# Patient Record
Sex: FEMALE | Race: WHITE | NOT HISPANIC OR LATINO | Employment: OTHER | URBAN - METROPOLITAN AREA
[De-identification: names, ages, dates, MRNs, and addresses within clinical notes are randomized per-mention and may not be internally consistent; named-entity substitution may affect disease eponyms.]

---

## 2017-01-12 ENCOUNTER — HOSPITAL ENCOUNTER (INPATIENT)
Facility: HOSPITAL | Age: 52
LOS: 5 days | Discharge: HOME | DRG: 885 | End: 2017-01-17
Attending: EMERGENCY MEDICINE | Admitting: PSYCHIATRY & NEUROLOGY
Payer: COMMERCIAL

## 2017-01-12 DIAGNOSIS — I10 HTN (HYPERTENSION): ICD-10-CM

## 2017-01-12 DIAGNOSIS — F32.A DEPRESSION: ICD-10-CM

## 2017-01-12 DIAGNOSIS — F33.2 MAJOR DEPRESSIVE DISORDER, RECURRENT, SEVERE WITHOUT PSYCHOTIC FEATURES (HCC): Primary | Chronic | ICD-10-CM

## 2017-01-12 DIAGNOSIS — F41.9 ANXIETY: ICD-10-CM

## 2017-01-12 DIAGNOSIS — R45.851 SUICIDAL IDEATION: ICD-10-CM

## 2017-01-12 LAB
AMPHETAMINES SERPL QL SCN: NEGATIVE
BARBITURATES UR QL: NEGATIVE
BENZODIAZ UR QL: NEGATIVE
COCAINE UR QL: NEGATIVE
ETHANOL EXG-MCNC: NORMAL MG/DL
METHADONE UR QL: NEGATIVE
OPIATES UR QL SCN: NEGATIVE
PCP UR QL: NEGATIVE
THC UR QL: NEGATIVE

## 2017-01-12 PROCEDURE — 99285 EMERGENCY DEPT VISIT HI MDM: CPT

## 2017-01-12 PROCEDURE — 82075 ASSAY OF BREATH ETHANOL: CPT | Performed by: EMERGENCY MEDICINE

## 2017-01-12 PROCEDURE — 80307 DRUG TEST PRSMV CHEM ANLYZR: CPT | Performed by: EMERGENCY MEDICINE

## 2017-01-12 RX ORDER — HALOPERIDOL 5 MG/ML
5 INJECTION INTRAMUSCULAR EVERY 6 HOURS PRN
Status: DISCONTINUED | OUTPATIENT
Start: 2017-01-12 | End: 2017-01-17 | Stop reason: HOSPADM

## 2017-01-12 RX ORDER — FLUOXETINE HYDROCHLORIDE 20 MG/1
20 CAPSULE ORAL DAILY
COMMUNITY
End: 2017-01-17 | Stop reason: HOSPADM

## 2017-01-12 RX ORDER — RISPERIDONE 1 MG/1
1 TABLET, ORALLY DISINTEGRATING ORAL
Status: DISCONTINUED | OUTPATIENT
Start: 2017-01-12 | End: 2017-01-17 | Stop reason: HOSPADM

## 2017-01-12 RX ORDER — BENZTROPINE MESYLATE 1 MG/ML
1 INJECTION INTRAMUSCULAR; INTRAVENOUS EVERY 4 HOURS PRN
Status: DISCONTINUED | OUTPATIENT
Start: 2017-01-12 | End: 2017-01-17 | Stop reason: HOSPADM

## 2017-01-12 RX ORDER — IBUPROFEN 400 MG/1
800 TABLET ORAL EVERY 8 HOURS PRN
Status: DISCONTINUED | OUTPATIENT
Start: 2017-01-12 | End: 2017-01-17 | Stop reason: HOSPADM

## 2017-01-12 RX ORDER — OLANZAPINE 10 MG/1
10 INJECTION, POWDER, LYOPHILIZED, FOR SOLUTION INTRAMUSCULAR
Status: DISCONTINUED | OUTPATIENT
Start: 2017-01-12 | End: 2017-01-17 | Stop reason: HOSPADM

## 2017-01-12 RX ORDER — HYDROXYZINE HYDROCHLORIDE 25 MG/1
25 TABLET, FILM COATED ORAL EVERY 4 HOURS PRN
Status: DISCONTINUED | OUTPATIENT
Start: 2017-01-12 | End: 2017-01-17 | Stop reason: HOSPADM

## 2017-01-12 RX ORDER — LORAZEPAM 1 MG/1
1 TABLET ORAL EVERY 4 HOURS PRN
Status: DISCONTINUED | OUTPATIENT
Start: 2017-01-12 | End: 2017-01-16

## 2017-01-12 RX ORDER — ACETAMINOPHEN 325 MG/1
650 TABLET ORAL EVERY 6 HOURS PRN
Status: DISCONTINUED | OUTPATIENT
Start: 2017-01-12 | End: 2017-01-17 | Stop reason: HOSPADM

## 2017-01-12 RX ORDER — HALOPERIDOL 5 MG
5 TABLET ORAL EVERY 6 HOURS PRN
Status: DISCONTINUED | OUTPATIENT
Start: 2017-01-12 | End: 2017-01-17 | Stop reason: HOSPADM

## 2017-01-12 RX ORDER — ARIPIPRAZOLE 5 MG/1
5 TABLET ORAL DAILY
Status: DISCONTINUED | OUTPATIENT
Start: 2017-01-13 | End: 2017-01-16

## 2017-01-12 RX ORDER — TRAZODONE HYDROCHLORIDE 50 MG/1
50 TABLET ORAL
Status: DISCONTINUED | OUTPATIENT
Start: 2017-01-12 | End: 2017-01-17 | Stop reason: HOSPADM

## 2017-01-12 RX ORDER — BENZTROPINE MESYLATE 1 MG/1
1 TABLET ORAL EVERY 4 HOURS PRN
Status: DISCONTINUED | OUTPATIENT
Start: 2017-01-12 | End: 2017-01-17 | Stop reason: HOSPADM

## 2017-01-12 RX ORDER — LORAZEPAM 2 MG/ML
1 INJECTION INTRAMUSCULAR EVERY 4 HOURS PRN
Status: DISCONTINUED | OUTPATIENT
Start: 2017-01-12 | End: 2017-01-17 | Stop reason: HOSPADM

## 2017-01-12 RX ORDER — ACETAMINOPHEN 325 MG/1
650 TABLET ORAL EVERY 4 HOURS PRN
Status: DISCONTINUED | OUTPATIENT
Start: 2017-01-12 | End: 2017-01-17 | Stop reason: HOSPADM

## 2017-01-12 RX ORDER — MAGNESIUM HYDROXIDE/ALUMINUM HYDROXICE/SIMETHICONE 120; 1200; 1200 MG/30ML; MG/30ML; MG/30ML
30 SUSPENSION ORAL EVERY 4 HOURS PRN
Status: DISCONTINUED | OUTPATIENT
Start: 2017-01-12 | End: 2017-01-17 | Stop reason: HOSPADM

## 2017-01-12 RX ORDER — ARIPIPRAZOLE 10 MG/1
5 TABLET ORAL DAILY
COMMUNITY
End: 2017-01-17 | Stop reason: HOSPADM

## 2017-01-12 RX ORDER — LORAZEPAM 0.5 MG/1
0.5 TABLET ORAL EVERY 6 HOURS PRN
COMMUNITY
End: 2017-01-17 | Stop reason: HOSPADM

## 2017-01-12 RX ORDER — FLUOXETINE HYDROCHLORIDE 20 MG/1
20 CAPSULE ORAL DAILY
Status: DISCONTINUED | OUTPATIENT
Start: 2017-01-13 | End: 2017-01-17 | Stop reason: HOSPADM

## 2017-01-13 LAB
ALBUMIN SERPL BCP-MCNC: 3.3 G/DL (ref 3.5–5)
ALP SERPL-CCNC: 73 U/L (ref 46–116)
ALT SERPL W P-5'-P-CCNC: 23 U/L (ref 12–78)
ANION GAP SERPL CALCULATED.3IONS-SCNC: 8 MMOL/L (ref 4–13)
AST SERPL W P-5'-P-CCNC: 12 U/L (ref 5–45)
BASOPHILS # BLD AUTO: 0.03 THOUSANDS/ΜL (ref 0–0.1)
BASOPHILS NFR BLD AUTO: 0 % (ref 0–1)
BILIRUB SERPL-MCNC: 0.75 MG/DL (ref 0.2–1)
BUN SERPL-MCNC: 15 MG/DL (ref 5–25)
CALCIUM SERPL-MCNC: 8.9 MG/DL (ref 8.3–10.1)
CHLORIDE SERPL-SCNC: 105 MMOL/L (ref 100–108)
CHOLEST SERPL-MCNC: 193 MG/DL (ref 50–200)
CO2 SERPL-SCNC: 29 MMOL/L (ref 21–32)
CREAT SERPL-MCNC: 0.76 MG/DL (ref 0.6–1.3)
EOSINOPHIL # BLD AUTO: 0.41 THOUSAND/ΜL (ref 0–0.61)
EOSINOPHIL NFR BLD AUTO: 5 % (ref 0–6)
ERYTHROCYTE [DISTWIDTH] IN BLOOD BY AUTOMATED COUNT: 13.9 % (ref 11.6–15.1)
GFR SERPL CREATININE-BSD FRML MDRD: >60 ML/MIN/1.73SQ M
GLUCOSE SERPL-MCNC: 105 MG/DL (ref 65–140)
HCG SERPL QL: NEGATIVE
HCT VFR BLD AUTO: 39.3 % (ref 34.8–46.1)
HDLC SERPL-MCNC: 60 MG/DL (ref 40–60)
HGB BLD-MCNC: 13.2 G/DL (ref 11.5–15.4)
LDLC SERPL CALC-MCNC: 112 MG/DL (ref 0–100)
LYMPHOCYTES # BLD AUTO: 3.31 THOUSANDS/ΜL (ref 0.6–4.47)
LYMPHOCYTES NFR BLD AUTO: 38 % (ref 14–44)
MCH RBC QN AUTO: 29.4 PG (ref 26.8–34.3)
MCHC RBC AUTO-ENTMCNC: 33.6 G/DL (ref 31.4–37.4)
MCV RBC AUTO: 88 FL (ref 82–98)
MONOCYTES # BLD AUTO: 0.99 THOUSAND/ΜL (ref 0.17–1.22)
MONOCYTES NFR BLD AUTO: 11 % (ref 4–12)
NEUTROPHILS # BLD AUTO: 4.01 THOUSANDS/ΜL (ref 1.85–7.62)
NEUTS SEG NFR BLD AUTO: 46 % (ref 43–75)
NRBC BLD AUTO-RTO: 0 /100 WBCS
PLATELET # BLD AUTO: 224 THOUSANDS/UL (ref 149–390)
PMV BLD AUTO: 10.1 FL (ref 8.9–12.7)
POTASSIUM SERPL-SCNC: 4.1 MMOL/L (ref 3.5–5.3)
PROT SERPL-MCNC: 7.2 G/DL (ref 6.4–8.2)
RBC # BLD AUTO: 4.49 MILLION/UL (ref 3.81–5.12)
RPR SER QL: NORMAL
SODIUM SERPL-SCNC: 142 MMOL/L (ref 136–145)
TRIGL SERPL-MCNC: 104 MG/DL
TSH SERPL DL<=0.05 MIU/L-ACNC: 1.54 UIU/ML (ref 0.36–3.74)
WBC # BLD AUTO: 8.77 THOUSAND/UL (ref 4.31–10.16)

## 2017-01-13 PROCEDURE — 80053 COMPREHEN METABOLIC PANEL: CPT | Performed by: PSYCHIATRY & NEUROLOGY

## 2017-01-13 PROCEDURE — 86592 SYPHILIS TEST NON-TREP QUAL: CPT | Performed by: PSYCHIATRY & NEUROLOGY

## 2017-01-13 PROCEDURE — 85025 COMPLETE CBC W/AUTO DIFF WBC: CPT | Performed by: PSYCHIATRY & NEUROLOGY

## 2017-01-13 PROCEDURE — 84703 CHORIONIC GONADOTROPIN ASSAY: CPT | Performed by: PSYCHIATRY & NEUROLOGY

## 2017-01-13 PROCEDURE — 84443 ASSAY THYROID STIM HORMONE: CPT | Performed by: PSYCHIATRY & NEUROLOGY

## 2017-01-13 PROCEDURE — 80061 LIPID PANEL: CPT | Performed by: PSYCHIATRY & NEUROLOGY

## 2017-01-13 RX ORDER — HYDROCHLOROTHIAZIDE 25 MG/1
25 TABLET ORAL DAILY
Status: DISCONTINUED | OUTPATIENT
Start: 2017-01-13 | End: 2017-01-17 | Stop reason: HOSPADM

## 2017-01-13 RX ADMIN — FLUOXETINE 20 MG: 20 CAPSULE ORAL at 08:37

## 2017-01-13 RX ADMIN — ARIPIPRAZOLE 5 MG: 5 TABLET ORAL at 08:37

## 2017-01-13 RX ADMIN — HYDROCHLOROTHIAZIDE 25 MG: 25 TABLET ORAL at 17:57

## 2017-01-14 RX ADMIN — LORAZEPAM 1 MG: 1 TABLET ORAL at 15:49

## 2017-01-14 RX ADMIN — FLUOXETINE 20 MG: 20 CAPSULE ORAL at 08:37

## 2017-01-14 RX ADMIN — ARIPIPRAZOLE 5 MG: 5 TABLET ORAL at 08:36

## 2017-01-14 RX ADMIN — HYDROCHLOROTHIAZIDE 25 MG: 25 TABLET ORAL at 08:36

## 2017-01-15 RX ADMIN — FLUOXETINE 20 MG: 20 CAPSULE ORAL at 08:15

## 2017-01-15 RX ADMIN — ARIPIPRAZOLE 5 MG: 5 TABLET ORAL at 08:15

## 2017-01-15 RX ADMIN — LORAZEPAM 1 MG: 1 TABLET ORAL at 10:20

## 2017-01-15 RX ADMIN — HYDROCHLOROTHIAZIDE 25 MG: 25 TABLET ORAL at 08:15

## 2017-01-16 RX ORDER — ARIPIPRAZOLE 10 MG/1
10 TABLET ORAL DAILY
Status: DISCONTINUED | OUTPATIENT
Start: 2017-01-17 | End: 2017-01-17 | Stop reason: HOSPADM

## 2017-01-16 RX ORDER — HYDROXYZINE 50 MG/1
50 TABLET, FILM COATED ORAL ONCE
Status: COMPLETED | OUTPATIENT
Start: 2017-01-16 | End: 2017-01-16

## 2017-01-16 RX ORDER — ARIPIPRAZOLE 5 MG/1
5 TABLET ORAL ONCE
Status: COMPLETED | OUTPATIENT
Start: 2017-01-16 | End: 2017-01-16

## 2017-01-16 RX ORDER — LORAZEPAM 1 MG/1
1 TABLET ORAL EVERY 8 HOURS PRN
Status: DISCONTINUED | OUTPATIENT
Start: 2017-01-16 | End: 2017-01-17 | Stop reason: HOSPADM

## 2017-01-16 RX ADMIN — ARIPIPRAZOLE 5 MG: 5 TABLET ORAL at 08:56

## 2017-01-16 RX ADMIN — HYDROXYZINE HYDROCHLORIDE 50 MG: 50 TABLET ORAL at 16:05

## 2017-01-16 RX ADMIN — FLUOXETINE 20 MG: 20 CAPSULE ORAL at 08:56

## 2017-01-16 RX ADMIN — HYDROCHLOROTHIAZIDE 25 MG: 25 TABLET ORAL at 09:31

## 2017-01-16 RX ADMIN — ARIPIPRAZOLE 5 MG: 5 TABLET ORAL at 16:35

## 2017-01-17 VITALS
RESPIRATION RATE: 16 BRPM | HEIGHT: 64 IN | BODY MASS INDEX: 29.53 KG/M2 | TEMPERATURE: 97.1 F | DIASTOLIC BLOOD PRESSURE: 75 MMHG | SYSTOLIC BLOOD PRESSURE: 122 MMHG | HEART RATE: 81 BPM | WEIGHT: 173 LBS | OXYGEN SATURATION: 98 %

## 2017-01-17 RX ORDER — ARIPIPRAZOLE 10 MG/1
10 TABLET ORAL DAILY
Qty: 30 TABLET | Refills: 0 | Status: ON HOLD | OUTPATIENT
Start: 2017-01-17 | End: 2019-07-11 | Stop reason: SDUPTHER

## 2017-01-17 RX ORDER — FLUOXETINE HYDROCHLORIDE 20 MG/1
20 CAPSULE ORAL DAILY
Qty: 30 CAPSULE | Refills: 0 | Status: ON HOLD | OUTPATIENT
Start: 2017-01-17 | End: 2019-07-11 | Stop reason: SDUPTHER

## 2017-01-17 RX ORDER — HYDROCHLOROTHIAZIDE 25 MG/1
25 TABLET ORAL DAILY
Qty: 30 TABLET | Refills: 0 | Status: SHIPPED | OUTPATIENT
Start: 2017-01-17 | End: 2019-05-06 | Stop reason: HOSPADM

## 2017-01-17 RX ORDER — HYDROXYZINE HYDROCHLORIDE 25 MG/1
25 TABLET, FILM COATED ORAL EVERY 8 HOURS PRN
Qty: 30 TABLET | Refills: 0 | Status: SHIPPED | OUTPATIENT
Start: 2017-01-17 | End: 2020-03-13

## 2017-01-17 RX ADMIN — HYDROCHLOROTHIAZIDE 25 MG: 25 TABLET ORAL at 10:25

## 2017-01-17 RX ADMIN — FLUOXETINE 20 MG: 20 CAPSULE ORAL at 08:45

## 2017-01-17 RX ADMIN — ARIPIPRAZOLE 10 MG: 10 TABLET ORAL at 08:45

## 2018-11-27 ENCOUNTER — TRANSCRIBE ORDERS (OUTPATIENT)
Dept: ADMINISTRATIVE | Facility: HOSPITAL | Age: 53
End: 2018-11-27

## 2018-11-27 DIAGNOSIS — H91.8X9 ASYMMETRICAL HEARING LOSS, UNSPECIFIED LATERALITY: Primary | ICD-10-CM

## 2018-12-24 ENCOUNTER — HOSPITAL ENCOUNTER (EMERGENCY)
Facility: HOSPITAL | Age: 53
Discharge: HOME | End: 2018-12-24
Attending: EMERGENCY MEDICINE | Admitting: EMERGENCY MEDICINE
Payer: COMMERCIAL

## 2018-12-24 VITALS
HEIGHT: 64 IN | DIASTOLIC BLOOD PRESSURE: 73 MMHG | OXYGEN SATURATION: 97 % | BODY MASS INDEX: 29.7 KG/M2 | RESPIRATION RATE: 18 BRPM | HEART RATE: 70 BPM | SYSTOLIC BLOOD PRESSURE: 122 MMHG | TEMPERATURE: 98.8 F

## 2018-12-24 DIAGNOSIS — F32.A DEPRESSION: Primary | ICD-10-CM

## 2018-12-24 LAB
AMPHETAMINES SERPL QL SCN: NEGATIVE
BARBITURATES UR QL: NEGATIVE
BENZODIAZ UR QL: NEGATIVE
COCAINE UR QL: NEGATIVE
ETHANOL EXG-MCNC: 0 MG/DL
METHADONE UR QL: NEGATIVE
OPIATES UR QL SCN: NEGATIVE
PCP UR QL: NEGATIVE
THC UR QL: NEGATIVE

## 2018-12-24 PROCEDURE — 82075 ASSAY OF BREATH ETHANOL: CPT | Performed by: EMERGENCY MEDICINE

## 2018-12-24 PROCEDURE — 99284 EMERGENCY DEPT VISIT MOD MDM: CPT

## 2018-12-24 PROCEDURE — 80307 DRUG TEST PRSMV CHEM ANLYZR: CPT | Performed by: EMERGENCY MEDICINE

## 2018-12-24 RX ORDER — SIMVASTATIN 20 MG
20 TABLET ORAL
Status: ON HOLD | COMMUNITY
End: 2019-05-02 | Stop reason: CLARIF

## 2018-12-25 NOTE — ED NOTES
[facility]) - patient's support person at bedside        Brayan Leyva RN  12/24/18 0721

## 2018-12-25 NOTE — ED NOTES
Patient ambulatory to secured holding accompanied by behavioral health support person Eugenio Steele)  Compliant with assessment and treatment        Devorah Calderon RN  12/24/18 2954

## 2018-12-25 NOTE — ED PROVIDER NOTES
History  Chief Complaint   Patient presents with    Psychiatric Evaluation     [facility] Sommer Wilber) for evaluation of increasing depression over the last few days  Patient seen by psychiatrist/therapist today and suggested to be evaluated in emergency department  Patient reports that she had thoughts of harming herself today, but denies any plan  Denies HI   Depression     46 YR  FEMALE WITH HX OF MAJOR DEPRESSION -- BROUGHT IN BY [facility] TODAY WHO TALKED WITH PT FOR LENGTH OF TIME TODAY - PT WITH INCREASING DEPRESSION  OVER LAST SEVERAL DAYS  -STATES THIS HAPPENS AROUND  HOLIDAYS-- PT WITH SI- BUT NO PLAN AT PRESENT -- ASKING TO GO TO [address] [place] [address]        History provided by:  Patient   used: No    Psychiatric Evaluation   Presenting symptoms: suicidal thoughts    Presenting symptoms: no agitation, no hallucinations and no self-mutilation    Associated symptoms: no anxiety    Depression   Presenting symptoms: suicidal thoughts    Presenting symptoms: no agitation, no hallucinations and no self-mutilation    Associated symptoms: no anxiety        Prior to Admission Medications   Prescriptions Last Dose Informant Patient Reported? Taking?    ARIPiprazole (ABILIFY) 10 mg tablet   No Yes   Sig: Take 1 tablet by mouth daily for 30 days   FLUoxetine (PROzac) 20 mg capsule   No Yes   Sig: Take 1 capsule by mouth daily for 30 days   hydrOXYzine HCL (ATARAX) 25 mg tablet   No Yes   Sig: Take 1 tablet by mouth every 8 (eight) hours as needed for anxiety (mild anxiety) for up to 30 days   hydrochlorothiazide (HYDRODIURIL) 25 mg tablet   No Yes   Sig: Take 1 tablet by mouth daily for 30 days   simvastatin (ZOCOR) 20 mg tablet   Yes Yes   Sig: Take 20 mg by mouth daily at bedtime      Facility-Administered Medications: None       Past Medical History:   Diagnosis Date    Anxiety     Arthritis     Depression     Hyperlipidemia     Hypertension     Psychiatric disorder     Psychiatric illness        History reviewed  No pertinent surgical history  Family History   Problem Relation Age of Onset   [facility] Alzheimer's disease Mother     Depression Mother     No Known Problems Sister     Depression Brother     No Known Problems Maternal Aunt     No Known Problems Paternal Aunt     No Known Problems Maternal Uncle     No Known Problems Paternal Uncle     No Known Problems Maternal Grandfather     No Known Problems Maternal Grandmother     No Known Problems Paternal Grandfather     No Known Problems Paternal Grandmother     No Known Problems Cousin     ADD / ADHD Neg Hx     Alcohol abuse Neg Hx     Anxiety disorder Neg Hx     Bipolar disorder Neg Hx     Dementia Neg Hx     Drug abuse Neg Hx     OCD Neg Hx     Paranoid behavior Neg Hx     Schizophrenia Neg Hx     Seizures Neg Hx     Self-Injury Neg Hx     Suicide Attempts Neg Hx      I have reviewed and agree with the history as documented  Social History   Substance Use Topics    Smoking status: Never Smoker    Smokeless tobacco: Never Used    Alcohol use No        Review of Systems   Constitutional: Negative  HENT: Negative  Eyes: Negative  Respiratory: Negative  Cardiovascular: Negative  Gastrointestinal: Negative  Endocrine: Negative  Genitourinary: Negative  Musculoskeletal: Negative  Skin: Negative  Allergic/Immunologic: Negative  Neurological: Negative  Hematological: Negative  Psychiatric/Behavioral: Positive for depression, dysphoric mood and suicidal ideas  Negative for agitation, behavioral problems, confusion, decreased concentration, hallucinations, self-injury and sleep disturbance  The patient is not nervous/anxious and is not hyperactive  Physical Exam  Physical Exam   Constitutional: She is oriented to person, place, and time  No distress     AVSS- PULSE OX 97 % ON RA- INTERPRETATION IS NORMAL- NO INTERVENTION    HENT:   Head: Normocephalic and atraumatic  Eyes: Pupils are equal, round, and reactive to light  Conjunctivae and EOM are normal  Right eye exhibits no discharge  Left eye exhibits no discharge  No scleral icterus  MM PINK   Neck: Normal range of motion  Neck supple  No JVD present  No tracheal deviation present  No thyromegaly present  Cardiovascular: Normal rate, regular rhythm, normal heart sounds and intact distal pulses  Exam reveals no gallop and no friction rub  No murmur heard  Pulmonary/Chest: Effort normal and breath sounds normal  No stridor  No respiratory distress  She has no wheezes  She has no rales  She exhibits no tenderness  Abdominal: Soft  Bowel sounds are normal  She exhibits no distension and no mass  There is no tenderness  There is no rebound and no guarding  No hernia  Musculoskeletal: Normal range of motion  She exhibits no edema, tenderness or deformity  Lymphadenopathy:     She has no cervical adenopathy  Neurological: She is alert and oriented to person, place, and time  No cranial nerve deficit or sensory deficit  She exhibits normal muscle tone  Coordination normal    Skin: Skin is warm  Capillary refill takes less than 2 seconds  No rash noted  She is not diaphoretic  No erythema  No pallor  Psychiatric:   FLAT AFFECT   Nursing note and vitals reviewed        Vital Signs  ED Triage Vitals [12/24/18 2025]   Temperature Pulse Respirations Blood Pressure SpO2   98 8 °F (37 1 °C) 70 18 122/73 97 %      Temp Source Heart Rate Source Patient Position - Orthostatic VS BP Location FiO2 (%)   Oral Monitor Sitting Right arm --      Pain Score       No Pain           Vitals:    12/24/18 2025   BP: 122/73   Pulse: 70   Patient Position - Orthostatic VS: Sitting       Visual Acuity      ED Medications  Medications - No data to display    Diagnostic Studies  Results Reviewed     Procedure Component Value Units Date/Time    Rapid drug screen, urine [[id number]]  (Normal) Collected:  12/24/18 2105    Lab Status:  Final result Specimen:  Urine from Urine, Other Updated:  12/24/18 2120     Amph/Meth UR Negative     Barbiturate Ur Negative     Benzodiazepine Urine Negative     Cocaine Urine Negative     Methadone Urine Negative     Opiate Urine Negative     PCP Ur Negative     THC Urine Negative    Narrative:         FOR MEDICAL PURPOSES ONLY  IF CONFIRMATION NEEDED PLEASE CONTACT THE LAB WITHIN 5 DAYS  Drug Screen Cutoff Levels:  AMPHETAMINE/METHAMPHETAMINES  1000 ng/mL  BARBITURATES     200 ng/mL  BENZODIAZEPINES     200 ng/mL  COCAINE      300 ng/mL  METHADONE      300 ng/mL  OPIATES      300 ng/mL  PHENCYCLIDINE     25 ng/mL  THC       50 ng/mL    POCT alcohol breath test [[id number]]  (Normal) Resulted:  12/24/18 2102    Lab Status:  Final result Updated:  12/24/18 2102     EXTBreath Alcohol 0 000                 No orders to display              Procedures  Procedures       Phone Contacts  ED Phone Contact    ED Course                               MDM  CritCare Time    Disposition  Final diagnoses:   Depression     Time reflects when diagnosis was documented in both MDM as applicable and the Disposition within this note     Time User Action Codes Description Comment    12/24/2018  9:52 PM Hattie Boyd Add [F32 9] Depression       ED Disposition     ED Disposition Condition Comment    Discharge  [facility] discharge to home/self care  Condition at discharge: Good        Follow-up Information    None         Patient's Medications   Discharge Prescriptions    No medications on file     No discharge procedures on file      ED Provider  Electronically Signed by           Iron Wheatley MD  12/24/18 1873

## 2018-12-25 NOTE — ED NOTES
Intake / Safety assessment completed with patient who is here with her peer   Patient presents to ED due to increased depression / anxiety  Patient report vague suicidal thoughts earlier today, but states she no longer feels that way and that she had no specific plan to harm herself  Patient denies homicidal ideation or hallucinations  Patient reports increased anxiety as well  She notes that the holidays tend to be difficult for her  She reports feeling lonely and sad  Patient states she has little to no family  Initially patient was thinking she wanted to be admitted to [facility]  Since being here she feels she would like to go spend a few days with her friend in [place] whom she lived with when she was homeless  Ronaldo Moreira her peer  feels that patient can contract for safety at friends home  Patient is followed at [facility] for Psychatric care  She also has an ACT team worker as well as her peer  who is with her now  Ronaldo Moreira agreed to up her visits with patient to twice a week instead of weekly  She will also get her a sooner appointment with her therapist and Psychiatrist at [facility]  Patient to be discharged

## 2018-12-25 NOTE — DISCHARGE INSTRUCTIONS
DIAGNOSIS; MAJOR DEPRESSION     - IF YOU START TO FEEL WORSE- PLEASE COME BACK TO THE ER AND WE WILL GET YOU MORE HELP

## 2019-05-01 ENCOUNTER — HOSPITAL ENCOUNTER (EMERGENCY)
Facility: HOSPITAL | Age: 54
End: 2019-05-01
Attending: EMERGENCY MEDICINE | Admitting: EMERGENCY MEDICINE
Payer: COMMERCIAL

## 2019-05-01 ENCOUNTER — HOSPITAL ENCOUNTER (INPATIENT)
Facility: HOSPITAL | Age: 54
LOS: 5 days | Discharge: HOME | DRG: 885 | End: 2019-05-06
Attending: PSYCHIATRY & NEUROLOGY | Admitting: PSYCHIATRY & NEUROLOGY
Payer: COMMERCIAL

## 2019-05-01 VITALS
SYSTOLIC BLOOD PRESSURE: 128 MMHG | BODY MASS INDEX: 30.73 KG/M2 | HEIGHT: 64 IN | HEART RATE: 83 BPM | WEIGHT: 180 LBS | RESPIRATION RATE: 20 BRPM | OXYGEN SATURATION: 96 % | DIASTOLIC BLOOD PRESSURE: 70 MMHG | TEMPERATURE: 97.9 F

## 2019-05-01 DIAGNOSIS — F32.A DEPRESSION: Primary | ICD-10-CM

## 2019-05-01 DIAGNOSIS — R45.851 SUICIDAL IDEATIONS: ICD-10-CM

## 2019-05-01 DIAGNOSIS — F33.2 MAJOR DEPRESSIVE DISORDER, RECURRENT, SEVERE WITHOUT PSYCHOTIC FEATURES (HCC): Chronic | ICD-10-CM

## 2019-05-01 DIAGNOSIS — G47.00 INSOMNIA: Primary | ICD-10-CM

## 2019-05-01 DIAGNOSIS — Z00.8 MEDICAL CLEARANCE FOR PSYCHIATRIC ADMISSION: Primary | ICD-10-CM

## 2019-05-01 DIAGNOSIS — Z00.8 MEDICAL CLEARANCE FOR PSYCHIATRIC ADMISSION: ICD-10-CM

## 2019-05-01 LAB
AMPHETAMINES SERPL QL SCN: NEGATIVE
BARBITURATES UR QL: NEGATIVE
BENZODIAZ UR QL: NEGATIVE
BILIRUB UR QL STRIP: NEGATIVE
CLARITY UR: CLEAR
COCAINE UR QL: NEGATIVE
COLOR UR: YELLOW
ETHANOL EXG-MCNC: 0 MG/DL
ETHANOL EXG-MCNC: 0 MG/DL
ETHANOL SERPL-MCNC: <3 MG/DL (ref 0–3)
GLUCOSE UR STRIP-MCNC: NEGATIVE MG/DL
HGB UR QL STRIP.AUTO: NEGATIVE
KETONES UR STRIP-MCNC: NEGATIVE MG/DL
LEUKOCYTE ESTERASE UR QL STRIP: NEGATIVE
METHADONE UR QL: NEGATIVE
NITRITE UR QL STRIP: NEGATIVE
OPIATES UR QL SCN: NEGATIVE
PCP UR QL: NEGATIVE
PH UR STRIP.AUTO: 5.5 [PH] (ref 4.5–8)
PROT UR STRIP-MCNC: NEGATIVE MG/DL
SP GR UR STRIP.AUTO: >=1.03 (ref 1–1.03)
THC UR QL: NEGATIVE
UROBILINOGEN UR QL STRIP.AUTO: 1 E.U./DL

## 2019-05-01 PROCEDURE — 99285 EMERGENCY DEPT VISIT HI MDM: CPT | Performed by: EMERGENCY MEDICINE

## 2019-05-01 PROCEDURE — 36415 COLL VENOUS BLD VENIPUNCTURE: CPT | Performed by: EMERGENCY MEDICINE

## 2019-05-01 PROCEDURE — 81003 URINALYSIS AUTO W/O SCOPE: CPT

## 2019-05-01 PROCEDURE — 99285 EMERGENCY DEPT VISIT HI MDM: CPT

## 2019-05-01 PROCEDURE — 80320 DRUG SCREEN QUANTALCOHOLS: CPT | Performed by: EMERGENCY MEDICINE

## 2019-05-01 PROCEDURE — 80307 DRUG TEST PRSMV CHEM ANLYZR: CPT | Performed by: EMERGENCY MEDICINE

## 2019-05-01 PROCEDURE — 82075 ASSAY OF BREATH ETHANOL: CPT | Performed by: EMERGENCY MEDICINE

## 2019-05-01 RX ORDER — HALOPERIDOL 5 MG
5 TABLET ORAL EVERY 6 HOURS PRN
Status: DISCONTINUED | OUTPATIENT
Start: 2019-05-01 | End: 2019-05-06 | Stop reason: HOSPADM

## 2019-05-01 RX ORDER — TRAZODONE HYDROCHLORIDE 50 MG/1
50 TABLET ORAL
Status: DISCONTINUED | OUTPATIENT
Start: 2019-05-01 | End: 2019-05-06 | Stop reason: HOSPADM

## 2019-05-01 RX ORDER — HYDROXYZINE HYDROCHLORIDE 25 MG/1
25 TABLET, FILM COATED ORAL EVERY 4 HOURS PRN
Status: DISCONTINUED | OUTPATIENT
Start: 2019-05-01 | End: 2019-05-06 | Stop reason: HOSPADM

## 2019-05-01 RX ORDER — HALOPERIDOL 5 MG
5 TABLET ORAL EVERY 6 HOURS PRN
Status: CANCELLED | OUTPATIENT
Start: 2019-05-01

## 2019-05-01 RX ORDER — BENZTROPINE MESYLATE 1 MG/1
1 TABLET ORAL EVERY 6 HOURS PRN
Status: DISCONTINUED | OUTPATIENT
Start: 2019-05-01 | End: 2019-05-06 | Stop reason: HOSPADM

## 2019-05-01 RX ORDER — IBUPROFEN 800 MG/1
800 TABLET ORAL EVERY 8 HOURS PRN
Status: DISCONTINUED | OUTPATIENT
Start: 2019-05-01 | End: 2019-05-06 | Stop reason: HOSPADM

## 2019-05-01 RX ORDER — BENZTROPINE MESYLATE 1 MG/ML
1 INJECTION INTRAMUSCULAR; INTRAVENOUS EVERY 6 HOURS PRN
Status: DISCONTINUED | OUTPATIENT
Start: 2019-05-01 | End: 2019-05-06 | Stop reason: HOSPADM

## 2019-05-01 RX ORDER — IBUPROFEN 600 MG/1
600 TABLET ORAL EVERY 8 HOURS PRN
Status: DISCONTINUED | OUTPATIENT
Start: 2019-05-01 | End: 2019-05-06 | Stop reason: HOSPADM

## 2019-05-01 RX ORDER — BENZTROPINE MESYLATE 1 MG/ML
1 INJECTION INTRAMUSCULAR; INTRAVENOUS EVERY 6 HOURS PRN
Status: CANCELLED | OUTPATIENT
Start: 2019-05-01

## 2019-05-01 RX ORDER — MAGNESIUM HYDROXIDE/ALUMINUM HYDROXICE/SIMETHICONE 120; 1200; 1200 MG/30ML; MG/30ML; MG/30ML
30 SUSPENSION ORAL EVERY 4 HOURS PRN
Status: DISCONTINUED | OUTPATIENT
Start: 2019-05-01 | End: 2019-05-06 | Stop reason: HOSPADM

## 2019-05-01 RX ORDER — IBUPROFEN 600 MG/1
600 TABLET ORAL EVERY 8 HOURS PRN
Status: CANCELLED | OUTPATIENT
Start: 2019-05-01

## 2019-05-01 RX ORDER — BENZTROPINE MESYLATE 1 MG/1
1 TABLET ORAL EVERY 6 HOURS PRN
Status: CANCELLED | OUTPATIENT
Start: 2019-05-01

## 2019-05-01 RX ORDER — HALOPERIDOL 5 MG/ML
5 INJECTION INTRAMUSCULAR EVERY 6 HOURS PRN
Status: DISCONTINUED | OUTPATIENT
Start: 2019-05-01 | End: 2019-05-06 | Stop reason: HOSPADM

## 2019-05-01 RX ORDER — IBUPROFEN 400 MG/1
800 TABLET ORAL EVERY 8 HOURS PRN
Status: CANCELLED | OUTPATIENT
Start: 2019-05-01

## 2019-05-01 RX ORDER — HALOPERIDOL 5 MG/ML
5 INJECTION INTRAMUSCULAR EVERY 6 HOURS PRN
Status: CANCELLED | OUTPATIENT
Start: 2019-05-01

## 2019-05-01 RX ORDER — RISPERIDONE 1 MG/1
1 TABLET, ORALLY DISINTEGRATING ORAL EVERY 6 HOURS PRN
Status: DISCONTINUED | OUTPATIENT
Start: 2019-05-01 | End: 2019-05-06 | Stop reason: HOSPADM

## 2019-05-01 RX ORDER — TRAZODONE HYDROCHLORIDE 50 MG/1
50 TABLET ORAL
Status: CANCELLED | OUTPATIENT
Start: 2019-05-01

## 2019-05-01 RX ORDER — MAGNESIUM HYDROXIDE/ALUMINUM HYDROXICE/SIMETHICONE 120; 1200; 1200 MG/30ML; MG/30ML; MG/30ML
30 SUSPENSION ORAL EVERY 4 HOURS PRN
Status: CANCELLED | OUTPATIENT
Start: 2019-05-01

## 2019-05-01 RX ORDER — HYDROXYZINE HYDROCHLORIDE 25 MG/1
25 TABLET, FILM COATED ORAL EVERY 4 HOURS PRN
Status: CANCELLED | OUTPATIENT
Start: 2019-05-01

## 2019-05-01 RX ORDER — RISPERIDONE 1 MG/1
1 TABLET, ORALLY DISINTEGRATING ORAL EVERY 6 HOURS PRN
Status: CANCELLED | OUTPATIENT
Start: 2019-05-01

## 2019-05-01 RX ADMIN — TRAZODONE HYDROCHLORIDE 50 MG: 50 TABLET ORAL at 22:25

## 2019-05-01 RX ADMIN — HYDROXYZINE HYDROCHLORIDE 25 MG: 25 TABLET ORAL at 22:25

## 2019-05-02 PROBLEM — F31.4 BIPOLAR AFFECTIVE DISORDER, DEPRESSED, SEVERE (HCC): Chronic | Status: ACTIVE | Noted: 2019-04-28

## 2019-05-02 PROBLEM — F31.4 BIPOLAR AFFECTIVE DISORDER, DEPRESSED, SEVERE (HCC): Status: ACTIVE | Noted: 2019-04-28

## 2019-05-02 PROBLEM — R45.851 SUICIDAL IDEATIONS: Status: ACTIVE | Noted: 2019-04-28

## 2019-05-02 PROBLEM — I10 ESSENTIAL HYPERTENSION: Status: ACTIVE | Noted: 2019-04-28

## 2019-05-02 LAB
ALBUMIN SERPL BCP-MCNC: 3.1 G/DL (ref 3.5–5)
ALP SERPL-CCNC: 70 U/L (ref 46–116)
ALT SERPL W P-5'-P-CCNC: 31 U/L (ref 12–78)
ANION GAP SERPL CALCULATED.3IONS-SCNC: 9 MMOL/L (ref 4–13)
AST SERPL W P-5'-P-CCNC: 17 U/L (ref 5–45)
BASOPHILS # BLD AUTO: 0.04 THOUSANDS/ΜL (ref 0–0.1)
BASOPHILS NFR BLD AUTO: 0 % (ref 0–1)
BILIRUB SERPL-MCNC: 0.6 MG/DL (ref 0.2–1)
BUN SERPL-MCNC: 12 MG/DL (ref 5–25)
CALCIUM SERPL-MCNC: 8.9 MG/DL (ref 8.3–10.1)
CHLORIDE SERPL-SCNC: 107 MMOL/L (ref 100–108)
CHOLEST SERPL-MCNC: 139 MG/DL (ref 50–200)
CO2 SERPL-SCNC: 28 MMOL/L (ref 21–32)
CREAT SERPL-MCNC: 0.84 MG/DL (ref 0.6–1.3)
EOSINOPHIL # BLD AUTO: 0.5 THOUSAND/ΜL (ref 0–0.61)
EOSINOPHIL NFR BLD AUTO: 5 % (ref 0–6)
ERYTHROCYTE [DISTWIDTH] IN BLOOD BY AUTOMATED COUNT: 13.4 % (ref 11.6–15.1)
GFR SERPL CREATININE-BSD FRML MDRD: 79 ML/MIN/1.73SQ M
GLUCOSE SERPL-MCNC: 101 MG/DL (ref 65–140)
HCT VFR BLD AUTO: 38 % (ref 34.8–46.1)
HDLC SERPL-MCNC: 41 MG/DL (ref 40–60)
HGB BLD-MCNC: 12.4 G/DL (ref 11.5–15.4)
IMM GRANULOCYTES # BLD AUTO: 0.03 THOUSAND/UL (ref 0–0.2)
IMM GRANULOCYTES NFR BLD AUTO: 0 % (ref 0–2)
LDLC SERPL CALC-MCNC: 77 MG/DL (ref 0–100)
LYMPHOCYTES # BLD AUTO: 2.61 THOUSANDS/ΜL (ref 0.6–4.47)
LYMPHOCYTES NFR BLD AUTO: 28 % (ref 14–44)
MCH RBC QN AUTO: 28.6 PG (ref 26.8–34.3)
MCHC RBC AUTO-ENTMCNC: 32.6 G/DL (ref 31.4–37.4)
MCV RBC AUTO: 88 FL (ref 82–98)
MONOCYTES # BLD AUTO: 1.11 THOUSAND/ΜL (ref 0.17–1.22)
MONOCYTES NFR BLD AUTO: 12 % (ref 4–12)
NEUTROPHILS # BLD AUTO: 4.95 THOUSANDS/ΜL (ref 1.85–7.62)
NEUTS SEG NFR BLD AUTO: 55 % (ref 43–75)
NONHDLC SERPL-MCNC: 98 MG/DL
NRBC BLD AUTO-RTO: 0 /100 WBCS
PLATELET # BLD AUTO: 270 THOUSANDS/UL (ref 149–390)
PMV BLD AUTO: 10 FL (ref 8.9–12.7)
POTASSIUM SERPL-SCNC: 3.9 MMOL/L (ref 3.5–5.3)
PROT SERPL-MCNC: 6.6 G/DL (ref 6.4–8.2)
RBC # BLD AUTO: 4.34 MILLION/UL (ref 3.81–5.12)
RPR SER QL: NORMAL
SODIUM SERPL-SCNC: 144 MMOL/L (ref 136–145)
TRIGL SERPL-MCNC: 106 MG/DL
TSH SERPL DL<=0.05 MIU/L-ACNC: 2.55 UIU/ML (ref 0.36–3.74)
WBC # BLD AUTO: 9.24 THOUSAND/UL (ref 4.31–10.16)

## 2019-05-02 PROCEDURE — 85025 COMPLETE CBC W/AUTO DIFF WBC: CPT | Performed by: PSYCHIATRY & NEUROLOGY

## 2019-05-02 PROCEDURE — 80061 LIPID PANEL: CPT | Performed by: PSYCHIATRY & NEUROLOGY

## 2019-05-02 PROCEDURE — 99221 1ST HOSP IP/OBS SF/LOW 40: CPT | Performed by: PHYSICIAN ASSISTANT

## 2019-05-02 PROCEDURE — 80053 COMPREHEN METABOLIC PANEL: CPT | Performed by: PSYCHIATRY & NEUROLOGY

## 2019-05-02 PROCEDURE — 99222 1ST HOSP IP/OBS MODERATE 55: CPT | Performed by: PSYCHIATRY & NEUROLOGY

## 2019-05-02 PROCEDURE — 84443 ASSAY THYROID STIM HORMONE: CPT | Performed by: PSYCHIATRY & NEUROLOGY

## 2019-05-02 PROCEDURE — 86592 SYPHILIS TEST NON-TREP QUAL: CPT | Performed by: PSYCHIATRY & NEUROLOGY

## 2019-05-02 RX ORDER — AMLODIPINE BESYLATE 10 MG/1
10 TABLET ORAL DAILY
COMMUNITY
End: 2019-08-01 | Stop reason: SDUPTHER

## 2019-05-02 RX ORDER — TRIAMTERENE AND HYDROCHLOROTHIAZIDE 37.5; 25 MG/1; MG/1
CAPSULE ORAL
Refills: 0 | COMMUNITY
Start: 2019-02-17 | End: 2019-05-06 | Stop reason: HOSPADM

## 2019-05-02 RX ORDER — FLUOXETINE HYDROCHLORIDE 40 MG/1
CAPSULE ORAL
Refills: 0 | Status: ON HOLD | COMMUNITY
Start: 2019-04-30 | End: 2019-05-06 | Stop reason: SDUPTHER

## 2019-05-02 RX ORDER — ARIPIPRAZOLE 10 MG/1
10 TABLET ORAL DAILY
Status: DISCONTINUED | OUTPATIENT
Start: 2019-05-02 | End: 2019-05-06 | Stop reason: HOSPADM

## 2019-05-02 RX ORDER — PRAVASTATIN SODIUM 40 MG
40 TABLET ORAL
Status: DISCONTINUED | OUTPATIENT
Start: 2019-05-02 | End: 2019-05-06 | Stop reason: HOSPADM

## 2019-05-02 RX ORDER — SIMVASTATIN 20 MG
20 TABLET ORAL
COMMUNITY
End: 2019-08-01 | Stop reason: SDUPTHER

## 2019-05-02 RX ORDER — AMLODIPINE BESYLATE 5 MG/1
10 TABLET ORAL DAILY
Status: DISCONTINUED | OUTPATIENT
Start: 2019-05-02 | End: 2019-05-06 | Stop reason: HOSPADM

## 2019-05-02 RX ORDER — LORAZEPAM 0.5 MG/1
0.5 TABLET ORAL EVERY 8 HOURS PRN
COMMUNITY
Start: 2019-04-30 | End: 2019-05-10 | Stop reason: ALTCHOICE

## 2019-05-02 RX ORDER — FLUOXETINE HYDROCHLORIDE 20 MG/1
60 CAPSULE ORAL DAILY
Status: DISCONTINUED | OUTPATIENT
Start: 2019-05-02 | End: 2019-05-06 | Stop reason: HOSPADM

## 2019-05-02 RX ADMIN — TRAZODONE HYDROCHLORIDE 50 MG: 50 TABLET ORAL at 22:16

## 2019-05-02 RX ADMIN — ARIPIPRAZOLE 10 MG: 10 TABLET ORAL at 11:36

## 2019-05-02 RX ADMIN — PRAVASTATIN SODIUM 40 MG: 40 TABLET ORAL at 16:12

## 2019-05-02 RX ADMIN — AMLODIPINE BESYLATE 10 MG: 5 TABLET ORAL at 11:35

## 2019-05-02 RX ADMIN — FLUOXETINE 60 MG: 20 CAPSULE ORAL at 11:36

## 2019-05-03 PROCEDURE — 99231 SBSQ HOSP IP/OBS SF/LOW 25: CPT | Performed by: PHYSICIAN ASSISTANT

## 2019-05-03 RX ORDER — HYDROXYZINE 50 MG/1
50 TABLET, FILM COATED ORAL EVERY 6 HOURS PRN
Status: DISCONTINUED | OUTPATIENT
Start: 2019-05-03 | End: 2019-05-06 | Stop reason: HOSPADM

## 2019-05-03 RX ORDER — LORAZEPAM 1 MG/1
1 TABLET ORAL EVERY 6 HOURS PRN
Status: DISCONTINUED | OUTPATIENT
Start: 2019-05-03 | End: 2019-05-06 | Stop reason: HOSPADM

## 2019-05-03 RX ADMIN — FLUOXETINE 60 MG: 20 CAPSULE ORAL at 08:18

## 2019-05-03 RX ADMIN — ARIPIPRAZOLE 10 MG: 10 TABLET ORAL at 08:19

## 2019-05-03 RX ADMIN — MAGNESIUM HYDROXIDE 30 ML: 400 SUSPENSION ORAL at 14:31

## 2019-05-03 RX ADMIN — AMLODIPINE BESYLATE 10 MG: 5 TABLET ORAL at 08:21

## 2019-05-03 RX ADMIN — TRAZODONE HYDROCHLORIDE 50 MG: 50 TABLET ORAL at 22:02

## 2019-05-03 RX ADMIN — PRAVASTATIN SODIUM 40 MG: 40 TABLET ORAL at 16:24

## 2019-05-04 PROCEDURE — 99232 SBSQ HOSP IP/OBS MODERATE 35: CPT | Performed by: PSYCHIATRY & NEUROLOGY

## 2019-05-04 RX ADMIN — AMLODIPINE BESYLATE 10 MG: 5 TABLET ORAL at 10:02

## 2019-05-04 RX ADMIN — FLUOXETINE 60 MG: 20 CAPSULE ORAL at 08:57

## 2019-05-04 RX ADMIN — ARIPIPRAZOLE 10 MG: 10 TABLET ORAL at 08:57

## 2019-05-04 RX ADMIN — PRAVASTATIN SODIUM 40 MG: 40 TABLET ORAL at 18:21

## 2019-05-05 PROCEDURE — 99231 SBSQ HOSP IP/OBS SF/LOW 25: CPT | Performed by: PSYCHIATRY & NEUROLOGY

## 2019-05-05 RX ADMIN — FLUOXETINE 60 MG: 20 CAPSULE ORAL at 09:20

## 2019-05-05 RX ADMIN — AMLODIPINE BESYLATE 10 MG: 5 TABLET ORAL at 09:20

## 2019-05-05 RX ADMIN — PRAVASTATIN SODIUM 40 MG: 40 TABLET ORAL at 17:00

## 2019-05-05 RX ADMIN — MAGNESIUM HYDROXIDE 30 ML: 400 SUSPENSION ORAL at 12:30

## 2019-05-05 RX ADMIN — ARIPIPRAZOLE 10 MG: 10 TABLET ORAL at 09:20

## 2019-05-05 RX ADMIN — TRAZODONE HYDROCHLORIDE 50 MG: 50 TABLET ORAL at 22:52

## 2019-05-06 VITALS
RESPIRATION RATE: 18 BRPM | WEIGHT: 170 LBS | TEMPERATURE: 96 F | SYSTOLIC BLOOD PRESSURE: 118 MMHG | BODY MASS INDEX: 29.02 KG/M2 | HEART RATE: 82 BPM | DIASTOLIC BLOOD PRESSURE: 55 MMHG | HEIGHT: 64 IN

## 2019-05-06 PROCEDURE — 99239 HOSP IP/OBS DSCHRG MGMT >30: CPT | Performed by: PSYCHIATRY & NEUROLOGY

## 2019-05-06 RX ORDER — TRAZODONE HYDROCHLORIDE 50 MG/1
50 TABLET ORAL
Qty: 14 TABLET | Refills: 0 | Status: SHIPPED | OUTPATIENT
Start: 2019-05-06 | End: 2020-04-16 | Stop reason: SDUPTHER

## 2019-05-06 RX ORDER — ARIPIPRAZOLE 10 MG/1
10 TABLET ORAL DAILY
Qty: 14 TABLET | Refills: 0 | Status: SHIPPED | OUTPATIENT
Start: 2019-05-06 | End: 2020-03-25 | Stop reason: SDUPTHER

## 2019-05-06 RX ORDER — FLUOXETINE HYDROCHLORIDE 20 MG/1
CAPSULE ORAL
Qty: 14 CAPSULE | Refills: 0 | Status: SHIPPED | OUTPATIENT
Start: 2019-05-06 | End: 2020-03-25 | Stop reason: SDUPTHER

## 2019-05-06 RX ORDER — FLUOXETINE HYDROCHLORIDE 40 MG/1
CAPSULE ORAL
Qty: 14 CAPSULE | Refills: 0 | Status: SHIPPED | OUTPATIENT
Start: 2019-05-06 | End: 2020-04-16 | Stop reason: SDUPTHER

## 2019-05-06 RX ADMIN — AMLODIPINE BESYLATE 10 MG: 5 TABLET ORAL at 09:26

## 2019-05-06 RX ADMIN — FLUOXETINE 60 MG: 20 CAPSULE ORAL at 09:26

## 2019-05-06 RX ADMIN — ARIPIPRAZOLE 10 MG: 10 TABLET ORAL at 09:26

## 2019-07-10 RX ORDER — LORAZEPAM 0.5 MG/1
0.5 TABLET ORAL 2 TIMES DAILY
Refills: 0 | COMMUNITY
Start: 2019-05-20 | End: 2020-04-16 | Stop reason: SDUPTHER

## 2019-07-11 ENCOUNTER — OFFICE VISIT (OUTPATIENT)
Dept: INTERNAL MEDICINE CLINIC | Facility: CLINIC | Age: 54
End: 2019-07-11

## 2019-07-11 VITALS
HEIGHT: 63 IN | WEIGHT: 182.98 LBS | BODY MASS INDEX: 32.42 KG/M2 | SYSTOLIC BLOOD PRESSURE: 142 MMHG | HEART RATE: 104 BPM | TEMPERATURE: 98.3 F | DIASTOLIC BLOOD PRESSURE: 88 MMHG

## 2019-07-11 DIAGNOSIS — F31.4 BIPOLAR AFFECTIVE DISORDER, DEPRESSED, SEVERE (HCC): Chronic | ICD-10-CM

## 2019-07-11 DIAGNOSIS — I10 ESSENTIAL HYPERTENSION: ICD-10-CM

## 2019-07-11 DIAGNOSIS — E78.2 MIXED HYPERLIPIDEMIA: ICD-10-CM

## 2019-07-11 DIAGNOSIS — F33.2 MAJOR DEPRESSIVE DISORDER, RECURRENT, SEVERE WITHOUT PSYCHOTIC FEATURES (HCC): Chronic | ICD-10-CM

## 2019-07-11 DIAGNOSIS — Z12.11 SCREENING FOR COLON CANCER: ICD-10-CM

## 2019-07-11 DIAGNOSIS — Z12.39 SCREENING FOR BREAST CANCER: ICD-10-CM

## 2019-07-11 DIAGNOSIS — Z00.00 ANNUAL PHYSICAL EXAM: Primary | ICD-10-CM

## 2019-07-11 PROCEDURE — 90715 TDAP VACCINE 7 YRS/> IM: CPT | Performed by: INTERNAL MEDICINE

## 2019-07-11 PROCEDURE — 99204 OFFICE O/P NEW MOD 45 MIN: CPT | Performed by: INTERNAL MEDICINE

## 2019-07-11 PROCEDURE — 90471 IMMUNIZATION ADMIN: CPT | Performed by: INTERNAL MEDICINE

## 2019-07-11 RX ORDER — TRIAMTERENE AND HYDROCHLOROTHIAZIDE 37.5; 25 MG/1; MG/1
1 CAPSULE ORAL DAILY
Refills: 0 | COMMUNITY
Start: 2019-07-04 | End: 2019-08-01 | Stop reason: SDUPTHER

## 2019-07-11 NOTE — PROGRESS NOTES
INTERNAL MEDICINE FOLLOW-UP OFFICE VISIT  [facility]  [address] [place] [address]    NAME: Arturo Herman  AGE: 46 y o  SEX: female    DATE OF ENCOUNTER: 7/11/2019    Assessment and Plan     1  Annual physical exam  Patient is here today to establish care  We will provide today a Tdap booster and order serologic screen for hepatitis C    - TDAP Vaccine greater than or equal to 6yo  - Hepatitis C antibody; Future    2  Screening for breast cancer  Will refer for screening mammogram   - Mammo screening bilateral w cad; Future    3  Screening for colon cancer  Patient currently unwilling to have a screening colonoscopy, so as a result we will order FIT testing  If negative, we will reorder an ear  If positive we will refer her for colonoscopy  - Occult Blood, Fecal Immunochemical; Future    4  Essential hypertension  Blood pressure was elevated today  Recommend patient continue triple therapy of amlodipine and triamterene-HCTZ combo  Will recheck at next visit  5  Mixed hyperlipidemia  Last lipid panel May 2019 during hospitalization revealed lipids are at goal   Continue simvastatin  6  Major depressive disorder, recurrent, severe without psychotic features [facility])  Patient follows closely with psychiatry and psychotherapy  No reported symptoms today  Continue prescribed regimen of Prozac, trazodone, Atarax, and Ativan  7  Bipolar affective disorder, depressed, severe ([facility] [address] )  Patient follows closely with psychiatry and psychotherapy  Continue prescribed regimen of Abilify  No orders of the defined types were placed in this encounter  Patient advised to follow up in 6 months or sooner as needed  - Counseling Documentation: patient was counseled regarding: instructions for management, prognosis, patient and family education and impressions  - Medication Side Effects:  Adverse side effects of medications were reviewed with the patient/guardian today     Chief Complaint     Chief Complaint   Patient presents with    Physical Exam     TRANSITIONING FROM OLD PCP TO HERE        History of Present Illness     This is a 76-year-old female with a past medical history significant for MDD with multiple psychiatric hospitalizations, bipolar disorder, hypertension, and hyperlipidemia here today in clinic as a new patient to establish care  She had a primary care provider located in [facility] in [facility] whom she last saw at some point earlier in 2019  She cannot remember last time she had any screening blood work  With regards to her psychiatric issues, she follows with a psychiatrist every 3 months at the [facility] in [place], as well as psychotherapy at [facility] every 2 weeks  With regards to her recent hospitalization and May of 2019, she states that she is feeling much more normal and significantly less depressed  She remains compliant with her multiple psychiatric medications  With regards to her social history, she does not work and collects disability  She lives in [place]  She is a former smoker but quit when she was 20 of her 1-2 years of cigarette use  She does not drink or use any recreational drugs  Her only complaint today is chronic bilateral hearing loss, left worse than right  She states that she saw an ENT provider earlier in 2019 somewhere in [place], however she is unaware of the practice name or her provider's name  She states that she was told she has hearing loss and was recommended to have an MRI of her brain, however she did not go for this imaging due to an anxiety of tight spaces  She reports no ringing in her ears or difficulty with any of her vestibular functions  With regards to her health maintenance, she is unsure if she has ever been screened for hepatitis C or when her last tetanus booster was  She has never had a colonoscopy          The following portions of the patient's history were reviewed and updated as appropriate: allergies, current medications, past family history, past medical history, past social history, past surgical history and problem list     Review of Systems     Review of Systems   Constitutional: Negative for activity change, appetite change, chills, diaphoresis, fatigue and fever  HENT: Positive for hearing loss (See HPI)  Negative for congestion, ear discharge, ear pain, rhinorrhea, tinnitus and trouble swallowing  Eyes: Negative for pain and visual disturbance  Respiratory: Negative for cough, chest tightness, shortness of breath and wheezing  Cardiovascular: Negative for chest pain and palpitations  Gastrointestinal: Negative for abdominal pain, constipation, diarrhea, nausea and vomiting  Genitourinary: Negative for difficulty urinating, dysuria, frequency and urgency  Musculoskeletal: Negative for arthralgias, back pain and myalgias  Skin: Negative for rash  Allergic/Immunologic: Negative for environmental allergies and food allergies  Neurological: Negative for dizziness, weakness, light-headedness, numbness and headaches  Psychiatric/Behavioral: Negative for behavioral problems, confusion, dysphoric mood and hallucinations  The patient is not nervous/anxious  Active Problem List     Patient Active Problem List   Diagnosis    Major depressive disorder, recurrent, severe without psychotic features ([facility] [address] )    Bipolar affective disorder, depressed, severe ([facility] [address] )    Bacterial vaginosis    Amenorrhea    Essential hypertension    Hyperlipidemia    Suicidal ideations    Vaginal discharge    Vaginitis       Objective     /88 (BP Location: Right arm, Patient Position: Sitting, Cuff Size: Adult)   Pulse 104   Temp 98 3 °F (36 8 °C) (Oral)   Ht 5' 3" (1 6 m)   Wt 83 kg (182 lb 15 7 oz)   BMI 32 41 kg/m²     Physical Exam   Constitutional: She is oriented to person, place, and time  She appears well-developed and well-nourished  No distress     HENT:   Head: Normocephalic and atraumatic  Right Ear: External ear normal    Left Ear: External ear normal    Mouth/Throat: Oropharynx is clear and moist  No oropharyngeal exudate  Eyes: Pupils are equal, round, and reactive to light  Conjunctivae and EOM are normal  No scleral icterus  Neck: Normal range of motion  Neck supple  No thyromegaly present  Cardiovascular: Normal rate, regular rhythm, normal heart sounds and intact distal pulses  Exam reveals no gallop and no friction rub  No murmur heard  Pulmonary/Chest: Effort normal and breath sounds normal  She has no wheezes  She has no rales  Abdominal: Soft  Bowel sounds are normal  She exhibits no distension and no mass  There is no tenderness  There is no guarding  Musculoskeletal: Normal range of motion  She exhibits no edema, tenderness or deformity  Lymphadenopathy:     She has no cervical adenopathy  Neurological: She is alert and oriented to person, place, and time  She displays normal reflexes  No cranial nerve deficit  Skin: Skin is warm and dry  Capillary refill takes less than 2 seconds  No rash noted  She is not diaphoretic  Psychiatric: She has a normal mood and affect  Her behavior is normal  Judgment and thought content normal        Pertinent Laboratory/Diagnostic Studies:  No results found      Images and diagnostics reviewed     Current Medications     Current Outpatient Medications:     amLODIPine (NORVASC) 10 mg tablet, Take 10 mg by mouth daily, Disp: , Rfl:     ARIPiprazole (ABILIFY) 10 mg tablet, Take 1 tablet (10 mg total) by mouth daily, Disp: 14 tablet, Rfl: 0    FLUoxetine (PROzac) 20 mg capsule, Take 1 capsule (20mg) with 40mg capsule (60mg total) by mouth daily (9am), Disp: 14 capsule, Rfl: 0    FLUoxetine (PROzac) 40 MG capsule, Take 1 capsule (40mg) with 20mg capsule (60mg total) by mouth daily (9am), Disp: 14 capsule, Rfl: 0    LORazepam (ATIVAN) 0 5 mg tablet, Take 0 5 mg by mouth 2 (two) times a day, Disp: , Rfl: 0    simvastatin (ZOCOR) 20 mg tablet, Take 20 mg by mouth, Disp: , Rfl:     traZODone (DESYREL) 50 mg tablet, Take 1 tablet (50 mg total) by mouth daily at bedtime as needed for sleep, Disp: 14 tablet, Rfl: 0    triamterene-hydrochlorothiazide (DYAZIDE) 37 5-25 mg per capsule, Take 1 capsule by mouth daily, Disp: , Rfl: 0    hydrOXYzine HCL (ATARAX) 25 mg tablet, Take 1 tablet by mouth every 8 (eight) hours as needed for anxiety (mild anxiety) for up to 30 days, Disp: 30 tablet, Rfl: 0    Health Maintenance     Health Maintenance   Topic Date Due    Hepatitis C Screening  1965    Medicare Annual Wellness Visit (AWV)  1965    MAMMOGRAM  1965    CRC Screening: Colonoscopy  1965    Pneumococcal Vaccine: Pediatrics (0 to 5 Years) and At-Risk Patients (6 to 59 Years) (1 of 1 - PPSV23) 11/16/1970    BMI: Followup Plan  11/16/1982    DTaP,Tdap,and Td Vaccines (1 - Tdap) 11/16/1985    INFLUENZA VACCINE  07/01/2019    BMI: Adult  05/01/2020    Pneumococcal Vaccine: 65+ Years (1 of 2 - PCV13) 11/16/2029    HEPATITIS B VACCINES  Aged Out     Immunization History   Administered Date(s) Administered    INFLUENZA 10/26/2014, 12/08/2015, 08/29/2017       RANDOLPH Valiente  Internal Medicine PGY-1  [address] , [id number] [place] [address] 28, [address] [place] [address]  Office: [phone]  Fax: [phone]

## 2019-08-01 DIAGNOSIS — I10 ESSENTIAL HYPERTENSION: Primary | ICD-10-CM

## 2019-08-01 RX ORDER — AMLODIPINE BESYLATE 10 MG/1
10 TABLET ORAL DAILY
Qty: 90 TABLET | Refills: 1 | Status: SHIPPED | OUTPATIENT
Start: 2019-08-01 | End: 2019-11-18 | Stop reason: SDUPTHER

## 2019-08-01 RX ORDER — TRIAMTERENE AND HYDROCHLOROTHIAZIDE 37.5; 25 MG/1; MG/1
1 CAPSULE ORAL DAILY
Qty: 90 CAPSULE | Refills: 1 | Status: SHIPPED | OUTPATIENT
Start: 2019-08-01 | End: 2020-02-05 | Stop reason: ALTCHOICE

## 2019-08-01 RX ORDER — SIMVASTATIN 20 MG
20 TABLET ORAL DAILY
Qty: 90 TABLET | Refills: 1 | Status: SHIPPED | OUTPATIENT
Start: 2019-08-01 | End: 2020-02-05 | Stop reason: ALTCHOICE

## 2019-08-15 ENCOUNTER — APPOINTMENT (EMERGENCY)
Dept: RADIOLOGY | Facility: HOSPITAL | Age: 54
DRG: 871 | End: 2019-08-15
Payer: COMMERCIAL

## 2019-08-15 ENCOUNTER — HOSPITAL ENCOUNTER (INPATIENT)
Facility: HOSPITAL | Age: 54
LOS: 1 days | Discharge: HOME | DRG: 871 | End: 2019-08-18
Attending: EMERGENCY MEDICINE | Admitting: INTERNAL MEDICINE
Payer: COMMERCIAL

## 2019-08-15 DIAGNOSIS — R10.32 LEFT LOWER QUADRANT PAIN: ICD-10-CM

## 2019-08-15 DIAGNOSIS — N12 PYELONEPHRITIS: Primary | ICD-10-CM

## 2019-08-15 PROBLEM — R06.02 SHORTNESS OF BREATH: Status: ACTIVE | Noted: 2019-08-15

## 2019-08-15 PROBLEM — R11.2 NAUSEA & VOMITING: Status: ACTIVE | Noted: 2019-08-15

## 2019-08-15 PROBLEM — R17 ELEVATED BILIRUBIN: Status: ACTIVE | Noted: 2019-08-15

## 2019-08-15 PROBLEM — J96.01 ACUTE HYPOXEMIC RESPIRATORY FAILURE (HCC): Status: ACTIVE | Noted: 2019-08-15

## 2019-08-15 PROBLEM — E87.6 HYPOKALEMIA: Status: ACTIVE | Noted: 2019-08-15

## 2019-08-15 PROBLEM — F41.9 ANXIETY: Status: ACTIVE | Noted: 2019-08-15

## 2019-08-15 PROBLEM — A41.9 SEPSIS (HCC): Status: ACTIVE | Noted: 2019-08-15

## 2019-08-15 LAB
ALBUMIN SERPL BCP-MCNC: 3.7 G/DL (ref 3.5–5)
ALP SERPL-CCNC: 103 U/L (ref 46–116)
ALT SERPL W P-5'-P-CCNC: 40 U/L (ref 12–78)
ANION GAP SERPL CALCULATED.3IONS-SCNC: 11 MMOL/L (ref 4–13)
APTT PPP: 30 SECONDS (ref 23–37)
AST SERPL W P-5'-P-CCNC: 34 U/L (ref 5–45)
BACTERIA UR QL AUTO: ABNORMAL /HPF
BASOPHILS # BLD AUTO: 0.04 THOUSANDS/ΜL (ref 0–0.1)
BASOPHILS NFR BLD AUTO: 0 % (ref 0–1)
BILIRUB DIRECT SERPL-MCNC: 0.43 MG/DL (ref 0–0.2)
BILIRUB SERPL-MCNC: 1.88 MG/DL (ref 0.2–1)
BILIRUB UR QL STRIP: NEGATIVE
BUN SERPL-MCNC: 12 MG/DL (ref 5–25)
CALCIUM SERPL-MCNC: 8.5 MG/DL (ref 8.3–10.1)
CHLORIDE SERPL-SCNC: 102 MMOL/L (ref 100–108)
CLARITY UR: ABNORMAL
CO2 SERPL-SCNC: 26 MMOL/L (ref 21–32)
COLOR UR: YELLOW
CREAT SERPL-MCNC: 0.92 MG/DL (ref 0.6–1.3)
EOSINOPHIL # BLD AUTO: 0.07 THOUSAND/ΜL (ref 0–0.61)
EOSINOPHIL NFR BLD AUTO: 0 % (ref 0–6)
ERYTHROCYTE [DISTWIDTH] IN BLOOD BY AUTOMATED COUNT: 14 % (ref 11.6–15.1)
GFR SERPL CREATININE-BSD FRML MDRD: 71 ML/MIN/1.73SQ M
GLUCOSE SERPL-MCNC: 138 MG/DL (ref 65–140)
GLUCOSE UR STRIP-MCNC: NEGATIVE MG/DL
HCT VFR BLD AUTO: 37 % (ref 34.8–46.1)
HGB BLD-MCNC: 12.6 G/DL (ref 11.5–15.4)
HGB UR QL STRIP.AUTO: ABNORMAL
IMM GRANULOCYTES # BLD AUTO: 0.13 THOUSAND/UL (ref 0–0.2)
IMM GRANULOCYTES NFR BLD AUTO: 1 % (ref 0–2)
INR PPP: 1.16 (ref 0.84–1.19)
KETONES UR STRIP-MCNC: NEGATIVE MG/DL
LACTATE SERPL-SCNC: 1.6 MMOL/L (ref 0.5–2)
LEUKOCYTE ESTERASE UR QL STRIP: ABNORMAL
LYMPHOCYTES # BLD AUTO: 1.01 THOUSANDS/ΜL (ref 0.6–4.47)
LYMPHOCYTES NFR BLD AUTO: 5 % (ref 14–44)
MAGNESIUM SERPL-MCNC: 2 MG/DL (ref 1.6–2.6)
MCH RBC QN AUTO: 28.5 PG (ref 26.8–34.3)
MCHC RBC AUTO-ENTMCNC: 34.1 G/DL (ref 31.4–37.4)
MCV RBC AUTO: 84 FL (ref 82–98)
MONOCYTES # BLD AUTO: 1.91 THOUSAND/ΜL (ref 0.17–1.22)
MONOCYTES NFR BLD AUTO: 9 % (ref 4–12)
NEUTROPHILS # BLD AUTO: 17.55 THOUSANDS/ΜL (ref 1.85–7.62)
NEUTS SEG NFR BLD AUTO: 85 % (ref 43–75)
NITRITE UR QL STRIP: POSITIVE
NON-SQ EPI CELLS URNS QL MICRO: ABNORMAL /HPF
NRBC BLD AUTO-RTO: 0 /100 WBCS
PH UR STRIP.AUTO: 7 [PH]
PLATELET # BLD AUTO: 233 THOUSANDS/UL (ref 149–390)
PMV BLD AUTO: 9.6 FL (ref 8.9–12.7)
POTASSIUM SERPL-SCNC: 2.8 MMOL/L (ref 3.5–5.3)
PROCALCITONIN SERPL-MCNC: 1.27 NG/ML
PROT SERPL-MCNC: 7.3 G/DL (ref 6.4–8.2)
PROT UR STRIP-MCNC: ABNORMAL MG/DL
PROTHROMBIN TIME: 14.4 SECONDS (ref 11.6–14.5)
RBC # BLD AUTO: 4.42 MILLION/UL (ref 3.81–5.12)
RBC #/AREA URNS AUTO: ABNORMAL /HPF
SODIUM SERPL-SCNC: 139 MMOL/L (ref 136–145)
SP GR UR STRIP.AUTO: 1.01 (ref 1–1.03)
UROBILINOGEN UR QL STRIP.AUTO: 1 E.U./DL
WBC # BLD AUTO: 20.71 THOUSAND/UL (ref 4.31–10.16)
WBC #/AREA URNS AUTO: ABNORMAL /HPF

## 2019-08-15 PROCEDURE — 96375 TX/PRO/DX INJ NEW DRUG ADDON: CPT

## 2019-08-15 PROCEDURE — 87077 CULTURE AEROBIC IDENTIFY: CPT | Performed by: EMERGENCY MEDICINE

## 2019-08-15 PROCEDURE — 36415 COLL VENOUS BLD VENIPUNCTURE: CPT | Performed by: EMERGENCY MEDICINE

## 2019-08-15 PROCEDURE — 84145 PROCALCITONIN (PCT): CPT | Performed by: EMERGENCY MEDICINE

## 2019-08-15 PROCEDURE — 87040 BLOOD CULTURE FOR BACTERIA: CPT | Performed by: EMERGENCY MEDICINE

## 2019-08-15 PROCEDURE — 87186 SC STD MICRODIL/AGAR DIL: CPT

## 2019-08-15 PROCEDURE — 87086 URINE CULTURE/COLONY COUNT: CPT

## 2019-08-15 PROCEDURE — 82248 BILIRUBIN DIRECT: CPT | Performed by: INTERNAL MEDICINE

## 2019-08-15 PROCEDURE — 87077 CULTURE AEROBIC IDENTIFY: CPT

## 2019-08-15 PROCEDURE — 96361 HYDRATE IV INFUSION ADD-ON: CPT

## 2019-08-15 PROCEDURE — 93005 ELECTROCARDIOGRAM TRACING: CPT

## 2019-08-15 PROCEDURE — 71045 X-RAY EXAM CHEST 1 VIEW: CPT

## 2019-08-15 PROCEDURE — 81001 URINALYSIS AUTO W/SCOPE: CPT | Performed by: EMERGENCY MEDICINE

## 2019-08-15 PROCEDURE — 96365 THER/PROPH/DIAG IV INF INIT: CPT

## 2019-08-15 PROCEDURE — 85610 PROTHROMBIN TIME: CPT | Performed by: EMERGENCY MEDICINE

## 2019-08-15 PROCEDURE — 99284 EMERGENCY DEPT VISIT MOD MDM: CPT | Performed by: EMERGENCY MEDICINE

## 2019-08-15 PROCEDURE — 85730 THROMBOPLASTIN TIME PARTIAL: CPT | Performed by: EMERGENCY MEDICINE

## 2019-08-15 PROCEDURE — 87186 SC STD MICRODIL/AGAR DIL: CPT | Performed by: EMERGENCY MEDICINE

## 2019-08-15 PROCEDURE — 74177 CT ABD & PELVIS W/CONTRAST: CPT

## 2019-08-15 PROCEDURE — 85025 COMPLETE CBC W/AUTO DIFF WBC: CPT | Performed by: EMERGENCY MEDICINE

## 2019-08-15 PROCEDURE — 80053 COMPREHEN METABOLIC PANEL: CPT | Performed by: EMERGENCY MEDICINE

## 2019-08-15 PROCEDURE — 83605 ASSAY OF LACTIC ACID: CPT | Performed by: EMERGENCY MEDICINE

## 2019-08-15 PROCEDURE — 99285 EMERGENCY DEPT VISIT HI MDM: CPT

## 2019-08-15 PROCEDURE — 83735 ASSAY OF MAGNESIUM: CPT | Performed by: INTERNAL MEDICINE

## 2019-08-15 RX ORDER — ARIPIPRAZOLE 10 MG/1
10 TABLET ORAL DAILY
Status: DISCONTINUED | OUTPATIENT
Start: 2019-08-16 | End: 2019-08-18 | Stop reason: HOSPADM

## 2019-08-15 RX ORDER — POTASSIUM CHLORIDE 14.9 MG/ML
20 INJECTION INTRAVENOUS ONCE
Status: COMPLETED | OUTPATIENT
Start: 2019-08-15 | End: 2019-08-16

## 2019-08-15 RX ORDER — POTASSIUM CHLORIDE 14.9 MG/ML
20 INJECTION INTRAVENOUS ONCE
Status: DISCONTINUED | OUTPATIENT
Start: 2019-08-16 | End: 2019-08-16

## 2019-08-15 RX ORDER — TRAZODONE HYDROCHLORIDE 50 MG/1
50 TABLET ORAL
Status: DISCONTINUED | OUTPATIENT
Start: 2019-08-15 | End: 2019-08-18 | Stop reason: HOSPADM

## 2019-08-15 RX ORDER — POTASSIUM CHLORIDE 14.9 MG/ML
20 INJECTION INTRAVENOUS ONCE
Status: DISCONTINUED | OUTPATIENT
Start: 2019-08-15 | End: 2019-08-15

## 2019-08-15 RX ORDER — AMLODIPINE BESYLATE 10 MG/1
10 TABLET ORAL DAILY
Status: DISCONTINUED | OUTPATIENT
Start: 2019-08-16 | End: 2019-08-18 | Stop reason: HOSPADM

## 2019-08-15 RX ORDER — POTASSIUM CHLORIDE 14.9 MG/ML
20 INJECTION INTRAVENOUS ONCE
Status: DISCONTINUED | OUTPATIENT
Start: 2019-08-16 | End: 2019-08-15

## 2019-08-15 RX ORDER — HYDROXYZINE HYDROCHLORIDE 25 MG/1
25 TABLET, FILM COATED ORAL EVERY 8 HOURS PRN
Status: DISCONTINUED | OUTPATIENT
Start: 2019-08-15 | End: 2019-08-18 | Stop reason: HOSPADM

## 2019-08-15 RX ORDER — ONDANSETRON 2 MG/ML
4 INJECTION INTRAMUSCULAR; INTRAVENOUS EVERY 6 HOURS PRN
Status: DISCONTINUED | OUTPATIENT
Start: 2019-08-15 | End: 2019-08-18 | Stop reason: HOSPADM

## 2019-08-15 RX ORDER — ACETAMINOPHEN 325 MG/1
650 TABLET ORAL EVERY 6 HOURS PRN
Status: DISCONTINUED | OUTPATIENT
Start: 2019-08-15 | End: 2019-08-18 | Stop reason: HOSPADM

## 2019-08-15 RX ORDER — NICOTINE 21 MG/24HR
1 PATCH, TRANSDERMAL 24 HOURS TRANSDERMAL DAILY
Status: DISCONTINUED | OUTPATIENT
Start: 2019-08-16 | End: 2019-08-15

## 2019-08-15 RX ORDER — LORAZEPAM 0.5 MG/1
0.5 TABLET ORAL 2 TIMES DAILY
Status: DISCONTINUED | OUTPATIENT
Start: 2019-08-16 | End: 2019-08-18 | Stop reason: HOSPADM

## 2019-08-15 RX ORDER — POTASSIUM CHLORIDE 20 MEQ/1
40 TABLET, EXTENDED RELEASE ORAL ONCE
Status: COMPLETED | OUTPATIENT
Start: 2019-08-15 | End: 2019-08-15

## 2019-08-15 RX ORDER — POTASSIUM CHLORIDE 14.9 MG/ML
20 INJECTION INTRAVENOUS ONCE
Status: COMPLETED | OUTPATIENT
Start: 2019-08-15 | End: 2019-08-15

## 2019-08-15 RX ORDER — POTASSIUM CHLORIDE 14.9 MG/ML
20 INJECTION INTRAVENOUS ONCE
Status: COMPLETED | OUTPATIENT
Start: 2019-08-16 | End: 2019-08-16

## 2019-08-15 RX ORDER — SODIUM CHLORIDE 9 MG/ML
75 INJECTION, SOLUTION INTRAVENOUS CONTINUOUS
Status: DISCONTINUED | OUTPATIENT
Start: 2019-08-15 | End: 2019-08-18

## 2019-08-15 RX ORDER — POTASSIUM CHLORIDE 20 MEQ/1
40 TABLET, EXTENDED RELEASE ORAL ONCE
Status: COMPLETED | OUTPATIENT
Start: 2019-08-16 | End: 2019-08-16

## 2019-08-15 RX ORDER — FLUOXETINE HYDROCHLORIDE 20 MG/1
60 CAPSULE ORAL DAILY
Status: DISCONTINUED | OUTPATIENT
Start: 2019-08-16 | End: 2019-08-18 | Stop reason: HOSPADM

## 2019-08-15 RX ADMIN — CEFTRIAXONE SODIUM 1000 MG: 10 INJECTION, POWDER, FOR SOLUTION INTRAVENOUS at 21:44

## 2019-08-15 RX ADMIN — SODIUM CHLORIDE 1000 ML: 0.9 INJECTION, SOLUTION INTRAVENOUS at 19:08

## 2019-08-15 RX ADMIN — POTASSIUM CHLORIDE 20 MEQ: 200 INJECTION, SOLUTION INTRAVENOUS at 23:17

## 2019-08-15 RX ADMIN — IOHEXOL 100 ML: 350 INJECTION, SOLUTION INTRAVENOUS at 20:29

## 2019-08-15 RX ADMIN — ACETAMINOPHEN 650 MG: 325 TABLET ORAL at 22:58

## 2019-08-15 RX ADMIN — SODIUM CHLORIDE 1000 ML: 0.9 INJECTION, SOLUTION INTRAVENOUS at 23:13

## 2019-08-15 RX ADMIN — POTASSIUM CHLORIDE 20 MEQ: 200 INJECTION, SOLUTION INTRAVENOUS at 20:42

## 2019-08-15 RX ADMIN — POTASSIUM CHLORIDE 40 MEQ: 1500 TABLET, EXTENDED RELEASE ORAL at 20:36

## 2019-08-15 NOTE — ED ATTENDING ATTESTATION
Satya Aguayo DO, saw and evaluated the patient  I have discussed the patient with the resident/non-physician practitioner and agree with the resident's/non-physician practitioner's findings, Plan of Care, and MDM as documented in the resident's/non-physician practitioner's note, except where noted  All available labs and Radiology studies were reviewed  I was present for key portions of any procedure(s) performed by the resident/non-physician practitioner and I was immediately available to provide assistance  At this point I agree with the current assessment done in the Emergency Department  I have conducted an independent evaluation of this patient a history and physical is as follows:    65-year-old female presents for evaluation of not feeling myself"it is unclear how long this has been going on  Patient is awake and alert however has a difficult time recalling any sort of details regarding her present illness  She relates that she has a tumor somewhere in her abdomen that was diagnosed a while ago by somebody at [facility] and she is going to undergo treatment for it  She is unsure where it is located in her abdomen and when asked what type of tumor it was and 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

## 2019-08-15 NOTE — ED PROVIDER NOTES
History  Chief Complaint   Patient presents with    Abdominal Pain     Pt presents with LLQ abdominal pain taht started this morning, as well as "Not feeling herself"  Pt also complains of nausea, no D/V      46year old female history of hypertension, hyperlipidemia, depression coming in today with one-day history of feeling unwell  Patient stated that she is going to bed last night she felt like she was about to get a cold  When she woke up this morning she felt relatively tired and not quite like herself  She states she has had poor p o  Intake of fluids for last several days but denies overly exerting herself for being outside  She reports feeling somewhat dehydrated and dry with dry mouth as well as some mild left lower quadrant pain that has been getting somewhat worse over the last couple days  When asked about in the left lower quadrant pain she stated that she believes she has been told in the past that she has some sort of cyst and that area  Upon further questioning patient is unsure what type of imaging she got, when she got it, when she was told about this issue, or by whom she was told about it  Patient denies any symptoms  Patient denies any headache, lightheadedness, chest pain, shortness of breath, nausea/vomiting, fevers/chills, or any bladder or bowel changes        History provided by:  Patient   used: No    Abdominal Pain   Pain location:  LLQ  Pain quality: sharp    Pain radiates to:  Does not radiate  Pain severity:  Mild  Onset quality:  Gradual  Duration:  3 days  Timing:  Intermittent  Progression:  Waxing and waning  Chronicity:  Recurrent  Context: not alcohol use, not awakening from sleep, not diet changes, not eating, not laxative use, not medication withdrawal, not previous surgeries, not recent illness, not recent sexual activity, not recent travel, not retching, not sick contacts, not suspicious food intake and not trauma    Relieved by:  None tried  Worsened by:  Nothing  Ineffective treatments:  None tried  Associated symptoms: no anorexia, no belching, no chest pain, no chills, no constipation, no cough, no diarrhea, no dysuria, no fatigue, no fever, no flatus, no hematemesis, no hematochezia, no hematuria, no melena, no nausea, no shortness of breath, no sore throat, no vaginal bleeding, no vaginal discharge and no vomiting        Prior to Admission Medications   Prescriptions Last Dose Informant Patient Reported? Taking?    ARIPiprazole (ABILIFY) 10 mg tablet   No Yes   Sig: Take 1 tablet (10 mg total) by mouth daily   FLUoxetine (PROzac) 20 mg capsule   No Yes   Sig: Take 1 capsule (20mg) with 40mg capsule (60mg total) by mouth daily (9am)   FLUoxetine (PROzac) 40 MG capsule   No Yes   Sig: Take 1 capsule (40mg) with 20mg capsule (60mg total) by mouth daily (9am)   LORazepam (ATIVAN) 0 5 mg tablet   Yes Yes   Sig: Take 0 5 mg by mouth 2 (two) times a day   amLODIPine (NORVASC) 10 mg tablet   No Yes   Sig: Take 1 tablet (10 mg total) by mouth daily   hydrOXYzine HCL (ATARAX) 25 mg tablet   No Yes   Sig: Take 1 tablet by mouth every 8 (eight) hours as needed for anxiety (mild anxiety) for up to 30 days   simvastatin (ZOCOR) 20 mg tablet   No Yes   Sig: Take 1 tablet (20 mg total) by mouth daily   traZODone (DESYREL) 50 mg tablet   No Yes   Sig: Take 1 tablet (50 mg total) by mouth daily at bedtime as needed for sleep   triamterene-hydrochlorothiazide (DYAZIDE) 37 5-25 mg per capsule   No Yes   Sig: Take 1 capsule by mouth daily      Facility-Administered Medications: None       Past Medical History:   Diagnosis Date    Anxiety     Arthritis     Bipolar affective disorder, depressed, severe ([facility] [address] ) 4/28/2019    Depression     Hyperlipidemia     Hypertension     Learning difficulty     Osteopenia     Previous known suicide attempt     Psychiatric disorder     Psychiatric illness        Past Surgical History:   Procedure Laterality Date    LAPAROSCOPY      as a child, per patient-normal findings       Family History   Problem Relation Age of Onset    Alzheimer's disease Mother     Depression Mother     No Known Problems Sister     Depression Brother     Bipolar disorder Brother     No Known Problems Maternal Aunt     No Known Problems Paternal Aunt     No Known Problems Maternal Uncle     No Known Problems Paternal Uncle     No Known Problems Maternal Grandfather     No Known Problems Maternal Grandmother     No Known Problems Paternal Grandfather     No Known Problems Paternal Grandmother     No Known Problems Cousin     ADD / ADHD Neg Hx     Alcohol abuse Neg Hx     Anxiety disorder Neg Hx     Dementia Neg Hx     Drug abuse Neg Hx     OCD Neg Hx     Paranoid behavior Neg Hx     Schizophrenia Neg Hx     Seizures Neg Hx     Self-Injury Neg Hx     Suicide Attempts Neg Hx      I have reviewed and agree with the history as documented  Social History     Tobacco Use    Smoking status: Current Every Day Smoker     Years: 15 00     Types: Cigarettes    Smokeless tobacco: Never Used   Substance Use Topics    Alcohol use: Yes     Comment: socially    Drug use: No        Review of Systems   Constitutional: Negative for appetite change, chills, diaphoresis, fatigue, fever and unexpected weight change  HENT: Negative for congestion, rhinorrhea and sore throat  Eyes: Negative for photophobia and visual disturbance  Respiratory: Negative for cough, chest tightness and shortness of breath  Cardiovascular: Negative for chest pain, palpitations and leg swelling  Gastrointestinal: Positive for abdominal pain  Negative for abdominal distention, anorexia, blood in stool, constipation, diarrhea, flatus, hematemesis, hematochezia, melena, nausea and vomiting  Genitourinary: Negative for dysuria, hematuria, vaginal bleeding and vaginal discharge     Musculoskeletal: Negative for back pain, joint swelling, neck pain and neck stiffness  Skin: Negative for color change, pallor, rash and wound  Neurological: Negative for dizziness, syncope, weakness, light-headedness and headaches  Psychiatric/Behavioral: Negative for agitation  All other systems reviewed and are negative  Physical Exam  ED Triage Vitals [08/15/19 1855]   Temperature Pulse Respirations Blood Pressure SpO2   (!) 101 8 °F (38 8 °C) 94 18 121/70 90 %      Temp Source Heart Rate Source Patient Position - Orthostatic VS BP Location FiO2 (%)   Oral Monitor Sitting Right arm --      Pain Score       8             Orthostatic Vital Signs  Vitals:    08/15/19 1855 08/15/19 2045 08/15/19 2253   BP: 121/70 115/59 118/70   Pulse: 94 86 100   Patient Position - Orthostatic VS: Sitting Lying        Physical Exam   Constitutional: She is oriented to person, place, and time  She appears well-developed and well-nourished  HENT:   Head: Normocephalic and atraumatic  Mouth/Throat: Oropharynx is clear and moist    Eyes: Pupils are equal, round, and reactive to light  EOM are normal    Neck: Normal range of motion  Neck supple  Cardiovascular: Normal rate, regular rhythm, normal heart sounds and intact distal pulses  Exam reveals no gallop and no friction rub  No murmur heard  Pulmonary/Chest: Effort normal and breath sounds normal  No respiratory distress  She exhibits no tenderness  Lung sounds normal bilaterally   Abdominal: Soft  Normal appearance and bowel sounds are normal  She exhibits no distension  There is tenderness (Mild left lower tenderness to palpation)  There is no rigidity, no rebound, no CVA tenderness and negative Mueller's sign  Musculoskeletal: Normal range of motion  Neurological: She is alert and oriented to person, place, and time  Skin: Skin is warm and dry  She is not diaphoretic  Psychiatric: She has a normal mood and affect  Her behavior is normal  Thought content normal    Nursing note and vitals reviewed        ED Medications  Medications   hydrOXYzine HCL (ATARAX) tablet 25 mg (has no administration in time range)   ARIPiprazole (ABILIFY) tablet 10 mg (has no administration in time range)   FLUoxetine (PROzac) capsule 60 mg (has no administration in time range)   traZODone (DESYREL) tablet 50 mg (has no administration in time range)   LORazepam (ATIVAN) tablet 0 5 mg (has no administration in time range)   amLODIPine (NORVASC) tablet 10 mg (has no administration in time range)   ondansetron (ZOFRAN) injection 4 mg (has no administration in time range)   enoxaparin (LOVENOX) subcutaneous injection 40 mg (has no administration in time range)   cefTRIAXone (ROCEPHIN) 2,000 mg in dextrose 5 % 50 mL IVPB (has no administration in time range)   acetaminophen (TYLENOL) tablet 650 mg (650 mg Oral Given 8/15/19 2258)   sodium chloride 0 9 % bolus 1,000 mL (1,000 mL Intravenous New Bag 8/15/19 2313)   sodium chloride 0 9 % infusion (has no administration in time range)   potassium chloride 20 mEq IVPB (premix) (has no administration in time range)     Followed by   potassium chloride 20 mEq IVPB (premix) (has no administration in time range)   potassium chloride 20 mEq IVPB (premix) (20 mEq Intravenous New Bag 8/15/19 2317)     Followed by   potassium chloride 20 mEq IVPB (premix) (has no administration in time range)   potassium chloride (K-DUR,KLOR-CON) CR tablet 40 mEq (has no administration in time range)   sodium chloride 0 9 % bolus 1,000 mL (0 mL Intravenous Stopped 8/15/19 2032)   potassium chloride (K-DUR,KLOR-CON) CR tablet 40 mEq (40 mEq Oral Given 8/15/19 2036)   potassium chloride 20 mEq IVPB (premix) (20 mEq Intravenous New Bag 8/15/19 2042)   iohexol (OMNIPAQUE) 350 MG/ML injection (MULTI-DOSE) 100 mL (100 mL Intravenous Given 8/15/19 2029)   ceftriaxone (ROCEPHIN) 1 g/50 mL in dextrose IVPB (1,000 mg Intravenous New Bag 8/15/19 2144)       Diagnostic Studies  Results Reviewed     Procedure Component Value Units Date/Time    Magnesium [[id number]]  (Normal) Collected:  08/15/19 1909    Lab Status:  Final result Specimen:  Blood from Arm, Left Updated:  08/15/19 2317     Magnesium 2 0 mg/dL     Bilirubin, direct [[id number]]  (Abnormal) Collected:  08/15/19 1909    Lab Status:  Final result Specimen:  Blood from Arm, Left Updated:  08/15/19 2317     Bilirubin, Direct 0 43 mg/dL     Urine culture [[id number]] Collected:  08/15/19 2025    Lab Status:   In process Specimen:  Urine, Other Updated:  08/15/19 2213    Urine Microscopic [[id number]]  (Abnormal) Collected:  08/15/19 2025    Lab Status:  Final result Specimen:  Urine, Clean Catch Updated:  08/15/19 2155     RBC, UA 2-4 /hpf      WBC, UA 20-30 /hpf      Epithelial Cells Occasional /hpf      Bacteria, UA Moderate /hpf     UA w Reflex to Microscopic w Reflex to Culture [[id number]]  (Abnormal) Collected:  08/15/19 2025    Lab Status:  Final result Specimen:  Urine, Clean Catch Updated:  08/15/19 2112     Color, UA Yellow     Clarity, UA Cloudy     Specific Gravity, UA 1 009     pH, UA 7 0     Leukocytes, UA Small     Nitrite, UA Positive     Protein, UA 30 (1+) mg/dl      Glucose, UA Negative mg/dl      Ketones, UA Negative mg/dl      Urobilinogen, UA 1 0 E U /dl      Bilirubin, UA Negative     Blood, UA Moderate    Procalcitonin [[id number]]  (Abnormal) Collected:  08/15/19 1909    Lab Status:  Final result Specimen:  Blood from Arm, Left Updated:  08/15/19 2006     Procalcitonin 1 27 ng/ml     Comprehensive metabolic panel [[id number]]  (Abnormal) Collected:  08/15/19 1909    Lab Status:  Final result Specimen:  Blood from Arm, Left Updated:  08/15/19 1949     Sodium 139 mmol/L      Potassium 2 8 mmol/L      Chloride 102 mmol/L      CO2 26 mmol/L      ANION GAP 11 mmol/L      BUN 12 mg/dL      Creatinine 0 92 mg/dL      Glucose 138 mg/dL      Calcium 8 5 mg/dL      AST 34 U/L      ALT 40 U/L      Alkaline Phosphatase 103 U/L      Total Protein 7 3 g/dL      Albumin 3 7 g/dL Total Bilirubin 1 88 mg/dL      eGFR 71 ml/min/1 73sq m     Narrative:       [place] guidelines for Chronic Kidney Disease (CKD):     Stage 1 with normal or high GFR (GFR > 90 mL/min/1 73 square meters)    Stage 2 Mild CKD (GFR = 60-89 mL/min/1 73 square meters)    Stage 3A Moderate CKD (GFR = 45-59 mL/min/1 73 square meters)    Stage 3B Moderate CKD (GFR = 30-44 mL/min/1 73 square meters)    Stage 4 Severe CKD (GFR = 15-29 mL/min/1 73 square meters)    Stage 5 End Stage CKD (GFR <15 mL/min/1 73 square meters)  Note: GFR calculation is accurate only with a steady state creatinine    Lactic acid x2 [[id number]]  (Normal) Collected:  08/15/19 1909    Lab Status:  Final result Specimen:  Blood from Arm, Left Updated:  08/15/19 1937     LACTIC ACID 1 6 mmol/L     Narrative:       Result may be elevated if tourniquet was used during collection      Protime-INR [[id number]]  (Normal) Collected:  08/15/19 1909    Lab Status:  Final result Specimen:  Blood from Arm, Left Updated:  08/15/19 1929     Protime 14 4 seconds      INR 1 16    APTT [[id number]]  (Normal) Collected:  08/15/19 1909    Lab Status:  Final result Specimen:  Blood from Arm, Left Updated:  08/15/19 1929     PTT 30 seconds     CBC and differential [[id number]]  (Abnormal) Collected:  08/15/19 1909    Lab Status:  Final result Specimen:  Blood from Arm, Left Updated:  08/15/19 1924     WBC 20 71 Thousand/uL      RBC 4 42 Million/uL      Hemoglobin 12 6 g/dL      Hematocrit 37 0 %      MCV 84 fL      MCH 28 5 pg      MCHC 34 1 g/dL      RDW 14 0 %      MPV 9 6 fL      Platelets 588 Thousands/uL      nRBC 0 /100 WBCs      Neutrophils Relative 85 %      Immat GRANS % 1 %      Lymphocytes Relative 5 %      Monocytes Relative 9 %      Eosinophils Relative 0 %      Basophils Relative 0 %      Neutrophils Absolute 17 55 Thousands/µL      Immature Grans Absolute 0 13 Thousand/uL      Lymphocytes Absolute 1 01 Thousands/µL Monocytes Absolute 1 91 Thousand/µL      Eosinophils Absolute 0 07 Thousand/µL      Basophils Absolute 0 04 Thousands/µL     Blood culture #1 [[id number]] Collected:  08/15/19 1909    Lab Status: In process Specimen:  Blood from Arm, Left Updated:  08/15/19 1917    Blood culture #2 [[id number]] Collected:  08/15/19 1909    Lab Status: In process Specimen:  Blood from Arm, Right Updated:  08/15/19 1916                 CT abdomen pelvis with contrast   Final Result by Angie Murguia MD (08/15 2053)      Enhancement of the left renal urothelial lining with perinephric and periureteric stranding along with smudgy areas of hypodensity in the left kidney consistent with ascending infection and pyelonephritis  Workstation performed: [id number]         XR chest portable    (Results Pending)         Procedures  Procedures        ED Course                   Initial Sepsis Screening     Row Name 08/15/19 2392                Is the patient's history suggestive of a new or worsening infection? (!) Yes (Proceed)  -CE        Suspected source of infection  urinary tract infection;pneumonia  -CE        Are two or more of the following signs & symptoms of infection both present and new to the patient? (!) Yes (Proceed)  -CE        Indicate SIRS criteria  Hyperthemia > 38 3C (100 9F); Leukocytosis (WBC > 45215 IJL); Tachycardia > 90 bpm  -CE        If the answer is yes to both questions, suspicion of sepsis is present          If severe sepsis is present AND tissue hypoperfusion perists in the hour after fluid resuscitation or lactate > 4, the patient meets criteria for SEPTIC SHOCK          Are any of the following organ dysfunction criteria present within 6 hours of suspected infection and SIRS criteria that are NOT considered to be chronic conditions?   No  -CE        Organ dysfunction          Date of presentation of severe sepsis          Time of presentation of severe sepsis          Tissue hypoperfusion persists in the hour after crystalloid fluid administration, evidenced, by either:          Was hypotension present within one hour of the conclusion of crystalloid fluid administration? No  -CE        Date of presentation of septic shock          Time of presentation of septic shock            User Key  (r) = Recorded By, (t) = Taken By, (c) = Cosigned By    Initials Name Provider Type    CE Manuel Linn MD Resident           Default Flowsheet Data (last 720 hours)      Sepsis Reassess     Row Name 08/15/19 2353 08/15/19 2100                Repeat Volume Status and Tissue Perfusion Assessment Performed    Repeat Volume Status and Tissue Perfusion Assessment Performed  Yes  -HT  Yes  -CE          Volume Status and Tissue Perfusion Post Fluid Resuscitation * Must Document All *    Vital Signs Reviewed (HR, RR, BP, T)  Yes  -HT  Yes  -CE       Shock Index Reviewed  Yes  -HT  Yes  -CE       Arterial Oxygen Saturation Reviewed (POx, SaO2 or SpO2)  Yes (comment %)  -HT  Yes (comment %) 94  -CE       Cardio  Normal S1/S2; Regular rate and rhythm  -HT  Normal S1/S2; Regular rate and rhythm; No murmor  -CE       Pulmonary  Normal effort;Clear to auscultation  -HT  Normal effort;Clear to auscultation  -CE       Capillary Refill  Brisk  -HT  Brisk  -CE       Peripheral Pulses  Radial  -HT  Radial  -CE       Peripheral Pulse  +2  -HT  +2  -CE       Skin  Warm  -HT  Warm  -CE       Urine output assessed              *OR*   Intensive Monitoring- Must Document One of the Following Four *:    Vital Signs Reviewed           * Central Venous Pressure (CVP or RAP)           * Central Venous Oxygen (SVO2, ScvO2 or Oxygen saturation via central catheter)           * Bedside Cardiovascular US in IVC diameter and % collapse           * Passive Leg Raise OR Crystalloid Challenge             User Key  (r) = Recorded By, (t) = Taken By, (c) = Cosigned By    Initials Name Provider Type     Alex Lopez MD Resident    [address] [place] [address], MD Resident                MDM  Number of Diagnoses or Management Options  Left lower quadrant pain:   Pyelonephritis:   Diagnosis management comments: 46year old female history of hypertension, hyperlipidemia, depression coming in today with one-day history of feeling unwell  The patient heart rate low 100s in the room and new oxygen requirement, white count 18019, and febrile plan to perform sepsis workup and will obtain CT abdomen pelvis due to abdominal pain  Plan to give 1 L of fluids  EKG within normal limits no acute ST or T-wave changes  Troponin negative  CT scan with changes indicative of pyelonephritis  UA suspicious of infection  Lactate negative not severe sepsis  Plan to give antibiotics  Admitted to SOD  Amount and/or Complexity of Data Reviewed  Clinical lab tests: ordered and reviewed  Tests in the radiology section of CPT®: ordered and reviewed  Tests in the medicine section of CPT®: ordered and reviewed  Review and summarize past medical records: yes  Independent visualization of images, tracings, or specimens: yes    Risk of Complications, Morbidity, and/or Mortality  Presenting problems: moderate  Diagnostic procedures: moderate  Management options: moderate    Patient Progress  Patient progress: stable      Disposition  Final diagnoses:   Pyelonephritis   Left lower quadrant pain     Time reflects when diagnosis was documented in both MDM as applicable and the Disposition within this note     Time User Action Codes Description Comment    8/15/2019  9:54 PM Brandie Arthur Add [N12] Pyelonephritis     8/15/2019  9:54 PM Brandie Arthur Add [R10 32] Left lower quadrant pain       ED Disposition     ED Disposition Condition Date/Time Comment    Admit Stable Thu Aug 15, 2019  9:54 PM Case was discussed with Sheree Buchanan and the patient's admission status was agreed to be Admission Status: observation status to the service of Dr Landry Orona           Follow-up Information    None         Current Discharge Medication List      CONTINUE these medications which have NOT CHANGED    Details   amLODIPine (NORVASC) 10 mg tablet Take 1 tablet (10 mg total) by mouth daily  Qty: 90 tablet, Refills: 1    Associated Diagnoses: Essential hypertension      ARIPiprazole (ABILIFY) 10 mg tablet Take 1 tablet (10 mg total) by mouth daily  Qty: 14 tablet, Refills: 0    Associated Diagnoses: Major depressive disorder, recurrent, severe without psychotic features ([facility] [address] )      !! FLUoxetine (PROzac) 20 mg capsule Take 1 capsule (20mg) with 40mg capsule (60mg total) by mouth daily (9am)  Qty: 14 capsule, Refills: 0    Associated Diagnoses: Major depressive disorder, recurrent, severe without psychotic features ([facility] [address] )      !! FLUoxetine (PROzac) 40 MG capsule Take 1 capsule (40mg) with 20mg capsule (60mg total) by mouth daily (9am)  Qty: 14 capsule, Refills: 0    Associated Diagnoses: Major depressive disorder, recurrent, severe without psychotic features ([facility])      hydrOXYzine HCL (ATARAX) 25 mg tablet Take 1 tablet by mouth every 8 (eight) hours as needed for anxiety (mild anxiety) for up to 30 days  Qty: 30 tablet, Refills: 0    Associated Diagnoses: Anxiety      LORazepam (ATIVAN) 0 5 mg tablet Take 0 5 mg by mouth 2 (two) times a day  Refills: 0      simvastatin (ZOCOR) 20 mg tablet Take 1 tablet (20 mg total) by mouth daily  Qty: 90 tablet, Refills: 1    Associated Diagnoses: Essential hypertension      traZODone (DESYREL) 50 mg tablet Take 1 tablet (50 mg total) by mouth daily at bedtime as needed for sleep  Qty: 14 tablet, Refills: 0    Associated Diagnoses: Insomnia      triamterene-hydrochlorothiazide (DYAZIDE) 37 5-25 mg per capsule Take 1 capsule by mouth daily  Qty: 90 capsule, Refills: 1    Associated Diagnoses: Essential hypertension       !! - Potential duplicate medications found  Please discuss with provider  No discharge procedures on file      ED Provider  Attending physically available and evaluated Misa Marian Castorena I managed the patient along with the ED Attending      Electronically Signed by         Arash Lainez MD  08/15/19 8524

## 2019-08-16 LAB
ALBUMIN SERPL BCP-MCNC: 2.7 G/DL (ref 3.5–5)
ALP SERPL-CCNC: 78 U/L (ref 46–116)
ALT SERPL W P-5'-P-CCNC: 35 U/L (ref 12–78)
ANION GAP SERPL CALCULATED.3IONS-SCNC: 3 MMOL/L (ref 4–13)
AST SERPL W P-5'-P-CCNC: 28 U/L (ref 5–45)
ATRIAL RATE: 78 BPM
ATRIAL RATE: 78 BPM
ATRIAL RATE: 96 BPM
BILIRUB SERPL-MCNC: 1.8 MG/DL (ref 0.2–1)
BUN SERPL-MCNC: 10 MG/DL (ref 5–25)
CALCIUM SERPL-MCNC: 8.2 MG/DL (ref 8.3–10.1)
CHLORIDE SERPL-SCNC: 112 MMOL/L (ref 100–108)
CO2 SERPL-SCNC: 25 MMOL/L (ref 21–32)
CREAT SERPL-MCNC: 0.84 MG/DL (ref 0.6–1.3)
ERYTHROCYTE [DISTWIDTH] IN BLOOD BY AUTOMATED COUNT: 14.4 % (ref 11.6–15.1)
GFR SERPL CREATININE-BSD FRML MDRD: 79 ML/MIN/1.73SQ M
GLUCOSE SERPL-MCNC: 118 MG/DL (ref 65–140)
HCT VFR BLD AUTO: 33.3 % (ref 34.8–46.1)
HGB BLD-MCNC: 10.9 G/DL (ref 11.5–15.4)
MCH RBC QN AUTO: 28.1 PG (ref 26.8–34.3)
MCHC RBC AUTO-ENTMCNC: 32.7 G/DL (ref 31.4–37.4)
MCV RBC AUTO: 86 FL (ref 82–98)
P AXIS: 37 DEGREES
P AXIS: 40 DEGREES
P AXIS: 45 DEGREES
PLATELET # BLD AUTO: 205 THOUSANDS/UL (ref 149–390)
PMV BLD AUTO: 9.9 FL (ref 8.9–12.7)
POTASSIUM SERPL-SCNC: 4.1 MMOL/L (ref 3.5–5.3)
PR INTERVAL: 146 MS
PR INTERVAL: 152 MS
PR INTERVAL: 154 MS
PROCALCITONIN SERPL-MCNC: 11.9 NG/ML
PROT SERPL-MCNC: 6.2 G/DL (ref 6.4–8.2)
QRS AXIS: -47 DEGREES
QRS AXIS: 0 DEGREES
QRS AXIS: 2 DEGREES
QRSD INTERVAL: 90 MS
QT INTERVAL: 318 MS
QT INTERVAL: 444 MS
QT INTERVAL: 450 MS
QTC INTERVAL: 401 MS
QTC INTERVAL: 506 MS
QTC INTERVAL: 513 MS
RBC # BLD AUTO: 3.88 MILLION/UL (ref 3.81–5.12)
SODIUM SERPL-SCNC: 140 MMOL/L (ref 136–145)
T WAVE AXIS: 30 DEGREES
T WAVE AXIS: 37 DEGREES
T WAVE AXIS: 44 DEGREES
VENTRICULAR RATE: 78 BPM
VENTRICULAR RATE: 78 BPM
VENTRICULAR RATE: 96 BPM
WBC # BLD AUTO: 18.75 THOUSAND/UL (ref 4.31–10.16)

## 2019-08-16 PROCEDURE — 93010 ELECTROCARDIOGRAM REPORT: CPT | Performed by: INTERNAL MEDICINE

## 2019-08-16 PROCEDURE — 84145 PROCALCITONIN (PCT): CPT | Performed by: INTERNAL MEDICINE

## 2019-08-16 PROCEDURE — 99220 PR INITIAL OBSERVATION CARE/DAY 70 MINUTES: CPT | Performed by: INTERNAL MEDICINE

## 2019-08-16 PROCEDURE — 93005 ELECTROCARDIOGRAM TRACING: CPT

## 2019-08-16 PROCEDURE — 80053 COMPREHEN METABOLIC PANEL: CPT | Performed by: INTERNAL MEDICINE

## 2019-08-16 PROCEDURE — 85027 COMPLETE CBC AUTOMATED: CPT | Performed by: INTERNAL MEDICINE

## 2019-08-16 PROCEDURE — NC001 PR NO CHARGE: Performed by: INTERNAL MEDICINE

## 2019-08-16 RX ORDER — POTASSIUM CHLORIDE 20 MEQ/1
40 TABLET, EXTENDED RELEASE ORAL ONCE
Status: COMPLETED | OUTPATIENT
Start: 2019-08-16 | End: 2019-08-16

## 2019-08-16 RX ADMIN — POTASSIUM CHLORIDE 20 MEQ: 200 INJECTION, SOLUTION INTRAVENOUS at 01:54

## 2019-08-16 RX ADMIN — ARIPIPRAZOLE 10 MG: 10 TABLET ORAL at 08:38

## 2019-08-16 RX ADMIN — LORAZEPAM 0.5 MG: 0.5 TABLET ORAL at 17:38

## 2019-08-16 RX ADMIN — ENOXAPARIN SODIUM 40 MG: 40 INJECTION SUBCUTANEOUS at 08:38

## 2019-08-16 RX ADMIN — SODIUM CHLORIDE 75 ML/HR: 0.9 INJECTION, SOLUTION INTRAVENOUS at 00:11

## 2019-08-16 RX ADMIN — FLUOXETINE 60 MG: 20 CAPSULE ORAL at 08:39

## 2019-08-16 RX ADMIN — POTASSIUM CHLORIDE 40 MEQ: 1500 TABLET, EXTENDED RELEASE ORAL at 08:38

## 2019-08-16 RX ADMIN — CEFTRIAXONE SODIUM 1000 MG: 10 INJECTION, POWDER, FOR SOLUTION INTRAVENOUS at 21:40

## 2019-08-16 RX ADMIN — LORAZEPAM 0.5 MG: 0.5 TABLET ORAL at 08:42

## 2019-08-16 RX ADMIN — ACETAMINOPHEN 650 MG: 325 TABLET ORAL at 19:23

## 2019-08-16 RX ADMIN — AMLODIPINE BESYLATE 10 MG: 10 TABLET ORAL at 08:40

## 2019-08-16 RX ADMIN — POTASSIUM CHLORIDE 40 MEQ: 1500 TABLET, EXTENDED RELEASE ORAL at 00:09

## 2019-08-16 RX ADMIN — SODIUM CHLORIDE 75 ML/HR: 0.9 INJECTION, SOLUTION INTRAVENOUS at 13:44

## 2019-08-16 NOTE — RESPIRATORY THERAPY NOTE
RT Protocol Note  Fabby Gutiérrez 46 y o  female MRN: [id number]  Unit/Bed#: CW2 215-01 Encounter: [id number]    Assessment    Principal Problem:    Pyelonephritis  Active Problems:    Major depressive disorder, recurrent, severe without psychotic features ([place] [address] )    Bipolar affective disorder, depressed, severe ([place] [address] )    Essential hypertension    Hyperlipidemia    Sepsis ([place] [address] )    Hypokalemia    Acute hypoxemic respiratory failure (HCC)    Elevated bilirubin    Shortness of breath    Anxiety    Nausea & vomiting      Home Pulmonary Medications:  n/a       Past Medical History:   Diagnosis Date    Anxiety     Arthritis     Bipolar affective disorder, depressed, severe ([place] [address] ) 4/28/2019    Depression     Hyperlipidemia     Hypertension     Learning difficulty     Osteopenia     Previous known suicide attempt     Psychiatric disorder     Psychiatric illness      Social History     Socioeconomic History    Marital status: Single     Spouse name: None    Number of children: None    Years of education: None    Highest education level: None   Occupational History    None   Social Needs    Financial resource strain: None    Food insecurity:     Worry: None     Inability: None    Transportation needs:     Medical: None     Non-medical: None   Tobacco Use    Smoking status: Current Every Day Smoker     Years: 15 00     Types: Cigarettes    Smokeless tobacco: Never Used   Substance and Sexual Activity    Alcohol use: Yes     Comment: socially    Drug use: No    Sexual activity: Yes     Partners: Male     Birth control/protection: None   Lifestyle    Physical activity:     Days per week: None     Minutes per session: None    Stress: None   Relationships    Social connections:     Talks on phone: None     Gets together: None     Attends Nondenominational service: None     Active member of club or organization: None     Attends meetings of clubs or organizations: None     Relationship status: None    Intimate partner violence:     Fear of current or ex partner: None     Emotionally abused: None     Physically abused: None     Forced sexual activity: None   Other Topics Concern    None   Social History Narrative    None       Subjective         Objective    Physical Exam:   Assessment Type: (P) Assess only  General Appearance: (P) Alert, Awake  Respiratory Pattern: (P) Normal  Chest Assessment: (P) Chest expansion symmetrical  Bilateral Breath Sounds: (P) Clear    Vitals:  Blood pressure 118/70, pulse 100, temperature (!) 103 °F (39 4 °C), resp  rate 18, height 5' 3" (1 6 m), weight 83 6 kg (184 lb 3 2 oz), SpO2 93 %  Imaging and other studies: I have personally reviewed pertinent reports  Plan    Respiratory Plan: (P) Discontinue Protocol        Resp Comments: (P) Pt in no distress at this time  Pt admitted with pyelonnephritis  Pt has no pulmonary hx and takes no meds at home  Pt denies cough and SOB  Protocol d/cd

## 2019-08-16 NOTE — PROGRESS NOTES
[facility] Admission Note   Unit/Bed # @DBLINK (SUZETTE,02364)@ Encounter: [id number]  SOD Team A          Efe Hernandez 46 y o  female [id number]       Patient seen and examined  Reviewed H&P per Dr Cervantes Nephew    Agree with the assessment and plan    Assessment/Plan: Principal Problem:    Pyelonephritis  Active Problems:    Major depressive disorder, recurrent, severe without psychotic features (HCC)    Bipolar affective disorder, depressed, severe ([address] )    Essential hypertension    Hyperlipidemia    Sepsis ([address] )    Hypokalemia    Acute hypoxemic respiratory failure (HCC)    Elevated bilirubin    Shortness of breath    Anxiety    Nausea & vomiting         Disposition:  OBSERVATION    Expected LOS: <2 Ozzie Starkey MD

## 2019-08-16 NOTE — ASSESSMENT & PLAN NOTE
Patient currently normotensive with a blood pressure of 118/70    - continue current home blood pressure regimen

## 2019-08-16 NOTE — PLAN OF CARE
Problem: Potential for Falls  Goal: Patient will remain free of falls  Description  INTERVENTIONS:  - Assess patient frequently for physical needs  -  Identify cognitive and physical deficits and behaviors that affect risk of falls    -  [place] fall precautions as indicated by assessment   - Educate patient/family on patient safety including physical limitations  - Instruct patient to call for assistance with activity based on assessment  - Modify environment to reduce risk of injury  - Consider OT/PT consult to assist with strengthening/mobility  Outcome: Progressing     Problem: Prexisting or High Potential for Compromised Skin Integrity  Goal: Skin integrity is maintained or improved  Description  INTERVENTIONS:  - Identify patients at risk for skin breakdown  - Assess and monitor skin integrity  - Assess and monitor nutrition and hydration status  - Monitor labs   - Assess for incontinence   - Turn and reposition patient  - Assist with mobility/ambulation  - Relieve pressure over bony prominences  - Avoid friction and shearing  - Provide appropriate hygiene as needed including keeping skin clean and dry  - Evaluate need for skin moisturizer/barrier cream  - Collaborate with interdisciplinary team   - Patient/family teaching  - Consider wound care consult   Outcome: Progressing

## 2019-08-16 NOTE — ASSESSMENT & PLAN NOTE
Patient with dysuria and left CVA tenderness with CT abdomen and pelvis consistent with ascending infection and pyelonephritis  - see plan as above

## 2019-08-16 NOTE — PLAN OF CARE
Problem: Potential for Falls  Goal: Patient will remain free of falls  Description  INTERVENTIONS:  - Assess patient frequently for physical needs  -  Identify cognitive and physical deficits and behaviors that affect risk of falls    -  [place] fall precautions as indicated by assessment   - Educate patient/family on patient safety including physical limitations  - Instruct patient to call for assistance with activity based on assessment  - Modify environment to reduce risk of injury  - Consider OT/PT consult to assist with strengthening/mobility  Outcome: Progressing     Problem: Prexisting or High Potential for Compromised Skin Integrity  Goal: Skin integrity is maintained or improved  Description  INTERVENTIONS:  - Identify patients at risk for skin breakdown  - Assess and monitor skin integrity  - Assess and monitor nutrition and hydration status  - Monitor labs   - Assess for incontinence   - Turn and reposition patient  - Assist with mobility/ambulation  - Relieve pressure over bony prominences  - Avoid friction and shearing  - Provide appropriate hygiene as needed including keeping skin clean and dry  - Evaluate need for skin moisturizer/barrier cream  - Collaborate with interdisciplinary team   - Patient/family teaching  - Consider wound care consult   Outcome: Progressing

## 2019-08-16 NOTE — ASSESSMENT & PLAN NOTE
Patient with increased shortness of breath for 1 day  Not on oxygen at home  Found to be desatting at 88-89% on room air in the ED  Patient put on 2 L of oxygen satting in the low 90s    Likely secondary to acute infection and left flank pain as patient has decreased inspiratory effort  - continue with plan as above  - chest x-ray ordered  - respiratory protocol  - continue to monitor

## 2019-08-16 NOTE — H&P
INTERNAL MEDICINE RESIDENCY ADMISSION H&P     Name: Bebe Bolden   Age & Sex: 46 y o  female   MRN: [id number]  Unit/Bed#: CW2 215-01   Encounter: [id number]  Primary Care Provider: Efe Hernandez DO    Code Status: Level 1 - Full Code  Admission Status: OBSERVATION  Disposition:  IV antibiotics    ASSESSMENT/PLAN     Active Problems:    Sepsis ([facility] [address] )    Pyelonephritis    Hypokalemia    Shortness of breath    Major depressive disorder, recurrent, severe without psychotic features ([facility] )    Bipolar affective disorder, depressed, severe ([facility] )    Essential hypertension    Hyperlipidemia    Acute hypoxemic respiratory failure (HCC)    Elevated bilirubin    Anxiety    Nausea & vomiting      Sepsis ([facility] )  Assessment & Plan  Patient complaining of burning with urination and left flank pain associated with increased shortness of breath that has progressed since 10:00 a m  this morning  Patient was febrile on admission with a temperature of 101 8 -103, white count of 20 71 with a left shift, tachycardic in the low 100's  Lactic acid normal at 1 6  Urine positive for nitrites and moderate amount of blood with 20-30 WBCs  CT abdomen and pelvis consistent with ascending infection and pyelonephritis  - patient given 1 L by the ED, will give additional bolus and continue with IV fluids at 75ml/hr   - IV ceftriaxone  - UA and blood cultures x2  - respiratory protocol  - repeat procalcitonin in the morning    Pyelonephritis  Assessment & Plan  Patient with dysuria and left CVA tenderness with CT abdomen and pelvis consistent with ascending infection and pyelonephritis  - see plan as above    Shortness of breath  Assessment & Plan  Patient with increased shortness of breath for 1 day, no cough  Not on oxygen at home  Found to be desatting at 88-89% on room air in the ED  Patient put on 2 L of oxygen satting in the low 90s    Likely multifactorial secondary to left flank pain as patient has decreased inspiratory effort /atlectasis / STANLEY component STOP-BANG 4 / anxiety   - continue with plan as above  - chest x-ray with enlarged cardiac silhouette, no evidence of pneumonia, pending final read  - respiratory protocol   - continue to monitor, wean off oxygen as tolerated    Hypokalemia  Assessment & Plan  Patient found to be hypokalemic with a potassium of 2 8  Likely secondary to vomiting and increased urinary frequency  - will replete potassium as needed  - discontinued triamterene-HCTZ     Nausea & vomiting  Assessment & Plan  Patient with increased nausea and vomiting since this morning   - continue Zofran as needed    Anxiety  Assessment & Plan  Continue Ativan 0 5 mg b i d  Elevated bilirubin  Assessment & Plan  Patient found to have total bilirubin 1 88  - direct bilirubin ordered       Hyperlipidemia  Assessment & Plan  Continue simvastatin 20 mg daily    Essential hypertension  Assessment & Plan  Patient currently normotensive with a blood pressure of 118/70    - continue current home blood pressure regimen    Major depressive disorder, recurrent, severe without psychotic features ([facility] )  Assessment & Plan  - continue home medications  - aripiprazole 10 mg , fluoxetine 60 mg, trazodone 50 mg      VTE Pharmacologic Prophylaxis: Enoxaparin (Lovenox)  VTE Mechanical Prophylaxis: sequential compression device    CHIEF COMPLAINT     Chief Complaint   Patient presents with    Abdominal Pain     Pt presents with LLQ abdominal pain taht started this morning, as well as "Not feeling herself"  Pt also complains of nausea, no D/V  HISTORY OF PRESENT ILLNESS     Misa Rasheed is a 54-year-old female with a past medical history significant for learning difference, chronic hearing loss, MDD with multiple psychiatric hospitalizations, bipolar disorder, hypertension, and hyperlipidemia presenting with a 1 weeks history of increased urinary discomfort    Per the patient, she has a history of 6 UTIs in the past   She notes that she has been using the bathroom every 10-15 minutes for the past week, with bowel movements occurring every 3 days  Starting around 10:00 a m  today she developed left flank pain, and increased shortness of breath  She described her flank pain as intermittent, worse with movement and pressure with a 10/10 pain scale during periods of pain  She used Aleve which gave her minimal relief  She began to feel nauseous and vomited twice throughout the course of the day  She noted that she began shaking, felt cold, and feverish  Her shortness of breath began last night as the patient was unable to sleep and progressed throughout the day to the point where she felt she was going to pass out, which prompted her to come to the ED  While in the ED the patient was found to be septic with a temperature 101 8  A CT abdomen pelvis was performed which demonstrated ascending infection consistent with pyelonephritis  Her urine was also positive for nitrites, moderate blood and 20-30 WBC  She denied any associated abdominal or chest pain  Of note, the patient just moved from [facility] and has recently established care with [facility]  She lives with a friend at home  Per the patient, she requests that the friend be the decision maker in the event any complications arise as the patient has a learning difference and is not close with any immediate family members  REVIEW OF SYSTEMS     Review of Systems   Constitutional: Positive for appetite change, chills, diaphoresis, fatigue and fever  Negative for activity change  HENT: Positive for congestion and drooling  Respiratory: Positive for shortness of breath  Negative for cough and chest tightness  Cardiovascular: Negative for chest pain, palpitations and leg swelling  Gastrointestinal: Positive for nausea and vomiting  Negative for abdominal distention and abdominal pain  Genitourinary: Positive for dysuria, flank pain, frequency and urgency          Left flank pain   Skin: Positive for pallor  Neurological: Positive for weakness, light-headedness and headaches  OBJECTIVE     Vitals:    08/15/19 1855 08/15/19 2045 08/15/19 2253 08/15/19 2253   BP: 121/70 115/59  118/70   BP Location: Right arm Right arm     Pulse: 94 86  100   Resp: 18 20  18   Temp: (!) 101 8 °F (38 8 °C)   (!) 103 °F (39 4 °C)   TempSrc: Oral      SpO2: 90% 91%  93%   Weight:   83 6 kg (184 lb 3 2 oz)    Height:   5' 3" (1 6 m)       Temperature:   Temp (24hrs), Av 4 °F (39 1 °C), Min:101 8 °F (38 8 °C), Max:103 °F (39 4 °C)    Temperature: (!) 103 °F (39 4 °C)  Intake & Output:  I/O        07 - 08/15 0700 08/15 07 -  0700    IV Piggyback  1000    Total Intake(mL/kg)  1000 (12)    Net  +1000              Weights:   IBW: 52 4 kg    Body mass index is 32 63 kg/m²  Weight (last 2 days)     Date/Time   Weight    08/15/19 2253   83 6 (184 2)            Physical Exam   Constitutional: She is oriented to person, place, and time  She appears distressed  HENT:   Head: Normocephalic and atraumatic  Nose: Nose normal    Eyes: Pupils are equal, round, and reactive to light  Conjunctivae and EOM are normal  Right eye exhibits no discharge  Left eye exhibits no discharge  No scleral icterus  Neck: Normal range of motion  Cardiovascular: Intact distal pulses  Exam reveals no gallop and no friction rub  No murmur heard  Tachycardic   Pulmonary/Chest: No stridor  No respiratory distress  She has no wheezes  She exhibits no tenderness  Decreased inspiratory effort due to pain   Abdominal: Soft  Bowel sounds are normal  She exhibits no distension and no mass  There is no tenderness  There is no rebound and no guarding  Left CVA tenderness   Genitourinary:   Genitourinary Comments: Deferred   Neurological: She is alert and oriented to person, place, and time  Skin: She is diaphoretic  Psychiatric: She has a normal mood and affect  Nursing note and vitals reviewed      PAST MEDICAL HISTORY     Past Medical History:   Diagnosis Date    Anxiety     Arthritis     Bipolar affective disorder, depressed, severe ([facility] [address] ) 4/28/2019    Depression     Hyperlipidemia     Hypertension     Learning difficulty     Osteopenia     Previous known suicide attempt     Psychiatric disorder     Psychiatric illness      PAST SURGICAL HISTORY     Past Surgical History:   Procedure Laterality Date    LAPAROSCOPY      as a child, per patient-normal findings     SOCIAL & FAMILY HISTORY     Social History     Substance and Sexual Activity   Alcohol Use Yes    Comment: socially     Substance and Sexual Activity   Alcohol Use Yes    Comment: socially        Substance and Sexual Activity   Drug Use No     Social History     Tobacco Use   Smoking Status Current Every Day Smoker    Years: 15 00    Types: Cigarettes   Smokeless Tobacco Never Used     Family History   Problem Relation Age of Onset    Alzheimer's disease Mother     Depression Mother     No Known Problems Sister     Depression Brother     Bipolar disorder Brother     No Known Problems Maternal Aunt     No Known Problems Paternal Aunt     No Known Problems Maternal Uncle     No Known Problems Paternal Uncle     No Known Problems Maternal Grandfather     No Known Problems Maternal Grandmother     No Known Problems Paternal Grandfather     No Known Problems Paternal Grandmother     No Known Problems Cousin     ADD / ADHD Neg Hx     Alcohol abuse Neg Hx     Anxiety disorder Neg Hx     Dementia Neg Hx     Drug abuse Neg Hx     OCD Neg Hx     Paranoid behavior Neg Hx     Schizophrenia Neg Hx     Seizures Neg Hx     Self-Injury Neg Hx     Suicide Attempts Neg Hx      LABORATORY DATA     Labs: I have personally reviewed pertinent reports      Results from last 7 days   Lab Units 08/15/19  1909   WBC Thousand/uL 20 71*   HEMOGLOBIN g/dL 12 6   HEMATOCRIT % 37 0   PLATELETS Thousands/uL 233   NEUTROS PCT % 85*   MONOS PCT % 9 Results from last 7 days   Lab Units 08/15/19  1909   POTASSIUM mmol/L 2 8*   CHLORIDE mmol/L 102   CO2 mmol/L 26   BUN mg/dL 12   CREATININE mg/dL 0 92   CALCIUM mg/dL 8 5   ALK PHOS U/L 103   ALT U/L 40   AST U/L 34     Results from last 7 days   Lab Units 08/15/19  1909   MAGNESIUM mg/dL 2 0          Results from last 7 days   Lab Units 08/15/19  1909   INR  1 16   PTT seconds 30     Results from last 7 days   Lab Units 08/15/19  1909   LACTIC ACID mmol/L 1 6         Micro:  No results found for: Rolena Prudent, WOUNDCULT, SPUTUMCULTUR  IMAGING & DIAGNOSTIC TESTS     Imaging: I have personally reviewed pertinent reports  Ct Abdomen Pelvis With Contrast    Result Date: 8/15/2019  Impression: Enhancement of the left renal urothelial lining with perinephric and periureteric stranding along with smudgy areas of hypodensity in the left kidney consistent with ascending infection and pyelonephritis  Workstation performed: [id number]     EKG, Pathology, and Other Studies: I have personally reviewed pertinent reports  ALLERGIES     Allergies   Allergen Reactions    Other      Hay Fever    Oxycodone-Acetaminophen GI Intolerance     MEDICATIONS PRIOR TO ARRIVAL     Prior to Admission medications    Medication Sig Start Date End Date Taking?  Authorizing Provider   amLODIPine (NORVASC) 10 mg tablet Take 1 tablet (10 mg total) by mouth daily 8/1/19  Yes Nathanael Rosas DO   ARIPiprazole (ABILIFY) 10 mg tablet Take 1 tablet (10 mg total) by mouth daily 5/6/19 8/18/21 Yes Eliana Cullen PA-C   FLUoxetine (PROzac) 20 mg capsule Take 1 capsule (20mg) with 40mg capsule (60mg total) by mouth daily (9am) 5/6/19  Yes Eliana Cullen PA-C   FLUoxetine (PROzac) 40 MG capsule Take 1 capsule (40mg) with 20mg capsule (60mg total) by mouth daily (9am) 5/6/19  Yes Eliana Cullen PA-C   hydrOXYzine HCL (ATARAX) 25 mg tablet Take 1 tablet by mouth every 8 (eight) hours as needed for anxiety (mild anxiety) for up to 30 days 1/17/17 8/15/19 Yes Lucy Bermudez MD   LORazepam (ATIVAN) 0 5 mg tablet Take 0 5 mg by mouth 2 (two) times a day 5/20/19  Yes Historical Provider, MD   simvastatin (ZOCOR) 20 mg tablet Take 1 tablet (20 mg total) by mouth daily 8/1/19  Yes Sanju Valdez DO   traZODone (DESYREL) 50 mg tablet Take 1 tablet (50 mg total) by mouth daily at bedtime as needed for sleep 5/6/19  Yes Shanita Frank PA-C   triamterene-hydrochlorothiazide (DYAZIDE) 37 5-25 mg per capsule Take 1 capsule by mouth daily 8/1/19  Yes Sanju Valdez DO     MEDICATIONS ADMINISTERED IN LAST 24 HOURS     Medication Administration - last 24 hours from 08/14/2019 2328 to 08/15/2019 2328       Date/Time Order Dose Route Action Action by     08/15/2019 2032 sodium chloride 0 9 % bolus 1,000 mL 0 mL Intravenous Stopped [employer], RN     08/15/2019 1908 sodium chloride 0 9 % bolus 1,000 mL 1,000 mL Intravenous [employer], RN     08/15/2019 2036 potassium chloride (K-DUR,KLOR-CON) CR tablet 40 mEq 40 mEq Oral Given [employer], RN     08/15/2019 2042 potassium chloride 20 mEq IVPB (premix) 20 mEq Intravenous [employer], RN     08/15/2019 2029 iohexol (OMNIPAQUE) 350 MG/ML injection (MULTI-DOSE) 100 mL 100 mL Intravenous Given Lisa Clements     08/15/2019 2144 ceftriaxone (ROCEPHIN) 1 g/50 mL in dextrose IVPB 1,000 mg Intravenous David Lobo, RN     08/15/2019 2258 acetaminophen (TYLENOL) tablet 650 mg 650 mg Oral Given Chio Vee RN     08/15/2019 2313 sodium chloride 0 9 % bolus 1,000 mL 1,000 mL Intravenous Chapincito Leong [facility]     08/15/2019 2317 potassium chloride 20 mEq IVPB (premix) 20 mEq Intravenous David Leong RN        CURRENT MEDICATIONS       Current Facility-Administered Medications:  acetaminophen 650 mg Oral Q6H PRN Sayda Mcdaniel MD    [START ON 8/16/2019] amLODIPine 10 mg Oral Daily Sayda Mcdaniel MD    [START ON 8/16/2019] ARIPiprazole 10 mg Oral Daily Sayda Mcdaniel, MD    [START ON 8/16/2019] cefTRIAXone 2,000 mg Intravenous Q24H Sayda Mcdaniel MD    [START ON 8/16/2019] enoxaparin 40 mg Subcutaneous Daily Sayda Mcdaniel MD    [START ON 8/16/2019] FLUoxetine 60 mg Oral Daily Sayda Mcdaniel MD    hydrOXYzine HCL 25 mg Oral Q8H PRN Sayda Mcdaniel MD    [START ON 8/16/2019] LORazepam 0 5 mg Oral BID Sayda Mcdaniel MD    ondansetron 4 mg Intravenous Q6H PRN Sayda Mcdaniel MD    [START ON 8/16/2019] potassium chloride 20 mEq Intravenous Once Leatha Torres MD    Followed by        Trung Levo ON 8/16/2019] potassium chloride 20 mEq Intravenous Once Leatha Torres MD    [START ON 8/16/2019] potassium chloride 20 mEq Intravenous Once Sayda Mcdaniel MD    Followed by        potassium chloride 20 mEq Intravenous Once Sayda Mcdaniel MD Last Rate: 20 mEq (08/15/19 2317)   sodium chloride 1,000 mL Intravenous Once Sayda Mcdaniel MD Last Rate: 1,000 mL (08/15/19 2313)   sodium chloride 75 mL/hr Intravenous Continuous Sayda Mcdaniel MD    traZODone 50 mg Oral HS PRN Sayda Mcdaniel MD        sodium chloride 75 mL/hr       acetaminophen 650 mg Q6H PRN   hydrOXYzine HCL 25 mg Q8H PRN   ondansetron 4 mg Q6H PRN   traZODone 50 mg HS PRN       Admission Time  I spent 30 minutes admitting the patient  This involved direct patient contact where I performed a full history and physical, reviewing previous records, and reviewing laboratory and other diagnostic studies  Portions of the record may have been created with voice recognition software  Occasional wrong word or "sound a like" substitutions may have occurred due to the inherent limitations of voice recognition software    Read the chart carefully and recognize, using context, where substitutions have occurred     ==  Micaela Saunders,   [place]

## 2019-08-16 NOTE — ASSESSMENT & PLAN NOTE
Patient complaining of burning with urination and left flank pain associated with increased shortness of breath that has progressed since 10:00 a m  this morning  Patient was febrile on admission with a temperature of 101 8, white count of 20 71 with a left shift, tachycardic in the low 100's  Lactic acid normal at 1 6  Urine positive for nitrites and moderate amount of blood with 20-30 WBCs  CT abdomen and pelvis consistent with ascending infection and pyelonephritis    - patient given 1 L by the ED and will give additional bolus and continue with IV fluids at 75ml/hr   - IV ceftriaxone  - UA and blood cultures x2  - respiratory protocol  - repeat procalcitonin in the morning

## 2019-08-16 NOTE — ASSESSMENT & PLAN NOTE
Patient currently normotensive with a blood pressure of 118/70    - continue current home blood pressure regimen of amlodipine 10 mg  - hold triamterene HCTZ as patient is hypokalemic

## 2019-08-16 NOTE — ASSESSMENT & PLAN NOTE
Patient found to be hypokalemic with a potassium of 2 8    Likely multifactorial to polyuria, and vomiting   - will replete potassium as needed  - current on telemetry  - repeat EKG in the morning

## 2019-08-16 NOTE — UTILIZATION REVIEW
Initial Clinical Review    Admission: Date/Time/Statement: 8/15/2019  2155    Orders Placed This Encounter   Procedures    Place in Observation     Standing Status:   Standing     Number of Occurrences:   1     Order Specific Question:   Admitting Physician     Answer:   Ruben Freeman     Order Specific Question:   Level of Care     Answer:   Med Surg [16]     ED Arrival Information     Expected Arrival Acuity Means of Arrival Escorted By Service Admission Type    - 8/15/2019 18:36 Urgent Ambulance [address] Urgent    Arrival Complaint    -        Chief Complaint   Patient presents with    Abdominal Pain     Pt presents with LLQ abdominal pain taht started this morning, as well as "Not feeling herself"  Pt also complains of nausea, no D/V  Assessment/Plan:  46 yr old female to ed from home via ems with urinary frequency q 10-15 minutes, left  Intermittent  flank pain started today at 10 am 10/10  Pain scale worse with movement and pressure  She became nauseous and vomited x2 and noted she began to shake, felt cold and feverish  Her sob began during the night and progressed to the point she felt that she was going to pass out  She then decided to come to the ed  Aleve was used as pain deterrent with minimal response  Ct of abd - CT abdomen pelvis was performed which demonstrated ascending infection consistent with pyelonephritis  she is + for appetite change, chills, diaphoresis, fatigue, and fever  Congestion drooling, sob, nausea and vomiting, dysuria, flank pain frequency and urgency and flank pain, pallor, weakness, light-headedness, and headaches  Her termp on arrival to ed was 101 8 and her urine + for nitrites, mod blood and 20-30 wbc  The plan is to continue iv fluids at 75, iv cefriaxone, cultures of blood and urine, repeat procalcitonin in am  She has been found to be desatting at 88-89% on ra and placed on 2l of 02  Chest x-ray - no acute cardiopulmonary disease  She is admitted as an observation with sepsis and pyleonephritis    ED Triage Vitals [08/15/19 1855]   Temperature Pulse Respirations Blood Pressure SpO2   (!) 101 8 °F (38 8 °C) 94 18 121/70 90 %      Temp Source Heart Rate Source Patient Position - Orthostatic VS BP Location FiO2 (%)   Oral Monitor Sitting Right arm --      Pain Score       8        Wt Readings from Last 1 Encounters:   08/15/19 83 6 kg (184 lb 3 2 oz)     Additional Vital Signs:   /15/19 22:53:22  103 °F (39 4 °C)Abnormal   100  18  118/70  86  93 %       08/15/19 2045    86  20  115/59    91 %  None (Room air)  Lying   08/15/19 1915              None (         Pertinent Labs/Diagnostic Test Results:   Results from last 7 days   Lab Units 08/16/19 0534 08/15/19  1909   WBC Thousand/uL 18 75* 20 71*   HEMOGLOBIN g/dL 10 9* 12 6   HEMATOCRIT % 33 3* 37 0   PLATELETS Thousands/uL 205 233   NEUTROS ABS Thousands/µL  --  17 55*         Results from last 7 days   Lab Units 08/16/19  0534 08/15/19  1909   SODIUM mmol/L 140 139   POTASSIUM mmol/L 4 1 2 8*   CHLORIDE mmol/L 112* 102   CO2 mmol/L 25 26   ANION GAP mmol/L 3* 11   BUN mg/dL 10 12   CREATININE mg/dL 0 84 0 92   EGFR ml/min/1 73sq m 79 71   CALCIUM mg/dL 8 2* 8 5   MAGNESIUM mg/dL  --  2 0     Results from last 7 days   Lab Units 08/16/19  0534 08/15/19  1909   AST U/L 28 34   ALT U/L 35 40   ALK PHOS U/L 78 103   TOTAL PROTEIN g/dL 6 2* 7 3   ALBUMIN g/dL 2 7* 3 7   TOTAL BILIRUBIN mg/dL 1 80* 1 88*   BILIRUBIN DIRECT mg/dL  --  0 43*         Results from last 7 days   Lab Units 08/16/19 0534 08/15/19  1909   GLUCOSE RANDOM mg/dL 118 138                                     Results from last 7 days   Lab Units 08/15/19  1909   PROTIME seconds 14 4   INR  1 16   PTT seconds 30         Results from last 7 days   Lab Units 08/16/19  0534 08/15/19  1909   PROCALCITONIN ng/ml 11 90* 1 27*     Results from last 7 days   Lab Units 08/15/19  1909   LACTIC ACID mmol/L 1 6 Results from last 7 days   Lab Units 08/15/19  2025   CLARITY UA  Cloudy   COLOR UA  Yellow   SPEC GRAV UA  1 009   PH UA  7 0   GLUCOSE UA mg/dl Negative   KETONES UA mg/dl Negative   BLOOD UA  Moderate*   PROTEIN UA mg/dl 30 (1+)*   NITRITE UA  Positive*   BILIRUBIN UA  Negative   UROBILINOGEN UA E U /dl 1 0   LEUKOCYTES UA  Small*   WBC UA /hpf 20-30*   RBC UA /hpf 2-4*   BACTERIA UA /hpf Moderate*   EPITHELIAL CELLS WET PREP /hpf Occasional                             Results from last 7 days   Lab Units 08/15/19  1909   GRAM STAIN RESULT  Gram negative rods*               ED Treatment:   Medication Administration from 08/15/2019 1836 to 08/15/2019 2236       Date/Time Order Dose Route Action Action by Comments     08/15/2019 2032 sodium chloride 0 9 % bolus 1,000 mL 0 mL Intravenous Stopped [employer], RN      08/15/2019 1908 sodium chloride 0 9 % bolus 1,000 mL 1,000 mL Intravenous [employer], RN      08/15/2019 2036 potassium chloride (K-DUR,KLOR-CON) CR tablet 40 mEq 40 mEq Oral Given [employer], RN      08/15/2019 2042 potassium chloride 20 mEq IVPB (premix) 20 mEq Intravenous [employer], RN      08/15/2019 2029 iohexol (OMNIPAQUE) 350 MG/ML injection (MULTI-DOSE) 100 mL 100 mL Intravenous Given Gareth Adams      08/15/2019 2144 ceftriaxone (ROCEPHIN) 1 g/50 mL in dextrose IVPB 1,000 mg Intravenous New Bag [employer], RN         Past Medical History:   Diagnosis Date    Anxiety     Arthritis     Bipolar affective disorder, depressed, severe ([facility] [address] ) 4/28/2019    Depression     Hyperlipidemia     Hypertension     Learning difficulty     Osteopenia     Previous known suicide attempt     Psychiatric disorder     Psychiatric illness      Present on Admission:   Essential hypertension   Bipolar affective disorder, depressed, severe ([address] )   Major depressive disorder, recurrent, severe without psychotic features ([address] )   Sepsis ([facility] [address] )   Hypokalemia   Hyperlipidemia   Shortness of breath      Admitting Diagnosis: Abdominal pain [R10 9]  Pyelonephritis [N12]  Left lower quadrant pain [R10 32]  Age/Sex: 46 y o  female  Admission Orders:    Current Facility-Administered Medications:  acetaminophen 650 mg Oral Q6H PRN    amLODIPine 10 mg Oral Daily    ARIPiprazole 10 mg Oral Daily    cefTRIAXone 1,000 mg Intravenous Q24H    enoxaparin 40 mg Subcutaneous Daily    FLUoxetine 60 mg Oral Daily    hydrOXYzine HCL 25 mg Oral Q8H PRN    LORazepam 0 5 mg Oral BID    ondansetron 4 mg Intravenous Q6H PRN    potassium chloride 40 mEq Oral Once    sodium chloride 75 mL/hr Intravenous Continuous Last Rate: 75 mL/hr (08/16/19 0011)   traZODone 50 mg Oral HS PRN    urine culture  Strep pneumoniae urine   blood cultures  Legionella antigen  yrlm  Nasal 02 prn  Incentive spirometry q1  scd    IP CONSULT TO CASE MANAGEMENT    Network Utilization Review Department  Phone: [phone]; Fax [phone]  [email or web address]  ATTENTION: Please call with any questions or concerns to [phone]  and carefully listen to the prompts so that you are directed to the right person  Send all requests for admission clinical reviews, approved or denied determinations and any other requests to fax [phone]   All voicemails are confidential

## 2019-08-16 NOTE — SOCIAL WORK
CM met w/ pt for d/c planning  Pt verbalizes understanding of CM role  PTA Pt lives in apartment on 1st  floor  W/ no steps to enter  Pt is independent w/ all ADLS & Ambulation with no assistive device  Pt uses [facility] [address] for pharmacy needs   PCP Dr Ishmael [facility] Walter Kehr No POA  Pt is SSDI    Pt does not drive   Pt has had no VNA, , or short term rehab,  or D & A Admission history  Pt has had psych admissions  Does not remember  CM reviewed d/c planning process including the following: identifying help at home, patient preference for d/c planning needs, Discharge Lounge, [facility] Meds to Bed program, availability of treatment team to discuss questions or concerns patient and/or family may have regarding understanding medications and recognizing signs and symptoms once discharged  CM also encouraged patient to follow up with all recommended appointments after discharge  Patient advised of importance for patient and family to participate in managing patients medical well being    Discharge checklist discussed with patient

## 2019-08-16 NOTE — SEPSIS NOTE
Sepsis Note   Jessy Olvera 46 y o  female MRN: [id number]  Unit/Bed#: CW2 215-01 Encounter: [id number]      qSOFA     Row Name 08/15/19 22:53:22 08/15/19 2045 08/15/19 1855          Altered mental status GCS < 15            Respiratory Rate > / =22  0  0  0      Systolic BP < / =532  0  0  0      Q Sofa Score  0  0  0          Initial Sepsis Screening     Row Name 08/15/19 1855                Is the patient's history suggestive of a new or worsening infection? (!) Yes (Proceed)  -CE        Suspected source of infection  urinary tract infection;pneumonia  -CE        Are two or more of the following signs & symptoms of infection both present and new to the patient? (!) Yes (Proceed)  -CE        Indicate SIRS criteria  Hyperthemia > 38 3C (100 9F); Leukocytosis (WBC > 33130 IJL); Tachycardia > 90 bpm  -CE        If the answer is yes to both questions, suspicion of sepsis is present          If severe sepsis is present AND tissue hypoperfusion perists in the hour after fluid resuscitation or lactate > 4, the patient meets criteria for SEPTIC SHOCK          Are any of the following organ dysfunction criteria present within 6 hours of suspected infection and SIRS criteria that are NOT considered to be chronic conditions? No  -CE        Organ dysfunction          Date of presentation of severe sepsis          Time of presentation of severe sepsis          Tissue hypoperfusion persists in the hour after crystalloid fluid administration, evidenced, by either:          Was hypotension present within one hour of the conclusion of crystalloid fluid administration?   No  -CE        Date of presentation of septic shock          Time of presentation of septic shock            User Key  (r) = Recorded By, (t) = Taken By, (c) = Cosigned By    Initials Name Provider Presley Alcazar MD Resident               Default Flowsheet Data (last 720 hours)      Sepsis Reassess     Row Name 08/15/19 1670 08/15/19 2100 Repeat Volume Status and Tissue Perfusion Assessment Performed    Repeat Volume Status and Tissue Perfusion Assessment Performed  Yes  -HT  Yes  -CE          Volume Status and Tissue Perfusion Post Fluid Resuscitation * Must Document All *    Vital Signs Reviewed (HR, RR, BP, T)  Yes  -HT  Yes  -CE       Shock Index Reviewed  Yes  -HT  Yes  -CE       Arterial Oxygen Saturation Reviewed (POx, SaO2 or SpO2)  Yes (comment %)  -HT  Yes (comment %) 94  -CE       Cardio  Normal S1/S2; Regular rate and rhythm  -HT  Normal S1/S2; Regular rate and rhythm; No murmor  -CE       Pulmonary  Normal effort;Clear to auscultation  -HT  Normal effort;Clear to auscultation  -CE       Capillary Refill  Brisk  -HT  Brisk  -CE       Peripheral Pulses  Radial  -HT  Radial  -CE       Peripheral Pulse  +2  -HT  +2  -CE       Skin  Warm  -HT  Warm  -CE       Urine output assessed              *OR*   Intensive Monitoring- Must Document One of the Following Four *:    Vital Signs Reviewed           * Central Venous Pressure (CVP or RAP)           * Central Venous Oxygen (SVO2, ScvO2 or Oxygen saturation via central catheter)           * Bedside Cardiovascular US in IVC diameter and % collapse           * Passive Leg Raise OR Crystalloid Challenge             User Key  (r) = Recorded By, (t) = Taken By, (c) = Cosigned By    Initials Name Provider Type    HT Marlyn Wu MD Resident    Lauren Drake MD Resident

## 2019-08-16 NOTE — PROGRESS NOTES
INTERNAL MEDICINE RESIDENCY PROGRESS NOTE     Name: Hung Lane   Age & Sex: 46 y o  female   MRN: [id number]  Unit/Bed#: 2 215-01   Encounter: [id number]  Team: SOD Team A    PATIENT INFORMATION     Name: Hung Lane   Age & Sex: 46 y o  female   MRN: [id number]  Hospital Stay Days: 0    ASSESSMENT/PLAN     Principal Problem:    Pyelonephritis  Active Problems:    Major depressive disorder, recurrent, severe without psychotic features ([facility] )    Bipolar affective disorder, depressed, severe ([facility] )    Essential hypertension    Hyperlipidemia    Sepsis ([facility] )    Hypokalemia    Acute hypoxemic respiratory failure (HCC)    Elevated bilirubin    Shortness of breath    Anxiety    Nausea & vomiting      1  Severe Sepsis (most likely secondary to pyelonephritis from UTI)  The most likely etiology is pyelonephritis from UTI  Patient came in with burning with urination, left flank pain and shortness of breath  Admission labs showed WBC 20 71 with left shift, tachycardia and temperature of 101 8-103  Lactic acid was normal 1 6  Procalcitonin elevated at 1 27  T bili 1 88 and direct bili 0 43  Urine positive for nitrites and moderate amount of blood with 20-30 WBCs  CT abdomen and pelvis consistent with ascending infection and pyelonephritis  Chest x-ray showed no acute cardiopulmonary disease  Follow up procalcitonin this morning is 11 9  It is possible that procalcitonin may be positive in the presence of sepsis  Plan:  - patient received 1 L in ED and received additional bolus on floors  Currently receiving fluids 75 mL/hour   - continue IV ceftriaxone  Blood culture # 1 showed gram-negative rods  Patient has been afebrile and WBC count is trending down  - pt had hx of multiple UTIs in past  Consider escalation to antibiotics to Cefepime or Pip-Tazo if pt develops fever or WBC starts trending up    - follow-up on blood culture # 2 and urine cultures, strep pneumo and Legionella urine antigen        2  Anemia  Patient's hemoglobin was 12 6 on admission  Hemoglobin this morning 10 9  Most likely secondary to receiving fluids  Plan:  - continue to monitor hemoglobin daily  - will consider additional intervention if hemoglobin keeps dropping  3  Pyelonephritis  Patient with increase urinary frequency, urgency, dysuria and left CVA tenderness with CT abdomen and pelvis consistent with ascending infection and pyelonephritis    Plan:  see plan as above       4  Shortness of breath  Patient with increased shortness of breath for 1 day, no cough  Not on oxygen at home  Found to be desatting at 88-89% on room air in the ED  Patient put on 2 L of oxygen satting in the low 90s  Likely multifactorial secondary to left flank pain as patient has decreased inspiratory effort /atlectasis / STANLEY component STOP-BANG 4 / anxiety  chest x-ray showed no acute cardiopulmonary disease  Plan:  -continue with plan as above  -respiratory protocol, patient is off nasal canula this morning         5  Hypokalemia- resolved on 08/16/19  Patient found to be hypokalemic with a potassium of 2 8  Likely secondary to vomiting and increased urinary frequency  Patient's potassium this morning is 4 1      6  Nausea & vomiting  Patient with increased nausea and vomiting since this morning   - continue Zofran as needed     7  Anxiety  - Continue Ativan 0 5 mg b i d      8  Elevated bilirubin  Patient found to have total bilirubin 1 88 which improved to 1 80 this morning  Direct bili was 0 43  Likely secondary to sepsis  Patient denies history of hepatitis, gall bladder diseases, IV drug use or alcohol  Plan:  - will continue to monitor  Patient is asymptomatic          9  Hyperlipidemia  - Continue simvastatin 20 mg daily     10  Essential hypertension  Patient currently normotensive with a blood pressure of 118/70 on admission  Patient's blood pressure was 95/50 fever this morning       Plan:  - will hold patient's home blood pressure medication Norvasc 10 mg daily if blood pressure drops below 222 systolic      11  Major depressive disorder, recurrent, severe without psychotic features ([facility] [address] )  - continue home medications: aripiprazole 10 mg , fluoxetine 60 mg, trazodone 50 mg       Disposition: inpatient      SUBJECTIVE     Patient seen and examined  No acute events overnight  No new symptoms  OBJECTIVE     Vitals:    19 0400 19 0430 19 0435 19 0535   BP:       BP Location:       Pulse: 63 65 72 70   Resp:       Temp:    98 3 °F (36 8 °C)   TempSrc:       SpO2: 92% (!) 89% 96% 95%   Weight:       Height:          Temperature:   Temp (24hrs), Av 5 °F (38 1 °C), Min:98 3 °F (36 8 °C), Max:103 °F (39 4 °C)    Temperature: 98 3 °F (36 8 °C)  Intake & Output:  I/O        07 - 08/15 0700 08/15 07 -  0700  07 -  0700    I V  (mL/kg)  1000 (12)     IV Piggyback  2000     Total Intake(mL/kg)  3000 (35 9)     Urine (mL/kg/hr)  425     Total Output  425     Net  +2575                Weights:   IBW: 52 4 kg    Body mass index is 32 63 kg/m²  Weight (last 2 days)     Date/Time   Weight    08/15/19 2253   83 6 (184 2)            Physical Exam   Constitutional: She is oriented to person, place, and time  She appears well-developed and well-nourished  No distress  HENT:   Head: Normocephalic and atraumatic  Nose: Nose normal    Mouth/Throat: No oropharyngeal exudate  Eyes: Conjunctivae are normal  No scleral icterus  Neck: Neck supple  No JVD present  No tracheal deviation present  No thyromegaly present  Cardiovascular: Normal rate, regular rhythm, normal heart sounds and intact distal pulses  Exam reveals no gallop and no friction rub  No murmur heard  Pulmonary/Chest: Effort normal and breath sounds normal  No stridor  No respiratory distress  She has no wheezes  She has no rales  She exhibits no tenderness  Abdominal: Soft   Normal appearance and bowel sounds are normal  She exhibits no distension and no mass  There is tenderness  There is no rebound and no guarding  Mild diffuse abdominal tenderness on palpation  Neurological: She is alert and oriented to person, place, and time  Skin: Skin is warm and dry  She is not diaphoretic  No pallor  Psychiatric: She has a normal mood and affect  Her behavior is normal  Judgment and thought content normal      LABORATORY DATA     Labs: I have personally reviewed pertinent reports  Results from last 7 days   Lab Units 08/16/19  0534 08/15/19  1909   WBC Thousand/uL 18 75* 20 71*   HEMOGLOBIN g/dL 10 9* 12 6   HEMATOCRIT % 33 3* 37 0   PLATELETS Thousands/uL 205 233   NEUTROS PCT %  --  85*   MONOS PCT %  --  9      Results from last 7 days   Lab Units 08/16/19  0534 08/15/19  1909   POTASSIUM mmol/L 4 1 2 8*   CHLORIDE mmol/L 112* 102   CO2 mmol/L 25 26   BUN mg/dL 10 12   CREATININE mg/dL 0 84 0 92   CALCIUM mg/dL 8 2* 8 5   ALK PHOS U/L 78 103   ALT U/L 35 40   AST U/L 28 34     Results from last 7 days   Lab Units 08/15/19  1909   MAGNESIUM mg/dL 2 0          Results from last 7 days   Lab Units 08/15/19  1909   INR  1 16   PTT seconds 30     Results from last 7 days   Lab Units 08/15/19  1909   LACTIC ACID mmol/L 1 6           IMAGING & DIAGNOSTIC TESTING     Radiology Results: I have personally reviewed pertinent reports  Ct Abdomen Pelvis With Contrast    Result Date: 8/15/2019  Impression: Enhancement of the left renal urothelial lining with perinephric and periureteric stranding along with smudgy areas of hypodensity in the left kidney consistent with ascending infection and pyelonephritis  Workstation performed: [id number]     Other Diagnostic Testing: I have personally reviewed pertinent reports      ACTIVE MEDICATIONS     Current Facility-Administered Medications   Medication Dose Route Frequency    acetaminophen (TYLENOL) tablet 650 mg  650 mg Oral Q6H PRN    amLODIPine (NORVASC) tablet 10 mg  10 mg Oral Daily    ARIPiprazole (ABILIFY) tablet 10 mg  10 mg Oral Daily    cefTRIAXone (ROCEPHIN) 1,000 mg in dextrose 5 % 50 mL IVPB  1,000 mg Intravenous Q24H    enoxaparin (LOVENOX) subcutaneous injection 40 mg  40 mg Subcutaneous Daily    FLUoxetine (PROzac) capsule 60 mg  60 mg Oral Daily    hydrOXYzine HCL (ATARAX) tablet 25 mg  25 mg Oral Q8H PRN    LORazepam (ATIVAN) tablet 0 5 mg  0 5 mg Oral BID    ondansetron (ZOFRAN) injection 4 mg  4 mg Intravenous Q6H PRN    potassium chloride (K-DUR,KLOR-CON) CR tablet 40 mEq  40 mEq Oral Once    sodium chloride 0 9 % infusion  75 mL/hr Intravenous Continuous    traZODone (DESYREL) tablet 50 mg  50 mg Oral HS PRN       VTE Pharmacologic Prophylaxis: Enoxaparin (Lovenox)  VTE Mechanical Prophylaxis: sequential compression device    Portions of the record may have been created with voice recognition software  Occasional wrong word or "sound a like" substitutions may have occurred due to the inherent limitations of voice recognition software    Read the chart carefully and recognize, using context, where substitutions have occurred   ==  Zari Hdez, [address] [facility]  Internal Medicine Residency PGY-1

## 2019-08-16 NOTE — SEPSIS NOTE
Sepsis Note   Ricardo Carballo 46 y o  female MRN: [id number]  Unit/Bed#: ED 16 Encounter: [id number]      qSOFA     Row Name 08/15/19 2045 08/15/19 1855             Altered mental status GCS < 15           Respiratory Rate > / =22  0  0       Systolic BP < / =960  0  0       Q Sofa Score  0  0           Initial Sepsis Screening     Row Name 08/15/19 1855                Is the patient's history suggestive of a new or worsening infection? (!) Yes (Proceed)  -CE        Suspected source of infection  urinary tract infection;pneumonia  -CE        Are two or more of the following signs & symptoms of infection both present and new to the patient? (!) Yes (Proceed)  -CE        Indicate SIRS criteria  Hyperthemia > 38 3C (100 9F); Leukocytosis (WBC > 46736 IJL); Tachycardia > 90 bpm  -CE        If the answer is yes to both questions, suspicion of sepsis is present          If severe sepsis is present AND tissue hypoperfusion perists in the hour after fluid resuscitation or lactate > 4, the patient meets criteria for SEPTIC SHOCK          Are any of the following organ dysfunction criteria present within 6 hours of suspected infection and SIRS criteria that are NOT considered to be chronic conditions? No  -CE        Organ dysfunction          Date of presentation of severe sepsis          Time of presentation of severe sepsis          Tissue hypoperfusion persists in the hour after crystalloid fluid administration, evidenced, by either:          Was hypotension present within one hour of the conclusion of crystalloid fluid administration?   No  -CE        Date of presentation of septic shock          Time of presentation of septic shock            User Key  (r) = Recorded By, (t) = Taken By, (c) = Cosigned By    [address] Name Provider Brain MD Karen Resident

## 2019-08-16 NOTE — ASSESSMENT & PLAN NOTE
Patient with increased shortness of breath for 1 day  Not on oxygen at home  Found to be desatting at 88-89% on room air in the ED  Patient put on 2 L of oxygen satting in the low 90s  Likely secondary to acute infection and left flank pain as patient has decreased inspiratory effort  Wean off oxygen as needed    - continue with plan as above  - chest x-ray ordered  - respiratory protocol  - continue to monitor

## 2019-08-17 LAB
ALBUMIN SERPL BCP-MCNC: 2.8 G/DL (ref 3.5–5)
ALP SERPL-CCNC: 82 U/L (ref 46–116)
ALT SERPL W P-5'-P-CCNC: 28 U/L (ref 12–78)
ANION GAP SERPL CALCULATED.3IONS-SCNC: 4 MMOL/L (ref 4–13)
AST SERPL W P-5'-P-CCNC: 23 U/L (ref 5–45)
BASOPHILS # BLD AUTO: 0.03 THOUSANDS/ΜL (ref 0–0.1)
BASOPHILS NFR BLD AUTO: 0 % (ref 0–1)
BILIRUB SERPL-MCNC: 0.72 MG/DL (ref 0.2–1)
BUN SERPL-MCNC: 9 MG/DL (ref 5–25)
CALCIUM SERPL-MCNC: 8.7 MG/DL (ref 8.3–10.1)
CHLORIDE SERPL-SCNC: 112 MMOL/L (ref 100–108)
CO2 SERPL-SCNC: 26 MMOL/L (ref 21–32)
CREAT SERPL-MCNC: 0.82 MG/DL (ref 0.6–1.3)
EOSINOPHIL # BLD AUTO: 0.27 THOUSAND/ΜL (ref 0–0.61)
EOSINOPHIL NFR BLD AUTO: 2 % (ref 0–6)
ERYTHROCYTE [DISTWIDTH] IN BLOOD BY AUTOMATED COUNT: 14.7 % (ref 11.6–15.1)
GFR SERPL CREATININE-BSD FRML MDRD: 81 ML/MIN/1.73SQ M
GLUCOSE SERPL-MCNC: 105 MG/DL (ref 65–140)
HCT VFR BLD AUTO: 34.2 % (ref 34.8–46.1)
HGB BLD-MCNC: 11 G/DL (ref 11.5–15.4)
IMM GRANULOCYTES # BLD AUTO: 0.07 THOUSAND/UL (ref 0–0.2)
IMM GRANULOCYTES NFR BLD AUTO: 1 % (ref 0–2)
LYMPHOCYTES # BLD AUTO: 1.75 THOUSANDS/ΜL (ref 0.6–4.47)
LYMPHOCYTES NFR BLD AUTO: 15 % (ref 14–44)
MCH RBC QN AUTO: 28 PG (ref 26.8–34.3)
MCHC RBC AUTO-ENTMCNC: 32.2 G/DL (ref 31.4–37.4)
MCV RBC AUTO: 87 FL (ref 82–98)
MONOCYTES # BLD AUTO: 1.69 THOUSAND/ΜL (ref 0.17–1.22)
MONOCYTES NFR BLD AUTO: 15 % (ref 4–12)
NEUTROPHILS # BLD AUTO: 7.68 THOUSANDS/ΜL (ref 1.85–7.62)
NEUTS SEG NFR BLD AUTO: 67 % (ref 43–75)
NRBC BLD AUTO-RTO: 0 /100 WBCS
PLATELET # BLD AUTO: 193 THOUSANDS/UL (ref 149–390)
PMV BLD AUTO: 9.7 FL (ref 8.9–12.7)
POTASSIUM SERPL-SCNC: 4.3 MMOL/L (ref 3.5–5.3)
PROT SERPL-MCNC: 6.3 G/DL (ref 6.4–8.2)
RBC # BLD AUTO: 3.93 MILLION/UL (ref 3.81–5.12)
SODIUM SERPL-SCNC: 142 MMOL/L (ref 136–145)
WBC # BLD AUTO: 11.49 THOUSAND/UL (ref 4.31–10.16)

## 2019-08-17 PROCEDURE — 99232 SBSQ HOSP IP/OBS MODERATE 35: CPT | Performed by: INTERNAL MEDICINE

## 2019-08-17 PROCEDURE — 80053 COMPREHEN METABOLIC PANEL: CPT | Performed by: STUDENT IN AN ORGANIZED HEALTH CARE EDUCATION/TRAINING PROGRAM

## 2019-08-17 PROCEDURE — 85025 COMPLETE CBC W/AUTO DIFF WBC: CPT | Performed by: STUDENT IN AN ORGANIZED HEALTH CARE EDUCATION/TRAINING PROGRAM

## 2019-08-17 RX ORDER — SIMVASTATIN 10 MG
20 TABLET ORAL
Status: DISCONTINUED | OUTPATIENT
Start: 2019-08-17 | End: 2019-08-17

## 2019-08-17 RX ORDER — PRAVASTATIN SODIUM 20 MG
40 TABLET ORAL
Status: DISCONTINUED | OUTPATIENT
Start: 2019-08-17 | End: 2019-08-18 | Stop reason: HOSPADM

## 2019-08-17 RX ADMIN — AMLODIPINE BESYLATE 10 MG: 10 TABLET ORAL at 09:40

## 2019-08-17 RX ADMIN — LORAZEPAM 0.5 MG: 0.5 TABLET ORAL at 09:40

## 2019-08-17 RX ADMIN — SODIUM CHLORIDE 75 ML/HR: 0.9 INJECTION, SOLUTION INTRAVENOUS at 18:41

## 2019-08-17 RX ADMIN — ARIPIPRAZOLE 10 MG: 10 TABLET ORAL at 09:40

## 2019-08-17 RX ADMIN — PRAVASTATIN SODIUM 40 MG: 40 TABLET ORAL at 17:45

## 2019-08-17 RX ADMIN — ENOXAPARIN SODIUM 40 MG: 40 INJECTION SUBCUTANEOUS at 09:40

## 2019-08-17 RX ADMIN — LORAZEPAM 0.5 MG: 0.5 TABLET ORAL at 17:44

## 2019-08-17 RX ADMIN — CEFTRIAXONE SODIUM 1000 MG: 10 INJECTION, POWDER, FOR SOLUTION INTRAVENOUS at 21:21

## 2019-08-17 RX ADMIN — FLUOXETINE 60 MG: 20 CAPSULE ORAL at 09:40

## 2019-08-17 RX ADMIN — SODIUM CHLORIDE 75 ML/HR: 0.9 INJECTION, SOLUTION INTRAVENOUS at 04:53

## 2019-08-17 NOTE — UTILIZATION REVIEW
Continued Stay Review    Admit OBS on 8/15  @  2155  Changed to INPT status on  8/17  @  1629   Due to ongoing IVF and IVAB therapy       [facility] Date: 8/17                         Current Patient Class: OBS      Current Level of Care: marcela     HPI:53 y o  female initially admitted on 8/15  @  2155  For  gram negative bacteremia and sepsis due to UTI  On 8/16,  Required supplemental oxygen for ra sats 89%; Tmax 101 3  Assessment/Plan: Today 8/17,  tmax thru noc was 99 4  Procalcitonin stephanie from 1 27 on admission  to 11 90 (on 8/17); Will cont IVABs and IVF, trend PCT level  WBC consistently declining;  Bilirubin has returned to wnl    Pt not on oxygen at home,  Intermittently desats to 88-89% on rm air, requiring supplemental oxygen @ 2L per NC        08/16/19 23:06:45  99 4 °F (37 4 °C) 72 18 99/60 92 %  rm air    08/16/19 21:45:55  99 6 °F (37 6 °C) 71   93 % rm air    08/16/19 18:57:26  101 3 104  109/78 95 % on 2L nc   08/16/19 04:35:21  98 3 72  95/57 96 % on 2L NC    08/16/19 0430   65   89 % rm air    08/16/19 0400   63   92 %       08/17/19 07:31:51  98 3 °F   79  17  108/68  81  95 %  Nasal cannula  2L    08/17/19 02:48:33  99 2 °F   81  20  105/67  80  95 %  NC 2L          Pertinent Labs/Diagnostic Results:   Results from last 7 days   Lab Units 08/17/19  0506 08/16/19  0534 08/15/19  1909   WBC Thousand/uL 11 49* 18 75* 20 71*   HEMOGLOBIN g/dL 11 0* 10 9* 12 6   HEMATOCRIT % 34 2* 33 3* 37 0   PLATELETS Thousands/uL 193 205 233   NEUTROS ABS Thousands/µL 7 68*  --  17 55*         Results from last 7 days   Lab Units 08/17/19  0506 08/16/19  0534 08/15/19  1909   SODIUM mmol/L 142 140 139   POTASSIUM mmol/L 4 3 4 1 2 8*   CHLORIDE mmol/L 112* 112* 102   CO2 mmol/L 26 25 26   ANION GAP mmol/L 4 3* 11   BUN mg/dL 9 10 12   CREATININE mg/dL 0 82 0 84 0 92   EGFR ml/min/1 73sq m 81 79 71   CALCIUM mg/dL 8 7 8 2* 8 5   MAGNESIUM mg/dL  --   --  2 0     Results from last 7 days   Lab Units 08/17/19  0506 08/16/19  0534 08/15/19  1909   AST U/L 23 28 34   ALT U/L 28 35 40   ALK PHOS U/L 82 78 103   TOTAL PROTEIN g/dL 6 3* 6 2* 7 3   ALBUMIN g/dL 2 8* 2 7* 3 7   TOTAL BILIRUBIN mg/dL 0 72 1 80* 1 88*   BILIRUBIN DIRECT mg/dL  --   --  0 43*         Results from last 7 days   Lab Units 08/17/19  0506 08/16/19  0534 08/15/19  1909   GLUCOSE RANDOM mg/dL 105 118 138         Results from last 7 days   Lab Units 08/15/19  1909   PROTIME seconds 14 4   INR  1 16   PTT seconds 30         Results from last 7 days   Lab Units 08/16/19  0534 08/15/19  1909   PROCALCITONIN ng/ml 11 90* 1 27*     Results from last 7 days   Lab Units 08/15/19  1909   LACTIC ACID mmol/L 1 6     Results from last 7 days   Lab Units 08/15/19  2025   CLARITY UA  Cloudy   COLOR UA  Yellow   SPEC GRAV UA  1 009   PH UA  7 0   GLUCOSE UA mg/dl Negative   KETONES UA mg/dl Negative   BLOOD UA  Moderate*   PROTEIN UA mg/dl 30 (1+)*   NITRITE UA  Positive*   BILIRUBIN UA  Negative   UROBILINOGEN UA E U /dl 1 0   LEUKOCYTES UA  Small*   WBC UA /hpf 20-30*   RBC UA /hpf 2-4*   BACTERIA UA /hpf Moderate*   EPITHELIAL CELLS WET PREP /hpf Occasional       Urine culture [[id number]] (Abnormal) Collected: 08/15/19 2025    Urine Culture >100,000 cfu/ml Gram Negative Rolf Enteric LikeAbnormal    Blood culture #1 [[id number]] (Abnormal) Collected: 08/15/19 1909    Gram Stain Result Gram negative rodsAbnormal    Blood culture #2 [[id number]] (Abnormal) Collected: 08/15/19 1909    Gram Stain Result Gram negative rodsAbnormal        Medications:   Scheduled Meds:   Current Facility-Administered Medications:  acetaminophen 650 mg Oral Q6H PRN   amLODIPine 10 mg Oral Daily   ARIPiprazole 10 mg Oral Daily   cefTRIAXone 1,000 mg Intravenous Q24H   enoxaparin 40 mg Subcutaneous Daily   FLUoxetine 60 mg Oral Daily   hydrOXYzine HCL 25 mg Oral Q8H PRN   LORazepam 0 5 mg Oral BID   ondansetron 4 mg Intravenous Q6H PRN   pravastatin 40 mg Oral Daily With Dinner   sodium chloride 75 mL/hr Intravenous Continuous   traZODone 50 mg Oral HS PRN       Discharge Plan: meid    Network Utilization Review Department  Phone: [phone]; Fax [phone]  [email or web address]  ATTENTION: Please call with any questions or concerns to [phone]  and carefully listen to the prompts so that you are directed to the right person  Send all requests for admission clinical reviews, approved or denied determinations and any other requests to fax [phone]   All voicemails are confidential

## 2019-08-17 NOTE — PROGRESS NOTES
INTERNAL MEDICINE RESIDENCY PROGRESS NOTE     Name: Didi Tavares   Age & Sex: 46 y o  female   MRN: [id number]  Unit/Bed#: CW2 215-01   Encounter: [id number]  Team: SOD Team A    PATIENT INFORMATION     Name: Didi Tavares   Age & Sex: 46 y o  female   MRN: [id number]  Hospital Stay Days: 0    ASSESSMENT/PLAN     Principal Problem:    Pyelonephritis  Active Problems:    Major depressive disorder, recurrent, severe without psychotic features ([facility] )    Bipolar affective disorder, depressed, severe ([facility] )    Essential hypertension    Hyperlipidemia    Sepsis ([facility] )    Hypokalemia    Acute hypoxemic respiratory failure (HCC)    Elevated bilirubin    Shortness of breath    Anxiety    Nausea & vomiting        1  Severe Sepsis (most likely secondary to pyelonephritis from UTI)- Resolving  The most likely etiology is pyelonephritis from UTI  pt had hx of multiple UTIs in past  Patient came in with burning with urination, left flank pain and shortness of breath  Admission labs showed WBC 20 71 with left shift, tachycardia and temperature of 101 8-103  Lactic acid was normal 1 6  Procalcitonin elevated at 1 27  T bili 1 88 and direct bili 0 43  Urine positive for nitrites and moderate amount of blood with 20-30 WBCs  CT abdomen and pelvis consistent with ascending infection and pyelonephritis  Chest x-ray showed no acute cardiopulmonary disease  Follow up procalcitonin was 11  9   patient has no signs of pneumonia  It is possible that procalcitonin may be positive in the presence of sepsis  T bili trending down 1 88--1 8---0 72      Plan:  - patient received 1 L in ED and received additional bolus on floors  Currently receiving fluids 75 mL/hour   - Blood culture # 1 & 2 showed gram-negative rods  WBC count is trending down  - follow-up strep pneumo and Legionella urine antigen    - urine cultures show >100,000 colonies of entering like gram-negative rods  - switch from IV ceftriaxone to oral antibiotic once culture sensitivity results are back since patient is not nauseated anymore           2  Anemia- stable  Patient's hemoglobin was 12 6 on admission  Hemoglobin this morning 11  Most likely secondary to receiving fluids      Plan:  - continue to monitor hemoglobin daily  - will consider additional intervention if hemoglobin keeps dropping         3  Pyelonephritis  Patient with increase urinary frequency, urgency, dysuria and left CVA tenderness with CT abdomen and pelvis consistent with ascending infection and pyelonephritis     Plan:  see plan as above        4  Shortness of breath  Patient with increased shortness of breath for 1 day, no cough   Not on oxygen at home   Found to be desatting at 88-89% on room air in the ED   Patient put on 2 L of oxygen satting in the low 90s   Likely multifactorial secondary to left flank pain as patient has decreased inspiratory effort /atlectasis / STANLEY component STOP-BANG 4 / anxiety  chest x-ray showed no acute cardiopulmonary disease      Plan:  -continue with plan as above  -respiratory protocol  - patient not short of breath anymore         5  Hypokalemia- resolved on 08/16/19  Patient found to be hypokalemic with a potassium of 2 8  Likely secondary to vomiting and increased urinary frequency  Patient's potassium this morning is 4 1      6  Nausea & vomiting- resolved 08/15/19  Patient with increased nausea and vomiting on admission  Not nauseated anymore  - continue Zofran as needed     7  Anxiety  - Continue Ativan 0 5 mg b i d      8  Elevated bilirubin- resolved 08/17/19  Patient found to have total bilirubin 1 88 which improved to 1 80 this morning  Direct bili was 0 43  Likely secondary to sepsis  Patient denies history of hepatitis, gall bladder diseases, IV drug use or alcohol      Plan:  - will continue to monitor  Patient is asymptomatic   - bilirubin normal now          9  Hyperlipidemia  - Continue simvastatin 20 mg daily     10   Essential hypertension  Patient's blood pressure has been soft during that admission  The most recent blood pressure is 105/67 this morning       Plan:  - will hold patient's home blood pressure medication Norvasc 10 mg daily if blood pressure drops below 419 systolic      11  Major depressive disorder, recurrent, severe without psychotic features ([facility] )  - continue home medications: aripiprazole 10 mg , fluoxetine 60 mg, trazodone 50 mg    Disposition: observation  SUBJECTIVE     Patient seen and examined  No acute events overnight  No new complaints  Patient had a fever of 101 which subsided with Tylenol  OBJECTIVE     Vitals:    19 2042 19 2145 19 2306 19 0248   BP:   99/60 105/67   Pulse: 95 71 72 81   Resp:   18 20   Temp: (!) 101 3 °F (38 5 °C) 99 6 °F (37 6 °C) 99 4 °F (37 4 °C) 99 2 °F (37 3 °C)   TempSrc:       SpO2: 92% 93% 92% 95%   Weight:       Height:          Temperature:   Temp (24hrs), Av 7 °F (37 6 °C), Min:98 3 °F (36 8 °C), Max:101 3 °F (38 5 °C)    Temperature: 99 2 °F (37 3 °C)  Intake & Output:  I/O       08/15 07 -  0700 701 -  0700    P  O   560    I V  (mL/kg) 1000 (12)     IV Piggyback 2000     Total Intake(mL/kg) 3000 (35 9) 560 (6 7)    Urine (mL/kg/hr) 425 1250 (0 6)    Total Output 425 1250    Net +2575 -690              Weights:   IBW: 52 4 kg    Body mass index is 32 63 kg/m²  Weight (last 2 days)     Date/Time   Weight    08/15/19 2253   83 6 (184 2)            Physical Exam   Constitutional: She is oriented to person, place, and time  She appears well-developed and well-nourished  No distress  HENT:   Head: Normocephalic and atraumatic  Nose: Nose normal    Mouth/Throat: No oropharyngeal exudate  Eyes: Conjunctivae are normal  No scleral icterus  Neck: Neck supple  No JVD present  No tracheal deviation present  No thyromegaly present  Cardiovascular: Normal rate, regular rhythm, normal heart sounds and intact distal pulses  Exam reveals no gallop and no friction rub  No murmur heard  Pulmonary/Chest: Effort normal and breath sounds normal  No stridor  No respiratory distress  She has no wheezes  She has no rales  Abdominal: Soft  Bowel sounds are normal  She exhibits no distension and no mass  There is tenderness  There is no rebound and no guarding  Left-sided abdominal pain on palpation  Unchanged from yesterday  No signs of acute abdomen  Neurological: She is alert and oriented to person, place, and time  Skin: Skin is warm and dry  No rash noted  She is not diaphoretic  No erythema  No pallor  Psychiatric: She has a normal mood and affect  Her behavior is normal  Judgment and thought content normal      LABORATORY DATA     Labs: I have personally reviewed pertinent reports  Results from last 7 days   Lab Units 08/17/19  0506 08/16/19  0534 08/15/19  1909   WBC Thousand/uL 11 49* 18 75* 20 71*   HEMOGLOBIN g/dL 11 0* 10 9* 12 6   HEMATOCRIT % 34 2* 33 3* 37 0   PLATELETS Thousands/uL 193 205 233   NEUTROS PCT % 67  --  85*   MONOS PCT % 15*  --  9      Results from last 7 days   Lab Units 08/17/19  0506 08/16/19  0534 08/15/19  1909   POTASSIUM mmol/L 4 3 4 1 2 8*   CHLORIDE mmol/L 112* 112* 102   CO2 mmol/L 26 25 26   BUN mg/dL 9 10 12   CREATININE mg/dL 0 82 0 84 0 92   CALCIUM mg/dL 8 7 8 2* 8 5   ALK PHOS U/L 82 78 103   ALT U/L 28 35 40   AST U/L 23 28 34     Results from last 7 days   Lab Units 08/15/19  1909   MAGNESIUM mg/dL 2 0          Results from last 7 days   Lab Units 08/15/19  1909   INR  1 16   PTT seconds 30     Results from last 7 days   Lab Units 08/15/19  1909   LACTIC ACID mmol/L 1 6           IMAGING & DIAGNOSTIC TESTING     Radiology Results: I have personally reviewed pertinent reports  Xr Chest Portable    Result Date: 8/16/2019  Impression: No acute cardiopulmonary disease   Workstation performed: [id number]     Ct Abdomen Pelvis With Contrast    Result Date: 8/15/2019  Impression: Enhancement of the left renal urothelial lining with perinephric and periureteric stranding along with smudgy areas of hypodensity in the left kidney consistent with ascending infection and pyelonephritis  Workstation performed: [id number]     Other Diagnostic Testing: I have personally reviewed pertinent reports  ACTIVE MEDICATIONS     Current Facility-Administered Medications   Medication Dose Route Frequency    acetaminophen (TYLENOL) tablet 650 mg  650 mg Oral Q6H PRN    amLODIPine (NORVASC) tablet 10 mg  10 mg Oral Daily    ARIPiprazole (ABILIFY) tablet 10 mg  10 mg Oral Daily    cefTRIAXone (ROCEPHIN) 1,000 mg in dextrose 5 % 50 mL IVPB  1,000 mg Intravenous Q24H    enoxaparin (LOVENOX) subcutaneous injection 40 mg  40 mg Subcutaneous Daily    FLUoxetine (PROzac) capsule 60 mg  60 mg Oral Daily    hydrOXYzine HCL (ATARAX) tablet 25 mg  25 mg Oral Q8H PRN    LORazepam (ATIVAN) tablet 0 5 mg  0 5 mg Oral BID    ondansetron (ZOFRAN) injection 4 mg  4 mg Intravenous Q6H PRN    sodium chloride 0 9 % infusion  75 mL/hr Intravenous Continuous    traZODone (DESYREL) tablet 50 mg  50 mg Oral HS PRN       VTE Pharmacologic Prophylaxis: Enoxaparin (Lovenox)  VTE Mechanical Prophylaxis: sequential compression device    Portions of the record may have been created with voice recognition software  Occasional wrong word or "sound a like" substitutions may have occurred due to the inherent limitations of voice recognition software    Read the chart carefully and recognize, using context, where substitutions have occurred   ==  Shira Fam, [address] [facility]  Internal Medicine Residency PGY-1

## 2019-08-18 VITALS
DIASTOLIC BLOOD PRESSURE: 70 MMHG | RESPIRATION RATE: 24 BRPM | BODY MASS INDEX: 32.64 KG/M2 | HEIGHT: 63 IN | WEIGHT: 184.2 LBS | TEMPERATURE: 98.5 F | SYSTOLIC BLOOD PRESSURE: 110 MMHG | HEART RATE: 71 BPM | OXYGEN SATURATION: 95 %

## 2019-08-18 LAB
ANION GAP SERPL CALCULATED.3IONS-SCNC: 6 MMOL/L (ref 4–13)
BACTERIA BLD CULT: ABNORMAL
BACTERIA BLD CULT: ABNORMAL
BACTERIA UR CULT: ABNORMAL
BUN SERPL-MCNC: 7 MG/DL (ref 5–25)
CALCIUM SERPL-MCNC: 8.6 MG/DL (ref 8.3–10.1)
CHLORIDE SERPL-SCNC: 108 MMOL/L (ref 100–108)
CO2 SERPL-SCNC: 26 MMOL/L (ref 21–32)
CREAT SERPL-MCNC: 0.83 MG/DL (ref 0.6–1.3)
ERYTHROCYTE [DISTWIDTH] IN BLOOD BY AUTOMATED COUNT: 14.5 % (ref 11.6–15.1)
GFR SERPL CREATININE-BSD FRML MDRD: 80 ML/MIN/1.73SQ M
GLUCOSE SERPL-MCNC: 105 MG/DL (ref 65–140)
GRAM STN SPEC: ABNORMAL
GRAM STN SPEC: ABNORMAL
HCT VFR BLD AUTO: 35.4 % (ref 34.8–46.1)
HGB BLD-MCNC: 11.4 G/DL (ref 11.5–15.4)
MCH RBC QN AUTO: 27.9 PG (ref 26.8–34.3)
MCHC RBC AUTO-ENTMCNC: 32.2 G/DL (ref 31.4–37.4)
MCV RBC AUTO: 87 FL (ref 82–98)
PLATELET # BLD AUTO: 214 THOUSANDS/UL (ref 149–390)
PMV BLD AUTO: 9.7 FL (ref 8.9–12.7)
POTASSIUM SERPL-SCNC: 3.9 MMOL/L (ref 3.5–5.3)
RBC # BLD AUTO: 4.08 MILLION/UL (ref 3.81–5.12)
SODIUM SERPL-SCNC: 140 MMOL/L (ref 136–145)
WBC # BLD AUTO: 8.9 THOUSAND/UL (ref 4.31–10.16)

## 2019-08-18 PROCEDURE — 99232 SBSQ HOSP IP/OBS MODERATE 35: CPT | Performed by: INTERNAL MEDICINE

## 2019-08-18 PROCEDURE — 80048 BASIC METABOLIC PNL TOTAL CA: CPT | Performed by: INTERNAL MEDICINE

## 2019-08-18 PROCEDURE — NC001 PR NO CHARGE: Performed by: INTERNAL MEDICINE

## 2019-08-18 PROCEDURE — 85027 COMPLETE CBC AUTOMATED: CPT | Performed by: INTERNAL MEDICINE

## 2019-08-18 RX ORDER — SULFAMETHOXAZOLE AND TRIMETHOPRIM 800; 160 MG/1; MG/1
1 TABLET ORAL EVERY 12 HOURS SCHEDULED
Qty: 20 TABLET | Refills: 0 | Status: SHIPPED | OUTPATIENT
Start: 2019-08-18 | End: 2019-08-28

## 2019-08-18 RX ORDER — SULFAMETHOXAZOLE AND TRIMETHOPRIM 800; 160 MG/1; MG/1
1 TABLET ORAL EVERY 12 HOURS SCHEDULED
Status: DISCONTINUED | OUTPATIENT
Start: 2019-08-18 | End: 2019-08-18 | Stop reason: HOSPADM

## 2019-08-18 RX ADMIN — FLUOXETINE 60 MG: 20 CAPSULE ORAL at 08:10

## 2019-08-18 RX ADMIN — ARIPIPRAZOLE 10 MG: 10 TABLET ORAL at 12:46

## 2019-08-18 RX ADMIN — SODIUM CHLORIDE 75 ML/HR: 0.9 INJECTION, SOLUTION INTRAVENOUS at 08:16

## 2019-08-18 RX ADMIN — AMLODIPINE BESYLATE 10 MG: 10 TABLET ORAL at 08:12

## 2019-08-18 RX ADMIN — ENOXAPARIN SODIUM 40 MG: 40 INJECTION SUBCUTANEOUS at 08:12

## 2019-08-18 RX ADMIN — LORAZEPAM 0.5 MG: 0.5 TABLET ORAL at 08:09

## 2019-08-18 NOTE — PLAN OF CARE
Problem: Potential for Falls  Goal: Patient will remain free of falls  Description  INTERVENTIONS:  - Assess patient frequently for physical needs  -  Identify cognitive and physical deficits and behaviors that affect risk of falls    -  [place] fall precautions as indicated by assessment   - Educate patient/family on patient safety including physical limitations  - Instruct patient to call for assistance with activity based on assessment  - Modify environment to reduce risk of injury  - Consider OT/PT consult to assist with strengthening/mobility  Outcome: Progressing     Problem: Prexisting or High Potential for Compromised Skin Integrity  Goal: Skin integrity is maintained or improved  Description  INTERVENTIONS:  - Identify patients at risk for skin breakdown  - Assess and monitor skin integrity  - Assess and monitor nutrition and hydration status  - Monitor labs   - Assess for incontinence   - Turn and reposition patient  - Assist with mobility/ambulation  - Relieve pressure over bony prominences  - Avoid friction and shearing  - Provide appropriate hygiene as needed including keeping skin clean and dry  - Evaluate need for skin moisturizer/barrier cream  - Collaborate with interdisciplinary team   - Patient/family teaching  - Consider wound care consult   Outcome: Progressing

## 2019-08-18 NOTE — PROGRESS NOTES
IM Residency Progress Note   Unit/Bed#: -01 Encounter: [id number]  SOD Team A      Norman Jordan 46 y o  female [id number]    Hospital Stay Days: 1      Assessment/Plan:    1  Pyelonephritis  · Patient has a history of multiple UTIs in the past   Initially presents with burning upon urination, left flank pain, and shortness of breath  Urinalysis with small leukocytes and positive for nitrites  Urine culture with E coli  WBC count now within normal limits  · Ceftriaxone 1 g q24 hours   · IVFs with NSS @ 75 cc/hour  · Will transition to PO pending urine culture sensitivities  · Trend fever curve and WBC count    2  Sepsis  · In the setting of pyelonephritis  Blood cultures positive for E coli  Patient has remained afebrile and hemodynamically stable  Lactate within normal limits  · IVFs with NSS @ 75 cc/hour  · Ceftriaxone 1 g q24 hours   · No need to trend lactate    3  SOB  · Resolved  Patient with complaints of increased shortness of breath x1 day  No cough  Patient was placed on 2 L nasal cannula oxygenation after was found to be saturating at 88-89% on room air  Likely multifactorial secondary to left flank pain verses STANLEY  Chest x-ray with no acute cardiopulmonary disease  · Respiratory protocol  · Monitor symptomatology      Disposition: Will D/C home following urine culture sensitivity results      Subjective:   Patient seen and examined at bedside  No acute events overnight  Denies chest pain, shortness of breath, nausea/vomiting/diarrhea, fevers/chills         Vitals: Temp (24hrs), Av 2 °F (37 3 °C), Min:98 3 °F (36 8 °C), Max:99 9 °F (37 7 °C)  Current: Temperature: 99 4 °F (37 4 °C)  Vitals:    19 0248 19 0731 19 1521 19 2227   BP: 105/67 108/68 93/60 115/63   BP Location:  Right arm     Pulse: 81 79 68 62   Resp: 20 17 15 (!) 24   Temp: 99 2 °F (37 3 °C) 98 3 °F (36 8 °C) 99 9 °F (37 7 °C) 99 4 °F (37 4 °C)   TempSrc:  Oral     SpO2: 95% 95% 94% 92% Weight:       Height:        Body mass index is 32 63 kg/m²  I/O last 24 hours: In: 320 [P O :320]  Out: 1600 [Urine:1600]      Physical Exam   Constitutional: She is oriented to person, place, and time  She appears well-developed and well-nourished  No distress  HENT:   Head: Normocephalic and atraumatic  Nose: Nose normal    Mouth/Throat: No oropharyngeal exudate  Eyes: Conjunctivae are normal  Right eye exhibits no discharge  Left eye exhibits no discharge  No scleral icterus  Neck: Neck supple  No JVD present  Cardiovascular: Normal rate, regular rhythm, normal heart sounds and intact distal pulses  Exam reveals no gallop and no friction rub  No murmur heard  Pulmonary/Chest: Effort normal and breath sounds normal  No respiratory distress  She has no wheezes  She has no rales  Abdominal: Soft  Bowel sounds are normal  She exhibits no distension  There is no tenderness  There is no rebound and no guarding  Musculoskeletal: Normal range of motion  She exhibits no edema  Neurological: She is alert and oriented to person, place, and time  Skin: Skin is warm and dry  She is not diaphoretic  No erythema  Psychiatric: She has a normal mood and affect           Invasive Devices     Peripheral Intravenous Line            Peripheral IV 08/15/19 Left Antecubital 2 days                  Labs:   Recent Results (from the past 24 hour(s))   CBC    Collection Time: 08/18/19  4:37 AM   Result Value Ref Range    WBC 8 90 4 31 - 10 16 Thousand/uL    RBC 4 08 3 81 - 5 12 Million/uL    Hemoglobin 11 4 (L) 11 5 - 15 4 g/dL    Hematocrit 35 4 34 8 - 46 1 %    MCV 87 82 - 98 fL    MCH 27 9 26 8 - 34 3 pg    MCHC 32 2 31 4 - 37 4 g/dL    RDW 14 5 11 6 - 15 1 %    Platelets 937 194 - 204 Thousands/uL    MPV 9 7 8 9 - 12 7 fL   Basic metabolic panel    Collection Time: 08/18/19  4:37 AM   Result Value Ref Range    Sodium 140 136 - 145 mmol/L    Potassium 3 9 3 5 - 5 3 mmol/L    Chloride 108 100 - 108 mmol/L CO2 26 21 - 32 mmol/L    ANION GAP 6 4 - 13 mmol/L    BUN 7 5 - 25 mg/dL    Creatinine 0 83 0 60 - 1 30 mg/dL    Glucose 105 65 - 140 mg/dL    Calcium 8 6 8 3 - 10 1 mg/dL    eGFR 80 ml/min/1 73sq m       Radiology Results: I have personally reviewed pertinent reports  Other Diagnostic Testing:   I have personally reviewed pertinent reports          Active Meds:   Current Facility-Administered Medications   Medication Dose Route Frequency    acetaminophen (TYLENOL) tablet 650 mg  650 mg Oral Q6H PRN    amLODIPine (NORVASC) tablet 10 mg  10 mg Oral Daily    ARIPiprazole (ABILIFY) tablet 10 mg  10 mg Oral Daily    cefTRIAXone (ROCEPHIN) 1,000 mg in dextrose 5 % 50 mL IVPB  1,000 mg Intravenous Q24H    enoxaparin (LOVENOX) subcutaneous injection 40 mg  40 mg Subcutaneous Daily    FLUoxetine (PROzac) capsule 60 mg  60 mg Oral Daily    hydrOXYzine HCL (ATARAX) tablet 25 mg  25 mg Oral Q8H PRN    LORazepam (ATIVAN) tablet 0 5 mg  0 5 mg Oral BID    ondansetron (ZOFRAN) injection 4 mg  4 mg Intravenous Q6H PRN    pravastatin (PRAVACHOL) tablet 40 mg  40 mg Oral Daily With Dinner    sodium chloride 0 9 % infusion  75 mL/hr Intravenous Continuous    traZODone (DESYREL) tablet 50 mg  50 mg Oral HS PRN         VTE Pharmacologic Prophylaxis: Enoxaparin (Lovenox)  VTE Mechanical Prophylaxis: sequential compression device    France Santizo DO

## 2019-08-18 NOTE — DISCHARGE SUMMARY
[facility] Discharge Summary - Medical Misa Burns 46 y o  female MRN: [id number]    [facility] 2 Room / Bed: /-41 Encounter: [id number]    BRIEF OVERVIEW    Admitting Provider: Ritu Sheppard DO  Discharge Provider: Ritu Sheppard DO  Primary Care Physician at Discharge: Dr Jeannie Morgan    Discharge To: Home    Admission Date: 8/15/2019     Discharge Date: 8/18/2019    Primary Discharge Diagnosis  Principal Problem:    Pyelonephritis  Active Problems:    Major depressive disorder, recurrent, severe without psychotic features ([facility] [address] )    Bipolar affective disorder, depressed, severe ([facility] [address] )    Essential hypertension    Hyperlipidemia    Sepsis ([facility] [address] )    Hypokalemia    Acute hypoxemic respiratory failure (HCC)    Elevated bilirubin    Shortness of breath    Anxiety    Nausea & vomiting  Resolved Problems:    * No resolved hospital problems  *      Diagnostic Procedures Performed  Xr Chest Portable    Result Date: 8/16/2019  Impression: No acute cardiopulmonary disease  Workstation performed: [id number]     Ct Abdomen Pelvis With Contrast    Result Date: 8/15/2019  Impression: Enhancement of the left renal urothelial lining with perinephric and periureteric stranding along with smudgy areas of hypodensity in the left kidney consistent with ascending infection and pyelonephritis  Workstation performed: [id number]     Discharge Disposition: Home/Self Care  Discharged With Lines: no    Test Results Pending at Discharge: Zeus Kamara - Dr Jeannie Morgan   Follow up with consulting providers  none required   Active Issues Requiring Follow-up   Pyelonephritis     Code Status: Level 1 - Full Code  Advance Directive and Living Will: <no information>  Power of :    POLST:      Medications   See after visit summary for reconciled discharge medications provided to patient and family      Allergies  Allergies   Allergen Reactions    Other      Hay Fever    Oxycodone-Acetaminophen GI Intolerance     Discharge Diet: regular diet  Activity restrictions: none    [address]  is a 50-year-old female with a past medical history significant for learning difference, chronic hearing loss, MDD with multiple psychiatric hospitalizations, bipolar disorder, hypertension, and hyperlipidemia presenting with a 1 weeks history of increased urinary discomfort  Per the patient, she has a history of 6 UTIs in the past   She notes that she has been using the bathroom every 10-15 minutes for the past week, with bowel movements occurring every 3 days  Starting around 10:00 a m  today she developed left flank pain, and increased shortness of breath  She described her flank pain as intermittent, worse with movement and pressure with a 10/10 pain scale during periods of pain  She used Aleve which gave her minimal relief  She began to feel nauseous and vomited twice throughout the course of the day  She noted that she began shaking, felt cold, and feverish  Her shortness of breath began last night as the patient was unable to sleep and progressed throughout the day to the point where she felt she was going to pass out, which prompted her to come to the ED  While in the ED the patient was found to be septic with a temperature 101 8  A CT abdomen pelvis was performed which demonstrated ascending infection consistent with pyelonephritis  Her urine was also positive for nitrites, moderate blood and 20-30 WBC  She denied any associated abdominal or chest pain        Of note, the patient just moved from [place] and has recently established care with [facility]  She lives with a friend at home  Per the patient, she requests that the friend be the decision maker in the event any complications arise as the patient has a learning difference and is not close with any immediate family members      The patient was admitted to the general medicine service and was treated as follows:    1  Pyelonephritis  · Patient has a history of multiple UTIs in the past   Initially presents with burning upon urination, left flank pain, and shortness of breath  Urinalysis with small leukocytes and positive for nitrites  Urine culture with E coli  WBC count now within normal limits  § Ceftriaxone 1 g q24 hours   § IVFs with NSS @ 75 cc/hour  § Will transition to PO pending urine culture sensitivities  § Trend fever curve and WBC count     2  Sepsis  · In the setting of pyelonephritis  Blood cultures positive for E coli  Patient has remained afebrile and hemodynamically stable  Lactate within normal limits  § IVFs with NSS @ 75 cc/hour  § Ceftriaxone 1 g q24 hours   § No need to trend lactate     3  SOB  · Resolved  Patient with complaints of increased shortness of breath x1 day  No cough  Patient was placed on 2 L nasal cannula oxygenation after was found to be saturating at 88-89% on room air  Likely multifactorial secondary to left flank pain verses STANLEY  Chest x-ray with no acute cardiopulmonary disease  § Respiratory protocol  § Monitor symptomatology    Urine culture sensitivity was completed and the patient was initiated on PO Bactrim for a total antibiotic course of 14 days  The patient was instructed to follow up with her primary care physician  Presenting Problem/History of Present Illness  Principal Problem:    Pyelonephritis  Active Problems:    Major depressive disorder, recurrent, severe without psychotic features ([address] )    Bipolar affective disorder, depressed, severe ([address] )    Essential hypertension    Hyperlipidemia    Sepsis ([address] )    Hypokalemia    Acute hypoxemic respiratory failure (HCC)    Elevated bilirubin    Shortness of breath    Anxiety    Nausea & vomiting  Resolved Problems:    * No resolved hospital problems   *      Discharge Condition: stable    Discharge  Statement   I spent 30 minutes minutes discharging the patient  This time was spent on the day of discharge  I had direct contact with the patient on the day of discharge  Additional documentation is required if more than 30 minutes were spent on discharge

## 2019-08-18 NOTE — PLAN OF CARE
Problem: Potential for Falls  Goal: Patient will remain free of falls  Description  INTERVENTIONS:  - Assess patient frequently for physical needs  -  Identify cognitive and physical deficits and behaviors that affect risk of falls    -  [place] fall precautions as indicated by assessment   - Educate patient/family on patient safety including physical limitations  - Instruct patient to call for assistance with activity based on assessment  - Modify environment to reduce risk of injury  - Consider OT/PT consult to assist with strengthening/mobility  Outcome: Progressing     Problem: Prexisting or High Potential for Compromised Skin Integrity  Goal: Skin integrity is maintained or improved  Description  INTERVENTIONS:  - Identify patients at risk for skin breakdown  - Assess and monitor skin integrity  - Assess and monitor nutrition and hydration status  - Monitor labs   - Assess for incontinence   - Turn and reposition patient  - Assist with mobility/ambulation  - Relieve pressure over bony prominences  - Avoid friction and shearing  - Provide appropriate hygiene as needed including keeping skin clean and dry  - Evaluate need for skin moisturizer/barrier cream  - Collaborate with interdisciplinary team   - Patient/family teaching  - Consider wound care consult   Outcome: Progressing

## 2019-08-18 NOTE — SOCIAL WORK
Informed patient is ready for discharge this date however she does not have transportation  Spoke with patient who states she reached out to friends however no one is available to pick her up  Bedside RN aware patient will take a Lyft home  [facility] this [facility]  Waiver faxed to [place] and uploaded in [place]  Paged resident with [facility] to inform patient will have lyft available at discharge

## 2019-08-19 ENCOUNTER — TRANSITIONAL CARE MANAGEMENT (OUTPATIENT)
Dept: INTERNAL MEDICINE CLINIC | Facility: CLINIC | Age: 54
End: 2019-08-19

## 2019-08-22 ENCOUNTER — OFFICE VISIT (OUTPATIENT)
Dept: INTERNAL MEDICINE CLINIC | Facility: CLINIC | Age: 54
End: 2019-08-22

## 2019-08-22 VITALS
SYSTOLIC BLOOD PRESSURE: 122 MMHG | BODY MASS INDEX: 31.88 KG/M2 | HEIGHT: 63 IN | WEIGHT: 179.9 LBS | DIASTOLIC BLOOD PRESSURE: 82 MMHG | TEMPERATURE: 98.4 F | HEART RATE: 100 BPM

## 2019-08-22 DIAGNOSIS — N12 PYELONEPHRITIS: Primary | ICD-10-CM

## 2019-08-22 DIAGNOSIS — A41.51 SEPSIS DUE TO ESCHERICHIA COLI (HCC): ICD-10-CM

## 2019-08-22 PROCEDURE — 99495 TRANSJ CARE MGMT MOD F2F 14D: CPT | Performed by: HOSPITALIST

## 2019-08-22 NOTE — PROGRESS NOTES
ASSESSMENT/PLAN:    Pyelonephritis and sepsis  -secondary to E coli UTI  -continue Bactrim (last dose 08/28/2019)  -repeat BMP on 08/29/2019 post Bactrim course to evaluate for renal function  -no repeat blood cultures documented for clearance of bacteremia prior to hospital discharge  -it patient starts showing symptoms of infection with fever, chills, nausea, vomiting consider repeating urine culture and blood culture or emergency department visit    Health Maintenance:  Advised diet and exercise  Advised to refrain from tobacco, alcohol, illicit drug use  Advised medical compliance      Schedule a follow-up appointment 9/17/2019 with PCP - Dr Dawn Goodman Call (since 7/22/2019)     Date and time call was made  8/20/2019  9:20 AM    Hospital care reviewed  Records reviewed    Patient was hospitialized at  [facility]    Date of Admission  08/15/19    Date of discharge  08/18/19    Diagnosis  PYELONEPHRITIS - N12    Disposition  Home <img src='C:FILES (X86)    Were the patients medications reviewed and updated  Yes    Current Symptoms  Lower abdominal pain    Lower abdominal pain severity  Mild    Lower abdominal pain onset  Ongoing <img src='C:FILES (X86)      TCM Call (since 7/22/2019)     Post hospital issues  None    Should patient be enrolled in anticoag monitoring? No    Scheduled for follow up?   Yes <img src='C:FILES (X86)    Did you obtain your prescribed medications  Yes <img src='C:FILES (X86)    Do you need help managing your prescriptions or medications  No <img src='C:FILES (X86)    Is transportation to your appointment needed  No <img src='C:FILES (X86)    I have advised the patient to call PCP with any new or worsening symptoms  Selina Esparza LPN    Living Arrangements  Friends    Are you recieving any outpatient services  Yes    What type of services  [address] [place] [address]    Are you recieving home care services  Yes    Types of home care services  -- <img src='C:FILES (X86) Are you using any community resources  No    Have you fallen in the last 12 months  No    Interperter language line needed  No    Counseling  Patient    Counseling topics  instructions for management; Importance of RX compliance            CHIEF COMPLAINT: TCM for hospitalization 8/15/19 to 8/18/19    HISTORY OF PRESENT ILLNESS:    Today the patient states that she feels well  Her flank pain has resolved her dysuria has resolved  Denies any nausea, vomiting, fever, chills, chest pain, shortness of breath, diarrhea  Patient is adherent to her antibiotic regimen  Discharge summary per Dr Aimee Vernon:    "Didi Tavares is a 14-year-old female with a past medical history significant for learning difference, chronic hearing loss, MDD with multiple psychiatric hospitalizations, bipolar disorder, hypertension, and hyperlipidemia presenting with a 1 weeks history of increased urinary discomfort  Per the patient, she has a history of 6 UTIs in the past   She notes that she has been using the bathroom every 10-15 minutes for the past week, with bowel movements occurring every 3 days  Starting around 10:00 a m  today she developed left flank pain, and increased shortness of breath  She described her flank pain as intermittent, worse with movement and pressure with a 10/10 pain scale during periods of pain  She used Aleve which gave her minimal relief  She began to feel nauseous and vomited twice throughout the course of the day  She noted that she began shaking, felt cold, and feverish  Her shortness of breath began last night as the patient was unable to sleep and progressed throughout the day to the point where she felt she was going to pass out, which prompted her to come to the ED  While in the ED the patient was found to be septic with a temperature 101 8  A CT abdomen pelvis was performed which demonstrated ascending infection consistent with pyelonephritis    Her urine was also positive for nitrites, moderate blood and 20-30 WBC  She denied any associated abdominal or chest pain  Urine culture sensitivity was completed and the patient was initiated on PO Bactrim for a total antibiotic course of 14 days  The patient was instructed to follow up with her primary care physician "     During patient's hospitalization she was noted to have 2/2 blood cultures positive for E coli bacteremia  She was discharged on 08/18/2019 with 10 days of p o  Bactrim  To complete a total 14 day course of antibiotics  Patient was anxious to leave early prior to sensitivities of E coli returning  Sensitivities revealing pansensitive E coli  The following portions of the patient's history were reviewed and updated as appropriate: allergies, current medications, past family history, past medical history, past social history, past surgical history and problem list     Review of Systems: Denies fever, chills, headaches, lightheadedness, dizziness, visual disturbances, sore throat, chest pain, palpitations, SOB, abdominal pain, nausea, vomiting, or trouble urinating/ defecating  OBJECTIVE:  Vitals:    08/22/19 1355   BP: 122/82   BP Location: Left arm   Patient Position: Sitting   Cuff Size: Adult   Pulse: 100   Temp: 98 4 °F (36 9 °C)   TempSrc: Oral   Weight: 81 6 kg (179 lb 14 3 oz)   Height: 5' 3" (1 6 m)     Physical Exam   Constitutional: She is oriented to person, place, and time  She appears well-developed and well-nourished  No distress  HENT:   Head: Normocephalic and atraumatic  Right Ear: External ear normal    Left Ear: External ear normal    Nose: Nose normal    Eyes: Pupils are equal, round, and reactive to light  Conjunctivae and EOM are normal  Right eye exhibits no discharge  Left eye exhibits no discharge  No scleral icterus  Neck: No JVD present  No tracheal deviation present  Cardiovascular: Normal rate, regular rhythm, normal heart sounds and intact distal pulses   Exam reveals no gallop and no friction rub  No murmur heard  Pulmonary/Chest: Effort normal and breath sounds normal  No stridor  No respiratory distress  She has no wheezes  She has no rales  She exhibits no tenderness  Abdominal: Soft  Bowel sounds are normal  She exhibits no distension  There is no tenderness  There is no rebound and no guarding  Genitourinary:   Genitourinary Comments: No suprapubic tenderness noted   Musculoskeletal: She exhibits no edema, tenderness or deformity  No CVA tenderness noted   Neurological: She is alert and oriented to person, place, and time  No cranial nerve deficit  Skin: Skin is warm and dry  She is not diaphoretic  Vitals reviewed          Current Outpatient Medications:     amLODIPine (NORVASC) 10 mg tablet, Take 1 tablet (10 mg total) by mouth daily, Disp: 90 tablet, Rfl: 1    ARIPiprazole (ABILIFY) 10 mg tablet, Take 1 tablet (10 mg total) by mouth daily, Disp: 14 tablet, Rfl: 0    FLUoxetine (PROzac) 20 mg capsule, Take 1 capsule (20mg) with 40mg capsule (60mg total) by mouth daily (9am), Disp: 14 capsule, Rfl: 0    FLUoxetine (PROzac) 40 MG capsule, Take 1 capsule (40mg) with 20mg capsule (60mg total) by mouth daily (9am), Disp: 14 capsule, Rfl: 0    LORazepam (ATIVAN) 0 5 mg tablet, Take 0 5 mg by mouth 2 (two) times a day, Disp: , Rfl: 0    simvastatin (ZOCOR) 20 mg tablet, Take 1 tablet (20 mg total) by mouth daily, Disp: 90 tablet, Rfl: 1    sulfamethoxazole-trimethoprim (BACTRIM DS) 800-160 mg per tablet, Take 1 tablet by mouth every 12 (twelve) hours for 10 days, Disp: 20 tablet, Rfl: 0    traZODone (DESYREL) 50 mg tablet, Take 1 tablet (50 mg total) by mouth daily at bedtime as needed for sleep, Disp: 14 tablet, Rfl: 0    hydrOXYzine HCL (ATARAX) 25 mg tablet, Take 1 tablet by mouth every 8 (eight) hours as needed for anxiety (mild anxiety) for up to 30 days, Disp: 30 tablet, Rfl: 0    triamterene-hydrochlorothiazide (DYAZIDE) 37 5-25 mg per capsule, Take 1 capsule by mouth daily, Disp: 90 capsule, Rfl: 1    Past Medical History:   Diagnosis Date    Anxiety     Arthritis     Bipolar affective disorder, depressed, severe ([facility] [address] ) 4/28/2019    Depression     Hyperlipidemia     Hypertension     Learning difficulty     Osteopenia     Previous known suicide attempt     Psychiatric disorder     Psychiatric illness      Past Surgical History:   Procedure Laterality Date    LAPAROSCOPY      as a child, per patient-normal findings     Social History     Socioeconomic History    Marital status: Single     Spouse name: Not on file    Number of children: Not on file    Years of education: Not on file    Highest education level: Not on file   Occupational History    Not on file   Social Needs    Financial resource strain: Not on file    Food insecurity:     Worry: Not on file     Inability: Not on file    Transportation needs:     Medical: Not on file     Non-medical: Not on file   Tobacco Use    Smoking status: Current Every Day Smoker     Years: 15 00     Types: Cigarettes    Smokeless tobacco: Never Used   Substance and Sexual Activity    Alcohol use: Yes     Comment: socially    Drug use: No    Sexual activity: Yes     Partners: Male     Birth control/protection: None   Lifestyle    Physical activity:     Days per week: Not on file     Minutes per session: Not on file    Stress: Not on file   Relationships    Social connections:     Talks on phone: Not on file     Gets together: Not on file     Attends [employer] service: Not on file     Active member of club or organization: Not on file     Attends meetings of clubs or organizations: Not on file     Relationship status: Not on file    Intimate partner violence:     Fear of current or ex partner: Not on file     Emotionally abused: Not on file     Physically abused: Not on file     Forced sexual activity: Not on file   Other Topics Concern    Not on file   Social History Narrative    Not on file Family History   Problem Relation Age of Onset    Alzheimer's disease Mother     Depression Mother     No Known Problems Sister     Depression Brother     Bipolar disorder Brother     No Known Problems Maternal Aunt     No Known Problems Paternal Aunt     No Known Problems Maternal Uncle     No Known Problems Paternal Uncle     No Known Problems Maternal Grandfather     No Known Problems Maternal Grandmother     No Known Problems Paternal Grandfather     No Known Problems Paternal Grandmother     No Known Problems Cousin     ADD / ADHD Neg Hx     Alcohol abuse Neg Hx     Anxiety disorder Neg Hx     Dementia Neg Hx     Drug abuse Neg Hx     OCD Neg Hx     Paranoid behavior Neg Hx     Schizophrenia Neg Hx     Seizures Neg Hx     Self-Injury Neg Hx     Suicide Attempts Neg Hx        ==  Bimal Hwang, DO [address] [facility] PGY-3    [facility]  [address]   [facility] [place] , [address] [address] [facility] [facility]  Office: [phone]  Fax: [phone]

## 2019-11-18 DIAGNOSIS — I10 ESSENTIAL HYPERTENSION: ICD-10-CM

## 2019-11-19 RX ORDER — AMLODIPINE BESYLATE 10 MG/1
10 TABLET ORAL DAILY
Qty: 90 TABLET | Refills: 1 | Status: SHIPPED | OUTPATIENT
Start: 2019-11-19 | End: 2020-07-06 | Stop reason: SDUPTHER

## 2020-01-08 ENCOUNTER — OFFICE VISIT (OUTPATIENT)
Dept: INTERNAL MEDICINE CLINIC | Facility: CLINIC | Age: 55
End: 2020-01-08

## 2020-01-08 VITALS
TEMPERATURE: 98.3 F | DIASTOLIC BLOOD PRESSURE: 88 MMHG | BODY MASS INDEX: 30.94 KG/M2 | SYSTOLIC BLOOD PRESSURE: 128 MMHG | HEIGHT: 63 IN | HEART RATE: 104 BPM | WEIGHT: 174.6 LBS

## 2020-01-08 DIAGNOSIS — J06.9 VIRAL URI: ICD-10-CM

## 2020-01-08 DIAGNOSIS — I10 ESSENTIAL HYPERTENSION: ICD-10-CM

## 2020-01-08 DIAGNOSIS — R05.3 PERSISTENT COUGH: Primary | ICD-10-CM

## 2020-01-08 DIAGNOSIS — E78.2 MIXED HYPERLIPIDEMIA: ICD-10-CM

## 2020-01-08 PROBLEM — J96.01 ACUTE HYPOXEMIC RESPIRATORY FAILURE (HCC): Status: RESOLVED | Noted: 2019-08-15 | Resolved: 2020-01-08

## 2020-01-08 PROBLEM — R11.2 NAUSEA & VOMITING: Status: RESOLVED | Noted: 2019-08-15 | Resolved: 2020-01-08

## 2020-01-08 PROBLEM — N12 PYELONEPHRITIS: Status: RESOLVED | Noted: 2019-08-15 | Resolved: 2020-01-08

## 2020-01-08 PROBLEM — A41.9 SEPSIS (HCC): Status: RESOLVED | Noted: 2019-08-15 | Resolved: 2020-01-08

## 2020-01-08 PROBLEM — R06.02 SHORTNESS OF BREATH: Status: RESOLVED | Noted: 2019-08-15 | Resolved: 2020-01-08

## 2020-01-08 PROBLEM — R45.851 SUICIDAL IDEATIONS: Status: RESOLVED | Noted: 2019-04-28 | Resolved: 2020-01-08

## 2020-01-08 PROCEDURE — 99213 OFFICE O/P EST LOW 20 MIN: CPT | Performed by: PHYSICIAN ASSISTANT

## 2020-01-08 PROCEDURE — 3079F DIAST BP 80-89 MM HG: CPT | Performed by: PHYSICIAN ASSISTANT

## 2020-01-08 PROCEDURE — 3008F BODY MASS INDEX DOCD: CPT | Performed by: PHYSICIAN ASSISTANT

## 2020-01-08 PROCEDURE — 3074F SYST BP LT 130 MM HG: CPT | Performed by: PHYSICIAN ASSISTANT

## 2020-01-08 NOTE — PROGRESS NOTES
Assessment/Plan:      Diagnoses and all orders for this visit:    Persistent cough  -     dextromethorphan-guaifenesin (MUCINEX DM)  MG per 12 hr tablet; Take 1 tablet by mouth every 12 (twelve) hours    Viral URI  -     dextromethorphan-guaifenesin (MUCINEX DM)  MG per 12 hr tablet; Take 1 tablet by mouth every 12 (twelve) hours    Essential hypertension    Mixed hyperlipidemia      patient is a 48-year-old female presenting today for evaluation of persistent cough following a cold that started a week ago which she reports symptoms of fairly much resolved  She did report feeling feverish and it is difficult to tell if this could have been influenza but she did not have any medical evaluation prior to today  She notes for the most part symptoms of fairly much resolved and her exam is relatively benign and on suggestive of any bacterial cause at present  She did not cough at all during the visit today in her lungs sound relatively clear  Potential etiologies discussed with patient and I am most suspicious for a virus with residual symptoms  I explain whether it is the flu or not we would still treat the same this far out from onset of symptoms  Patient expresses understanding  I recommended continued symptomatic treatment and reassured patient that it may take a little longer than a week for symptoms to resolve, especially cough  I will have patient use Mucinex DM OTC twice a day for the next 5 days or so to help with mucus control and cough  I will have patient restart her Claritin as well every day to help dry out any extra phlegm or postnasal drip she may be having  I recommended continuing with clear fluids, warm tea, throat lozenges as needed and we will give this a few more days  Patient advised to call Monday if symptoms persist or worsen as she may need further treatment  Patient expresses understanding      I also on concerned as patient had hospitalization in August for sepsis with pyelonephritis and did have 80 cm but never followed up accordingly with 2 no-shows and 4 cancellations since August including a no-show appointment 2 days ago for this exact complaint  I reminded patient importance of following up routinely with her PCP for management of her chronic conditions including her hypertension and hyperlipidemia and she does have an appointment scheduled next month that she intends to keep and expresses understanding of the importance of routine follow-ups  Chief Complaint   Patient presents with    Follow-up     Cough not getting better patient report fever,chills a week ago        Subjective:     Patient ID: Amy Echols is a 53 y o  female     55y/o female here today for persistent cough  She reports last week started with sinus pressure, nasal congestion, chest congestion, vomiting x 1, felt feverish (chills but didn't check temp)  States sxs were gradual  States sxs for most part have improved/resolved, all but the cough  She denies any chest congestion, chest tightness or SOB  States the cough is dry  States the cough is mainly at night and early in AM, less throughout the day  She is taking coricidin for her cough  She had appt for this 1/6 with resident but no showed for her appt "because I didn't come in " She has also cancelled 4 appts and no showed for 2 appts since aug when she was seen for TCM for sepsis and pyelonephritis  Review of Systems   Constitutional:        As in HPI, sxs have resolved  HENT: Negative for sore throat  As in HPI, sxs have resolved   Respiratory:        As in HPI   Cardiovascular: Negative  Gastrointestinal:        As in HPI, eating back to normal, no N/V/D   Neurological: Negative            The following portions of the patient's history were reviewed and updated as appropriate: allergies, current medications, past family history, past medical history, past social history, past surgical history and problem list       Objective:     Physical Exam   Constitutional: She is oriented to person, place, and time  She appears well-developed  No distress  HENT:   Head: Normocephalic  Right Ear: Tympanic membrane and ear canal normal    Left Ear: Tympanic membrane and ear canal normal    Nose: Mucosal edema present  No rhinorrhea  Mouth/Throat: Oropharynx is clear and moist    Neck: Neck supple  Cardiovascular: Normal rate, regular rhythm and normal heart sounds  Pulmonary/Chest: Effort normal and breath sounds normal  She has no decreased breath sounds  She has no wheezes  Pt did not cough at all during appt today   Lymphadenopathy:        Head (right side): No submandibular and no tonsillar adenopathy present  Head (left side): No submandibular and no tonsillar adenopathy present  Neurological: She is alert and oriented to person, place, and time  Psychiatric: She has a normal mood and affect  Vitals reviewed        Vitals:    01/08/20 1415   BP: 128/88   BP Location: Right arm   Patient Position: Sitting   Cuff Size: Adult   Pulse: 104   Temp: 98 3 °F (36 8 °C)   TempSrc: Oral   Weight: 79 2 kg (174 lb 9 7 oz)   Height: 5' 3" (1 6 m)

## 2020-01-08 NOTE — PATIENT INSTRUCTIONS
I feel your persistent cough is residual from a possible virus  We would recommend continuing to treat your symptoms  Mucinex DM 1 pill twice a day to control your cough and mucinex  Also please start your Claritin daily to help dry out mucous that can be causing the persistent cough  You can also restart steroid spray (flonase or fluticasone nasal spray) to also help dry out mucous

## 2020-02-03 ENCOUNTER — OFFICE VISIT (OUTPATIENT)
Dept: INTERNAL MEDICINE CLINIC | Facility: CLINIC | Age: 55
End: 2020-02-03

## 2020-02-03 ENCOUNTER — LAB (OUTPATIENT)
Dept: LAB | Facility: CLINIC | Age: 55
End: 2020-02-03
Payer: COMMERCIAL

## 2020-02-03 VITALS
WEIGHT: 176.81 LBS | TEMPERATURE: 98.2 F | HEART RATE: 110 BPM | DIASTOLIC BLOOD PRESSURE: 86 MMHG | OXYGEN SATURATION: 97 % | HEIGHT: 63 IN | BODY MASS INDEX: 31.33 KG/M2 | SYSTOLIC BLOOD PRESSURE: 132 MMHG

## 2020-02-03 DIAGNOSIS — F33.2 MAJOR DEPRESSIVE DISORDER, RECURRENT, SEVERE WITHOUT PSYCHOTIC FEATURES (HCC): Chronic | ICD-10-CM

## 2020-02-03 DIAGNOSIS — Z00.00 ANNUAL PHYSICAL EXAM: ICD-10-CM

## 2020-02-03 DIAGNOSIS — N12 PYELONEPHRITIS: ICD-10-CM

## 2020-02-03 DIAGNOSIS — Z12.4 SCREENING FOR CERVICAL CANCER: ICD-10-CM

## 2020-02-03 DIAGNOSIS — Z23 NEED FOR PNEUMOCOCCAL VACCINE: ICD-10-CM

## 2020-02-03 DIAGNOSIS — I10 ESSENTIAL HYPERTENSION: ICD-10-CM

## 2020-02-03 DIAGNOSIS — I10 ESSENTIAL HYPERTENSION: Primary | ICD-10-CM

## 2020-02-03 DIAGNOSIS — A41.51 SEPSIS DUE TO ESCHERICHIA COLI (HCC): ICD-10-CM

## 2020-02-03 DIAGNOSIS — E78.2 MIXED HYPERLIPIDEMIA: ICD-10-CM

## 2020-02-03 DIAGNOSIS — F31.4 BIPOLAR AFFECTIVE DISORDER, DEPRESSED, SEVERE (HCC): Chronic | ICD-10-CM

## 2020-02-03 LAB
ALBUMIN SERPL BCP-MCNC: 4.2 G/DL (ref 3.5–5)
ANION GAP SERPL CALCULATED.3IONS-SCNC: 9 MMOL/L (ref 4–13)
BUN SERPL-MCNC: 19 MG/DL (ref 5–25)
CALCIUM ALBUM COR SERPL-MCNC: 10.2 MG/DL (ref 8.3–10.1)
CALCIUM SERPL-MCNC: 10.4 MG/DL (ref 8.3–10.1)
CALCIUM SERPL-MCNC: 10.4 MG/DL (ref 8.3–10.1)
CHLORIDE SERPL-SCNC: 101 MMOL/L (ref 100–108)
CO2 SERPL-SCNC: 26 MMOL/L (ref 21–32)
CREAT SERPL-MCNC: 0.85 MG/DL (ref 0.6–1.3)
GFR SERPL CREATININE-BSD FRML MDRD: 77 ML/MIN/1.73SQ M
GLUCOSE P FAST SERPL-MCNC: 130 MG/DL (ref 65–99)
HCV AB SER QL: NORMAL
POTASSIUM SERPL-SCNC: 2.8 MMOL/L (ref 3.5–5.3)
SODIUM SERPL-SCNC: 136 MMOL/L (ref 136–145)

## 2020-02-03 PROCEDURE — 82040 ASSAY OF SERUM ALBUMIN: CPT

## 2020-02-03 PROCEDURE — 3075F SYST BP GE 130 - 139MM HG: CPT | Performed by: INTERNAL MEDICINE

## 2020-02-03 PROCEDURE — 3079F DIAST BP 80-89 MM HG: CPT | Performed by: INTERNAL MEDICINE

## 2020-02-03 PROCEDURE — 87389 HIV-1 AG W/HIV-1&-2 AB AG IA: CPT

## 2020-02-03 PROCEDURE — 80048 BASIC METABOLIC PNL TOTAL CA: CPT

## 2020-02-03 PROCEDURE — 99213 OFFICE O/P EST LOW 20 MIN: CPT | Performed by: INTERNAL MEDICINE

## 2020-02-03 PROCEDURE — G0009 ADMIN PNEUMOCOCCAL VACCINE: HCPCS | Performed by: INTERNAL MEDICINE

## 2020-02-03 PROCEDURE — 90732 PPSV23 VACC 2 YRS+ SUBQ/IM: CPT | Performed by: INTERNAL MEDICINE

## 2020-02-03 PROCEDURE — 86803 HEPATITIS C AB TEST: CPT

## 2020-02-03 PROCEDURE — 3008F BODY MASS INDEX DOCD: CPT | Performed by: INTERNAL MEDICINE

## 2020-02-03 PROCEDURE — 36415 COLL VENOUS BLD VENIPUNCTURE: CPT

## 2020-02-03 NOTE — PROGRESS NOTES
INTERNAL MEDICINE FOLLOW-UP OFFICE VISIT  [facility]  [address] [facility]  [place]\[facility] [place] [facility]\""    NAME: Kennedy Krause  AGE: 53 y o  SEX: female    DATE OF ENCOUNTER: 2/3/2020    Assessment and Plan     1  Screening for cervical cancer  - will refer to gynecology for routine Pap smear  - Ambulatory referral to Obstetrics / Gynecology; Future    2  Essential hypertension  - chronic and well controlled  - patient is advised to continue her amlodipine  - will continue to monitor  - HIV 1/2 w/ Reflex (Multispot); Future    3  Major depressive disorder, recurrent, severe without psychotic features ([facility] [address] )  - chronic and well controlled  - patient is advised to continue her medications as prescribed he continue follow-up with psychotherapy and Psychiatry  - will continue to monitor    4  Mixed hyperlipidemia  - patient is advised to continue her statin therapy  - not due for lipid panel recheck at this time    5  Bipolar affective disorder, depressed, severe ([facility] [address] )  - chronic and well controlled  - see plan as in 3  Above    6  Need for pneumococcal vaccine  - patient provided pneumococcal vaccine during visit today  - PNEUMOCOCCAL POLYSACCHARIDE VACCINE 23-VALENT =>1YO SQ IM    Orders Placed This Encounter   Procedures    Ambulatory referral to Obstetrics / Gynecology     Follow-up in 6 months  - Counseling Documentation: patient was counseled regarding: diagnostic results, instructions for management, risk factor reductions, prognosis, patient and family education, impressions, risks and benefits of treatment options and importance of compliance with treatment  - Counseling Time: counseling time more than 50% of visit: 30 minutes  - Medication Side Effects: Adverse side effects of medications were reviewed with the patient/guardian today  Routine Health Maintenance: Patient is agreeable to screening blood work for hepatitis C and HIV, will obtain today    Patient is agreeable for scheduling a screening mammogram   Will schedule patient for FIT testing, and patient is understanding of the fact that if this is positive she will need a colonoscopy  Will provide patient with referral to gynecology for Pap smear for cervical cancer screening  Otherwise is currently up-to-date  OMT/OPP:   None indicated at this time  Chief Complaint     Chief Complaint   Patient presents with    Follow-up     high cholesterol    Hypertension       History of Present Illness     Misa Malone is a 58-year-old female with past medical history significant for hypertension, hyperlipidemia, and MDD and bipolar disorder with history of multiple psychiatric hospitalizations who presents to clinic today for routine follow-up  Last visit 01/08/2020 with TERESA Fields  With regards to her hypertension, patient states she has been compliant with her amlodipine  Her blood pressure has been under good control at her last few office visits  With regards to her hyperlipidemia, patient is compliant with her statin therapy  Last lipid panel in May of 2019 was within normal limits  With regards to her history of depression and bipolar disorder, she states she is currently in a good mood today  She states she is compliant with her psychiatric medications and regularly sees her psychotherapist and psychiatrist     At last visit with me in July 2019, patient was advised to obtain some screening tests, including hepatitis-C blood work, FIT testing, screening mammogram, and referral to gynecology for routine Pap smear  Patient has not been able to follow up with a these screening test     At her last office visit January, patient was complaining of URI symptoms which have since resolved  Patient offers no acute complaints this morning    Review of systems is essentially negative, with patient specifically denying any recent fevers, chills, night sweats, headaches, chest pains, palpitations, shortness of breath, coughing, wheezing, abdominal pain, nausea vomiting, diarrhea, constipation, or muscle aches or pains  The following portions of the patient's history were reviewed and updated as appropriate: allergies, current medications, past family history, past medical history, past social history, past surgical history and problem list     Review of Systems     Review of Systems   Constitutional: Negative for chills and fever  HENT: Negative for congestion, ear pain, hearing loss, postnasal drip, rhinorrhea, sore throat and trouble swallowing  Eyes: Negative for visual disturbance  Respiratory: Negative for cough, shortness of breath and wheezing  Cardiovascular: Negative for chest pain and palpitations  Gastrointestinal: Negative for abdominal pain, constipation, diarrhea, nausea and vomiting  Genitourinary: Negative for dysuria, frequency, hematuria and urgency  Musculoskeletal: Negative for arthralgias, back pain and myalgias  Skin: Negative for pallor, rash and wound  Neurological: Negative for dizziness, weakness, light-headedness, numbness and headaches  Psychiatric/Behavioral: Negative for agitation, confusion, dysphoric mood, hallucinations, self-injury and suicidal ideas  The patient is not nervous/anxious  Active Problem List     Patient Active Problem List   Diagnosis    Major depressive disorder, recurrent, severe without psychotic features ([facility] [address] )    Bipolar affective disorder, depressed, severe ([facility] [address] )    Amenorrhea    Essential hypertension    Hyperlipidemia    Hypokalemia    Elevated bilirubin    Anxiety       Objective     /86 (BP Location: Right arm, Patient Position: Sitting, Cuff Size: Adult)   Pulse (!) 110   Temp 98 2 °F (36 8 °C) (Oral)   Ht 5' 3" (1 6 m)   Wt 80 2 kg (176 lb 12 9 oz)   SpO2 97%   BMI 31 32 kg/m²     Physical Exam   Constitutional: She is oriented to person, place, and time  She appears well-developed and well-nourished   No distress  HENT:   Head: Normocephalic and atraumatic  Eyes: Pupils are equal, round, and reactive to light  Conjunctivae are normal  No scleral icterus  Neck: Neck supple  Cardiovascular: Normal rate, regular rhythm, normal heart sounds and intact distal pulses  Exam reveals no gallop and no friction rub  No murmur heard  Pulmonary/Chest: Effort normal and breath sounds normal  No stridor  No respiratory distress  She has no wheezes  She has no rales  Abdominal: Soft  Bowel sounds are normal  She exhibits no distension and no mass  There is no tenderness  There is no rebound and no guarding  Musculoskeletal: She exhibits no edema, tenderness or deformity  Lymphadenopathy:     She has no cervical adenopathy  Neurological: She is alert and oriented to person, place, and time  Skin: Skin is warm and dry  Capillary refill takes less than 2 seconds  No rash noted  She is not diaphoretic  No pallor  Psychiatric: She has a normal mood and affect  Her speech is normal and behavior is normal  Judgment and thought content normal  Cognition and memory are normal    Denies SI or HI at this time  Nursing note and vitals reviewed  Pertinent Laboratory/Diagnostic Studies:  Xr Chest Portable    Result Date: 8/16/2019  Impression: No acute cardiopulmonary disease  Workstation performed: [id number]     Ct Abdomen Pelvis With Contrast    Result Date: 8/15/2019  Impression: Enhancement of the left renal urothelial lining with perinephric and periureteric stranding along with smudgy areas of hypodensity in the left kidney consistent with ascending infection and pyelonephritis   Workstation performed: [id number]       Images and diagnostics reviewed     Current Medications     Current Outpatient Medications:     amLODIPine (NORVASC) 10 mg tablet, Take 1 tablet (10 mg total) by mouth daily, Disp: 90 tablet, Rfl: 1    ARIPiprazole (ABILIFY) 10 mg tablet, Take 1 tablet (10 mg total) by mouth daily, Disp: 14 tablet, Rfl: 0    FLUoxetine (PROzac) 20 mg capsule, Take 1 capsule (20mg) with 40mg capsule (60mg total) by mouth daily (9am), Disp: 14 capsule, Rfl: 0    FLUoxetine (PROzac) 40 MG capsule, Take 1 capsule (40mg) with 20mg capsule (60mg total) by mouth daily (9am), Disp: 14 capsule, Rfl: 0    LORazepam (ATIVAN) 0 5 mg tablet, Take 0 5 mg by mouth 2 (two) times a day, Disp: , Rfl: 0    simvastatin (ZOCOR) 20 mg tablet, Take 1 tablet (20 mg total) by mouth daily, Disp: 90 tablet, Rfl: 1    traZODone (DESYREL) 50 mg tablet, Take 1 tablet (50 mg total) by mouth daily at bedtime as needed for sleep, Disp: 14 tablet, Rfl: 0    triamterene-hydrochlorothiazide (DYAZIDE) 37 5-25 mg per capsule, Take 1 capsule by mouth daily, Disp: 90 capsule, Rfl: 1    dextromethorphan-guaifenesin (MUCINEX DM)  MG per 12 hr tablet, Take 1 tablet by mouth every 12 (twelve) hours (Patient not taking: Reported on 2/3/2020), Disp: 20 tablet, Rfl: 0    hydrOXYzine HCL (ATARAX) 25 mg tablet, Take 1 tablet by mouth every 8 (eight) hours as needed for anxiety (mild anxiety) for up to 30 days, Disp: 30 tablet, Rfl: 0    Health Maintenance     Health Maintenance   Topic Date Due    Hepatitis C Screening  1965    Medicare Annual Wellness Visit (AWV)  1965    MAMMOGRAM  1965    CRC Screening: Colonoscopy  1965    Pneumococcal Vaccine: Pediatrics (0 to 5 Years) and At-Risk Patients (6 to 59 Years) (1 of 1 - PPSV23) 11/16/1970    HIV Screening  11/16/1979    BMI: Followup Plan  11/16/1982    Cervical Cancer Screening  11/16/1985    BMI: Adult  01/08/2021    DTaP,Tdap,and Td Vaccines (3 - Td) 10/06/2029    Pneumococcal Vaccine: 65+ Years (1 of 2 - PCV13) 11/16/2029    Influenza Vaccine  Completed    HIB Vaccine  Aged Out    Hepatitis B Vaccine  Aged Out    IPV Vaccine  Aged Out    Hepatitis A Vaccine  Aged Out    Meningococcal ACWY Vaccine  Aged Out    HPV Vaccine  Aged [employer] History Administered Date(s) Administered    INFLUENZA 10/26/2014, 12/08/2015, 08/29/2017    Influenza, injectable, quadrivalent, preservative free 0 5 mL 10/06/2019    Tdap 07/11/2019, 10/06/2019       RANDOLPH Fregoso  Internal Medicine PGY-1  [address] , [address] [address] [facility] [address] [facility]  Office: [phone]  Fax: [phone]

## 2020-02-05 DIAGNOSIS — I10 ESSENTIAL HYPERTENSION: ICD-10-CM

## 2020-02-05 DIAGNOSIS — E87.6 HYPOKALEMIA: ICD-10-CM

## 2020-02-05 DIAGNOSIS — E78.2 MIXED HYPERLIPIDEMIA: Primary | ICD-10-CM

## 2020-02-05 LAB — HIV 1+2 AB+HIV1 P24 AG SERPL QL IA: NORMAL

## 2020-02-05 RX ORDER — POTASSIUM CHLORIDE 20 MEQ/1
20 TABLET, EXTENDED RELEASE ORAL 2 TIMES DAILY
Qty: 60 TABLET | Refills: 0 | Status: SHIPPED | OUTPATIENT
Start: 2020-02-05 | End: 2020-07-31 | Stop reason: HOSPADM

## 2020-02-05 RX ORDER — ATORVASTATIN CALCIUM 20 MG/1
20 TABLET, FILM COATED ORAL DAILY
Qty: 90 TABLET | Refills: 3 | Status: SHIPPED | OUTPATIENT
Start: 2020-02-05 | End: 2020-07-06 | Stop reason: SDUPTHER

## 2020-02-05 RX ORDER — LISINOPRIL 10 MG/1
10 TABLET ORAL DAILY
Qty: 90 TABLET | Refills: 1 | Status: SHIPPED | OUTPATIENT
Start: 2020-02-05 | End: 2020-07-06 | Stop reason: SDUPTHER

## 2020-02-06 ENCOUNTER — TELEPHONE (OUTPATIENT)
Dept: INTERNAL MEDICINE CLINIC | Facility: CLINIC | Age: 55
End: 2020-02-06

## 2020-02-06 DIAGNOSIS — E87.6 HYPOKALEMIA: Primary | ICD-10-CM

## 2020-02-06 RX ORDER — POTASSIUM CHLORIDE 20MEQ/15ML
20 LIQUID (ML) ORAL 2 TIMES DAILY
Qty: 900 ML | Refills: 0 | Status: SHIPPED | OUTPATIENT
Start: 2020-02-06 | End: 2020-03-13

## 2020-02-06 NOTE — TELEPHONE ENCOUNTER
Patient called, states she picked up her potassium pills yesterday  She has taken 2 doses but finds them difficult to swallow  Patient did inform me that she does cut them up but is still having difficulty  Patient would like to know if there is an alternative that she can take that would be smaller in size to swallow  Please advise

## 2020-02-06 NOTE — TELEPHONE ENCOUNTER
Spoke to patient and she said she cut the pill into 4 pieces and that helped   No other action needed

## 2020-03-13 ENCOUNTER — OFFICE VISIT (OUTPATIENT)
Dept: OBGYN CLINIC | Facility: CLINIC | Age: 55
End: 2020-03-13

## 2020-03-13 VITALS
HEIGHT: 60 IN | SYSTOLIC BLOOD PRESSURE: 125 MMHG | BODY MASS INDEX: 32.79 KG/M2 | HEART RATE: 105 BPM | DIASTOLIC BLOOD PRESSURE: 85 MMHG | WEIGHT: 167 LBS

## 2020-03-13 DIAGNOSIS — Z01.419 WOMEN'S ANNUAL ROUTINE GYNECOLOGICAL EXAMINATION: Primary | ICD-10-CM

## 2020-03-13 DIAGNOSIS — Z12.4 SCREENING FOR CERVICAL CANCER: ICD-10-CM

## 2020-03-13 DIAGNOSIS — Z12.11 SCREENING FOR COLON CANCER: ICD-10-CM

## 2020-03-13 DIAGNOSIS — E28.39 OVARIAN FAILURE: ICD-10-CM

## 2020-03-13 PROCEDURE — 87624 HPV HI-RISK TYP POOLED RSLT: CPT | Performed by: NURSE PRACTITIONER

## 2020-03-13 PROCEDURE — G0145 SCR C/V CYTO,THINLAYER,RESCR: HCPCS | Performed by: PATHOLOGY

## 2020-03-13 PROCEDURE — G0124 SCREEN C/V THIN LAYER BY MD: HCPCS | Performed by: PATHOLOGY

## 2020-03-13 PROCEDURE — G0101 CA SCREEN;PELVIC/BREAST EXAM: HCPCS | Performed by: NURSE PRACTITIONER

## 2020-03-13 NOTE — PROGRESS NOTES
Lianet Drewt is a 53 y o  female who presents for annual exam   Last Pap smear 2013 resulted NILM/ HR HPV positive non 16/18  Was due for follow-up in 1 year  Will collect a repeat Pap smear today  Mammogram a few years ago  Denies history of abnormal mammograms  Has mammogram ordered by PCP  Patient encouraged to schedule appointment  CRC screening-has not had  Referral provided to GI today  The patient has no complaints today  The patient is sexually active  The patient is not taking hormone replacement therapy  Patient denies post-menopausal vaginal bleeding    The patient wears seatbelts: yes  The patient participates in regular exercise: no  The patient reports that there is not domestic violence in her life  Patient states she has never had a menstrual cycle  States she was told was ovarian failure  Thickness throughout neck  Patient is 5 ft tall  Mild intellectual disability  Medical history significant for hypertension, high cholesterol, depression, anxiety and suicide attempt  Menstrual History:  OB History        0    Para   0    Term   0       0    AB   0    Living   0       SAB   0    TAB   0    Ectopic   0    Multiple   0    Live Births   0                Menarche age:  Has never had menses  No LMP recorded (lmp unknown)  The following portions of the patient's history were reviewed and updated as appropriate: allergies, current medications, past family history, past medical history, past social history, past surgical history and problem list     Review of Systems  Pertinent items are noted in HPI       Objective      /85 (BP Location: Right arm, Patient Position: Sitting, Cuff Size: Standard)   Pulse 105   Ht 5' (1 524 m)   Wt 75 8 kg (167 lb)   LMP  (LMP Unknown)   BMI 32 61 kg/m²     General:   alert and oriented, in no acute distress   Heart: regular rate and rhythm, S1, S2 normal, no murmur, click, rub or gallop   Lungs: clear to auscultation bilaterally   Abdomen: soft, non-tender, without masses or organomegaly, nondistended and normal bowel sounds   Vulva: normal, Bartholin's, Urethra, [place]'s normal, female escutcheon   Vagina: normal mucosa, normal discharge, no palpable nodules   Cervix: no bleeding following Pap, no cervical motion tenderness and no lesions   Uterus: normal size, non-tender, normal shape and consistency   Adnexa: normal adnexa and no mass, fullness, tenderness   Breast:  Nontender, no palpable masses, no nipple discharge, no skin changes bilaterally    Assessment/Plan     Diagnoses and all orders for this visit:    Women's annual routine gynecological examination       - Pap smear with reflex ordered  Screening for colon cancer  -     Ambulatory referral to Gastroenterology; Future    Screening for cervical cancer  -     Ambulatory referral to Obstetrics / Gynecology    Ovarian failure  -     Chromosome analysis, peripheral blood; Future  -     Follicle stimulating hormone; Future  -     TSH, 3rd generation with Free T4 reflex; Future      -will call with results  Will develop plan of care depending on results  -encouraged healthy diet, exercise and lifestyle  -encouraged to follow-up with PCP and mental health professionals on a regular basis  -breast awareness reviewed  VBI-      BMI Counseling: Body mass index is 32 61 kg/m²  The BMI is above normal  Nutrition recommendations include reducing portion sizes, decreasing overall calorie intake and 3-5 servings of fruits/vegetables daily  Exercise recommendations include moderate aerobic physical activity for 150 minutes/week, exercising 3-5 times per week and strength training exercises

## 2020-03-13 NOTE — PATIENT INSTRUCTIONS
Colonoscopy   WHAT YOU NEED TO KNOW:   What do I need to know about a colonoscopy? A colonoscopy is a procedure to examine the inside of your colon (intestine) with a scope  A scope is a flexible tube with a small light and camera on the end  Polyps or tissue growths may be removed during your colonoscopy  How should I prepare the week before my colonoscopy? You will need to stop taking medicines that contain aspirin or iron for 7 days before your colonoscopy  If you take anticoagulants, such as warfarin, ask when you should stop taking it  Make plans for someone to drive you home after your procedure  How should I prepare the day before my colonoscopy? Your healthcare provider will have you prepare your bowels before your procedure  Your bowels will need to be empty before your procedure to allow him to clearly see your colon  You will need to do the following the day before your procedure:  · Have only clear liquids  for the entire day before your colonoscopy  Clear liquid diet includes clear fruit juices and broths, clear flavored gelatin, and hard candy  It also includes coffee, tea, carbonated beverages, and clear sports drinks  · Follow your bowel prep as directed  There are many different preparations that can be given before a colonoscopy  Some are given over 2 hours and others over 6 hours  Some are given earlier in the afternoon the day before the colonoscopy  Others are given the day before and then the morning of the colonoscopy  With any bowel prep, stay close to the bathroom  This liquid will cause your bowels to move frequently  · An enema  may be needed  Your healthcare provider may tell you to use an enema to help clean out your bowels  · Do not eat or drink anything after midnight  This will help prevent problems that can happen if you vomit while under anesthesia  What will happen during my colonoscopy? · You will be given medicine to help you relax   You will lie on your left side and raise one or both knees toward your chest  Your healthcare provider will examine your anus and use a finger to check your rectum  You may need another enema if your bowel is not empty  The scope will be lubricated and gently placed into your anus  It will then be passed through your rectum and into your colon  Water or air will be put into your colon to help clean or expand it  This is done so your healthcare provider can see your colon clearly  · Tissue samples may be taken from the walls of your bowel and sent to a lab for tests  If you have a polyp, your healthcare provider will pass a wire loop through the scope and use it to hold the polyp  The polyp is then burned or cut off the wall of your colon  Removed polyps are sent to a lab for tests  Pictures of your colon may be taken during the procedure  The scope will be removed when the procedure is done  What will happen after my colonoscopy? · Rest after your procedure  You may feel bloated, have some gas and abdominal discomfort  You may need to lie on your right side with a heating pad on your abdomen  You may need to take short walks to help move the gas out  Eat small meals, if you feel bloated  Do not drive or make important decisions until the day after your procedure  · You may have polyps removed  Do not take aspirin or go on long car trips for 7 days after your procedure  Ask your healthcare provider about any other limits after your procedure  What are the risks of a colonoscopy? You may have pain or bleeding after the scope or polyps are removed  You may also have a slow heartbeat, decreased blood pressure, or increased sweating  Your colon may tear due to the increased pressure from the scope and other instruments  This may cause bowel contents to leak out of your colon and into your abdomen  If this happens, you will need to stay in the hospital and have surgery on your colon     CARE AGREEMENT:   You have the right to help plan your care  Learn about your health condition and how it may be treated  Discuss treatment options with your caregivers to decide what care you want to receive  You always have the right to refuse treatment  The above information is an  only  It is not intended as medical advice for individual conditions or treatments  Talk to your doctor, nurse or pharmacist before following any medical regimen to see if it is safe and effective for you  © 2017 [address] is for End User's use only and may not be sold, redistributed or otherwise used for commercial purposes  All illustrations and images included in CareNotes® are the copyrighted property of A D A M , Inc  or [employer]  Mammogram   WHAT YOU NEED TO KNOW:   What do I need to know about a mammogram?  A mammogram is an x-ray of your breasts to screen for breast cancer  Experts recommend mammograms every 2 years starting at age 48 years  You may need a mammogram at age 52 years or younger if you have an increased risk for breast cancer  Talk to your healthcare provider about when you should start having mammograms and how often you need them  How do I prepare for a mammogram?   · Do not use deodorant, powder, lotion, or perfume  These products may cause particles to appear on your mammogram      · Wear a 2-piece outfit  · If your breasts are tender before your monthly period, do not have a mammogram during this time  Schedule your mammogram to be done 1 week after your period ends  · If you are breastfeeding, express as much milk as possible before the mammogram     · Bring a list of the dates and places of your past mammograms and other breast tests or treatments    What will happen during a mammogram?  A top view and a side view x-ray are usually done for each breast  Tell healthcare providers if you have breast implants or breast problems before you have your mammogram  You may need extra x-rays of each breast   · You will be given a hospital gown  Take off your clothes from the waist up  Wear the hospital gown so that it opens in the front  · You will sit or stand next to a small x-ray machine  The healthcare provider will help you place one of your breasts on the x-ray plate  Your arm and breast will be moved until your breast is in the correct position  · Your breast will be gently pressed between 2 plastic plates for a few seconds while the x-ray is taken  This may be uncomfortable  · You will be asked to hold your breath while the x-ray is taken  Another x-ray will be taken of the same breast after the position of the x-ray machine has been changed  · Your other breast will be x-rayed the same way  What will happen after my mammogram?  Your breasts may feel tender for a short while after the mammogram  You may resume your regular activities  Ask your healthcare provider when you should receive the results of your mammogram   What are the risks of a mammogram?  You will be exposed to a small amount of radiation  Some breast cancers may not show up on mammograms  When should I contact my healthcare provider? · You cannot make your appointment on time  · You do not receive your results when expected  · You have questions or concerns about the mammogram   CARE AGREEMENT:   You have the right to help plan your care  Learn about your health condition and how it may be treated  Discuss treatment options with your caregivers to decide what care you want to receive  You always have the right to refuse treatment  The above information is an  only  It is not intended as medical advice for individual conditions or treatments  Talk to your doctor, nurse or pharmacist before following any medical regimen to see if it is safe and effective for you    © 2017 [employer] Information is for End User's use only and may not be sold, redistributed or otherwise used for commercial purposes  All illustrations and images included in CareNotes® are the copyrighted property of [employer]  or [employer] Cheung  Breast Self Exam for Women   WHAT YOU NEED TO KNOW:   What is a breast self-exam (BSE)? A BSE is a way to check your breasts for lumps and other changes  Regular BSEs can help you know how your breasts normally look and feel  Most breast lumps or changes are not cancer, but you should always have them checked by a healthcare provider  Your healthcare provider can also watch you do a BSE and can tell you if you are doing your BSE correctly  Why should I do a BSE? Breast cancer is the most common type of cancer in women  Even if you have mammograms, you may still want to do a BSE regularly  If you know how your breasts normally feel and look, it may help you know when to contact your healthcare provider  Mammograms can miss some cancers  You may find a lump during a BSE that did not show up on your mammogram   When should I do a BSE? Kye your calendar to help you remember to do BSE on a regular schedule  One easy way to remember to do a BSE is to do the exam on the same day of each month  If you have periods, you may want to do your BSE 1 week after your period ends  This is the time when your breasts may be the least swollen, lumpy, or tender  You can do regular BSEs even if you are breastfeeding or have breast implants  How should I do a BSE? · Look at your breasts in a mirror  Look at the size and shape of each breast and nipple  Check for swelling, lumps, dimpling, scaly skin, or other skin changes  Look for nipple changes, such as a nipple that is painful or beginning to pull inward  Gently squeeze both nipples and check to see if fluid (that is not breast milk) comes out of them  If you find any of these or other breast changes, contact your healthcare provider   Check your breasts while you sit or  the following 3 positions:    [facility] your arms down at your sides  ¨ Raise your hands and join them behind your head  ¨ Put firm pressure with your hands on your hips  Bend slightly forward while you look at your breasts in the mirror  · Lie down and feel your breasts  When you lie down, your breast tissue spreads out evenly over your chest  This makes it easier for you to feel for lumps and anything that may not be normal for your breasts  Do a BSE on one breast at a time  ¨ Place a small pillow or towel under your left shoulder  Put your left arm behind your head  ¨ Use the 3 middle fingers of your right hand  Use your fingertip pads, on the top of your fingers  Your fingertip pad is the most sensitive part of your finger  ¨ Use small circles to feel your breast tissue  Use your fingertip pads to make dime-sized, overlapping circles on your breast and armpits  Use light, medium, and firm pressure  First, press lightly  Second, press with medium pressure to feel a little deeper into the breast  Last, use firm pressure to feel deep within your breast     ¨ Examine your entire breast area  Examine the breast area from above the breast to below the breast where you feel only ribs  Make small circles with your fingertips, starting in the middle of your armpit  Make circles going up and down the breast area  Continue toward your breast and all the way across it  Examine the area from your armpit all the way over to the middle of your chest (breastbone)  Stop at the middle of your chest     ¨ Move the pillow or towel to your right shoulder, and put your right arm behind your head  Use the 3 fingertip pads of your left hand, and repeat the above steps to do a BSE on your right breast        What else can I do to check for breast problems or cancer? Some experts suggest that women 36years of age or older should have a mammogram every year   Other experts suggest that women between the ages of 48and 76years old should have a mammogram every 2 years  Talk to your healthcare provider about when you should have a mammogram   When should I contact my healthcare provider? · You find any lumps or changes in your breasts  · You have breast pain or fluid coming from your nipples  · You have questions or concerns or concerns about your condition or care  CARE AGREEMENT:   You have the right to help plan your care  Learn about your health condition and how it may be treated  Discuss treatment options with your caregivers to decide what care you want to receive  You always have the right to refuse treatment  The above information is an  only  It is not intended as medical advice for individual conditions or treatments  Talk to your doctor, nurse or pharmacist before following any medical regimen to see if it is safe and effective for you  © 2017 [address]  Information is for End User's use only and may not be sold, redistributed or otherwise used for commercial purposes  All illustrations and images included in CareNotes® are the copyrighted property of A D A M , Inc  or Diaz Jonatan  Mammogram   WHAT YOU NEED TO KNOW:   What do I need to know about a mammogram?  A mammogram is an x-ray of your breasts to screen for breast cancer  Experts recommend mammograms every 2 years starting at age 48 years  You may need a mammogram at age 52 years or younger if you have an increased risk for breast cancer  Talk to your healthcare provider about when you should start having mammograms and how often you need them  How do I prepare for a mammogram?   · Do not use deodorant, powder, lotion, or perfume  These products may cause particles to appear on your mammogram      · Wear a 2-piece outfit  · If your breasts are tender before your monthly period, do not have a mammogram during this time  Schedule your mammogram to be done 1 week after your period ends      · If you are breastfeeding, express as much milk as possible before the mammogram     · Bring a list of the dates and places of your past mammograms and other breast tests or treatments  What will happen during a mammogram?  A top view and a side view x-ray are usually done for each breast  Tell healthcare providers if you have breast implants or breast problems before you have your mammogram  You may need extra x-rays of each breast   · You will be given a hospital gown  Take off your clothes from the waist up  Wear the hospital gown so that it opens in the front  · You will sit or stand next to a small x-ray machine  The healthcare provider will help you place one of your breasts on the x-ray plate  Your arm and breast will be moved until your breast is in the correct position  · Your breast will be gently pressed between 2 plastic plates for a few seconds while the x-ray is taken  This may be uncomfortable  · You will be asked to hold your breath while the x-ray is taken  Another x-ray will be taken of the same breast after the position of the x-ray machine has been changed  · Your other breast will be x-rayed the same way  What will happen after my mammogram?  Your breasts may feel tender for a short while after the mammogram  You may resume your regular activities  Ask your healthcare provider when you should receive the results of your mammogram   What are the risks of a mammogram?  You will be exposed to a small amount of radiation  Some breast cancers may not show up on mammograms  When should I contact my healthcare provider? · You cannot make your appointment on time  · You do not receive your results when expected  · You have questions or concerns about the mammogram   CARE AGREEMENT:   You have the right to help plan your care  Learn about your health condition and how it may be treated  Discuss treatment options with your caregivers to decide what care you want to receive  You always have the right to refuse treatment  The above information is an  only  It is not intended as medical advice for individual conditions or treatments  Talk to your doctor, nurse or pharmacist before following any medical regimen to see if it is safe and effective for you  © 2017 [address] Information is for End User's use only and may not be sold, redistributed or otherwise used for commercial purposes  All illustrations and images included in CareNotes® are the copyrighted property of A D A M , Inc  or [employer]  Pap Smear   GENERAL INFORMATION:   What is a Pap smear? A Pap smear, or Pap test, is a procedure to check your cervix for abnormal cells  The cervix is the narrow opening at the bottom of your uterus  The cervix meets the top part of the vagina  How do I prepare for a Pap smear? The best time to schedule the test is right after your period stops  Do not have a Pap smear during your monthly period  Do not have intercourse or put anything in your vagina for 24 hours before your test    What will happen during a Pap smear? · You will lie on your back and place your feet on footrests called stirrups  Your caregiver will gently insert a device called a speculum into your vagina  The speculum is used to spread the walls of your vagina so he can see your cervix  He will use a thin brush or cotton swab to collect cells from the inside of your cervix  · Your caregiver will also collect cells from the surface of your cervix with a plastic or wooden tool called a spatula  He may also gently scrape the upper part of your vagina for a sample  The samples are placed in a container with liquid or on a glass slide  They are sent to a lab and examined for abnormal cells  How often do I need a Pap smear? Pap smears are usually done every 1 to 3 years   You may need a Pap smear more often if you have any of the following:  · Positive test result for the human papillomavirus (HPV)    · Cervical intraepithelial neoplasm or cervical cancer    · HIV    · A weak immune system    · Exposure to diethylstilbestrol (SCAR) medicine when your mother was pregnant with you  CARE AGREEMENT:   You have the right to help plan your care  Learn about your health condition and how it may be treated  Discuss treatment options with your caregivers to decide what care you want to receive  You always have the right to refuse treatment  The above information is an  only  It is not intended as medical advice for individual conditions or treatments  Talk to your doctor, nurse or pharmacist before following any medical regimen to see if it is safe and effective for you  © 2014 [address] is for End User's use only and may not be sold, redistributed or otherwise used for commercial purposes  All illustrations and images included in CareNotes® are the copyrighted property of A D A M , Inc  or Diaz Cheung

## 2020-03-16 LAB
HPV HR 12 DNA CVX QL NAA+PROBE: POSITIVE
HPV16 DNA CVX QL NAA+PROBE: NEGATIVE
HPV18 DNA CVX QL NAA+PROBE: NEGATIVE

## 2020-03-19 LAB
LAB AP GYN PRIMARY INTERPRETATION: ABNORMAL
Lab: ABNORMAL
PATH INTERP SPEC-IMP: ABNORMAL

## 2020-03-25 ENCOUNTER — TELEPHONE (OUTPATIENT)
Dept: OBGYN CLINIC | Facility: CLINIC | Age: 55
End: 2020-03-25

## 2020-03-25 DIAGNOSIS — F33.2 MAJOR DEPRESSIVE DISORDER, RECURRENT, SEVERE WITHOUT PSYCHOTIC FEATURES (HCC): Chronic | ICD-10-CM

## 2020-03-25 NOTE — TELEPHONE ENCOUNTER
Discussed with patient pap results  Patient is scheduled for colposcopy, however, in light of current COVID-19, will recommend delaying colpo 4 - 8 weeks, as recommended by [facility], ASCCP guidelines  Instructed patient that [facility] will call to formalize appointment in the future  Patient is in agreement with the plan  All questions answered        Tamia Ricci MD  OB/GYN PGY-4  3/25/2020 3:46 PM

## 2020-04-01 ENCOUNTER — TELEPHONE (OUTPATIENT)
Dept: INTERNAL MEDICINE CLINIC | Facility: CLINIC | Age: 55
End: 2020-04-01

## 2020-04-02 RX ORDER — ARIPIPRAZOLE 10 MG/1
10 TABLET ORAL DAILY
Qty: 30 TABLET | Refills: 27 | Status: SHIPPED | OUTPATIENT
Start: 2020-04-02 | End: 2020-04-16 | Stop reason: SDUPTHER

## 2020-04-02 RX ORDER — FLUOXETINE HYDROCHLORIDE 20 MG/1
20 CAPSULE ORAL DAILY
Qty: 30 CAPSULE | Refills: 0 | Status: SHIPPED | OUTPATIENT
Start: 2020-04-02 | End: 2020-04-16 | Stop reason: SDUPTHER

## 2020-04-12 ENCOUNTER — HOSPITAL ENCOUNTER (EMERGENCY)
Facility: HOSPITAL | Age: 55
Discharge: HOME | End: 2020-04-12
Attending: EMERGENCY MEDICINE | Admitting: EMERGENCY MEDICINE
Payer: COMMERCIAL

## 2020-04-12 VITALS
OXYGEN SATURATION: 97 % | SYSTOLIC BLOOD PRESSURE: 140 MMHG | RESPIRATION RATE: 20 BRPM | HEART RATE: 98 BPM | DIASTOLIC BLOOD PRESSURE: 76 MMHG

## 2020-04-12 DIAGNOSIS — F31.4 BIPOLAR AFFECTIVE DISORDER, DEPRESSED, SEVERE (HCC): Primary | Chronic | ICD-10-CM

## 2020-04-12 DIAGNOSIS — F32.A DEPRESSION: ICD-10-CM

## 2020-04-12 LAB
ALBUMIN SERPL BCP-MCNC: 3.9 G/DL (ref 3.5–5)
ALP SERPL-CCNC: 87 U/L (ref 46–116)
ALT SERPL W P-5'-P-CCNC: 27 U/L (ref 12–78)
AMPHETAMINES SERPL QL SCN: NEGATIVE
ANION GAP SERPL CALCULATED.3IONS-SCNC: 8 MMOL/L (ref 4–13)
AST SERPL W P-5'-P-CCNC: 15 U/L (ref 5–45)
ATRIAL RATE: 104 BPM
BARBITURATES UR QL: NEGATIVE
BENZODIAZ UR QL: NEGATIVE
BILIRUB SERPL-MCNC: 0.92 MG/DL (ref 0.2–1)
BUN SERPL-MCNC: 14 MG/DL (ref 5–25)
CALCIUM SERPL-MCNC: 9.5 MG/DL (ref 8.3–10.1)
CHLORIDE SERPL-SCNC: 107 MMOL/L (ref 100–108)
CO2 SERPL-SCNC: 25 MMOL/L (ref 21–32)
COCAINE UR QL: NEGATIVE
CREAT SERPL-MCNC: 0.85 MG/DL (ref 0.6–1.3)
ERYTHROCYTE [DISTWIDTH] IN BLOOD BY AUTOMATED COUNT: 13.6 % (ref 11.6–15.1)
ETHANOL EXG-MCNC: 0 MG/DL
EXT PREG TEST URINE: NEGATIVE
EXT. CONTROL ED NAV: NORMAL
GFR SERPL CREATININE-BSD FRML MDRD: 77 ML/MIN/1.73SQ M
GLUCOSE SERPL-MCNC: 123 MG/DL (ref 65–140)
HCT VFR BLD AUTO: 40.5 % (ref 34.8–46.1)
HGB BLD-MCNC: 13.6 G/DL (ref 11.5–15.4)
MCH RBC QN AUTO: 28.6 PG (ref 26.8–34.3)
MCHC RBC AUTO-ENTMCNC: 33.6 G/DL (ref 31.4–37.4)
MCV RBC AUTO: 85 FL (ref 82–98)
METHADONE UR QL: NEGATIVE
OPIATES UR QL SCN: NEGATIVE
P AXIS: 61 DEGREES
PCP UR QL: NEGATIVE
PLATELET # BLD AUTO: 251 THOUSANDS/UL (ref 149–390)
PMV BLD AUTO: 9.6 FL (ref 8.9–12.7)
POTASSIUM SERPL-SCNC: 3.6 MMOL/L (ref 3.5–5.3)
PR INTERVAL: 160 MS
PROT SERPL-MCNC: 8 G/DL (ref 6.4–8.2)
QRS AXIS: -38 DEGREES
QRSD INTERVAL: 94 MS
QT INTERVAL: 360 MS
QTC INTERVAL: 473 MS
RBC # BLD AUTO: 4.76 MILLION/UL (ref 3.81–5.12)
SODIUM SERPL-SCNC: 140 MMOL/L (ref 136–145)
T WAVE AXIS: 44 DEGREES
THC UR QL: NEGATIVE
TSH SERPL DL<=0.05 MIU/L-ACNC: 2.14 UIU/ML (ref 0.36–3.74)
VENTRICULAR RATE: 104 BPM
WBC # BLD AUTO: 11.23 THOUSAND/UL (ref 4.31–10.16)

## 2020-04-12 PROCEDURE — 81025 URINE PREGNANCY TEST: CPT | Performed by: EMERGENCY MEDICINE

## 2020-04-12 PROCEDURE — 86592 SYPHILIS TEST NON-TREP QUAL: CPT | Performed by: EMERGENCY MEDICINE

## 2020-04-12 PROCEDURE — 80307 DRUG TEST PRSMV CHEM ANLYZR: CPT | Performed by: EMERGENCY MEDICINE

## 2020-04-12 PROCEDURE — 36415 COLL VENOUS BLD VENIPUNCTURE: CPT | Performed by: EMERGENCY MEDICINE

## 2020-04-12 PROCEDURE — 93005 ELECTROCARDIOGRAM TRACING: CPT

## 2020-04-12 PROCEDURE — 93010 ELECTROCARDIOGRAM REPORT: CPT | Performed by: INTERNAL MEDICINE

## 2020-04-12 PROCEDURE — 85027 COMPLETE CBC AUTOMATED: CPT | Performed by: EMERGENCY MEDICINE

## 2020-04-12 PROCEDURE — 80053 COMPREHEN METABOLIC PANEL: CPT | Performed by: EMERGENCY MEDICINE

## 2020-04-12 PROCEDURE — 99285 EMERGENCY DEPT VISIT HI MDM: CPT

## 2020-04-12 PROCEDURE — 82075 ASSAY OF BREATH ETHANOL: CPT | Performed by: EMERGENCY MEDICINE

## 2020-04-12 PROCEDURE — 84443 ASSAY THYROID STIM HORMONE: CPT | Performed by: EMERGENCY MEDICINE

## 2020-04-12 PROCEDURE — 99285 EMERGENCY DEPT VISIT HI MDM: CPT | Performed by: EMERGENCY MEDICINE

## 2020-04-13 LAB — RPR SER QL: NORMAL

## 2020-04-16 ENCOUNTER — TELEMEDICINE (OUTPATIENT)
Dept: INTERNAL MEDICINE CLINIC | Facility: CLINIC | Age: 55
End: 2020-04-16

## 2020-04-16 DIAGNOSIS — G47.00 INSOMNIA: ICD-10-CM

## 2020-04-16 DIAGNOSIS — F33.2 MAJOR DEPRESSIVE DISORDER, RECURRENT, SEVERE WITHOUT PSYCHOTIC FEATURES (HCC): Primary | Chronic | ICD-10-CM

## 2020-04-16 DIAGNOSIS — F31.4 BIPOLAR AFFECTIVE DISORDER, DEPRESSED, SEVERE (HCC): Chronic | ICD-10-CM

## 2020-04-16 DIAGNOSIS — F41.9 ANXIETY: ICD-10-CM

## 2020-04-16 PROCEDURE — 99214 OFFICE O/P EST MOD 30 MIN: CPT | Performed by: INTERNAL MEDICINE

## 2020-04-16 RX ORDER — FLUOXETINE HYDROCHLORIDE 40 MG/1
CAPSULE ORAL
Qty: 30 CAPSULE | Refills: 0 | Status: SHIPPED | OUTPATIENT
Start: 2020-04-16 | End: 2020-07-31 | Stop reason: HOSPADM

## 2020-04-16 RX ORDER — FLUOXETINE HYDROCHLORIDE 20 MG/1
20 CAPSULE ORAL DAILY
Qty: 30 CAPSULE | Refills: 0 | Status: SHIPPED | OUTPATIENT
Start: 2020-04-16 | End: 2020-06-04

## 2020-04-16 RX ORDER — TRAZODONE HYDROCHLORIDE 50 MG/1
50 TABLET ORAL
Qty: 30 TABLET | Refills: 0 | Status: SHIPPED | OUTPATIENT
Start: 2020-04-16 | End: 2020-07-31 | Stop reason: HOSPADM

## 2020-04-16 RX ORDER — LORAZEPAM 0.5 MG/1
0.5 TABLET ORAL 2 TIMES DAILY
Qty: 60 TABLET | Refills: 0 | Status: SHIPPED | OUTPATIENT
Start: 2020-04-16 | End: 2020-07-31 | Stop reason: HOSPADM

## 2020-04-16 RX ORDER — ARIPIPRAZOLE 10 MG/1
10 TABLET ORAL DAILY
Qty: 30 TABLET | Refills: 0 | Status: ON HOLD | OUTPATIENT
Start: 2020-04-16 | End: 2020-07-31 | Stop reason: SDUPTHER

## 2020-05-01 ENCOUNTER — TELEPHONE (OUTPATIENT)
Dept: INTERNAL MEDICINE CLINIC | Facility: CLINIC | Age: 55
End: 2020-05-01

## 2020-05-04 ENCOUNTER — TELEMEDICINE (OUTPATIENT)
Dept: INTERNAL MEDICINE CLINIC | Facility: CLINIC | Age: 55
End: 2020-05-04

## 2020-05-04 DIAGNOSIS — F41.9 ANXIETY: Primary | ICD-10-CM

## 2020-05-04 DIAGNOSIS — R45.851 SUICIDAL IDEATION: ICD-10-CM

## 2020-05-04 PROCEDURE — 99215 OFFICE O/P EST HI 40 MIN: CPT | Performed by: INTERNAL MEDICINE

## 2020-05-07 ENCOUNTER — HOSPITAL ENCOUNTER (EMERGENCY)
Facility: HOSPITAL | Age: 55
End: 2020-05-07
Attending: EMERGENCY MEDICINE | Admitting: EMERGENCY MEDICINE
Payer: COMMERCIAL

## 2020-05-07 VITALS
TEMPERATURE: 97.9 F | SYSTOLIC BLOOD PRESSURE: 108 MMHG | DIASTOLIC BLOOD PRESSURE: 67 MMHG | BODY MASS INDEX: 32.61 KG/M2 | HEART RATE: 68 BPM | OXYGEN SATURATION: 99 % | RESPIRATION RATE: 18 BRPM | WEIGHT: 167 LBS

## 2020-05-07 DIAGNOSIS — R45.851 SUICIDAL IDEATION: Primary | ICD-10-CM

## 2020-05-07 LAB
ANION GAP SERPL CALCULATED.3IONS-SCNC: 5 MMOL/L (ref 4–13)
ATRIAL RATE: 68 BPM
BACTERIA UR QL AUTO: ABNORMAL /HPF
BASOPHILS # BLD AUTO: 0.06 THOUSANDS/ΜL (ref 0–0.1)
BASOPHILS NFR BLD AUTO: 1 % (ref 0–1)
BILIRUB UR QL STRIP: NEGATIVE
BUN SERPL-MCNC: 14 MG/DL (ref 5–25)
CALCIUM SERPL-MCNC: 9.5 MG/DL (ref 8.3–10.1)
CHLORIDE SERPL-SCNC: 108 MMOL/L (ref 100–108)
CLARITY UR: ABNORMAL
CO2 SERPL-SCNC: 29 MMOL/L (ref 21–32)
COLOR UR: YELLOW
CREAT SERPL-MCNC: 1.01 MG/DL (ref 0.6–1.3)
EOSINOPHIL # BLD AUTO: 0.56 THOUSAND/ΜL (ref 0–0.61)
EOSINOPHIL NFR BLD AUTO: 5 % (ref 0–6)
ERYTHROCYTE [DISTWIDTH] IN BLOOD BY AUTOMATED COUNT: 13.6 % (ref 11.6–15.1)
ETHANOL EXG-MCNC: 0 MG/DL
EXT PREG TEST URINE: NEGATIVE
EXT. CONTROL ED NAV: NORMAL
GFR SERPL CREATININE-BSD FRML MDRD: 63 ML/MIN/1.73SQ M
GLUCOSE SERPL-MCNC: 116 MG/DL (ref 65–140)
GLUCOSE UR STRIP-MCNC: NEGATIVE MG/DL
HCT VFR BLD AUTO: 42.1 % (ref 34.8–46.1)
HGB BLD-MCNC: 13.8 G/DL (ref 11.5–15.4)
HGB UR QL STRIP.AUTO: NEGATIVE
HYALINE CASTS #/AREA URNS LPF: ABNORMAL /LPF
IMM GRANULOCYTES # BLD AUTO: 0.04 THOUSAND/UL (ref 0–0.2)
IMM GRANULOCYTES NFR BLD AUTO: 0 % (ref 0–2)
KETONES UR STRIP-MCNC: ABNORMAL MG/DL
LEUKOCYTE ESTERASE UR QL STRIP: NEGATIVE
LYMPHOCYTES # BLD AUTO: 3.25 THOUSANDS/ΜL (ref 0.6–4.47)
LYMPHOCYTES NFR BLD AUTO: 27 % (ref 14–44)
MCH RBC QN AUTO: 28.6 PG (ref 26.8–34.3)
MCHC RBC AUTO-ENTMCNC: 32.8 G/DL (ref 31.4–37.4)
MCV RBC AUTO: 87 FL (ref 82–98)
MONOCYTES # BLD AUTO: 0.97 THOUSAND/ΜL (ref 0.17–1.22)
MONOCYTES NFR BLD AUTO: 8 % (ref 4–12)
NEUTROPHILS # BLD AUTO: 7.3 THOUSANDS/ΜL (ref 1.85–7.62)
NEUTS SEG NFR BLD AUTO: 59 % (ref 43–75)
NITRITE UR QL STRIP: POSITIVE
NON-SQ EPI CELLS URNS QL MICRO: ABNORMAL /HPF
NRBC BLD AUTO-RTO: 0 /100 WBCS
P AXIS: 57 DEGREES
PH UR STRIP.AUTO: 6 [PH]
PLATELET # BLD AUTO: 255 THOUSANDS/UL (ref 149–390)
PMV BLD AUTO: 9.7 FL (ref 8.9–12.7)
POTASSIUM SERPL-SCNC: 3.8 MMOL/L (ref 3.5–5.3)
PR INTERVAL: 152 MS
PROT UR STRIP-MCNC: NEGATIVE MG/DL
QRS AXIS: -7 DEGREES
QRSD INTERVAL: 88 MS
QT INTERVAL: 402 MS
QTC INTERVAL: 427 MS
RBC # BLD AUTO: 4.82 MILLION/UL (ref 3.81–5.12)
RBC #/AREA URNS AUTO: ABNORMAL /HPF
SARS-COV-2 RNA RESP QL NAA+PROBE: NEGATIVE
SODIUM SERPL-SCNC: 142 MMOL/L (ref 136–145)
SP GR UR STRIP.AUTO: 1.02 (ref 1–1.03)
T WAVE AXIS: 63 DEGREES
TSH SERPL DL<=0.05 MIU/L-ACNC: 1.05 UIU/ML (ref 0.36–3.74)
UROBILINOGEN UR QL STRIP.AUTO: 1 E.U./DL
VENTRICULAR RATE: 68 BPM
WBC # BLD AUTO: 12.18 THOUSAND/UL (ref 4.31–10.16)
WBC #/AREA URNS AUTO: ABNORMAL /HPF

## 2020-05-07 PROCEDURE — 36415 COLL VENOUS BLD VENIPUNCTURE: CPT | Performed by: EMERGENCY MEDICINE

## 2020-05-07 PROCEDURE — 93005 ELECTROCARDIOGRAM TRACING: CPT

## 2020-05-07 PROCEDURE — 84443 ASSAY THYROID STIM HORMONE: CPT | Performed by: EMERGENCY MEDICINE

## 2020-05-07 PROCEDURE — 80048 BASIC METABOLIC PNL TOTAL CA: CPT | Performed by: EMERGENCY MEDICINE

## 2020-05-07 PROCEDURE — 85025 COMPLETE CBC W/AUTO DIFF WBC: CPT | Performed by: EMERGENCY MEDICINE

## 2020-05-07 PROCEDURE — 99285 EMERGENCY DEPT VISIT HI MDM: CPT

## 2020-05-07 PROCEDURE — 81001 URINALYSIS AUTO W/SCOPE: CPT | Performed by: EMERGENCY MEDICINE

## 2020-05-07 PROCEDURE — 99285 EMERGENCY DEPT VISIT HI MDM: CPT | Performed by: EMERGENCY MEDICINE

## 2020-05-07 PROCEDURE — 93010 ELECTROCARDIOGRAM REPORT: CPT | Performed by: INTERNAL MEDICINE

## 2020-05-07 PROCEDURE — U0003 INFECTIOUS AGENT DETECTION BY NUCLEIC ACID (DNA OR RNA); SEVERE ACUTE RESPIRATORY SYNDROME CORONAVIRUS 2 (SARS-COV-2) (CORONAVIRUS DISEASE [COVID-19]), AMPLIFIED PROBE TECHNIQUE, MAKING USE OF HIGH THROUGHPUT TECHNOLOGIES AS DESCRIBED BY CMS-2020-01-R: HCPCS | Performed by: EMERGENCY MEDICINE

## 2020-05-07 PROCEDURE — 81025 URINE PREGNANCY TEST: CPT | Performed by: EMERGENCY MEDICINE

## 2020-05-07 PROCEDURE — 82075 ASSAY OF BREATH ETHANOL: CPT | Performed by: EMERGENCY MEDICINE

## 2020-05-07 RX ORDER — LORAZEPAM 0.5 MG/1
0.5 TABLET ORAL ONCE
Status: COMPLETED | OUTPATIENT
Start: 2020-05-07 | End: 2020-05-07

## 2020-05-07 RX ORDER — OLANZAPINE 5 MG/1
5 TABLET, ORALLY DISINTEGRATING ORAL ONCE
Status: COMPLETED | OUTPATIENT
Start: 2020-05-07 | End: 2020-05-07

## 2020-05-07 RX ORDER — LISINOPRIL 10 MG/1
10 TABLET ORAL ONCE
Status: COMPLETED | OUTPATIENT
Start: 2020-05-07 | End: 2020-05-07

## 2020-05-07 RX ORDER — CEPHALEXIN 250 MG/1
500 CAPSULE ORAL ONCE
Status: COMPLETED | OUTPATIENT
Start: 2020-05-07 | End: 2020-05-07

## 2020-05-07 RX ORDER — AMLODIPINE BESYLATE 10 MG/1
10 TABLET ORAL ONCE
Status: COMPLETED | OUTPATIENT
Start: 2020-05-07 | End: 2020-05-07

## 2020-05-07 RX ORDER — FLUOXETINE HYDROCHLORIDE 20 MG/1
40 CAPSULE ORAL DAILY
Status: DISCONTINUED | OUTPATIENT
Start: 2020-05-07 | End: 2020-05-08 | Stop reason: HOSPADM

## 2020-05-07 RX ORDER — OLANZAPINE 10 MG/1
10 TABLET, ORALLY DISINTEGRATING ORAL ONCE
Status: DISCONTINUED | OUTPATIENT
Start: 2020-05-07 | End: 2020-05-07

## 2020-05-07 RX ADMIN — FLUOXETINE 40 MG: 20 CAPSULE ORAL at 11:12

## 2020-05-07 RX ADMIN — CEPHALEXIN 500 MG: 250 CAPSULE ORAL at 10:51

## 2020-05-07 RX ADMIN — OLANZAPINE 5 MG: 5 TABLET, ORALLY DISINTEGRATING ORAL at 10:51

## 2020-05-07 RX ADMIN — AMLODIPINE BESYLATE 10 MG: 10 TABLET ORAL at 11:12

## 2020-05-07 RX ADMIN — LISINOPRIL 10 MG: 10 TABLET ORAL at 11:12

## 2020-05-07 RX ADMIN — LORAZEPAM 0.5 MG: 0.5 TABLET ORAL at 10:21

## 2020-05-22 ENCOUNTER — TELEPHONE (OUTPATIENT)
Dept: OBGYN CLINIC | Facility: CLINIC | Age: 55
End: 2020-05-22

## 2020-06-03 ENCOUNTER — TELEPHONE (OUTPATIENT)
Dept: OBGYN CLINIC | Facility: CLINIC | Age: 55
End: 2020-06-03

## 2020-06-04 ENCOUNTER — PROCEDURE VISIT (OUTPATIENT)
Dept: OBGYN CLINIC | Facility: CLINIC | Age: 55
End: 2020-06-04

## 2020-06-04 VITALS
HEIGHT: 60 IN | TEMPERATURE: 97.9 F | WEIGHT: 178 LBS | DIASTOLIC BLOOD PRESSURE: 83 MMHG | BODY MASS INDEX: 34.95 KG/M2 | HEART RATE: 91 BPM | SYSTOLIC BLOOD PRESSURE: 118 MMHG

## 2020-06-04 DIAGNOSIS — Z98.890 STATUS POST COLPOSCOPY: Primary | ICD-10-CM

## 2020-06-04 DIAGNOSIS — Z00.00 HEALTHCARE MAINTENANCE: ICD-10-CM

## 2020-06-04 LAB — SL AMB POCT URINE HCG: NEGATIVE

## 2020-06-04 PROCEDURE — 88305 TISSUE EXAM BY PATHOLOGIST: CPT | Performed by: PATHOLOGY

## 2020-06-04 PROCEDURE — 57455 BIOPSY OF CERVIX W/SCOPE: CPT | Performed by: STUDENT IN AN ORGANIZED HEALTH CARE EDUCATION/TRAINING PROGRAM

## 2020-06-04 PROCEDURE — 57454 BX/CURETT OF CERVIX W/SCOPE: CPT | Performed by: STUDENT IN AN ORGANIZED HEALTH CARE EDUCATION/TRAINING PROGRAM

## 2020-06-04 PROCEDURE — 81025 URINE PREGNANCY TEST: CPT | Performed by: STUDENT IN AN ORGANIZED HEALTH CARE EDUCATION/TRAINING PROGRAM

## 2020-06-04 RX ORDER — ATORVASTATIN CALCIUM 20 MG/1
TABLET, FILM COATED ORAL
COMMUNITY
Start: 2020-05-30 | End: 2020-07-31 | Stop reason: HOSPADM

## 2020-06-04 RX ORDER — AMLODIPINE BESYLATE 10 MG/1
TABLET ORAL
COMMUNITY
Start: 2020-05-30 | End: 2020-06-04

## 2020-06-11 ENCOUNTER — TELEPHONE (OUTPATIENT)
Dept: OBGYN CLINIC | Facility: CLINIC | Age: 55
End: 2020-06-11

## 2020-06-26 ENCOUNTER — OFFICE VISIT (OUTPATIENT)
Dept: INTERNAL MEDICINE CLINIC | Facility: CLINIC | Age: 55
End: 2020-06-26

## 2020-06-26 VITALS
WEIGHT: 184.3 LBS | TEMPERATURE: 96.4 F | HEIGHT: 60 IN | SYSTOLIC BLOOD PRESSURE: 136 MMHG | HEART RATE: 72 BPM | DIASTOLIC BLOOD PRESSURE: 88 MMHG | BODY MASS INDEX: 36.18 KG/M2

## 2020-06-26 DIAGNOSIS — Z12.12 SCREENING FOR COLORECTAL CANCER: ICD-10-CM

## 2020-06-26 DIAGNOSIS — I10 ESSENTIAL HYPERTENSION: Primary | ICD-10-CM

## 2020-06-26 DIAGNOSIS — F31.4 BIPOLAR AFFECTIVE DISORDER, DEPRESSED, SEVERE (HCC): Chronic | ICD-10-CM

## 2020-06-26 DIAGNOSIS — Z12.11 SCREENING FOR COLORECTAL CANCER: ICD-10-CM

## 2020-06-26 DIAGNOSIS — E66.01 MORBID OBESITY (HCC): Chronic | ICD-10-CM

## 2020-06-26 DIAGNOSIS — Z72.0 TOBACCO USE: ICD-10-CM

## 2020-06-26 DIAGNOSIS — F33.2 MAJOR DEPRESSIVE DISORDER, RECURRENT, SEVERE WITHOUT PSYCHOTIC FEATURES (HCC): Chronic | ICD-10-CM

## 2020-06-26 DIAGNOSIS — E78.2 MIXED HYPERLIPIDEMIA: ICD-10-CM

## 2020-06-26 PROCEDURE — 3008F BODY MASS INDEX DOCD: CPT | Performed by: HOSPITALIST

## 2020-06-26 PROCEDURE — 3075F SYST BP GE 130 - 139MM HG: CPT | Performed by: HOSPITALIST

## 2020-06-26 PROCEDURE — 4004F PT TOBACCO SCREEN RCVD TLK: CPT | Performed by: HOSPITALIST

## 2020-06-26 PROCEDURE — 99213 OFFICE O/P EST LOW 20 MIN: CPT | Performed by: HOSPITALIST

## 2020-06-26 PROCEDURE — 3079F DIAST BP 80-89 MM HG: CPT | Performed by: HOSPITALIST

## 2020-07-06 DIAGNOSIS — I10 ESSENTIAL HYPERTENSION: ICD-10-CM

## 2020-07-06 DIAGNOSIS — E78.2 MIXED HYPERLIPIDEMIA: ICD-10-CM

## 2020-07-06 RX ORDER — AMLODIPINE BESYLATE 10 MG/1
10 TABLET ORAL DAILY
Qty: 90 TABLET | Refills: 1 | Status: SHIPPED | OUTPATIENT
Start: 2020-07-06 | End: 2020-08-17 | Stop reason: HOSPADM

## 2020-07-06 RX ORDER — ATORVASTATIN CALCIUM 20 MG/1
20 TABLET, FILM COATED ORAL DAILY
Qty: 90 TABLET | Refills: 3 | Status: ON HOLD | OUTPATIENT
Start: 2020-07-06 | End: 2020-07-31 | Stop reason: SDUPTHER

## 2020-07-06 RX ORDER — LISINOPRIL 10 MG/1
10 TABLET ORAL DAILY
Qty: 90 TABLET | Refills: 1 | Status: SHIPPED | OUTPATIENT
Start: 2020-07-06 | End: 2020-12-31

## 2020-07-06 NOTE — TELEPHONE ENCOUNTER
Name of medication, dose, quantity and frequency    Requested Prescriptions     Pending Prescriptions Disp Refills    lisinopril (ZESTRIL) 10 mg tablet 90 tablet 1     Sig: Take 1 tablet (10 mg total) by mouth daily    amLODIPine (NORVASC) 10 mg tablet 90 tablet 1     Sig: Take 1 tablet (10 mg total) by mouth daily    atorvastatin (LIPITOR) 20 mg tablet 90 tablet 3     Sig: Take 1 tablet (20 mg total) by mouth daily         Number of refills left:0    Amount of medication left:    Pharmacy verified and updated-yes    Additional information:

## 2020-07-07 ENCOUNTER — PATIENT OUTREACH (OUTPATIENT)
Dept: INTERNAL MEDICINE CLINIC | Facility: CLINIC | Age: 55
End: 2020-07-07

## 2020-07-07 DIAGNOSIS — F33.2 MAJOR DEPRESSIVE DISORDER, RECURRENT, SEVERE WITHOUT PSYCHOTIC FEATURES (HCC): Primary | Chronic | ICD-10-CM

## 2020-07-07 NOTE — PROGRESS NOTES
SW has spoken with pt as per PCP request to assist pt with [facility] referral   Pt has had a recent in pt psychiatric admission with S/I   Pt denies any SI/HI at this time  She reports she is not set up yet with out pt MH     She reports she had been going to [facility] in [place] but jamie twas some time ago  Pt now lived in [facility] at the [facility]  She reports she changed her insurance to [employer]  At pt's request JOHN attempted to make a 3 way call to pt's ICM [place] [place] [phone] but had to leave a message  Also at pt's request SW made a 3 way call to [facility] to request an appointment  This office is with in walking distance  We spoke with [facility] and they will get back to pt's ICM with the earliest appointment  Pt reports her ICM schedules her appointments  SW will f/u with pt/ICM as indicated

## 2020-07-08 ENCOUNTER — PATIENT OUTREACH (OUTPATIENT)
Dept: INTERNAL MEDICINE CLINIC | Facility: CLINIC | Age: 55
End: 2020-07-08

## 2020-07-08 NOTE — PROGRESS NOTES
JOHN received return call from [address] [place] [address] [phone] pt [facility] who reports she was waiting to set up [facility] because her insurance changed this month  She is aware we made a referral to [facility] due to it's close location  She will call them to confirm the first appointment and will get back to JOHN re same

## 2020-07-28 ENCOUNTER — HOSPITAL ENCOUNTER (EMERGENCY)
Facility: HOSPITAL | Age: 55
Discharge: OTHER INSTITUTION | End: 2020-07-28
Attending: EMERGENCY MEDICINE | Admitting: EMERGENCY MEDICINE
Payer: COMMERCIAL

## 2020-07-28 ENCOUNTER — HOSPITAL ENCOUNTER (INPATIENT)
Facility: HOSPITAL | Age: 55
LOS: 3 days | Discharge: HOME | DRG: 885 | End: 2020-07-31
Attending: PSYCHIATRY & NEUROLOGY | Admitting: STUDENT IN AN ORGANIZED HEALTH CARE EDUCATION/TRAINING PROGRAM
Payer: COMMERCIAL

## 2020-07-28 VITALS
HEART RATE: 80 BPM | RESPIRATION RATE: 16 BRPM | DIASTOLIC BLOOD PRESSURE: 60 MMHG | OXYGEN SATURATION: 96 % | SYSTOLIC BLOOD PRESSURE: 112 MMHG | BODY MASS INDEX: 35.99 KG/M2 | WEIGHT: 184.3 LBS | TEMPERATURE: 97.8 F

## 2020-07-28 DIAGNOSIS — R45.851 SUICIDAL IDEATIONS: Primary | ICD-10-CM

## 2020-07-28 DIAGNOSIS — F32.A DEPRESSION: ICD-10-CM

## 2020-07-28 DIAGNOSIS — F33.2 MAJOR DEPRESSIVE DISORDER, RECURRENT, SEVERE WITHOUT PSYCHOTIC FEATURES (HCC): Chronic | ICD-10-CM

## 2020-07-28 DIAGNOSIS — E87.6 HYPOKALEMIA: ICD-10-CM

## 2020-07-28 DIAGNOSIS — I10 ESSENTIAL HYPERTENSION: ICD-10-CM

## 2020-07-28 DIAGNOSIS — R17 ELEVATED BILIRUBIN: ICD-10-CM

## 2020-07-28 DIAGNOSIS — E78.2 MIXED HYPERLIPIDEMIA: ICD-10-CM

## 2020-07-28 PROBLEM — D72.829 LEUKOCYTOSIS: Status: ACTIVE | Noted: 2020-07-28

## 2020-07-28 LAB
ALBUMIN SERPL BCP-MCNC: 4 G/DL (ref 3.5–5)
ALP SERPL-CCNC: 98 U/L (ref 46–116)
ALT SERPL W P-5'-P-CCNC: 30 U/L (ref 12–78)
AMPHETAMINES SERPL QL SCN: NEGATIVE
ANION GAP SERPL CALCULATED.3IONS-SCNC: 10 MMOL/L (ref 4–13)
APAP SERPL-MCNC: <2 UG/ML (ref 10–20)
AST SERPL W P-5'-P-CCNC: 13 U/L (ref 5–45)
ATRIAL RATE: 72 BPM
BARBITURATES UR QL: NEGATIVE
BASOPHILS # BLD AUTO: 0.05 THOUSANDS/ΜL (ref 0–0.1)
BASOPHILS NFR BLD AUTO: 0 % (ref 0–1)
BENZODIAZ UR QL: NEGATIVE
BILIRUB SERPL-MCNC: 0.94 MG/DL (ref 0.2–1)
BILIRUB UR QL STRIP: NEGATIVE
BUN SERPL-MCNC: 17 MG/DL (ref 5–25)
CALCIUM SERPL-MCNC: 9.8 MG/DL (ref 8.3–10.1)
CHLORIDE SERPL-SCNC: 105 MMOL/L (ref 100–108)
CLARITY UR: CLEAR
CO2 SERPL-SCNC: 26 MMOL/L (ref 21–32)
COCAINE UR QL: NEGATIVE
COLOR UR: YELLOW
COLOR, POC: NORMAL
CREAT SERPL-MCNC: 0.82 MG/DL (ref 0.6–1.3)
EOSINOPHIL # BLD AUTO: 0.34 THOUSAND/ΜL (ref 0–0.61)
EOSINOPHIL NFR BLD AUTO: 3 % (ref 0–6)
ERYTHROCYTE [DISTWIDTH] IN BLOOD BY AUTOMATED COUNT: 13.9 % (ref 11.6–15.1)
ETHANOL EXG-MCNC: 0 MG/DL
ETHANOL SERPL-MCNC: <3 MG/DL (ref 0–3)
GFR SERPL CREATININE-BSD FRML MDRD: 81 ML/MIN/1.73SQ M
GLUCOSE SERPL-MCNC: 106 MG/DL (ref 65–140)
GLUCOSE UR STRIP-MCNC: NEGATIVE MG/DL
HCT VFR BLD AUTO: 42.2 % (ref 34.8–46.1)
HGB BLD-MCNC: 14.2 G/DL (ref 11.5–15.4)
HGB UR QL STRIP.AUTO: NEGATIVE
IMM GRANULOCYTES # BLD AUTO: 0.04 THOUSAND/UL (ref 0–0.2)
IMM GRANULOCYTES NFR BLD AUTO: 0 % (ref 0–2)
KETONES UR STRIP-MCNC: NEGATIVE MG/DL
LEUKOCYTE ESTERASE UR QL STRIP: NEGATIVE
LYMPHOCYTES # BLD AUTO: 3.97 THOUSANDS/ΜL (ref 0.6–4.47)
LYMPHOCYTES NFR BLD AUTO: 32 % (ref 14–44)
MCH RBC QN AUTO: 29.2 PG (ref 26.8–34.3)
MCHC RBC AUTO-ENTMCNC: 33.6 G/DL (ref 31.4–37.4)
MCV RBC AUTO: 87 FL (ref 82–98)
METHADONE UR QL: NEGATIVE
MONOCYTES # BLD AUTO: 1.24 THOUSAND/ΜL (ref 0.17–1.22)
MONOCYTES NFR BLD AUTO: 10 % (ref 4–12)
NEUTROPHILS # BLD AUTO: 6.97 THOUSANDS/ΜL (ref 1.85–7.62)
NEUTS SEG NFR BLD AUTO: 55 % (ref 43–75)
NITRITE UR QL STRIP: NEGATIVE
NRBC BLD AUTO-RTO: 0 /100 WBCS
OPIATES UR QL SCN: NEGATIVE
OXYCODONE+OXYMORPHONE UR QL SCN: NEGATIVE
P AXIS: -7 DEGREES
PCP UR QL: NEGATIVE
PH UR STRIP.AUTO: 6 [PH] (ref 4.5–8)
PLATELET # BLD AUTO: 293 THOUSANDS/UL (ref 149–390)
PMV BLD AUTO: 9.8 FL (ref 8.9–12.7)
POTASSIUM SERPL-SCNC: 3.9 MMOL/L (ref 3.5–5.3)
PR INTERVAL: 124 MS
PROT SERPL-MCNC: 8.3 G/DL (ref 6.4–8.2)
PROT UR STRIP-MCNC: NEGATIVE MG/DL
QRS AXIS: 9 DEGREES
QRSD INTERVAL: 92 MS
QT INTERVAL: 430 MS
QTC INTERVAL: 470 MS
RBC # BLD AUTO: 4.87 MILLION/UL (ref 3.81–5.12)
SALICYLATES SERPL-MCNC: <3 MG/DL (ref 3–20)
SARS-COV-2 RNA RESP QL NAA+PROBE: NEGATIVE
SODIUM SERPL-SCNC: 141 MMOL/L (ref 136–145)
SP GR UR STRIP.AUTO: 1.02 (ref 1–1.03)
T WAVE AXIS: 40 DEGREES
THC UR QL: NEGATIVE
TSH SERPL DL<=0.05 MIU/L-ACNC: 2.75 UIU/ML (ref 0.36–3.74)
UROBILINOGEN UR QL STRIP.AUTO: 0.2 E.U./DL
VENTRICULAR RATE: 72 BPM
WBC # BLD AUTO: 12.61 THOUSAND/UL (ref 4.31–10.16)

## 2020-07-28 PROCEDURE — 80320 DRUG SCREEN QUANTALCOHOLS: CPT | Performed by: EMERGENCY MEDICINE

## 2020-07-28 PROCEDURE — U0003 INFECTIOUS AGENT DETECTION BY NUCLEIC ACID (DNA OR RNA); SEVERE ACUTE RESPIRATORY SYNDROME CORONAVIRUS 2 (SARS-COV-2) (CORONAVIRUS DISEASE [COVID-19]), AMPLIFIED PROBE TECHNIQUE, MAKING USE OF HIGH THROUGHPUT TECHNOLOGIES AS DESCRIBED BY CMS-2020-01-R: HCPCS | Performed by: EMERGENCY MEDICINE

## 2020-07-28 PROCEDURE — 99285 EMERGENCY DEPT VISIT HI MDM: CPT | Performed by: EMERGENCY MEDICINE

## 2020-07-28 PROCEDURE — 93005 ELECTROCARDIOGRAM TRACING: CPT

## 2020-07-28 PROCEDURE — 99252 IP/OBS CONSLTJ NEW/EST SF 35: CPT | Performed by: HOSPITALIST

## 2020-07-28 PROCEDURE — 85025 COMPLETE CBC W/AUTO DIFF WBC: CPT | Performed by: EMERGENCY MEDICINE

## 2020-07-28 PROCEDURE — 80307 DRUG TEST PRSMV CHEM ANLYZR: CPT | Performed by: EMERGENCY MEDICINE

## 2020-07-28 PROCEDURE — 82075 ASSAY OF BREATH ETHANOL: CPT | Performed by: EMERGENCY MEDICINE

## 2020-07-28 PROCEDURE — 99285 EMERGENCY DEPT VISIT HI MDM: CPT

## 2020-07-28 PROCEDURE — 36415 COLL VENOUS BLD VENIPUNCTURE: CPT | Performed by: EMERGENCY MEDICINE

## 2020-07-28 PROCEDURE — 80053 COMPREHEN METABOLIC PANEL: CPT | Performed by: EMERGENCY MEDICINE

## 2020-07-28 PROCEDURE — 84443 ASSAY THYROID STIM HORMONE: CPT | Performed by: EMERGENCY MEDICINE

## 2020-07-28 PROCEDURE — 81003 URINALYSIS AUTO W/O SCOPE: CPT

## 2020-07-28 PROCEDURE — 93010 ELECTROCARDIOGRAM REPORT: CPT | Performed by: INTERNAL MEDICINE

## 2020-07-28 PROCEDURE — 80329 ANALGESICS NON-OPIOID 1 OR 2: CPT | Performed by: EMERGENCY MEDICINE

## 2020-07-28 RX ORDER — RISPERIDONE 0.5 MG/1
0.5 TABLET, FILM COATED ORAL 4 TIMES DAILY PRN
Status: DISCONTINUED | OUTPATIENT
Start: 2020-07-28 | End: 2020-07-31 | Stop reason: HOSPADM

## 2020-07-28 RX ORDER — LISINOPRIL 10 MG/1
10 TABLET ORAL DAILY
Status: DISCONTINUED | OUTPATIENT
Start: 2020-07-29 | End: 2020-07-31 | Stop reason: HOSPADM

## 2020-07-28 RX ORDER — HALOPERIDOL 5 MG
5 TABLET ORAL 4 TIMES DAILY PRN
Status: DISCONTINUED | OUTPATIENT
Start: 2020-07-28 | End: 2020-07-31 | Stop reason: HOSPADM

## 2020-07-28 RX ORDER — IBUPROFEN 400 MG/1
400 TABLET ORAL EVERY 8 HOURS PRN
Status: DISCONTINUED | OUTPATIENT
Start: 2020-07-28 | End: 2020-07-31 | Stop reason: HOSPADM

## 2020-07-28 RX ORDER — RISPERIDONE 0.25 MG/1
0.5 TABLET, FILM COATED ORAL 4 TIMES DAILY PRN
Status: CANCELLED | OUTPATIENT
Start: 2020-07-28

## 2020-07-28 RX ORDER — HALOPERIDOL 5 MG
5 TABLET ORAL 4 TIMES DAILY PRN
Status: CANCELLED | OUTPATIENT
Start: 2020-07-28

## 2020-07-28 RX ORDER — POTASSIUM CHLORIDE 20 MEQ/1
20 TABLET, EXTENDED RELEASE ORAL 2 TIMES DAILY
Status: DISCONTINUED | OUTPATIENT
Start: 2020-07-28 | End: 2020-07-30

## 2020-07-28 RX ORDER — ATORVASTATIN CALCIUM 20 MG/1
20 TABLET, FILM COATED ORAL DAILY
Status: DISCONTINUED | OUTPATIENT
Start: 2020-07-29 | End: 2020-07-31 | Stop reason: HOSPADM

## 2020-07-28 RX ORDER — FLUOXETINE HYDROCHLORIDE 20 MG/1
60 CAPSULE ORAL DAILY
Status: DISCONTINUED | OUTPATIENT
Start: 2020-07-29 | End: 2020-07-31 | Stop reason: HOSPADM

## 2020-07-28 RX ORDER — IBUPROFEN 400 MG/1
400 TABLET ORAL EVERY 8 HOURS PRN
Status: CANCELLED | OUTPATIENT
Start: 2020-07-28

## 2020-07-28 RX ORDER — FLUOXETINE HYDROCHLORIDE 20 MG/1
60 CAPSULE ORAL DAILY
Status: CANCELLED | OUTPATIENT
Start: 2020-07-29

## 2020-07-28 RX ORDER — HYDROXYZINE HYDROCHLORIDE 25 MG/1
25 TABLET, FILM COATED ORAL EVERY 6 HOURS PRN
Status: CANCELLED | OUTPATIENT
Start: 2020-07-28

## 2020-07-28 RX ORDER — HYDROXYZINE HYDROCHLORIDE 25 MG/1
25 TABLET, FILM COATED ORAL EVERY 6 HOURS PRN
Status: DISCONTINUED | OUTPATIENT
Start: 2020-07-28 | End: 2020-07-31 | Stop reason: HOSPADM

## 2020-07-28 RX ORDER — AMLODIPINE BESYLATE 10 MG/1
10 TABLET ORAL DAILY
Status: DISCONTINUED | OUTPATIENT
Start: 2020-07-29 | End: 2020-07-31 | Stop reason: HOSPADM

## 2020-07-28 RX ADMIN — POTASSIUM CHLORIDE 20 MEQ: 20 TABLET, EXTENDED RELEASE ORAL at 20:55

## 2020-07-28 NOTE — EMTALA/ACUTE CARE TRANSFER
[address] [facility] EMERGENCY DEPARTMENT  [address] [place] [address]  Dept: [phone]      YAAVVJ TRANSFER CONSENT    NAME Laurie Shoemaker 1965                              MRN [id number]    I have been informed of my rights regarding examination, treatment, and transfer   by Dr Graciela Chopra, DO    Benefits:      Risks:        Consent for Transfer:  I acknowledge that my medical condition has been evaluated and explained to me by the emergency department physician or other qualified medical person and/or my attending physician, who has recommended that I be transferred to the service of  Accepting Physician: Wing De La O at [address] Name, [address] : [facility]  The above potential benefits of such transfer, the potential risks associated with such transfer, and the probable risks of not being transferred have been explained to me, and I fully understand them  The doctor has explained that, in my case, the benefits of transfer outweigh the risks  I agree to be transferred  I authorize the performance of emergency medical procedures and treatments upon me in both transit and upon arrival at the receiving facility  Additionally, I authorize the release of any and all medical records to the receiving facility and request they be transported with me, if possible  I understand that the safest mode of transportation during a medical emergency is an ambulance and that the Hospital advocates the use of this mode of transport  Risks of traveling to the receiving facility by car, including absence of medical control, life sustaining equipment, such as oxygen, and medical personnel has been explained to me and I fully understand them  (NATASHA CORRECT BOX BELOW)  [  ]  I consent to the stated transfer and to be transported by ambulance/helicopter    [  ]  I consent to the stated transfer, but refuse transportation by ambulance and accept full responsibility for my transportation by car  I understand the risks of non-ambulance transfers and I exonerate the Hospital and its staff from any deterioration in my condition that results from this refusal     X___________________________________________    DATE  07/28/20  TIME________  Signature of patient or legally responsible individual signing on patient behalf           RELATIONSHIP TO PATIENT_________________________          Provider Certification    NAME Jakob Quinteros 1965                              MRN [id number]    A medical screening exam was performed on the above named patient  Based on the examination:    Condition Necessitating Transfer The primary encounter diagnosis was Suicidal ideations  Diagnoses of Essential hypertension, Hypokalemia, Elevated bilirubin, and Depression were also pertinent to this visit  Patient Condition:      Reason for Transfer:      Transfer Requirements: [facility]   · Space available and qualified personnel available for treatment as acknowledged by    · Agreed to accept transfer and to provide appropriate medical treatment as acknowledged by       Ernie Armenta  · Appropriate medical records of the examination and treatment of the patient are provided at the time of transfer   [address] [facility], [address] _______  · Transfer will be performed by qualified personnel from    and appropriate transfer equipment as required, including the use of necessary and appropriate life support measures      Provider Certification: I have examined the patient and explained the following risks and benefits of being transferred/refusing transfer to the patient/family:         Based on these reasonable risks and benefits to the patient and/or the unborn child(juan), and based upon the information available at the time of the patients examination, I certify that the medical benefits reasonably to be expected from the provision of appropriate medical treatments at another medical facility outweigh the increasing risks, if any, to the individuals medical condition, and in the case of labor to the unborn child, from effecting the transfer      X____________________________________________ DATE 07/28/20        TIME_______      ORIGINAL - SEND TO MEDICAL RECORDS   COPY - SEND WITH PATIENT DURING TRANSFER

## 2020-07-28 NOTE — PLAN OF CARE
Problem: Ineffective Coping  Goal: Participates in unit activities  Description  Interventions:  - Provide therapeutic environment   - Provide required programming   - Redirect inappropriate behaviors   Outcome: Progressing     Problem: SELF HARM/SUICIDALITY  Goal: Will have no self-injury during hospital stay  Description  INTERVENTIONS:  - Q 15 MINUTES: Routine safety checks  - Q WAKING SHIFT & PRN: Assess risk to determine if routine checks are adequate to maintain patient safety  - Encourage patient to participate actively in care by formulating a plan to combat response to suicidal ideation, identify supports and resources  Outcome: Progressing     Problem: DEPRESSION  Goal: Will be euthymic at discharge  Description  INTERVENTIONS:  - Administer medication as ordered  - Provide emotional support via 1:1 interaction with staff  - Encourage involvement in milieu/groups/activities  - Monitor for social isolation  Outcome: Progressing

## 2020-07-28 NOTE — ED NOTES
Pt reports taking 3 0 5mg ativan and 1 trazadone 50mg together because she was depressed      Christal Atkinson, RN  07/28/20 9454

## 2020-07-28 NOTE — ED NOTES
EVS (Eligibility Verification System) Automated system indicates:     PROMISe:  [facility] Medicare Assured (Forms to be completed)  MEDICARE PART A/B    [facility] ([address])    Insurance Authorization for Transportation:    Dread Cuevas

## 2020-07-28 NOTE — ED PROVIDER NOTES
History  Chief Complaint   Patient presents with    Depression     pt reports depression/suicidal thoughts starting today  wanted to take all of her medication and "never wake up"     71-year-old female with history of depression presents to emergency department suicidal ideation  Patient says that she has been feeling depressed due to relationship issues and has thoughts of overdosing on her medications and never for waking up  Says she took 1 5 mg of Ativan this evening rather than her usual 0 5 mg, but otherwise only took her regular doses of presription medications  She denies homicidal ideation, hallucinations, drug or alcohol use  No physical complaints  No prior psych admissions  Prior to Admission Medications   Prescriptions Last Dose Informant Patient Reported? Taking?    ARIPiprazole (ABILIFY) 10 mg tablet 7/28/2020 at Unknown time  No Yes   Sig: Take 1 tablet (10 mg total) by mouth daily   FLUoxetine (PROzac) 40 MG capsule 7/28/2020 at Unknown time  No Yes   Sig: Take 1 capsule (40mg) with 20mg capsule (60mg total) by mouth daily (9am)   LORazepam (ATIVAN) 0 5 mg tablet 7/28/2020 at Unknown time  No Yes   Sig: Take 1 tablet (0 5 mg total) by mouth 2 (two) times a day   amLODIPine (NORVASC) 10 mg tablet 7/28/2020 at Unknown time  No Yes   Sig: Take 1 tablet (10 mg total) by mouth daily   atorvastatin (LIPITOR) 20 mg tablet Not Taking at Unknown time  Yes No   atorvastatin (LIPITOR) 20 mg tablet 7/28/2020 at Unknown time  No Yes   Sig: Take 1 tablet (20 mg total) by mouth daily   lisinopril (ZESTRIL) 10 mg tablet 7/28/2020 at Unknown time  No Yes   Sig: Take 1 tablet (10 mg total) by mouth daily   potassium chloride (K-DUR,KLOR-CON) 20 mEq tablet 7/28/2020 at Unknown time  No Yes   Sig: Take 1 tablet (20 mEq total) by mouth 2 (two) times a day   Patient not taking: Reported on 7/28/2020   traZODone (DESYREL) 50 mg tablet 7/28/2020 at Unknown time  No Yes   Sig: Take 1 tablet (50 mg total) by mouth daily at bedtime as needed for sleep      Facility-Administered Medications: None       Past Medical History:   Diagnosis Date    Anxiety     Arthritis     Bipolar affective disorder, depressed, severe ([facility] [address] ) 2019    Depression     Hyperlipidemia     Hypertension     Learning difficulty     Osteopenia     Ovarian failure     Previous known suicide attempt     Psychiatric disorder     Psychiatric illness        Past Surgical History:   Procedure Laterality Date    LAPAROSCOPY      as a child, per patient-normal findings       Family History   Problem Relation Age of Onset    Alzheimer's disease Mother     Depression Mother     Depression Brother     Bipolar disorder Brother     No Known Problems Maternal Aunt     No Known Problems Paternal Aunt     No Known Problems Maternal Uncle     No Known Problems Paternal Uncle     No Known Problems Cousin     ADD / ADHD Neg Hx     Alcohol abuse Neg Hx     Anxiety disorder Neg Hx     Dementia Neg Hx     Drug abuse Neg Hx     OCD Neg Hx     Paranoid behavior Neg Hx     Schizophrenia Neg Hx     Seizures Neg Hx     Self-Injury Neg Hx     Suicide Attempts Neg Hx      I have reviewed and agree with the history as documented  E-Cigarette/Vaping    E-Cigarette Use Never User      E-Cigarette/Vaping Substances    Nicotine No     THC No     CBD No     Flavoring No     Other No     Unknown No      Social History     Tobacco Use    Smoking status: Current Every Day Smoker     Packs/day: 0 25     Years: 15 00     Pack years: 3 75     Types: Cigarettes     Last attempt to quit: 1990     Years since quittin 5    Smokeless tobacco: Never Used    Tobacco comment: about 30 years ago   Substance Use Topics    Alcohol use: Yes     Alcohol/week: 2 0 standard drinks     Types: 2 Cans of beer per week     Frequency: 2-3 times a week     Drinks per session: 1 or 2     Binge frequency: Never     Comment: socially    Drug use:  No Review of Systems   Constitutional: Negative  Negative for chills and fever  HENT: Negative  Negative for rhinorrhea  Eyes: Negative  Respiratory: Negative  Negative for cough and shortness of breath  Cardiovascular: Negative  Negative for chest pain and leg swelling  Gastrointestinal: Negative  Negative for abdominal pain, diarrhea, nausea and vomiting  Genitourinary: Negative  Negative for dysuria, flank pain and frequency  Musculoskeletal: Negative  Negative for back pain and neck pain  Skin: Negative  Negative for rash  Neurological: Negative  Negative for light-headedness and headaches  Psychiatric/Behavioral: Positive for dysphoric mood and suicidal ideas  Negative for self-injury  All other systems reviewed and are negative  Physical Exam  ED Triage Vitals   Temperature Pulse Respirations Blood Pressure SpO2   07/28/20 0243 07/28/20 0243 07/28/20 0243 07/28/20 0243 07/28/20 0243   97 8 °F (36 6 °C) 84 18 129/75 99 %      Temp Source Heart Rate Source Patient Position - Orthostatic VS BP Location FiO2 (%)   07/28/20 0243 07/28/20 0243 07/28/20 0243 07/28/20 0243 --   Oral Monitor Lying Left arm       Pain Score       07/28/20 0855       No Pain             Orthostatic Vital Signs  Vitals:    07/28/20 0243 07/28/20 0855 07/28/20 1535   BP: 129/75 113/56 112/60   Pulse: 84 79 80   Patient Position - Orthostatic VS: Lying  Lying       Physical Exam   Constitutional: She is oriented to person, place, and time  She appears well-developed and well-nourished  Patient tearful   HENT:   Head: Normocephalic and atraumatic  Mouth/Throat: Oropharynx is clear and moist    Eyes: EOM are normal    Neck: Normal range of motion  Neck supple  Cardiovascular: Normal rate, regular rhythm, normal heart sounds and intact distal pulses  Exam reveals no gallop and no friction rub  No murmur heard  Pulmonary/Chest: Effort normal and breath sounds normal  No respiratory distress   She has no wheezes  She has no rales  Abdominal: Soft  There is no tenderness  There is no rebound and no guarding  Musculoskeletal: She exhibits no edema or tenderness  Neurological: She is alert and oriented to person, place, and time  No cranial nerve deficit  Clear fluent speech   Skin: Skin is warm and dry  Capillary refill takes less than 2 seconds  Psychiatric: She exhibits a depressed mood  She expresses no homicidal and no suicidal ideation  She expresses no suicidal plans and no homicidal plans  Nursing note and vitals reviewed  ED Medications  Medications - No data to display    Diagnostic Studies  Results Reviewed     Procedure Component Value Units Date/Time    POCT urinalysis dipstick [[id number]]  (Normal) Resulted:  07/28/20 0625    Lab Status:  Final result Updated:  07/28/20 0625     Color, UA See chart    Urine Macroscopic, POC [[id number]] Collected:  07/28/20 0624    Lab Status:  Final result Specimen:  Urine Updated:  07/28/20 0623     Color, UA Yellow     Clarity, UA Clear     pH, UA 6 0     Leukocytes, UA Negative     Nitrite, UA Negative     Protein, UA Negative mg/dl      Glucose, UA Negative mg/dl      Ketones, UA Negative mg/dl      Urobilinogen, UA 0 2 E U /dl      Bilirubin, UA Negative     Blood, UA Negative     Specific Gravity, UA 1 020    Narrative:       CLINITEK RESULT    TSH, 3rd generation with Free T4 reflex [[id number]]  (Normal) Collected:  07/28/20 0451    Lab Status:  Final result Specimen:  Blood from Hand, Right Updated:  07/28/20 0554     TSH 3RD GENERATON 2 750 uIU/mL     Narrative:       Patients undergoing fluorescein dye angiography may retain small amounts of fluorescein in the body for 48-72 hours post procedure  Samples containing fluorescein can produce falsely depressed TSH values  If the patient had this procedure,a specimen should be resubmitted post fluorescein clearance        Comprehensive metabolic panel [[id number]]  (Abnormal) Collected: 07/28/20 0451    Lab Status:  Final result Specimen:  Blood from Hand, Right Updated:  07/28/20 0553     Sodium 141 mmol/L      Potassium 3 9 mmol/L      Chloride 105 mmol/L      CO2 26 mmol/L      ANION GAP 10 mmol/L      BUN 17 mg/dL      Creatinine 0 82 mg/dL      Glucose 106 mg/dL      Calcium 9 8 mg/dL      AST 13 U/L      ALT 30 U/L      Alkaline Phosphatase 98 U/L      Total Protein 8 3 g/dL      Albumin 4 0 g/dL      Total Bilirubin 0 94 mg/dL      eGFR 81 ml/min/1 73sq m     Narrative:       [place] guidelines for Chronic Kidney Disease (CKD):     Stage 1 with normal or high GFR (GFR > 90 mL/min/1 73 square meters)    Stage 2 Mild CKD (GFR = 60-89 mL/min/1 73 square meters)    Stage 3A Moderate CKD (GFR = 45-59 mL/min/1 73 square meters)    Stage 3B Moderate CKD (GFR = 30-44 mL/min/1 73 square meters)    Stage 4 Severe CKD (GFR = 15-29 mL/min/1 73 square meters)    Stage 5 End Stage CKD (GFR <15 mL/min/1 73 square meters)  Note: GFR calculation is accurate only with a steady state creatinine    Salicylate level [[id number]]  (Abnormal) Collected:  07/28/20 0451    Lab Status:  Final result Specimen:  Blood from Hand, Right Updated:  98/73/20 1178     Salicylate Lvl <3 mg/dL     Acetaminophen level-If concentration is detectable, please discuss with medical  on call  [[id number]]  (Abnormal) Collected:  07/28/20 0451    Lab Status:  Final result Specimen:  Blood from Hand, Right Updated:  07/28/20 0553     Acetaminophen Level <2 ug/mL     Ethanol [[id number]]  (Normal) Collected:  07/28/20 0451    Lab Status:  Final result Specimen:  Blood from Hand, Right Updated:  07/28/20 0542     Ethanol Lvl <3 mg/dL     Novel Coronavirus (Covid-19),PCR [facility] [[id number]]  (Normal) Collected:  07/28/20 0412    Lab Status:  Final result Specimen:  Nares from Nose Updated:  07/28/20 0539     SARS-CoV-2 Negative    Narrative:        The specimen collection materials, transport medium, and/or testing methodology utilized in the production of these test results have been proven to be reliable in a limited validation with an abbreviated program under the Emergency Utilization Authorization provided by the FDA  Testing reported as "Presumptive positive" will be confirmed with secondary testing with a reference laboratory to ensure result accuracy  Clinical caution and judgement should be used with the interpretation of these results with consideration of the clinical impression and other laboratory testing  Testing reported as "Positive" or "Negative" has been proven to be accurate according to standard laboratory validation requirements  All testing is performed with control materials showing appropriate reactivity at standard intervals        CBC and differential [[id number]]  (Abnormal) Collected:  07/28/20 0451    Lab Status:  Final result Specimen:  Blood from Hand, Right Updated:  07/28/20 0518     WBC 12 61 Thousand/uL      RBC 4 87 Million/uL      Hemoglobin 14 2 g/dL      Hematocrit 42 2 %      MCV 87 fL      MCH 29 2 pg      MCHC 33 6 g/dL      RDW 13 9 %      MPV 9 8 fL      Platelets 974 Thousands/uL      nRBC 0 /100 WBCs      Neutrophils Relative 55 %      Immat GRANS % 0 %      Lymphocytes Relative 32 %      Monocytes Relative 10 %      Eosinophils Relative 3 %      Basophils Relative 0 %      Neutrophils Absolute 6 97 Thousands/µL      Immature Grans Absolute 0 04 Thousand/uL      Lymphocytes Absolute 3 97 Thousands/µL      Monocytes Absolute 1 24 Thousand/µL      Eosinophils Absolute 0 34 Thousand/µL      Basophils Absolute 0 05 Thousands/µL     Rapid drug screen, urine [[id number]]  (Normal) Collected:  07/28/20 0412    Lab Status:  Final result Specimen:  Urine, Clean Catch Updated:  07/28/20 0433     Amph/Meth UR Negative     Barbiturate Ur Negative     Benzodiazepine Urine Negative     Cocaine Urine Negative     Methadone Urine Negative     Opiate Urine Negative     PCP Ur Negative     THC Urine Negative     Oxycodone Urine Negative    Narrative:       FOR MEDICAL PURPOSES ONLY  IF CONFIRMATION NEEDED PLEASE CONTACT THE LAB WITHIN 5 DAYS  Drug Screen Cutoff Levels:  AMPHETAMINE/METHAMPHETAMINES  1000 ng/mL  BARBITURATES     200 ng/mL  BENZODIAZEPINES     200 ng/mL  COCAINE      300 ng/mL  METHADONE      300 ng/mL  OPIATES      300 ng/mL  PHENCYCLIDINE     25 ng/mL  THC       50 ng/mL  OXYCODONE      100 ng/mL    POCT alcohol breath test [[id number]]  (Normal) Resulted:  07/28/20 0405    Lab Status:  Final result Updated:  07/28/20 0405     EXTBreath Alcohol 0 000                 No orders to display         Procedures  Procedures      ED Course  ED Course as of Jul 30 1811   Tue Jul 28, 2020   4479 Procedure Note: EKG  Date/Time: 07/28/20 5:54 AM   Interpreted by: Aniket Bell  Indications / Diagnosis: psych  ECG reviewed by me, the ED Provider: yes   The EKG demonstrates:  Rate: 72  Rhythm: normal sinus  Intervals: normal intervals  Axis: normal axis  QRS/Blocks: normal QRS  ST Changes: T-wave inversions in V1/V2,  similar to prior            US AUDIT      Most Recent Value   Initial Alcohol Screen: US AUDIT-C    1  How often do you have a drink containing alcohol? 3 Filed at: 07/28/2020 0245   2  How many drinks containing alcohol do you have on a typical day you are drinking? 2 Filed at: 07/28/2020 0245   3a  Male UNDER 65: How often do you have five or more drinks on one occasion? 0 Filed at: 07/28/2020 0245   3b  FEMALE Any Age, or MALE 65+: How often do you have 4 or more drinks on one occassion? 0 Filed at: 07/28/2020 0245   Audit-C Score  5 Filed at: 07/28/2020 0245                  MARYELLEN/DAST-10      Most Recent Value   How many times in the past year have you    Used an illegal drug or used a prescription medication for non-medical reasons?   Never Filed at: 07/28/2020 0245                              MDM  Number of Diagnoses or Management Options  Depression: Suicidal ideations:   Diagnosis management comments: 58-year-old female with history of depression presents to emergency department suicidal ideation  Patient initially resistant but eventually signed 201  If she changes her mind would do 302 given specific plan, being  very emotional, and misusing her medication  She is medically cleared and stable for behavioral health transfer           Disposition  Final diagnoses:   Suicidal ideations   Depression     Time reflects when diagnosis was documented in both MDM as applicable and the Disposition within this note     Time User Action Codes Description Comment    7/28/2020 12:50 PM Thereasa Garb Add [F33 2] Major depressive disorder, recurrent, severe without psychotic features ([facility] [address] )     7/28/2020 12:50 PM Thereasa Garb Modify [F33 2] Major depressive disorder, recurrent, severe without psychotic features ([facility] [address] )     7/28/2020 12:50 PM Thereasa Garb Modify [F33 2] Major depressive disorder, recurrent, severe without psychotic features ([facility] [address] )     7/28/2020 12:50 PM Thereasa Garb Add [I10] Essential hypertension     7/28/2020 12:50 PM Thereasa Garb Modify [I10] Essential hypertension     7/28/2020 12:50 PM Thereasa Garb Remove [F33 2] Major depressive disorder, recurrent, severe without psychotic features ([facility] [address] )     7/28/2020 12:51 PM Thereasa Garb Add [E87 6] Hypokalemia     7/28/2020 12:51 PM Thereasa Garb Add [R17] Elevated bilirubin     7/28/2020  1:50 PM Earney Mu Add [R45 851] Suicidal ideations     7/28/2020  1:51 PM Keiko White, [address]  Essential hypertension     7/28/2020  1:51 PM Keiko White, [address] [facility] Suicidal ideations     7/28/2020  1:51 PM Earney Mu Add [F32 9] Depression       ED Disposition     ED Disposition Condition Date/Time Comment    Transfer to [address] [place] [address] Jul 28, 2020  1:51 PM Misa Dowd Comment should be transferred out to psych facility and has been medically cleared          MD Documentation      Most Recent Value   Patient Condition  The patient has been stabilized such that within reasonable medical probability, no material deterioration of the patient condition or the condition of the unborn child(juan) is likely to result from the transfer   Reason for Transfer  Level of Care needed not available at this facility   Benefits of Transfer  Specialized equipment and/or services available at the receiving facility (Include comment)________________________   Accepting Physician  [address] Name, Aissatou Joyce   Sending MD  [address] [facility] Name, [facility]      Follow-up Information    None         Discharge Medication List as of 7/28/2020  3:36 PM      CONTINUE these medications which have NOT CHANGED    Details   amLODIPine (NORVASC) 10 mg tablet Take 1 tablet (10 mg total) by mouth daily, Starting Mon 7/6/2020, Normal      ARIPiprazole (ABILIFY) 10 mg tablet Take 1 tablet (10 mg total) by mouth daily, Starting Thu 4/16/2020, Until Sat 7/30/2022, Normal      !! atorvastatin (LIPITOR) 20 mg tablet Take 1 tablet (20 mg total) by mouth daily, Starting Mon 7/6/2020, Normal      FLUoxetine (PROzac) 40 MG capsule Take 1 capsule (40mg) with 20mg capsule (60mg total) by mouth daily (9am), Normal      lisinopril (ZESTRIL) 10 mg tablet Take 1 tablet (10 mg total) by mouth daily, Starting Mon 7/6/2020, Normal      LORazepam (ATIVAN) 0 5 mg tablet Take 1 tablet (0 5 mg total) by mouth 2 (two) times a day, Starting Thu 4/16/2020, Normal      traZODone (DESYREL) 50 mg tablet Take 1 tablet (50 mg total) by mouth daily at bedtime as needed for sleep, Starting Thu 4/16/2020, Normal      !! atorvastatin (LIPITOR) 20 mg tablet Starting Sat 5/30/2020, Historical Med      potassium chloride (K-DUR,KLOR-CON) 20 mEq tablet Take 1 tablet (20 mEq total) by mouth 2 (two) times a day, Starting Wed 2/5/2020, Normal       !! - Potential duplicate medications found  Please discuss with provider  No discharge procedures on file  PDMP Review     None           ED Provider  Attending physically available and evaluated Imer Wu  VANESSA managed the patient along with the ED Attending      Electronically Signed by         Sarah Connolly MD  07/30/20 9162

## 2020-07-28 NOTE — ED NOTES
Per Care Coordinator note from Star: [address] [facility] [phone] pt ICM who [facility] (July/2020)  She is aware we made a referral to [facility] due to it's close location

## 2020-07-28 NOTE — ED NOTES
Patient presented to the ED with increased depression, anxiety and SI  She reported no HI, hallucinations, delusions or paranoia  She reported no change in sleep or appetite  She did take extra ativan last night as she felt suicidal over stressed interpersonal relationships and she feels as though she has no one  She denies any inpatient hospitalizations but did say she had an ICM  CW called her ICM with blended case management at [facility] to let them know she was at the ED  She has outpatient with [facility] that starts this week

## 2020-07-28 NOTE — ED NOTES
Patient is accepted at [facility]  Patient is accepted by Dr Prince Aleman per [address] is arranged with [employer] is scheduled for 1500      Nurse report is to be called to [phone] prior to patient transfer

## 2020-07-28 NOTE — ED NOTES
Completed and faxed the [address] [place] [address] assured auth request form and completed COB with Donald Rey at [facility]

## 2020-07-28 NOTE — PROGRESS NOTES
Pt admitted on 201 from \[facility]\"" ED for SI with a plan to OD on medications  Pt did take 3 ativan pills and then called 911 to report  Reports recent stressor of relationship with male friend, difficulty managing finances, and recently having bedbugs in apartment  Denies current SI, HI, AVH  Reports medication compliance  Pt lives alone with cats and is concerned about their welfare while she is hospitalized  Cooperative upon admission

## 2020-07-28 NOTE — ED ATTENDING ATTESTATION
7/28/2020  Robin Vargas DO, saw and evaluated the patient  I have discussed the patient with the resident/non-physician practitioner and agree with the resident's/non-physician practitioner's findings, Plan of Care, and MDM as documented in the resident's/non-physician practitioner's note, except where noted  All available labs and Radiology studies were reviewed  I was present for key portions of any procedure(s) performed by the resident/non-physician practitioner and I was immediately available to provide assistance  At this point I agree with the current assessment done in the Emergency Department  I have conducted an independent evaluation of this patient a history and physical is as follows:    53 yof with depression and SI  She plans to OD  Past Medical History:   Diagnosis Date    Anxiety     Arthritis     Bipolar affective disorder, depressed, severe ([facility] [address] ) 4/28/2019    Depression     Hyperlipidemia     Hypertension     Learning difficulty     Osteopenia     Ovarian failure     Previous known suicide attempt     Psychiatric disorder     Psychiatric illness      /75 (BP Location: Left arm)   Pulse 84   Temp 97 8 °F (36 6 °C) (Oral)   Resp 18   Wt 83 6 kg (184 lb 4 9 oz)   SpO2 99%   BMI 35 99 kg/m²   Anxious, depressed, A&Ox4, no resp distress, RRR, ext NROM    bo psych eval      201 vs 302           ED Course         Critical Care Time  Procedures

## 2020-07-29 LAB
ALBUMIN SERPL BCP-MCNC: 4.1 G/DL (ref 3–5.2)
ALP SERPL-CCNC: 79 U/L (ref 43–122)
ALT SERPL W P-5'-P-CCNC: 22 U/L (ref 9–52)
ANION GAP SERPL CALCULATED.3IONS-SCNC: 8 MMOL/L (ref 5–14)
AST SERPL W P-5'-P-CCNC: 21 U/L (ref 14–36)
BACTERIA UR QL AUTO: ABNORMAL /HPF
BASOPHILS # BLD AUTO: 0.1 THOUSANDS/ΜL (ref 0–0.1)
BASOPHILS NFR BLD AUTO: 1 % (ref 0–1)
BILIRUB SERPL-MCNC: 1.5 MG/DL
BILIRUB UR QL STRIP: NEGATIVE
BUN SERPL-MCNC: 19 MG/DL (ref 5–25)
CALCIUM SERPL-MCNC: 9.8 MG/DL (ref 8.4–10.2)
CHLORIDE SERPL-SCNC: 104 MMOL/L (ref 97–108)
CLARITY UR: ABNORMAL
CO2 SERPL-SCNC: 28 MMOL/L (ref 22–30)
COLOR UR: ABNORMAL
CREAT SERPL-MCNC: 0.83 MG/DL (ref 0.6–1.2)
EOSINOPHIL # BLD AUTO: 0.5 THOUSAND/ΜL (ref 0–0.4)
EOSINOPHIL NFR BLD AUTO: 5 % (ref 0–6)
ERYTHROCYTE [DISTWIDTH] IN BLOOD BY AUTOMATED COUNT: 14.4 %
GFR SERPL CREATININE-BSD FRML MDRD: 80 ML/MIN/1.73SQ M
GLUCOSE SERPL-MCNC: 105 MG/DL (ref 70–99)
GLUCOSE UR STRIP-MCNC: NEGATIVE MG/DL
HCT VFR BLD AUTO: 41.2 % (ref 36–46)
HGB BLD-MCNC: 13.8 G/DL (ref 12–16)
HGB UR QL STRIP.AUTO: NEGATIVE
KETONES UR STRIP-MCNC: NEGATIVE MG/DL
LEUKOCYTE ESTERASE UR QL STRIP: 25
LYMPHOCYTES # BLD AUTO: 3 THOUSANDS/ΜL (ref 0.5–4)
LYMPHOCYTES NFR BLD AUTO: 32 % (ref 25–45)
MCH RBC QN AUTO: 28.8 PG (ref 26–34)
MCHC RBC AUTO-ENTMCNC: 33.5 G/DL (ref 31–36)
MCV RBC AUTO: 86 FL (ref 80–100)
MONOCYTES # BLD AUTO: 1 THOUSAND/ΜL (ref 0.2–0.9)
MONOCYTES NFR BLD AUTO: 10 % (ref 1–10)
NEUTROPHILS # BLD AUTO: 4.9 THOUSANDS/ΜL (ref 1.8–7.8)
NEUTS SEG NFR BLD AUTO: 52 % (ref 45–65)
NITRITE UR QL STRIP: NEGATIVE
NON-SQ EPI CELLS URNS QL MICRO: ABNORMAL /HPF
PH UR STRIP.AUTO: 6 [PH]
PLATELET # BLD AUTO: 266 THOUSANDS/UL (ref 150–450)
PMV BLD AUTO: 8.2 FL (ref 8.9–12.7)
POTASSIUM SERPL-SCNC: 4.3 MMOL/L (ref 3.6–5)
PROT SERPL-MCNC: 8 G/DL (ref 5.9–8.4)
PROT UR STRIP-MCNC: NEGATIVE MG/DL
RBC # BLD AUTO: 4.79 MILLION/UL (ref 4–5.2)
RBC #/AREA URNS AUTO: ABNORMAL /HPF
SODIUM SERPL-SCNC: 140 MMOL/L (ref 137–147)
SP GR UR STRIP.AUTO: 1.02 (ref 1–1.04)
UROBILINOGEN UA: NEGATIVE MG/DL
WBC # BLD AUTO: 9.4 THOUSAND/UL (ref 4.5–11)
WBC #/AREA URNS AUTO: ABNORMAL /HPF

## 2020-07-29 PROCEDURE — 85025 COMPLETE CBC W/AUTO DIFF WBC: CPT | Performed by: HOSPITALIST

## 2020-07-29 PROCEDURE — 81001 URINALYSIS AUTO W/SCOPE: CPT | Performed by: HOSPITALIST

## 2020-07-29 PROCEDURE — 80053 COMPREHEN METABOLIC PANEL: CPT | Performed by: HOSPITALIST

## 2020-07-29 PROCEDURE — 99221 1ST HOSP IP/OBS SF/LOW 40: CPT | Performed by: STUDENT IN AN ORGANIZED HEALTH CARE EDUCATION/TRAINING PROGRAM

## 2020-07-29 RX ORDER — TRAZODONE HYDROCHLORIDE 50 MG/1
50 TABLET ORAL
Status: DISCONTINUED | OUTPATIENT
Start: 2020-07-29 | End: 2020-07-30

## 2020-07-29 RX ORDER — ARIPIPRAZOLE 10 MG/1
10 TABLET ORAL DAILY
Status: DISCONTINUED | OUTPATIENT
Start: 2020-07-29 | End: 2020-07-31 | Stop reason: HOSPADM

## 2020-07-29 RX ADMIN — FLUOXETINE 60 MG: 20 CAPSULE ORAL at 09:30

## 2020-07-29 RX ADMIN — POTASSIUM CHLORIDE 20 MEQ: 20 TABLET, EXTENDED RELEASE ORAL at 17:09

## 2020-07-29 RX ADMIN — TRAZODONE HYDROCHLORIDE 50 MG: 50 TABLET ORAL at 21:02

## 2020-07-29 RX ADMIN — ATORVASTATIN CALCIUM 20 MG: 20 TABLET, FILM COATED ORAL at 09:30

## 2020-07-29 RX ADMIN — ARIPIPRAZOLE 10 MG: 10 TABLET ORAL at 12:14

## 2020-07-29 RX ADMIN — AMLODIPINE BESYLATE 10 MG: 10 TABLET ORAL at 09:30

## 2020-07-29 RX ADMIN — POTASSIUM CHLORIDE 20 MEQ: 20 TABLET, EXTENDED RELEASE ORAL at 09:30

## 2020-07-29 NOTE — PROGRESS NOTES
Pt quiet, seclusive in room for majority of shift  Visible for meals, some social interactions observed  Denies SI, appears to sleep

## 2020-07-29 NOTE — PLAN OF CARE
Pt  Attended 3 of 3 groups and self assessment interview with some initial reluctance to disclose her pre admit stressors  Her personal goal for discharge is to learn to cope with her illness and situations    Problem: Ineffective Coping  Goal: Participates in unit activities  Description  Interventions:  - Provide therapeutic environment   - Provide required programming   - Redirect inappropriate behaviors   Outcome: Progressing

## 2020-07-29 NOTE — PROGRESS NOTES
Pt attended short term problem solving group  Pt was anxious and able to self reflect  Pt was talkative and respectful, calm with intrusive peer  Redirection was needed for peer  Continue to provide therepuetic group support         07/29/20 1100   Activity/Group Checklist   Group Other (Comment)  (short term problem solving)   Attendance Attended   Attendance Duration (min) 31-45   Interactions Interacted appropriately   Affect/Mood Other (Comment)  (anxious)   Goals Achieved Identified feelings; Discussed coping strategies; Able to listen to others; Able to engage in interactions; Able to self-disclose

## 2020-07-29 NOTE — H&P
Psychiatric Evaluation - [address] 53 y o  female MRN: [id number]  Unit/Bed#: Adilson Rizvi 969-92 Encounter: [id number]    Assessment/Plan   Principal Problem:    Major depressive disorder, recurrent, severe without psychotic features ([facility] [address] )  Active Problems:    Bipolar affective disorder, depressed, severe ([facility] [address] )    Essential hypertension    Hyperlipidemia    Hypokalemia    Leukocytosis    Plan:   Continue Prozac, trazodone and abilify  Check admission labs  Collaborate with family for baseline assessment and disposition planning  Case discussed with treatment team     Treatment options and alternatives were reviewed with the patient, who concurs with the above plan  Risks, benefits, and possible side effects of medications were explained to the patient, and she verbalizes understanding       -----------------------------------    Chief Complaint: "I wanted to die"    History of Present Illness     Misa is a 53 y o  female who presents voluntarily for depression, anxiety and suicidal ideations in the context of psychosocial stressors  She was admitted voluntarily on 201 status for depression and suicidal ideations  Per Crisis worker on 7/28 "Patient presented to the ED with increased depression, anxiety and SI  She reported no HI, hallucinations, delusions or paranoia  She reported no change in sleep or appetite  She did take extra ativan last night as she felt suicidal over stressed interpersonal relationships and she feels as though she has no one  She denies any inpatient hospitalizations but did say she had an ICM  CW called her ICM with blended case management at [facility] to let them know she was at the ED  She has outpatient with [facility] that starts this week "      Misa states "I was overwhelmed"    Patient says that last week she found bed bugs in her house so called the  and spent all her money   She is worried as she doesn't have any more money for food  She is also worried about relationship issues with a friend  She took 2 ativan pills and one pill of trazodone for anxiety but had no plan or intention to kill herself  She called ambulance to come to hospital  She never overdosed on pills  She cut her hand superficially many years ago but never tried to kill herself  Patient reports compliance with her medications abilify and prozac  Patient endorses depression, anxiety and poor sleep due to current stressors  Medical Review Of Systems:  Complete review of systems is negative except as noted above  Historical Information     Psychiatric History:   Psychiatric medication trial: Abilify, prozac  Inpatient hospitalizations: Patient reports multiple inpatient psychiatric hospitalizations, last one few months ago  Suicide attempts: Denies  Violent behavior: Denies  Outpatient treatment: She is going to start at [facility] soon    Substance Abuse History:  Social History     Tobacco Use    Smoking status: Current Every Day Smoker     Packs/day: 0 25     Years: 15 00     Pack years: 3 75     Types: Cigarettes     Last attempt to quit: 1990     Years since quittin 5    Smokeless tobacco: Never Used    Tobacco comment: about 30 years ago   Substance Use Topics    Alcohol use: Yes     Alcohol/week: 2 0 standard drinks     Types: 2 Cans of beer per week     Frequency: 2-3 times a week     Drinks per session: 1 or 2     Binge frequency: Never     Comment: socially    Drug use: No      Patient denies current or previous substance use  I have assessed this patient for substance use within the past 12 months  I spent time with Misa in counseling and education on risk of alcohol and smoking nicotine      Family Psychiatric History:   Patient denies any known family history of psychiatric illness, suicide attempt, or substance abuse    Social History:  Education: 10th grade  Learning Disabilities: denies  Marital history: single  Living arrangement: Lives alone  Occupational History: unemployed  Functioning Relationships: alone & isolated    Other Pertinent History: She is supported by SSI      Traumatic History:   Abuse: denies  Other Traumatic Events: denies    Past Medical History:   Past Medical History:   Diagnosis Date    Anxiety     Arthritis     Bipolar affective disorder, depressed, severe ([facility] [address] ) 4/28/2019    Depression     Hyperlipidemia     Hypertension     Learning difficulty     Osteopenia     Ovarian failure     Previous known suicide attempt     Psychiatric disorder     Psychiatric illness         -----------------------------------  Objective    Temp:  [96 9 °F (36 1 °C)-97 4 °F (36 3 °C)] 96 9 °F (36 1 °C)  HR:  [63-80] 63  Resp:  [16-18] 18  BP: (102-121)/(60-73) 102/62    Mental Status Evaluation:  Appearance:  alert, appears older than stated age and overweight   Behavior:  sitting comfortably, no abnormal movements and normal gait and balance   Speech:  spontaneous, clear, normal rate, normal volume, soft and coherent   Mood:  depressed and anxious   Affect:  constricted   Thought Process:  organized, logical, coherent, linear, goal directed, normal rate of thoughts   Thought Content: no verbalized delusions, no overt paranoia, no obsessive thinking   Perceptual disturbances: no reported auditory hallucinations, no reported visual hallucinations and does not appear to be responding to internal stimuli at this time   Risk Potential: No active or passive suicidal or homicidal ideation, Low potential for aggression based on previous behavior   Cognition: oriented to person, place, time, and situation and age-appropriate attention span and concentration   Insight:  Fair   Judgment: Limited     Meds/Allergies   Allergies   Allergen Reactions    Other      Hay Fever    Oxycodone-Acetaminophen GI Intolerance     all current active meds have been reviewed, current meds:   Current Facility-Administered Medications   Medication Dose Route Frequency    amLODIPine (NORVASC) tablet 10 mg  10 mg Oral Daily    ARIPiprazole (ABILIFY) tablet 10 mg  10 mg Oral Daily    atorvastatin (LIPITOR) tablet 20 mg  20 mg Oral Daily    FLUoxetine (PROzac) capsule 60 mg  60 mg Oral Daily    haloperidol (HALDOL) tablet 5 mg  5 mg Oral 4x Daily PRN    hydrOXYzine HCL (ATARAX) tablet 25 mg  25 mg Oral Q6H PRN    ibuprofen (MOTRIN) tablet 400 mg  400 mg Oral Q8H PRN    lisinopril (ZESTRIL) tablet 10 mg  10 mg Oral Daily    nicotine polacrilex (NICORETTE) gum 4 mg  4 mg Oral Q2H PRN    potassium chloride (K-DUR,KLOR-CON) CR tablet 20 mEq  20 mEq Oral BID    risperiDONE (RisperDAL) tablet 0 5 mg  0 5 mg Oral 4x Daily PRN    and PTA meds:   Prior to Admission Medications   Prescriptions Last Dose Informant Patient Reported? Taking? ARIPiprazole (ABILIFY) 10 mg tablet   No Yes   Sig: Take 1 tablet (10 mg total) by mouth daily   FLUoxetine (PROzac) 40 MG capsule   No Yes   Sig: Take 1 capsule (40mg) with 20mg capsule (60mg total) by mouth daily (9am)   LORazepam (ATIVAN) 0 5 mg tablet   No Yes   Sig: Take 1 tablet (0 5 mg total) by mouth 2 (two) times a day   amLODIPine (NORVASC) 10 mg tablet   No Yes   Sig: Take 1 tablet (10 mg total) by mouth daily   atorvastatin (LIPITOR) 20 mg tablet   Yes No   atorvastatin (LIPITOR) 20 mg tablet   No Yes   Sig: Take 1 tablet (20 mg total) by mouth daily   lisinopril (ZESTRIL) 10 mg tablet   No Yes   Sig: Take 1 tablet (10 mg total) by mouth daily   potassium chloride (K-DUR,KLOR-CON) 20 mEq tablet Not Taking at Unknown time  No No   Sig: Take 1 tablet (20 mEq total) by mouth 2 (two) times a day   Patient not taking: Reported on 7/28/2020   traZODone (DESYREL) 50 mg tablet   No Yes   Sig: Take 1 tablet (50 mg total) by mouth daily at bedtime as needed for sleep      Facility-Administered Medications: None       Behavioral Health Medications: all current active meds have been reviewed  Changes as above  Laboratory results:   I have personally reviewed all pertinent laboratory/tests results    Recent Results (from the past 48 hour(s))   POCT alcohol breath test    Collection Time: 07/28/20  4:05 AM   Result Value Ref Range    EXTBreath Alcohol 0 000    Rapid drug screen, urine    Collection Time: 07/28/20  4:12 AM   Result Value Ref Range    Amph/Meth UR Negative Negative    Barbiturate Ur Negative Negative    Benzodiazepine Urine Negative Negative    Cocaine Urine Negative Negative    Methadone Urine Negative Negative    Opiate Urine Negative Negative    PCP Ur Negative Negative    THC Urine Negative Negative    Oxycodone Urine Negative Negative   Novel Coronavirus (Covid-19),PCR [facility]    Collection Time: 07/28/20  4:12 AM   Result Value Ref Range    SARS-CoV-2 Negative Negative   TSH, 3rd generation with Free T4 reflex    Collection Time: 07/28/20  4:51 AM   Result Value Ref Range    TSH 3RD GENERATON 2 750 0 358 - 3 740 uIU/mL   CBC and differential    Collection Time: 07/28/20  4:51 AM   Result Value Ref Range    WBC 12 61 (H) 4 31 - 10 16 Thousand/uL    RBC 4 87 3 81 - 5 12 Million/uL    Hemoglobin 14 2 11 5 - 15 4 g/dL    Hematocrit 42 2 34 8 - 46 1 %    MCV 87 82 - 98 fL    MCH 29 2 26 8 - 34 3 pg    MCHC 33 6 31 4 - 37 4 g/dL    RDW 13 9 11 6 - 15 1 %    MPV 9 8 8 9 - 12 7 fL    Platelets 975 396 - 164 Thousands/uL    nRBC 0 /100 WBCs    Neutrophils Relative 55 43 - 75 %    Immat GRANS % 0 0 - 2 %    Lymphocytes Relative 32 14 - 44 %    Monocytes Relative 10 4 - 12 %    Eosinophils Relative 3 0 - 6 %    Basophils Relative 0 0 - 1 %    Neutrophils Absolute 6 97 1 85 - 7 62 Thousands/µL    Immature Grans Absolute 0 04 0 00 - 0 20 Thousand/uL    Lymphocytes Absolute 3 97 0 60 - 4 47 Thousands/µL    Monocytes Absolute 1 24 (H) 0 17 - 1 22 Thousand/µL    Eosinophils Absolute 0 34 0 00 - 0 61 Thousand/µL    Basophils Absolute 0 05 0 00 - 0 10 Thousands/µL   Comprehensive metabolic panel    Collection Time: 07/28/20  4:51 AM   Result Value Ref Range    Sodium 141 136 - 145 mmol/L    Potassium 3 9 3 5 - 5 3 mmol/L    Chloride 105 100 - 108 mmol/L    CO2 26 21 - 32 mmol/L    ANION GAP 10 4 - 13 mmol/L    BUN 17 5 - 25 mg/dL    Creatinine 0 82 0 60 - 1 30 mg/dL    Glucose 106 65 - 140 mg/dL    Calcium 9 8 8 3 - 10 1 mg/dL    AST 13 5 - 45 U/L    ALT 30 12 - 78 U/L    Alkaline Phosphatase 98 46 - 116 U/L    Total Protein 8 3 (H) 6 4 - 8 2 g/dL    Albumin 4 0 3 5 - 5 0 g/dL    Total Bilirubin 0 94 0 20 - 1 00 mg/dL    eGFR 81 ml/min/1 73sq m   Ethanol    Collection Time: 07/28/20  4:51 AM   Result Value Ref Range    Ethanol Lvl <3 0 - 3 mg/dL   Salicylate level    Collection Time: 07/28/20  4:51 AM   Result Value Ref Range    Salicylate Lvl <3 (L) 3 - 20 mg/dL   Acetaminophen level-If concentration is detectable, please discuss with medical  on call      Collection Time: 07/28/20  4:51 AM   Result Value Ref Range    Acetaminophen Level <2 (L) 10 - 20 ug/mL   ECG 12 lead    Collection Time: 07/28/20  5:50 AM   Result Value Ref Range    Ventricular Rate 72 BPM    Atrial Rate 72 BPM    AL Interval 124 ms    QRSD Interval 92 ms    QT Interval 430 ms    QTC Interval 470 ms    P Axis -7 degrees    QRS Axis 9 degrees    T Wave Axis 40 degrees   Urine Macroscopic, POC    Collection Time: 07/28/20  6:24 AM   Result Value Ref Range    Color, UA Yellow     Clarity, UA Clear     pH, UA 6 0 4 5 - 8 0    Leukocytes, UA Negative Negative    Nitrite, UA Negative Negative    Protein, UA Negative Negative mg/dl    Glucose, UA Negative Negative mg/dl    Ketones, UA Negative Negative mg/dl    Urobilinogen, UA 0 2 0 2, 1 0 E U /dl E U /dl    Bilirubin, UA Negative Negative    Blood, UA Negative Negative    Specific Gravity, UA 1 020 1 003 - 1 030   POCT urinalysis dipstick    Collection Time: 07/28/20  6:25 AM   Result Value Ref Range    Color, UA See chart    CBC and differential    Collection Time: 07/29/20  6:21 AM   Result Value Ref Range    WBC 9 40 4 50 - 11 00 Thousand/uL    RBC 4 79 4 00 - 5 20 Million/uL    Hemoglobin 13 8 12 0 - 16 0 g/dL    Hematocrit 41 2 36 0 - 46 0 %    MCV 86 80 - 100 fL    MCH 28 8 26 0 - 34 0 pg    MCHC 33 5 31 0 - 36 0 g/dL    RDW 14 4 <15 3 %    MPV 8 2 (L) 8 9 - 12 7 fL    Platelets 132 091 - 789 Thousands/uL    Neutrophils Relative 52 45 - 65 %    Lymphocytes Relative 32 25 - 45 %    Monocytes Relative 10 1 - 10 %    Eosinophils Relative 5 0 - 6 %    Basophils Relative 1 0 - 1 %    Neutrophils Absolute 4 90 1 80 - 7 80 Thousands/µL    Lymphocytes Absolute 3 00 0 50 - 4 00 Thousands/µL    Monocytes Absolute 1 00 (H) 0 20 - 0 90 Thousand/µL    Eosinophils Absolute 0 50 (H) 0 00 - 0 40 Thousand/µL    Basophils Absolute 0 10 0 00 - 0 10 Thousands/µL   Comprehensive metabolic panel    Collection Time: 07/29/20  6:21 AM   Result Value Ref Range    Sodium 140 137 - 147 mmol/L    Potassium 4 3 3 6 - 5 0 mmol/L    Chloride 104 97 - 108 mmol/L    CO2 28 22 - 30 mmol/L    ANION GAP 8 5 - 14 mmol/L    BUN 19 5 - 25 mg/dL    Creatinine 0 83 0 60 - 1 20 mg/dL    Glucose 105 (H) 70 - 99 mg/dL    Calcium 9 8 8 4 - 10 2 mg/dL    AST 21 14 - 36 U/L    ALT 22 9 - 52 U/L    Alkaline Phosphatase 79 43 - 122 U/L    Total Protein 8 0 5 9 - 8 4 g/dL    Albumin 4 1 3 0 - 5 2 g/dL    Total Bilirubin 1 50 (H) <1 30 mg/dL    eGFR 80 >60 ml/min/1 73sq m   UA (URINE) with reflex to Scope    Collection Time: 07/29/20  6:46 AM   Result Value Ref Range    Color, UA [place] (A) Straw, Yellow, Pale Yellow    Clarity, UA Slightly Cloudy (A) Clear, Other    Specific Gravity, UA 1 020 1 003 - 1 040    pH, UA 6 0 4 5, 5 0, 5 5, 6 0, 6 5, 7 0, 7 5, 8 0    Leukocytes, UA 25 0 (A) Negative    Nitrite, UA Negative Negative    Protein, UA Negative Negative mg/dl    Glucose, UA Negative Negative mg/dl    Ketones, UA Negative Negative mg/dl    Bilirubin, UA Negative Negative    Blood, UA Negative Negative    UROBILINOGEN UA Negative 1 0, Negative mg/dL   Urine Microscopic Collection Time: 07/29/20  6:46 AM   Result Value Ref Range    RBC, UA 0-5 None Seen, 0-5 /hpf    WBC, UA None Seen None Seen, 0-5, 5-55, 5-65 /hpf    Epithelial Cells Moderate (A) None Seen, Occasional /hpf    Bacteria, UA Occasional None Seen, Occasional /hpf          -----------------------------------    Risks / Benefits of Treatment:     Risks, benefits, and possible side effects of medications explained to patient  The patient verbalizes understanding and agreement for treatment  Counseling / Coordination of Care:     Patient's presentation on admission and proposed treatment plan were discussed with the treatment team   Diagnosis, medication changes and treatment plan were reviewed with the patient  Recent stressors were discussed with the patient  Events leading to admission were reviewed with the patient  Importance of medication and treatment compliance was reviewed with the patient  Inpatient Psychiatric Certification:     Certification: Based upon physical, mental and social evaluations, I certify that inpatient psychiatric services are medically necessary for this patient for a duration of 5 midnights for the treatment of Major depressive disorder, recurrent, severe without psychotic features ([facility] )          This note has been constructed using a voice recognition system  There may be translation, syntax, or grammatical errors  If you have any questions, please contact the dictating provider

## 2020-07-29 NOTE — TREATMENT PLAN
TREATMENT PLAN REVIEW - [address] [facility] 53 y o  1965 female MRN: [id number]    [address] Room / Bed: [place] [address]/[facility] 968-01 Encounter: [id number]          Admit Date/Time:  7/28/2020  3:42 PM    Treatment Team: Attending Provider: Jo Wolfe MD; Patient Care Assistant: Nahomi Toney; Patient Care Technician: Tyrel James; Patient Care Assistant: Anayeli James; Registered Nurse: Rachele Christiansen RN; Occupational Therapy Assistant: Duyen Hanson, [address] ; : Marcelo Torres; Patient Care Assistant: Genesis Piper    Diagnosis: Principal Problem:    Major depressive disorder, recurrent, severe without psychotic features ([facility]  Active Problems:    Essential hypertension    Hyperlipidemia    Hypokalemia    Leukocytosis      Patient Strengths/Assets: capable of independent living, cooperative, good past treatment response, good physical health, motivated, negotiates basic needs, patient is on a voluntary commitment    Patient Barriers/Limitations: difficulty adapting, financial instability, lack of social/family support, limited education, no/few hobbies or interests    Short Term Goals: decrease in depressive symptoms, decrease in anxiety symptoms, decrease in suicidal thoughts    Long Term Goals: resolution of depressive symptoms, free of suicidal thoughts, acceptance of need for psychiatric follow up after discharge    Progress Towards Goals: continue psychiatric medications as prescribed    Recommended Treatment: medication management, patient medication education, group therapy, milieu therapy, continued Behavioral Health psychiatric evaluation/assessment process    Treatment Frequency: daily medication monitoring, group and milieu therapy daily, monitoring through interdisciplinary rounds, monitoring through weekly patient care conferences    Expected Discharge Date:  3-5 days    Discharge Plan: discharge to home, referral for outpatient medication management with a psychiatrist, referral for outpatient psychotherapy    Treatment Plan Created/Updated By: Gino Campo MD

## 2020-07-29 NOTE — ASSESSMENT & PLAN NOTE
· Etiology not clear    The patient does have a prior history of pyelonephritis  · Will order a urinalysis and will consider treating if patient is positive for UTI

## 2020-07-29 NOTE — PROGRESS NOTES
07/29/20 1331   Team Meeting   Meeting Type Tx Team Meeting   Initial Conference Date 07/29/20   Team Members Present   Team Members Present Physician;Nurse;   Physician Team Member Dr Nakul Anderson Team Member Constance Santamaria RN   Care Management Team Member AWILDA Gamez   Patient/Family Present   Patient Present Yes   Patient's Family Present No     Recommended treatment discussed; medication adjustments with d/c on Friday  Patient and team signed plan

## 2020-07-29 NOTE — NURSING NOTE
Patient brightens on approach and was compliant with medication  Patient is concerned about her two cats, Vinita and Scottie العلي, that are at home with no one present  Patient state she "feels much better" in comparison to when she arrived  Patient is looking forward to discharge  Patient stated she was having a physical relationship with a younger man in the [place] where she [place] residing, however despite her feelings for him, does not feel like he feels the same way and this added to her stressors leading to her arrival here  Patient denies SI    Patient rates depression 0/10  Patient rates anxiety 1/4 - denies needing med at this time
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
## 2020-07-29 NOTE — PROGRESS NOTES
07/29/20 1330 07/29/20 1430   Activity/Group Checklist   Group Exercise  (seated mindful movements program) Admission/Discharge  (self assessment interview completed )   Attendance Attended Attended   Attendance Duration (min) Greater than 60 0-15  (1-1)   Interactions Interacted appropriately Interacted appropriately  (expressed shame about having bed bugs at home)   Affect/Mood Appropriate;Calm;Normal range Other (Comment)  (guarded in discussion)   Goals Achieved  --  Able to engage in interactions; Able to self-disclose

## 2020-07-29 NOTE — TREATMENT TEAM
07/29/20 0943   Team Meeting   Meeting Type Daily Rounds   Team Members Present   Team Members Present Physician;Nurse;; Other (Discipline and Name)   Physician Team Member [address] Team Member [facility] Management Team Member Zia Shannon   Other (Discipline and Name) Huff Plaster     Reviewed case with team  Pt is a new admission  Medication to be reviewed

## 2020-07-29 NOTE — PLAN OF CARE
Problem: DISCHARGE PLANNING  Goal: Discharge to home or other facility with appropriate resources  Description  INTERVENTIONS:  - Identify barriers to discharge w/patient and caregiver  - Arrange for needed discharge resources and transportation as appropriate  - Identify discharge learning needs (meds, wound care, etc )  - Arrange for interpretive services to assist at discharge as needed  - Refer to Case Management Department for coordinating discharge planning if the patient needs post-hospital services based on physician/advanced practitioner order or complex needs related to functional status, cognitive ability, or social support system  Outcome: Progressing     Return home with outpatient services

## 2020-07-29 NOTE — CONSULTS
Consult- Blondell Litten 1965, 53 y o  female MRN: [id number]    Unit/Bed#: [facility] 724-67 Encounter: [id number]    Primary Care Provider: Candelaria Hampton DO   Date and time admitted to hospital: 7/28/2020  3:42 PM      Inpatient consult for Medical Clearance for [facility] patient  Consult performed by: Norma Sheppard MD  Consult ordered by: Melissa Henning MD          [facility] Department Internal Medicine - Internal Medicine Consultation:       Blondell Litten 53 y o  female MRN: [id number]  Unit/Bed#: SFZCT 505-10 Encounter: [id number]  Admitting Physician: Norma Sheppard MD  PCP: Candelaria Hampton DO  Date of Admission:  07/28/20        Assessment and Plan:     * Major depressive disorder, recurrent, severe without psychotic features ([address] )  Assessment & Plan  · Management to be determined by the primary team    Bipolar affective disorder, depressed, severe ([address] )  Assessment & Plan  · Management to be determined by the primary team    Leukocytosis  Assessment & Plan  · Etiology not clear  The patient does have a prior history of pyelonephritis  · Will order a urinalysis and will consider treating if patient is positive for UTI    Hypokalemia  Assessment & Plan  · Continue potassium chloride    Hyperlipidemia  Assessment & Plan  · Will resume statin    Essential hypertension  Assessment & Plan  · Will restart Norvasc and lisinopril                Total Time for Visit, including Counseling / Coordination of Care: 30 minutes  Greater than 50% of this total time spent on direct patient counseling and coordination of care  Chief Complaint:   Recent stressors    History of Present Illness:    Blondell Litten is a 53 y o  female who presented to the ED at [facility] where she apparently had suicide ideations and reported recent stressors  She apparently took extra Ativan because of depression due to relationship issues    The patient was subsequently transferred to [facility] behavioral health  Internal Medicine consultation was requested for medical management  Review of Systems:    Review of Systems   All other systems reviewed and are negative  Past Medical and Surgical History:     Past Medical History:   Diagnosis Date    Anxiety     Arthritis     Bipolar affective disorder, depressed, severe ([facility] [address] ) 4/28/2019    Depression     Hyperlipidemia     Hypertension     Learning difficulty     Osteopenia     Ovarian failure     Previous known suicide attempt     Psychiatric disorder     Psychiatric illness        Past Surgical History:   Procedure Laterality Date    LAPAROSCOPY      as a child, per patient-normal findings       Meds/Allergies:    Prior to Admission medications    Medication Sig Start Date End Date Taking?  Authorizing Provider   amLODIPine (NORVASC) 10 mg tablet Take 1 tablet (10 mg total) by mouth daily 7/6/20  Yes Erasmo Granda DO   ARIPiprazole (ABILIFY) 10 mg tablet Take 1 tablet (10 mg total) by mouth daily 4/16/20 7/30/22 Yes Nathanael Rosas DO   atorvastatin (LIPITOR) 20 mg tablet Take 1 tablet (20 mg total) by mouth daily 7/6/20  Yes Erasmo Granda DO   FLUoxetine (PROzac) 40 MG capsule Take 1 capsule (40mg) with 20mg capsule (60mg total) by mouth daily (9am) 4/16/20  Yes Nathanael Rosas DO   lisinopril (ZESTRIL) 10 mg tablet Take 1 tablet (10 mg total) by mouth daily 7/6/20  Yes Erasmo Granda DO   LORazepam (ATIVAN) 0 5 mg tablet Take 1 tablet (0 5 mg total) by mouth 2 (two) times a day 4/16/20  Yes Andie Saini DO   traZODone (DESYREL) 50 mg tablet Take 1 tablet (50 mg total) by mouth daily at bedtime as needed for sleep 4/16/20  Yes Nathanael Rosas DO   atorvastatin (LIPITOR) 20 mg tablet  5/30/20   Historical Provider, MD   potassium chloride (K-DUR,KLOR-CON) 20 mEq tablet Take 1 tablet (20 mEq total) by mouth 2 (two) times a day  Patient not taking: Reported on 7/28/2020 2/5/20   Alexia Lyons DO     I have reviewed home medications with patient personally  Allergies: Allergies   Allergen Reactions    Other      Hay Fever    Oxycodone-Acetaminophen GI Intolerance       Social History:     Marital Status: Single     Social History     Substance and Sexual Activity   Alcohol Use Yes    Alcohol/week: 2 0 standard drinks    Types: 2 Cans of beer per week    Frequency: 2-3 times a week    Drinks per session: 1 or 2    Binge frequency: Never    Comment: socially     Social History     Tobacco Use   Smoking Status Current Every Day Smoker    Packs/day: 0 25    Years: 15 00    Pack years: 3 75    Types: Cigarettes    Last attempt to quit: 1990    Years since quittin 5   Smokeless Tobacco Never Used   Tobacco Comment    about 30 years ago     Social History     Substance and Sexual Activity   Drug Use No       Family History:    non-contributory    Physical Exam:     Vitals:   Blood Pressure: 121/73 (20 155)  Pulse: 68 (20 155)  Temperature: (!) 97 4 °F (36 3 °C) (20 155)  Temp Source: Tympanic (20 155)  Respirations: 18 (20 155)  Height: 5' 3" (160 cm) (20 155)  Weight - Scale: 84 2 kg (185 lb 10 oz) (20 155)  SpO2: 94 % (20)    Physical Exam   Constitutional: She is oriented to person, place, and time  She appears well-developed and well-nourished  HENT:   Head: Normocephalic and atraumatic  Eyes: Pupils are equal, round, and reactive to light  EOM are normal    Neck: Neck supple  Cardiovascular: Normal rate  Pulmonary/Chest: Effort normal and breath sounds normal    Abdominal: Soft  Bowel sounds are normal    Musculoskeletal: Normal range of motion  Neurological: She is alert and oriented to person, place, and time  Psychiatric: Her behavior is normal          Additional Data:     Lab Results: I have personally reviewed pertinent reports        Results from last 7 days   Lab Units 20  0451   WBC Thousand/uL 12 61*   HEMOGLOBIN g/dL 14 2   HEMATOCRIT % 42 2 PLATELETS Thousands/uL 293   NEUTROS PCT % 55   LYMPHS PCT % 32   MONOS PCT % 10   EOS PCT % 3     Results from last 7 days   Lab Units 07/28/20  0451   SODIUM mmol/L 141   POTASSIUM mmol/L 3 9   CHLORIDE mmol/L 105   CO2 mmol/L 26   BUN mg/dL 17   CREATININE mg/dL 0 82   ANION GAP mmol/L 10   CALCIUM mg/dL 9 8   ALBUMIN g/dL 4 0   TOTAL BILIRUBIN mg/dL 0 94   ALK PHOS U/L 98   ALT U/L 30   AST U/L 13   GLUCOSE RANDOM mg/dL 106                       Imaging: I have personally reviewed pertinent reports  No orders to display           Allscripts / Epic Records Reviewed: Yes     ** Please Note: This note has been constructed using a voice recognition system   **

## 2020-07-30 PROCEDURE — 99238 HOSP IP/OBS DSCHRG MGMT 30/<: CPT | Performed by: STUDENT IN AN ORGANIZED HEALTH CARE EDUCATION/TRAINING PROGRAM

## 2020-07-30 RX ORDER — MIRTAZAPINE 15 MG/1
15 TABLET, FILM COATED ORAL
Status: DISCONTINUED | OUTPATIENT
Start: 2020-07-30 | End: 2020-07-31 | Stop reason: HOSPADM

## 2020-07-30 RX ADMIN — ATORVASTATIN CALCIUM 20 MG: 20 TABLET, FILM COATED ORAL at 09:53

## 2020-07-30 RX ADMIN — MIRTAZAPINE 15 MG: 15 TABLET, FILM COATED ORAL at 21:08

## 2020-07-30 RX ADMIN — POTASSIUM CHLORIDE 20 MEQ: 20 TABLET, EXTENDED RELEASE ORAL at 09:53

## 2020-07-30 RX ADMIN — FLUOXETINE 60 MG: 20 CAPSULE ORAL at 09:53

## 2020-07-30 RX ADMIN — ARIPIPRAZOLE 10 MG: 10 TABLET ORAL at 09:53

## 2020-07-30 NOTE — PROGRESS NOTES
07/30/20 1000   Activity/Group Checklist   Group Admission/Discharge  (completed relapse prevention plan)   Attendance Attended   Attendance Duration (min) 16-30   Interactions Interacted appropriately   Affect/Mood Normal range; Appropriate   Goals Achieved Able to engage in interactions; Discussed coping strategies; Identified relapse prevention strategies; Identified resources and support systems

## 2020-07-30 NOTE — PLAN OF CARE
Pt  Engaged in 2 of 3 morning groups     Problem: Ineffective Coping  Goal: Participates in unit activities  Description  Interventions:  - Provide therapeutic environment   - Provide required programming   - Redirect inappropriate behaviors   Outcome: Progressing

## 2020-07-30 NOTE — PROGRESS NOTES
Progress Note - Behavioral Health   Misa Diaz 53 y o  female MRN: [id number]  Unit/Bed#: [facility]cher 950-73 Encounter: [id number]     Patient was seen, chart reviewed and case discussed with team  As per report patient did not have any behavioral issues on the unit  Patient says that her mood is good and her anxiety is getting better  Sleep is ok, wants to try remeron as she did not like trazodone  Appetite is good  Denies SI/HI  Denies AH/VH  Assessment/Plan   Principal Problem:    Major depressive disorder, recurrent, severe without psychotic features ([facility] [address] )  Active Problems:    Essential hypertension    Hyperlipidemia    Hypokalemia    Leukocytosis        Mental Status Evaluation:  Appearance:  overweight   Behavior:  normal   Speech:  soft   Mood:  normal   Affect:  mood-congruent   Thought Process:  goal directed and logical   Thought Content:  normal   Perceptual Disturbances: None   Risk Potential: Suicidal Ideations none  Homicidal Ideations none  Potential for Aggression No   Sensorium:  person, place, time/date, situation, day of week, month of year and year   Memory:  recent and remote memory grossly intact   Consciousness:  alert and awake    Attention: attention span and concentration were age appropriate   Insight:  fair   Judgment: good   Gait/Station: normal gait/station and normal balance     Progress Toward Goals: Patient appears improving  Recommended Treatment: Continue with group therapy, milieu therapy and occupational therapy  Risks, benefits and possible side effects of Medications:   Risks, benefits, and possible side effects of medications explained to patient and patient verbalizes understanding        Medications:   all current active meds have been reviewed, continue current psychiatric medications and current meds:   Current Facility-Administered Medications   Medication Dose Route Frequency    amLODIPine (NORVASC) tablet 10 mg  10 mg Oral Daily    ARIPiprazole (ABILIFY) tablet 10 mg  10 mg Oral Daily    atorvastatin (LIPITOR) tablet 20 mg  20 mg Oral Daily    FLUoxetine (PROzac) capsule 60 mg  60 mg Oral Daily    haloperidol (HALDOL) tablet 5 mg  5 mg Oral 4x Daily PRN    hydrOXYzine HCL (ATARAX) tablet 25 mg  25 mg Oral Q6H PRN    ibuprofen (MOTRIN) tablet 400 mg  400 mg Oral Q8H PRN    lisinopril (ZESTRIL) tablet 10 mg  10 mg Oral Daily    mirtazapine (REMERON) tablet 15 mg  15 mg Oral HS    nicotine polacrilex (NICORETTE) gum 4 mg  4 mg Oral Q2H PRN    risperiDONE (RisperDAL) tablet 0 5 mg  0 5 mg Oral 4x Daily PRN     Labs: I have personally reviewed all pertinent laboratory/tests results     Most Recent Labs:   Lab Results   Component Value Date    WBC 9 40 07/29/2020    RBC 4 79 07/29/2020    HGB 13 8 07/29/2020    HCT 41 2 07/29/2020     07/29/2020    RDW 14 4 07/29/2020    NEUTROABS 4 90 07/29/2020    SODIUM 140 07/29/2020    K 4 3 07/29/2020     07/29/2020    CO2 28 07/29/2020    BUN 19 07/29/2020    CREATININE 0 83 07/29/2020    GLUC 105 (H) 07/29/2020    GLUF 130 (H) 02/03/2020    CALCIUM 9 8 07/29/2020    AST 21 07/29/2020    ALT 22 07/29/2020    ALKPHOS 79 07/29/2020    TP 8 0 07/29/2020    ALB 4 1 07/29/2020    TBILI 1 50 (H) 07/29/2020    CHOLESTEROL 139 05/02/2019    HDL 41 05/02/2019    TRIG 106 05/02/2019    LDLCALC 77 05/02/2019    NONHDLC 98 05/02/2019    MLZ4BPSHNKST 2 750 07/28/2020    PREGSERUM Negative 01/13/2017    RPR Non-Reactive 04/12/2020    HGBA1C 5 6 04/29/2019     04/29/2019

## 2020-07-30 NOTE — TREATMENT TEAM
07/30/20 1100   Activity/Group Checklist   Group Nursing Education  (coping skills)   Attendance Attended   Attendance Duration (min) 16-30   Interactions Interacted appropriately   Affect/Mood Calm   Goals Achieved Able to listen to others; Able to engage in interactions     Pt engaged when asked too, otherwise just listened

## 2020-07-31 VITALS
SYSTOLIC BLOOD PRESSURE: 112 MMHG | DIASTOLIC BLOOD PRESSURE: 69 MMHG | OXYGEN SATURATION: 99 % | RESPIRATION RATE: 16 BRPM | HEIGHT: 63 IN | WEIGHT: 185.63 LBS | TEMPERATURE: 97.8 F | HEART RATE: 70 BPM | BODY MASS INDEX: 32.89 KG/M2

## 2020-07-31 PROCEDURE — 99238 HOSP IP/OBS DSCHRG MGMT 30/<: CPT | Performed by: STUDENT IN AN ORGANIZED HEALTH CARE EDUCATION/TRAINING PROGRAM

## 2020-07-31 RX ORDER — FLUOXETINE HYDROCHLORIDE 20 MG/1
60 CAPSULE ORAL DAILY
Qty: 90 CAPSULE | Refills: 0 | Status: SHIPPED | OUTPATIENT
Start: 2020-08-01 | End: 2020-08-17 | Stop reason: HOSPADM

## 2020-07-31 RX ORDER — ATORVASTATIN CALCIUM 20 MG/1
20 TABLET, FILM COATED ORAL DAILY
Qty: 90 TABLET | Refills: 0
Start: 2020-07-31 | End: 2022-01-10 | Stop reason: SDUPTHER

## 2020-07-31 RX ORDER — ARIPIPRAZOLE 10 MG/1
10 TABLET ORAL DAILY
Qty: 30 TABLET | Refills: 0 | Status: SHIPPED | OUTPATIENT
Start: 2020-07-31 | End: 2020-09-22 | Stop reason: HOSPADM

## 2020-07-31 RX ORDER — MIRTAZAPINE 15 MG/1
15 TABLET, FILM COATED ORAL
Qty: 30 TABLET | Refills: 0 | Status: SHIPPED | OUTPATIENT
Start: 2020-07-31 | End: 2020-08-17 | Stop reason: HOSPADM

## 2020-07-31 RX ADMIN — ATORVASTATIN CALCIUM 20 MG: 20 TABLET, FILM COATED ORAL at 09:13

## 2020-07-31 RX ADMIN — LISINOPRIL 10 MG: 10 TABLET ORAL at 09:13

## 2020-07-31 RX ADMIN — AMLODIPINE BESYLATE 10 MG: 10 TABLET ORAL at 09:13

## 2020-07-31 RX ADMIN — ARIPIPRAZOLE 10 MG: 10 TABLET ORAL at 09:13

## 2020-07-31 RX ADMIN — FLUOXETINE 60 MG: 20 CAPSULE ORAL at 09:13

## 2020-07-31 NOTE — CASE MANAGEMENT
Patient is being discharged, no SI/HI, no psychosis or A/V  Patient is in agreement with discharge  No questions verbalized  Patient provided with after care appointment, discharge instructions and prescriptions  IMM, quality core measures, transition of care, discharge instructions, and treatment plan complete  Patient will be transported by [facility] at 11:30am for a discharge home with outpatient follow up   SW will continue to follow up as needed  Patient's discharge was faxed to outpatient providers

## 2020-07-31 NOTE — PLAN OF CARE
Problem: Ineffective Coping  Goal: Participates in unit activities  Description  Interventions:  - Provide therapeutic environment   - Provide required programming   - Redirect inappropriate behaviors   Outcome: Completed     Problem: SELF HARM/SUICIDALITY  Goal: Will have no self-injury during hospital stay  Description  INTERVENTIONS:  - Q 15 MINUTES: Routine safety checks  - Q WAKING SHIFT & PRN: Assess risk to determine if routine checks are adequate to maintain patient safety  - Encourage patient to participate actively in care by formulating a plan to combat response to suicidal ideation, identify supports and resources  Outcome: Completed     Problem: DEPRESSION  Goal: Will be euthymic at discharge  Description  INTERVENTIONS:  - Administer medication as ordered  - Provide emotional support via 1:1 interaction with staff  - Encourage involvement in milieu/groups/activities  - Monitor for social isolation  Outcome: Completed

## 2020-07-31 NOTE — PLAN OF CARE
Problem: DISCHARGE PLANNING  Goal: Discharge to home or other facility with appropriate resources  Description  INTERVENTIONS:  - Identify barriers to discharge w/patient and caregiver  - Arrange for needed discharge resources and transportation as appropriate  - Identify discharge learning needs (meds, wound care, etc )  - Arrange for interpretive services to assist at discharge as needed  - Refer to Case Management Department for coordinating discharge planning if the patient needs post-hospital services based on physician/advanced practitioner order or complex needs related to functional status, cognitive ability, or social support system  Outcome: Completed     Patient and BCM in agreement with discharge

## 2020-07-31 NOTE — BH TRANSITION RECORD
Contact Information: If you have any questions, concerns, pended studies, tests and/or procedures, or emergencies regarding your inpatient behavioral health visit  Please contact [facility] older adult behavioral health unit 6T [phone] and ask to speak to a , nurse or physician  A contact is available 24 hours/ 7 days a week at this number  Summary of Procedures Performed During your Stay:  Below is a list of major procedures performed during your hospital stay and a summary of results:  - No major procedures performed  Pending Studies (From admission, onward)    None        If studies are pending at discharge, follow up with your PCP and/or referring provider

## 2020-07-31 NOTE — NURSING NOTE
Patient was isolatve in her room while socializing with her room mate and also reading the bible but came out for pm snack together with her room mate  Patient had a flat affect but brightens up on approach  Patient denies anxiety, depression, SI/HI, hallucination and pain  Patient was cooperative and compliant with HS medications  Safety checks ongoing

## 2020-07-31 NOTE — NURSING NOTE
Pt is calm and cooperative  Pt denies all s/s, reports she is ready for discharge  Pt is currently showering in preporation for d/c  Pt offers no complaints, is medication compliant

## 2020-07-31 NOTE — TREATMENT TEAM
07/31/20 1113   Team Meeting   Meeting Type Daily Rounds   Team Members Present   Team Members Present Physician;Nurse;; Other (Discipline and Name)   Physician Team Member [address] Team Member [facility] Management Team Member Osmar Watson   Other (Discipline and Name) Jair Burris     Reviewed case with team  D/C today

## 2020-07-31 NOTE — NURSING NOTE
Pt leaving via walking with RN  All d/c instructions reviewed, all questions answered  Pt received all belongings as well as prescriptions

## 2020-07-31 NOTE — DISCHARGE SUMMARY
Discharge Summary - [address] 53 y o  female MRN: [id number]  Unit/Bed#: [facility] 189-89 Encounter: [id number]     Admission Date:   Admission Orders (From admission, onward)     Ordered        07/28/20 1543  ED TO DIFFERENT CAMPUS [facility] UNIT or INPATIENT MEDICAL UNIT to [facility] UNIT (using Discharge Readmit Navigator) - Admit Patient to [address] [place] [address]  Once                         Discharge Date: 07/31/2020  Attending Psychiatrist: Jeancarlos Ramsey*    Reason for Admission/HPI:   History of Present Illness      Misa is a 53 y o  female who presents voluntarily for depression, anxiety and suicidal ideations in the context of psychosocial stressors  She was admitted voluntarily on 201 status for depression and suicidal ideations       Per Crisis worker on 7/28 "Patient presented to the ED with increased depression, anxiety and SI  She reported no HI, hallucinations, delusions or paranoia  She reported no change in sleep or appetite   She did take extra ativan last night as she felt suicidal over stressed interpersonal relationships and she feels as though she has no one  She denies any inpatient hospitalizations but did say she had an ICM   CW called her ICM with blended case management at [address] [place] [address] to let them know she was at the ED  Izabel Castro has outpatient with [facility] that starts this week "        Misa states "I was overwhelmed"     Patient stated that last week she found bed bugs in her house so called the  and spent all her money  She is worried as she doesn't have any more money for food  She is also worried about relationship issues with a friend  She took 2 ativan pills and one pill of trazodone for anxiety but had no plan or intention to kill herself  She called ambulance to come to hospital  She never overdosed on pills  She cut her hand superficially many years ago but never tried to kill herself   Patient reports compliance with her medications abilify and prozac  Patient endorses depression, anxiety and poor sleep due to current stressors  Hospital Course: On admission patient was withdrawn, anxious, depressed and endorsed poor sleep  She was started on her home medications Abilify 10 mg and Prozac 60 mg  Trazodone was changed to remeron 15 mg Po HS  Patient improved on the regimen  Patient participated in groups and activities  On the day of discharge she denies any symptoms of depression, ranjit/hypomania or psychosis  Denies SI/HI  Patient agreed to follow up with outpatient providers after the discharge  Mental Status at time of Discharge:     Appearance:  age appropriate   Behavior:  normal   Speech:  normal pitch and normal volume   Mood:  normal   Affect:  normal and pleasant   Thought Process:  goal directed   Thought Content:  normal   Perceptual Disturbances: None   Risk Potential: Suicidal Ideations none   Sensorium:  person, place, time/date, situation, day of week, month of year and year   Cognition:  recent and remote memory grossly intact   Consciousness:  alert and awake    Attention: attention span and concentration were age appropriate   Insight:  fair   Judgment: fair   Gait/Station: normal gait/station and normal balance   Motor Activity: no abnormal movements       Discharge Diagnosis:     Major depressive disorder    Discharge Medications:  See after visit summary for reconciled discharge medications provided to patient  Discharge instructions/Information to patient and family:   See after visit summary for information provided to patient and family  Provisions for Follow-Up Care:  See after visit summary for information related to follow-up care and any pertinent home health orders  Discharge Statement   I spent 30 minutes discharging the patient  This time was spent on the day of discharge  I had direct contact with the patient on the day of discharge   Patient was given scripts for a month

## 2020-08-11 ENCOUNTER — HOSPITAL ENCOUNTER (EMERGENCY)
Facility: HOSPITAL | Age: 55
End: 2020-08-12
Attending: EMERGENCY MEDICINE | Admitting: EMERGENCY MEDICINE
Payer: COMMERCIAL

## 2020-08-11 DIAGNOSIS — R45.851 SUICIDAL IDEATION: Primary | ICD-10-CM

## 2020-08-11 LAB
ALBUMIN SERPL BCP-MCNC: 3.7 G/DL (ref 3.5–5)
ALP SERPL-CCNC: 94 U/L (ref 46–116)
ALT SERPL W P-5'-P-CCNC: 32 U/L (ref 12–78)
ANION GAP SERPL CALCULATED.3IONS-SCNC: 7 MMOL/L (ref 4–13)
AST SERPL W P-5'-P-CCNC: 13 U/L (ref 5–45)
BASOPHILS # BLD AUTO: 0.08 THOUSANDS/ΜL (ref 0–0.1)
BASOPHILS NFR BLD AUTO: 1 % (ref 0–1)
BILIRUB SERPL-MCNC: 0.79 MG/DL (ref 0.2–1)
BUN SERPL-MCNC: 21 MG/DL (ref 5–25)
CALCIUM SERPL-MCNC: 9.1 MG/DL (ref 8.3–10.1)
CHLORIDE SERPL-SCNC: 109 MMOL/L (ref 100–108)
CO2 SERPL-SCNC: 23 MMOL/L (ref 21–32)
CREAT SERPL-MCNC: 0.92 MG/DL (ref 0.6–1.3)
EOSINOPHIL # BLD AUTO: 0.45 THOUSAND/ΜL (ref 0–0.61)
EOSINOPHIL NFR BLD AUTO: 3 % (ref 0–6)
ERYTHROCYTE [DISTWIDTH] IN BLOOD BY AUTOMATED COUNT: 13.7 % (ref 11.6–15.1)
ETHANOL EXG-MCNC: NORMAL MG/DL
GFR SERPL CREATININE-BSD FRML MDRD: 70 ML/MIN/1.73SQ M
GLUCOSE SERPL-MCNC: 114 MG/DL (ref 65–140)
HCT VFR BLD AUTO: 41.7 % (ref 34.8–46.1)
HGB BLD-MCNC: 13.8 G/DL (ref 11.5–15.4)
IMM GRANULOCYTES # BLD AUTO: 0.08 THOUSAND/UL (ref 0–0.2)
IMM GRANULOCYTES NFR BLD AUTO: 1 % (ref 0–2)
LYMPHOCYTES # BLD AUTO: 3.43 THOUSANDS/ΜL (ref 0.6–4.47)
LYMPHOCYTES NFR BLD AUTO: 24 % (ref 14–44)
MCH RBC QN AUTO: 28.9 PG (ref 26.8–34.3)
MCHC RBC AUTO-ENTMCNC: 33.1 G/DL (ref 31.4–37.4)
MCV RBC AUTO: 87 FL (ref 82–98)
MONOCYTES # BLD AUTO: 1.39 THOUSAND/ΜL (ref 0.17–1.22)
MONOCYTES NFR BLD AUTO: 10 % (ref 4–12)
NEUTROPHILS # BLD AUTO: 9.15 THOUSANDS/ΜL (ref 1.85–7.62)
NEUTS SEG NFR BLD AUTO: 61 % (ref 43–75)
NRBC BLD AUTO-RTO: 0 /100 WBCS
PLATELET # BLD AUTO: 279 THOUSANDS/UL (ref 149–390)
PMV BLD AUTO: 9.6 FL (ref 8.9–12.7)
POTASSIUM SERPL-SCNC: 4 MMOL/L (ref 3.5–5.3)
PROT SERPL-MCNC: 8.6 G/DL (ref 6.4–8.2)
RBC # BLD AUTO: 4.78 MILLION/UL (ref 3.81–5.12)
SODIUM SERPL-SCNC: 139 MMOL/L (ref 136–145)
TSH SERPL DL<=0.05 MIU/L-ACNC: 3.13 UIU/ML (ref 0.36–3.74)
WBC # BLD AUTO: 14.58 THOUSAND/UL (ref 4.31–10.16)

## 2020-08-11 PROCEDURE — 93005 ELECTROCARDIOGRAM TRACING: CPT

## 2020-08-11 PROCEDURE — 84443 ASSAY THYROID STIM HORMONE: CPT | Performed by: EMERGENCY MEDICINE

## 2020-08-11 PROCEDURE — 81025 URINE PREGNANCY TEST: CPT | Performed by: EMERGENCY MEDICINE

## 2020-08-11 PROCEDURE — 80053 COMPREHEN METABOLIC PANEL: CPT | Performed by: EMERGENCY MEDICINE

## 2020-08-11 PROCEDURE — 85025 COMPLETE CBC W/AUTO DIFF WBC: CPT | Performed by: EMERGENCY MEDICINE

## 2020-08-11 PROCEDURE — 99285 EMERGENCY DEPT VISIT HI MDM: CPT | Performed by: EMERGENCY MEDICINE

## 2020-08-11 PROCEDURE — 82075 ASSAY OF BREATH ETHANOL: CPT | Performed by: EMERGENCY MEDICINE

## 2020-08-11 PROCEDURE — 36415 COLL VENOUS BLD VENIPUNCTURE: CPT | Performed by: EMERGENCY MEDICINE

## 2020-08-11 PROCEDURE — 99285 EMERGENCY DEPT VISIT HI MDM: CPT
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
## 2020-08-11 NOTE — LETTER
[address] [facility] EMERGENCY DEPARTMENT  [address] [place] [address]  Dept: [phone]              XWSESB TRANSFER CONSENT    NAME Martha Christopher 1965                              MRN [id number]    I have been informed of my rights regarding examination, treatment, and transfer   by Dr Emma Lopez MD    Benefits: Specialized equipment and/or services available at the receiving facility (Include comment)________________________    Risks: Potential for delay in receiving treatment      Consent for Transfer:  I acknowledge that my medical condition has been evaluated and explained to me by the emergency department physician or other qualified medical person and/or my attending physician, who has recommended that I be transferred to the service of  Accepting Physician: Dr Wing De La O at [address] [place] [address] Name, [address] : [address]  The above potential benefits of such transfer, the potential risks associated with such transfer, and the probable risks of not being transferred have been explained to me, and I fully understand them  The doctor has explained that, in my case, the benefits of transfer outweigh the risks  I agree to be transferred  I authorize the performance of emergency medical procedures and treatments upon me in both transit and upon arrival at the receiving facility  Additionally, I authorize the release of any and all medical records to the receiving facility and request they be transported with me, if possible  I understand that the safest mode of transportation during a medical emergency is an ambulance and that the Hospital advocates the use of this mode of transport  Risks of traveling to the receiving facility by car, including absence of medical control, life sustaining equipment, such as oxygen, and medical personnel has been explained to me and I fully understand them      (NATASHA CORRECT BOX BELOW)  [ X ]  I consent to the stated transfer and to be transported by ambulance/helicopter  [  ]  I consent to the stated transfer, but refuse transportation by ambulance and accept full responsibility for my transportation by car  I understand the risks of non-ambulance transfers and I exonerate the Hospital and its staff from any deterioration in my condition that results from this refusal     X___________________________________________    DATE  08/12/20  TIME________  Signature of patient or legally responsible individual signing on patient behalf           RELATIONSHIP TO PATIENT_________________________                        Provider Certification    NAME Ajay Holguin 1965                              MRN [id number]    A medical screening exam was performed on the above named patient  Based on the examination:    Condition Necessitating Transfer There were no encounter diagnoses  Patient Condition: The patient has been stabilized such that within reasonable medical probability, no material deterioration of the patient condition or the condition of the unborn child(juan) is likely to result from the transfer    Reason for Transfer: Level of Care needed not available at this facility    Transfer Requirements: Facility [address]   · Space available and qualified personnel available for treatment as acknowledged by Paras Weston MA  · Agreed to accept transfer and to provide appropriate medical treatment as acknowledged by       Dr Ninfa Morley  · Appropriate medical records of the examination and treatment of the patient are provided at the time of transfer   [address] [facility], [address] ___DC____  · Transfer will be performed by qualified personnel from [facility]  and appropriate transfer equipment as required, including the use of necessary and appropriate life support measures      Provider Certification: I have examined the patient and explained the following risks and benefits of being transferred/refusing transfer to the patient/family:  General risk, such as traffic hazards, adverse weather conditions, rough terrain or turbulence, possible failure of equipment (including vehicle or aircraft), or consequences of actions of persons outside the control of the transport personnel      Based on these reasonable risks and benefits to the patient and/or the unborn child(juan), and based upon the information available at the time of the patients examination, I certify that the medical benefits reasonably to be expected from the provision of appropriate medical treatments at another medical facility outweigh the increasing risks, if any, to the individuals medical condition, and in the case of labor to the unborn child, from effecting the transfer      X____________________________________________ DATE 08/12/20        TIME_______      ORIGINAL - SEND TO MEDICAL RECORDS   COPY - SEND WITH PATIENT DURING TRANSFER

## 2020-08-12 ENCOUNTER — HOSPITAL ENCOUNTER (INPATIENT)
Facility: HOSPITAL | Age: 55
LOS: 5 days | Discharge: HOME | DRG: 885 | End: 2020-08-17
Attending: PSYCHIATRY & NEUROLOGY | Admitting: PSYCHIATRY & NEUROLOGY
Payer: COMMERCIAL

## 2020-08-12 VITALS
SYSTOLIC BLOOD PRESSURE: 100 MMHG | OXYGEN SATURATION: 98 % | RESPIRATION RATE: 18 BRPM | WEIGHT: 185 LBS | BODY MASS INDEX: 32.77 KG/M2 | TEMPERATURE: 98.7 F | HEART RATE: 62 BPM | DIASTOLIC BLOOD PRESSURE: 63 MMHG

## 2020-08-12 DIAGNOSIS — F33.1 MAJOR DEPRESSIVE DISORDER, RECURRENT, MODERATE (HCC): Primary | Chronic | ICD-10-CM

## 2020-08-12 DIAGNOSIS — Z00.8 MEDICAL CLEARANCE FOR PSYCHIATRIC ADMISSION: ICD-10-CM

## 2020-08-12 DIAGNOSIS — F33.2 MAJOR DEPRESSIVE DISORDER, RECURRENT, SEVERE WITHOUT PSYCHOTIC FEATURES (HCC): Chronic | ICD-10-CM

## 2020-08-12 DIAGNOSIS — I10 ESSENTIAL HYPERTENSION: ICD-10-CM

## 2020-08-12 LAB
AMPHETAMINES SERPL QL SCN: NEGATIVE
APAP SERPL-MCNC: <2 UG/ML (ref 10–20)
ATRIAL RATE: 81 BPM
BARBITURATES UR QL: NEGATIVE
BENZODIAZ UR QL: NEGATIVE
COCAINE UR QL: NEGATIVE
ETHANOL SERPL-MCNC: <3 MG/DL (ref 0–3)
EXT PREG TEST URINE: NEGATIVE
EXT. CONTROL ED NAV: NORMAL
METHADONE UR QL: NEGATIVE
OPIATES UR QL SCN: NEGATIVE
OXYCODONE+OXYMORPHONE UR QL SCN: NEGATIVE
P AXIS: 67 DEGREES
PCP UR QL: NEGATIVE
PR INTERVAL: 148 MS
QRS AXIS: 28 DEGREES
QRSD INTERVAL: 86 MS
QT INTERVAL: 396 MS
QTC INTERVAL: 460 MS
SALICYLATES SERPL-MCNC: <3 MG/DL (ref 3–20)
SARS-COV-2 RNA RESP QL NAA+PROBE: NEGATIVE
T WAVE AXIS: 74 DEGREES
THC UR QL: NEGATIVE
VENTRICULAR RATE: 81 BPM

## 2020-08-12 PROCEDURE — 80307 DRUG TEST PRSMV CHEM ANLYZR: CPT | Performed by: EMERGENCY MEDICINE

## 2020-08-12 PROCEDURE — U0003 INFECTIOUS AGENT DETECTION BY NUCLEIC ACID (DNA OR RNA); SEVERE ACUTE RESPIRATORY SYNDROME CORONAVIRUS 2 (SARS-COV-2) (CORONAVIRUS DISEASE [COVID-19]), AMPLIFIED PROBE TECHNIQUE, MAKING USE OF HIGH THROUGHPUT TECHNOLOGIES AS DESCRIBED BY CMS-2020-01-R: HCPCS | Performed by: EMERGENCY MEDICINE

## 2020-08-12 PROCEDURE — 36415 COLL VENOUS BLD VENIPUNCTURE: CPT | Performed by: EMERGENCY MEDICINE

## 2020-08-12 PROCEDURE — 80329 ANALGESICS NON-OPIOID 1 OR 2: CPT | Performed by: EMERGENCY MEDICINE

## 2020-08-12 PROCEDURE — 93010 ELECTROCARDIOGRAM REPORT: CPT | Performed by: INTERNAL MEDICINE

## 2020-08-12 PROCEDURE — 80320 DRUG SCREEN QUANTALCOHOLS: CPT | Performed by: EMERGENCY MEDICINE

## 2020-08-12 NOTE — ED PROVIDER NOTES
History  Chief Complaint   Patient presents with    Psychiatric Evaluation     pt called  today after feeling depressed because "someone hurt me today", thoughts of self harm with plan to OD on meds, pt did not actually take any extra meds today       Psychiatric Evaluation   Presenting symptoms: suicidal thoughts    Presenting symptoms: no agitation and no self-mutilation    Onset quality:  Gradual  Duration:  1 day  Timing:  Constant  Progression:  Unchanged  Chronicity:  Recurrent  Context: stressful life event    Associated symptoms: no abdominal pain and no chest pain        Prior to Admission Medications   Prescriptions Last Dose Informant Patient Reported? Taking?    ARIPiprazole (ABILIFY) 10 mg tablet   No No   Sig: Take 1 tablet (10 mg total) by mouth daily   FLUoxetine (PROzac) 20 mg capsule   No No   Sig: Take 3 capsules (60 mg total) by mouth daily   Shingrix 50 MCG/0 5ML SUSR   Yes No   amLODIPine (NORVASC) 10 mg tablet   No No   Sig: Take 1 tablet (10 mg total) by mouth daily   atorvastatin (LIPITOR) 20 mg tablet   No No   Sig: Take 1 tablet (20 mg total) by mouth daily   lisinopril (ZESTRIL) 10 mg tablet   No No   Sig: Take 1 tablet (10 mg total) by mouth daily   mirtazapine (REMERON) 15 mg tablet   No No   Sig: Take 1 tablet (15 mg total) by mouth daily at bedtime      Facility-Administered Medications: None       Past Medical History:   Diagnosis Date    Anxiety     Arthritis     Bipolar affective disorder, depressed, severe ([facility] [address] ) 4/28/2019    Depression     Elevated bilirubin 8/15/2019    Hyperlipidemia     Hypertension     Hypokalemia 8/15/2019    Learning difficulty     Leukocytosis 7/28/2020    Osteopenia     Ovarian failure     Previous known suicide attempt     Psychiatric disorder     Psychiatric illness        Past Surgical History:   Procedure Laterality Date    LAPAROSCOPY      as a child, per patient-normal findings       Family History   Problem Relation Age of Onset    Alzheimer's disease Mother     Depression Mother     Depression Brother     Bipolar disorder Brother     No Known Problems Maternal Aunt     No Known Problems Paternal Aunt     No Known Problems Maternal Uncle     No Known Problems Paternal Uncle     No Known Problems Cousin     ADD / ADHD Neg Hx     Alcohol abuse Neg Hx     Anxiety disorder Neg Hx     Dementia Neg Hx     Drug abuse Neg Hx     OCD Neg Hx     Paranoid behavior Neg Hx     Schizophrenia Neg Hx     Seizures Neg Hx     Self-Injury Neg Hx     Suicide Attempts Neg Hx      I have reviewed and agree with the history as documented  E-Cigarette/Vaping    E-Cigarette Use Never User      E-Cigarette/Vaping Substances    Nicotine No     THC No     CBD No     Flavoring No     Other No     Unknown No      Social History     Tobacco Use    Smoking status: Current Every Day Smoker     Packs/day: 0 25     Years: 15 00     Pack years: 3 75     Types: Cigarettes     Last attempt to quit: 1990     Years since quittin 6    Smokeless tobacco: Never Used    Tobacco comment: about 30 years ago   Substance Use Topics    Alcohol use: Yes     Alcohol/week: 2 0 standard drinks     Types: 2 Cans of beer per week     Frequency: 2-3 times a week     Drinks per session: 1 or 2     Binge frequency: Never     Comment: socially    Drug use: No        Review of Systems   Constitutional: Negative for chills and fever  HENT: Negative for rhinorrhea and sore throat  Eyes: Negative for photophobia and visual disturbance  Respiratory: Negative for cough and shortness of breath  Cardiovascular: Negative for chest pain and palpitations  Gastrointestinal: Negative for abdominal pain, blood in stool, diarrhea, nausea and vomiting  Genitourinary: Negative for dysuria and hematuria  Musculoskeletal: Negative for neck pain and neck stiffness  Skin: Negative for rash and wound     Neurological: Negative for weakness and numbness  Psychiatric/Behavioral: Positive for dysphoric mood and suicidal ideas  Negative for agitation, confusion and self-injury  All other systems reviewed and are negative  Physical Exam  ED Triage Vitals   Temperature Pulse Respirations Blood Pressure SpO2   08/11/20 1940 08/11/20 1940 08/11/20 1940 08/11/20 1940 08/11/20 1940   98 7 °F (37 1 °C) 94 18 120/75 93 %      Temp Source Heart Rate Source Patient Position - Orthostatic VS BP Location FiO2 (%)   08/11/20 1940 08/11/20 1940 08/11/20 1940 08/11/20 1940 --   Oral Monitor Sitting Right arm       Pain Score       08/12/20 0648       No Pain             Orthostatic Vital Signs  Vitals:    08/12/20 0228 08/12/20 0648 08/12/20 1500 08/12/20 1952   BP: 122/50 122/69 113/71 100/63   Pulse: 90 66 67 62   Patient Position - Orthostatic VS: Lying  Sitting Lying       Physical Exam  Vitals signs and nursing note reviewed  Constitutional:       General: She is not in acute distress  Appearance: She is well-developed  She is not diaphoretic  HENT:      Head: Normocephalic  Right Ear: External ear normal       Left Ear: External ear normal       Nose: Nose normal    Eyes:      Conjunctiva/sclera: Conjunctivae normal    Neck:      Trachea: No tracheal deviation  Cardiovascular:      Rate and Rhythm: Normal rate  Heart sounds: Normal heart sounds  Pulmonary:      Effort: Pulmonary effort is normal  No respiratory distress  Breath sounds: Normal breath sounds  No stridor  No wheezing or rales  Chest:      Chest wall: No tenderness  Abdominal:      General: There is no distension  Palpations: Abdomen is soft  Tenderness: There is no abdominal tenderness  There is no guarding or rebound  Musculoskeletal:         General: No tenderness or deformity  Skin:     General: Skin is warm and dry  Capillary Refill: Capillary refill takes less than 2 seconds  Neurological:      Mental Status: She is alert        Cranial Nerves: No cranial nerve deficit  Sensory: No sensory deficit  Motor: No abnormal muscle tone  Psychiatric:         Attention and Perception: Attention normal          Mood and Affect: Mood is depressed  Speech: Speech normal          Behavior: Behavior normal  Behavior is cooperative  Thought Content: Thought content is not paranoid or delusional  Thought content includes suicidal ideation  Thought content does not include homicidal ideation  Thought content does not include homicidal or suicidal plan  ED Medications  Medications - No data to display    Diagnostic Studies  Results Reviewed     Procedure Component Value Units Date/Time    Novel Coronavirus [facility] [[id number]]  (Normal) Collected:  08/12/20 1136    Lab Status:  Final result Specimen:  Nares from Nose Updated:  08/12/20 1249     SARS-CoV-2 Negative    Narrative: The specimen collection materials, transport medium, and/or testing methodology utilized in the production of these test results have been proven to be reliable in a limited validation with an abbreviated program under the Emergency Utilization Authorization provided by the FDA  Testing reported as "Presumptive positive" will be confirmed with secondary testing with a reference laboratory to ensure result accuracy  Clinical caution and judgement should be used with the interpretation of these results with consideration of the clinical impression and other laboratory testing  Testing reported as "Positive" or "Negative" has been proven to be accurate according to standard laboratory validation requirements  All testing is performed with control materials showing appropriate reactivity at standard intervals        Rapid drug screen, urine [[id number]]  (Normal) Collected:  08/12/20 0148    Lab Status:  Final result Specimen:  Urine, Other Updated:  08/12/20 0239     Amph/Meth UR Negative     Barbiturate Ur Negative     Benzodiazepine Urine Negative     Cocaine Urine Negative     Methadone Urine Negative     Opiate Urine Negative     PCP Ur Negative     THC Urine Negative     Oxycodone Urine Negative    Narrative:       FOR MEDICAL PURPOSES ONLY  IF CONFIRMATION NEEDED PLEASE CONTACT THE LAB WITHIN 5 DAYS  Drug Screen Cutoff Levels:  AMPHETAMINE/METHAMPHETAMINES  1000 ng/mL  BARBITURATES     200 ng/mL  BENZODIAZEPINES     200 ng/mL  COCAINE      300 ng/mL  METHADONE      300 ng/mL  OPIATES      300 ng/mL  PHENCYCLIDINE     25 ng/mL  THC       50 ng/mL  OXYCODONE      100 ng/mL    POCT pregnancy, urine [[id number]]  (Normal) Resulted:  08/12/20 0155    Lab Status:  Final result Updated:  08/12/20 0155     EXT PREG TEST UR (Ref: Negative) NEGATIVE     Control VALID    Salicylate level [[id number]]  (Abnormal) Collected:  08/12/20 0109    Lab Status:  Final result Specimen:  Blood from Arm, Right Updated:  32/52/32 0935     Salicylate Lvl <3 mg/dL     Acetaminophen level-If concentration is detectable, please discuss with medical  on call  [[id number]]  (Abnormal) Collected:  08/12/20 0109    Lab Status:  Final result Specimen:  Blood from Arm, Right Updated:  08/12/20 0134     Acetaminophen Level <2 ug/mL     Ethanol [[id number]]  (Normal) Collected:  08/12/20 0108    Lab Status:  Final result Specimen:  Blood from Arm, Right Updated:  08/12/20 0126     Ethanol Lvl <3 mg/dL     TSH, 3rd generation with Free T4 reflex [[id number]]  (Normal) Collected:  08/11/20 2049    Lab Status:  Final result Specimen:  Blood from Hand, Left Updated:  08/11/20 2128     TSH 3RD GENERATON 3 130 uIU/mL     Narrative:       Patients undergoing fluorescein dye angiography may retain small amounts of fluorescein in the body for 48-72 hours post procedure  Samples containing fluorescein can produce falsely depressed TSH values  If the patient had this procedure,a specimen should be resubmitted post fluorescein clearance        Comprehensive metabolic panel [[id number]]  (Abnormal) Collected:  08/11/20 2049    Lab Status:  Final result Specimen:  Blood from Hand, Left Updated:  08/11/20 2121     Sodium 139 mmol/L      Potassium 4 0 mmol/L      Chloride 109 mmol/L      CO2 23 mmol/L      ANION GAP 7 mmol/L      BUN 21 mg/dL      Creatinine 0 92 mg/dL      Glucose 114 mg/dL      Calcium 9 1 mg/dL      AST 13 U/L      ALT 32 U/L      Alkaline Phosphatase 94 U/L      Total Protein 8 6 g/dL      Albumin 3 7 g/dL      Total Bilirubin 0 79 mg/dL      eGFR 70 ml/min/1 73sq m     Narrative:       National Kidney Disease Foundation guidelines for Chronic Kidney Disease (CKD):     Stage 1 with normal or high GFR (GFR > 90 mL/min/1 73 square meters)    Stage 2 Mild CKD (GFR = 60-89 mL/min/1 73 square meters)    Stage 3A Moderate CKD (GFR = 45-59 mL/min/1 73 square meters)    Stage 3B Moderate CKD (GFR = 30-44 mL/min/1 73 square meters)    Stage 4 Severe CKD (GFR = 15-29 mL/min/1 73 square meters)    Stage 5 End Stage CKD (GFR <15 mL/min/1 73 square meters)  Note: GFR calculation is accurate only with a steady state creatinine    CBC and differential [[id number]]  (Abnormal) Collected:  08/11/20 2049    Lab Status:  Final result Specimen:  Blood from Hand, Left Updated:  08/11/20 2101     WBC 14 58 Thousand/uL      RBC 4 78 Million/uL      Hemoglobin 13 8 g/dL      Hematocrit 41 7 %      MCV 87 fL      MCH 28 9 pg      MCHC 33 1 g/dL      RDW 13 7 %      MPV 9 6 fL      Platelets 883 Thousands/uL      nRBC 0 /100 WBCs      Neutrophils Relative 61 %      Immat GRANS % 1 %      Lymphocytes Relative 24 %      Monocytes Relative 10 %      Eosinophils Relative 3 %      Basophils Relative 1 %      Neutrophils Absolute 9 15 Thousands/µL      Immature Grans Absolute 0 08 Thousand/uL      Lymphocytes Absolute 3 43 Thousands/µL      Monocytes Absolute 1 39 Thousand/µL      Eosinophils Absolute 0 45 Thousand/µL      Basophils Absolute 0 08 Thousands/µL     POCT alcohol breath test [[id number]]  (Normal) Resulted:  08/11/20 1943    Lab Status:  Final result Updated:  08/11/20 1943     EXTBreath Alcohol                  No orders to display         Procedures  Procedures      ED Course  ED Course as of Aug 14 0729   Tue Aug 11, 2020   2231 Procedure Note: EKG  Date/Time: 08/11/20 10:31 PM   Interpreted by: Ced Ziegler   Indications / Diagnosis: bo psych eval  ECG reviewed by me, the ED Provider: yes   The EKG demonstrates:  Rhythm: normal sinus  Intervals: normal intervals  Axis: normal axis  QRS/Blocks: normal QRS  ST Changes: No acute ST Changes, no STD/ALEX           2258 201 signed                                              MDM  Number of Diagnoses or Management Options  Diagnosis management comments: This is a 50-year-old female who presents for psychiatric evaluation  Patient comes to the emergency department today because she has been having suicidal thoughts, states that her boyfriend cheated on her today and because of that she was having thoughts of wanting to overdose on medications earlier, she denies actually following through with this though and denies any intentional overdose today  She is noted to have a history of psychiatric admissions for suicidal ideation as she states that she has tried to cut her wrists in the past   He denies any HI, she denies any drugs or alcohol today, she denies any other physical symptoms at this time  On exam patient is alert with a nontoxic appearance, her vitals are stable, physical exam is unremarkable  Overall no evidence to suggest a toxidrome or other organic disease at this time  Given the patient's age will also send a geriatric psychiatric workup to rule out other organic causes  Given suicidal ideation will otherwise plan to consult Behavioral Health crisis worker           Disposition  Final diagnoses:   Suicidal ideation     Time reflects when diagnosis was documented in both MDM as applicable and the Disposition within this note     Time User Action Codes Description Comment    8/14/2020  7:29 AM Ceasar Cerrato Add [U88 831] Suicidal ideation       ED Disposition     ED Disposition Condition Date/Time Comment    Transfer to Another Facility  Wed Aug 12, 2020  8:21 PM Megha Chahal should be transferred out to [facility]        MD Documentation      Most Recent Value   Patient Condition  The patient has been stabilized such that within reasonable medical probability, no material deterioration of the patient condition or the condition of the unborn child(juan) is likely to result from the transfer   Reason for Transfer  Level of Care needed not available at this facility   Benefits of Transfer  Specialized equipment and/or services available at the receiving facility (Include comment)________________________   Risks of Transfer  Potential for delay in receiving treatment   Accepting Physician  Dr Keya Quispe Name, [address]    (Name & Tel number)  Rosaura Butt MA   Transported by [employer] and Unit #)  SLETS/CTS   Sending MD Yoana Jane MD   Provider Certification  General risk, such as traffic hazards, adverse weather conditions, rough terrain or turbulence, possible failure of equipment (including vehicle or aircraft), or consequences of actions of persons outside the control of the transport personnel      RN Documentation      [address] Name, [address]    (Name & Tel number)  Rosaura Butt MA   Transport Mode  Car [CTS]   Transported by [employer] and Unit #)  SLETS/CTS   Level of Care  Other (Comment) [CTS]      Follow-up Information    None         Discharge Medication List as of 8/12/2020  8:21 PM      CONTINUE these medications which have NOT CHANGED    Details   amLODIPine (NORVASC) 10 mg tablet Take 1 tablet (10 mg total) by mouth daily, Starting Mon 7/6/2020, Normal      !! ARIPiprazole (ABILIFY) 10 mg tablet Take 1 tablet (10 mg total) by mouth daily, Starting Fri 7/31/2020, Until Sun 8/30/2020, Print      atorvastatin (LIPITOR) 20 mg tablet Take 1 tablet (20 mg total) by mouth daily, Starting Fri 7/31/2020, No Print      !! FLUoxetine (PROzac) 20 mg capsule Take 3 capsules (60 mg total) by mouth daily, Starting Sat 8/1/2020, Until Mon 8/31/2020, Print      lisinopril (ZESTRIL) 10 mg tablet Take 1 tablet (10 mg total) by mouth daily, Starting Mon 7/6/2020, Normal      mirtazapine (REMERON) 15 mg tablet Take 1 tablet (15 mg total) by mouth daily at bedtime, Starting Fri 7/31/2020, Until Sun 8/30/2020, Print      Shingrix 50 MCG/0 5ML SUSR Starting Thu 5/21/2020, Historical Med      !! ARIPiprazole (ABILIFY) 10 mg tablet Starting Thu 6/11/2020, Historical Med      !! FLUoxetine (PROzac) 20 mg capsule Starting Sun 6/21/2020, Historical Med      LORazepam (ATIVAN) 0 5 mg tablet Starting Wed 5/20/2020, Historical Med       !! - Potential duplicate medications found  Please discuss with provider  No discharge procedures on file  PDMP Review     None           ED Provider  Attending physically available and evaluated Keshawn Borrero  I managed the patient along with the ED Attending      Electronically Signed by         Melvina Blancas MD  08/14/20 0046       Melvina Blancas MD  08/14/20 6230

## 2020-08-12 NOTE — ED NOTES
Pt came to the ED due to feelings of suicide with a plan to take pills  Pt denied feeling suicidal when speaking to crisis, however, Pt has a history of suicidal ideations and has tried cutting herself in the past   Pt stated that she was feeling suicidal due to a breakup with a boyfriend and a friend who stopped speaking to her because the friend found out the Pt had bedbugs in her home  Pt has been in treatement at [facility] and currently has a   Pt states she has high blood pressure and high cholesterol  Pt is oriented X4  Her affect is flat and speech is soft  Pt's mood is depressed  Pt denies hallucinations or HI  Pt's judgement is poor  Insight is poor and impulse control poor  Pt states that her appetite and sleep is good  Pt concentration appeared fair and memory intact  Pt denies any legal issues or substance abuse issues  Pt agreed to sign a 201 and   Is in agreement

## 2020-08-12 NOTE — ED NOTES
Spoke with Jeanine Moon and they were concerned that patient had bed bugs  RN spoke with patient and patient reported that the owner of the building had the building sprayed to address the bed bug issue  RN has not seen any bedbugs on or near patient the entire stay here so far  Jeanine Moon notified that the building was treated they will speak with director

## 2020-08-12 NOTE — ED NOTES
Insurance Authorization for admission:   Phone call placed to **  Phone number: **      Spoke to **      ** days approved  Level of care: **   Review on **  Authorization # **         EVS (Eligibility Verification System) called - [phone]  Automated system indicates: Patient is eligible with both physical and behavioral health managed by managed care  Patient's recipient number is [id number]  As per chart, patient has [facility] and is [facility]  Insurance Authorization for Transportation:    Phone call placed to **  Phone number **  Spoke to **     Authorization #: **

## 2020-08-12 NOTE — ED NOTES
Patient is accepted at [facility] 6T  Patient is accepted by Dr Ernie Armenta  per Heather Neal at intake          Nurse report is to be called to [phone] prior to patient transfer

## 2020-08-12 NOTE — ED NOTES
[place] Assured Auth Request Form completed and faxed       Dunia Gerardo, VLADISLAV  08/12/20   6730

## 2020-08-12 NOTE — ED NOTES
[facility] [address] for admission:   Phone call placed to [facility]  Phone number: [phone]  Spoke to Luz Elena Gunter  Level of care: 201 inpatient mental health treatment  Review on: call upon discharge         EVS (Eligibility Verification System) called - [phone]    Automated system indicates: active with [facility]

## 2020-08-12 NOTE — ED NOTES
Spoke with Ed at [employer] p/u at [facility])    ER aware     Hayes Phi at intake aware

## 2020-08-12 NOTE — ED NOTES
Spoke with Ju Allen at [facility] and their director will not accept patient at this time due to not having an appropriate bed, but we can represent today

## 2020-08-13 PROBLEM — E66.9 OBESITY (BMI 30.0-34.9): Chronic | Status: ACTIVE | Noted: 2020-06-26

## 2020-08-13 PROBLEM — E87.6 HYPOKALEMIA: Status: RESOLVED | Noted: 2019-08-15 | Resolved: 2020-08-13

## 2020-08-13 PROBLEM — Z00.8 MEDICAL CLEARANCE FOR PSYCHIATRIC ADMISSION: Status: ACTIVE | Noted: 2020-08-13

## 2020-08-13 PROBLEM — D72.829 LEUKOCYTOSIS: Status: RESOLVED | Noted: 2020-07-28 | Resolved: 2020-08-13

## 2020-08-13 PROBLEM — F70 MILD INTELLECTUAL DISABILITY: Chronic | Status: ACTIVE | Noted: 2020-08-13

## 2020-08-13 PROBLEM — F33.1 MAJOR DEPRESSIVE DISORDER, RECURRENT, MODERATE (HCC): Chronic | Status: ACTIVE | Noted: 2017-01-12

## 2020-08-13 PROBLEM — R17 ELEVATED BILIRUBIN: Status: RESOLVED | Noted: 2019-08-15 | Resolved: 2020-08-13

## 2020-08-13 PROBLEM — E28.39 OVARIAN FAILURE: Chronic | Status: ACTIVE | Noted: 2020-08-13

## 2020-08-13 PROBLEM — E66.811 OBESITY (BMI 30.0-34.9): Chronic | Status: ACTIVE | Noted: 2020-06-26

## 2020-08-13 LAB
ALBUMIN SERPL BCP-MCNC: 4 G/DL (ref 3–5.2)
ALP SERPL-CCNC: 76 U/L (ref 43–122)
ALT SERPL W P-5'-P-CCNC: 20 U/L (ref 9–52)
ANION GAP SERPL CALCULATED.3IONS-SCNC: 7 MMOL/L (ref 5–14)
AST SERPL W P-5'-P-CCNC: 22 U/L (ref 14–36)
ATRIAL RATE: 54 BPM
BASOPHILS # BLD AUTO: 0 THOUSANDS/ΜL (ref 0–0.1)
BASOPHILS NFR BLD AUTO: 1 % (ref 0–1)
BILIRUB SERPL-MCNC: 1 MG/DL
BUN SERPL-MCNC: 19 MG/DL (ref 5–25)
CALCIUM SERPL-MCNC: 9.4 MG/DL (ref 8.4–10.2)
CHLORIDE SERPL-SCNC: 103 MMOL/L (ref 97–108)
CHOLEST SERPL-MCNC: 170 MG/DL
CO2 SERPL-SCNC: 27 MMOL/L (ref 22–30)
CREAT SERPL-MCNC: 0.77 MG/DL (ref 0.6–1.2)
EOSINOPHIL # BLD AUTO: 0.4 THOUSAND/ΜL (ref 0–0.4)
EOSINOPHIL NFR BLD AUTO: 4 % (ref 0–6)
ERYTHROCYTE [DISTWIDTH] IN BLOOD BY AUTOMATED COUNT: 14.5 %
GFR SERPL CREATININE-BSD FRML MDRD: 87 ML/MIN/1.73SQ M
GLUCOSE P FAST SERPL-MCNC: 104 MG/DL (ref 70–99)
GLUCOSE SERPL-MCNC: 104 MG/DL (ref 70–99)
HCG SERPL QL: NEGATIVE
HCT VFR BLD AUTO: 38.9 % (ref 36–46)
HDLC SERPL-MCNC: 30 MG/DL
HGB BLD-MCNC: 13.2 G/DL (ref 12–16)
LDLC SERPL CALC-MCNC: 112 MG/DL
LYMPHOCYTES # BLD AUTO: 3.1 THOUSANDS/ΜL (ref 0.5–4)
LYMPHOCYTES NFR BLD AUTO: 30 % (ref 25–45)
MCH RBC QN AUTO: 29.3 PG (ref 26–34)
MCHC RBC AUTO-ENTMCNC: 34 G/DL (ref 31–36)
MCV RBC AUTO: 86 FL (ref 80–100)
MONOCYTES # BLD AUTO: 0.9 THOUSAND/ΜL (ref 0.2–0.9)
MONOCYTES NFR BLD AUTO: 9 % (ref 1–10)
NEUTROPHILS # BLD AUTO: 5.7 THOUSANDS/ΜL (ref 1.8–7.8)
NEUTS SEG NFR BLD AUTO: 57 % (ref 45–65)
NONHDLC SERPL-MCNC: 140 MG/DL
P AXIS: 60 DEGREES
PLATELET # BLD AUTO: 245 THOUSANDS/UL (ref 150–450)
PMV BLD AUTO: 8.2 FL (ref 8.9–12.7)
POTASSIUM SERPL-SCNC: 3.9 MMOL/L (ref 3.6–5)
PR INTERVAL: 148 MS
PROT SERPL-MCNC: 7.6 G/DL (ref 5.9–8.4)
QRS AXIS: 13 DEGREES
QRSD INTERVAL: 92 MS
QT INTERVAL: 480 MS
QTC INTERVAL: 455 MS
RBC # BLD AUTO: 4.52 MILLION/UL (ref 4–5.2)
SODIUM SERPL-SCNC: 137 MMOL/L (ref 137–147)
T WAVE AXIS: 54 DEGREES
T4 SERPL-MCNC: 9.3 UG/DL (ref 4.7–13.3)
TRIGL SERPL-MCNC: 142 MG/DL
TSH SERPL DL<=0.05 MIU/L-ACNC: 1.49 UIU/ML (ref 0.47–4.68)
VENTRICULAR RATE: 54 BPM
WBC # BLD AUTO: 10.1 THOUSAND/UL (ref 4.5–11)

## 2020-08-13 PROCEDURE — 80061 LIPID PANEL: CPT | Performed by: PSYCHIATRY & NEUROLOGY

## 2020-08-13 PROCEDURE — 84703 CHORIONIC GONADOTROPIN ASSAY: CPT | Performed by: PSYCHIATRY & NEUROLOGY

## 2020-08-13 PROCEDURE — 80053 COMPREHEN METABOLIC PANEL: CPT | Performed by: PSYCHIATRY & NEUROLOGY

## 2020-08-13 PROCEDURE — 93010 ELECTROCARDIOGRAM REPORT: CPT | Performed by: INTERNAL MEDICINE

## 2020-08-13 PROCEDURE — 84443 ASSAY THYROID STIM HORMONE: CPT | Performed by: PSYCHIATRY & NEUROLOGY

## 2020-08-13 PROCEDURE — 93005 ELECTROCARDIOGRAM TRACING: CPT

## 2020-08-13 PROCEDURE — 84436 ASSAY OF TOTAL THYROXINE: CPT | Performed by: PSYCHIATRY & NEUROLOGY

## 2020-08-13 PROCEDURE — 99222 1ST HOSP IP/OBS MODERATE 55: CPT | Performed by: PSYCHIATRY & NEUROLOGY

## 2020-08-13 PROCEDURE — 85025 COMPLETE CBC W/AUTO DIFF WBC: CPT | Performed by: PSYCHIATRY & NEUROLOGY

## 2020-08-13 RX ORDER — HYDROXYZINE HYDROCHLORIDE 25 MG/1
25 TABLET, FILM COATED ORAL EVERY 4 HOURS PRN
Status: DISCONTINUED | OUTPATIENT
Start: 2020-08-13 | End: 2020-08-17 | Stop reason: HOSPADM

## 2020-08-13 RX ORDER — HALOPERIDOL 5 MG
5 TABLET ORAL EVERY 6 HOURS PRN
Status: DISCONTINUED | OUTPATIENT
Start: 2020-08-13 | End: 2020-08-17 | Stop reason: HOSPADM

## 2020-08-13 RX ORDER — ACETAMINOPHEN 325 MG/1
650 TABLET ORAL EVERY 6 HOURS PRN
Status: DISCONTINUED | OUTPATIENT
Start: 2020-08-13 | End: 2020-08-17 | Stop reason: HOSPADM

## 2020-08-13 RX ORDER — ATORVASTATIN CALCIUM 20 MG/1
20 TABLET, FILM COATED ORAL DAILY
Status: DISCONTINUED | OUTPATIENT
Start: 2020-08-13 | End: 2020-08-17 | Stop reason: HOSPADM

## 2020-08-13 RX ORDER — ARIPIPRAZOLE 10 MG/1
10 TABLET ORAL DAILY
Status: DISCONTINUED | OUTPATIENT
Start: 2020-08-13 | End: 2020-08-17 | Stop reason: HOSPADM

## 2020-08-13 RX ORDER — MAGNESIUM HYDROXIDE/ALUMINUM HYDROXICE/SIMETHICONE 120; 1200; 1200 MG/30ML; MG/30ML; MG/30ML
30 SUSPENSION ORAL EVERY 4 HOURS PRN
Status: DISCONTINUED | OUTPATIENT
Start: 2020-08-13 | End: 2020-08-17 | Stop reason: HOSPADM

## 2020-08-13 RX ORDER — AMLODIPINE BESYLATE 10 MG/1
10 TABLET ORAL DAILY
Status: DISCONTINUED | OUTPATIENT
Start: 2020-08-13 | End: 2020-08-17

## 2020-08-13 RX ORDER — HALOPERIDOL 5 MG/ML
2.5 INJECTION INTRAMUSCULAR
Status: DISCONTINUED | OUTPATIENT
Start: 2020-08-13 | End: 2020-08-17 | Stop reason: HOSPADM

## 2020-08-13 RX ORDER — LISINOPRIL 10 MG/1
10 TABLET ORAL DAILY
Status: DISCONTINUED | OUTPATIENT
Start: 2020-08-13 | End: 2020-08-17 | Stop reason: HOSPADM

## 2020-08-13 RX ORDER — VENLAFAXINE HYDROCHLORIDE 75 MG/1
75 CAPSULE, EXTENDED RELEASE ORAL DAILY
Status: DISCONTINUED | OUTPATIENT
Start: 2020-08-14 | End: 2020-08-14

## 2020-08-13 RX ORDER — VENLAFAXINE HYDROCHLORIDE 37.5 MG/1
37.5 CAPSULE, EXTENDED RELEASE ORAL ONCE
Status: COMPLETED | OUTPATIENT
Start: 2020-08-13 | End: 2020-08-13

## 2020-08-13 RX ORDER — POLYETHYLENE GLYCOL 3350 17 G/17G
17 POWDER, FOR SOLUTION ORAL DAILY PRN
Status: DISCONTINUED | OUTPATIENT
Start: 2020-08-13 | End: 2020-08-17 | Stop reason: HOSPADM

## 2020-08-13 RX ADMIN — ARIPIPRAZOLE 10 MG: 10 TABLET ORAL at 12:10

## 2020-08-13 RX ADMIN — VENLAFAXINE HYDROCHLORIDE 37.5 MG: 37.5 CAPSULE, EXTENDED RELEASE ORAL at 12:10

## 2020-08-13 RX ADMIN — ATORVASTATIN CALCIUM 20 MG: 20 TABLET, FILM COATED ORAL at 08:40

## 2020-08-13 NOTE — NURSING NOTE
Pt admitted from [facility] ED on a 201  Presented to ED from group home with SI to OD on her medications after an argument with her boyfriend  Pt was calm on admission to T-6 unit  She admits to depression but would not rate  Pt denies all other S/S and contracts for safety

## 2020-08-13 NOTE — TREATMENT TEAM
08/13/20 7487   Team Meeting   Meeting Type Daily Rounds   Team Members Present   Team Members Present Physician;Nurse;; Other (Discipline and Name)   Physician Team Member Brina Meza, [address] [facility] Team Member [facility] Management Team Member Dante Fitch    Other (Discipline and Name) Effie Beyer      Pt discussed at treatment team today, 30 day readmission, medications will be reviewed and adjusted accordingly

## 2020-08-13 NOTE — PROGRESS NOTES
08/13/20 1548   Team Meeting   Meeting Type Tx Team Meeting   Initial Conference Date 08/13/20   Team Members Present   Team Members Present Physician;Nurse;   Physician Team Member Dr Driscoll [facility] Team Member Shaun Lafleur, SHORTY   Care Management Team Member AWILDA Jeffrey   Patient/Family Present   Patient Present Yes   Patient's Family Present No     Patient is a 30 day readmission  Patient reports the following as triggers for her readmission; Rejected by male tenant at apartment complex, not following up with [employer], failing to reach out to [facility] prior to coming to the hospital, and female friend distancing herself from the patient as she had bed bugs in her apartment  Voicemail left for [facility] from [facility]  Patient agreeable to see if [facility] can be bumped up to ICM, calling [facility] in [facility] as she was on their wait list to start, and finding day programming open due to [employer]  Team and patient signed plan together
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
## 2020-08-13 NOTE — PLAN OF CARE
Problem: Risk for Self Injury/Neglect  Goal: Treatment Goal: Remain safe during length of stay, learn and adopt new coping skills, and be free of self-injurious ideation, impulses and acts at the time of discharge  Outcome: Progressing  Goal: Verbalize thoughts and feelings  Description: Interventions:  - Assess and re-assess patient's lethality and potential for self-injury  - Engage patient in 1:1 interactions, daily, for a minimum of 15 minutes  - Encourage patient to express feelings, fears, frustrations, hopes  - Establish rapport/trust with patient   Outcome: Progressing  Goal: Refrain from harming self  Description: Interventions:  - Monitor patient closely, per order  - Develop a trusting relationship  - Supervise medication ingestion, monitor effects and side effects   Outcome: Progressing     Problem: Depression  Goal: Treatment Goal: Demonstrate behavioral control of depressive symptoms, verbalize feelings of improved mood/affect, and adopt new coping skills prior to discharge  Outcome: Progressing  Goal: Refrain from harming self  Description: Interventions:  - Monitor patient closely, per order   - Supervise medication ingestion, monitor effects and side effects   Outcome: Progressing

## 2020-08-13 NOTE — PLAN OF CARE
Problem: Risk for Self Injury/Neglect  Goal: Treatment Goal: Remain safe during length of stay, learn and adopt new coping skills, and be free of self-injurious ideation, impulses and acts at the time of discharge  Outcome: Progressing  Goal: Verbalize thoughts and feelings  Description: Interventions:  - Assess and re-assess patient's lethality and potential for self-injury  - Engage patient in 1:1 interactions, daily, for a minimum of 15 minutes  - Encourage patient to express feelings, fears, frustrations, hopes  - Establish rapport/trust with patient   Outcome: Progressing  Goal: Refrain from harming self  Description: Interventions:  - Monitor patient closely, per order  - Develop a trusting relationship  - Supervise medication ingestion, monitor effects and side effects   Outcome: Progressing     Problem: Depression  Goal: Treatment Goal: Demonstrate behavioral control of depressive symptoms, verbalize feelings of improved mood/affect, and adopt new coping skills prior to discharge  Outcome: Progressing  Goal: Refrain from harming self  Description: Interventions:  - Monitor patient closely, per order   - Supervise medication ingestion, monitor effects and side effects   Outcome: Progressing     Problem: DISCHARGE PLANNING  Goal: Discharge to home or other facility with appropriate resources  Description: INTERVENTIONS:  - Identify barriers to discharge w/patient and caregiver  - Arrange for needed discharge resources and transportation as appropriate  - Identify discharge learning needs (meds, wound care, etc )  - Arrange for interpretive services to assist at discharge as needed  - Refer to Case Management Department for coordinating discharge planning if the patient needs post-hospital services based on physician/advanced practitioner order or complex needs related to functional status, cognitive ability, or social support system  Outcome: Progressing     Problem: Ineffective Coping  Goal: Participates in unit activities  Description: Interventions:  - Provide therapeutic environment   - Provide required programming   - Redirect inappropriate behaviors   Outcome: Progressing

## 2020-08-13 NOTE — TREATMENT PLAN
TREATMENT PLAN REVIEW - [facility] 53 y o  1965 female MRN: [id number]    [address] Room / Bed: [facility] [address]/[place] [address]- Encounter: [id number]          Admit Date/Time:  8/12/2020  8:41 PM    Treatment Team: Attending Provider: Dena García MD; Consulting Physician: Jessie Greco MD; Patient Care Technician: Danny Barry; Registered Nurse: Ailyn Louis RN; : Clarisa Real; Occupational Therapy Assistant: JENNY Duenas    Diagnosis: Principal Problem:    Major depressive disorder, recurrent, moderate ([address] )  Active Problems:    Amenorrhea    Essential hypertension    Hyperlipidemia    Tobacco use    Obesity (BMI 30 0-34 9)    Ovarian failure    Mild intellectual disability      Patient Strengths/Assets: cooperative    Patient Barriers/Limitations: difficulty adapting    Short Term Goals: decrease in depressive symptoms, decrease in suicidal thoughts    Long Term Goals: improvement in depression, free of suicidal thoughts    Progress Towards Goals: starting psychiatric medications as prescribed    Recommended Treatment: medication management, patient medication education, group therapy, milieu therapy, continued Behavioral Health psychiatric evaluation/assessment process    Treatment Frequency: daily medication monitoring, group and milieu therapy daily, monitoring through interdisciplinary rounds, monitoring through weekly patient care conferences    Expected Discharge Date:   In 7 days    Discharge Plan: discharge to home    Treatment Plan Created/Updated By: Dena García MD

## 2020-08-13 NOTE — NURSING NOTE
Pt is visible on the unit and dayroom  Pt is medication compliant ,good appetite at dinner time and steady gait  Pt denies s/s including SI  Will continue to monitor
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
## 2020-08-13 NOTE — NURSING NOTE
Patient compliant with medications and was present in dayroom for breakfast and nursing group  Patient currently denies all symptoms, including SI, however appears constricted in affect

## 2020-08-13 NOTE — PROGRESS NOTES
Pt  Stated readiness for discharge as when she can cope with her loss  08/13/20 1906   Activity/Group Checklist   Group Admission/Discharge  (self assessment interview completed)   Attendance Attended   Attendance Duration (min) 0-15  (1-1)   Interactions Interacted appropriately   Affect/Mood Appropriate;Calm   Goals Achieved Able to engage in interactions; Able to self-disclose

## 2020-08-13 NOTE — TREATMENT TEAM
08/13/20 1100   Activity/Group Checklist   Group Nursing Education   Attendance Attended   Attendance Duration (min) 16-30   Interactions Interacted appropriately   Affect/Mood Normal range   Goals Achieved Able to self-disclose

## 2020-08-13 NOTE — CONSULTS
Consult- El De Luna 1965, 53 y o  female MRN: [id number]    Unit/Bed#: [facility] [address] Encounter: [id number]    Primary Care Provider: Hermes Iniguez DO   Date and time admitted to hospital: 8/12/2020  8:41 PM      Inpatient consult for Medical Clearance for [facility] patient  Consult performed by: Lacey Saravia PA-C  Consult ordered by: Renetta Ya MD        Medical clearance for psychiatric admission  Assessment & Plan  · Patient is medically cleared for BHU treatment  Hyperlipidemia  Assessment & Plan  · Continue Lipitor 20 mg QHS  Essential hypertension  Assessment & Plan  · Outpatient regimen includes Norvasc 10 mg and Lisinopril 10 mg    · Continue outpatient regimen  * Major depressive disorder, recurrent, moderate (HCC)  Assessment & Plan  · Plan per psychiatry  ECT Clearance:  · History of recent seizure or stroke:  No  · History of pheochromocytoma:  No  · History of active bleeding (Intracranial hemorrhage, aneurysm or AVM):  No  · History of metallic implants in the head or neck:  No  · History of increased intracranial pressure with mass effect:  No  · Current arrhythmia: No     Based on above criteria, Patient is medically cleared for ECT should it be recommended  VTE Prophylaxis: Reason for no pharmacologic prophylaxis low risk  / reason for no mechanical VTE prophylaxis low risk, ambulating      Recommendations for Discharge:  · Per primary team      Counseling / Coordination of Care Time: 45 minutes  Greater than 50% of total time spent on patient counseling and coordination of care  Collaboration of Care: Were Recommendations Directly Discussed with Primary Treatment Team? - Yes     History of Present Illness:    El De Luna is a 53 y o  female who is originally admitted to the [facility] service due to exacerbation of psychiatric illness and suicidal ideations  We are consulted for medical clearance for BHU treatment   Patient reports long history of psychiatric illness as well as medical history of HTN and HLD  Patient is pleasant, cooperative and denies any medical complaints at this time  Review of Systems:    Review of Systems   Constitutional: Negative for appetite change, chills, fatigue and fever  Eyes: Negative for visual disturbance  Respiratory: Negative for cough and shortness of breath  Cardiovascular: Negative for chest pain and leg swelling  Gastrointestinal: Negative for abdominal pain, constipation, diarrhea, nausea and vomiting  Genitourinary: Negative for difficulty urinating, dysuria, frequency and urgency  Skin: Negative for rash  Neurological: Negative for dizziness, weakness, light-headedness, numbness and headaches  Psychiatric/Behavioral: Positive for dysphoric mood  Past Medical and Surgical History:     Past Medical History:   Diagnosis Date    Anxiety     Arthritis     Bipolar affective disorder, depressed, severe ([facility] [address] ) 4/28/2019    Depression     Elevated bilirubin 8/15/2019    Hyperlipidemia     Hypertension     Hypokalemia 8/15/2019    Learning difficulty     Leukocytosis 7/28/2020    Osteopenia     Ovarian failure     Previous known suicide attempt     Psychiatric disorder     Psychiatric illness        Past Surgical History:   Procedure Laterality Date    LAPAROSCOPY      as a child, per patient-normal findings       Meds/Allergies:    all medications and allergies reviewed    Allergies:    Allergies   Allergen Reactions    Other      Hay Fever    Oxycodone-Acetaminophen GI Intolerance       Social History:     Marital Status: Single    Substance Use History:   Social History     Substance and Sexual Activity   Alcohol Use Yes    Alcohol/week: 2 0 standard drinks    Types: 2 Cans of beer per week    Frequency: 2-3 times a week    Drinks per session: 1 or 2    Binge frequency: Never    Comment: socially     Social History     Tobacco Use   Smoking Status Current Every Day Smoker    Packs/day: 0 25    Years: 15 00    Pack years: 3 75    Types: Cigarettes    Last attempt to quit: 1990    Years since quittin 6   Smokeless Tobacco Never Used   Tobacco Comment    about 30 years ago     Social History     Substance and Sexual Activity   Drug Use No       Family History:    Cancer in Grandmother     Physical Exam:     Vitals:   Blood Pressure: 105/57 (20)  Pulse: (!) 50 (20)  Temperature: 97 5 °F (36 4 °C) (20)  Temp Source: Tympanic (20)  Respirations: 16 (20)  Height: 5' 3" (160 cm) (20)  Weight - Scale: 83 9 kg (184 lb 15 5 oz) (20)  SpO2: 93 % (20)    Physical Exam  Constitutional:       General: She is not in acute distress  Appearance: Normal appearance  She is obese  She is not toxic-appearing or diaphoretic  HENT:      Head: Normocephalic  Neck:      Musculoskeletal: Normal range of motion  Cardiovascular:      Rate and Rhythm: Normal rate and regular rhythm  Pulses: Normal pulses  Heart sounds: Normal heart sounds  No murmur  No friction rub  No gallop  Pulmonary:      Effort: Pulmonary effort is normal  No respiratory distress  Breath sounds: Normal breath sounds  No wheezing, rhonchi or rales  Abdominal:      General: Bowel sounds are normal  There is no distension  Palpations: Abdomen is soft  Tenderness: There is no abdominal tenderness  Musculoskeletal: Normal range of motion  General: No swelling, tenderness or deformity  Right lower leg: No edema  Left lower leg: No edema  Skin:     General: Skin is warm and dry  Findings: No rash  Neurological:      Mental Status: She is alert and oriented to person, place, and time  Motor: No weakness        Coordination: Coordination normal       Gait: Gait normal    Psychiatric:         Behavior: Behavior normal            Additional Data:     Lab Results: I have personally reviewed pertinent reports  Results from last 7 days   Lab Units 08/13/20  0559   WBC Thousand/uL 10 10   HEMOGLOBIN g/dL 13 2   HEMATOCRIT % 38 9   PLATELETS Thousands/uL 245   NEUTROS PCT % 57   LYMPHS PCT % 30   MONOS PCT % 9   EOS PCT % 4     Results from last 7 days   Lab Units 08/13/20  0559   SODIUM mmol/L 137   POTASSIUM mmol/L 3 9   CHLORIDE mmol/L 103   CO2 mmol/L 27   BUN mg/dL 19   CREATININE mg/dL 0 77   ANION GAP mmol/L 7   CALCIUM mg/dL 9 4   ALBUMIN g/dL 4 0   TOTAL BILIRUBIN mg/dL 1 00   ALK PHOS U/L 76   ALT U/L 20   AST U/L 22   GLUCOSE RANDOM mg/dL 104*             Lab Results   Component Value Date/Time    HGBA1C 5 6 04/29/2019 06:22 AM               Imaging: I have personally reviewed pertinent reports  No orders to display       EKG, Pathology, and Other Studies Reviewed on Admission:   · EKG: on 8/11/20 NSR QTc 460    ** Please Note: This note has been constructed using a voice recognition system   **

## 2020-08-13 NOTE — PLAN OF CARE
Problem: DISCHARGE PLANNING  Goal: Discharge to home or other facility with appropriate resources  Description: INTERVENTIONS:  - Identify barriers to discharge w/patient and caregiver  - Arrange for needed discharge resources and transportation as appropriate  - Identify discharge learning needs (meds, wound care, etc )  - Arrange for interpretive services to assist at discharge as needed  - Refer to Case Management Department for coordinating discharge planning if the patient needs post-hospital services based on physician/advanced practitioner order or complex needs related to functional status, cognitive ability, or social support system  Outcome: Progressing     Return home with outpatient services

## 2020-08-13 NOTE — PROGRESS NOTES
08/13/20 1000   Activity/Group Checklist   Group Community meeting  (topicoptimism)   Attendance Attended   Attendance Duration (min) 31-45   Interactions Interacted appropriately   Affect/Mood Normal range   Goals Achieved Able to engage in interactions; Discussed coping strategies

## 2020-08-14 PROBLEM — K59.00 CONSTIPATION: Chronic | Status: ACTIVE | Noted: 2020-08-14

## 2020-08-14 PROCEDURE — 99231 SBSQ HOSP IP/OBS SF/LOW 25: CPT | Performed by: PSYCHIATRY & NEUROLOGY

## 2020-08-14 RX ORDER — VENLAFAXINE HYDROCHLORIDE 75 MG/1
75 CAPSULE, EXTENDED RELEASE ORAL DAILY
Status: COMPLETED | OUTPATIENT
Start: 2020-08-15 | End: 2020-08-15

## 2020-08-14 RX ORDER — MIRTAZAPINE 15 MG/1
15 TABLET, FILM COATED ORAL
Status: DISCONTINUED | OUTPATIENT
Start: 2020-08-14 | End: 2020-08-17

## 2020-08-14 RX ADMIN — MIRTAZAPINE 15 MG: 15 TABLET, FILM COATED ORAL at 22:00

## 2020-08-14 RX ADMIN — ATORVASTATIN CALCIUM 20 MG: 20 TABLET, FILM COATED ORAL at 08:47

## 2020-08-14 RX ADMIN — ARIPIPRAZOLE 10 MG: 10 TABLET ORAL at 08:47

## 2020-08-14 RX ADMIN — VENLAFAXINE HYDROCHLORIDE 75 MG: 75 CAPSULE, EXTENDED RELEASE ORAL at 08:47

## 2020-08-14 NOTE — PROGRESS NOTES
Pt attends [address] education: positive self talk and feelings group  Pt able to self reflect and contribute to group discussion  Pt was able to identify obstacles and barriers  Pt anxious and lacks self confidence but able to verbalize and follow conversation  Pt identified positive self talk and provided examples  08/14/20 1100   Activity/Group Checklist   Group Other (Comment)  ( education: positive self talk and feelings)   Attendance Attended   Attendance Duration (min) 31-45   Interactions Interacted appropriately   Affect/Mood Appropriate; Other (Comment)   Goals Achieved Identified feelings; Discussed coping strategies; Able to listen to others; Able to engage in interactions; Able to self-disclose; Able to recieve feedback

## 2020-08-14 NOTE — NURSING NOTE
Patient has been frequently in milieu, she denies symptoms  States she feels "good" and attributes this to medication changes  Patient is calm and cooperative

## 2020-08-14 NOTE — PLAN OF CARE
Attended 3/3 groups needed some prompting to discuss low self esteem and ways to set limits in healthier relationships    Problem: Ineffective Coping  Goal: Participates in unit activities  Description: Interventions:  - Provide therapeutic environment   - Provide required programming   - Redirect inappropriate behaviors   Outcome: Progressing

## 2020-08-14 NOTE — PROGRESS NOTES
Psychiatrist Progress Note - [address] 53 y o  female MRN: [id number]  Unit/Bed#: [facility] 273-10 Encounter: [id number]    The patient was seen for continuing care and reviewed with treatment team  Per staff, patient has been reporting poor sleep and feeling constipated  On assessment, patient reports improvement in depressed mood and anxiety  Denies suicidal thoughts  No delusions elicited  Denies hallucinations  Denies side effects of medications       Mental Status Evaluation:  Appearance: casual grooming, fair hygiene; no abnormal involuntary movements noted  Behavior: calm, cooperative, regressed  Speech: wnl  Mood: improved  Affect: neutral, stable, reactive  Thought process: linear, concrete and vague  Thought content: no delusions elicited; denies SI/HI  Perceptual disturbances: denies Ah/VH  Cognition: adequately oriented; diminished intellect  Insight: poor  Judgement: improved    Vitals:    08/13/20 1500 08/13/20 2043 08/14/20 0700 08/14/20 1500   BP: 117/67 132/90 109/58 105/64   BP Location: Left arm Right arm Right arm Left arm   Pulse: 63 60 57 76   Resp: 16 18 16 18   Temp: 97 6 °F (36 4 °C) 97 9 °F (36 6 °C) 97 8 °F (36 6 °C) 98 1 °F (36 7 °C)   TempSrc: Tympanic Tympanic Tympanic Tympanic   SpO2: 95% 95% 95% 90%   Weight:       Height:           Current Facility-Administered Medications   Medication Dose Route Frequency Provider Last Rate    acetaminophen  650 mg Oral Q6H PRN Russ Valerio MD      aluminum-magnesium hydroxide-simethicone  30 mL Oral Q4H PRN Russ Valerio MD      amLODIPine  10 mg Oral Daily Ilianaerjerson Valerio MD      ARIPiprazole  10 mg Oral Daily Cathern MD Iman      atorvastatin  20 mg Oral Daily Catherjerson Valerio MD      haloperidol  5 mg Oral Q6H PRN Russ Valerio MD      haloperidol lactate  2 5 mg Intramuscular Q1H PRN Russ Valerio MD      hydrOXYzine HCL  25 mg Oral Q4H PRN Russ Valerio MD      lisinopril  10 mg Oral Daily Russ Valerio MD      nicotine polacrilex  2 mg Oral Q2H PRN Ramón Mistry MD      polyethylene glycol  17 g Oral Daily PRN Ramón Mistry MD      venlafaxine  75 mg Oral Daily Ramnó Mistry MD             Assessment/Plan    Principal Problem:    Major depressive disorder, recurrent, moderate (HCC)  Active Problems:    Amenorrhea    Essential hypertension    Hyperlipidemia    Tobacco use    Obesity (BMI 30 0-34 9)    Ovarian failure    Mild intellectual disability    Medical clearance for psychiatric admission      Progress Toward Goals: improved    Recommended Treatment:   - continue effexor XR 75mg po daily for another day and increase to 112 5mg po daily starting Sunday  - continue abilify 10mg po daily  - start remeron 15mg po qhs for insomnia  - Continue with pharmacotherapy, group therapy, milieu therapy and occupational therapy  Risks, benefits and possible side effects of Medications:   Risks, benefits, and possible side effects of medications explained to patient and patient verbalizes understanding

## 2020-08-14 NOTE — CASE MANAGEMENT
CM left voicemail for Angelina Fournier [phone], [facility] from [facility]  CM awaiting call back  CM will continue to follow and provide services as needed

## 2020-08-14 NOTE — PROGRESS NOTES
08/14/20 1330   Activity/Group Checklist   Group Life Skills  (slef esteem)   Attendance Attended   Attendance Duration (min) 46-60   Interactions Other (Comment)  (desires to set better relationship limits )   Affect/Mood Constricted   Goals Achieved Able to experience relief/decrease in symptoms

## 2020-08-14 NOTE — TREATMENT TEAM
08/14/20 0909   Team Meeting   Meeting Type Daily Rounds   Team Members Present   Team Members Present Physician;Nurse;; Other (Discipline and Name)   Physician Team Member Dr Ayleen Chung, [facility] Team Member [facility] Management Team Member Vlad Griffin     Other (Discipline and Name) Krunal      Pt discussed at treatment team, no medication changes made    Effexor was increased yesterday

## 2020-08-15 PROCEDURE — 99232 SBSQ HOSP IP/OBS MODERATE 35: CPT | Performed by: PSYCHIATRY & NEUROLOGY

## 2020-08-15 RX ADMIN — POLYETHYLENE GLYCOL 3350 17 G: 17 POWDER, FOR SOLUTION ORAL at 18:21

## 2020-08-15 RX ADMIN — AMLODIPINE BESYLATE 10 MG: 10 TABLET ORAL at 09:00

## 2020-08-15 RX ADMIN — VENLAFAXINE HYDROCHLORIDE 75 MG: 75 CAPSULE, EXTENDED RELEASE ORAL at 09:01

## 2020-08-15 RX ADMIN — MIRTAZAPINE 15 MG: 15 TABLET, FILM COATED ORAL at 21:33

## 2020-08-15 RX ADMIN — LISINOPRIL 10 MG: 10 TABLET ORAL at 09:01

## 2020-08-15 RX ADMIN — ARIPIPRAZOLE 10 MG: 10 TABLET ORAL at 09:00

## 2020-08-15 RX ADMIN — ATORVASTATIN CALCIUM 20 MG: 20 TABLET, FILM COATED ORAL at 09:00

## 2020-08-15 NOTE — NURSING NOTE
Patient reports having difficult falling asleep last night and feeling tired today  She has been in her room napping more today then yesterday  Likely in part related to decreased unit activity on the weekend  Other than tiredness she denies symptoms

## 2020-08-15 NOTE — PROGRESS NOTES
53 y o  was seen today, on unit  According to the patient and nursing staff, the following is reported: nursing is reports that patient is doing fairly well  Patient reporting improving mood and anxiety  Sleep and appetite are fair  Has been complaint with medications and no agitation witnessed  Medications reviewed, recent admission, denies SI/H, denies A/V guzman         Mental Status Evaluation:  Appearance:  Adequate hygiene and grooming   Behavior:  calm, cooperative and friendly   Mood:  euthymic   Affect: appropriate   Speech: Normal rate and Normal volume   Thought Process:  Goal directed and coherent   Thought Content:  Does not verbalize delusional material   Perceptual Disturbances: Denies hallucinations and does not appear to be responding to internal stimuli   Risk Potential: No suicidal or homicidal ideation   Orientation:   Oriented x 3   Insight and judgment fair    Current Facility-Administered Medications   Medication Dose Route Frequency Provider Last Rate    acetaminophen  650 mg Oral Q6H PRN Zuri Flanagan MD      aluminum-magnesium hydroxide-simethicone  30 mL Oral Q4H PRN Zuri Flanagan MD      amLODIPine  10 mg Oral Daily Zrui Flanagan MD      ARIPiprazole  10 mg Oral Daily Zuri Flanagan MD      atorvastatin  20 mg Oral Daily Zuri Flanagan MD      haloperidol  5 mg Oral Q6H PRN Zuri Flanagan MD      haloperidol lactate  2 5 mg Intramuscular Q1H PRN Zuri Flanagan MD      hydrOXYzine HCL  25 mg Oral Q4H PRN Zuri Flanagan MD      lisinopril  10 mg Oral Daily Zuri Flanagan MD      mirtazapine  15 mg Oral HS Zuri Flanagan MD      nicotine polacrilex  2 mg Oral Q2H PRN Zuri Flanagan MD      polyethylene glycol  17 g Oral Daily PRN Zuri Flanagan MD      [START ON 8/16/2020] venlafaxine  112 5 mg Oral Daily Zuri Flanagan MD        Patient Active Problem List    Diagnosis Date Noted    Constipation 08/14/2020    Ovarian failure 08/13/2020    Mild intellectual disability 08/13/2020    Medical clearance for psychiatric admission 08/13/2020    Tobacco use 06/26/2020    Obesity (BMI 30 0-34 9) 06/26/2020    Essential hypertension 04/28/2019    Major depressive disorder, recurrent, moderate ([facility] [address] ) 01/12/2017    Amenorrhea 08/29/2013    Hyperlipidemia 08/29/2013        Plan: Medications reviewed, benefits/risks discussed with patient, condition reviewed with treatment team  Routine monitoring will occur on unit, evaluation of effectiveness and side effects of medications  Will continue with current meds

## 2020-08-15 NOTE — NURSING NOTE
Pt, calm and compliant with care, was seen in a day room last night  Pt was bright, pleasant, denied symptoms, was asking about doses of her recently ordered meds  Pt reported not sleeping well last night

## 2020-08-16 PROCEDURE — 99232 SBSQ HOSP IP/OBS MODERATE 35: CPT | Performed by: PSYCHIATRY & NEUROLOGY

## 2020-08-16 RX ADMIN — LISINOPRIL 10 MG: 10 TABLET ORAL at 08:28

## 2020-08-16 RX ADMIN — ARIPIPRAZOLE 10 MG: 10 TABLET ORAL at 08:28

## 2020-08-16 RX ADMIN — HYDROXYZINE HYDROCHLORIDE 25 MG: 25 TABLET, FILM COATED ORAL at 18:19

## 2020-08-16 RX ADMIN — AMLODIPINE BESYLATE 10 MG: 10 TABLET ORAL at 08:27

## 2020-08-16 RX ADMIN — MIRTAZAPINE 15 MG: 15 TABLET, FILM COATED ORAL at 21:08

## 2020-08-16 RX ADMIN — ATORVASTATIN CALCIUM 20 MG: 20 TABLET, FILM COATED ORAL at 08:28

## 2020-08-16 RX ADMIN — VENLAFAXINE HYDROCHLORIDE 112.5 MG: 37.5 CAPSULE, EXTENDED RELEASE ORAL at 08:28

## 2020-08-16 NOTE — PROGRESS NOTES
53 y o  was seen today, on unit  According to the patient and nursing staff, the following is reported: patient is doing fairly well, has been pleasant and cooperative, is reporting improving mood and anxiety  Denies SI/HI, denies A/V guzman  Sleep and appetite are fair  Has been complaint with medications and no agitation witnessed  Medications reviewed        Mental Status Evaluation:  Appearance:  Adequate hygiene and grooming   Behavior:  calm, cooperative and friendly   Mood:  euthymic   Affect: blunted   Speech: Normal rate and Normal volume   Thought Process:  Goal directed and coherent   Thought Content:  Does not verbalize delusional material   Perceptual Disturbances: Denies hallucinations and does not appear to be responding to internal stimuli   Risk Potential: No suicidal or homicidal ideation   Orientation:   Oriented x 3   Insight and judgment fair    Current Facility-Administered Medications   Medication Dose Route Frequency Provider Last Rate    acetaminophen  650 mg Oral Q6H PRN Maia Ogden MD      aluminum-magnesium hydroxide-simethicone  30 mL Oral Q4H PRN Maia Ogden MD      amLODIPine  10 mg Oral Daily Maia Ogden MD      ARIPiprazole  10 mg Oral Daily Maia Ogden MD      atorvastatin  20 mg Oral Daily Maia Ogden MD      haloperidol  5 mg Oral Q6H PRN Maia Ogden MD      haloperidol lactate  2 5 mg Intramuscular Q1H PRN Maia Ogden MD      hydrOXYzine HCL  25 mg Oral Q4H PRN Maia Ogden MD      lisinopril  10 mg Oral Daily Maia Ogden MD      mirtazapine  15 mg Oral HS Maia Ogden MD      nicotine polacrilex  2 mg Oral Q2H PRN Maia Ogden MD      polyethylene glycol  17 g Oral Daily PRN Maia Ogden MD      venlafaxine  112 5 mg Oral Daily Maia Ogden MD        Patient Active Problem List    Diagnosis Date Noted    Constipation 08/14/2020    Ovarian failure 08/13/2020    Mild intellectual disability 08/13/2020    Medical clearance for psychiatric admission 08/13/2020  Tobacco use 06/26/2020    Obesity (BMI 30 0-34 9) 06/26/2020    Essential hypertension 04/28/2019    Major depressive disorder, recurrent, moderate ([facility] [address] ) 01/12/2017    Amenorrhea 08/29/2013    Hyperlipidemia 08/29/2013        Plan: Medications reviewed, benefits/risks discussed with patient, condition reviewed with treatment team  Routine monitoring will occur on unit, evaluation of effectiveness and side effects of medications  Will continue with current meds

## 2020-08-16 NOTE — NURSING NOTE
Patient is very bright in affect and shows this writer a [place] comfort bear  Patient is cooperative with care  Patient is often out in milieu, social with others  Denies all symptoms

## 2020-08-16 NOTE — NURSING NOTE
Pt was seen in a day room, calm and pleasant last night  She reported having restlessness or restless legs after taking HS Remeron that effected her bed time sleep  However, pt stated she was sleeping better last night and was not shaky  No other complains  Pt will be monitored

## 2020-08-17 VITALS
BODY MASS INDEX: 32.77 KG/M2 | RESPIRATION RATE: 16 BRPM | OXYGEN SATURATION: 94 % | DIASTOLIC BLOOD PRESSURE: 75 MMHG | TEMPERATURE: 98.2 F | WEIGHT: 184.97 LBS | HEIGHT: 63 IN | HEART RATE: 61 BPM | SYSTOLIC BLOOD PRESSURE: 125 MMHG

## 2020-08-17 PROCEDURE — 99238 HOSP IP/OBS DSCHRG MGMT 30/<: CPT | Performed by: PSYCHIATRY & NEUROLOGY

## 2020-08-17 RX ORDER — AMLODIPINE BESYLATE 5 MG/1
5 TABLET ORAL DAILY
Qty: 30 TABLET | Refills: 0 | Status: SHIPPED | OUTPATIENT
Start: 2020-08-18 | End: 2021-01-08 | Stop reason: SDUPTHER

## 2020-08-17 RX ORDER — MIRTAZAPINE 15 MG/1
15 TABLET, FILM COATED ORAL
Qty: 30 TABLET | Refills: 0 | Status: SHIPPED | OUTPATIENT
Start: 2020-08-17 | End: 2020-09-22 | Stop reason: HOSPADM

## 2020-08-17 RX ORDER — VENLAFAXINE HYDROCHLORIDE 37.5 MG/1
37.5 CAPSULE, EXTENDED RELEASE ORAL DAILY
Qty: 30 CAPSULE | Refills: 0 | Status: SHIPPED | OUTPATIENT
Start: 2020-08-17 | End: 2020-09-22 | Stop reason: HOSPADM

## 2020-08-17 RX ORDER — VENLAFAXINE HYDROCHLORIDE 75 MG/1
75 CAPSULE, EXTENDED RELEASE ORAL DAILY
Qty: 30 CAPSULE | Refills: 0 | Status: SHIPPED | OUTPATIENT
Start: 2020-08-18 | End: 2020-09-22 | Stop reason: HOSPADM

## 2020-08-17 RX ORDER — MIRTAZAPINE 15 MG/1
15 TABLET, FILM COATED ORAL
Status: DISCONTINUED | OUTPATIENT
Start: 2020-08-17 | End: 2020-08-17 | Stop reason: HOSPADM

## 2020-08-17 RX ORDER — AMLODIPINE BESYLATE 5 MG/1
5 TABLET ORAL DAILY
Status: DISCONTINUED | OUTPATIENT
Start: 2020-08-18 | End: 2020-08-17 | Stop reason: HOSPADM

## 2020-08-17 RX ADMIN — ATORVASTATIN CALCIUM 20 MG: 20 TABLET, FILM COATED ORAL at 09:24

## 2020-08-17 RX ADMIN — ARIPIPRAZOLE 10 MG: 10 TABLET ORAL at 09:24

## 2020-08-17 RX ADMIN — VENLAFAXINE HYDROCHLORIDE 112.5 MG: 37.5 CAPSULE, EXTENDED RELEASE ORAL at 09:24

## 2020-08-17 RX ADMIN — AMLODIPINE BESYLATE 10 MG: 10 TABLET ORAL at 09:24

## 2020-08-17 RX ADMIN — LISINOPRIL 10 MG: 10 TABLET ORAL at 09:24

## 2020-08-17 NOTE — NURSING NOTE
Patient will be discharge at 13:00  With belongings, Patient was educated in her medications and follow up appoitments

## 2020-08-17 NOTE — DISCHARGE INSTR - OTHER ORDERS
You are being discharged to:  [address] [facility][1] [address]   Phone: [facility] Crisis Intervention: [phone]  *National Suicide Prevention Lifeline:  1-655.544.2488  *Peer Support Talk Line (Seven days a week, 1:00 PM  9:00 PM) Call: 670.902.5260 or Text: [address] on [address] [facility] [place][note 2] ([facility]) HELPLINE: 316.944.6340/Website: www mabel org  *Substance Abuse and [facility]) [employer], which is a confidential, free, 24-hour-a-day, 365-day-a-year, information service for individuals and family members facing mental health and/or substance use disorders  This service provides referrals to local treatment facilities, support groups, and community-based organizations  Callers can also order free publications and other information  Call 2-634.211.9733/Website: www [facility] gov  *[employer] 2-1-1: This is a toll free, confidential, 24-hour-a-day service which connects you to a community  in your area who can help you find services and resources that are available to you locally and provide critical services that can improve and save lives  Call: 80  /Website: https://[employer] net/       What you need to know aboutcoronavirus disease 2019 (COVID-19)     What is coronavirus disease 2019 (COVID-19)? Coronavirus disease 2019 (COVID-19) is a respiratory illness that can spread from person to person  The virus that causes COVID-19 is a novel coronavirus that was first identified during an investigation into an outbreak in [place]  Can people in the [place]  get COVID-19? Yes  COVID-19 is spreading from person to person in parts of the [place]  Risk of infection with COVID-19 is higher for people who are close contacts of someone known to have COVID-19, for example healthcare workers, or household members   Other people at higher risk for infection are those who live in or have recently been in an area with ongoing spread of COVID-19  Learn more about places with ongoing spread at   [facility]  html#geographic  Have there been cases of COVID-19 in the [place] ?   Yes  The first case of COVID-19 in the [place] was reported on January 21, 2020  The current count of cases of COVID-19 in the [place] is available on [employer] at [facility]  How does COVID-19 spread? The virus that causes COVID-19 probably emerged from an animal source, but is now spreading from person to person  The virus is thought to spread mainly between people who are in close contact with one another (within about 6 feet) through respiratory droplets produced when an infected person coughs or sneezes  It also may be possible that a person can get COVID-19 by touching a surface or object that has the virus on it and then touching their own mouth, nose, or possibly their eyes, but this is not thought to be the main way the virus spreads  Learn what is known about the spread of newly emerged coronaviruses at [facility]  What are the symptoms of COVID-19? Patients with COVID-19 have had mild to severe respiratory illness with symptoms of   fever   cough   shortness of breath  What are severe complications from this virus? Some patients have pneumonia in both lungs, multi-organ failure and in some cases death  How can I help protect myself? People can help protect themselves from respiratory illness with everyday preventive actions  Avoid close contact with people who are sick  Avoid touching your eyes, nose, and mouth withunwashed hands  Wash your hands often with soap and water for at least 20 seconds  Use an alcohol-based hand  that contains at least 60% alcohol if soap and water are not available    If you are sick, to keep from spreading respiratory illness to others, you should   Stay home when you are sick  Cover your cough or sneeze with a tissue, then throw the tissue in the trash  Clean and disinfect frequently touched objectsand surfaces  What should I do if I recently traveled from an area with ongoing spread of COVID-19? If you have traveled from an affected area, there may be restrictions on your movements for up to 2 weeks  If you develop symptoms during that period (fever, cough, trouble breathing), seek medical advice  Call the office of your health care provider before you go, and tell them about your travel and your symptoms  They will give you instructions on how to get care without exposing other people to your illness  While sick, avoid contact with people, don't go out and delay any travel to reduce the possibility of spreading illness to others  Is there a vaccine? There is currently no vaccine to protect against COVID-19  The best way to prevent infection is to take everyday preventive actions, like avoiding close contact with people who are sick and washing your hands often  Is there a treatment? There is no specific antiviral treatment for COVID-19  People with COVID-19 can seek medical care to helprelieve symptoms  For more information: www cdc gov/TPHXM53PT 670122-I 03/03/2020       What to do if you are sick withcoronavirus disease 2019 (COVID-19)     If you are sick with COVID-19 or suspect you are infected with the virus that causes COVID-19, follow the steps below to help prevent the disease from spreading to people in your home and community  Stay home except to get medical care   You should restrict activities outside your home, except for getting medical care  Do not go to work, school, or public areas  Avoid using public transportation, ride-sharing, or taxis    Separate yourself from other people and animals inyour home  People: As much as possible, you should stay in a specific room and away from other people in your home  Also, you should use a separate bathroom, if available  Animals: Do not handle pets or other animals while sick  See COVID-19 and Animals for more information  Call ahead before visiting your doctor   If you have a medical appointment, call the healthcare provider and tell them that you have or may have COVID-19  This will help the healthcare provider's office take steps to keep other people from getting infected or exposed  Wear a facemask  You should wear a facemask when you are around other people (e g , sharing a room or vehicle) or pets and before you enter a healthcare provider's office  If you are not able to wear a facemask (for example, because it causes trouble breathing), then people who live with you should not stay in the same room with you, or they should wear a facemask if they enteryour room  Cover your coughs and sneezes   Cover your mouth and nose with a tissue when you cough or sneeze  Throw used tissues in a lined trash can; immediately wash your hands with soap and water for at least 20 seconds or clean your hands with an alcohol-based hand  that contains at least 60 to 95% alcohol, covering all surfaces of your hands and rubbing them together until they feel dry  Soap and water should be used preferentially if hands are visibly dirty  Avoid sharing personal household items   You should not share dishes, drinking glasses, cups, eating utensils, towels, or bedding with other people or pets in your home  After using these items, they should be washed thoroughly with soap and water  Clean your hands often  Wash your hands often with soap and water for at least 20 seconds  If soap and water are not available, clean your hands with an alcohol-based hand  that contains at least 60% alcohol, covering all surfaces of your hands and rubbing them together until they feel dry  Soap and water should be used preferentially if hands are visibly dirty   Avoid touching your eyes, nose, and mouth with unwashed hands  Clean all "high-touch" surfaces every day  High touch surfaces include counters, tabletops, doorknobs, bathroom fixtures, toilets, phones, keyboards, tablets, and bedside tables  Also, clean any surfaces that may have blood, stool, or body fluids on them  Use a household cleaning spray or wipe, according to the label instructions  Labels contain instructions for safe and effective use of the cleaning product including precautions you should take when applying the product, such as wearing gloves and making sure you have good ventilation during use of the product  Monitor your symptoms  Seek prompt medical attention if your illness is worsening (e g , difficulty breathing)  Before seeking care, call your healthcare provider and tell them that you have, or are being evaluated for, COVID-19  Put on a facemask before you enter the facility  These steps will help the healthcare provider's office to keep other people in the office or waiting room from getting infectedor exposed  Ask your healthcare provider to call the local or state health department  Persons who are placed under active monitoring or facilitated self-monitoring should follow instructions provided by their local health department or occupational health professionals, as appropriate  If you have a medical emergency and need to call 911, notify the dispatch personnel that you have, or are being evaluated for COVID-19  If possible, put on a facemask before emergency medical services arrive  Discontinuing home isolation  Patients with confirmed COVID-19 should remain under home isolation precautions until the risk of secondary transmission to others is thought to be low  The decision to discontinue home isolation precautions should be made on a case-by-case basis, in consultation with healthcare providers and state and local health departments    For more information: www cdc gov/FBBKA82EE 953703-Z 02/24/2020 Stay home when you are sick,except to get medical care  Wash your hands often with soap and water for at least 20 seconds  Cover your cough or sneeze with a tissue, then throw the tissue in the trash  Clean and disinfect frequently touched objects and surfaces  Avoid touching your eyes, nose, and mouth  STOP THE SPREAD OF GERMS  For more information: www cdc gov/COVID19 Avoid close contact with people who are sick  Help prevent the spread of respiratory diseases like COVID-19

## 2020-08-17 NOTE — DISCHARGE SUMMARY
[address] [facility] Inpatient Geriatric Psychiatry  Psychiatrists Discharge Summary      Patient Name: Manolo Childress  MRN: [id number]  DOS: 08/17/20     Date of Admission: 8/12/2020  Date of Discharge:  08/17/20      Principal Problem:    Major depressive disorder, recurrent, moderate ([address] )  Active Problems:    Amenorrhea    Essential hypertension    Hyperlipidemia    Tobacco use    Obesity (BMI 30 0-34 9)    Ovarian failure    Mild intellectual disability    Medical clearance for psychiatric admission    Constipation      DISCHARGE MEDICATIONS:      amLODIPine Besylate 5 mg Oral Daily      ARIPiprazole 10 mg Oral Daily      Atorvastatin Calcium 20 mg Oral Daily      Lisinopril 10 mg Oral Daily      Mirtazapine 15 mg Oral Daily at bedtime PRN      Venlafaxine HCl   75 mg Oral Daily, Take with 37 5mg cap to total 112 5mg/day   37 5 mg Oral Daily, Take with 75mg cap to total 112 5mg/day       Zoster Vac Recomb Adjuvanted 50 MCG/0 5ML No dose, route, or frequency recorded  HOME MEDICATIONS RECONCILED ON ADMISSION:  Prior to Admission Medications   Prescriptions Last Dose Informant Patient Reported? Taking?    ARIPiprazole (ABILIFY) 10 mg tablet   No No   Sig: Take 1 tablet (10 mg total) by mouth daily   FLUoxetine (PROzac) 20 mg capsule   No No   Sig: Take 3 capsules (60 mg total) by mouth daily   Shingrix 50 MCG/0 5ML SUSR   Yes No   amLODIPine (NORVASC) 10 mg tablet   No No   Sig: Take 1 tablet (10 mg total) by mouth daily   atorvastatin (LIPITOR) 20 mg tablet   No No   Sig: Take 1 tablet (20 mg total) by mouth daily   lisinopril (ZESTRIL) 10 mg tablet   No No   Sig: Take 1 tablet (10 mg total) by mouth daily   mirtazapine (REMERON) 15 mg tablet   No No   Sig: Take 1 tablet (15 mg total) by mouth daily at bedtime      Facility-Administered Medications: None       REASON FOR ADMISSION    Per admission h&p:    Manolo Childress is a 53 y o  female with a long h/o depression who was bib EMS due to suicidal thoughts with a plan to take pills in the context of relationship strife  Patient is a vague and suggestible historian, likely due to low intellect  She states that her best friend no longer wants to see her because the patient had bedbugs and her boyfriend also broke up with her the day she presented to the ED  She states she was very hurt that her feelings towards him were not reciprocated, especially since she was giving him money, cooking for him and offering to clean and do his laundry for him  Patient was discharged from our unit just a few weeks ago and had relationship stress then as well  She did not follow up with her outpatient psychiatrist at [facility] on 8/7/20 - she was not able to describe what the barrier was  She was also set up with a day program at [facility] but it's unclear when the start date was supposed to be  She states she's tried the day program in the past but that she would rather  not go  On the other hand, she states she has nothing to do during the day and is lonely  She also reports hopelessness, worthlessness, low energy  Patient denies having had any problems with sleep  She reports anxiety in the form of panic attacks if she's in large crowds  No delusions elicited  Denies hallucinations       Review of Systems   Constitutional: Negative for appetite change, diaphoresis, fatigue and fever  HENT: Negative for facial swelling, rhinorrhea, sinus pain and trouble swallowing  Eyes: Negative for photophobia, pain and redness  Respiratory: Negative for cough, chest tightness, shortness of breath and wheezing  Cardiovascular: Negative for chest pain and palpitations  Gastrointestinal: Negative for abdominal pain, diarrhea, nausea and vomiting  Endocrine: Negative for cold intolerance, polydipsia and polyphagia  Genitourinary: Negative for dysuria, flank pain, frequency and urgency     Musculoskeletal: Negative for arthralgias, back pain, gait problem and myalgias  Skin: Negative for color change, pallor and rash  Neurological: Negative for dizziness, tremors, speech difficulty, weakness and headaches              PPHx:    1  Onset/Prior Diagnoses:              - reports a long h/o depression  2  Current and past outpatient psych treatment:                - recently established at [facility] but she has not been compliant              - has a BCM through [facility]  3  Inpatient hospitalizations:                - multiple, last was on this unit 7/2020       4  Medication trials in the past (including Family Hx):                - cannot recall but it seems she has been on abilify and prozac for a long time  5  Suicide attempts:                - reports a h/o cutting but no medical intervention  6  Substance use:              - denies    HOSPITAL COURSE    1  Treatment and response: Fluoxetine was discontinued due to presumed ineffectiveness despite compliance at adequate doses over the years  Patient was started on effexor instead  Abilify was continued as is at 10mg po daily  Patient tolerated medications well and had good response to treatment as noted by resolution of suicidal thoughts  Risks/benefits of medications discussed with patient/family who verbalized understanding and agreed with plan  Is patient being discharged on more than 1 antipsychotic? no    2  Behavior/diagnostic clarification: none    3  Medical comorbidities: no acute needs    4   Case management: provided outpatient referrals    MSE ON DISCHARGE    Appearance: fair grooming, intact hygiene  Gait/station: stable  Behavior: calm, cooperative  Motor activity: no psychomotor retardation/agitation  Muscle strength and tone: intact  Speech: wnl  Language: intact  Mood: euthymic  Affect: neutral, stable, reactive  Thought process: linear, logical  Thought content: no delusions elicited  Risk Potential: denies SI/HI  Perceptual disturbances: denies AH/VH  Consciousness: alert  Sensorium:  oriented to all domains  Memory: intact  Intellect: average  Fund of knowledge: intact  Cognition:  grossly intact   Insight: fair  Judgement: adequate    Patient is being discharged due to having had complete and sustained resolution of suicidal thoughts  She reports feeling more comfortable returning to her apartment, even with her ex-boyfriend living in the building  Patient is forward thinking  Is no longer considered an acute danger to self or others  Discussed with treatment team      Discharge plan/instructions: Patient is being discharged to home  Follow up with: Amy Fisher    See after visit summary for additional details of outpatient follow up arrangements  MOST RECENT LABS AND OTHER WORKUP PERFORMED DURING THIS ADMISSION:    I have personally reviewed all pertinent laboratory/tests results    CBC:   Lab Results   Component Value Date    WBC 10 10 08/13/2020    RBC 4 52 08/13/2020    HGB 13 2 08/13/2020    HCT 38 9 08/13/2020    MCV 86 08/13/2020     08/13/2020    MCH 29 3 08/13/2020    MCHC 34 0 08/13/2020    RDW 14 5 08/13/2020    MPV 8 2 (L) 08/13/2020    NRBC 0 08/11/2020    NEUTROABS 5 70 08/13/2020     CMP:   Lab Results   Component Value Date    SODIUM 137 08/13/2020    K 3 9 08/13/2020     08/13/2020    CO2 27 08/13/2020    AGAP 7 08/13/2020    BUN 19 08/13/2020    CREATININE 0 77 08/13/2020    GLUC 104 (H) 08/13/2020    GLUF 104 (H) 08/13/2020    CALCIUM 9 4 08/13/2020    AST 22 08/13/2020    ALT 20 08/13/2020    ALKPHOS 76 08/13/2020    TP 7 6 08/13/2020    ALB 4 0 08/13/2020    TBILI 1 00 08/13/2020    EGFR 87 08/13/2020     Lipid Profile:   Lab Results   Component Value Date    CHOLESTEROL 170 08/13/2020    HDL 30 (L) 08/13/2020    TRIG 142 08/13/2020    LDLCALC 112 08/13/2020    [place] 140 08/13/2020     Thyroid Studies:   Lab Results   Component Value Date    LBE0NFYRJBZN 1 490 08/13/2020    K5UVRGD 9 3 08/13/2020     COVID19:   Lab Results   Component Value Date    SARSCOV2 Negative 08/12/2020     Drug Screen:   Lab Results   Component Value Date    AMPMETHUR Negative 08/12/2020    BARBTUR Negative 08/12/2020    BDZUR Negative 08/12/2020    THCUR Negative 08/12/2020    COCAINEUR Negative 08/12/2020    METHADONEUR Negative 08/12/2020    OPIATEUR Negative 08/12/2020    PCPUR Negative 08/12/2020     Medication Drug Levels: No results found for: CBMZFREE, PHENOBARB, PHENYTOIN, VALPROICTOT, CARBAMAZEPIN, LAMOTRIGINE, LEVETIRACETA, TOPIRAMATE  Urinalysis   Lab Results   Component Value Date    COLORU [place] (A) 07/29/2020    CLARITYU Slightly Cloudy (A) 07/29/2020    SPECGRAV 1 020 07/29/2020    PHUR 6 0 07/29/2020    LEUKOCYTESUR 25 0 (A) 07/29/2020    NITRITE Negative 07/29/2020    PROTEIN UA Negative 07/29/2020    GLUCOSEU Negative 07/29/2020    KETONESU Negative 07/29/2020    UROBILINOGEN Negative 07/29/2020    BILIRUBINUR Negative 07/29/2020    BLOODU Negative 07/29/2020    RBCUA 0-5 07/29/2020    WBCUA None Seen 07/29/2020    EPIS Moderate (A) 07/29/2020    BACTERIA Occasional 07/29/2020     Ext Breath Alcohol   Lab Results   Component Value Date    BREATHALC  08/11/2020     EKG   Lab Results   Component Value Date    VENTRATE 54 08/13/2020    ATRIALRATE 54 08/13/2020    PRINT 148 08/13/2020    QRSDINT 92 08/13/2020    QTINT 480 08/13/2020    QTCINT 455 08/13/2020    PAXIS 60 08/13/2020    QRSAXIS 13 08/13/2020    TWAVEAXIS 54 08/13/2020         25 minutes spent on discharge       Smooth Biggs MD  08/17/20

## 2020-08-17 NOTE — BH TRANSITION RECORD
Contact Information: If you have any questions, concerns, pended studies, tests and/or procedures, or emergencies regarding your inpatient behavioral health visit  Please contact [facility] older adult behavioral health unit 6T [phone] and ask to speak to a , nurse or physician  A contact is available 24 hours/ 7 days a week at this number  Summary of Procedures Performed During your Stay:  Below is a list of major procedures performed during your hospital stay and a summary of results:  - No major procedures performed  Pending Studies (From admission, onward)    None        If studies are pending at discharge, follow up with your PCP and/or referring provider

## 2020-08-17 NOTE — PROGRESS NOTES
08/17/20 1000 08/17/20 1100   Activity/Group Checklist   Group Community meeting  (self care topic ) Impromptu group (Comment)  (group on journaling )   Attendance Attended Attended   Attendance Duration (min) 31-45 16-30   Interactions Interacted appropriately Interacted appropriately  (but sat away from discussion )   Affect/Mood Appropriate;Calm Constricted   Goals Achieved Able to engage in interactions; Able to listen to others;Discussed coping strategies; Identified feelings Able to listen to others;Discussed coping strategies  (Pt  was receptive to a taking a packet on journal prompts )

## 2020-08-17 NOTE — CASE MANAGEMENT
Patient is being discharged, no SI/HI, no psychosis or A/V  Patient is in agreement with discharge  No questions verbalized  Patient provided with after care appointment, discharge instructions and prescriptions  IMM, quality core measures, transition of care, discharge instructions, and treatment plan complete  Patient will be transported by [facility] at 2pm for a discharge home with outpatient follow up   SW will continue to follow up as needed  Patient's discharge was faxed to outpatient providers  Discharge summary faxed to PCP, Amy Fisher, and DEACON Lozada [facility]) [phone]

## 2020-08-17 NOTE — PLAN OF CARE
Problem: DISCHARGE PLANNING  Goal: Discharge to home or other facility with appropriate resources  Description: INTERVENTIONS:  - Identify barriers to discharge w/patient and caregiver  - Arrange for needed discharge resources and transportation as appropriate  - Identify discharge learning needs (meds, wound care, etc )  - Arrange for interpretive services to assist at discharge as needed  - Refer to Case Management Department for coordinating discharge planning if the patient needs post-hospital services based on physician/advanced practitioner order or complex needs related to functional status, cognitive ability, or social support system  Outcome: Completed     Patient in agreement with discharge

## 2020-08-17 NOTE — PLAN OF CARE
Problem: Risk for Self Injury/Neglect  Goal: Verbalize thoughts and feelings  Description: Interventions:  - Assess and re-assess patient's lethality and potential for self-injury  - Engage patient in 1:1 interactions, daily, for a minimum of 15 minutes  - Encourage patient to express feelings, fears, frustrations, hopes  - Establish rapport/trust with patient   Outcome: Progressing     Problem: Risk for Self Injury/Neglect  Goal: Refrain from harming self  Description: Interventions:  - Monitor patient closely, per order  - Develop a trusting relationship  - Supervise medication ingestion, monitor effects and side effects   Outcome: Progressing     Problem: Depression  Goal: Refrain from harming self  Description: Interventions:  - Monitor patient closely, per order   - Supervise medication ingestion, monitor effects and side effects   Outcome: Progressing

## 2020-08-17 NOTE — NURSING NOTE
Pt was seen in a day room, calm, compliant with care, denied symptoms  Pt stayed quiet a bed, however reported not sleeping well last night  Pt did not look happy this am  No other complains this am  Will continue to monitor

## 2020-08-17 NOTE — DISCHARGE INSTR - APPOINTMENTS
You will be seen by Dr Sandar Carreon on 11/11/2020 at 8:30am at [facility], [phone]    Please continue to follow with [address], [phone], [facility] from [facility]

## 2020-09-14 ENCOUNTER — HOSPITAL ENCOUNTER (EMERGENCY)
Facility: HOSPITAL | Age: 55
End: 2020-09-15
Attending: EMERGENCY MEDICINE | Admitting: EMERGENCY MEDICINE
Payer: COMMERCIAL

## 2020-09-14 DIAGNOSIS — F33.1 MAJOR DEPRESSIVE DISORDER, RECURRENT, MODERATE (HCC): Primary | Chronic | ICD-10-CM

## 2020-09-14 DIAGNOSIS — R45.851 SUICIDAL IDEATIONS: ICD-10-CM

## 2020-09-14 LAB
ALBUMIN SERPL BCP-MCNC: 3.1 G/DL (ref 3.5–5)
ALP SERPL-CCNC: 84 U/L (ref 46–116)
ALT SERPL W P-5'-P-CCNC: 27 U/L (ref 12–78)
AMPHETAMINES SERPL QL SCN: NEGATIVE
ANION GAP SERPL CALCULATED.3IONS-SCNC: 7 MMOL/L (ref 4–13)
AST SERPL W P-5'-P-CCNC: 15 U/L (ref 5–45)
BARBITURATES UR QL: NEGATIVE
BASOPHILS # BLD AUTO: 0.05 THOUSANDS/ΜL (ref 0–0.1)
BASOPHILS NFR BLD AUTO: 0 % (ref 0–1)
BENZODIAZ UR QL: NEGATIVE
BILIRUB SERPL-MCNC: 0.53 MG/DL (ref 0.2–1)
BUN SERPL-MCNC: 15 MG/DL (ref 5–25)
CALCIUM SERPL-MCNC: 9.2 MG/DL (ref 8.3–10.1)
CHLORIDE SERPL-SCNC: 111 MMOL/L (ref 100–108)
CO2 SERPL-SCNC: 24 MMOL/L (ref 21–32)
COCAINE UR QL: NEGATIVE
CREAT SERPL-MCNC: 0.95 MG/DL (ref 0.6–1.3)
EOSINOPHIL # BLD AUTO: 0.52 THOUSAND/ΜL (ref 0–0.61)
EOSINOPHIL NFR BLD AUTO: 4 % (ref 0–6)
ERYTHROCYTE [DISTWIDTH] IN BLOOD BY AUTOMATED COUNT: 14 % (ref 11.6–15.1)
ETHANOL SERPL-MCNC: <3 MG/DL (ref 0–3)
EXT PREG TEST URINE: NEGATIVE
EXT. CONTROL ED NAV: NORMAL
GFR SERPL CREATININE-BSD FRML MDRD: 68 ML/MIN/1.73SQ M
GLUCOSE SERPL-MCNC: 111 MG/DL (ref 65–140)
HCT VFR BLD AUTO: 37.5 % (ref 34.8–46.1)
HGB BLD-MCNC: 12.1 G/DL (ref 11.5–15.4)
IMM GRANULOCYTES # BLD AUTO: 0.03 THOUSAND/UL (ref 0–0.2)
IMM GRANULOCYTES NFR BLD AUTO: 0 % (ref 0–2)
LYMPHOCYTES # BLD AUTO: 4.25 THOUSANDS/ΜL (ref 0.6–4.47)
LYMPHOCYTES NFR BLD AUTO: 35 % (ref 14–44)
MCH RBC QN AUTO: 28.8 PG (ref 26.8–34.3)
MCHC RBC AUTO-ENTMCNC: 32.3 G/DL (ref 31.4–37.4)
MCV RBC AUTO: 89 FL (ref 82–98)
METHADONE UR QL: NEGATIVE
MONOCYTES # BLD AUTO: 1.27 THOUSAND/ΜL (ref 0.17–1.22)
MONOCYTES NFR BLD AUTO: 11 % (ref 4–12)
NEUTROPHILS # BLD AUTO: 5.96 THOUSANDS/ΜL (ref 1.85–7.62)
NEUTS SEG NFR BLD AUTO: 50 % (ref 43–75)
NRBC BLD AUTO-RTO: 0 /100 WBCS
OPIATES UR QL SCN: NEGATIVE
OXYCODONE+OXYMORPHONE UR QL SCN: NEGATIVE
PCP UR QL: NEGATIVE
PLATELET # BLD AUTO: 223 THOUSANDS/UL (ref 149–390)
PMV BLD AUTO: 10 FL (ref 8.9–12.7)
POTASSIUM SERPL-SCNC: 3.5 MMOL/L (ref 3.5–5.3)
PROT SERPL-MCNC: 7.3 G/DL (ref 6.4–8.2)
RBC # BLD AUTO: 4.2 MILLION/UL (ref 3.81–5.12)
SARS-COV-2 RNA RESP QL NAA+PROBE: NEGATIVE
SODIUM SERPL-SCNC: 142 MMOL/L (ref 136–145)
THC UR QL: NEGATIVE
TROPONIN I SERPL-MCNC: <0.02 NG/ML
TSH SERPL DL<=0.05 MIU/L-ACNC: 2.12 UIU/ML (ref 0.36–3.74)
WBC # BLD AUTO: 12.08 THOUSAND/UL (ref 4.31–10.16)

## 2020-09-14 PROCEDURE — 80307 DRUG TEST PRSMV CHEM ANLYZR: CPT | Performed by: EMERGENCY MEDICINE

## 2020-09-14 PROCEDURE — 96372 THER/PROPH/DIAG INJ SC/IM: CPT

## 2020-09-14 PROCEDURE — 36415 COLL VENOUS BLD VENIPUNCTURE: CPT | Performed by: EMERGENCY MEDICINE

## 2020-09-14 PROCEDURE — 99285 EMERGENCY DEPT VISIT HI MDM: CPT

## 2020-09-14 PROCEDURE — 80320 DRUG SCREEN QUANTALCOHOLS: CPT | Performed by: EMERGENCY MEDICINE

## 2020-09-14 PROCEDURE — 99285 EMERGENCY DEPT VISIT HI MDM: CPT | Performed by: EMERGENCY MEDICINE

## 2020-09-14 PROCEDURE — 80053 COMPREHEN METABOLIC PANEL: CPT | Performed by: EMERGENCY MEDICINE

## 2020-09-14 PROCEDURE — 85025 COMPLETE CBC W/AUTO DIFF WBC: CPT | Performed by: EMERGENCY MEDICINE

## 2020-09-14 PROCEDURE — 81025 URINE PREGNANCY TEST: CPT | Performed by: EMERGENCY MEDICINE

## 2020-09-14 PROCEDURE — 84443 ASSAY THYROID STIM HORMONE: CPT | Performed by: EMERGENCY MEDICINE

## 2020-09-14 PROCEDURE — 93005 ELECTROCARDIOGRAM TRACING: CPT

## 2020-09-14 PROCEDURE — 84484 ASSAY OF TROPONIN QUANT: CPT | Performed by: EMERGENCY MEDICINE

## 2020-09-14 PROCEDURE — U0003 INFECTIOUS AGENT DETECTION BY NUCLEIC ACID (DNA OR RNA); SEVERE ACUTE RESPIRATORY SYNDROME CORONAVIRUS 2 (SARS-COV-2) (CORONAVIRUS DISEASE [COVID-19]), AMPLIFIED PROBE TECHNIQUE, MAKING USE OF HIGH THROUGHPUT TECHNOLOGIES AS DESCRIBED BY CMS-2020-01-R: HCPCS | Performed by: EMERGENCY MEDICINE

## 2020-09-14 RX ORDER — OLANZAPINE 10 MG/1
10 INJECTION, POWDER, LYOPHILIZED, FOR SOLUTION INTRAMUSCULAR ONCE
Status: COMPLETED | OUTPATIENT
Start: 2020-09-14 | End: 2020-09-14

## 2020-09-14 RX ADMIN — OLANZAPINE 10 MG: 10 INJECTION, POWDER, FOR SOLUTION INTRAMUSCULAR at 20:14

## 2020-09-14 RX ADMIN — WATER 2.1 ML: 1 INJECTION INTRAMUSCULAR; INTRAVENOUS; SUBCUTANEOUS at 20:15
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
## 2020-09-15 ENCOUNTER — HOSPITAL ENCOUNTER (INPATIENT)
Facility: HOSPITAL | Age: 55
LOS: 7 days | Discharge: HOME | DRG: 885 | End: 2020-09-22
Attending: PSYCHIATRY & NEUROLOGY | Admitting: PSYCHIATRY & NEUROLOGY
Payer: COMMERCIAL

## 2020-09-15 VITALS
TEMPERATURE: 98.6 F | SYSTOLIC BLOOD PRESSURE: 122 MMHG | OXYGEN SATURATION: 93 % | HEART RATE: 56 BPM | DIASTOLIC BLOOD PRESSURE: 77 MMHG | RESPIRATION RATE: 18 BRPM

## 2020-09-15 DIAGNOSIS — F33.2 SEVERE EPISODE OF RECURRENT MAJOR DEPRESSIVE DISORDER, WITHOUT PSYCHOTIC FEATURES (HCC): Primary | ICD-10-CM

## 2020-09-15 DIAGNOSIS — F33.1 MAJOR DEPRESSIVE DISORDER, RECURRENT, MODERATE (HCC): Chronic | ICD-10-CM

## 2020-09-15 LAB
ATRIAL RATE: 84 BPM
P AXIS: 57 DEGREES
PR INTERVAL: 156 MS
QRS AXIS: 9 DEGREES
QRSD INTERVAL: 90 MS
QT INTERVAL: 374 MS
QTC INTERVAL: 441 MS
T WAVE AXIS: 63 DEGREES
VENTRICULAR RATE: 84 BPM

## 2020-09-15 PROCEDURE — 93010 ELECTROCARDIOGRAM REPORT: CPT | Performed by: INTERNAL MEDICINE

## 2020-09-15 RX ORDER — NICOTINE 21 MG/24HR
1 PATCH, TRANSDERMAL 24 HOURS TRANSDERMAL DAILY
Status: DISCONTINUED | OUTPATIENT
Start: 2020-09-16 | End: 2020-09-22 | Stop reason: HOSPADM

## 2020-09-15 RX ORDER — LORAZEPAM 2 MG/ML
2 INJECTION INTRAMUSCULAR EVERY 4 HOURS PRN
Status: DISCONTINUED | OUTPATIENT
Start: 2020-09-15 | End: 2020-09-22 | Stop reason: HOSPADM

## 2020-09-15 RX ORDER — OLANZAPINE 10 MG/1
10 INJECTION, POWDER, LYOPHILIZED, FOR SOLUTION INTRAMUSCULAR
Status: CANCELLED | OUTPATIENT
Start: 2020-09-15

## 2020-09-15 RX ORDER — BENZTROPINE MESYLATE 1 MG/1
1 TABLET ORAL EVERY 6 HOURS PRN
Status: DISCONTINUED | OUTPATIENT
Start: 2020-09-15 | End: 2020-09-22 | Stop reason: HOSPADM

## 2020-09-15 RX ORDER — RISPERIDONE 1 MG/1
1 TABLET, ORALLY DISINTEGRATING ORAL
Status: DISCONTINUED | OUTPATIENT
Start: 2020-09-15 | End: 2020-09-22 | Stop reason: HOSPADM

## 2020-09-15 RX ORDER — HYDROXYZINE HYDROCHLORIDE 25 MG/1
25 TABLET, FILM COATED ORAL EVERY 4 HOURS PRN
Status: DISCONTINUED | OUTPATIENT
Start: 2020-09-15 | End: 2020-09-22 | Stop reason: HOSPADM

## 2020-09-15 RX ORDER — OLANZAPINE 10 MG/1
10 INJECTION, POWDER, LYOPHILIZED, FOR SOLUTION INTRAMUSCULAR
Status: DISCONTINUED | OUTPATIENT
Start: 2020-09-15 | End: 2020-09-22 | Stop reason: HOSPADM

## 2020-09-15 RX ORDER — BENZTROPINE MESYLATE 1 MG/1
1 TABLET ORAL EVERY 6 HOURS PRN
Status: CANCELLED | OUTPATIENT
Start: 2020-09-15

## 2020-09-15 RX ORDER — IBUPROFEN 400 MG/1
400 TABLET ORAL EVERY 8 HOURS PRN
Status: CANCELLED | OUTPATIENT
Start: 2020-09-15

## 2020-09-15 RX ORDER — RISPERIDONE 1 MG/1
1 TABLET, ORALLY DISINTEGRATING ORAL
Status: CANCELLED | OUTPATIENT
Start: 2020-09-15

## 2020-09-15 RX ORDER — IBUPROFEN 800 MG/1
800 TABLET ORAL EVERY 8 HOURS PRN
Status: DISCONTINUED | OUTPATIENT
Start: 2020-09-15 | End: 2020-09-22 | Stop reason: HOSPADM

## 2020-09-15 RX ORDER — IBUPROFEN 600 MG/1
600 TABLET ORAL EVERY 8 HOURS PRN
Status: CANCELLED | OUTPATIENT
Start: 2020-09-15

## 2020-09-15 RX ORDER — IBUPROFEN 400 MG/1
400 TABLET ORAL EVERY 8 HOURS PRN
Status: DISCONTINUED | OUTPATIENT
Start: 2020-09-15 | End: 2020-09-22 | Stop reason: HOSPADM

## 2020-09-15 RX ORDER — HYDROXYZINE HYDROCHLORIDE 25 MG/1
50 TABLET, FILM COATED ORAL EVERY 4 HOURS PRN
Status: CANCELLED | OUTPATIENT
Start: 2020-09-15

## 2020-09-15 RX ORDER — HYDROXYZINE HYDROCHLORIDE 25 MG/1
50 TABLET, FILM COATED ORAL EVERY 4 HOURS PRN
Status: DISCONTINUED | OUTPATIENT
Start: 2020-09-15 | End: 2020-09-22 | Stop reason: HOSPADM

## 2020-09-15 RX ORDER — TRAZODONE HYDROCHLORIDE 50 MG/1
50 TABLET ORAL
Status: DISCONTINUED | OUTPATIENT
Start: 2020-09-15 | End: 2020-09-22 | Stop reason: HOSPADM

## 2020-09-15 RX ORDER — HALOPERIDOL 5 MG
5 TABLET ORAL EVERY 6 HOURS PRN
Status: DISCONTINUED | OUTPATIENT
Start: 2020-09-15 | End: 2020-09-22 | Stop reason: HOSPADM

## 2020-09-15 RX ORDER — TRAZODONE HYDROCHLORIDE 50 MG/1
50 TABLET ORAL
Status: CANCELLED | OUTPATIENT
Start: 2020-09-15

## 2020-09-15 RX ORDER — HYDROXYZINE HYDROCHLORIDE 25 MG/1
25 TABLET, FILM COATED ORAL EVERY 4 HOURS PRN
Status: CANCELLED | OUTPATIENT
Start: 2020-09-15

## 2020-09-15 RX ORDER — HALOPERIDOL 5 MG/ML
5 INJECTION INTRAMUSCULAR EVERY 6 HOURS PRN
Status: CANCELLED | OUTPATIENT
Start: 2020-09-15

## 2020-09-15 RX ORDER — HALOPERIDOL 5 MG
5 TABLET ORAL EVERY 6 HOURS PRN
Status: CANCELLED | OUTPATIENT
Start: 2020-09-15

## 2020-09-15 RX ORDER — BENZTROPINE MESYLATE 1 MG/ML
1 INJECTION INTRAMUSCULAR; INTRAVENOUS EVERY 6 HOURS PRN
Status: DISCONTINUED | OUTPATIENT
Start: 2020-09-15 | End: 2020-09-22 | Stop reason: HOSPADM

## 2020-09-15 RX ORDER — NICOTINE 21 MG/24HR
1 PATCH, TRANSDERMAL 24 HOURS TRANSDERMAL DAILY
Status: CANCELLED | OUTPATIENT
Start: 2020-09-15

## 2020-09-15 RX ORDER — IBUPROFEN 600 MG/1
600 TABLET ORAL EVERY 8 HOURS PRN
Status: DISCONTINUED | OUTPATIENT
Start: 2020-09-15 | End: 2020-09-22 | Stop reason: HOSPADM

## 2020-09-15 RX ORDER — LORAZEPAM 2 MG/ML
2 INJECTION INTRAMUSCULAR EVERY 4 HOURS PRN
Status: CANCELLED | OUTPATIENT
Start: 2020-09-15

## 2020-09-15 RX ORDER — BENZTROPINE MESYLATE 1 MG/ML
1 INJECTION INTRAMUSCULAR; INTRAVENOUS EVERY 6 HOURS PRN
Status: CANCELLED | OUTPATIENT
Start: 2020-09-15

## 2020-09-15 RX ORDER — IBUPROFEN 400 MG/1
800 TABLET ORAL EVERY 8 HOURS PRN
Status: CANCELLED | OUTPATIENT
Start: 2020-09-15

## 2020-09-15 RX ORDER — MAGNESIUM HYDROXIDE/ALUMINUM HYDROXICE/SIMETHICONE 120; 1200; 1200 MG/30ML; MG/30ML; MG/30ML
30 SUSPENSION ORAL EVERY 4 HOURS PRN
Status: DISCONTINUED | OUTPATIENT
Start: 2020-09-15 | End: 2020-09-22 | Stop reason: HOSPADM

## 2020-09-15 RX ORDER — HALOPERIDOL 5 MG/ML
5 INJECTION INTRAMUSCULAR EVERY 6 HOURS PRN
Status: DISCONTINUED | OUTPATIENT
Start: 2020-09-15 | End: 2020-09-22 | Stop reason: HOSPADM

## 2020-09-15 RX ORDER — MAGNESIUM HYDROXIDE/ALUMINUM HYDROXICE/SIMETHICONE 120; 1200; 1200 MG/30ML; MG/30ML; MG/30ML
30 SUSPENSION ORAL EVERY 4 HOURS PRN
Status: CANCELLED | OUTPATIENT
Start: 2020-09-15

## 2020-09-15 NOTE — ED NOTES
Pt was brought to the ed last night after her  [place] [phone] petitioned a 36  Pt had called and texted Kumar Mann that she was suicidal and was going to kill herself  Kumar Mann called for police and ems  Today pt is still suicidal and hasn't been taking her meds recently  Pt states the meds aren't working and make her gain weight  Pt states she is not going to take any new meds as well  Pt has been inpatient many times in the past  She was not willing to sign a 201 so the 302 was upheld by ED Dr Ariel Interiano  Pt was read her rights and given a copy as well  Pt is currently calm and more cooperative than last night  Pt is worried about her two cats at home  Pt states there is nobody to take care of them  Will contact [facility] crisis to see what can be done to care for the cats  Pt denies HI or hallucinations

## 2020-09-15 NOTE — ED NOTES
Patient is accepted at [facility] [place]  Patient is accepted by CYNTHIA Bynum    Transportation is arranged with [address] is scheduled for 1930    Nurse report is to be called to [phone] prior to patient transfer

## 2020-09-15 NOTE — ED NOTES
Full report given to Behavioral Health Unit RN at this time        Jerry Spann, SHORTY  09/15/20 1382

## 2020-09-15 NOTE — ED NOTES
Insurance Authorization for admission:     Pt has [employer] as primary  Completed and faxed the auth request form  Secondary is [employer]  Completed COB with Luz Elena Gunter
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
## 2020-09-15 NOTE — ED NOTES
Met with pt to inform her of the 302  Pt stated she understands she will be going to inpatient behavioral health and was given a copy of her rights  Faxed the 302 and Act 68 to [facility] crisis  Confirmed it was received by Haider Bergeron  The original 36 is a faxed copy from [facility] crisis and will remain with pts chart

## 2020-09-15 NOTE — EMTALA/ACUTE CARE TRANSFER
[address] [facility] EMERGENCY DEPARTMENT  [address] [place] [id number]  Dept: [phone]      MVNMYM TRANSFER CONSENT    NAME Blas Keane 1965                              MRN [id number]    I have been informed of my rights regarding examination, treatment, and transfer   by Dr Author Urban,     Benefits: Continuity of care    Risks: Potential for delay in receiving treatment      Consent for Transfer:  I acknowledge that my medical condition has been evaluated and explained to me by the emergency department physician or other qualified medical person and/or my attending physician, who has recommended that I be transferred to the service of  Accepting Physician: CYNTHIA Lucio at [address], [address] : [place]  The above potential benefits of such transfer, the potential risks associated with such transfer, and the probable risks of not being transferred have been explained to me, and I fully understand them  The doctor has explained that, in my case, the benefits of transfer outweigh the risks  I agree to be transferred  I authorize the performance of emergency medical procedures and treatments upon me in both transit and upon arrival at the receiving facility  Additionally, I authorize the release of any and all medical records to the receiving facility and request they be transported with me, if possible  I understand that the safest mode of transportation during a medical emergency is an ambulance and that the Hospital advocates the use of this mode of transport  Risks of traveling to the receiving facility by car, including absence of medical control, life sustaining equipment, such as oxygen, and medical personnel has been explained to me and I fully understand them  (NATASHA CORRECT BOX BELOW)  [  ]  I consent to the stated transfer and to be transported by ambulance/helicopter    [  ]  I consent to the stated transfer, but refuse transportation by ambulance and accept full responsibility for my transportation by car  I understand the risks of non-ambulance transfers and I exonerate the Hospital and its staff from any deterioration in my condition that results from this refusal     X___________________________________________    DATE  09/15/20  TIME________  Signature of patient or legally responsible individual signing on patient behalf           RELATIONSHIP TO PATIENT_________________________          Provider Certification    NAME Salena Henderson 1965                              MRN [id number]    A medical screening exam was performed on the above named patient  Based on the examination:    Condition Necessitating Transfer The primary encounter diagnosis was Major depressive disorder, recurrent, moderate ([facility] [address] )  A diagnosis of Suicidal ideations was also pertinent to this visit  Patient Condition: The patient has been stabilized such that within reasonable medical probability, no material deterioration of the patient condition or the condition of the unborn child(juan) is likely to result from the transfer    Reason for Transfer: Level of Care needed not available at this facility    Transfer Requirements: [address]   · Space available and qualified personnel available for treatment as acknowledged by    · Agreed to accept transfer and to provide appropriate medical treatment as acknowledged by       NP [place]  · Appropriate medical records of the examination and treatment of the patient are provided at the time of transfer   [address], [address] _______  · Transfer will be performed by qualified personnel from [facility]  and appropriate transfer equipment as required, including the use of necessary and appropriate life support measures      Provider Certification: I have examined the patient and explained the following risks and benefits of being transferred/refusing transfer to the patient/family:  General risk, such as traffic hazards, adverse weather conditions, rough terrain or turbulence, possible failure of equipment (including vehicle or aircraft), or consequences of actions of persons outside the control of the transport personnel      Based on these reasonable risks and benefits to the patient and/or the unborn child(juan), and based upon the information available at the time of the patients examination, I certify that the medical benefits reasonably to be expected from the provision of appropriate medical treatments at another medical facility outweigh the increasing risks, if any, to the individuals medical condition, and in the case of labor to the unborn child, from effecting the transfer      X____________________________________________ DATE 09/15/20        TIME_______      ORIGINAL - SEND TO MEDICAL RECORDS   COPY - SEND WITH PATIENT DURING TRANSFER

## 2020-09-15 NOTE — ED CARE HANDOFF
Emergency Department Sign Out Note        Sign out and transfer of care from [facility], ED Note  See Separate Emergency Department note  The patient, Kayy Magaña, was evaluated by the previous provider for suicide ideation  Workup Completed:  Pt is medically cleared for inpatient psychiatric admission     ED Course / Workup Pending (followup):  UDS  BAT  COVID  Pregnancy  CBC  CMP  Troponin                      ED Course as of Sep 15 1319   Tue Sep 15, 2020   0703 Case being reviewed        Procedures  MDM    Disposition  Final diagnoses:   None     ED Disposition     None      MD Documentation      Most Recent Value   Sending MD [place]      Follow-up Information    None       Patient's Medications   Discharge Prescriptions    No medications on file     No discharge procedures on file         ED Provider  Electronically Signed by     Lalo Barrow MD  09/15/20 [phone]

## 2020-09-15 NOTE — ED NOTES
Patient sitting up in bed eating a turkey sandwich and having something to drink      Montse Hoffmann RN  09/15/20 1640

## 2020-09-15 NOTE — ED NOTES
Pt carolynns     Red robe bra and heels    medroom 5 cabinet c     Ring and chain        Linda Colon  09/14/20 2013       Linda Colon  09/14/20 2016

## 2020-09-15 NOTE — ED ATTENDING ATTESTATION
9/14/2020  IRandy, DO, saw and evaluated the patient  I have discussed the patient with the resident/non-physician practitioner and agree with the resident's/non-physician practitioner's findings, Plan of Care, and MDM as documented in the resident's/non-physician practitioner's note, except where noted  All available labs and Radiology studies were reviewed  I was present for key portions of any procedure(s) performed by the resident/non-physician practitioner and I was immediately available to provide assistance  At this point I agree with the current assessment done in the Emergency Department  I have conducted an independent evaluation of this patient a history and physical is as follows:    48yo female presents after [address]  Pt admits she has not been taking any of her medications  Currently denies SI and auditory and visual hallucinations but pt is not cooperative with history and physical   Pt was combative on arrival and swung at tech  Denies physical complaints, denies drug use, admits to alcohol use  On exam - nad, combative in locked restraints now, heart tachy, no resp distress  Plan - zyprexa IM, check geriatric psych labs, c/s crisis  302 states that pt expressed SI and has a gun, also admits that she is not caring for herself      ED Course         Critical Care Time  Procedures

## 2020-09-16 PROBLEM — I10 ESSENTIAL HYPERTENSION: Chronic | Status: ACTIVE | Noted: 2019-04-28

## 2020-09-16 PROBLEM — F33.2 MDD (MAJOR DEPRESSIVE DISORDER), RECURRENT EPISODE, SEVERE (HCC): Status: ACTIVE | Noted: 2020-09-16

## 2020-09-16 PROBLEM — Z72.0 TOBACCO USE: Chronic | Status: ACTIVE | Noted: 2020-06-26

## 2020-09-16 PROCEDURE — 99221 1ST HOSP IP/OBS SF/LOW 40: CPT | Performed by: PSYCHIATRY & NEUROLOGY

## 2020-09-16 PROCEDURE — 99254 IP/OBS CNSLTJ NEW/EST MOD 60: CPT | Performed by: PHYSICIAN ASSISTANT

## 2020-09-16 RX ORDER — LISINOPRIL 10 MG/1
10 TABLET ORAL DAILY
Status: DISCONTINUED | OUTPATIENT
Start: 2020-09-17 | End: 2020-09-22 | Stop reason: HOSPADM

## 2020-09-16 RX ORDER — AMLODIPINE BESYLATE 5 MG/1
5 TABLET ORAL DAILY
Status: DISCONTINUED | OUTPATIENT
Start: 2020-09-16 | End: 2020-09-22 | Stop reason: HOSPADM

## 2020-09-16 RX ORDER — ATORVASTATIN CALCIUM 20 MG/1
20 TABLET, FILM COATED ORAL DAILY
Status: DISCONTINUED | OUTPATIENT
Start: 2020-09-17 | End: 2020-09-22 | Stop reason: HOSPADM

## 2020-09-16 RX ORDER — FLUOXETINE HYDROCHLORIDE 20 MG/1
20 CAPSULE ORAL DAILY
Status: DISCONTINUED | OUTPATIENT
Start: 2020-09-16 | End: 2020-09-17

## 2020-09-16 RX ORDER — AMLODIPINE BESYLATE 5 MG/1
5 TABLET ORAL DAILY
Status: DISCONTINUED | OUTPATIENT
Start: 2020-09-17 | End: 2020-09-16

## 2020-09-16 RX ADMIN — FLUOXETINE 20 MG: 20 CAPSULE ORAL at 12:56

## 2020-09-17 PROCEDURE — 99232 SBSQ HOSP IP/OBS MODERATE 35: CPT | Performed by: NURSE PRACTITIONER

## 2020-09-17 RX ADMIN — FLUOXETINE 20 MG: 20 CAPSULE ORAL at 08:49

## 2020-09-17 RX ADMIN — ATORVASTATIN CALCIUM 20 MG: 20 TABLET, FILM COATED ORAL at 08:49

## 2020-09-18 PROCEDURE — 99232 SBSQ HOSP IP/OBS MODERATE 35: CPT | Performed by: NURSE PRACTITIONER

## 2020-09-18 RX ORDER — BACITRACIN, NEOMYCIN, POLYMYXIN B 400; 3.5; 5 [USP'U]/G; MG/G; [USP'U]/G
1 OINTMENT TOPICAL 2 TIMES DAILY
Status: DISCONTINUED | OUTPATIENT
Start: 2020-09-18 | End: 2020-09-22 | Stop reason: HOSPADM

## 2020-09-18 RX ORDER — MIRTAZAPINE 15 MG/1
7.5 TABLET, FILM COATED ORAL
Status: DISCONTINUED | OUTPATIENT
Start: 2020-09-18 | End: 2020-09-22 | Stop reason: HOSPADM

## 2020-09-18 RX ADMIN — FLUOXETINE 30 MG: 20 CAPSULE ORAL at 10:22

## 2020-09-18 RX ADMIN — BACITRACIN, NEOMYCIN, POLYMYXIN B 1 SMALL APPLICATION: 400; 3.5; 5 OINTMENT TOPICAL at 18:08

## 2020-09-18 RX ADMIN — LISINOPRIL 10 MG: 10 TABLET ORAL at 10:22

## 2020-09-18 RX ADMIN — MIRTAZAPINE 7.5 MG: 15 TABLET, FILM COATED ORAL at 21:49

## 2020-09-18 RX ADMIN — AMLODIPINE BESYLATE 5 MG: 5 TABLET ORAL at 10:22

## 2020-09-18 RX ADMIN — ATORVASTATIN CALCIUM 20 MG: 20 TABLET, FILM COATED ORAL at 10:22

## 2020-09-19 PROCEDURE — 99231 SBSQ HOSP IP/OBS SF/LOW 25: CPT | Performed by: PSYCHIATRY & NEUROLOGY

## 2020-09-19 RX ADMIN — MIRTAZAPINE 7.5 MG: 15 TABLET, FILM COATED ORAL at 22:29

## 2020-09-19 RX ADMIN — FLUOXETINE 30 MG: 20 CAPSULE ORAL at 09:16

## 2020-09-19 RX ADMIN — BACITRACIN, NEOMYCIN, POLYMYXIN B 1 SMALL APPLICATION: 400; 3.5; 5 OINTMENT TOPICAL at 17:15

## 2020-09-19 RX ADMIN — BACITRACIN, NEOMYCIN, POLYMYXIN B 1 SMALL APPLICATION: 400; 3.5; 5 OINTMENT TOPICAL at 09:16

## 2020-09-19 RX ADMIN — ATORVASTATIN CALCIUM 20 MG: 20 TABLET, FILM COATED ORAL at 09:16

## 2020-09-20 PROCEDURE — 99232 SBSQ HOSP IP/OBS MODERATE 35: CPT | Performed by: NURSE PRACTITIONER

## 2020-09-20 RX ADMIN — MIRTAZAPINE 7.5 MG: 15 TABLET, FILM COATED ORAL at 21:28

## 2020-09-20 RX ADMIN — ATORVASTATIN CALCIUM 20 MG: 20 TABLET, FILM COATED ORAL at 08:19

## 2020-09-20 RX ADMIN — FLUOXETINE 30 MG: 20 CAPSULE ORAL at 08:19

## 2020-09-20 RX ADMIN — BACITRACIN, NEOMYCIN, POLYMYXIN B 1 SMALL APPLICATION: 400; 3.5; 5 OINTMENT TOPICAL at 17:59

## 2020-09-20 RX ADMIN — BACITRACIN, NEOMYCIN, POLYMYXIN B 1 SMALL APPLICATION: 400; 3.5; 5 OINTMENT TOPICAL at 08:19

## 2020-09-21 PROCEDURE — 99232 SBSQ HOSP IP/OBS MODERATE 35: CPT | Performed by: PSYCHIATRY & NEUROLOGY

## 2020-09-21 RX ADMIN — MIRTAZAPINE 7.5 MG: 15 TABLET, FILM COATED ORAL at 07:05

## 2020-09-21 RX ADMIN — ATORVASTATIN CALCIUM 20 MG: 20 TABLET, FILM COATED ORAL at 08:41

## 2020-09-21 RX ADMIN — BACITRACIN, NEOMYCIN, POLYMYXIN B 1 SMALL APPLICATION: 400; 3.5; 5 OINTMENT TOPICAL at 17:25

## 2020-09-21 RX ADMIN — BACITRACIN, NEOMYCIN, POLYMYXIN B 1 SMALL APPLICATION: 400; 3.5; 5 OINTMENT TOPICAL at 08:41

## 2020-09-21 RX ADMIN — FLUOXETINE 30 MG: 20 CAPSULE ORAL at 08:41

## 2020-09-22 VITALS
WEIGHT: 197 LBS | BODY MASS INDEX: 34.91 KG/M2 | HEIGHT: 63 IN | OXYGEN SATURATION: 97 % | HEART RATE: 71 BPM | RESPIRATION RATE: 18 BRPM | DIASTOLIC BLOOD PRESSURE: 67 MMHG | SYSTOLIC BLOOD PRESSURE: 112 MMHG | TEMPERATURE: 97.9 F

## 2020-09-22 PROBLEM — Z00.8 MEDICAL CLEARANCE FOR PSYCHIATRIC ADMISSION: Status: RESOLVED | Noted: 2020-08-13 | Resolved: 2020-09-22

## 2020-09-22 PROBLEM — F33.1 MAJOR DEPRESSIVE DISORDER, RECURRENT, MODERATE (HCC): Chronic | Status: RESOLVED | Noted: 2017-01-12 | Resolved: 2020-09-22

## 2020-09-22 PROCEDURE — G0009 ADMIN PNEUMOCOCCAL VACCINE: HCPCS | Performed by: NURSE PRACTITIONER

## 2020-09-22 PROCEDURE — 99238 HOSP IP/OBS DSCHRG MGMT 30/<: CPT | Performed by: NURSE PRACTITIONER

## 2020-09-22 PROCEDURE — 90732 PPSV23 VACC 2 YRS+ SUBQ/IM: CPT | Performed by: NURSE PRACTITIONER

## 2020-09-22 RX ORDER — MIRTAZAPINE 7.5 MG/1
7.5 TABLET, FILM COATED ORAL
Qty: 30 TABLET | Refills: 0 | Status: SHIPPED | OUTPATIENT
Start: 2020-09-22 | End: 2020-10-16

## 2020-09-22 RX ORDER — FLUOXETINE 10 MG/1
30 CAPSULE ORAL DAILY
Qty: 90 CAPSULE | Refills: 0 | Status: SHIPPED | OUTPATIENT
Start: 2020-09-23 | End: 2021-05-03 | Stop reason: ALTCHOICE

## 2020-09-22 RX ADMIN — LISINOPRIL 10 MG: 10 TABLET ORAL at 08:33

## 2020-09-22 RX ADMIN — AMLODIPINE BESYLATE 5 MG: 5 TABLET ORAL at 08:33

## 2020-09-22 RX ADMIN — FLUOXETINE 30 MG: 20 CAPSULE ORAL at 08:33

## 2020-09-22 RX ADMIN — BACITRACIN, NEOMYCIN, POLYMYXIN B 1 SMALL APPLICATION: 400; 3.5; 5 OINTMENT TOPICAL at 08:35

## 2020-09-22 RX ADMIN — ATORVASTATIN CALCIUM 20 MG: 20 TABLET, FILM COATED ORAL at 08:33

## 2020-09-22 RX ADMIN — PNEUMOCOCCAL VACCINE POLYVALENT 0.5 ML
25; 25; 25; 25; 25; 25; 25; 25; 25; 25; 25; 25; 25; 25; 25; 25; 25; 25; 25; 25; 25; 25; 25 INJECTION, SOLUTION INTRAMUSCULAR; SUBCUTANEOUS at 11:31

## 2020-10-01 ENCOUNTER — TELEPHONE (OUTPATIENT)
Dept: INTERNAL MEDICINE CLINIC | Facility: CLINIC | Age: 55
End: 2020-10-01

## 2020-10-01 ENCOUNTER — OFFICE VISIT (OUTPATIENT)
Dept: INTERNAL MEDICINE CLINIC | Facility: CLINIC | Age: 55
End: 2020-10-01

## 2020-10-01 VITALS
SYSTOLIC BLOOD PRESSURE: 128 MMHG | DIASTOLIC BLOOD PRESSURE: 98 MMHG | WEIGHT: 197.97 LBS | TEMPERATURE: 95.7 F | HEART RATE: 102 BPM | OXYGEN SATURATION: 96 % | BODY MASS INDEX: 35.07 KG/M2

## 2020-10-01 DIAGNOSIS — M54.16 LUMBAR RADICULOPATHY: ICD-10-CM

## 2020-10-01 DIAGNOSIS — E66.9 CLASS 2 OBESITY WITH BODY MASS INDEX (BMI) OF 35.0 TO 35.9 IN ADULT, UNSPECIFIED OBESITY TYPE, UNSPECIFIED WHETHER SERIOUS COMORBIDITY PRESENT: ICD-10-CM

## 2020-10-01 DIAGNOSIS — F33.2 SEVERE EPISODE OF RECURRENT MAJOR DEPRESSIVE DISORDER, WITHOUT PSYCHOTIC FEATURES (HCC): Primary | ICD-10-CM

## 2020-10-01 PROBLEM — E66.811 OBESITY (BMI 30.0-34.9): Chronic | Status: RESOLVED | Noted: 2020-06-26 | Resolved: 2020-10-01

## 2020-10-01 PROCEDURE — 99213 OFFICE O/P EST LOW 20 MIN: CPT | Performed by: HOSPITALIST

## 2020-10-01 PROCEDURE — 4004F PT TOBACCO SCREEN RCVD TLK: CPT | Performed by: HOSPITALIST

## 2020-10-16 DIAGNOSIS — F33.2 SEVERE EPISODE OF RECURRENT MAJOR DEPRESSIVE DISORDER, WITHOUT PSYCHOTIC FEATURES (HCC): ICD-10-CM

## 2020-10-16 RX ORDER — MIRTAZAPINE 7.5 MG/1
TABLET, FILM COATED ORAL
Qty: 30 TABLET | Refills: 0 | Status: SHIPPED | OUTPATIENT
Start: 2020-10-16 | End: 2022-01-10 | Stop reason: ALTCHOICE

## 2020-10-18 DIAGNOSIS — F33.2 SEVERE EPISODE OF RECURRENT MAJOR DEPRESSIVE DISORDER, WITHOUT PSYCHOTIC FEATURES (HCC): ICD-10-CM

## 2020-10-19 ENCOUNTER — HOSPITAL ENCOUNTER (OUTPATIENT)
Dept: RADIOLOGY | Facility: HOSPITAL | Age: 55
Discharge: HOME | End: 2020-10-19
Payer: COMMERCIAL

## 2020-10-19 ENCOUNTER — OFFICE VISIT (OUTPATIENT)
Dept: OBGYN CLINIC | Facility: HOSPITAL | Age: 55
End: 2020-10-19
Payer: COMMERCIAL

## 2020-10-19 VITALS
DIASTOLIC BLOOD PRESSURE: 86 MMHG | WEIGHT: 197 LBS | HEART RATE: 99 BPM | SYSTOLIC BLOOD PRESSURE: 144 MMHG | BODY MASS INDEX: 34.91 KG/M2 | HEIGHT: 63 IN

## 2020-10-19 DIAGNOSIS — M25.561 CHRONIC PAIN OF BOTH KNEES: ICD-10-CM

## 2020-10-19 DIAGNOSIS — M17.12 PRIMARY OSTEOARTHRITIS OF LEFT KNEE: ICD-10-CM

## 2020-10-19 DIAGNOSIS — M17.11 PRIMARY OSTEOARTHRITIS OF RIGHT KNEE: ICD-10-CM

## 2020-10-19 DIAGNOSIS — G89.29 CHRONIC PAIN OF BOTH KNEES: Primary | ICD-10-CM

## 2020-10-19 DIAGNOSIS — M25.561 CHRONIC PAIN OF BOTH KNEES: Primary | ICD-10-CM

## 2020-10-19 DIAGNOSIS — M25.562 CHRONIC PAIN OF BOTH KNEES: Primary | ICD-10-CM

## 2020-10-19 DIAGNOSIS — G89.29 CHRONIC PAIN OF BOTH KNEES: ICD-10-CM

## 2020-10-19 DIAGNOSIS — M25.562 CHRONIC PAIN OF BOTH KNEES: ICD-10-CM

## 2020-10-19 PROCEDURE — 73562 X-RAY EXAM OF KNEE 3: CPT

## 2020-10-19 PROCEDURE — 3077F SYST BP >= 140 MM HG: CPT | Performed by: PHYSICIAN ASSISTANT

## 2020-10-19 PROCEDURE — 3008F BODY MASS INDEX DOCD: CPT | Performed by: HOSPITALIST

## 2020-10-19 PROCEDURE — 3079F DIAST BP 80-89 MM HG: CPT | Performed by: PHYSICIAN ASSISTANT

## 2020-10-19 PROCEDURE — 4004F PT TOBACCO SCREEN RCVD TLK: CPT | Performed by: PHYSICIAN ASSISTANT

## 2020-10-19 PROCEDURE — 99203 OFFICE O/P NEW LOW 30 MIN: CPT | Performed by: PHYSICIAN ASSISTANT

## 2020-10-19 RX ORDER — FLUOXETINE 10 MG/1
CAPSULE ORAL
Qty: 90 CAPSULE | Refills: 0 | OUTPATIENT
Start: 2020-10-19

## 2020-10-19 RX ORDER — NAPROXEN 500 MG/1
500 TABLET ORAL 2 TIMES DAILY WITH MEALS
Qty: 30 TABLET | Refills: 0 | Status: SHIPPED | OUTPATIENT
Start: 2020-10-19 | End: 2020-11-05 | Stop reason: SDUPTHER

## 2020-10-21 ENCOUNTER — TELEPHONE (OUTPATIENT)
Dept: OBGYN CLINIC | Facility: HOSPITAL | Age: 55
End: 2020-10-21

## 2020-11-04 ENCOUNTER — TELEPHONE (OUTPATIENT)
Dept: OBGYN CLINIC | Facility: HOSPITAL | Age: 55
End: 2020-11-04

## 2020-11-05 ENCOUNTER — EVALUATION (OUTPATIENT)
Dept: PHYSICAL THERAPY | Facility: CLINIC | Age: 55
End: 2020-11-05
Payer: COMMERCIAL

## 2020-11-05 DIAGNOSIS — M17.11 PRIMARY OSTEOARTHRITIS OF RIGHT KNEE: ICD-10-CM

## 2020-11-05 DIAGNOSIS — M25.561 CHRONIC PAIN OF BOTH KNEES: ICD-10-CM

## 2020-11-05 DIAGNOSIS — G89.29 CHRONIC PAIN OF RIGHT KNEE: Primary | ICD-10-CM

## 2020-11-05 DIAGNOSIS — M25.562 CHRONIC PAIN OF BOTH KNEES: ICD-10-CM

## 2020-11-05 DIAGNOSIS — G89.29 CHRONIC PAIN OF BOTH KNEES: ICD-10-CM

## 2020-11-05 DIAGNOSIS — M25.562 CHRONIC PAIN OF LEFT KNEE: ICD-10-CM

## 2020-11-05 DIAGNOSIS — M17.12 PRIMARY OSTEOARTHRITIS OF LEFT KNEE: ICD-10-CM

## 2020-11-05 DIAGNOSIS — G89.29 CHRONIC PAIN OF LEFT KNEE: ICD-10-CM

## 2020-11-05 DIAGNOSIS — M25.561 CHRONIC PAIN OF RIGHT KNEE: Primary | ICD-10-CM

## 2020-11-05 PROCEDURE — 97162 PT EVAL MOD COMPLEX 30 MIN: CPT | Performed by: PHYSICAL THERAPIST

## 2020-11-05 RX ORDER — NAPROXEN 500 MG/1
500 TABLET ORAL 2 TIMES DAILY WITH MEALS
Qty: 10 TABLET | Refills: 0 | Status: SHIPPED | OUTPATIENT
Start: 2020-11-05 | End: 2020-11-10

## 2020-11-09 ENCOUNTER — OFFICE VISIT (OUTPATIENT)
Dept: PHYSICAL THERAPY | Facility: CLINIC | Age: 55
End: 2020-11-09
Payer: COMMERCIAL

## 2020-11-09 DIAGNOSIS — M25.562 CHRONIC PAIN OF LEFT KNEE: ICD-10-CM

## 2020-11-09 DIAGNOSIS — G89.29 CHRONIC PAIN OF RIGHT KNEE: Primary | ICD-10-CM

## 2020-11-09 DIAGNOSIS — G89.29 CHRONIC PAIN OF LEFT KNEE: ICD-10-CM

## 2020-11-09 DIAGNOSIS — M25.561 CHRONIC PAIN OF RIGHT KNEE: Primary | ICD-10-CM

## 2020-11-09 PROCEDURE — 97112 NEUROMUSCULAR REEDUCATION: CPT | Performed by: PHYSICAL THERAPIST

## 2020-11-09 PROCEDURE — 97140 MANUAL THERAPY 1/> REGIONS: CPT | Performed by: PHYSICAL THERAPIST

## 2020-11-10 ENCOUNTER — OFFICE VISIT (OUTPATIENT)
Dept: OBGYN CLINIC | Facility: CLINIC | Age: 55
End: 2020-11-10
Payer: COMMERCIAL

## 2020-11-10 VITALS
HEIGHT: 63 IN | HEART RATE: 107 BPM | BODY MASS INDEX: 35.79 KG/M2 | WEIGHT: 202 LBS | SYSTOLIC BLOOD PRESSURE: 136 MMHG | DIASTOLIC BLOOD PRESSURE: 80 MMHG

## 2020-11-10 DIAGNOSIS — M25.562 CHRONIC PAIN OF BOTH KNEES: Primary | ICD-10-CM

## 2020-11-10 DIAGNOSIS — G89.29 CHRONIC PAIN OF BOTH KNEES: Primary | ICD-10-CM

## 2020-11-10 DIAGNOSIS — M25.561 CHRONIC PAIN OF BOTH KNEES: Primary | ICD-10-CM

## 2020-11-10 DIAGNOSIS — M17.11 PRIMARY OSTEOARTHRITIS OF RIGHT KNEE: Primary | ICD-10-CM

## 2020-11-10 PROCEDURE — 1036F TOBACCO NON-USER: CPT | Performed by: PHYSICAL MEDICINE & REHABILITATION

## 2020-11-10 PROCEDURE — 3008F BODY MASS INDEX DOCD: CPT | Performed by: INTERNAL MEDICINE

## 2020-11-10 PROCEDURE — 3008F BODY MASS INDEX DOCD: CPT | Performed by: PHYSICAL MEDICINE & REHABILITATION

## 2020-11-10 PROCEDURE — 20610 DRAIN/INJ JOINT/BURSA W/O US: CPT | Performed by: PHYSICAL MEDICINE & REHABILITATION

## 2020-11-10 PROCEDURE — 3079F DIAST BP 80-89 MM HG: CPT | Performed by: PHYSICAL MEDICINE & REHABILITATION

## 2020-11-10 PROCEDURE — 3075F SYST BP GE 130 - 139MM HG: CPT | Performed by: PHYSICAL MEDICINE & REHABILITATION

## 2020-11-10 PROCEDURE — 99214 OFFICE O/P EST MOD 30 MIN: CPT | Performed by: PHYSICAL MEDICINE & REHABILITATION

## 2020-11-10 RX ORDER — LIDOCAINE HYDROCHLORIDE 10 MG/ML
3 INJECTION, SOLUTION INFILTRATION; PERINEURAL
Status: COMPLETED | OUTPATIENT
Start: 2020-11-10 | End: 2020-11-10

## 2020-11-10 RX ORDER — TRIAMCINOLONE ACETONIDE 40 MG/ML
40 INJECTION, SUSPENSION INTRA-ARTICULAR; INTRAMUSCULAR
Status: COMPLETED | OUTPATIENT
Start: 2020-11-10 | End: 2020-11-10

## 2020-11-10 RX ORDER — NAPROXEN 500 MG/1
500 TABLET ORAL 2 TIMES DAILY PRN
Qty: 60 TABLET | Refills: 1 | Status: SHIPPED | OUTPATIENT
Start: 2020-11-10 | End: 2022-03-09 | Stop reason: SDUPTHER

## 2020-11-10 RX ADMIN — TRIAMCINOLONE ACETONIDE 40 MG: 40 INJECTION, SUSPENSION INTRA-ARTICULAR; INTRAMUSCULAR at 15:18

## 2020-11-10 RX ADMIN — LIDOCAINE HYDROCHLORIDE 3 ML: 10 INJECTION, SOLUTION INFILTRATION; PERINEURAL at 15:18

## 2020-11-11 ENCOUNTER — APPOINTMENT (OUTPATIENT)
Dept: PHYSICAL THERAPY | Facility: CLINIC | Age: 55
End: 2020-11-11
Payer: COMMERCIAL

## 2020-11-11 ENCOUNTER — OFFICE VISIT (OUTPATIENT)
Dept: INTERNAL MEDICINE CLINIC | Facility: CLINIC | Age: 55
End: 2020-11-11

## 2020-11-11 ENCOUNTER — TELEPHONE (OUTPATIENT)
Dept: INTERNAL MEDICINE CLINIC | Facility: CLINIC | Age: 55
End: 2020-11-11

## 2020-11-11 VITALS
SYSTOLIC BLOOD PRESSURE: 142 MMHG | WEIGHT: 203.2 LBS | OXYGEN SATURATION: 96 % | HEART RATE: 116 BPM | TEMPERATURE: 97.3 F | BODY MASS INDEX: 36 KG/M2 | DIASTOLIC BLOOD PRESSURE: 100 MMHG

## 2020-11-11 DIAGNOSIS — Z12.39 ENCOUNTER FOR SCREENING FOR MALIGNANT NEOPLASM OF BREAST, UNSPECIFIED SCREENING MODALITY: ICD-10-CM

## 2020-11-11 DIAGNOSIS — F33.2 SEVERE EPISODE OF RECURRENT MAJOR DEPRESSIVE DISORDER, WITHOUT PSYCHOTIC FEATURES (HCC): ICD-10-CM

## 2020-11-11 DIAGNOSIS — M25.561 CHRONIC PAIN OF BOTH KNEES: ICD-10-CM

## 2020-11-11 DIAGNOSIS — E66.9 CLASS 2 OBESITY WITH BODY MASS INDEX (BMI) OF 36.0 TO 36.9 IN ADULT, UNSPECIFIED OBESITY TYPE, UNSPECIFIED WHETHER SERIOUS COMORBIDITY PRESENT: Chronic | ICD-10-CM

## 2020-11-11 DIAGNOSIS — J30.2 SEASONAL ALLERGIES: ICD-10-CM

## 2020-11-11 DIAGNOSIS — G89.29 CHRONIC PAIN OF BOTH KNEES: ICD-10-CM

## 2020-11-11 DIAGNOSIS — Z72.0 TOBACCO USE: Chronic | ICD-10-CM

## 2020-11-11 DIAGNOSIS — I10 ESSENTIAL HYPERTENSION: Primary | Chronic | ICD-10-CM

## 2020-11-11 DIAGNOSIS — M25.562 CHRONIC PAIN OF BOTH KNEES: ICD-10-CM

## 2020-11-11 DIAGNOSIS — E78.2 MIXED HYPERLIPIDEMIA: Chronic | ICD-10-CM

## 2020-11-11 PROBLEM — F31.9 BIPOLAR DISORDER (HCC): Chronic | Status: ACTIVE | Noted: 2020-11-11

## 2020-11-11 PROBLEM — G47.00 INSOMNIA: Chronic | Status: ACTIVE | Noted: 2020-11-11

## 2020-11-11 PROBLEM — F41.9 ANXIETY: Chronic | Status: ACTIVE | Noted: 2020-11-11

## 2020-11-11 PROCEDURE — 1036F TOBACCO NON-USER: CPT | Performed by: INTERNAL MEDICINE

## 2020-11-11 PROCEDURE — 3725F SCREEN DEPRESSION PERFORMED: CPT | Performed by: INTERNAL MEDICINE

## 2020-11-11 PROCEDURE — 99213 OFFICE O/P EST LOW 20 MIN: CPT | Performed by: INTERNAL MEDICINE

## 2020-11-11 RX ORDER — FLUTICASONE PROPIONATE 50 MCG
2 SPRAY, SUSPENSION (ML) NASAL DAILY
Qty: 11.1 ML | Refills: 2 | Status: SHIPPED | OUTPATIENT
Start: 2020-11-11

## 2020-11-11 RX ORDER — LORATADINE 10 MG/1
10 TABLET ORAL DAILY
Qty: 30 TABLET | Refills: 2 | Status: SHIPPED | OUTPATIENT
Start: 2020-11-11 | End: 2022-03-09

## 2020-11-11 RX ORDER — BLOOD PRESSURE TEST KIT
KIT MISCELLANEOUS 3 TIMES WEEKLY
Qty: 1 EACH | Refills: 0 | Status: SHIPPED | OUTPATIENT
Start: 2020-11-11 | End: 2022-03-09

## 2020-11-12 ENCOUNTER — OFFICE VISIT (OUTPATIENT)
Dept: PHYSICAL THERAPY | Facility: CLINIC | Age: 55
End: 2020-11-12
Payer: COMMERCIAL

## 2020-11-12 DIAGNOSIS — G89.29 CHRONIC PAIN OF RIGHT KNEE: Primary | ICD-10-CM

## 2020-11-12 DIAGNOSIS — G89.29 CHRONIC PAIN OF LEFT KNEE: ICD-10-CM

## 2020-11-12 DIAGNOSIS — M25.561 CHRONIC PAIN OF RIGHT KNEE: Primary | ICD-10-CM

## 2020-11-12 DIAGNOSIS — M25.562 CHRONIC PAIN OF LEFT KNEE: ICD-10-CM

## 2020-11-12 PROCEDURE — 97112 NEUROMUSCULAR REEDUCATION: CPT

## 2020-11-12 PROCEDURE — 97140 MANUAL THERAPY 1/> REGIONS: CPT

## 2020-11-12 PROCEDURE — 97110 THERAPEUTIC EXERCISES: CPT

## 2020-11-12 RX ORDER — MIRTAZAPINE 7.5 MG/1
TABLET, FILM COATED ORAL
Qty: 30 TABLET | Refills: 0 | OUTPATIENT
Start: 2020-11-12

## 2020-11-17 ENCOUNTER — OFFICE VISIT (OUTPATIENT)
Dept: PHYSICAL THERAPY | Facility: CLINIC | Age: 55
End: 2020-11-17
Payer: COMMERCIAL

## 2020-11-17 DIAGNOSIS — G89.29 CHRONIC PAIN OF RIGHT KNEE: Primary | ICD-10-CM

## 2020-11-17 DIAGNOSIS — G89.29 CHRONIC PAIN OF LEFT KNEE: ICD-10-CM

## 2020-11-17 DIAGNOSIS — M25.562 CHRONIC PAIN OF LEFT KNEE: ICD-10-CM

## 2020-11-17 DIAGNOSIS — M25.561 CHRONIC PAIN OF RIGHT KNEE: Primary | ICD-10-CM

## 2020-11-17 PROCEDURE — 97140 MANUAL THERAPY 1/> REGIONS: CPT

## 2020-11-17 PROCEDURE — 97110 THERAPEUTIC EXERCISES: CPT

## 2020-11-17 PROCEDURE — 97112 NEUROMUSCULAR REEDUCATION: CPT

## 2020-11-19 ENCOUNTER — APPOINTMENT (OUTPATIENT)
Dept: PHYSICAL THERAPY | Facility: CLINIC | Age: 55
End: 2020-11-19
Payer: COMMERCIAL

## 2020-11-23 ENCOUNTER — OFFICE VISIT (OUTPATIENT)
Dept: PHYSICAL THERAPY | Facility: CLINIC | Age: 55
End: 2020-11-23
Payer: COMMERCIAL

## 2020-11-23 DIAGNOSIS — M25.562 CHRONIC PAIN OF LEFT KNEE: ICD-10-CM

## 2020-11-23 DIAGNOSIS — G89.29 CHRONIC PAIN OF RIGHT KNEE: Primary | ICD-10-CM

## 2020-11-23 DIAGNOSIS — G89.29 CHRONIC PAIN OF LEFT KNEE: ICD-10-CM

## 2020-11-23 DIAGNOSIS — M25.561 CHRONIC PAIN OF RIGHT KNEE: Primary | ICD-10-CM

## 2020-11-23 PROCEDURE — 97112 NEUROMUSCULAR REEDUCATION: CPT

## 2020-11-23 PROCEDURE — 97110 THERAPEUTIC EXERCISES: CPT

## 2020-11-25 ENCOUNTER — OFFICE VISIT (OUTPATIENT)
Dept: PHYSICAL THERAPY | Facility: CLINIC | Age: 55
End: 2020-11-25
Payer: COMMERCIAL

## 2020-11-25 DIAGNOSIS — G89.29 CHRONIC PAIN OF LEFT KNEE: ICD-10-CM

## 2020-11-25 DIAGNOSIS — G89.29 CHRONIC PAIN OF RIGHT KNEE: Primary | ICD-10-CM

## 2020-11-25 DIAGNOSIS — M25.561 CHRONIC PAIN OF RIGHT KNEE: Primary | ICD-10-CM

## 2020-11-25 DIAGNOSIS — M25.562 CHRONIC PAIN OF LEFT KNEE: ICD-10-CM

## 2020-11-25 PROCEDURE — 97110 THERAPEUTIC EXERCISES: CPT

## 2020-11-25 PROCEDURE — 97112 NEUROMUSCULAR REEDUCATION: CPT

## 2020-11-25 PROCEDURE — 97010 HOT OR COLD PACKS THERAPY: CPT

## 2020-11-30 ENCOUNTER — APPOINTMENT (OUTPATIENT)
Dept: PHYSICAL THERAPY | Facility: CLINIC | Age: 55
End: 2020-11-30
Payer: COMMERCIAL

## 2020-12-02 ENCOUNTER — APPOINTMENT (OUTPATIENT)
Dept: PHYSICAL THERAPY | Facility: CLINIC | Age: 55
End: 2020-12-02
Payer: COMMERCIAL

## 2020-12-07 ENCOUNTER — EVALUATION (OUTPATIENT)
Dept: PHYSICAL THERAPY | Facility: CLINIC | Age: 55
End: 2020-12-07
Payer: COMMERCIAL

## 2020-12-07 DIAGNOSIS — G89.29 CHRONIC PAIN OF LEFT KNEE: ICD-10-CM

## 2020-12-07 DIAGNOSIS — M25.561 CHRONIC PAIN OF RIGHT KNEE: Primary | ICD-10-CM

## 2020-12-07 DIAGNOSIS — M25.562 CHRONIC PAIN OF LEFT KNEE: ICD-10-CM

## 2020-12-07 DIAGNOSIS — G89.29 CHRONIC PAIN OF RIGHT KNEE: Primary | ICD-10-CM

## 2020-12-07 PROCEDURE — 97140 MANUAL THERAPY 1/> REGIONS: CPT | Performed by: PHYSICAL THERAPIST

## 2020-12-07 PROCEDURE — 97110 THERAPEUTIC EXERCISES: CPT | Performed by: PHYSICAL THERAPIST

## 2020-12-07 PROCEDURE — 97010 HOT OR COLD PACKS THERAPY: CPT | Performed by: PHYSICAL THERAPIST

## 2020-12-07 PROCEDURE — 97112 NEUROMUSCULAR REEDUCATION: CPT | Performed by: PHYSICAL THERAPIST

## 2020-12-15 ENCOUNTER — OFFICE VISIT (OUTPATIENT)
Dept: OBGYN CLINIC | Facility: CLINIC | Age: 55
End: 2020-12-15
Payer: COMMERCIAL

## 2020-12-15 VITALS
SYSTOLIC BLOOD PRESSURE: 155 MMHG | HEART RATE: 106 BPM | BODY MASS INDEX: 36.14 KG/M2 | WEIGHT: 204 LBS | DIASTOLIC BLOOD PRESSURE: 98 MMHG | HEIGHT: 63 IN

## 2020-12-15 DIAGNOSIS — G89.29 CHRONIC PAIN OF BOTH KNEES: ICD-10-CM

## 2020-12-15 DIAGNOSIS — M22.2X2 PATELLOFEMORAL PAIN SYNDROME OF BOTH KNEES: Primary | ICD-10-CM

## 2020-12-15 DIAGNOSIS — M22.2X1 PATELLOFEMORAL PAIN SYNDROME OF BOTH KNEES: Primary | ICD-10-CM

## 2020-12-15 DIAGNOSIS — M25.562 CHRONIC PAIN OF BOTH KNEES: ICD-10-CM

## 2020-12-15 DIAGNOSIS — M25.561 CHRONIC PAIN OF BOTH KNEES: ICD-10-CM

## 2020-12-15 PROCEDURE — 3008F BODY MASS INDEX DOCD: CPT | Performed by: INTERNAL MEDICINE

## 2020-12-15 PROCEDURE — 1036F TOBACCO NON-USER: CPT | Performed by: PHYSICAL MEDICINE & REHABILITATION

## 2020-12-15 PROCEDURE — 3080F DIAST BP >= 90 MM HG: CPT | Performed by: PHYSICAL MEDICINE & REHABILITATION

## 2020-12-15 PROCEDURE — 20610 DRAIN/INJ JOINT/BURSA W/O US: CPT | Performed by: PHYSICAL MEDICINE & REHABILITATION

## 2020-12-15 PROCEDURE — 99214 OFFICE O/P EST MOD 30 MIN: CPT | Performed by: PHYSICAL MEDICINE & REHABILITATION

## 2020-12-15 PROCEDURE — 3077F SYST BP >= 140 MM HG: CPT | Performed by: PHYSICAL MEDICINE & REHABILITATION

## 2020-12-15 PROCEDURE — 3008F BODY MASS INDEX DOCD: CPT | Performed by: PHYSICAL MEDICINE & REHABILITATION

## 2020-12-15 RX ORDER — TRIAMCINOLONE ACETONIDE 40 MG/ML
40 INJECTION, SUSPENSION INTRA-ARTICULAR; INTRAMUSCULAR
Status: COMPLETED | OUTPATIENT
Start: 2020-12-15 | End: 2020-12-15

## 2020-12-15 RX ORDER — LIDOCAINE HYDROCHLORIDE 10 MG/ML
3 INJECTION, SOLUTION INFILTRATION; PERINEURAL
Status: COMPLETED | OUTPATIENT
Start: 2020-12-15 | End: 2020-12-15

## 2020-12-15 RX ADMIN — LIDOCAINE HYDROCHLORIDE 3 ML: 10 INJECTION, SOLUTION INFILTRATION; PERINEURAL at 13:16

## 2020-12-15 RX ADMIN — TRIAMCINOLONE ACETONIDE 40 MG: 40 INJECTION, SUSPENSION INTRA-ARTICULAR; INTRAMUSCULAR at 13:16

## 2020-12-16 ENCOUNTER — TELEPHONE (OUTPATIENT)
Dept: OBGYN CLINIC | Facility: HOSPITAL | Age: 55
End: 2020-12-16

## 2020-12-16 DIAGNOSIS — G89.29 CHRONIC PAIN OF BOTH KNEES: Primary | ICD-10-CM

## 2020-12-16 DIAGNOSIS — M25.562 CHRONIC PAIN OF BOTH KNEES: Primary | ICD-10-CM

## 2020-12-16 DIAGNOSIS — M25.561 CHRONIC PAIN OF BOTH KNEES: Primary | ICD-10-CM

## 2020-12-30 DIAGNOSIS — I10 ESSENTIAL HYPERTENSION: ICD-10-CM

## 2020-12-31 RX ORDER — LISINOPRIL 10 MG/1
TABLET ORAL
Qty: 90 TABLET | Refills: 2 | Status: SHIPPED | OUTPATIENT
Start: 2020-12-31 | End: 2021-11-23 | Stop reason: SDUPTHER

## 2021-01-07 DIAGNOSIS — I10 ESSENTIAL HYPERTENSION: ICD-10-CM

## 2021-01-07 RX ORDER — AMLODIPINE BESYLATE 5 MG/1
5 TABLET ORAL DAILY
Qty: 30 TABLET | Refills: 0 | Status: CANCELLED | OUTPATIENT
Start: 2021-01-07

## 2021-01-07 NOTE — TELEPHONE ENCOUNTER
Name of medication, dose, quantity and frequency:    Requested Prescriptions     Pending Prescriptions Disp Refills    amLODIPine (NORVASC) 5 mg tablet 30 tablet 0     Sig: Take 1 tablet (5 mg total) by mouth daily       Number of refills left: 0    Amount of medication left:    Pharmacy verified and updated: yes    Additional information:    Patient requesting refills

## 2021-01-08 NOTE — TELEPHONE ENCOUNTER
Pt called requesting status of refill  Reports that she is not taking 5mg, she takes 10mg 1x daily  Pt has enough for tomorrow and Sunday   Please have covering doctor send refill ASAP

## 2021-01-12 RX ORDER — AMLODIPINE BESYLATE 5 MG/1
10 TABLET ORAL DAILY
Qty: 90 TABLET | Refills: 1 | Status: SHIPPED | OUTPATIENT
Start: 2021-01-12 | End: 2021-04-03 | Stop reason: SDUPTHER

## 2021-02-17 ENCOUNTER — HOSPITAL ENCOUNTER (EMERGENCY)
Facility: HOSPITAL | Age: 56
Discharge: HOME | End: 2021-02-18
Attending: EMERGENCY MEDICINE
Payer: COMMERCIAL

## 2021-02-17 VITALS
TEMPERATURE: 97.8 F | HEART RATE: 76 BPM | SYSTOLIC BLOOD PRESSURE: 130 MMHG | DIASTOLIC BLOOD PRESSURE: 85 MMHG | OXYGEN SATURATION: 97 % | RESPIRATION RATE: 16 BRPM

## 2021-02-17 DIAGNOSIS — R45.851 DEPRESSION WITH SUICIDAL IDEATION: Primary | ICD-10-CM

## 2021-02-17 DIAGNOSIS — F32.A DEPRESSION WITH SUICIDAL IDEATION: Primary | ICD-10-CM

## 2021-02-17 LAB
BILIRUB UR QL STRIP: NEGATIVE
CLARITY UR: CLEAR
COLOR UR: YELLOW
GLUCOSE UR STRIP-MCNC: NEGATIVE MG/DL
HGB UR QL STRIP.AUTO: NEGATIVE
KETONES UR STRIP-MCNC: NEGATIVE MG/DL
LEUKOCYTE ESTERASE UR QL STRIP: NEGATIVE
NITRITE UR QL STRIP: NEGATIVE
PH UR STRIP.AUTO: 6 [PH] (ref 4.5–8)
PROT UR STRIP-MCNC: NEGATIVE MG/DL
SP GR UR STRIP.AUTO: >=1.03 (ref 1–1.03)
UROBILINOGEN UR QL STRIP.AUTO: 0.2 E.U./DL

## 2021-02-17 PROCEDURE — 81003 URINALYSIS AUTO W/O SCOPE: CPT

## 2021-02-17 PROCEDURE — 99285 EMERGENCY DEPT VISIT HI MDM: CPT

## 2021-02-17 PROCEDURE — 81002 URINALYSIS NONAUTO W/O SCOPE: CPT | Performed by: EMERGENCY MEDICINE

## 2021-02-17 PROCEDURE — 82075 ASSAY OF BREATH ETHANOL: CPT | Performed by: EMERGENCY MEDICINE

## 2021-02-18 LAB
ALBUMIN SERPL BCP-MCNC: 3.6 G/DL (ref 3.5–5)
ALP SERPL-CCNC: 103 U/L (ref 46–116)
ALT SERPL W P-5'-P-CCNC: 25 U/L (ref 12–78)
AMPHETAMINES SERPL QL SCN: NEGATIVE
ANION GAP SERPL CALCULATED.3IONS-SCNC: 6 MMOL/L (ref 4–13)
AST SERPL W P-5'-P-CCNC: 12 U/L (ref 5–45)
ATRIAL RATE: 68 BPM
BARBITURATES UR QL: NEGATIVE
BASOPHILS # BLD AUTO: 0.07 THOUSANDS/ΜL (ref 0–0.1)
BASOPHILS NFR BLD AUTO: 1 % (ref 0–1)
BENZODIAZ UR QL: NEGATIVE
BILIRUB SERPL-MCNC: 0.59 MG/DL (ref 0.2–1)
BUN SERPL-MCNC: 21 MG/DL (ref 5–25)
CALCIUM SERPL-MCNC: 9.5 MG/DL (ref 8.3–10.1)
CHLORIDE SERPL-SCNC: 106 MMOL/L (ref 100–108)
CO2 SERPL-SCNC: 28 MMOL/L (ref 21–32)
COCAINE UR QL: NEGATIVE
COLOR, POC: YELLOW
CREAT SERPL-MCNC: 0.83 MG/DL (ref 0.6–1.3)
EOSINOPHIL # BLD AUTO: 0.43 THOUSAND/ΜL (ref 0–0.61)
EOSINOPHIL NFR BLD AUTO: 3 % (ref 0–6)
ERYTHROCYTE [DISTWIDTH] IN BLOOD BY AUTOMATED COUNT: 13.4 % (ref 11.6–15.1)
ETHANOL EXG-MCNC: 0 MG/DL
FLUAV RNA RESP QL NAA+PROBE: NEGATIVE
FLUBV RNA RESP QL NAA+PROBE: NEGATIVE
GFR SERPL CREATININE-BSD FRML MDRD: 79 ML/MIN/1.73SQ M
GLUCOSE SERPL-MCNC: 112 MG/DL (ref 65–140)
HCT VFR BLD AUTO: 40.1 % (ref 34.8–46.1)
HGB BLD-MCNC: 13.2 G/DL (ref 11.5–15.4)
IMM GRANULOCYTES # BLD AUTO: 0.09 THOUSAND/UL (ref 0–0.2)
IMM GRANULOCYTES NFR BLD AUTO: 1 % (ref 0–2)
LYMPHOCYTES # BLD AUTO: 4.03 THOUSANDS/ΜL (ref 0.6–4.47)
LYMPHOCYTES NFR BLD AUTO: 29 % (ref 14–44)
MCH RBC QN AUTO: 29 PG (ref 26.8–34.3)
MCHC RBC AUTO-ENTMCNC: 32.9 G/DL (ref 31.4–37.4)
MCV RBC AUTO: 88 FL (ref 82–98)
METHADONE UR QL: NEGATIVE
MONOCYTES # BLD AUTO: 1.43 THOUSAND/ΜL (ref 0.17–1.22)
MONOCYTES NFR BLD AUTO: 10 % (ref 4–12)
NEUTROPHILS # BLD AUTO: 7.95 THOUSANDS/ΜL (ref 1.85–7.62)
NEUTS SEG NFR BLD AUTO: 56 % (ref 43–75)
NRBC BLD AUTO-RTO: 0 /100 WBCS
OPIATES UR QL SCN: NEGATIVE
OXYCODONE+OXYMORPHONE UR QL SCN: NEGATIVE
P AXIS: 45 DEGREES
PCP UR QL: NEGATIVE
PLATELET # BLD AUTO: 274 THOUSANDS/UL (ref 149–390)
PMV BLD AUTO: 9.3 FL (ref 8.9–12.7)
POTASSIUM SERPL-SCNC: 3.9 MMOL/L (ref 3.5–5.3)
PR INTERVAL: 144 MS
PROT SERPL-MCNC: 7.5 G/DL (ref 6.4–8.2)
QRS AXIS: -4 DEGREES
QRSD INTERVAL: 90 MS
QT INTERVAL: 424 MS
QTC INTERVAL: 450 MS
RBC # BLD AUTO: 4.55 MILLION/UL (ref 3.81–5.12)
RSV RNA RESP QL NAA+PROBE: NEGATIVE
SARS-COV-2 RNA RESP QL NAA+PROBE: NEGATIVE
SODIUM SERPL-SCNC: 140 MMOL/L (ref 136–145)
T WAVE AXIS: 21 DEGREES
THC UR QL: NEGATIVE
TSH SERPL DL<=0.05 MIU/L-ACNC: 3.12 UIU/ML (ref 0.36–3.74)
VENTRICULAR RATE: 68 BPM
WBC # BLD AUTO: 14 THOUSAND/UL (ref 4.31–10.16)

## 2021-02-18 PROCEDURE — 93005 ELECTROCARDIOGRAM TRACING: CPT

## 2021-02-18 PROCEDURE — 0241U HB NFCT DS VIR RESP RNA 4 TRGT: CPT | Performed by: EMERGENCY MEDICINE

## 2021-02-18 PROCEDURE — 80053 COMPREHEN METABOLIC PANEL: CPT | Performed by: EMERGENCY MEDICINE

## 2021-02-18 PROCEDURE — 85025 COMPLETE CBC W/AUTO DIFF WBC: CPT | Performed by: EMERGENCY MEDICINE

## 2021-02-18 PROCEDURE — 36415 COLL VENOUS BLD VENIPUNCTURE: CPT | Performed by: EMERGENCY MEDICINE

## 2021-02-18 PROCEDURE — 93010 ELECTROCARDIOGRAM REPORT: CPT | Performed by: INTERNAL MEDICINE

## 2021-02-18 PROCEDURE — 84443 ASSAY THYROID STIM HORMONE: CPT | Performed by: EMERGENCY MEDICINE

## 2021-02-18 PROCEDURE — 80307 DRUG TEST PRSMV CHEM ANLYZR: CPT | Performed by: EMERGENCY MEDICINE

## 2021-02-18 PROCEDURE — 99285 EMERGENCY DEPT VISIT HI MDM: CPT | Performed by: EMERGENCY MEDICINE

## 2021-02-18 NOTE — ED NOTES
Pt is a 63 y o  female who was brought to the ED with worsening depression and suicidal ideations without a plan  Upon arrival to ED, patient had continued to express suicidal ideations to the doctor, however, at the time of this assessment patient no longer has suicidal ideations  Patient reports feeling suicidal, without a plan, earlier today because of feeling lonely due to the end of a relationship a few weeks ago  Patient has felt more depressed over the past week, but identifies no other stressors besides loneliness  Patient last received inpatient treatment in September 2020  She is active with her outpatient treatment through [facility]  Patient speaks with her therapist every week on Tuesday  Patient is taking her medications as prescribed  She denies homicidal ideations and auditory/visual hallucinations  Patient denies changes in sleep or appetite  Treatment options were discussed with patient, who reports feeling safe to return home tonight  She states she has cats at home that nobody else can take care of  Patient confirms she is able to contract for safety and has no plan or intent to harm herself  Patient will reach out to her therapist if she feels she needs additional support and is agreeable to return to the ER if symptoms persist     Chief Complaint   Patient presents with    Depression     pt reports with depression   pt states "i don't want my life anymore" pt has no Suicide plan and hs no HI     Intake Assessment completed, Safety risk Assessment completed    VLADISLAV Barry  02/18/21   0888

## 2021-02-18 NOTE — DISCHARGE INSTRUCTIONS
Return to the emergency department he have continued feelings of hurting yourself or others  Please refer to the outpatient resources provided to you by crisis for behavior health follow-up

## 2021-02-18 NOTE — ED NOTES
Slipper, pants, two shirts, bra, and coat all placed in two clear plastic patient belongings bag and stored in zone 5 med room cabinet (F)     Krista Du  02/18/21 0047

## 2021-02-18 NOTE — ED PROVIDER NOTES
History  Chief Complaint   Patient presents with    Depression     pt reports with depression  pt states "i don't want my life anymore" pt has no Suicide plan and hs no HI     50-year-old female history of depression presents to the emergency department for psychiatric evaluation  The patient reports worsening depression over the past couple of weeks due to relationship with her boyfriend ending  The patient states that since then she has increasingly depressed and today having thoughts of suicide  The patient states that she just does not want to be here anymore  She reports that she does not have any specific plan to commit suicide  The patient denies HI, AVH  She states that she has been compliant with her medication  She has no medical complaints at this time  Prior to Admission Medications   Prescriptions Last Dose Informant Patient Reported? Taking?    Blood Pressure KIT   No Yes   Sig: Use 3 (three) times a week   Diclofenac Sodium (VOLTAREN) 1 %   No Yes   Sig: Apply 2 g topically 3 (three) times a day as needed (Pain)   FLUoxetine (PROzac) 10 mg capsule   No No   Sig: Take 3 capsules (30 mg total) by mouth daily   amLODIPine (NORVASC) 5 mg tablet   No Yes   Sig: Take 2 tablets (10 mg total) by mouth daily   atorvastatin (LIPITOR) 20 mg tablet   No Yes   Sig: Take 1 tablet (20 mg total) by mouth daily   fluticasone (FLONASE) 50 mcg/act nasal spray   No Yes   Si sprays into each nostril daily   lisinopril (ZESTRIL) 10 mg tablet   No Yes   Sig: take 1 tablet by mouth once daily   loratadine (CLARITIN) 10 mg tablet   No Yes   Sig: Take 1 tablet (10 mg total) by mouth daily   mirtazapine (REMERON) 7 5 MG tablet   No Yes   Sig: take 1 tablet by mouth at bedtime   naphazoline-pheniramine (NAPHCON-A) 0 025-0 3 % ophthalmic solution   No Yes   Sig: Administer 1 drop to both eyes 4 (four) times a day as needed for allergies or irritation   naproxen (NAPROSYN) 500 mg tablet   No Yes   Sig: Take 1 tablet (500 mg total) by mouth 2 (two) times a day as needed for moderate pain      Facility-Administered Medications: None       Past Medical History:   Diagnosis Date    Anxiety     Arthritis     Bipolar affective disorder, depressed, severe ([facility] [address] ) 2019    Depression     Elevated bilirubin 8/15/2019    Hyperlipidemia     Hypertension     Hypokalemia 8/15/2019    Learning difficulty     Leukocytosis 2020    Obesity (BMI 30 0-34 9) 2020    Osteopenia     Ovarian failure     Previous known suicide attempt     Psychiatric disorder     Psychiatric illness        Past Surgical History:   Procedure Laterality Date    LAPAROSCOPY      as a child, per patient-normal findings       Family History   Problem Relation Age of Onset    Alzheimer's disease Mother     Depression Mother     Depression Brother     Bipolar disorder Brother     No Known Problems Maternal Aunt     No Known Problems Paternal Aunt     No Known Problems Maternal Uncle     No Known Problems Paternal Uncle     No Known Problems Cousin     ADD / ADHD Neg Hx     Alcohol abuse Neg Hx     Anxiety disorder Neg Hx     Dementia Neg Hx     Drug abuse Neg Hx     OCD Neg Hx     Paranoid behavior Neg Hx     Schizophrenia Neg Hx     Seizures Neg Hx     Self-Injury Neg Hx     Suicide Attempts Neg Hx      I have reviewed and agree with the history as documented      E-Cigarette/Vaping    E-Cigarette Use Never User      E-Cigarette/Vaping Substances    Nicotine No     THC No     CBD No     Flavoring No     Other No     Unknown No      Social History     Tobacco Use    Smoking status: Former Smoker     Packs/day: 0 25     Years: 15 00     Pack years: 3 75     Types: Cigarettes     Quit date: 1990     Years since quittin 1    Smokeless tobacco: Never Used    Tobacco comment: stoped 1 month ago   Substance Use Topics    Alcohol use: Not Currently     Alcohol/week: 2 0 standard drinks     Types: 2 Cans of beer per week     Frequency: 2-3 times a week     Drinks per session: 1 or 2     Binge frequency: Never     Comment: socially    Drug use: No        Review of Systems   Constitutional: Negative for chills and fever  HENT: Negative for ear pain and sore throat  Eyes: Negative for pain and visual disturbance  Respiratory: Negative for cough and shortness of breath  Cardiovascular: Negative for chest pain and palpitations  Gastrointestinal: Negative for abdominal pain and vomiting  Genitourinary: Negative for dysuria and hematuria  Musculoskeletal: Negative for arthralgias and back pain  Skin: Negative for color change and rash  Neurological: Negative for seizures and syncope  Psychiatric/Behavioral: Positive for suicidal ideas  All other systems reviewed and are negative  Physical Exam  ED Triage Vitals   Temperature Pulse Respirations Blood Pressure SpO2   02/17/21 2344 02/17/21 2344 02/17/21 2345 02/17/21 2344 02/17/21 2344   97 8 °F (36 6 °C) 76 16 130/85 97 %      Temp Source Heart Rate Source Patient Position - Orthostatic VS BP Location FiO2 (%)   02/17/21 2344 02/17/21 2344 02/17/21 2344 -- --   Oral Monitor Lying        Pain Score       02/17/21 2344       No Pain             Orthostatic Vital Signs  Vitals:    02/17/21 2344   BP: 130/85   Pulse: 76   Patient Position - Orthostatic VS: Lying       Physical Exam  Vitals signs and nursing note reviewed  Constitutional:       General: She is not in acute distress  Appearance: She is well-developed  HENT:      Head: Normocephalic and atraumatic  Eyes:      Conjunctiva/sclera: Conjunctivae normal    Neck:      Musculoskeletal: Neck supple  Cardiovascular:      Rate and Rhythm: Normal rate and regular rhythm  Heart sounds: No murmur  Pulmonary:      Effort: Pulmonary effort is normal  No respiratory distress  Breath sounds: Normal breath sounds  Abdominal:      Palpations: Abdomen is soft        Tenderness: There is no abdominal tenderness  Skin:     General: Skin is warm and dry  Neurological:      Mental Status: She is alert  Psychiatric:         Attention and Perception: She does not perceive auditory or visual hallucinations  Thought Content: Thought content includes suicidal ideation  Thought content does not include homicidal ideation  Thought content does not include homicidal or suicidal plan  ED Medications  Medications - No data to display    Diagnostic Studies  Results Reviewed     Procedure Component Value Units Date/Time    COVID19, Influenza A/B, RSV PCR, [facility] [[id number]]  (Normal) Collected: 02/18/21 0008    Lab Status: Final result Specimen: Nares from Nasopharyngeal Swab Updated: 02/18/21 0112     SARS-CoV-2 Negative     INFLUENZA A PCR Negative     INFLUENZA B PCR Negative     RSV PCR Negative    Narrative: This test has been authorized by FDA under an EUA (Emergency Use Assay) for use by authorized laboratories  Clinical caution and judgement should be used with the interpretation of these results with consideration of the clinical impression and other laboratory testing  Testing reported as "Positive" or "Negative" has been proven to be accurate according to standard laboratory validation requirements  All testing is performed with control materials showing appropriate reactivity at standard intervals  Rapid drug screen, urine [[id number]]  (Normal) Collected: 02/18/21 0005    Lab Status: Final result Specimen: Urine, Other Updated: 02/18/21 0102     Amph/Meth UR Negative     Barbiturate Ur Negative     Benzodiazepine Urine Negative     Cocaine Urine Negative     Methadone Urine Negative     Opiate Urine Negative     PCP Ur Negative     THC Urine Negative     Oxycodone Urine Negative    Narrative:      FOR MEDICAL PURPOSES ONLY  IF CONFIRMATION NEEDED PLEASE CONTACT THE LAB WITHIN 5 DAYS      Drug Screen Cutoff Levels:  AMPHETAMINE/METHAMPHETAMINES  1000 ng/mL  BARBITURATES     200 ng/mL  BENZODIAZEPINES     200 ng/mL  COCAINE      300 ng/mL  METHADONE      300 ng/mL  OPIATES      300 ng/mL  PHENCYCLIDINE     25 ng/mL  THC       50 ng/mL  OXYCODONE      100 ng/mL    TSH, 3rd generation with Free T4 reflex [[id number]]  (Normal) Collected: 02/18/21 0008    Lab Status: Final result Specimen: Blood from Arm, Left Updated: 02/18/21 0044     TSH 3RD GENERATON 3 120 uIU/mL     Narrative:      Patients undergoing fluorescein dye angiography may retain small amounts of fluorescein in the body for 48-72 hours post procedure  Samples containing fluorescein can produce falsely depressed TSH values  If the patient had this procedure,a specimen should be resubmitted post fluorescein clearance        Comprehensive metabolic panel [[id number]] Collected: 02/18/21 0008    Lab Status: Final result Specimen: Blood from Arm, Left Updated: 02/18/21 0038     Sodium 140 mmol/L      Potassium 3 9 mmol/L      Chloride 106 mmol/L      CO2 28 mmol/L      ANION GAP 6 mmol/L      BUN 21 mg/dL      Creatinine 0 83 mg/dL      Glucose 112 mg/dL      Calcium 9 5 mg/dL      AST 12 U/L      ALT 25 U/L      Alkaline Phosphatase 103 U/L      Total Protein 7 5 g/dL      Albumin 3 6 g/dL      Total Bilirubin 0 59 mg/dL      eGFR 79 ml/min/1 73sq m     Narrative:      Marco A guidelines for Chronic Kidney Disease (CKD):     Stage 1 with normal or high GFR (GFR > 90 mL/min/1 73 square meters)    Stage 2 Mild CKD (GFR = 60-89 mL/min/1 73 square meters)    Stage 3A Moderate CKD (GFR = 45-59 mL/min/1 73 square meters)    Stage 3B Moderate CKD (GFR = 30-44 mL/min/1 73 square meters)    Stage 4 Severe CKD (GFR = 15-29 mL/min/1 73 square meters)    Stage 5 End Stage CKD (GFR <15 mL/min/1 73 square meters)  Note: GFR calculation is accurate only with a steady state creatinine    CBC and differential [[id number]]  (Abnormal) Collected: 02/18/21 0008    Lab Status: Final result Specimen: Blood from Arm, Left Updated: 02/18/21 0020     WBC 14 00 Thousand/uL      RBC 4 55 Million/uL      Hemoglobin 13 2 g/dL      Hematocrit 40 1 %      MCV 88 fL      MCH 29 0 pg      MCHC 32 9 g/dL      RDW 13 4 %      MPV 9 3 fL      Platelets 401 Thousands/uL      nRBC 0 /100 WBCs      Neutrophils Relative 56 %      Immat GRANS % 1 %      Lymphocytes Relative 29 %      Monocytes Relative 10 %      Eosinophils Relative 3 %      Basophils Relative 1 %      Neutrophils Absolute 7 95 Thousands/µL      Immature Grans Absolute 0 09 Thousand/uL      Lymphocytes Absolute 4 03 Thousands/µL      Monocytes Absolute 1 43 Thousand/µL      Eosinophils Absolute 0 43 Thousand/µL      Basophils Absolute 0 07 Thousands/µL     POCT alcohol breath test [[id number]]  (Normal) Resulted: 02/18/21 0009    Lab Status: Final result Updated: 02/18/21 0009     EXTBreath Alcohol 0 000    POCT urinalysis dipstick [[id number]]  (Normal) Resulted: 02/18/21 0008    Lab Status: Final result Updated: 02/18/21 0009     Color, UA yellow    Urine Macroscopic, POC [[id number]] Collected: 02/17/21 2355    Lab Status: Final result Specimen: Urine Updated: 02/17/21 2357     Color, UA Yellow     Clarity, UA Clear     pH, UA 6 0     Leukocytes, UA Negative     Nitrite, UA Negative     Protein, UA Negative mg/dl      Glucose, UA Negative mg/dl      Ketones, UA Negative mg/dl      Urobilinogen, UA 0 2 E U /dl      Bilirubin, UA Negative     Blood, UA Negative     Specific Gravity, UA >=1 030    Narrative:      CLINITEK RESULT                 No orders to display         Procedures  ECG 12 Lead Documentation Only    Date/Time: 2/18/2021 12:09 AM  Performed by: Julian Alfaro MD  Authorized by: Julian Alfaro MD     ECG reviewed by me, the ED Provider: yes    Patient location:  ED  Previous ECG:     Previous ECG:  Compared to current    Similarity:  No change    Comparison to cardiac monitor: Yes    Interpretation:     Interpretation: abnormal Rate:     ECG rate assessment: normal    Rhythm:     Rhythm: sinus rhythm    Ectopy:     Ectopy: none    QRS:     QRS axis:  Normal  Conduction:     Conduction: abnormal      Abnormal conduction: incomplete RBBB    ST segments:     ST segments:  Normal  T waves:     T waves: normal            ED Course                             SBIRT 20yo+      Most Recent Value   SBIRT (22 yo +)   In order to provide better care to our patients, we are screening all of our patients for alcohol and drug use  Would it be okay to ask you these screening questions? No Filed at: 02/17/2021 2343                MDM  Number of Diagnoses or Management Options  Depression with suicidal ideation:   Diagnosis management comments: Patient presented with SI without plan  On evaluation by crisis patient no longer endorsing SI  Patient requesting discharge with outpatient resources  Return precautions were discussed  Disposition  Final diagnoses:   Depression with suicidal ideation     Time reflects when diagnosis was documented in both MDM as applicable and the Disposition within this note     Time User Action Codes Description Comment    2/18/2021  2:41 AM Montse Galeana [F32 9,  [id number]] Depression with suicidal ideation       ED Disposition     ED Disposition Condition Date/Time Comment    Discharge Stable u Feb 18, 2021  2:41 AM Lissa Andino discharge to home/self care              MD Documentation      Most Recent Value   Sending MD Dr Kayla Nava    None         Discharge Medication List as of 2/18/2021  2:42 AM      CONTINUE these medications which have NOT CHANGED    Details   amLODIPine (NORVASC) 5 mg tablet Take 2 tablets (10 mg total) by mouth daily, Starting Tue 1/12/2021, Normal      atorvastatin (LIPITOR) 20 mg tablet Take 1 tablet (20 mg total) by mouth daily, Starting Fri 7/31/2020, No Print      Blood Pressure KIT Use 3 (three) times a week, Starting Wed 11/11/2020, Normal      Diclofenac Sodium (VOLTAREN) 1 % Apply 2 g topically 3 (three) times a day as needed (Pain), Starting Wed 12/16/2020, Normal      fluticasone (FLONASE) 50 mcg/act nasal spray 2 sprays into each nostril daily, Starting Wed 11/11/2020, Normal      lisinopril (ZESTRIL) 10 mg tablet take 1 tablet by mouth once daily, Normal      loratadine (CLARITIN) 10 mg tablet Take 1 tablet (10 mg total) by mouth daily, Starting Wed 11/11/2020, Normal      mirtazapine (REMERON) 7 5 MG tablet take 1 tablet by mouth at bedtime, Normal      naphazoline-pheniramine (NAPHCON-A) 0 025-0 3 % ophthalmic solution Administer 1 drop to both eyes 4 (four) times a day as needed for allergies or irritation, Starting Wed 11/11/2020, Normal      naproxen (NAPROSYN) 500 mg tablet Take 1 tablet (500 mg total) by mouth 2 (two) times a day as needed for moderate pain, Starting Tue 11/10/2020, Normal      FLUoxetine (PROzac) 10 mg capsule Take 3 capsules (30 mg total) by mouth daily, Starting Wed 9/23/2020, Until Wed 11/11/2020, Normal           No discharge procedures on file  PDMP Review     None           ED Provider  Attending physically available and evaluated Elsy MENDEZ managed the patient along with the ED Attending      Electronically Signed by         Janine Mayberry MD  02/18/21 1259

## 2021-02-18 NOTE — ED ATTENDING ATTESTATION
2/17/2021  I, Diana Rodriguez DO, saw and evaluated the patient  I have discussed the patient with the resident/non-physician practitioner and agree with the resident's/non-physician practitioner's findings, Plan of Care, and MDM as documented in the resident's/non-physician practitioner's note, except where noted  All available labs and Radiology studies were reviewed  I was present for key portions of any procedure(s) performed by the resident/non-physician practitioner and I was immediately available to provide assistance  At this point I agree with the current assessment done in the Emergency Department  I have conducted an independent evaluation of this patient a history and physical is as follows:    39-year-old female presents for depression, no SI or HI  States that she just does not want to live anymore  She has no plan  She has transient thoughts of suicide    Plan to consult crisis not meeting criteria for 302    ED Course         Critical Care Time  Procedures

## 2021-02-20 NOTE — TELEPHONE ENCOUNTER
Requested Prescriptions     Pending Prescriptions Disp Refills    phentermine (ADIPEX-P) 37.5 MG tablet [Pharmacy Med Name: PHENTERMINE 37.5 MG TABLET] 30 tablet 0     Sig: TAKE ONE TABLET EVERY MORNING BEFORE BREAKFAST Understood  Please call to inform patient that I have provided her a prescription for a potassium supplementation oral solution  This liquid which should be more palatable than the pills  She is advised to use the liquid solution for the next 1 month  In the meantime, please advise her that if she does use any of the pills, that she should remain upright after swelling at the pills, because if she lies down that may damage her esophagus  Thank you

## 2021-02-26 ENCOUNTER — OFFICE VISIT (OUTPATIENT)
Dept: INTERNAL MEDICINE CLINIC | Facility: CLINIC | Age: 56
End: 2021-02-26

## 2021-02-26 VITALS
WEIGHT: 194.2 LBS | SYSTOLIC BLOOD PRESSURE: 138 MMHG | OXYGEN SATURATION: 97 % | BODY MASS INDEX: 34.4 KG/M2 | DIASTOLIC BLOOD PRESSURE: 86 MMHG | HEART RATE: 128 BPM | TEMPERATURE: 96.5 F

## 2021-02-26 DIAGNOSIS — F70 MILD INTELLECTUAL DISABILITY: Chronic | ICD-10-CM

## 2021-02-26 DIAGNOSIS — F33.2 SEVERE EPISODE OF RECURRENT MAJOR DEPRESSIVE DISORDER, WITHOUT PSYCHOTIC FEATURES (HCC): ICD-10-CM

## 2021-02-26 DIAGNOSIS — Z00.00 MEDICARE ANNUAL WELLNESS VISIT, INITIAL: Primary | ICD-10-CM

## 2021-02-26 DIAGNOSIS — Z12.12 SCREENING FOR COLORECTAL CANCER: ICD-10-CM

## 2021-02-26 DIAGNOSIS — Z12.31 ENCOUNTER FOR SCREENING MAMMOGRAM FOR MALIGNANT NEOPLASM OF BREAST: ICD-10-CM

## 2021-02-26 DIAGNOSIS — Z12.11 SCREENING FOR COLORECTAL CANCER: ICD-10-CM

## 2021-02-26 PROCEDURE — G0438 PPPS, INITIAL VISIT: HCPCS | Performed by: INTERNAL MEDICINE

## 2021-02-26 NOTE — PATIENT INSTRUCTIONS
Medicare Preventive Visit Patient Instructions  Thank you for completing your Welcome to Medicare Visit or Medicare Annual Wellness Visit today  Your next wellness visit will be due in one year (2/26/2022)  The screening/preventive services that you may require over the next 5-10 years are detailed below  Some tests may not apply to you based off risk factors and/or age  Screening tests ordered at today's visit but not completed yet may show as past due  Also, please note that scanned in results may not display below  Preventive Screenings:  Service Recommendations Previous Testing/Comments   Colorectal Cancer Screening  * Colonoscopy    * Fecal Occult Blood Test (FOBT)/Fecal Immunochemical Test (FIT)  * Fecal DNA/Cologuard Test  * Flexible Sigmoidoscopy Age: 54-65 years old   Colonoscopy: every 10 years (may be performed more frequently if at higher risk)  OR  FOBT/FIT: every 1 year  OR  Cologuard: every 3 years  OR  Sigmoidoscopy: every 5 years  Screening may be recommended earlier than age 48 if at higher risk for colorectal cancer  Also, an individualized decision between you and your healthcare provider will decide whether screening between the ages of 74-80 would be appropriate  Colonoscopy: Not on file  FOBT/FIT: Not on file  Cologuard: Not on file  Sigmoidoscopy: Not on file         Breast Cancer Screening Age: 36 years old  Frequency: every 1-2 years  Not required if history of left and right mastectomy Mammogram: Not on file       Cervical Cancer Screening Between the ages of 21-29, pap smear recommended once every 3 years  Between the ages of 33-67, can perform pap smear with HPV co-testing every 5 years     Recommendations may differ for women with a history of total hysterectomy, cervical cancer, or abnormal pap smears in past  Pap Smear: 03/13/2020    Screening Current   Hepatitis C Screening Once for adults born between 1945 and 1965  More frequently in patients at high risk for Hepatitis C Hep C Antibody: 02/03/2020    Screening Current   Diabetes Screening 1-2 times per year if you're at risk for diabetes or have pre-diabetes Fasting glucose: 104 mg/dL   A1C: 5 6 %    Screening Current   Cholesterol Screening Once every 5 years if you don't have a lipid disorder  May order more often based on risk factors  Lipid panel: 08/13/2020    Screening Not Indicated  History Lipid Disorder     Other Preventive Screenings Covered by Medicare:  1  Abdominal Aortic Aneurysm (AAA) Screening: covered once if your at risk  You're considered to be at risk if you have a family history of AAA  2  Lung Cancer Screening: covers low dose CT scan once per year if you meet all of the following conditions: (1) Age 50-69; (2) No signs or symptoms of lung cancer; (3) Current smoker or have quit smoking within the last 15 years; (4) You have a tobacco smoking history of at least 30 pack years (packs per day multiplied by number of years you smoked); (5) You get a written order from a healthcare provider  3  Glaucoma Screening: covered annually if you're considered high risk: (1) You have diabetes OR (2) Family history of glaucoma OR (3)  aged 48 and older OR (3)  American aged 72 and older  3  Osteoporosis Screening: covered every 2 years if you meet one of the following conditions: (1) You're estrogen deficient and at risk for osteoporosis based off medical history and other findings; (2) Have a vertebral abnormality; (3) On glucocorticoid therapy for more than 3 months; (4) Have primary hyperparathyroidism; (5) On osteoporosis medications and need to assess response to drug therapy  · Last bone density test (DXA Scan): Not on file  5  HIV Screening: covered annually if you're between the age of 12-76  Also covered annually if you are younger than 13 and older than 72 with risk factors for HIV infection  For pregnant patients, it is covered up to 3 times per pregnancy      Immunizations:  Immunization Recommendations   Influenza Vaccine Annual influenza vaccination during flu season is recommended for all persons aged >= 6 months who do not have contraindications   Pneumococcal Vaccine (Prevnar and Pneumovax)  * Prevnar = PCV13  * Pneumovax = PPSV23   Adults 25-60 years old: 1-3 doses may be recommended based on certain risk factors  Adults 72 years old: Prevnar (PCV13) vaccine recommended followed by Pneumovax (PPSV23) vaccine  If already received PPSV23 since turning 65, then PCV13 recommended at least one year after PPSV23 dose  Hepatitis B Vaccine 3 dose series if at intermediate or high risk (ex: diabetes, end stage renal disease, liver disease)   Tetanus (Td) Vaccine - COST NOT COVERED BY MEDICARE PART B Following completion of primary series, a booster dose should be given every 10 years to maintain immunity against tetanus  Td may also be given as tetanus wound prophylaxis  Tdap Vaccine - COST NOT COVERED BY MEDICARE PART B Recommended at least once for all adults  For pregnant patients, recommended with each pregnancy  Shingles Vaccine (Shingrix) - COST NOT COVERED BY MEDICARE PART B  2 shot series recommended in those aged 48 and above     Health Maintenance Due:      Topic Date Due    MAMMOGRAM  1965    Colorectal Cancer Screening  11/16/2014    Cervical Cancer Screening  03/13/2025    HIV Screening  Completed    Hepatitis C Screening  Completed     Immunizations Due:  There are no preventive care reminders to display for this patient  Advance Directives   What are advance directives? Advance directives are legal documents that state your wishes and plans for medical care  These plans are made ahead of time in case you lose your ability to make decisions for yourself  Advance directives can apply to any medical decision, such as the treatments you want, and if you want to donate organs  What are the types of advance directives?   There are many types of advance directives, and each state has rules about how to use them  You may choose a combination of any of the following:  · Living will: This is a written record of the treatment you want  You can also choose which treatments you do not want, which to limit, and which to stop at a certain time  This includes surgery, medicine, IV fluid, and tube feedings  · Durable power of  for healthcare [facility]): This is a written record that states who you want to make healthcare choices for you when you are unable to make them for yourself  This person, called a proxy, is usually a family member or a friend  You may choose more than 1 proxy  · Do not resuscitate (DNR) order:  A DNR order is used in case your heart stops beating or you stop breathing  It is a request not to have certain forms of treatment, such as CPR  A DNR order may be included in other types of advance directives  · Medical directive: This covers the care that you want if you are in a coma, near death, or unable to make decisions for yourself  You can list the treatments you want for each condition  Treatment may include pain medicine, surgery, blood transfusions, dialysis, IV or tube feedings, and a ventilator (breathing machine)  · Values history: This document has questions about your views, beliefs, and how you feel and think about life  This information can help others choose the care that you would choose  Why are advance directives important? An advance directive helps you control your care  Although spoken wishes may be used, it is better to have your wishes written down  Spoken wishes can be misunderstood, or not followed  Treatments may be given even if you do not want them  An advance directive may make it easier for your family to make difficult choices about your care     Weight Management   Why it is important to manage your weight:  Being overweight increases your risk of health conditions such as heart disease, high blood pressure, type 2 diabetes, and certain types of cancer  It can also increase your risk for osteoarthritis, sleep apnea, and other respiratory problems  Aim for a slow, steady weight loss  Even a small amount of weight loss can lower your risk of health problems  How to lose weight safely:  A safe and healthy way to lose weight is to eat fewer calories and get regular exercise  You can lose up about 1 pound a week by decreasing the number of calories you eat by 500 calories each day  Healthy meal plan for weight management:  A healthy meal plan includes a variety of foods, contains fewer calories, and helps you stay healthy  A healthy meal plan includes the following:  · Eat whole-grain foods more often  A healthy meal plan should contain fiber  Fiber is the part of grains, fruits, and vegetables that is not broken down by your body  Whole-grain foods are healthy and provide extra fiber in your diet  Some examples of whole-grain foods are whole-wheat breads and pastas, oatmeal, brown rice, and bulgur  · Eat a variety of vegetables every day  Include dark, leafy greens such as spinach, kale, geovanny greens, and mustard greens  Eat yellow and orange vegetables such as carrots, sweet potatoes, and winter squash  · Eat a variety of fruits every day  Choose fresh or canned fruit (canned in its own juice or light syrup) instead of juice  Fruit juice has very little or no fiber  · Eat low-fat dairy foods  Drink fat-free (skim) milk or 1% milk  Eat fat-free yogurt and low-fat cottage cheese  Try low-fat cheeses such as mozzarella and other reduced-fat cheeses  · Choose meat and other protein foods that are low in fat  Choose beans or other legumes such as split peas or lentils  Choose fish, skinless poultry (chicken or turkey), or lean cuts of red meat (beef or pork)  Before you cook meat or poultry, cut off any visible fat  · Use less fat and oil  Try baking foods instead of frying them   Add less fat, such as margarine, sour cream, regular salad dressing and mayonnaise to foods  Eat fewer high-fat foods  Some examples of high-fat foods include french fries, doughnuts, ice cream, and cakes  · Eat fewer sweets  Limit foods and drinks that are high in sugar  This includes candy, cookies, regular soda, and sweetened drinks  Exercise:  Exercise at least 30 minutes per day on most days of the week  Some examples of exercise include walking, biking, dancing, and swimming  You can also fit in more physical activity by taking the stairs instead of the elevator or parking farther away from stores  Ask your healthcare provider about the best exercise plan for you  © Copyright [employer] 2018 Information is for End User's use only and may not be sold, redistributed or otherwise used for commercial purposes   All illustrations and images included in CareNotes® are the copyrighted property of A D A M , Inc  or [address] [place] [address]

## 2021-02-26 NOTE — PROGRESS NOTES
Assessment and Plan:     Problem List Items Addressed This Visit        Other    Mild intellectual disability (Chronic)    Relevant Orders    Ambulatory referral to social work care management program    MDD (major depressive disorder), recurrent episode, severe ([address] )    Relevant Orders    Ambulatory referral to social work care management program      Other Visit Diagnoses     Medicare annual wellness visit, initial    -  Primary    Encounter for screening mammogram for malignant neoplasm of breast        Relevant Orders    Mammo screening bilateral w cad    Screening for colorectal cancer        Relevant Orders    Occult Blood, Fecal Immunochemical          Will refer to in house social work as patient has lost follow up with outpatient ICM  Orders placed for screening mammo and FIT kit  Preventive health issues were discussed with patient, and age appropriate screening tests were ordered as noted in patient's After Visit Summary  Personalized health advice and appropriate referrals for health education or preventive services given if needed, as noted in patient's After Visit Summary  History of Present Illness:     Patient presents for Welcome to Medicare visit  Patient Care Team:  Ceasar Pérez DO as PCP - General (Internal Medicine)  Winsome Pacheco MD as PCP - [address] [place] [address] ([facility])  JOHN Stewart as  (Care Coordination)     Review of Systems:     Review of Systems   Constitutional: Negative for chills, fatigue and fever  HENT: Negative for congestion, postnasal drip, rhinorrhea, sinus pressure, sinus pain and sore throat  Respiratory: Negative for cough, shortness of breath and wheezing  Cardiovascular: Negative for chest pain and palpitations  Gastrointestinal: Negative for abdominal pain, constipation, diarrhea, nausea and vomiting  Genitourinary: Negative for difficulty urinating, dysuria, frequency, hematuria and urgency     Musculoskeletal: Negative for arthralgias, back pain, gait problem and myalgias  Skin: Negative for pallor, rash and wound  Neurological: Negative for dizziness, weakness, light-headedness, numbness and headaches  Psychiatric/Behavioral: Positive for decreased concentration, dysphoric mood and sleep disturbance  Negative for hallucinations, self-injury and suicidal ideas  The patient is nervous/anxious  The patient is not hyperactive         Problem List:     Patient Active Problem List   Diagnosis    Amenorrhea    Essential hypertension    Hyperlipidemia    Tobacco use    Ovarian failure    Mild intellectual disability    Constipation    MDD (major depressive disorder), recurrent episode, severe (HCC)    Chronic pain of both knees    Bipolar disorder ([address] )    Anxiety    Insomnia    Obesity    Patellofemoral pain syndrome of both knees      Past Medical and Surgical History:     Past Medical History:   Diagnosis Date    Anxiety     Arthritis     Bipolar affective disorder, depressed, severe ([facility] [address] ) 4/28/2019    Depression     Elevated bilirubin 8/15/2019    Hyperlipidemia     Hypertension     Hypokalemia 8/15/2019    Learning difficulty     Leukocytosis 7/28/2020    Obesity (BMI 30 0-34 9) 6/26/2020    Osteopenia     Ovarian failure     Previous known suicide attempt     Psychiatric disorder     Psychiatric illness      Past Surgical History:   Procedure Laterality Date    LAPAROSCOPY      as a child, per patient-normal findings      Family History:     Family History   Problem Relation Age of Onset    Alzheimer's disease Mother     Depression Mother     Depression Brother     Bipolar disorder Brother     No Known Problems Maternal Aunt     No Known Problems Paternal Aunt     No Known Problems Maternal Uncle     No Known Problems Paternal Uncle     No Known Problems Cousin     ADD / ADHD Neg Hx     Alcohol abuse Neg Hx     Anxiety disorder Neg Hx     Dementia Neg Hx     Drug abuse Neg Hx     OCD Neg Hx     Paranoid behavior Neg Hx     Schizophrenia Neg Hx     Seizures Neg Hx     Self-Injury Neg Hx     Suicide Attempts Neg Hx       Social History:     E-Cigarette/Vaping    E-Cigarette Use Never User      E-Cigarette/Vaping Substances    Nicotine No     THC No     CBD No     Flavoring No     Other No     Unknown No      Social History     Socioeconomic History    Marital status: Single     Spouse name: None    Number of children: None    Years of education: None    Highest education level: None   Occupational History    None   Social Needs    Financial resource strain: Somewhat hard    Food insecurity     Worry: Never true     Inability: Never true    Transportation needs     Medical: No     Non-medical: No   Tobacco Use    Smoking status: Former Smoker     Packs/day: 0 25     Years: 15 00     Pack years: 3 75     Types: Cigarettes     Quit date: 1990     Years since quittin 1    Smokeless tobacco: Never Used    Tobacco comment: stoped 1 month ago   Substance and Sexual Activity    Alcohol use: Not Currently     Alcohol/week: 2 0 standard drinks     Types: 2 Cans of beer per week     Frequency: 2-3 times a week     Drinks per session: 1 or 2     Binge frequency: Never     Comment: socially    Drug use: No    Sexual activity: Yes     Partners: Male     Birth control/protection: None   Lifestyle    Physical activity     Days per week: 0 days     Minutes per session: 0 min    Stress: Not at all   Relationships    Social connections     Talks on phone: More than three times a week     Gets together:  Three times a week     Attends Yazidism service: More than 4 times per year     Active member of club or organization: No     Attends meetings of clubs or organizations: Never     Relationship status: Never     Intimate partner violence     Fear of current or ex partner: No     Emotionally abused: No     Physically abused: No     Forced sexual activity: No   Other Topics Concern    None   Social History Narrative    None      Medications and Allergies:     Current Outpatient Medications   Medication Sig Dispense Refill    amLODIPine (NORVASC) 5 mg tablet Take 2 tablets (10 mg total) by mouth daily 90 tablet 1    atorvastatin (LIPITOR) 20 mg tablet Take 1 tablet (20 mg total) by mouth daily 90 tablet 0    Blood Pressure KIT Use 3 (three) times a week 1 each 0    Diclofenac Sodium (VOLTAREN) 1 % Apply 2 g topically 3 (three) times a day as needed (Pain) 1 Tube 1    FLUoxetine (PROzac) 10 mg capsule Take 3 capsules (30 mg total) by mouth daily 90 capsule 0    fluticasone (FLONASE) 50 mcg/act nasal spray 2 sprays into each nostril daily 11 1 mL 2    lisinopril (ZESTRIL) 10 mg tablet take 1 tablet by mouth once daily 90 tablet 2    loratadine (CLARITIN) 10 mg tablet Take 1 tablet (10 mg total) by mouth daily 30 tablet 2    naphazoline-pheniramine (NAPHCON-A) 0 025-0 3 % ophthalmic solution Administer 1 drop to both eyes 4 (four) times a day as needed for allergies or irritation 15 mL 2    naproxen (NAPROSYN) 500 mg tablet Take 1 tablet (500 mg total) by mouth 2 (two) times a day as needed for moderate pain 60 tablet 1    mirtazapine (REMERON) 7 5 MG tablet take 1 tablet by mouth at bedtime (Patient not taking: Reported on 2/26/2021) 30 tablet 0     No current facility-administered medications for this visit        Allergies   Allergen Reactions    Other      Hay Fever    Oxycodone-Acetaminophen GI Intolerance      Immunizations:     Immunization History   Administered Date(s) Administered    INFLUENZA 10/26/2014, 12/08/2015, 08/29/2017    Influenza, injectable, quadrivalent, preservative free 0 5 mL 10/06/2019    Pneumococcal Polysaccharide PPV23 02/03/2020, 09/22/2020    Tdap 07/11/2019, 10/06/2019    Zoster Vaccine Recombinant 05/21/2020      Health Maintenance:         Topic Date Due    MAMMOGRAM  1965    Colorectal Cancer Screening  11/16/2014    Cervical Cancer Screening  03/13/2025    HIV Screening  Completed    Hepatitis C Screening  Completed     There are no preventive care reminders to display for this patient  Medicare Screening Tests and Risk Assessments:     Misa is here for her Subsequent Wellness visit  Health Risk Assessment:   Patient rates overall health as poor  Patient feels that their physical health rating is slightly worse  Eyesight was rated as same  Hearing was rated as same  Patient feels that their emotional and mental health rating is much worse  Pain experienced in the last 7 days has been none  Patient states that she has experienced no weight loss or gain in last 6 months  MH is still poorly controlled, largely secondary to isolation  Has stopped following with intensive  provided by [employer]  Trying to exercise regularly to help with weight  Fall Risk Screening: In the past year, patient has experienced: no history of falling in past year      Urinary Incontinence Screening:   Patient has not leaked urine accidently in the last six months  Home Safety:  Patient does not have trouble with stairs inside or outside of their home  Patient has working smoke alarms and has working carbon monoxide detector  Home safety hazards include: none  Nutrition:   Current diet is Regular and Limited junk food  Trying to cut down on "sugary junk" and sodas  Medications:   Patient is not currently taking any over-the-counter supplements  Patient is able to manage medications  Activities of Daily Living (ADLs)/Instrumental Activities of Daily Living (IADLs):   Walk and transfer into and out of bed and chair?: Yes  Dress and groom yourself?: Yes    Bathe or shower yourself?: Yes    Feed yourself?  Yes  Do your laundry/housekeeping?: Yes  Manage your money, pay your bills and track your expenses?: Yes  Make your own meals?: Yes    Do your own shopping?: Yes    Previous Hospitalizations:   Any hospitalizations or ED visits within the last 12 months?: Yes    How many hospitalizations have you had in the last year?: 3-4    Hospitalization Comments: All secondary to MDD  Advance Care Planning:   Living will: No    Durable POA for healthcare: No    Advanced directive: No    Advanced directive counseling given: Yes    Five wishes given: Yes      PREVENTIVE SCREENINGS      Cardiovascular Screening:    General: Screening Not Indicated and History Lipid Disorder      Diabetes Screening:     General: Screening Current      Colorectal Cancer Screening:     General: Risks and Benefits Discussed    Due for: FOBT/FIT      Breast Cancer Screening:     General: Risks and Benefits Discussed    Due for: Mammogram        Cervical Cancer Screening:    General: Screening Current      Lung Cancer Screening:     General: Screening Not Indicated      Hepatitis C Screening:    General: Screening Current    No exam data present     Physical Exam:     /86 (BP Location: Left arm, Patient Position: Sitting, Cuff Size: Standard)   Pulse (!) 128   Temp (!) 96 5 °F (35 8 °C) (Temporal)   Wt 88 1 kg (194 lb 3 2 oz)   LMP  (LMP Unknown)   SpO2 97%   BMI 34 40 kg/m²     Physical Exam  Vitals signs and nursing note reviewed  Constitutional:       General: She is not in acute distress  Appearance: Normal appearance  She is normal weight  She is not ill-appearing, toxic-appearing or diaphoretic  HENT:      Head: Normocephalic and atraumatic  Eyes:      General: No scleral icterus  Extraocular Movements: Extraocular movements intact  Conjunctiva/sclera: Conjunctivae normal       Pupils: Pupils are equal, round, and reactive to light  Neck:      Musculoskeletal: Neck supple  Cardiovascular:      Rate and Rhythm: Normal rate and regular rhythm  Pulses: Normal pulses  Heart sounds: Normal heart sounds  No murmur  No friction rub  No gallop  Pulmonary:      Effort: Pulmonary effort is normal  No respiratory distress  Breath sounds: Normal breath sounds  No stridor  No wheezing or rhonchi  Abdominal:      General: Abdomen is flat  Bowel sounds are normal  There is no distension  Palpations: Abdomen is soft  There is no mass  Tenderness: There is no abdominal tenderness  There is no right CVA tenderness, left CVA tenderness, guarding or rebound  Hernia: No hernia is present  Musculoskeletal:         General: No swelling, tenderness, deformity or signs of injury  Right lower leg: No edema  Left lower leg: No edema  Lymphadenopathy:      Cervical: No cervical adenopathy  Skin:     General: Skin is warm and dry  Capillary Refill: Capillary refill takes less than 2 seconds  Coloration: Skin is not pale  Findings: No erythema or rash  Neurological:      General: No focal deficit present  Mental Status: She is alert  Psychiatric:         Attention and Perception: Attention and perception normal          Mood and Affect: Mood is depressed  Affect is blunt, flat and tearful  Speech: Speech is delayed  Behavior: Behavior is slowed  Thought Content: Thought content is not paranoid  Thought content does not include homicidal or suicidal ideation  Thought content does not include homicidal or suicidal plan           Cognition and Memory: Cognition normal          Judgment: Judgment normal           Nitin Bar DO

## 2021-03-08 ENCOUNTER — PATIENT OUTREACH (OUTPATIENT)
Dept: INTERNAL MEDICINE CLINIC | Facility: CLINIC | Age: 56
End: 2021-03-08

## 2021-03-08 NOTE — PROGRESS NOTES
JOHN WALTER received referral from provider, Dr Wade Munoz to follow up with patient regarding OP MH services/ resources  JOHN WALTER called patient ([phone]), introduced JOHN WALTER role and completed assessment  JOHN WALTER confirmed patient's contact information and address  Patient is single, disabled and receives about $830 in SSD a month  Patient stated that she does not have any financial issues and is able to afford all her bills and always has enough food in her home  Patient stated that her highest level of education is 10th grade  Patient receives $149 food stamps monthly  Patient lives alone in an apartment  Patient stated that she is independent with all ADLs and does not utilize an assistive device  Patient does not have anyone that she considers her support system  Patient is not a   JOHN WALTER confirmed patient's insurance  Patient walks or takes the bus to appointments and places she needs to go  Patient stated that she sees a therapist and psychiatrist at [facility] ([facility]] and has been going there for a little less then a year  Patient stated that she has had virtual appointments  Patient stated that her psychiatrist does prescribe her medication  Denies any current or recent SI  Patient had no other questions concerns or needs due to already being connected with OP [address] services  Please re consult JOHN WALTER for any future needs

## 2021-03-19 ENCOUNTER — OFFICE VISIT (OUTPATIENT)
Dept: INTERNAL MEDICINE CLINIC | Facility: CLINIC | Age: 56
End: 2021-03-19

## 2021-03-19 VITALS
BODY MASS INDEX: 35.14 KG/M2 | HEART RATE: 120 BPM | DIASTOLIC BLOOD PRESSURE: 86 MMHG | TEMPERATURE: 98.5 F | SYSTOLIC BLOOD PRESSURE: 130 MMHG | WEIGHT: 198.4 LBS

## 2021-03-19 DIAGNOSIS — M54.32 SCIATICA OF LEFT SIDE: Primary | ICD-10-CM

## 2021-03-19 PROCEDURE — 99213 OFFICE O/P EST LOW 20 MIN: CPT | Performed by: INTERNAL MEDICINE

## 2021-03-19 RX ORDER — LORAZEPAM 0.5 MG/1
TABLET ORAL EVERY 8 HOURS PRN
COMMUNITY
End: 2022-05-02 | Stop reason: SDUPTHER

## 2021-03-19 NOTE — PROGRESS NOTES
[facility] Visit Note  Renuka Bird 63 y o  female   MRN: [id number]    Assessment and Plan      Diagnoses and all orders for this visit:    1  Sciatica of left side - Patient with pain in her left SI joint traveling down her left leg  No fevers  No trauma  No bowel or bladder incont  No sensory or motor def  Neg  Straight leg raise  Relief with naproxen and with tylenol  Noted to have mild tenderness on palpitation of the left paraspinal muscle and si joint  Patient does have a noted muscle in her left flank that had a fibrotic feel of a strained muscle  Patient will return in 2 months or sooner as needed  Will obtain lidocaine patches OTC    -     Ambulatory referral to Physical Therapy; Future              Schedule a follow-up appointment in 2 months for regular follow up        Chief Complaint: left hip pain  Subjective     History of Present Illness:  63year old female PMH of ovarian failure, HTN, patellofemoral pain syndrome, bipolar disorder  Patient reports that she has a pain on her left hip  She reports she an X ray a long time ago and they stated it was a fatty tumor, this was approximately 10 years ago at [facility]  She states that 5 months she reports her hip has been hurting her, she reports that yesterday it got much worse  She calls it a constat aches, starting in her left SI joint, going down buttock and down back of her leg all the way down to her feet  No recent trauma  No sensory or motor def  She reports she has been taking tylenol for attempted alleviation of her symptoms and does help  She also has been taking naproxen  She states that the pain is constant  Alleviated by tylenol  She reports that walking makes it worse, and lifting, as well as leaning forward, and does get relief when she is at the grocery store leaning on a cart  No fevers  No chills  No history of IVDU  Review of Systems   Constitutional: Negative for fever  HENT: Negative for congestion  Eyes: Negative for discharge  Respiratory: Negative for apnea  Gastrointestinal: Negative for abdominal distention  Genitourinary: Negative for difficulty urinating  Skin: Negative for rash  Neurological: Negative for dizziness  Psychiatric/Behavioral: Negative for agitation           Current Outpatient Medications:     amLODIPine (NORVASC) 5 mg tablet, Take 2 tablets (10 mg total) by mouth daily, Disp: 90 tablet, Rfl: 1    atorvastatin (LIPITOR) 20 mg tablet, Take 1 tablet (20 mg total) by mouth daily, Disp: 90 tablet, Rfl: 0    Blood Pressure KIT, Use 3 (three) times a week, Disp: 1 each, Rfl: 0    Diclofenac Sodium (VOLTAREN) 1 %, Apply 2 g topically 3 (three) times a day as needed (Pain), Disp: 1 Tube, Rfl: 1    FLUoxetine (PROzac) 10 mg capsule, Take 3 capsules (30 mg total) by mouth daily (Patient taking differently: Take 40 mg by mouth daily ), Disp: 90 capsule, Rfl: 0    fluticasone (FLONASE) 50 mcg/act nasal spray, 2 sprays into each nostril daily, Disp: 11 1 mL, Rfl: 2    lisinopril (ZESTRIL) 10 mg tablet, take 1 tablet by mouth once daily, Disp: 90 tablet, Rfl: 2    loratadine (CLARITIN) 10 mg tablet, Take 1 tablet (10 mg total) by mouth daily, Disp: 30 tablet, Rfl: 2    LORazepam (ATIVAN) 0 5 mg tablet, Take by mouth every 8 (eight) hours as needed for anxiety, Disp: , Rfl:     mirtazapine (REMERON) 7 5 MG tablet, take 1 tablet by mouth at bedtime, Disp: 30 tablet, Rfl: 0    naproxen (NAPROSYN) 500 mg tablet, Take 1 tablet (500 mg total) by mouth 2 (two) times a day as needed for moderate pain, Disp: 60 tablet, Rfl: 1    naphazoline-pheniramine (NAPHCON-A) 0 025-0 3 % ophthalmic solution, Administer 1 drop to both eyes 4 (four) times a day as needed for allergies or irritation (Patient not taking: Reported on 3/19/2021), Disp: 15 mL, Rfl: 2  Past Medical History:   Diagnosis Date    Anxiety     Arthritis     Bipolar affective disorder, depressed, severe ([facility] [address] ) 2019    Depression     Elevated bilirubin 8/15/2019    Hyperlipidemia     Hypertension     Hypokalemia 8/15/2019    Learning difficulty     Leukocytosis 2020    Obesity (BMI 30 0-34 9) 2020    Osteopenia     Ovarian failure     Previous known suicide attempt     Psychiatric disorder     Psychiatric illness      Past Surgical History:   Procedure Laterality Date    LAPAROSCOPY      as a child, per patient-normal findings     Social History     Socioeconomic History    Marital status: Single     Spouse name: Not on file    Number of children: Not on file    Years of education: Not on file    Highest education level: Not on file   Occupational History    Not on file   Social Needs    Financial resource strain: Somewhat hard    Food insecurity     Worry: Never true     Inability: Never true    Transportation needs     Medical: No     Non-medical: No   Tobacco Use    Smoking status: Former Smoker     Packs/day: 0 25     Years: 15 00     Pack years: 3 75     Types: Cigarettes     Quit date: 1990     Years since quittin 2    Smokeless tobacco: Never Used    Tobacco comment: stoped 1 month ago   Substance and Sexual Activity    Alcohol use: Not Currently     Alcohol/week: 2 0 standard drinks     Types: 2 Cans of beer per week     Frequency: 2-3 times a week     Drinks per session: 1 or 2     Binge frequency: Never     Comment: socially    Drug use: No    Sexual activity: Yes     Partners: Male     Birth control/protection: None   Lifestyle    Physical activity     Days per week: 0 days     Minutes per session: 0 min    Stress: Not at all   Relationships    Social connections     Talks on phone: More than three times a week     Gets together:  Three times a week     Attends [employer] service: More than 4 times per year     Active member of club or organization: No     Attends meetings of clubs or organizations: Never     Relationship status: Never     Intimate partner violence     Fear of current or ex partner: No     Emotionally abused: No     Physically abused: No     Forced sexual activity: No   Other Topics Concern    Not on file   Social History Narrative    Not on file     Family History   Problem Relation Age of Onset    Alzheimer's disease Mother     Depression Mother     Depression Brother     Bipolar disorder Brother     No Known Problems Maternal Aunt     No Known Problems Paternal Aunt     No Known Problems Maternal Uncle     No Known Problems Paternal Uncle     No Known Problems Cousin     ADD / ADHD Neg Hx     Alcohol abuse Neg Hx     Anxiety disorder Neg Hx     Dementia Neg Hx     Drug abuse Neg Hx     OCD Neg Hx     Paranoid behavior Neg Hx     Schizophrenia Neg Hx     Seizures Neg Hx     Self-Injury Neg Hx     Suicide Attempts Neg Hx      Allergies   Allergen Reactions    Other      Hay Fever    Oxycodone-Acetaminophen GI Intolerance       Objective     Vitals:    03/19/21 1143   BP: 130/86   BP Location: Right arm   Patient Position: Sitting   Cuff Size: Adult   Pulse: (!) 120   Temp: 98 5 °F (36 9 °C)   TempSrc: Temporal   Weight: 90 kg (198 lb 6 4 oz)       Physical exam:     GENERAL: NAD   HEENT:  NC/AT, PERRL, EOMI, no scleral icterus  CARDIAC:  RRR,   PULMONARY:  CTA B/L  ABDOMEN:  Soft, NT/ND,no rebound/guarding/rigidity, noted to have strained muscle originating from lumbar spine and going across right flank  Extremities:  Negative straight leg  Mild tenderness to palpitation left paraspinal muscles and left SI joint  No joint deformity  NEUROLOGIC: Grossly intact  SKIN:  No rashes or erythema noted on exposed skin  Psych: Normal affect        ==  PLEASE NOTE:  This encounter was completed utilizing the [employer] Direct Speech Voice Recognition Software   Grammatical errors, random word insertions, pronoun errors and incomplete sentences are occasional consequences of the system due to software limitations, ambient noise and hardware issues  These may be missed by proof reading prior to affixing electronic signature  Any questions or concerns about the content, text or information contained within the body of this dictation should be directly addressed to the physician for clarification  Please do not hesitate to call me directly if you have any any questions or concerns

## 2021-03-29 ENCOUNTER — OFFICE VISIT (OUTPATIENT)
Dept: INTERNAL MEDICINE CLINIC | Facility: CLINIC | Age: 56
End: 2021-03-29

## 2021-03-29 VITALS
SYSTOLIC BLOOD PRESSURE: 126 MMHG | WEIGHT: 197.8 LBS | HEART RATE: 95 BPM | BODY MASS INDEX: 35.04 KG/M2 | TEMPERATURE: 96.7 F | DIASTOLIC BLOOD PRESSURE: 82 MMHG

## 2021-03-29 DIAGNOSIS — R91.1 PULMONARY NODULE SEEN ON IMAGING STUDY: ICD-10-CM

## 2021-03-29 DIAGNOSIS — M25.552 LEFT HIP PAIN: Primary | ICD-10-CM

## 2021-03-29 DIAGNOSIS — Z72.0 TOBACCO USE: Chronic | ICD-10-CM

## 2021-03-29 DIAGNOSIS — I10 ESSENTIAL HYPERTENSION: Chronic | ICD-10-CM

## 2021-03-29 PROCEDURE — 3074F SYST BP LT 130 MM HG: CPT | Performed by: INTERNAL MEDICINE

## 2021-03-29 PROCEDURE — 4004F PT TOBACCO SCREEN RCVD TLK: CPT | Performed by: INTERNAL MEDICINE

## 2021-03-29 PROCEDURE — 3079F DIAST BP 80-89 MM HG: CPT | Performed by: INTERNAL MEDICINE

## 2021-03-29 PROCEDURE — 99213 OFFICE O/P EST LOW 20 MIN: CPT | Performed by: INTERNAL MEDICINE

## 2021-03-29 RX ORDER — FLUOXETINE HYDROCHLORIDE 40 MG/1
40 CAPSULE ORAL EVERY MORNING
COMMUNITY
Start: 2021-03-11 | End: 2022-05-02 | Stop reason: SDUPTHER

## 2021-03-29 NOTE — PROGRESS NOTES
INTERNAL MEDICINE FOLLOW-UP OFFICE VISIT  [facility]  [address] [place], [place] [address]    NAME: Radha Martinez  AGE: 63 y o  SEX: female    DATE OF ENCOUNTER: 3/29/2021    Assessment and Plan     1  Essential hypertension  -   Chronic and well controlled  -  Blood pressure initially elevated at 164/71, however much better controlled at 126/82 on recheck  -  Continue current regimen with close home monitoring    2  Left hip pain  -  Patient has history and symptoms consistent with radicular low back pain, likely secondary to osteoarthritis of the lumbar spine with associated lumbar paraspinal hypertonicity  -  Patient instructed to report to physical therapy as  Ordered at last visit for further evaluation and management of low back pain  -  No red flag symptoms at this time   -  Will consider imaging if symptoms persist or worsen despite physical therapy at next regularly scheduled appointment    3  Pulmonary nodule seen on imaging study  -  CT scan report from August 2016 shows isolated right middle lobe pulmonary nodule that has not been followed up per chart review  -  Given history of smoking and prior imaging, will order standard dose chest CT  - CT lung screening program; Future    4  Tobacco use  -  Patient does endorse intermittent yet current tobacco abuse  -  Will address commitment towards cessation strategies at next visit  - CT lung screening program; Future     Follow-up with me in person in clinic in 3 months      Orders Placed This Encounter   Procedures    CT lung screening program       - Counseling Documentation: patient was counseled regarding: diagnostic results, instructions for management, risk factor reductions, prognosis, patient and family education, impressions, risks and benefits of treatment options and importance of compliance with treatment  - Counseling Time: counseling time more than 50% of visit: 30 minutes  - Medication Side Effects: Adverse side effects of medications were reviewed with the patient/guardian today  Routine Health Maintenance:   Patient provided information regarding scheduling her COVID-19 vaccination  Patient provided script for mammogram and fit kit at previous appointment  Advised diet and exercise  Advised to refrain from tobacco, alcohol, and illicit drug use  Advised medical compliance    BMI Counseling: Body mass index is 35 04 kg/m²  The BMI is above normal  Nutrition recommendations include reducing portion sizes  OMT/OPP: During the course of my history and physical, I utilized osteopathic examination modalities to assess the patient  Somatic dysfunctions were not identified during this visit  OMT is not indicated at this time, however should would be considered at follow up visit  Chief Complaint     Chief Complaint   Patient presents with    Hip Pain     left hip pain radiates to thigh and back       History of Present Illness     Misa Grijalva   Is a 58-year-old female with past medical history significant for depression, anxiety, psychosis, suicidal ideation with history of inpatient hospitalization, hypertension, hyperlipidemia, patellofemoral syndrome of both knees, allergies, and tobacco use disorder who presents today for routine follow-up  Last visit to clinic 03/19/2021 with Dr Alex Calle  Patient is here today for re-evaluation of left hip pain which prompted her last visit to clinic  After last visit, she complained of roughly 5 months of left hip pain  At time of that exam, she was told she is having pain likely secondary to sciatica, for which she was referred to physical therapy and was told to obtain over-the-counter lidocaine pain patches  She presents again today with the same complaint as she was not comfortable at that time with the resident who took care of her or the diagnosis she was given    Regarding her symptoms, she states she is having pain in her left hip anteriorly wrapping around towards her low back and down through her buttock to the level of her knee  She says she has had this discomfort intermittently over the last few months to years, though she cannot exactly identify a start time  She does tell me it is worse in recent months  She describes this as an achy pain that is made worse by extended periods of walking or doing chores in the house  She says her discomfort is alleviated with alternating between Tylenol and naproxen  She does say it is a little better when she rests or uses icy Hot  She does tell me that her symptoms today are well controlled  She denies any recent fevers, chills, numbness, tingling, or loss of sensation in her perineum  She also shows me a radiology report from a CT scan she had done at the [facility] in 2016 for chondral pain which was secondary to an anterior left seventh rib fracture  She presents this radiology report today because she was told she had a "fatty tumor" in her lung  Reading this report, it shows there was a small 4 mm pulmonary nodule in the right middle lobe  She mistakenly thought that this nodule had some relation to her hip pain  The following portions of the patient's history were reviewed and updated as appropriate: allergies, current medications, past family history, past medical history, past social history, past surgical history and problem list     Review of Systems     Review of Systems   Constitutional: Negative for chills, fatigue and fever  HENT: Negative for congestion, postnasal drip, rhinorrhea, sinus pressure, sinus pain and sore throat  Respiratory: Negative for cough, shortness of breath and wheezing  Cardiovascular: Negative for chest pain and palpitations  Gastrointestinal: Negative for abdominal pain, constipation, diarrhea, nausea and vomiting  Genitourinary: Negative for difficulty urinating, dysuria, frequency, hematuria and urgency  Musculoskeletal: Positive for arthralgias and back pain  Negative for gait problem, myalgias, neck pain and neck stiffness  Skin: Negative for pallor, rash and wound  Neurological: Negative for dizziness, weakness, light-headedness, numbness and headaches  Active Problem List     Patient Active Problem List   Diagnosis    Amenorrhea    Essential hypertension    Hyperlipidemia    Tobacco use    Ovarian failure    Mild intellectual disability    Constipation    MDD (major depressive disorder), recurrent episode, severe (HCC)    Chronic pain of both knees    Bipolar disorder (HCC)    Anxiety    Insomnia    Obesity    Patellofemoral pain syndrome of both knees       Objective     /82   Pulse 95   Temp (!) 96 7 °F (35 9 °C) (Temporal)   Wt 89 7 kg (197 lb 12 8 oz)   LMP  (LMP Unknown)   BMI 35 04 kg/m²     Physical Exam  Vitals signs and nursing note reviewed  Constitutional:       General: She is not in acute distress  Appearance: Normal appearance  She is obese  She is not ill-appearing, toxic-appearing or diaphoretic  Comments: Patient is generally pleasant, calm, and cooperative  HENT:      Head: Normocephalic and atraumatic  Eyes:      General: No scleral icterus  Extraocular Movements: Extraocular movements intact  Conjunctiva/sclera: Conjunctivae normal       Pupils: Pupils are equal, round, and reactive to light  Neck:      Musculoskeletal: Neck supple  Cardiovascular:      Rate and Rhythm: Normal rate and regular rhythm  Pulses: Normal pulses  Heart sounds: Normal heart sounds  No murmur  No friction rub  No gallop  Pulmonary:      Effort: Pulmonary effort is normal  No respiratory distress  Breath sounds: Normal breath sounds  No stridor  No wheezing or rhonchi  Abdominal:      General: Bowel sounds are normal  There is no distension  Palpations: Abdomen is soft  There is no mass  Tenderness:  There is no abdominal tenderness  There is no guarding or rebound  Hernia: No hernia is present  Musculoskeletal:         General: Tenderness present  No swelling or deformity  Right lower leg: No edema  Left lower leg: No edema  Comments:   Negative straight leg test bilaterally  Hypertonicity identified without tenderness to palpation along bilateral lumbar paraspinal musculature, left worse than right  Some pain on ASIS compression, on the left side  Lymphadenopathy:      Cervical: No cervical adenopathy  Skin:     General: Skin is warm and dry  Capillary Refill: Capillary refill takes less than 2 seconds  Coloration: Skin is not pale  Findings: No erythema or rash  Neurological:      General: No focal deficit present  Mental Status: She is alert  Sensory: No sensory deficit  Motor: No weakness  Gait: Gait normal       Deep Tendon Reflexes: Reflexes normal    Psychiatric:         Mood and Affect: Mood normal          Behavior: Behavior normal          Thought Content: Thought content normal          Judgment: Judgment normal          Pertinent Laboratory/Diagnostic Studies:  No results found      Images and diagnostics reviewed     Current Medications     Current Outpatient Medications:     amLODIPine (NORVASC) 5 mg tablet, Take 2 tablets (10 mg total) by mouth daily, Disp: 90 tablet, Rfl: 1    atorvastatin (LIPITOR) 20 mg tablet, Take 1 tablet (20 mg total) by mouth daily, Disp: 90 tablet, Rfl: 0    Blood Pressure KIT, Use 3 (three) times a week, Disp: 1 each, Rfl: 0    Diclofenac Sodium (VOLTAREN) 1 %, Apply 2 g topically 3 (three) times a day as needed (Pain), Disp: 1 Tube, Rfl: 1    FLUoxetine (PROzac) 10 mg capsule, Take 3 capsules (30 mg total) by mouth daily (Patient taking differently: Take 40 mg by mouth daily ), Disp: 90 capsule, Rfl: 0    fluticasone (FLONASE) 50 mcg/act nasal spray, 2 sprays into each nostril daily, Disp: 11 1 mL, Rfl: 2    lisinopril (ZESTRIL) 10 mg tablet, take 1 tablet by mouth once daily, Disp: 90 tablet, Rfl: 2    loratadine (CLARITIN) 10 mg tablet, Take 1 tablet (10 mg total) by mouth daily, Disp: 30 tablet, Rfl: 2    LORazepam (ATIVAN) 0 5 mg tablet, Take by mouth every 8 (eight) hours as needed for anxiety, Disp: , Rfl:     mirtazapine (REMERON) 7 5 MG tablet, take 1 tablet by mouth at bedtime, Disp: 30 tablet, Rfl: 0    naproxen (NAPROSYN) 500 mg tablet, Take 1 tablet (500 mg total) by mouth 2 (two) times a day as needed for moderate pain, Disp: 60 tablet, Rfl: 1    FLUoxetine (PROzac) 40 MG capsule, Take 40 mg by mouth every morning, Disp: , Rfl:     naphazoline-pheniramine (NAPHCON-A) 0 025-0 3 % ophthalmic solution, Administer 1 drop to both eyes 4 (four) times a day as needed for allergies or irritation (Patient not taking: Reported on 3/29/2021), Disp: 15 mL, Rfl: 2    Health Maintenance     Health Maintenance   Topic Date Due    MAMMOGRAM  Never done    COVID-19 Vaccine (1) Never done    Colorectal Cancer Screening  Never done    BMI: Followup Plan  11/11/2021    Medicare Annual Wellness Visit (AWV)  02/26/2022    BMI: Adult  03/29/2022    Cervical Cancer Screening  03/13/2025    DTaP,Tdap,and Td Vaccines (3 - Td) 10/06/2029    HIV Screening  Completed    Hepatitis C Screening  Completed    Pneumococcal Vaccine: Pediatrics (0 to 5 Years) and At-Risk Patients (6 to 59 Years)  Completed    Influenza Vaccine  Completed    HIB Vaccine  Aged Out    Hepatitis B Vaccine  Aged Out    IPV Vaccine  Aged Out    Hepatitis A Vaccine  Aged Out    Meningococcal ACWY Vaccine  Aged Out    HPV Vaccine  Aged Out     Immunization History   Administered Date(s) Administered    INFLUENZA 10/26/2014, 12/08/2015, 08/29/2017    Influenza, injectable, quadrivalent, preservative free 0 5 mL 10/06/2019, 08/23/2020    Pneumococcal Polysaccharide PPV23 02/03/2020, 09/22/2020    Tdap 07/11/2019, 10/06/2019    Zoster Vaccine Recombinant 05/21/2020, 08/23/2020       Portions of this note may have been created with voice recognition software  Occasional wrong word or sound a like substitutions may have occurred due to inherent limitations of voice recognition software  Read the chart carefully and recognize, using context, where substitutions have occurred  Global time spent on encounter: 40 minutes    RANDOLPH Alan  Internal Medicine PGY-2  [address] [facility] , [address] [address] [facility], [address] [facility]  Office: [phone]  Fax: [phone]

## 2021-03-29 NOTE — PATIENT INSTRUCTIONS
Lower Back Exercises   WHAT YOU NEED TO KNOW:   Lower back exercises help heal and strengthen your back muscles to prevent another injury  Ask your healthcare provider if you need to see a physical therapist for more advanced exercises  DISCHARGE INSTRUCTIONS:   Return to the emergency department if:   · You have severe pain that prevents you from moving  Contact your healthcare provider if:   · Your pain becomes worse  · You have new pain  · You have questions or concerns about your condition or care  Do lower back exercises safely:   · Do the exercises on a mat or firm surface  (not on a bed) to support your spine and prevent low back pain  · Move slowly and smoothly  Avoid fast or jerky motions  · Breathe normally  Do not hold your breath  · Stop if you feel pain  It is normal to feel some discomfort at first  Regular exercise will help decrease your discomfort over time  Lower back exercises: Your healthcare provider may recommend that you do back exercises 10 to 30 minutes each day  He may also recommend that you do exercises 1 to 3 times each day  Ask your healthcare provider which exercises are best for you and how often to do them  · Ankle pumps:  Lie on your back  Move your foot up (with your toes pointing toward your head)  Then, move your foot down (with your toes pointing away from you)  Repeat this exercise 10 times on each side  · Heel slides:  Lie on your back  Slowly bend one leg and then straighten it  Next, bend the other leg and then straighten it  Repeat 10 times on each side  · Pelvic tilt:  Lie on your back with your knees bent and feet flat on the floor  Place your arms in a relaxed position beside your body  Tighten the muscles of your abdomen and flatten your back against the floor  Hold for 5 seconds  Repeat 5 times  · Back stretch:  Lie on your back with your hands behind your head   Bend your knees and turn the lower half of your body to one side  Hold this position for 10 seconds  Repeat 3 times on each side  · Straight leg raises:  Lie on your back with one leg straight  Bend the other knee  Tighten your abdomen and then slowly lift the straight leg up about 6 to 12 inches off the floor  Hold for 1 to 5 seconds  Lower your leg slowly  Repeat 10 times on each leg  · Knee-to-chest:  Lie on your back with your knees bent and feet flat on the floor  Pull one of your knees toward your chest and hold it there for 5 seconds  Return your leg to the starting position  Lift the other knee toward your chest and hold for 5 seconds  Do this 5 times on each side  · Cat and camel:  Place your hands and knees on the floor  Arch your back upward toward the ceiling and lower your head  Round out your spine as much as you can  Hold for 5 seconds  Lift your head upward and push your chest downward toward the floor  Hold for 5 seconds  Do 3 sets or as directed  · Wall squats:  Stand with your back against a wall  Tighten the muscles of your abdomen  Slowly lower your body until your knees are bent at a 45 degree angle  Hold this position for 5 seconds  Slowly move back up to a standing position  Repeat 10 times  · Curl up:  Lie on your back with your knees bent and feet flat on the floor  Place your hands, palms down, underneath the curve in your lower back  Next, with your elbows on the floor, lift your shoulders and chest 2 to 3 inches  Keep your head in line with your shoulders  Hold this position for 5 seconds  When you can do this exercise without pain for 10 to 15 seconds, you may add a rotation  While your shoulders and chest are lifted off the ground, turn slightly to the left and hold  Repeat on the other side  · Bird dog:  Place your hands and knees on the floor  Keep your wrists directly below your shoulders and your knees directly below your hips  Pull your belly button in toward your spine   Do not flatten or arch your back  Tighten your abdominal muscles  Raise one arm straight out so that it is aligned with your head  Next, raise the leg opposite your arm  Hold this position for 15 seconds  Lower your arm and leg slowly and change sides  Do 5 sets  © Copyright [address] Information is for End User's use only and may not be sold, redistributed or otherwise used for commercial purposes  All illustrations and images included in CareNotes® are the copyrighted property of A D A M , Inc  or [facility]   The above information is an  only  It is not intended as medical advice for individual conditions or treatments  Talk to your doctor, nurse or pharmacist before following any medical regimen to see if it is safe and effective for you

## 2021-03-30 ENCOUNTER — TRANSCRIBE ORDERS (OUTPATIENT)
Dept: ADMINISTRATIVE | Facility: HOSPITAL | Age: 56
End: 2021-03-30

## 2021-03-30 DIAGNOSIS — R91.1 PULMONARY NODULE SEEN ON IMAGING STUDY: Primary | ICD-10-CM

## 2021-03-30 DIAGNOSIS — Z12.31 SCREENING MAMMOGRAM FOR HIGH-RISK PATIENT: Primary | ICD-10-CM

## 2021-04-02 ENCOUNTER — TELEPHONE (OUTPATIENT)
Dept: INTERNAL MEDICINE CLINIC | Facility: CLINIC | Age: 56
End: 2021-04-02

## 2021-04-02 NOTE — TELEPHONE ENCOUNTER
Patient call to let Miles Orellana know the she is going to the ED to be evaluated for the hip pain ,per patient she can handle the pain no more and she want to get the ct scan done right away to see whats going ton with her hip  I explain the patient the am not sure if they are going to do a ct scan if she go to the ER ,but she insisting on going because of the pain  I inform patient if she is in the much pain she can go to be evaluated  Patient is going today

## 2021-04-03 DIAGNOSIS — I10 ESSENTIAL HYPERTENSION: ICD-10-CM

## 2021-04-03 RX ORDER — AMLODIPINE BESYLATE 5 MG/1
TABLET ORAL
Qty: 90 TABLET | Refills: 1 | Status: SHIPPED | OUTPATIENT
Start: 2021-04-03 | End: 2021-06-30 | Stop reason: SDUPTHER

## 2021-04-06 ENCOUNTER — HOSPITAL ENCOUNTER (OUTPATIENT)
Dept: RADIOLOGY | Facility: HOSPITAL | Age: 56
Discharge: HOME | End: 2021-04-06
Payer: COMMERCIAL

## 2021-04-06 ENCOUNTER — TRANSCRIBE ORDERS (OUTPATIENT)
Dept: ADMINISTRATIVE | Facility: HOSPITAL | Age: 56
End: 2021-04-06

## 2021-04-06 DIAGNOSIS — R91.1 PULMONARY NODULE SEEN ON IMAGING STUDY: ICD-10-CM

## 2021-04-06 PROCEDURE — 71250 CT THORAX DX C-: CPT

## 2021-04-06 PROCEDURE — G1004 CDSM NDSC: HCPCS

## 2021-04-08 ENCOUNTER — OFFICE VISIT (OUTPATIENT)
Dept: INTERNAL MEDICINE CLINIC | Facility: CLINIC | Age: 56
End: 2021-04-08

## 2021-04-08 VITALS
WEIGHT: 203 LBS | HEART RATE: 103 BPM | BODY MASS INDEX: 35.97 KG/M2 | DIASTOLIC BLOOD PRESSURE: 76 MMHG | SYSTOLIC BLOOD PRESSURE: 111 MMHG | HEIGHT: 63 IN | TEMPERATURE: 98.2 F

## 2021-04-08 DIAGNOSIS — G89.29 CHRONIC LEFT-SIDED LOW BACK PAIN WITHOUT SCIATICA: ICD-10-CM

## 2021-04-08 DIAGNOSIS — M54.50 CHRONIC LEFT-SIDED LOW BACK PAIN WITHOUT SCIATICA: ICD-10-CM

## 2021-04-08 DIAGNOSIS — M25.552 LEFT HIP PAIN: Primary | ICD-10-CM

## 2021-04-08 PROCEDURE — 3074F SYST BP LT 130 MM HG: CPT | Performed by: INTERNAL MEDICINE

## 2021-04-08 PROCEDURE — 99213 OFFICE O/P EST LOW 20 MIN: CPT | Performed by: INTERNAL MEDICINE

## 2021-04-08 PROCEDURE — 3078F DIAST BP <80 MM HG: CPT | Performed by: INTERNAL MEDICINE

## 2021-04-08 PROCEDURE — 4004F PT TOBACCO SCREEN RCVD TLK: CPT | Performed by: INTERNAL MEDICINE

## 2021-04-08 PROCEDURE — 3008F BODY MASS INDEX DOCD: CPT | Performed by: INTERNAL MEDICINE

## 2021-04-08 RX ORDER — GABAPENTIN 100 MG/1
100 CAPSULE ORAL 3 TIMES DAILY
Qty: 90 CAPSULE | Refills: 0 | Status: SHIPPED | OUTPATIENT
Start: 2021-04-08 | End: 2022-03-09

## 2021-04-08 NOTE — PROGRESS NOTES
ASSESSMENT/PLAN:  Diagnoses and all orders for this visit:    Left hip pain  -     gabapentin (NEURONTIN) 100 mg capsule; Take 1 capsule (100 mg total) by mouth 3 (three) times a day  -     XR spine lumbar minimum 4 views non injury; Future  -     XR hip/pelv 2-3 vws left if performed; Future  -     Consider muscle relaxers if pain does not resolve with gabapentin  Chronic left-sided low back pain without sciatica  -     XR spine lumbar minimum 4 views non injury; Future      Health Maintenance:  Advised diet and exercise  Advised to refrain from tobacco, alcohol, illicit drug use  Advised medical compliance  To discuss with PCP    Schedule a follow-up appointment in 2-4 weeks with PCP Dr Rosas     CHIEF COMPLAINT: Left hip pain    HISTORY OF PRESENT ILLNESS:    Patient presents today for left hip, back, and knee pain  Patient states that this has been ongoing for months and she has seen 2 physicians thus far  She states that the pain is pulling in nature  It starts at her left groin area and radiates up to the lumbar spine and down to her knee  She states that tylenol does help the pain  Patient did not go to PT as she can't afford it - she states last time she went, she was charged about 100$ and she can't pay that amount  I discussed with the patient using gabapentin 100 mg TID as well as getting an xray of the lumbar spine and left hip  She was agreeable to this plan  The following portions of the patient's history were reviewed and updated as appropriate: allergies, current medications, past family history, past medical history, past social history, past surgical history and problem list     Review of Systems   Constitutional: Negative  Respiratory: Negative  Cardiovascular: Negative  Gastrointestinal: Negative  Musculoskeletal: Positive for arthralgias and back pain         OBJECTIVE:  Vitals:    04/08/21 1648   BP: 111/76   BP Location: Left arm   Patient Position: Sitting   Cuff Size: Standard   Pulse: 103   Temp: 98 2 °F (36 8 °C)   TempSrc: Temporal   Weight: 92 1 kg (203 lb)   Height: 5' 3" (1 6 m)     Physical Exam  Constitutional:       Appearance: Normal appearance  HENT:      Head: Normocephalic and atraumatic  Nose: Nose normal       Mouth/Throat:      Mouth: Mucous membranes are moist       Pharynx: Oropharynx is clear  Eyes:      Extraocular Movements: Extraocular movements intact  Conjunctiva/sclera: Conjunctivae normal    Neck:      Musculoskeletal: Normal range of motion  Cardiovascular:      Rate and Rhythm: Normal rate and regular rhythm  Heart sounds: No murmur  No friction rub  No gallop  Pulmonary:      Effort: Pulmonary effort is normal  No respiratory distress  Breath sounds: Normal breath sounds  No stridor  No wheezing, rhonchi or rales  Chest:      Chest wall: No tenderness  Abdominal:      General: Bowel sounds are normal  There is distension  Palpations: Abdomen is soft  There is no mass  Tenderness: There is no abdominal tenderness  There is no guarding or rebound  Hernia: No hernia is present  Comments: No palpable hernias  Musculoskeletal: Normal range of motion  Skin:     General: Skin is warm and dry  Neurological:      Mental Status: She is alert and oriented to person, place, and time  Mental status is at baseline     Psychiatric:         Mood and Affect: Mood normal            Current Outpatient Medications:     amLODIPine (NORVASC) 5 mg tablet, take 2 tablets by mouth daily, Disp: 90 tablet, Rfl: 1    atorvastatin (LIPITOR) 20 mg tablet, Take 1 tablet (20 mg total) by mouth daily, Disp: 90 tablet, Rfl: 0    Diclofenac Sodium (VOLTAREN) 1 %, Apply 2 g topically 3 (three) times a day as needed (Pain), Disp: 1 Tube, Rfl: 1    FLUoxetine (PROzac) 10 mg capsule, Take 3 capsules (30 mg total) by mouth daily (Patient taking differently: Take 40 mg by mouth daily ), Disp: 90 capsule, Rfl: 0   FLUoxetine (PROzac) 40 MG capsule, Take 40 mg by mouth every morning, Disp: , Rfl:     fluticasone (FLONASE) 50 mcg/act nasal spray, 2 sprays into each nostril daily, Disp: 11 1 mL, Rfl: 2    lisinopril (ZESTRIL) 10 mg tablet, take 1 tablet by mouth once daily, Disp: 90 tablet, Rfl: 2    loratadine (CLARITIN) 10 mg tablet, Take 1 tablet (10 mg total) by mouth daily, Disp: 30 tablet, Rfl: 2    LORazepam (ATIVAN) 0 5 mg tablet, Take by mouth every 8 (eight) hours as needed for anxiety, Disp: , Rfl:     mirtazapine (REMERON) 7 5 MG tablet, take 1 tablet by mouth at bedtime, Disp: 30 tablet, Rfl: 0    naproxen (NAPROSYN) 500 mg tablet, Take 1 tablet (500 mg total) by mouth 2 (two) times a day as needed for moderate pain, Disp: 60 tablet, Rfl: 1    Blood Pressure KIT, Use 3 (three) times a week, Disp: 1 each, Rfl: 0    gabapentin (NEURONTIN) 100 mg capsule, Take 1 capsule (100 mg total) by mouth 3 (three) times a day, Disp: 90 capsule, Rfl: 0    naphazoline-pheniramine (NAPHCON-A) 0 025-0 3 % ophthalmic solution, Administer 1 drop to both eyes 4 (four) times a day as needed for allergies or irritation (Patient not taking: Reported on 3/29/2021), Disp: 15 mL, Rfl: 2    Past Medical History:   Diagnosis Date    Anxiety     Arthritis     Bipolar affective disorder, depressed, severe ([facility] [address] ) 4/28/2019    Depression     Elevated bilirubin 8/15/2019    Hyperlipidemia     Hypertension     Hypokalemia 8/15/2019    Learning difficulty     Leukocytosis 7/28/2020    Obesity (BMI 30 0-34 9) 6/26/2020    Osteopenia     Ovarian failure     Previous known suicide attempt     Psychiatric disorder     Psychiatric illness      Past Surgical History:   Procedure Laterality Date    LAPAROSCOPY      as a child, per patient-normal findings     Social History     Socioeconomic History    Marital status: Single     Spouse name: Not on file    Number of children: Not on file    Years of education: Not on file    Highest education level: Not on file   Occupational History    Not on file   Social Needs    Financial resource strain: Somewhat hard    Food insecurity     Worry: Never true     Inability: Never true    Transportation needs     Medical: No     Non-medical: No   Tobacco Use    Smoking status: Current Some Day Smoker     Packs/day: 0 25     Years: 15 00     Pack years: 3 75     Types: Cigarettes     Last attempt to quit: 1990     Years since quittin 2    Smokeless tobacco: Never Used    Tobacco comment: 5-6 cigarettes a day   Substance and Sexual Activity    Alcohol use: Not Currently     Alcohol/week: 2 0 standard drinks     Types: 2 Cans of beer per week     Frequency: 2-3 times a week     Drinks per session: 1 or 2     Binge frequency: Never     Comment: socially    Drug use: No    Sexual activity: Yes     Partners: Male     Birth control/protection: None   Lifestyle    Physical activity     Days per week: 0 days     Minutes per session: 0 min    Stress: Not at all   Relationships    Social connections     Talks on phone: More than three times a week     Gets together:  Three times a week     Attends Jewish service: More than 4 times per year     Active member of club or organization: No     Attends meetings of clubs or organizations: Never     Relationship status: Never     Intimate partner violence     Fear of current or ex partner: No     Emotionally abused: No     Physically abused: No     Forced sexual activity: No   Other Topics Concern    Not on file   Social History Narrative    Not on file     Family History   Problem Relation Age of Onset    Alzheimer's disease Mother     Depression Mother     Depression Brother     Bipolar disorder Brother     No Known Problems Maternal Aunt     No Known Problems Paternal Aunt     No Known Problems Maternal Uncle     No Known Problems Paternal Uncle     No Known Problems Cousin     ADD / ADHD Neg Hx     Alcohol abuse Neg Hx  Anxiety disorder Neg Hx     Dementia Neg Hx     Drug abuse Neg Hx     OCD Neg Hx     Paranoid behavior Neg Hx     Schizophrenia Neg Hx     Seizures Neg Hx     Self-Injury Neg Hx     Suicide Attempts Neg Hx        ==  MD Torie Gordon 73 Internal Medicine PGY-2    [facility] [address]   [facility] , [address] [address] [facility] [address] [facility]  Office: [phone]  Fax: [phone]

## 2021-04-12 ENCOUNTER — HOSPITAL ENCOUNTER (OUTPATIENT)
Dept: RADIOLOGY | Facility: HOSPITAL | Age: 56
Discharge: HOME | End: 2021-04-12
Payer: COMMERCIAL

## 2021-04-12 ENCOUNTER — TELEPHONE (OUTPATIENT)
Dept: INTERNAL MEDICINE CLINIC | Facility: CLINIC | Age: 56
End: 2021-04-12

## 2021-04-12 DIAGNOSIS — G89.29 CHRONIC LEFT-SIDED LOW BACK PAIN WITHOUT SCIATICA: ICD-10-CM

## 2021-04-12 DIAGNOSIS — M54.50 CHRONIC LEFT-SIDED LOW BACK PAIN WITHOUT SCIATICA: ICD-10-CM

## 2021-04-12 DIAGNOSIS — M25.552 LEFT HIP PAIN: ICD-10-CM

## 2021-04-12 DIAGNOSIS — R91.1 PULMONARY NODULE: Primary | ICD-10-CM

## 2021-04-12 PROCEDURE — 73502 X-RAY EXAM HIP UNI 2-3 VIEWS: CPT

## 2021-04-12 PROCEDURE — 72110 X-RAY EXAM L-2 SPINE 4/>VWS: CPT

## 2021-04-12 NOTE — TELEPHONE ENCOUNTER
Patient returned call to office  I made her aware of  Results as written  Patient verbally understood  Patient also asked about xray results that were done today  I informed her results are not in and to call us if she does not hear from our office by Thursday

## 2021-04-12 NOTE — TELEPHONE ENCOUNTER
I reviewed the radiologist's read regarding Misa's recent chest CT  Please let her know that it shows a right basilar 9 mm ground-glass nodule  Looking at her imaging report from [facility] in 2016, she had a nodule at a similar location  Based on current guidelines, our next step would be to follow-up with another CT scan in 6-12 months in order to see if this nodule is changing size  At that stage, we will make further recommendations regarding further imaging and workup  I will place orders for follow-up CT scan  The nodule in and of itself, as we discussed at our last appointment together, only means there is an area of irregular tissue within the long and that this is likely reflective of changes related to smoking  If she would like an appointment to discuss these findings further, she is free to schedule one  In the meantime, the best thing she can do for herself is to consider quitting smoking  Thank you

## 2021-04-12 NOTE — TELEPHONE ENCOUNTER
Kole Morrow from [employer] radiology called in today at 10:52 am  ([phone])    Significant finding in ct chest    9mm ground glass nodule     immediately notified SHORTY Ruiz Cos

## 2021-04-17 ENCOUNTER — HOSPITAL ENCOUNTER (EMERGENCY)
Facility: HOSPITAL | Age: 56
Discharge: HOME | End: 2021-04-17
Attending: EMERGENCY MEDICINE | Admitting: EMERGENCY MEDICINE
Payer: COMMERCIAL

## 2021-04-17 VITALS
BODY MASS INDEX: 35.44 KG/M2 | WEIGHT: 200 LBS | HEART RATE: 64 BPM | HEIGHT: 63 IN | SYSTOLIC BLOOD PRESSURE: 120 MMHG | DIASTOLIC BLOOD PRESSURE: 66 MMHG | OXYGEN SATURATION: 95 % | RESPIRATION RATE: 20 BRPM | TEMPERATURE: 97.7 F

## 2021-04-17 DIAGNOSIS — R10.9 LEFT SIDED ABDOMINAL PAIN: ICD-10-CM

## 2021-04-17 DIAGNOSIS — M79.18 MUSCULOSKELETAL PAIN: Primary | ICD-10-CM

## 2021-04-17 DIAGNOSIS — M54.50 LEFT LOW BACK PAIN: ICD-10-CM

## 2021-04-17 LAB
ALBUMIN SERPL BCP-MCNC: 4 G/DL (ref 3.5–5)
ALP SERPL-CCNC: 110 U/L (ref 46–116)
ALT SERPL W P-5'-P-CCNC: 29 U/L (ref 12–78)
ANION GAP SERPL CALCULATED.3IONS-SCNC: 8 MMOL/L (ref 4–13)
AST SERPL W P-5'-P-CCNC: 19 U/L (ref 5–45)
BASOPHILS # BLD AUTO: 0.08 THOUSANDS/ΜL (ref 0–0.1)
BASOPHILS NFR BLD AUTO: 1 % (ref 0–1)
BILIRUB SERPL-MCNC: 0.71 MG/DL (ref 0.2–1)
BILIRUB UR QL STRIP: NEGATIVE
BUN SERPL-MCNC: 21 MG/DL (ref 5–25)
CALCIUM SERPL-MCNC: 9.8 MG/DL (ref 8.3–10.1)
CHLORIDE SERPL-SCNC: 105 MMOL/L (ref 100–108)
CLARITY UR: CLEAR
CO2 SERPL-SCNC: 26 MMOL/L (ref 21–32)
COLOR UR: YELLOW
COLOR, POC: NORMAL
CREAT SERPL-MCNC: 0.85 MG/DL (ref 0.6–1.3)
EOSINOPHIL # BLD AUTO: 0.52 THOUSAND/ΜL (ref 0–0.61)
EOSINOPHIL NFR BLD AUTO: 4 % (ref 0–6)
ERYTHROCYTE [DISTWIDTH] IN BLOOD BY AUTOMATED COUNT: 13.7 % (ref 11.6–15.1)
GFR SERPL CREATININE-BSD FRML MDRD: 77 ML/MIN/1.73SQ M
GLUCOSE SERPL-MCNC: 107 MG/DL (ref 65–140)
GLUCOSE UR STRIP-MCNC: NEGATIVE MG/DL
HCT VFR BLD AUTO: 42.2 % (ref 34.8–46.1)
HGB BLD-MCNC: 13.8 G/DL (ref 11.5–15.4)
HGB UR QL STRIP.AUTO: NEGATIVE
IMM GRANULOCYTES # BLD AUTO: 0.08 THOUSAND/UL (ref 0–0.2)
IMM GRANULOCYTES NFR BLD AUTO: 1 % (ref 0–2)
KETONES UR STRIP-MCNC: NEGATIVE MG/DL
LEUKOCYTE ESTERASE UR QL STRIP: NEGATIVE
LYMPHOCYTES # BLD AUTO: 3.81 THOUSANDS/ΜL (ref 0.6–4.47)
LYMPHOCYTES NFR BLD AUTO: 30 % (ref 14–44)
MCH RBC QN AUTO: 28.6 PG (ref 26.8–34.3)
MCHC RBC AUTO-ENTMCNC: 32.7 G/DL (ref 31.4–37.4)
MCV RBC AUTO: 88 FL (ref 82–98)
MONOCYTES # BLD AUTO: 1.21 THOUSAND/ΜL (ref 0.17–1.22)
MONOCYTES NFR BLD AUTO: 10 % (ref 4–12)
NEUTROPHILS # BLD AUTO: 7.03 THOUSANDS/ΜL (ref 1.85–7.62)
NEUTS SEG NFR BLD AUTO: 54 % (ref 43–75)
NITRITE UR QL STRIP: NEGATIVE
NRBC BLD AUTO-RTO: 0 /100 WBCS
PH UR STRIP.AUTO: 6.5 [PH] (ref 4.5–8)
PLATELET # BLD AUTO: 294 THOUSANDS/UL (ref 149–390)
PMV BLD AUTO: 9.7 FL (ref 8.9–12.7)
POTASSIUM SERPL-SCNC: 4.2 MMOL/L (ref 3.5–5.3)
PROT SERPL-MCNC: 8.1 G/DL (ref 6.4–8.2)
PROT UR STRIP-MCNC: NEGATIVE MG/DL
RBC # BLD AUTO: 4.82 MILLION/UL (ref 3.81–5.12)
SODIUM SERPL-SCNC: 139 MMOL/L (ref 136–145)
SP GR UR STRIP.AUTO: 1.02 (ref 1–1.03)
UROBILINOGEN UR QL STRIP.AUTO: 0.2 E.U./DL
WBC # BLD AUTO: 12.73 THOUSAND/UL (ref 4.31–10.16)

## 2021-04-17 PROCEDURE — 80053 COMPREHEN METABOLIC PANEL: CPT | Performed by: EMERGENCY MEDICINE

## 2021-04-17 PROCEDURE — 81003 URINALYSIS AUTO W/O SCOPE: CPT

## 2021-04-17 PROCEDURE — 99284 EMERGENCY DEPT VISIT MOD MDM: CPT

## 2021-04-17 PROCEDURE — 36415 COLL VENOUS BLD VENIPUNCTURE: CPT | Performed by: EMERGENCY MEDICINE

## 2021-04-17 PROCEDURE — 96374 THER/PROPH/DIAG INJ IV PUSH: CPT

## 2021-04-17 PROCEDURE — 99284 EMERGENCY DEPT VISIT MOD MDM: CPT | Performed by: EMERGENCY MEDICINE

## 2021-04-17 PROCEDURE — 85025 COMPLETE CBC W/AUTO DIFF WBC: CPT | Performed by: EMERGENCY MEDICINE

## 2021-04-17 RX ORDER — LIDOCAINE 50 MG/G
1 PATCH TOPICAL ONCE
Status: DISCONTINUED | OUTPATIENT
Start: 2021-04-17 | End: 2021-04-17 | Stop reason: HOSPADM

## 2021-04-17 RX ORDER — LIDOCAINE 50 MG/G
1 PATCH TOPICAL DAILY
Qty: 6 PATCH | Refills: 0 | Status: SHIPPED | OUTPATIENT
Start: 2021-04-17

## 2021-04-17 RX ORDER — KETOROLAC TROMETHAMINE 30 MG/ML
15 INJECTION, SOLUTION INTRAMUSCULAR; INTRAVENOUS ONCE
Status: COMPLETED | OUTPATIENT
Start: 2021-04-17 | End: 2021-04-17

## 2021-04-17 RX ADMIN — LIDOCAINE 1 PATCH: 50 PATCH TOPICAL at 19:55

## 2021-04-17 RX ADMIN — KETOROLAC TROMETHAMINE 15 MG: 30 INJECTION, SOLUTION INTRAMUSCULAR at 19:07

## 2021-04-17 NOTE — ED ATTENDING ATTESTATION
4/17/2021  Jay Aguayo DO, saw and evaluated the patient  I have discussed the patient with the resident/non-physician practitioner and agree with the resident's/non-physician practitioner's findings, Plan of Care, and MDM as documented in the resident's/non-physician practitioner's note, except where noted  All available labs and Radiology studies were reviewed  I was present for key portions of any procedure(s) performed by the resident/non-physician practitioner and I was immediately available to provide assistance  At this point I agree with the current assessment done in the Emergency Department  I have conducted an independent evaluation of this patient a history and physical is as follows:      64 yo female BIBA from for [facility] abd pain "for a long time"  Pain possibly worse today  Non radiating  Constant, no a/e factors  No associated f/c/n/v, CP/SOB, diarrhea/constipation, blood in stool, urinary sxs, vag dc or bleeding  Workup started by PMD but apparently went away for 3 weeks, pt told to go to ED if symptoms worsen  L hip xray, lumbar spine xrays done, not formally read yet  I personally reviewed the films, no acute abnormalities  Nothing to account for pts symptoms  abd sndnt no r/g bs+  No rash    Imp: LLQ pain, acute on chronic? Plan: abd labs, upreg, reassess      ED Course         Critical Care Time  Procedures

## 2021-04-18 NOTE — DISCHARGE INSTRUCTIONS
Take ibuprofen and tylenol for pain as needed  You may also use prescribed lidoderm patches  Follow up with your primary care physician  Return to the emergency department for severe worsening of pain, vomiting, fever, difficulty breathing, any other new or concerning symptoms

## 2021-04-18 NOTE — ED PROVIDER NOTES
History  Chief Complaint   Patient presents with    Abdominal Pain     Pt reports intermittent LLQ pain radiating to back "on and off" for 5 years  Pt requesting CT scan to "check on my Lipoma " No relief from OTC meds at home, no  symptoms  HPI  62 yo female with PMH HTN, HLD, bipolar disorder, intellectual disability, presents to the ED via EMS with concern for LLQ/left side pain that has been intermittent for several years  States she has pain almost every day for most of the day  Reports pain radiates from her LLQ abdomen to left side and left lower back and left buttock  Pt cannot identify any aggrevating or alleviating factors  No nausea, vomiting, diarrhea, urinary sxs, vaginal discharge, vaginal bleeding, fever, chills, chest pain, SOB, dizziness, lightheadedness  Pt took tylenol and naproxen this morning without significant improvement  Saw her PCP last week for this pain and they ordered XRs, of which the results are still pending  Notes she had a lipoma on CT scan at [facility] 5 years ago  Prior to Admission Medications   Prescriptions Last Dose Informant Patient Reported? Taking?    Blood Pressure KIT   No No   Sig: Use 3 (three) times a week   Diclofenac Sodium (VOLTAREN) 1 %   No No   Sig: Apply 2 g topically 3 (three) times a day as needed (Pain)   FLUoxetine (PROzac) 10 mg capsule   No No   Sig: Take 3 capsules (30 mg total) by mouth daily   Patient taking differently: Take 40 mg by mouth daily    FLUoxetine (PROzac) 40 MG capsule   Yes No   Sig: Take 40 mg by mouth every morning   LORazepam (ATIVAN) 0 5 mg tablet   Yes No   Sig: Take by mouth every 8 (eight) hours as needed for anxiety   amLODIPine (NORVASC) 5 mg tablet   No No   Sig: take 2 tablets by mouth daily   atorvastatin (LIPITOR) 20 mg tablet   No No   Sig: Take 1 tablet (20 mg total) by mouth daily   fluticasone (FLONASE) 50 mcg/act nasal spray   No No   Si sprays into each nostril daily   gabapentin (NEURONTIN) 100 mg capsule   No No   Sig: Take 1 capsule (100 mg total) by mouth 3 (three) times a day   lisinopril (ZESTRIL) 10 mg tablet   No No   Sig: take 1 tablet by mouth once daily   loratadine (CLARITIN) 10 mg tablet   No No   Sig: Take 1 tablet (10 mg total) by mouth daily   mirtazapine (REMERON) 7 5 MG tablet   No No   Sig: take 1 tablet by mouth at bedtime   naphazoline-pheniramine (NAPHCON-A) 0 025-0 3 % ophthalmic solution   No No   Sig: Administer 1 drop to both eyes 4 (four) times a day as needed for allergies or irritation   Patient not taking: Reported on 3/29/2021   naproxen (NAPROSYN) 500 mg tablet   No No   Sig: Take 1 tablet (500 mg total) by mouth 2 (two) times a day as needed for moderate pain      Facility-Administered Medications: None       Past Medical History:   Diagnosis Date    Anxiety     Arthritis     Bipolar affective disorder, depressed, severe ([facility] [address] ) 4/28/2019    Depression     Elevated bilirubin 8/15/2019    Hyperlipidemia     Hypertension     Hypokalemia 8/15/2019    Learning difficulty     Leukocytosis 7/28/2020    Obesity (BMI 30 0-34 9) 6/26/2020    Osteopenia     Ovarian failure     Previous known suicide attempt     Psychiatric disorder     Psychiatric illness        Past Surgical History:   Procedure Laterality Date    LAPAROSCOPY      as a child, per patient-normal findings       Family History   Problem Relation Age of Onset    Alzheimer's disease Mother     Depression Mother     Depression Brother     Bipolar disorder Brother     No Known Problems Maternal Aunt     No Known Problems Paternal Aunt     No Known Problems Maternal Uncle     No Known Problems Paternal Uncle     No Known Problems Cousin     ADD / ADHD Neg Hx     Alcohol abuse Neg Hx     Anxiety disorder Neg Hx     Dementia Neg Hx     Drug abuse Neg Hx     OCD Neg Hx     Paranoid behavior Neg Hx     Schizophrenia Neg Hx     Seizures Neg Hx     Self-Injury Neg Hx     Suicide Attempts Neg Hx I have reviewed and agree with the history as documented  E-Cigarette/Vaping    E-Cigarette Use Never User      E-Cigarette/Vaping Substances    Nicotine No     THC No     CBD No     Flavoring No     Other No     Unknown No      Social History     Tobacco Use    Smoking status: Current Some Day Smoker     Packs/day: 0 25     Years: 15 00     Pack years: 3 75     Types: Cigarettes     Last attempt to quit: 1990     Years since quittin 3    Smokeless tobacco: Never Used    Tobacco comment: 5-6 cigarettes a day   Substance Use Topics    Alcohol use: Not Currently     Alcohol/week: 2 0 standard drinks     Types: 2 Cans of beer per week     Frequency: 2-3 times a week     Drinks per session: 1 or 2     Binge frequency: Never     Comment: socially    Drug use: No        Review of Systems   Constitutional: Negative for chills and fever  HENT: Negative for congestion, rhinorrhea and sore throat  Respiratory: Negative for chest tightness and shortness of breath  Cardiovascular: Negative for chest pain  Gastrointestinal: Positive for abdominal pain  Negative for diarrhea, nausea and vomiting  Genitourinary: Negative for dysuria and hematuria  Musculoskeletal: Positive for arthralgias, back pain and myalgias  Skin: Negative for rash  Neurological: Negative for dizziness, syncope, weakness, light-headedness, numbness and headaches  All other systems reviewed and are negative        Physical Exam  ED Triage Vitals [21 184]   Temperature Pulse Respirations Blood Pressure SpO2   97 7 °F (36 5 °C) 78 20 125/66 96 %      Temp Source Heart Rate Source Patient Position - Orthostatic VS BP Location FiO2 (%)   Tympanic Monitor Sitting Right arm --      Pain Score       8             Orthostatic Vital Signs  Vitals:    21 1842 21 194   BP: 125/66 120/66   Pulse: 78 64   Patient Position - Orthostatic VS: Sitting        Physical Exam  Vitals signs and nursing note reviewed  Constitutional:       General: She is not in acute distress  Appearance: She is well-developed  She is obese  She is not diaphoretic  HENT:      Head: Normocephalic and atraumatic  Right Ear: External ear normal       Left Ear: External ear normal       Nose: Nose normal    Eyes:      Conjunctiva/sclera: Conjunctivae normal       Pupils: Pupils are equal, round, and reactive to light  Neck:      Musculoskeletal: Normal range of motion and neck supple  Cardiovascular:      Rate and Rhythm: Normal rate and regular rhythm  Heart sounds: Normal heart sounds  No murmur  No friction rub  No gallop  Pulmonary:      Effort: Pulmonary effort is normal  No respiratory distress  Breath sounds: Normal breath sounds  No wheezing, rhonchi or rales  Abdominal:      General: Bowel sounds are normal  There is no distension  Palpations: Abdomen is soft  There is no mass  Tenderness: There is no abdominal tenderness  There is no right CVA tenderness, left CVA tenderness, guarding or rebound  Musculoskeletal: Normal range of motion  Back:    Lymphadenopathy:      Cervical: No cervical adenopathy  Skin:     General: Skin is warm and dry  Neurological:      Mental Status: She is alert and oriented to person, place, and time  Cranial Nerves: No cranial nerve deficit  Sensory: No sensory deficit           ED Medications  Medications   lidocaine (LIDODERM) 5 % patch 1 patch (1 patch Topical Medication Applied 4/17/21 1955)   ketorolac (TORADOL) injection 15 mg (15 mg Intravenous Given 4/17/21 1907)       Diagnostic Studies  Results Reviewed     Procedure Component Value Units Date/Time    POCT urinalysis dipstick [[id number]]  (Normal) Resulted: 04/17/21 2111    Lab Status: Final result Updated: 04/17/21 2111     Color, UA see results    Urine Macroscopic, POC [[id number]] Collected: 04/17/21 2109    Lab Status: Final result Specimen: Urine Updated: 04/17/21 2110 Color, UA Yellow     Clarity, UA Clear     pH, UA 6 5     Leukocytes, UA Negative     Nitrite, UA Negative     Protein, UA Negative mg/dl      Glucose, UA Negative mg/dl      Ketones, UA Negative mg/dl      Urobilinogen, UA 0 2 E U /dl      Bilirubin, UA Negative     Blood, UA Negative     Specific Gravity, UA 1 025    Narrative:      CLINITEK RESULT    CBC and differential [[id number]]  (Abnormal) Collected: 04/17/21 1906    Lab Status: Final result Specimen: Blood from Arm, Left Updated: 04/17/21 2029     WBC 12 73 Thousand/uL      RBC 4 82 Million/uL      Hemoglobin 13 8 g/dL      Hematocrit 42 2 %      MCV 88 fL      MCH 28 6 pg      MCHC 32 7 g/dL      RDW 13 7 %      MPV 9 7 fL      Platelets 607 Thousands/uL      nRBC 0 /100 WBCs      Neutrophils Relative 54 %      Immat GRANS % 1 %      Lymphocytes Relative 30 %      Monocytes Relative 10 %      Eosinophils Relative 4 %      Basophils Relative 1 %      Neutrophils Absolute 7 03 Thousands/µL      Immature Grans Absolute 0 08 Thousand/uL      Lymphocytes Absolute 3 81 Thousands/µL      Monocytes Absolute 1 21 Thousand/µL      Eosinophils Absolute 0 52 Thousand/µL      Basophils Absolute 0 08 Thousands/µL     Comprehensive metabolic panel [[id number]] Collected: 04/17/21 1906    Lab Status: Final result Specimen: Blood from Arm, Left Updated: 04/17/21 1935     Sodium 139 mmol/L      Potassium 4 2 mmol/L      Chloride 105 mmol/L      CO2 26 mmol/L      ANION GAP 8 mmol/L      BUN 21 mg/dL      Creatinine 0 85 mg/dL      Glucose 107 mg/dL      Calcium 9 8 mg/dL      AST 19 U/L      ALT 29 U/L      Alkaline Phosphatase 110 U/L      Total Protein 8 1 g/dL      Albumin 4 0 g/dL      Total Bilirubin 0 71 mg/dL      eGFR 77 ml/min/1 73sq m     Narrative:      Marco A guidelines for Chronic Kidney Disease (CKD):     Stage 1 with normal or high GFR (GFR > 90 mL/min/1 73 square meters)    Stage 2 Mild CKD (GFR = 60-89 mL/min/1 73 square meters)    Stage 3A Moderate CKD (GFR = 45-59 mL/min/1 73 square meters)    Stage 3B Moderate CKD (GFR = 30-44 mL/min/1 73 square meters)    Stage 4 Severe CKD (GFR = 15-29 mL/min/1 73 square meters)    Stage 5 End Stage CKD (GFR <15 mL/min/1 73 square meters)  Note: GFR calculation is accurate only with a steady state creatinine                 No orders to display         Procedures  Procedures      ED Course  ED Course as of Apr 17 2239   Sat Apr 17, 2021 2056 Pt reports she is feeling better after toradol and lidoderm patch  UA pending       2112 Mild leukocytosis, other blood work and UA wnl  Likely musculoskeletal vs  Self limited GI illness  Pt tolerating PO in the ED  Plan to discharge with strict return precautions and PCP follow up                                          MDM  62 yo female with PMH HTN, HLD, bipolar disorder, intellectual disability, presenting with left side pain radiating to left lower back and left buttock  Likely musculoskeletal vs  Self limited GI viral illness  XR left hip and lumbar spine from PCP without any obvious fractures or dislocations  Will obtain CBC, CMP, UA to evaluate for leukocytosis, anemia, electrolyte abnormality, renal dysfunction, hepatic dysfunction, UTI, hematuria  Will treat with toradol, lidoderm patch, and reassess  If sxs improved and no acute findings pt will likely be discharged with strict return precautions and PCP follow up     Disposition  Final diagnoses:   Musculoskeletal pain   Left sided abdominal pain   Left low back pain     Time reflects when diagnosis was documented in both MDM as applicable and the Disposition within this note     Time User Action Codes Description Comment    4/17/2021  9:23 PM Bronson Hamper Add [M79 18] Musculoskeletal pain     4/17/2021  9:23 PM Bronson Hamper Add [R10 9] Left sided abdominal pain     4/17/2021  9:23 PM [place] Hamper Add [M54 5] Left low back pain       ED Disposition     ED Disposition Condition Date/Time Comment    Discharge Stable Sat Apr 17, 2021  9:23 PM Gloria Roche discharge to home/self care              Follow-up Information     Follow up With Specialties Details Why Contact Info Additional [facility] in [facility] days  [address] [address]  [facility] [address], [address], [address] [phone]          Discharge Medication List as of 4/17/2021  9:29 PM      START taking these medications    Details   lidocaine (LIDODERM) 5 % Apply 1 patch topically daily Remove & Discard patch within 12 hours or as directed by MD, Starting Sat 4/17/2021, Normal         CONTINUE these medications which have NOT CHANGED    Details   amLODIPine (NORVASC) 5 mg tablet take 2 tablets by mouth daily, Normal      atorvastatin (LIPITOR) 20 mg tablet Take 1 tablet (20 mg total) by mouth daily, Starting Fri 7/31/2020, No Print      Blood Pressure KIT Use 3 (three) times a week, Starting Wed 11/11/2020, Normal      Diclofenac Sodium (VOLTAREN) 1 % Apply 2 g topically 3 (three) times a day as needed (Pain), Starting Wed 12/16/2020, Normal      FLUoxetine (PROzac) 40 MG capsule Take 40 mg by mouth every morning, Starting Thu 3/11/2021, Historical Med      fluticasone (FLONASE) 50 mcg/act nasal spray 2 sprays into each nostril daily, Starting Wed 11/11/2020, Normal      gabapentin (NEURONTIN) 100 mg capsule Take 1 capsule (100 mg total) by mouth 3 (three) times a day, Starting Thu 4/8/2021, Until Sat 5/8/2021, Normal      lisinopril (ZESTRIL) 10 mg tablet take 1 tablet by mouth once daily, Normal      loratadine (CLARITIN) 10 mg tablet Take 1 tablet (10 mg total) by mouth daily, Starting Wed 11/11/2020, Normal      LORazepam (ATIVAN) 0 5 mg tablet Take by mouth every 8 (eight) hours as needed for anxiety, Historical Med      mirtazapine (REMERON) 7 5 MG tablet take 1 tablet by mouth at bedtime, Normal      naphazoline-pheniramine (NAPHCON-A) 0 025-0 3 % ophthalmic solution Administer 1 drop to both eyes 4 (four) times a day as needed for allergies or irritation, Starting Wed 11/11/2020, Normal      naproxen (NAPROSYN) 500 mg tablet Take 1 tablet (500 mg total) by mouth 2 (two) times a day as needed for moderate pain, Starting Tue 11/10/2020, Normal           No discharge procedures on file  PDMP Review     None           ED Provider  Attending physically available and evaluated Elsy MENDEZ managed the patient along with the ED Attending      Electronically Signed by         Kayla Denver, MD  04/17/21 0328       Kayla Denver, MD  04/17/21 [id number]

## 2021-04-20 ENCOUNTER — TELEPHONE (OUTPATIENT)
Dept: INTERNAL MEDICINE CLINIC | Facility: CLINIC | Age: 56
End: 2021-04-20

## 2021-04-23 NOTE — TELEPHONE ENCOUNTER
I reviewed her recent lumbar spine and hip x-rays  They do reveal osteoarthritis in her low back and in both hips that is mild in severity  At this stage, she does not require further imaging workup  I would recommend she continue to treat her discomfort conservatively as we discussed previously and that she at least try physical therapy, which has been proven to be the best intervention for low back and hip arthritis such as hers  Thank you

## 2021-04-23 NOTE — TELEPHONE ENCOUNTER
Patient called the office back  Informed patient of results patient is refusing Physical Therapy stated "physical Therapy is out of the question" Patient states she is even thinking on changing offices because she does not feel like she is getting the care that she needs  Patient states she wants further imaging because she knows there is something more going on  Patient states she has also been experiencing headaches and pain on the left side of the abdomen and she has been using Lidocaine patches for the pain and is helping  relief the pain but patient states" I Feel like there is something on my left side" Patient is concerned it is a hernia or there is something there  Patient things there is something more going on other than the osteoarthritis  Informed patient she does have an appointment scheduled with Dr Osmar Pearson and this can be discussed further at that time      Just jose Coburn

## 2021-05-03 ENCOUNTER — OFFICE VISIT (OUTPATIENT)
Dept: INTERNAL MEDICINE CLINIC | Facility: CLINIC | Age: 56
End: 2021-05-03

## 2021-05-03 VITALS
HEART RATE: 106 BPM | SYSTOLIC BLOOD PRESSURE: 117 MMHG | BODY MASS INDEX: 35.78 KG/M2 | TEMPERATURE: 96.9 F | WEIGHT: 202 LBS | DIASTOLIC BLOOD PRESSURE: 78 MMHG | OXYGEN SATURATION: 96 %

## 2021-05-03 DIAGNOSIS — R51.9 ACUTE NONINTRACTABLE HEADACHE, UNSPECIFIED HEADACHE TYPE: ICD-10-CM

## 2021-05-03 DIAGNOSIS — E66.9 CLASS 2 OBESITY WITH BODY MASS INDEX (BMI) OF 36.0 TO 36.9 IN ADULT, UNSPECIFIED OBESITY TYPE, UNSPECIFIED WHETHER SERIOUS COMORBIDITY PRESENT: ICD-10-CM

## 2021-05-03 DIAGNOSIS — R10.9 ABDOMINAL PAIN, UNSPECIFIED ABDOMINAL LOCATION: Primary | ICD-10-CM

## 2021-05-03 PROCEDURE — 99213 OFFICE O/P EST LOW 20 MIN: CPT | Performed by: INTERNAL MEDICINE

## 2021-05-03 NOTE — PATIENT INSTRUCTIONS
Please apply heat/a warm compress to the left side of your belly for 20 minutes at a time  After this time please try to stretch over to your right side, and then your left to encourage the tight muscles to relax  For your headache, please try to take her ibuprofen with food as it can negatively affect her stomach  You can take up to 3 g of Tylenol within a 24 hour period safely  Could also try ice on the front of your head to decrease her discomfort  I believe your headache will improve with control of stressors  Please increase your daily water intake  Ideally, I would like you to drink roughly 70-80 oz of water per day at minimal (roughly 10 glasses daily)

## 2021-05-03 NOTE — PROGRESS NOTES
INTERNAL MEDICINE FOLLOW-UP OFFICE VISIT  [facility]  [address] [place] [address]    NAME: Cecetori Els  AGE: 63 y o  SEX: female    DATE OF ENCOUNTER: 5/3/2021    Assessment and Plan     1  Abdominal pain, unspecified abdominal location  -  Chronic and not well controlled  On current regimen of Motrin, Tylenol, and ice  - abdominal exam is reassuring that there is no acute intra-abdominal pathology and that this pain likely represents strain of the lateral abdominal musculature  - given findings concerning for musculoskeletal pain, will defer CT scan at this time as it is likely of no value  - patient advised to trial heat/warm compress to the area for 20 minutes at a time after which she is advised to attempt stretching over to her right and then over to her left to ease the chronic tension in these muscles  - stretching exercises reviewed with patient personally as she would not like to report back to physical therapy    2  Acute nonintractable headache, unspecified headache type  - patient with bifrontal headache intermittently for last 2 weeks not in a distribution consistent with migraine, tension headache, or sinus headache  - no red flag symptoms reported, neuroimaging not require  - given association with recent psychosocial stressors and perseveration regarding abdominal discomfort, I believe this discomfort will improve with control of stress and abdominal pain    3   Class 2 obesity with body mass index (BMI) of 36 0 to 36 9 in adult, unspecified obesity type, unspecified whether serious comorbidity present  - though patient was told her belly is "too bloated", I believe this protuberance is only reflective of central obesity  - we will review formalized weight loss strategies at subsequent visits, but in the meantime, advised patient on portion control and increasing her oral hydration as she reports she drinks very little water and quite a lot of coffee and soda    Will follow-up in person in clinic with patient in roughly 3 months  No orders of the defined types were placed in this encounter       - Counseling Documentation: patient was counseled regarding: diagnostic results, instructions for management, risk factor reductions, prognosis, patient and family education, impressions, risks and benefits of treatment options and importance of compliance with treatment  - Counseling Time: counseling time more than 50% of visit: 45 minutes  - Medication Side Effects: Adverse side effects of medications were reviewed with the patient/guardian today  Routine Health Maintenance:   RHM items not addressed at this visit  Patient has obtained her COVID-19 vaccine  Will need to again review need for screening mammography and CRC screening at next visit  Advised diet and exercise  Advised to refrain from tobacco, alcohol, and illicit drug use  Advised medical compliance    BMI Counseling: Body mass index is 35 78 kg/m²  The BMI is above normal  Nutrition recommendations include reducing portion sizes, decreasing overall calorie intake, 3-5 servings of fruits/vegetables daily, reducing fast food intake, consuming healthier snacks, decreasing soda and/or juice intake, moderation in carbohydrate intake, increasing intake of lean protein, reducing intake of saturated fat and trans fat and reducing intake of cholesterol  Exercise recommendations include moderate aerobic physical activity for 150 minutes/week and exercising 3-5 times per week  OMT/OPP: During the course of my history and physical, I utilized osteopathic examination modalities to assess the patient  Somatic dysfunctions were not identified during this visit  OMT is not indicated at this time, however could be considered at follow up visit      Chief Complaint     Chief Complaint   Patient presents with    Follow-up       History of Present Illness     Tru Bradley is a 63 y o  female with past medical history significant for depression, anxiety, psychosis, suicidal ideation with history of inpatient hospitalization, hypertension, hyperlipidemia, patellofemoral syndrome of both knees, allergies, tobacco use disorder, and recently identified pulmonary nodule who presents today for routine follow-up  Last visit to clinic 04/08/2021 with Dr Denise Aleman  She reports to primary complaints to me this afternoon  The first is a significant bifrontal headache that she has experienced nearly every day for last 2 weeks  She rates the severity as as high as 8/10  She qualifies this discomfort as a dull ache right in the center of her forehead above her eyes  She says she has been using Motrin 600 mg q 8 hours and Tylenol 500 mg 2 tablets q 8 hours  She says there are times when the discomfort is worse during which time she feels like her heart is racing  Otherwise, she does not identify any fevers, chills, night sweats, nasal congestion or rhinorrhea, chest pain, cough, shortness of breath, belly pain, nausea, vomiting, diarrhea, constipation, changes to her vision or her hearing, new dizziness or lightheadedness, focal weakness or diminished sensation  She says this headache has been worse in the last couple of weeks secondary to increased stressors at home  Patient also reports discomfort on the left lateral side of her abdomen  She says she has been having this pain on an intermittent basis for months  She describes it as an achy discomfort, but denies any association with ingestion of food or with bowel movements  Her pain is localized to the lateral abdomen only  She denies any nausea, vomiting, diarrhea, constipation, bright red blood per rectum, melena, mucoid stools, wang-colored stools  She also denies feeling bloated or noting increased flatulence  She states she has not noticed any relief when using ice  She recently did go to the emergency department for evaluation of this discomfort    At that time, her serologies and urine studies looks clean, however she says that she was told by the provider taking care per the emergency department that her stomach looked "too bloated"  She was not provided a CT AP  She received a dose of IV Toradol and noted significant symptomatic improvement, after which time she was discharged home  The following portions of the patient's history were reviewed and updated as appropriate: allergies, current medications, past family history, past medical history, past social history, past surgical history and problem list     Review of Systems     Review of Systems   Constitutional: Negative for chills, fatigue and fever  HENT: Negative for congestion, postnasal drip, rhinorrhea, sinus pressure, sinus pain and sore throat  Respiratory: Negative for cough, shortness of breath and wheezing  Cardiovascular: Negative for chest pain and palpitations  Gastrointestinal: Positive for abdominal pain  Negative for anal bleeding, blood in stool, constipation, diarrhea, nausea, rectal pain and vomiting  Genitourinary: Negative for difficulty urinating, dysuria, frequency, hematuria and urgency  Musculoskeletal: Positive for back pain  Negative for arthralgias, gait problem and myalgias  Skin: Negative for pallor, rash and wound  Neurological: Positive for headaches  Negative for dizziness, syncope, weakness, light-headedness and numbness         Active Problem List     Patient Active Problem List   Diagnosis    Amenorrhea    Essential hypertension    Hyperlipidemia    Tobacco use    Ovarian failure    Mild intellectual disability    Constipation    MDD (major depressive disorder), recurrent episode, severe (HCC)    Chronic pain of both knees    Bipolar disorder (HCC)    Anxiety    Insomnia    Obesity    Patellofemoral pain syndrome of both knees    Pulmonary nodule       Objective     /78 (BP Location: Left arm, Patient Position: Sitting, Cuff Size: Standard)   Pulse (!) 106   Temp (!) 96 9 °F (36 1 °C) (Temporal)   Wt 91 6 kg (202 lb)   LMP  (LMP Unknown)   SpO2 96%   BMI 35 78 kg/m²     Physical Exam  Vitals signs and nursing note reviewed  Constitutional:       General: She is not in acute distress  Appearance: Normal appearance  She is obese  She is not ill-appearing, toxic-appearing or diaphoretic  HENT:      Head: Normocephalic and atraumatic  Eyes:      General: No scleral icterus  Extraocular Movements: Extraocular movements intact  Conjunctiva/sclera: Conjunctivae normal       Pupils: Pupils are equal, round, and reactive to light  Neck:      Musculoskeletal: Neck supple  Cardiovascular:      Rate and Rhythm: Normal rate and regular rhythm  Pulses: Normal pulses  Heart sounds: Normal heart sounds  No murmur  No friction rub  No gallop  Pulmonary:      Effort: Pulmonary effort is normal  No respiratory distress  Breath sounds: Normal breath sounds  No stridor  No wheezing or rhonchi  Abdominal:      General: Abdomen is protuberant  Bowel sounds are normal  There is no distension  There are no signs of injury  Palpations: Abdomen is soft  There is no hepatomegaly, splenomegaly or mass  Tenderness: There is abdominal tenderness  There is no right CVA tenderness, left CVA tenderness, guarding or rebound  Negative signs include Mueller's sign, Rovsing's sign, McBurney's sign, psoas sign and obturator sign  Hernia: No hernia is present  There is no hernia in the umbilical area, ventral area, left inguinal area, right femoral area, left femoral area or right inguinal area  Comments:   Point tenderness on the left lateral abdomen along the external oblique  Remainder of abdominal exam benign  Musculoskeletal:         General: No swelling, tenderness or deformity  Right lower leg: No edema  Left lower leg: No edema  Lymphadenopathy:      Cervical: No cervical adenopathy  Skin:     General: Skin is warm and dry  Capillary Refill: Capillary refill takes less than 2 seconds  Coloration: Skin is not pale  Findings: No erythema or rash  Neurological:      General: No focal deficit present  Mental Status: She is alert  Psychiatric:      Comments: Patient presented with dysphoric mood, however this had improved significantly by the end of the visit  Pertinent Laboratory/Diagnostic Studies:  No results found      Images and diagnostics reviewed     Current Medications     Current Outpatient Medications:     amLODIPine (NORVASC) 5 mg tablet, take 2 tablets by mouth daily, Disp: 90 tablet, Rfl: 1    atorvastatin (LIPITOR) 20 mg tablet, Take 1 tablet (20 mg total) by mouth daily, Disp: 90 tablet, Rfl: 0    Blood Pressure KIT, Use 3 (three) times a week, Disp: 1 each, Rfl: 0    Diclofenac Sodium (VOLTAREN) 1 %, Apply 2 g topically 3 (three) times a day as needed (Pain), Disp: 1 Tube, Rfl: 1    FLUoxetine (PROzac) 40 MG capsule, Take 40 mg by mouth every morning, Disp: , Rfl:     fluticasone (FLONASE) 50 mcg/act nasal spray, 2 sprays into each nostril daily, Disp: 11 1 mL, Rfl: 2    lidocaine (LIDODERM) 5 %, Apply 1 patch topically daily Remove & Discard patch within 12 hours or as directed by MD, Disp: 6 patch, Rfl: 0    lisinopril (ZESTRIL) 10 mg tablet, take 1 tablet by mouth once daily, Disp: 90 tablet, Rfl: 2    loratadine (CLARITIN) 10 mg tablet, Take 1 tablet (10 mg total) by mouth daily, Disp: 30 tablet, Rfl: 2    LORazepam (ATIVAN) 0 5 mg tablet, Take by mouth every 8 (eight) hours as needed for anxiety, Disp: , Rfl:     mirtazapine (REMERON) 7 5 MG tablet, take 1 tablet by mouth at bedtime, Disp: 30 tablet, Rfl: 0    naproxen (NAPROSYN) 500 mg tablet, Take 1 tablet (500 mg total) by mouth 2 (two) times a day as needed for moderate pain, Disp: 60 tablet, Rfl: 1    gabapentin (NEURONTIN) 100 mg capsule, Take 1 capsule (100 mg total) by mouth 3 (three) times a day (Patient not taking: Reported on 5/3/2021), Disp: 90 capsule, Rfl: 0    naphazoline-pheniramine (NAPHCON-A) 0 025-0 3 % ophthalmic solution, Administer 1 drop to both eyes 4 (four) times a day as needed for allergies or irritation (Patient not taking: Reported on 3/29/2021), Disp: 15 mL, Rfl: 2    Health Maintenance     Health Maintenance   Topic Date Due    MAMMOGRAM  Never done    COVID-19 Vaccine (1) Never done    Colorectal Cancer Screening  Never done   [facility] Annual Wellness Visit (AWV)  02/26/2022    BMI: Followup Plan  03/31/2022    BMI: Adult  05/03/2022    Cervical Cancer Screening  03/13/2025    DTaP,Tdap,and Td Vaccines (3 - Td) 10/06/2029    HIV Screening  Completed    Hepatitis C Screening  Completed    Pneumococcal Vaccine: Pediatrics (0 to 5 Years) and At-Risk Patients (6 to 59 Years)  Completed    Influenza Vaccine  Completed    HIB Vaccine  Aged Out    Hepatitis B Vaccine  Aged Out    IPV Vaccine  Aged Out    Hepatitis A Vaccine  Aged Out    Meningococcal ACWY Vaccine  Aged Out    HPV Vaccine  Aged Out     Immunization History   Administered Date(s) Administered    INFLUENZA 10/26/2014, 12/08/2015, 08/29/2017    Influenza, injectable, quadrivalent, preservative free 0 5 mL 10/06/2019, 08/23/2020    Pneumococcal Polysaccharide PPV23 02/03/2020, 09/22/2020    Tdap 07/11/2019, 10/06/2019    Zoster Vaccine Recombinant 05/21/2020, 08/23/2020       Portions of this note may have been created with voice recognition software  Occasional wrong word or sound a like substitutions may have occurred due to inherent limitations of voice recognition software  Read the chart carefully and recognize, using context, where substitutions have occurred  Global time spent on encounter: 50 minutes    RANDOLPH Romero  Internal Medicine PGY-2  [address] [facility] , [address] [address] [facility] [address] [facility]  Office: [phone]  Fax: [phone]

## 2021-06-17 ENCOUNTER — VBI (OUTPATIENT)
Dept: ADMINISTRATIVE | Facility: OTHER | Age: 56
End: 2021-06-17

## 2021-06-30 ENCOUNTER — HOSPITAL ENCOUNTER (EMERGENCY)
Facility: HOSPITAL | Age: 56
Discharge: HOME | End: 2021-07-01
Attending: EMERGENCY MEDICINE
Payer: COMMERCIAL

## 2021-06-30 ENCOUNTER — APPOINTMENT (EMERGENCY)
Dept: RADIOLOGY | Facility: HOSPITAL | Age: 56
End: 2021-06-30
Payer: COMMERCIAL

## 2021-06-30 VITALS
HEIGHT: 63 IN | RESPIRATION RATE: 16 BRPM | HEART RATE: 81 BPM | TEMPERATURE: 98.9 F | WEIGHT: 200 LBS | OXYGEN SATURATION: 95 % | DIASTOLIC BLOOD PRESSURE: 60 MMHG | BODY MASS INDEX: 35.44 KG/M2 | SYSTOLIC BLOOD PRESSURE: 119 MMHG

## 2021-06-30 DIAGNOSIS — I10 ESSENTIAL HYPERTENSION: ICD-10-CM

## 2021-06-30 DIAGNOSIS — R10.9 ABDOMINAL PAIN: Primary | ICD-10-CM

## 2021-06-30 LAB
ALBUMIN SERPL BCP-MCNC: 3.8 G/DL (ref 3.5–5)
ALP SERPL-CCNC: 121 U/L (ref 46–116)
ALT SERPL W P-5'-P-CCNC: 31 U/L (ref 12–78)
ANION GAP SERPL CALCULATED.3IONS-SCNC: 6 MMOL/L (ref 4–13)
AST SERPL W P-5'-P-CCNC: 17 U/L (ref 5–45)
BASOPHILS # BLD AUTO: 0.05 THOUSANDS/ΜL (ref 0–0.1)
BASOPHILS NFR BLD AUTO: 0 % (ref 0–1)
BILIRUB SERPL-MCNC: 0.66 MG/DL (ref 0.2–1)
BILIRUB UR QL STRIP: NEGATIVE
BUN SERPL-MCNC: 16 MG/DL (ref 5–25)
CALCIUM SERPL-MCNC: 9.6 MG/DL (ref 8.3–10.1)
CHLORIDE SERPL-SCNC: 108 MMOL/L (ref 100–108)
CLARITY UR: CLEAR
CO2 SERPL-SCNC: 25 MMOL/L (ref 21–32)
COLOR UR: NORMAL
CREAT SERPL-MCNC: 0.8 MG/DL (ref 0.6–1.3)
EOSINOPHIL # BLD AUTO: 0.57 THOUSAND/ΜL (ref 0–0.61)
EOSINOPHIL NFR BLD AUTO: 4 % (ref 0–6)
ERYTHROCYTE [DISTWIDTH] IN BLOOD BY AUTOMATED COUNT: 13.7 % (ref 11.6–15.1)
EXT PREG TEST URINE: NEGATIVE
EXT. CONTROL ED NAV: NORMAL
GFR SERPL CREATININE-BSD FRML MDRD: 83 ML/MIN/1.73SQ M
GLUCOSE SERPL-MCNC: 115 MG/DL (ref 65–140)
GLUCOSE UR STRIP-MCNC: NEGATIVE MG/DL
HCT VFR BLD AUTO: 43.5 % (ref 34.8–46.1)
HGB BLD-MCNC: 14.4 G/DL (ref 11.5–15.4)
HGB UR QL STRIP.AUTO: NEGATIVE
IMM GRANULOCYTES # BLD AUTO: 0.09 THOUSAND/UL (ref 0–0.2)
IMM GRANULOCYTES NFR BLD AUTO: 1 % (ref 0–2)
KETONES UR STRIP-MCNC: NEGATIVE MG/DL
LEUKOCYTE ESTERASE UR QL STRIP: NEGATIVE
LIPASE SERPL-CCNC: 228 U/L (ref 73–393)
LYMPHOCYTES # BLD AUTO: 4.12 THOUSANDS/ΜL (ref 0.6–4.47)
LYMPHOCYTES NFR BLD AUTO: 31 % (ref 14–44)
MCH RBC QN AUTO: 28.5 PG (ref 26.8–34.3)
MCHC RBC AUTO-ENTMCNC: 33.1 G/DL (ref 31.4–37.4)
MCV RBC AUTO: 86 FL (ref 82–98)
MONOCYTES # BLD AUTO: 1.45 THOUSAND/ΜL (ref 0.17–1.22)
MONOCYTES NFR BLD AUTO: 11 % (ref 4–12)
NEUTROPHILS # BLD AUTO: 7.18 THOUSANDS/ΜL (ref 1.85–7.62)
NEUTS SEG NFR BLD AUTO: 53 % (ref 43–75)
NITRITE UR QL STRIP: NEGATIVE
NRBC BLD AUTO-RTO: 0 /100 WBCS
PH UR STRIP.AUTO: 6 [PH]
PLATELET # BLD AUTO: 336 THOUSANDS/UL (ref 149–390)
PMV BLD AUTO: 9.6 FL (ref 8.9–12.7)
POTASSIUM SERPL-SCNC: 3.7 MMOL/L (ref 3.5–5.3)
PROT SERPL-MCNC: 7.9 G/DL (ref 6.4–8.2)
PROT UR STRIP-MCNC: NEGATIVE MG/DL
RBC # BLD AUTO: 5.05 MILLION/UL (ref 3.81–5.12)
SODIUM SERPL-SCNC: 139 MMOL/L (ref 136–145)
SP GR UR STRIP.AUTO: 1.02 (ref 1–1.03)
UROBILINOGEN UR QL STRIP.AUTO: 0.2 E.U./DL
WBC # BLD AUTO: 13.46 THOUSAND/UL (ref 4.31–10.16)

## 2021-06-30 PROCEDURE — 74177 CT ABD & PELVIS W/CONTRAST: CPT

## 2021-06-30 PROCEDURE — 84443 ASSAY THYROID STIM HORMONE: CPT | Performed by: INTERNAL MEDICINE

## 2021-06-30 PROCEDURE — 99284 EMERGENCY DEPT VISIT MOD MDM: CPT | Performed by: EMERGENCY MEDICINE

## 2021-06-30 PROCEDURE — 80053 COMPREHEN METABOLIC PANEL: CPT | Performed by: INTERNAL MEDICINE

## 2021-06-30 PROCEDURE — 81025 URINE PREGNANCY TEST: CPT | Performed by: INTERNAL MEDICINE

## 2021-06-30 PROCEDURE — 36415 COLL VENOUS BLD VENIPUNCTURE: CPT | Performed by: INTERNAL MEDICINE

## 2021-06-30 PROCEDURE — 83690 ASSAY OF LIPASE: CPT | Performed by: INTERNAL MEDICINE

## 2021-06-30 PROCEDURE — 96360 HYDRATION IV INFUSION INIT: CPT

## 2021-06-30 PROCEDURE — 85025 COMPLETE CBC W/AUTO DIFF WBC: CPT | Performed by: INTERNAL MEDICINE

## 2021-06-30 PROCEDURE — G1004 CDSM NDSC: HCPCS

## 2021-06-30 PROCEDURE — 81003 URINALYSIS AUTO W/O SCOPE: CPT | Performed by: INTERNAL MEDICINE

## 2021-06-30 PROCEDURE — 99284 EMERGENCY DEPT VISIT MOD MDM: CPT

## 2021-06-30 RX ORDER — AMLODIPINE BESYLATE 5 MG/1
TABLET ORAL
Qty: 90 TABLET | Refills: 1 | Status: SHIPPED | OUTPATIENT
Start: 2021-06-30 | End: 2021-10-05

## 2021-06-30 RX ADMIN — SODIUM CHLORIDE 1000 ML: 0.9 INJECTION, SOLUTION INTRAVENOUS at 23:04

## 2021-06-30 RX ADMIN — IOHEXOL 100 ML: 350 INJECTION, SOLUTION INTRAVENOUS at 23:39

## 2021-07-01 LAB — TSH SERPL DL<=0.05 MIU/L-ACNC: 2.89 UIU/ML (ref 0.36–3.74)

## 2021-07-01 PROCEDURE — 96361 HYDRATE IV INFUSION ADD-ON: CPT

## 2021-07-01 NOTE — ED PROVIDER NOTES
History  Chief Complaint   Patient presents with    Abdominal Pain     pt brought in by EMS for abd pain pt was seen a couple days ago at a clinic for it and was told she had a UTI and yeast infection but pt reports gripping pain 8/10     HPI  54-year-old female presents to the ED for evaluation of left-sided abdominal pain  She states she has had the pain for over 6 months but is significantly worsened today  The pain is 8/10  It radiates across her abdomen  She states she was recently diagnosed with the UTI  She has been on antibiotics and antifungal   She reports improvement of dysuria  She denies any nausea, vomiting, diarrhea, melena hematochezia  She states she is eating and drinking normally  She denies any weight gain or weight loss  Patient reports she had a laparoscopic exploratory surgery that was negative in the past   She has never had a colonoscopy  She reports she is sexually active  She is postmenopausal  Patient denies headache, visual changes, dizziness, fevers, chills, chest pain, palpitations, new skin rashes or numbness or tingling of the extremities  Prior to Admission Medications   Prescriptions Last Dose Informant Patient Reported? Taking?    Blood Pressure KIT   No Yes   Sig: Use 3 (three) times a week   Diclofenac Sodium (VOLTAREN) 1 %   No Yes   Sig: Apply 2 g topically 3 (three) times a day as needed (Pain)   FLUoxetine (PROzac) 40 MG capsule   Yes Yes   Sig: Take 40 mg by mouth every morning   LORazepam (ATIVAN) 0 5 mg tablet   Yes Yes   Sig: Take by mouth every 8 (eight) hours as needed for anxiety   amLODIPine (NORVASC) 5 mg tablet   No No   Sig: take 2 tablets by mouth daily   atorvastatin (LIPITOR) 20 mg tablet   No Yes   Sig: Take 1 tablet (20 mg total) by mouth daily   fluticasone (FLONASE) 50 mcg/act nasal spray   No Yes   Si sprays into each nostril daily   gabapentin (NEURONTIN) 100 mg capsule   No No   Sig: Take 1 capsule (100 mg total) by mouth 3 (three) times a day   Patient not taking: Reported on 5/3/2021   lidocaine (LIDODERM) 5 %   No Yes   Sig: Apply 1 patch topically daily Remove & Discard patch within 12 hours or as directed by MD   lisinopril (ZESTRIL) 10 mg tablet   No Yes   Sig: take 1 tablet by mouth once daily   loratadine (CLARITIN) 10 mg tablet   No Yes   Sig: Take 1 tablet (10 mg total) by mouth daily   mirtazapine (REMERON) 7 5 MG tablet   No Yes   Sig: take 1 tablet by mouth at bedtime   naphazoline-pheniramine (NAPHCON-A) 0 025-0 3 % ophthalmic solution   No No   Sig: Administer 1 drop to both eyes 4 (four) times a day as needed for allergies or irritation   Patient not taking: Reported on 3/29/2021   naproxen (NAPROSYN) 500 mg tablet   No Yes   Sig: Take 1 tablet (500 mg total) by mouth 2 (two) times a day as needed for moderate pain      Facility-Administered Medications: None       Past Medical History:   Diagnosis Date    Anxiety     Arthritis     Bipolar affective disorder, depressed, severe ([facility] [address] ) 4/28/2019    Depression     Elevated bilirubin 8/15/2019    Hyperlipidemia     Hypertension     Hypokalemia 8/15/2019    Learning difficulty     Leukocytosis 7/28/2020    Obesity (BMI 30 0-34 9) 6/26/2020    Osteopenia     Ovarian failure     Previous known suicide attempt     Psychiatric disorder     Psychiatric illness        Past Surgical History:   Procedure Laterality Date    LAPAROSCOPY      as a child, per patient-normal findings       Family History   Problem Relation Age of Onset    Alzheimer's disease Mother     Depression Mother     Depression Brother     Bipolar disorder Brother     No Known Problems Maternal Aunt     No Known Problems Paternal Aunt     No Known Problems Maternal Uncle     No Known Problems Paternal Uncle     No Known Problems Cousin     ADD / ADHD Neg Hx     Alcohol abuse Neg Hx     Anxiety disorder Neg Hx     Dementia Neg Hx     Drug abuse Neg Hx     OCD Neg Hx     Paranoid behavior Neg Hx     Schizophrenia Neg Hx     Seizures Neg Hx     Self-Injury Neg Hx     Suicide Attempts Neg Hx      I have reviewed and agree with the history as documented  E-Cigarette/Vaping    E-Cigarette Use Never User      E-Cigarette/Vaping Substances    Nicotine No     THC No     CBD No     Flavoring No     Other No     Unknown No      Social History     Tobacco Use    Smoking status: Current Some Day Smoker     Packs/day: 0 25     Years: 15 00     Pack years: 3 75     Types: Cigarettes     Last attempt to quit: 1990     Years since quittin 5    Smokeless tobacco: Never Used    Tobacco comment: 5-6 cigarettes a day   Vaping Use    Vaping Use: Never used   Substance Use Topics    Alcohol use: Yes     Alcohol/week: 2 0 standard drinks     Types: 2 Cans of beer per week     Comment: socially    Drug use: No        Review of Systems   All other systems reviewed and are negative  Physical Exam  ED Triage Vitals [21]   Temperature Pulse Respirations Blood Pressure SpO2   98 9 °F (37 2 °C) 96 18 132/70 95 %      Temp Source Heart Rate Source Patient Position - Orthostatic VS BP Location FiO2 (%)   Oral Monitor Lying Left arm --      Pain Score       8             Orthostatic Vital Signs  Vitals:    21 2205 21 2340   BP: 132/70 119/60   Pulse: 96 81   Patient Position - Orthostatic VS: Lying Lying       Physical Exam   PHYSICAL EXAM    Constitutional:  Well developed, well nourished, no acute distress, non-toxic appearance    HEENT:  Conjunctiva normal  Oropharynx moist  Respiratory:  No respiratory distress, normal breath sounds  Cardiovascular:  Normal rate, normal rhythm, no murmurs  GI:  Soft, nondistended, normal bowel sounds, tenderness palpation over left lower quadrant, voluntary guarding, no rebound  :  No costovertebral angle tenderness   Musculoskeletal:  No edema, no tenderness, no deformities     Integument:  Well hydrated, no rash   Lymphatic:  No lymphadenopathy noted   Neurologic:  Alert & oriented, normal motor function, normal sensory function, no focal deficits noted   Psychiatric:  Speech and behavior appropriate       ED Medications  Medications   sodium chloride 0 9 % bolus 1,000 mL (1,000 mL Intravenous New Bag 6/30/21 2304)   iohexol (OMNIPAQUE) 350 MG/ML injection (MULTI-DOSE) 100 mL (100 mL Intravenous Given 6/30/21 2339)       Diagnostic Studies  Results Reviewed     Procedure Component Value Units Date/Time    TSH [[id number]]  (Normal) Collected: 06/30/21 2229    Lab Status: Final result Specimen: Blood from Arm, Left Updated: 07/01/21 0107     TSH 3RD GENERATON 2 890 uIU/mL     Narrative:      Patients undergoing fluorescein dye angiography may retain small amounts of fluorescein in the body for 48-72 hours post procedure  Samples containing fluorescein can produce falsely depressed TSH values  If the patient had this procedure,a specimen should be resubmitted post fluorescein clearance        UA w Reflex to Microscopic w Reflex to Culture [[id number]] Collected: 06/30/21 2309    Lab Status: Final result Specimen: Urine, Other Updated: 06/30/21 2325     Color, UA Dk Yellow     Clarity, UA Clear     Specific Gravity, UA 1 023     pH, UA 6 0     Leukocytes, UA Negative     Nitrite, UA Negative     Protein, UA Negative mg/dl      Glucose, UA Negative mg/dl      Ketones, UA Negative mg/dl      Urobilinogen, UA 0 2 E U /dl      Bilirubin, UA Negative     Blood, UA Negative    POCT pregnancy, urine [[id number]]  (Normal) Resulted: 06/30/21 2311    Lab Status: Final result Specimen: Urine Updated: 06/30/21 2311     EXT PREG TEST UR (Ref: Negative) Negative     Control Valid    CMP [[id number]]  (Abnormal) Collected: 06/30/21 2229    Lab Status: Final result Specimen: Blood from Arm, Left Updated: 06/30/21 2257     Sodium 139 mmol/L      Potassium 3 7 mmol/L      Chloride 108 mmol/L      CO2 25 mmol/L      ANION GAP 6 mmol/L      BUN 16 mg/dL Creatinine 0 80 mg/dL      Glucose 115 mg/dL      Calcium 9 6 mg/dL      AST 17 U/L      ALT 31 U/L      Alkaline Phosphatase 121 U/L      Total Protein 7 9 g/dL      Albumin 3 8 g/dL      Total Bilirubin 0 66 mg/dL      eGFR 83 ml/min/1 73sq m     Narrative:      [place] guidelines for Chronic Kidney Disease (CKD):     Stage 1 with normal or high GFR (GFR > 90 mL/min/1 73 square meters)    Stage 2 Mild CKD (GFR = 60-89 mL/min/1 73 square meters)    Stage 3A Moderate CKD (GFR = 45-59 mL/min/1 73 square meters)    Stage 3B Moderate CKD (GFR = 30-44 mL/min/1 73 square meters)    Stage 4 Severe CKD (GFR = 15-29 mL/min/1 73 square meters)    Stage 5 End Stage CKD (GFR <15 mL/min/1 73 square meters)  Note: GFR calculation is accurate only with a steady state creatinine    Lipase [[id number]]  (Normal) Collected: 06/30/21 2229    Lab Status: Final result Specimen: Blood from Arm, Left Updated: 06/30/21 2257     Lipase 228 u/L     CBC and differential [[id number]]  (Abnormal) Collected: 06/30/21 2229    Lab Status: Final result Specimen: Blood from Arm, Left Updated: 06/30/21 2239     WBC 13 46 Thousand/uL      RBC 5 05 Million/uL      Hemoglobin 14 4 g/dL      Hematocrit 43 5 %      MCV 86 fL      MCH 28 5 pg      MCHC 33 1 g/dL      RDW 13 7 %      MPV 9 6 fL      Platelets 914 Thousands/uL      nRBC 0 /100 WBCs      Neutrophils Relative 53 %      Immat GRANS % 1 %      Lymphocytes Relative 31 %      Monocytes Relative 11 %      Eosinophils Relative 4 %      Basophils Relative 0 %      Neutrophils Absolute 7 18 Thousands/µL      Immature Grans Absolute 0 09 Thousand/uL      Lymphocytes Absolute 4 12 Thousands/µL      Monocytes Absolute 1 45 Thousand/µL      Eosinophils Absolute 0 57 Thousand/µL      Basophils Absolute 0 05 Thousands/µL                  CT Abdomen pelvis with contrast   Final Result by Lani Quiros MD (07/01 0020)      No acute intra-abdominal abnormality    No free air or free fluid  Workstation performed: [id number]               Procedures  Procedures      ED Course  ED Course as of Jul 01 0109   u Jul 01, 2021   0026 CT a/p: No acute intra-abdominal abnormality  No free air or free fluid  MDM  Number of Diagnoses or Management Options  Abdominal pain  Diagnosis management comments: 69-year-old female presents to the ED for evaluation of left-sided abdominal pain  Will obtain labs and CT abdomen and pelvis  Presentation suspicious for diverticulitis  Labs unremarkable, mild leukocytosis which patient appears to have a baseline  CT abdomen pelvis negative for intra-abdominal acute findings  On reexamination, patient had improvement of pain after IV fluids  We discussed her results and she felt comfortable going home  She will follow-up with PCP and GI if her symptoms worsen  Amount and/or Complexity of Data Reviewed  Clinical lab tests: ordered and reviewed  Tests in the radiology section of CPT®: ordered and reviewed  Review and summarize past medical records: yes  Discuss the patient with other providers: yes    Risk of Complications, Morbidity, and/or Mortality  Presenting problems: moderate  Diagnostic procedures: moderate  Management options: moderate    Patient Progress  Patient progress: improved      Disposition  Final diagnoses:   Abdominal pain     Time reflects when diagnosis was documented in both MDM as applicable and the Disposition within this note     Time User Action Codes Description Comment    7/1/2021  1:07 AM Kennedy Yang Add [R10 9] Abdominal pain       ED Disposition     ED Disposition Condition Date/Time Comment    Discharge Stable Thu Jul 1, 2021  1:07 AM Alexandria Spencer discharge to home/self care              Follow-up Information     Follow up With Specialties Details Why Contact Info Additional [facility] Call If symptoms worsen [address] [address]  Jack Hood Delroy Leonardo [facility] [place], [address] [place] [address] [address], [place], [place], [address] [phone]          Patient's Medications   Discharge Prescriptions    No medications on file     No discharge procedures on file  PDMP Review     None           ED Provider  Attending physically available and evaluated Elsy MENDEZ managed the patient along with the ED Attending      Electronically Signed by         Yudelka Wills MD  07/01/21 0117

## 2021-07-01 NOTE — ED ATTENDING ATTESTATION
6/30/2021  ILissy MD, saw and evaluated the patient  I have discussed the patient with the resident/non-physician practitioner and agree with the resident's/non-physician practitioner's findings, Plan of Care, and MDM as documented in the resident's/non-physician practitioner's note, except where noted  All available labs and Radiology studies were reviewed  I was present for key portions of any procedure(s) performed by the resident/non-physician practitioner and I was immediately available to provide assistance  At this point I agree with the current assessment done in the Emergency Department  I have conducted an independent evaluation of this patient a history and physical is as follows:    OA: 62 y/o f with LLQ pain that has been ongoing x months but worsening over the past few weeks  Denies change in appetite, normal urinary and BM patterns  Denies vaginal dc/bleeding  No n/v  No weight change  No fevers/chills  Previous notes document low back pain/hip pain however pt currently denies any hip pain or pain with ambulation  No focal numbness/weakness/tingling  No cp/sob  Seen previously for the same as well as recently had evaluation by ob/gyn with u/s and advised WNL and no acute intervention necessary  Also has been seen by her PCP and advised nonnarcotic pain control for msk pain  Pe, NAD, VSS, Nc/AT, MMM, neck supple/FROM, RR, lungs CTAB, abd soft, + Bs, + mild ttp over LLQ, no mass, - flank ttp no upper abd tenderness/no epigastric ttp, no ttp over C, T or L spine, FAIR, intact sensation and strength, no ttp over b/l hips/knees, steady gait, intact pulses, capillary refill <2  Sec, AAO  A/p acute on chronic abd pain, labs, imaging, u/a, treat accoridngly    ED Course     Pt with Ct imaging pending at end of shift   Treat accordingly    Critical Care Time  Procedures

## 2021-09-21 ENCOUNTER — OFFICE VISIT (OUTPATIENT)
Dept: INTERNAL MEDICINE CLINIC | Facility: CLINIC | Age: 56
End: 2021-09-21

## 2021-09-21 VITALS
WEIGHT: 200 LBS | TEMPERATURE: 97.7 F | DIASTOLIC BLOOD PRESSURE: 72 MMHG | SYSTOLIC BLOOD PRESSURE: 120 MMHG | BODY MASS INDEX: 35.43 KG/M2 | HEART RATE: 118 BPM | OXYGEN SATURATION: 95 %

## 2021-09-21 DIAGNOSIS — M22.2X2 PATELLOFEMORAL PAIN SYNDROME OF BOTH KNEES: ICD-10-CM

## 2021-09-21 DIAGNOSIS — R91.1 PULMONARY NODULE: ICD-10-CM

## 2021-09-21 DIAGNOSIS — I10 ESSENTIAL HYPERTENSION: Primary | Chronic | ICD-10-CM

## 2021-09-21 DIAGNOSIS — G89.29 CHRONIC PAIN OF BOTH KNEES: ICD-10-CM

## 2021-09-21 DIAGNOSIS — Z12.11 SCREENING FOR COLORECTAL CANCER: ICD-10-CM

## 2021-09-21 DIAGNOSIS — M25.561 CHRONIC PAIN OF BOTH KNEES: ICD-10-CM

## 2021-09-21 DIAGNOSIS — M22.2X1 PATELLOFEMORAL PAIN SYNDROME OF BOTH KNEES: ICD-10-CM

## 2021-09-21 DIAGNOSIS — Z72.0 TOBACCO USE: Chronic | ICD-10-CM

## 2021-09-21 DIAGNOSIS — M25.562 CHRONIC PAIN OF BOTH KNEES: ICD-10-CM

## 2021-09-21 DIAGNOSIS — Z12.12 SCREENING FOR COLORECTAL CANCER: ICD-10-CM

## 2021-09-21 DIAGNOSIS — J30.2 SEASONAL ALLERGIES: ICD-10-CM

## 2021-09-21 PROCEDURE — 3078F DIAST BP <80 MM HG: CPT | Performed by: HOSPITALIST

## 2021-09-21 PROCEDURE — 3074F SYST BP LT 130 MM HG: CPT | Performed by: HOSPITALIST

## 2021-09-21 PROCEDURE — 99213 OFFICE O/P EST LOW 20 MIN: CPT | Performed by: HOSPITALIST

## 2021-09-21 PROCEDURE — 4004F PT TOBACCO SCREEN RCVD TLK: CPT | Performed by: HOSPITALIST

## 2021-09-21 RX ORDER — RISPERIDONE 0.5 MG/1
0.5 TABLET, FILM COATED ORAL 2 TIMES DAILY
COMMUNITY
Start: 2021-09-08 | End: 2022-05-02 | Stop reason: SDUPTHER

## 2021-09-21 RX ORDER — METRONIDAZOLE 7.5 MG/G
GEL VAGINAL
COMMUNITY
Start: 2021-06-19 | End: 2022-01-10 | Stop reason: ALTCHOICE

## 2021-09-21 RX ORDER — NITROFURANTOIN 25; 75 MG/1; MG/1
CAPSULE ORAL
COMMUNITY
Start: 2021-06-19 | End: 2022-01-10 | Stop reason: ALTCHOICE

## 2021-09-21 RX ORDER — MIRTAZAPINE 15 MG/1
15 TABLET, FILM COATED ORAL
COMMUNITY
Start: 2021-09-08 | End: 2022-05-02 | Stop reason: SDUPTHER

## 2021-09-21 NOTE — PATIENT INSTRUCTIONS
Please schedule your mammogram as soon as possible  When you go to the hospital to get her mammogram done, you can complete your stool study to check for colon cancer  Also, please schedule your follow-up lung CT scan, which can occur anytime between October 2021 and April 2022

## 2021-09-21 NOTE — PROGRESS NOTES
INTERNAL MEDICINE FOLLOW-UP OFFICE VISIT  [facility]  [address] [place] [address]    NAME: Jessica Aguilera  AGE: 63 y o  SEX: female    DATE OF ENCOUNTER: 9/21/2021    Assessment and Plan     1  Essential hypertension  Well controlled today  Patient advised to continue current antihypertensive regimen  2  Patellofemoral pain syndrome of both knees  Patient with continue bilateral knee pain  Symptoms are currently stable on her present analgesic regimen  Patient to follow-up soon with orthopedics for consideration for repeat steroid injection  Patient encouraged to continue complying with her medical regimen  3  Chronic pain of both knees    See plan under 2  Above  4  Tobacco use  Patient with longstanding history of tobacco abuse, currently in remission for the last 2 weeks  Patient was congratulated on her efforts to reduce her tobacco consumption to date  She is not interested in nicotine replacement or pharmacotherapy at this time  We will continue to address her tobacco use at all subsequent encounters  5  Pulmonary nodule    9 mm right basilar ground-glass nodule identified on screening chest CT April 2021  Patient due for repeat imaging 6-12 months following, which would be between October of this year and April of next year  Patient agreed to call the office of central scheduling to schedule her follow-up imaging during this time frame  Follow-up imaging and/or referrals to be determined pending further imaging  6  Seasonal allergies    Patient with recurrence of her known seasonal allergy symptoms  Instructed to continue taking over-the-counter Zyrtec daily and Flonase once per day  I educated her on the proper technique to use a nasal spray  I have also advised her to trial over-the-counter or nasal lavages/rinses at night to assist in her symptoms  She will call back if her symptoms are uncontrolled in the next few weeks      7  Screening for colorectal cancer    Patient again provided mail-in FIT kit today in the office for CRC screening  Patient indicated her barrier for colonoscopy is transportation, so we will reach out to our in-house social work care manager for assistance  - Ambulatory referral to social work care management program; Future    Orders Placed This Encounter   Procedures    Ambulatory referral to social work care management program       - Counseling Documentation: patient was counseled regarding: diagnostic results, instructions for management, risk factor reductions, prognosis, patient and family education, impressions, risks and benefits of treatment options and importance of compliance with treatment  - Counseling Time: counseling time more than 50% of visit: 30 minutes  - Medication Side Effects: Adverse side effects of medications were reviewed with the patient/guardian today  Routine Health Maintenance:   Cancer screening:   Colorectal: Pending FIT kit, given today   Lung: Due for repeat CT next month   Cervical: Per gynecology at [facility] Breast: Will have patient schedule mammogram  Labs:   Hemoglobin A1c: Overdue for recheck, normal in 2019   Lipid panel: Largely within normal limits 8/21   HIV: Non-reactive 2020   Hepatitis-C: Non-reactive 2020  Vaccines:   UTD on COVID-19, will have patient provide office with dates to add to EHR   Address reticence to receive flu shot at next visit  Other:   BMI Counseling: Body mass index is 35 43 kg/m²  The BMI is above normal  Nutrition recommendations include reducing portion sizes  Exercise recommendations include moderate aerobic physical activity for 150 minutes/week, exercising 3-5 times per week and strength training exercises   Tobacco: Tobacco Cessation Counseling: Tobacco cessation counseling and education was provided   The patient is sincerely urged to quit consumption of tobacco  She is ready to quit tobacco  The numerous health risks of tobacco consumption were discussed  If she decides to quit, there are a number of helpful adjunctive aids, and she can see me to discuss nicotine replacement therapy, chantix, or bupropion anytime in the future  Advised diet and exercise  Advised to refrain from tobacco, alcohol, and illicit drug use  Advised medical compliance      OMT/OPP: During the course of my history and physical, I utilized osteopathic examination modalities to assess the patient  Somatic dysfunctions were not identified during this visit  OMT is not indicated at this time  Chief Complaint     Chief Complaint   Patient presents with    Follow-up       History of Present Illness     Richa Ceja Fernandez Ards is a 63 y o  female with   Past medical history significant for depression, anxiety, psychosis, suicidality requiring inpatient psychiatric admissions, mild cognitive impairment, hypertension, hyperlipidemia, tobacco abuse, and seasonal allergies who presents to clinic today for follow-up  Last visit 05/03/2021  Overall, patient reports largely feeling in her normal state of health  She tells me she is still bothered by pain in both of her knees, though admits that this is intermittent  She tells me that she is due to see orthopedic surgery again this upcoming Thursday and tells me she would like another steroid injection  She says that after the last injection she had in her left knee, she had 3 months of relief in her pain  She now would like the injection in both knees  She continues to use naproxen as needed as well as Voltaren and ice, which helped control her symptoms  Patient is also bothered in the last 1 week by her seasonal allergy symptoms, which consist of nasal congestion, itchy eyes, and occasional dry cough  She tells me she has been taking Zyrtec daily and Flonase twice daily for the past 1 week with some improvement in her symptoms      She tells me that she has not been smoking for the last 2 weeks given her allergy symptoms  She tells me that she did get both rounds of the COVID-19 vaccine earlier this year and that she would like some more time to think about getting this year's flu shot  She again expresses to me that her concern regarding going for a colonoscopy for colon cancer surveillance would be postprocedure transport back to her home, as she does not have a car nor does she have anyone to rely on to provide her with a ride  She is interested in scheduling her mammogram if this can be done at the hospital, as it is within walking distance of her home  The following portions of the patient's history were reviewed and updated as appropriate: allergies, current medications, past family history, past medical history, past social history, past surgical history and problem list     Review of Systems     Review of Systems   Constitutional: Negative for chills, fatigue and fever  HENT: Positive for congestion  Negative for ear discharge, ear pain, postnasal drip, rhinorrhea, sinus pressure, sinus pain and sore throat  Respiratory: Negative for cough, shortness of breath and wheezing  Cardiovascular: Negative for chest pain and palpitations  Gastrointestinal: Negative for abdominal pain, constipation, diarrhea, nausea and vomiting  Genitourinary: Negative for difficulty urinating, dysuria, frequency, hematuria and urgency  Musculoskeletal: Positive for arthralgias  Negative for back pain, gait problem and myalgias  Skin: Negative for pallor, rash and wound  Neurological: Negative for dizziness, weakness, light-headedness, numbness and headaches         Active Problem List     Patient Active Problem List   Diagnosis    Amenorrhea    Essential hypertension    Hyperlipidemia    Tobacco use    Ovarian failure    Mild intellectual disability    Constipation    MDD (major depressive disorder), recurrent episode, severe ([facility] [address] )    Chronic pain of both knees    [facility]    Anxiety    Insomnia    Obesity    Patellofemoral pain syndrome of both knees    Pulmonary nodule       Objective     /72 (BP Location: Left arm, Patient Position: Sitting, Cuff Size: Large)   Pulse (!) 118   Temp 97 7 °F (36 5 °C) (Temporal)   Wt 90 7 kg (200 lb)   LMP  (LMP Unknown)   SpO2 95%   BMI 35 43 kg/m²     Physical Exam  Vitals and nursing note reviewed  Constitutional:       General: She is not in acute distress  Appearance: Normal appearance  She is obese  She is not ill-appearing, toxic-appearing or diaphoretic  Comments: Patient is pleasant, calm, and cooperative  HENT:      Head: Normocephalic and atraumatic  Eyes:      General: No scleral icterus  Extraocular Movements: Extraocular movements intact  Conjunctiva/sclera: Conjunctivae normal       Pupils: Pupils are equal, round, and reactive to light  Cardiovascular:      Rate and Rhythm: Normal rate and regular rhythm  Pulses: Normal pulses  Heart sounds: Normal heart sounds  No murmur heard  No friction rub  No gallop  Pulmonary:      Effort: Pulmonary effort is normal       Breath sounds: No stridor  No wheezing, rhonchi or rales  Comments: Breath sounds slightly diminished at the bases bilaterally  Abdominal:      General: Bowel sounds are normal  There is no distension  Palpations: Abdomen is soft  There is no mass  Tenderness: There is no abdominal tenderness  There is no guarding or rebound  Hernia: No hernia is present  Musculoskeletal:         General: No swelling, tenderness or deformity  Cervical back: Neck supple  Right lower leg: No edema  Left lower leg: No edema  Lymphadenopathy:      Cervical: No cervical adenopathy  Skin:     General: Skin is warm and dry  Capillary Refill: Capillary refill takes less than 2 seconds  Coloration: Skin is not pale  Findings: No erythema or rash     Neurological:      General: No focal deficit present  Mental Status: She is alert  Comments: Some mild cognitive slowing  Psychiatric:         Mood and Affect: Mood normal          Behavior: Behavior normal          Thought Content: Thought content normal          Judgment: Judgment normal       Comments: Mood is euthymic  Pertinent Laboratory/Diagnostic Studies:  CT Abdomen pelvis with contrast    Result Date: 7/1/2021  Impression: No acute intra-abdominal abnormality  No free air or free fluid   Workstation performed: [id number]       Images and diagnostics reviewed     Current Medications     Current Outpatient Medications:     amLODIPine (NORVASC) 5 mg tablet, take 2 tablets by mouth daily, Disp: 90 tablet, Rfl: 1    atorvastatin (LIPITOR) 20 mg tablet, Take 1 tablet (20 mg total) by mouth daily, Disp: 90 tablet, Rfl: 0    Diclofenac Sodium (VOLTAREN) 1 %, Apply 2 g topically 3 (three) times a day as needed (Pain), Disp: 1 Tube, Rfl: 1    FLUoxetine (PROzac) 40 MG capsule, Take 40 mg by mouth every morning, Disp: , Rfl:     fluticasone (FLONASE) 50 mcg/act nasal spray, 2 sprays into each nostril daily, Disp: 11 1 mL, Rfl: 2    lidocaine (LIDODERM) 5 %, Apply 1 patch topically daily Remove & Discard patch within 12 hours or as directed by MD, Disp: 6 patch, Rfl: 0    lisinopril (ZESTRIL) 10 mg tablet, take 1 tablet by mouth once daily, Disp: 90 tablet, Rfl: 2    LORazepam (ATIVAN) 0 5 mg tablet, Take by mouth every 8 (eight) hours as needed for anxiety, Disp: , Rfl:     mirtazapine (REMERON) 7 5 MG tablet, take 1 tablet by mouth at bedtime, Disp: 30 tablet, Rfl: 0    naproxen (NAPROSYN) 500 mg tablet, Take 1 tablet (500 mg total) by mouth 2 (two) times a day as needed for moderate pain, Disp: 60 tablet, Rfl: 1    risperiDONE (RisperDAL) 0 5 mg tablet, Take 0 5 mg by mouth 2 (two) times a day, Disp: , Rfl:     Blood Pressure KIT, Use 3 (three) times a week (Patient not taking: Reported on 9/21/2021), Disp: 1 each, Rfl: 0   gabapentin (NEURONTIN) 100 mg capsule, Take 1 capsule (100 mg total) by mouth 3 (three) times a day (Patient not taking: Reported on 5/3/2021), Disp: 90 capsule, Rfl: 0    loratadine (CLARITIN) 10 mg tablet, Take 1 tablet (10 mg total) by mouth daily (Patient not taking: Reported on 9/21/2021), Disp: 30 tablet, Rfl: 2    metroNIDAZOLE (METROGEL) 0 75 % vaginal gel, insert 1 applicatorful vaginally EVERY NIGHT for 5 days (Patient not taking: Reported on 9/21/2021), Disp: , Rfl:     metroNIDAZOLE (METROGEL) 0 75 % vaginal gel, Insert one applictorful into vagina for five nights (Patient not taking: Reported on 9/21/2021), Disp: , Rfl:     mirtazapine (REMERON) 15 mg tablet, Take 15 mg by mouth daily at bedtime (Patient not taking: Reported on 9/21/2021), Disp: , Rfl:     naphazoline-pheniramine (NAPHCON-A) 0 025-0 3 % ophthalmic solution, Administer 1 drop to both eyes 4 (four) times a day as needed for allergies or irritation (Patient not taking: Reported on 3/29/2021), Disp: 15 mL, Rfl: 2    nitrofurantoin (MACROBID) 100 mg capsule, take 1 capsule by mouth twice a day for 7 days (Patient not taking: Reported on 9/21/2021), Disp: , Rfl:     Health Maintenance     Health Maintenance   Topic Date Due    Breast Cancer Screening: Mammogram  Never done    Colorectal Cancer Screening  Never done    COVID-19 Vaccine (2 - Pfizer 2-dose series) 03/22/2021    Influenza Vaccine (1) 09/01/2021    Medicare Annual Wellness Visit (AWV)  02/26/2022    BMI: Followup Plan  05/03/2022    BMI: Adult  09/21/2022    Cervical Cancer Screening  03/13/2025    DTaP,Tdap,and Td Vaccines (3 - Td or Tdap) 10/06/2029    Pneumococcal Vaccine: Pediatrics (0 to 5 Years) and At-Risk Patients (6 to 59 Years) (2 of 2 - PPSV23) 11/16/2029    HIV Screening  Completed    Hepatitis C Screening  Completed    HIB Vaccine  Aged Out    Hepatitis B Vaccine  Aged Out    IPV Vaccine  Aged Out    Hepatitis A Vaccine  Aged C/ Gav[address] Meningococcal ACWY Vaccine  Aged Out    HPV Vaccine  Aged Out     Immunization History   Administered Date(s) Administered    INFLUENZA 10/26/2014, 12/08/2015, 08/29/2017    Influenza, injectable, quadrivalent, preservative free 0 5 mL 10/06/2019, 08/23/2020    Pneumococcal Polysaccharide PPV23 02/03/2020, 09/22/2020    SARS-CoV-2 / COVID-19 mRNA IM (Pfizer-BioNTech) 03/01/2021    Tdap 07/11/2019, 10/06/2019    Zoster Vaccine Recombinant 05/21/2020, 08/23/2020       Portions of this note may have been created with voice recognition software  Occasional wrong word or sound a like substitutions may have occurred due to inherent limitations of voice recognition software  Read the chart carefully and recognize, using context, where substitutions have occurred  Global time spent on encounter: 30 minutes    RANDOLPH Rodriguez  Internal Medicine PGY-3  [address] , [address] [place] [address] [address], [address] [facility]  Office: [phone]  Fax: [phone]

## 2021-09-27 ENCOUNTER — PATIENT OUTREACH (OUTPATIENT)
Dept: INTERNAL MEDICINE CLINIC | Facility: CLINIC | Age: 56
End: 2021-09-27

## 2021-09-27 NOTE — PROGRESS NOTES
[facility] received a referral for pt in regard to pt needing transportation services in order to have her colonoscopy  Pt had indicated transportation is a barrier  Prior to calling, I contacted her insurance, [facility] [employer] @ [phone]  I was advised that pt has 96 one way medical trips per year that have to be scheduled 3 days in advance  Pt simply needs to call [phone] to schedule  I contacted pt to provide her with the information to schedule a ride for her colonoscopy  Pt was also made aware that she will need to bring a support person for the ride home with her  While reviewing pt chart I noticed that pt  Has HX of major depressive disorder, bipolar disorder, and anxiety  Pt has been hospitalized for psychiatric concerns  I inquired today if pt is following up with OP  and pt stated she was receiving services from [facility]  but has not heard from them in months  Pt would like to re connect with them  [facility] offered to contact Amy [facility] to assist pt in obtaining services  Pt expressed understanding  Pt also inquired about an ICM and advised her that her OP [address] provider would need to complete paperwork in regard to same  After call with pt, JOHN contacted Amy Fisher in [place] @ [phone]  I was advised pt missed her August 31st appointment and did not reschedule  The  agreed to message pts therapist to please contact pt to schedule  She will also advise therapist that pt is inquiring about an ICM  Pt will be contacted within the week  [facility] will remain available and will follow up with pt to ensure she scheduled her colonoscopy and followed up with OP

## 2021-10-11 ENCOUNTER — OFFICE VISIT (OUTPATIENT)
Dept: OBGYN CLINIC | Facility: CLINIC | Age: 56
End: 2021-10-11
Payer: COMMERCIAL

## 2021-10-11 VITALS
SYSTOLIC BLOOD PRESSURE: 107 MMHG | WEIGHT: 200 LBS | HEIGHT: 63 IN | DIASTOLIC BLOOD PRESSURE: 74 MMHG | BODY MASS INDEX: 35.44 KG/M2 | HEART RATE: 75 BPM

## 2021-10-11 DIAGNOSIS — M22.2X1 PATELLOFEMORAL PAIN SYNDROME OF BOTH KNEES: Primary | ICD-10-CM

## 2021-10-11 DIAGNOSIS — M22.2X2 PATELLOFEMORAL PAIN SYNDROME OF BOTH KNEES: Primary | ICD-10-CM

## 2021-10-11 PROCEDURE — 3078F DIAST BP <80 MM HG: CPT | Performed by: PHYSICAL MEDICINE & REHABILITATION

## 2021-10-11 PROCEDURE — 3074F SYST BP LT 130 MM HG: CPT | Performed by: PHYSICAL MEDICINE & REHABILITATION

## 2021-10-11 PROCEDURE — 4004F PT TOBACCO SCREEN RCVD TLK: CPT | Performed by: PHYSICAL MEDICINE & REHABILITATION

## 2021-10-11 PROCEDURE — 3008F BODY MASS INDEX DOCD: CPT | Performed by: PHYSICAL MEDICINE & REHABILITATION

## 2021-10-11 PROCEDURE — 3008F BODY MASS INDEX DOCD: CPT | Performed by: HOSPITALIST

## 2021-10-11 PROCEDURE — 99213 OFFICE O/P EST LOW 20 MIN: CPT | Performed by: PHYSICAL MEDICINE & REHABILITATION

## 2021-10-11 PROCEDURE — 20610 DRAIN/INJ JOINT/BURSA W/O US: CPT | Performed by: PHYSICAL MEDICINE & REHABILITATION

## 2021-10-11 RX ORDER — TRIAMCINOLONE ACETONIDE 40 MG/ML
40 INJECTION, SUSPENSION INTRA-ARTICULAR; INTRAMUSCULAR
Status: COMPLETED | OUTPATIENT
Start: 2021-10-11 | End: 2021-10-11

## 2021-10-11 RX ORDER — LIDOCAINE HYDROCHLORIDE 10 MG/ML
3 INJECTION, SOLUTION INFILTRATION; PERINEURAL
Status: COMPLETED | OUTPATIENT
Start: 2021-10-11 | End: 2021-10-11

## 2021-10-11 RX ADMIN — TRIAMCINOLONE ACETONIDE 40 MG: 40 INJECTION, SUSPENSION INTRA-ARTICULAR; INTRAMUSCULAR at 11:02

## 2021-10-11 RX ADMIN — LIDOCAINE HYDROCHLORIDE 3 ML: 10 INJECTION, SOLUTION INFILTRATION; PERINEURAL at 11:02

## 2021-10-20 ENCOUNTER — TELEPHONE (OUTPATIENT)
Dept: INTERNAL MEDICINE CLINIC | Facility: CLINIC | Age: 56
End: 2021-10-20

## 2021-11-23 DIAGNOSIS — I10 ESSENTIAL HYPERTENSION: ICD-10-CM

## 2021-11-23 RX ORDER — LISINOPRIL 10 MG/1
10 TABLET ORAL DAILY
Qty: 90 TABLET | Refills: 3 | Status: SHIPPED | OUTPATIENT
Start: 2021-11-23

## 2021-12-30 DIAGNOSIS — I10 ESSENTIAL HYPERTENSION: ICD-10-CM

## 2021-12-30 RX ORDER — AMLODIPINE BESYLATE 5 MG/1
10 TABLET ORAL DAILY
Qty: 60 TABLET | Refills: 2 | Status: SHIPPED | OUTPATIENT
Start: 2021-12-30 | End: 2022-02-24 | Stop reason: SDUPTHER

## 2022-01-07 ENCOUNTER — TELEPHONE (OUTPATIENT)
Dept: INTERNAL MEDICINE CLINIC | Facility: CLINIC | Age: 57
End: 2022-01-07

## 2022-01-07 NOTE — TELEPHONE ENCOUNTER
I spoke to patient and asked her to call us when she finds the name of the medication  She said she has no idea what medication it is and Dr Cid Mom should know   I made her aware I would send the message but for the future she should write down the name of her medicaitions

## 2022-01-07 NOTE — TELEPHONE ENCOUNTER
Patient is calling in requesting cholesterol medication  Patient does not have the name of the medication handy  After reviewing med list the only medication I see listed is Atorvastatin but she has not been prescribed that since July  Please review and send if appropriate

## 2022-01-10 ENCOUNTER — OFFICE VISIT (OUTPATIENT)
Dept: INTERNAL MEDICINE CLINIC | Facility: CLINIC | Age: 57
End: 2022-01-10

## 2022-01-10 VITALS
TEMPERATURE: 97 F | DIASTOLIC BLOOD PRESSURE: 75 MMHG | BODY MASS INDEX: 35.57 KG/M2 | HEART RATE: 106 BPM | SYSTOLIC BLOOD PRESSURE: 107 MMHG | WEIGHT: 200.8 LBS | OXYGEN SATURATION: 95 %

## 2022-01-10 DIAGNOSIS — Z12.11 COLON CANCER SCREENING: ICD-10-CM

## 2022-01-10 DIAGNOSIS — E78.2 MIXED HYPERLIPIDEMIA: ICD-10-CM

## 2022-01-10 DIAGNOSIS — R91.1 PULMONARY NODULE: ICD-10-CM

## 2022-01-10 DIAGNOSIS — Z13.820 SCREENING FOR OSTEOPOROSIS: ICD-10-CM

## 2022-01-10 DIAGNOSIS — Z12.11 ENCOUNTER FOR SCREENING COLONOSCOPY: ICD-10-CM

## 2022-01-10 DIAGNOSIS — I10 ESSENTIAL HYPERTENSION: Primary | Chronic | ICD-10-CM

## 2022-01-10 DIAGNOSIS — Z12.31 ENCOUNTER FOR SCREENING MAMMOGRAM FOR MALIGNANT NEOPLASM OF BREAST: ICD-10-CM

## 2022-01-10 DIAGNOSIS — E66.9 CLASS 2 OBESITY WITH BODY MASS INDEX (BMI) OF 36.0 TO 36.9 IN ADULT, UNSPECIFIED OBESITY TYPE, UNSPECIFIED WHETHER SERIOUS COMORBIDITY PRESENT: Chronic | ICD-10-CM

## 2022-01-10 DIAGNOSIS — Z72.0 TOBACCO USE: Chronic | ICD-10-CM

## 2022-01-10 PROCEDURE — 99213 OFFICE O/P EST LOW 20 MIN: CPT | Performed by: INTERNAL MEDICINE

## 2022-01-10 RX ORDER — ATORVASTATIN CALCIUM 20 MG/1
20 TABLET, FILM COATED ORAL DAILY
Qty: 90 TABLET | Refills: 0 | Status: SHIPPED | OUTPATIENT
Start: 2022-01-10 | End: 2022-04-06 | Stop reason: SDUPTHER
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
## 2022-02-24 DIAGNOSIS — I10 ESSENTIAL HYPERTENSION: ICD-10-CM

## 2022-02-24 RX ORDER — AMLODIPINE BESYLATE 5 MG/1
10 TABLET ORAL DAILY
Qty: 60 TABLET | Refills: 2 | Status: SHIPPED | OUTPATIENT
Start: 2022-02-24 | End: 2022-05-11 | Stop reason: SDUPTHER

## 2022-02-28 ENCOUNTER — HOSPITAL ENCOUNTER (EMERGENCY)
Facility: HOSPITAL | Age: 57
Discharge: NURSING, REHAB OR LONG-TERM CARE | End: 2022-02-28
Attending: EMERGENCY MEDICINE
Payer: MEDICARE

## 2022-02-28 VITALS
DIASTOLIC BLOOD PRESSURE: 55 MMHG | HEART RATE: 73 BPM | RESPIRATION RATE: 16 BRPM | SYSTOLIC BLOOD PRESSURE: 105 MMHG | OXYGEN SATURATION: 99 % | TEMPERATURE: 98 F | BODY MASS INDEX: 32.95 KG/M2 | WEIGHT: 186 LBS

## 2022-02-28 DIAGNOSIS — F41.9 ANXIETY: Primary | ICD-10-CM

## 2022-02-28 DIAGNOSIS — R45.851 DEPRESSION WITH SUICIDAL IDEATION: Primary | ICD-10-CM

## 2022-02-28 DIAGNOSIS — Z00.8 ENCOUNTER FOR PSYCHOLOGICAL EVALUATION: ICD-10-CM

## 2022-02-28 DIAGNOSIS — F32.A DEPRESSION WITH SUICIDAL IDEATION: Primary | ICD-10-CM

## 2022-02-28 LAB
ALBUMIN SERPL BCP-MCNC: 3.9 G/DL (ref 3.5–5)
ALP SERPL-CCNC: 98 U/L (ref 46–116)
ALT SERPL W P-5'-P-CCNC: 35 U/L (ref 12–78)
AMPHETAMINES SERPL QL SCN: NEGATIVE
ANION GAP SERPL CALCULATED.3IONS-SCNC: 6 MMOL/L (ref 4–13)
AST SERPL W P-5'-P-CCNC: 15 U/L (ref 5–45)
BARBITURATES UR QL: NEGATIVE
BASOPHILS # BLD AUTO: 0.06 THOUSANDS/ΜL (ref 0–0.1)
BASOPHILS NFR BLD AUTO: 0 % (ref 0–1)
BENZODIAZ UR QL: NEGATIVE
BILIRUB SERPL-MCNC: 0.71 MG/DL (ref 0.2–1)
BUN SERPL-MCNC: 15 MG/DL (ref 5–25)
CALCIUM SERPL-MCNC: 10.2 MG/DL (ref 8.3–10.1)
CHLORIDE SERPL-SCNC: 109 MMOL/L (ref 100–108)
CO2 SERPL-SCNC: 26 MMOL/L (ref 21–32)
COCAINE UR QL: NEGATIVE
CREAT SERPL-MCNC: 1.07 MG/DL (ref 0.6–1.3)
EOSINOPHIL # BLD AUTO: 0.33 THOUSAND/ΜL (ref 0–0.61)
EOSINOPHIL NFR BLD AUTO: 2 % (ref 0–6)
ERYTHROCYTE [DISTWIDTH] IN BLOOD BY AUTOMATED COUNT: 14.1 % (ref 11.6–15.1)
ETHANOL EXG-MCNC: 0 MG/DL
FLUAV RNA RESP QL NAA+PROBE: NEGATIVE
FLUBV RNA RESP QL NAA+PROBE: NEGATIVE
GFR SERPL CREATININE-BSD FRML MDRD: 57 ML/MIN/1.73SQ M
GLUCOSE SERPL-MCNC: 106 MG/DL (ref 65–140)
HCT VFR BLD AUTO: 42.6 % (ref 34.8–46.1)
HGB BLD-MCNC: 14.2 G/DL (ref 11.5–15.4)
IMM GRANULOCYTES # BLD AUTO: 0.1 THOUSAND/UL (ref 0–0.2)
IMM GRANULOCYTES NFR BLD AUTO: 1 % (ref 0–2)
LYMPHOCYTES # BLD AUTO: 3.19 THOUSANDS/ΜL (ref 0.6–4.47)
LYMPHOCYTES NFR BLD AUTO: 20 % (ref 14–44)
MCH RBC QN AUTO: 29.2 PG (ref 26.8–34.3)
MCHC RBC AUTO-ENTMCNC: 33.3 G/DL (ref 31.4–37.4)
MCV RBC AUTO: 88 FL (ref 82–98)
METHADONE UR QL: NEGATIVE
MONOCYTES # BLD AUTO: 1.31 THOUSAND/ΜL (ref 0.17–1.22)
MONOCYTES NFR BLD AUTO: 8 % (ref 4–12)
NEUTROPHILS # BLD AUTO: 11 THOUSANDS/ΜL (ref 1.85–7.62)
NEUTS SEG NFR BLD AUTO: 69 % (ref 43–75)
NRBC BLD AUTO-RTO: 0 /100 WBCS
OPIATES UR QL SCN: NEGATIVE
OXYCODONE+OXYMORPHONE UR QL SCN: NEGATIVE
PCP UR QL: NEGATIVE
PLATELET # BLD AUTO: 293 THOUSANDS/UL (ref 149–390)
PMV BLD AUTO: 9.8 FL (ref 8.9–12.7)
POTASSIUM SERPL-SCNC: 3.7 MMOL/L (ref 3.5–5.3)
PROT SERPL-MCNC: 8.3 G/DL (ref 6.4–8.2)
RBC # BLD AUTO: 4.87 MILLION/UL (ref 3.81–5.12)
RSV RNA RESP QL NAA+PROBE: NEGATIVE
SARS-COV-2 RNA RESP QL NAA+PROBE: NEGATIVE
SODIUM SERPL-SCNC: 141 MMOL/L (ref 136–145)
THC UR QL: NEGATIVE
TSH SERPL DL<=0.05 MIU/L-ACNC: 1.23 UIU/ML (ref 0.36–3.74)
WBC # BLD AUTO: 15.99 THOUSAND/UL (ref 4.31–10.16)

## 2022-02-28 PROCEDURE — 82075 ASSAY OF BREATH ETHANOL: CPT

## 2022-02-28 PROCEDURE — 0241U HB NFCT DS VIR RESP RNA 4 TRGT: CPT

## 2022-02-28 PROCEDURE — 80307 DRUG TEST PRSMV CHEM ANLYZR: CPT

## 2022-02-28 PROCEDURE — 99285 EMERGENCY DEPT VISIT HI MDM: CPT | Performed by: EMERGENCY MEDICINE

## 2022-02-28 PROCEDURE — 85025 COMPLETE CBC W/AUTO DIFF WBC: CPT

## 2022-02-28 PROCEDURE — 99285 EMERGENCY DEPT VISIT HI MDM: CPT

## 2022-02-28 PROCEDURE — 84443 ASSAY THYROID STIM HORMONE: CPT

## 2022-02-28 PROCEDURE — 36415 COLL VENOUS BLD VENIPUNCTURE: CPT

## 2022-02-28 PROCEDURE — 80053 COMPREHEN METABOLIC PANEL: CPT

## 2022-02-28 PROCEDURE — 93005 ELECTROCARDIOGRAM TRACING: CPT

## 2022-02-28 RX ORDER — LORAZEPAM 0.5 MG/1
1 TABLET ORAL ONCE
Status: COMPLETED | OUTPATIENT
Start: 2022-02-28 | End: 2022-02-28

## 2022-02-28 RX ADMIN — LORAZEPAM 1 MG: 0.5 TABLET ORAL at 13:09

## 2022-02-28 NOTE — ED NOTES
Insurance Authorization for admission:     COB was completed with Xuan at [facility]

## 2022-02-28 NOTE — ED NOTES
Pt refused to sign a 201 after several attempts  Pt is now a [place] [id number] doc 302  The 302 and Act 77 will be faxed to [facility] crisis

## 2022-02-28 NOTE — ED NOTES
Transportation scheduled with [facility] for 1745       David Gutierrez, [facility]  02/28/22   2345

## 2022-02-28 NOTE — ED NOTES
Dr Chanda Eason at bedside speaking with patient about signing to stay in the [facility]  02/28/22 4712

## 2022-02-28 NOTE — ED NOTES
No beds:   [address] Leann Moralesler)  Leslee Dandy ([facility])  [facility])  [facility] ([facility])  [facility] Ruff)  [facility] LEI Rohini Jackson)    Faxed for review:  Tom Haas)   Flores Moyer - notes they have "a lot of referrals)    Crisis to follow up 25-Aug-2018 15:26

## 2022-02-28 NOTE — ED NOTES
[facility] Auth Request form packet completed and faxed       VLADISLAV Dodd  02/28/22   [id number]

## 2022-02-28 NOTE — TELEPHONE ENCOUNTER
I called Amy Fisher and spoke to DAT, she said patient missed her last 4 appointments and the most recent one was last week on 2/24/22  I asked what she needs to do to be able to get the refill from them and she said come in for an appointment  She has one available this week on 3/3/22 at 4:50  I asked her to put her in for that time and I will discuss with patient       I called patient and LMOM to call me back to discuss

## 2022-02-28 NOTE — EMTALA/ACUTE CARE TRANSFER
[facility] [address]  Dept: [phone]      BQRNCX TRANSFER CONSENT    NAME Shabbir Hyatt 1965                              MRN [id number]    I have been informed of my rights regarding examination, treatment, and transfer   by Dr Swapna Murray MD    Benefits: Specialized equipment and/or services available at the receiving facility (Include comment)________________________    Risks: Potential for delay in receiving treatment      Transfer Request   I acknowledge that my medical condition has been evaluated and explained to me by the emergency department physician or other qualified medical person and/or my attending physician who has recommended and offered to me further medical examination and treatment  I understand the Hospital's obligation with respect to the treatment and stabilization of my emergency medical condition  I nevertheless request to be transferred  I release the Hospital, the doctor, and any other persons caring for me from all responsibility or liability for any injury or ill effects that may result from my transfer and agree to accept all responsibility for the consequences of my choice to transfer, rather than receive stabilizing treatment at the Hospital  I understand that because the transfer is my request, my insurance may not provide reimbursement for the services  The Hospital will assist and direct me and my family in how to make arrangements for transfer, but the hospital is not liable for any fees charged by the transport service  In spite of this understanding, I refuse to consent to further medical examination and treatment which has been offered to me, and request transfer to  [address]   I authorize the performance of emergency medical procedures and treatments upon me in both transit and upon arrival at the receiving facility  Additionally, I authorize the release of any and all medical records to the receiving facility and request they be transported with me, if possible  I authorize the performance of emergency medical procedures and treatments upon me in both transit and upon arrival at the receiving facility  Additionally, I authorize the release of any and all medical records to the receiving facility and request they be transported with me, if possible  I understand that the safest mode of transportation during a medical emergency is an ambulance and that the Hospital advocates the use of this mode of transport  Risks of traveling to the receiving facility by car, including absence of medical control, life sustaining equipment, such as oxygen, and medical personnel has been explained to me and I fully understand them  (NATASHA CORRECT BOX BELOW)  [  ]  I consent to the stated transfer and to be transported by ambulance/helicopter  [  ]  I consent to the stated transfer, but refuse transportation by ambulance and accept full responsibility for my transportation by car  I understand the risks of non-ambulance transfers and I exonerate the Hospital and its staff from any deterioration in my condition that results from this refusal     X___________________________________________    DATE  22  TIME________  Signature of patient or legally responsible individual signing on patient behalf           RELATIONSHIP TO PATIENT_________________________          Provider Certification    NAME Jay Dawson                                        [facility] 1965                              MRN [id number]    A medical screening exam was performed on the above named patient  Based on the examination:    Condition Necessitating Transfer There were no encounter diagnoses      Patient Condition: The patient has been stabilized such that within reasonable medical probability, no material deterioration of the patient condition or the condition of the unborn child(juan) is likely to result from the transfer    Reason for Transfer: Level of Care needed not available at this facility    Transfer Requirements: [address]   · Space available and qualified personnel available for treatment as acknowledged by    · Agreed to accept transfer and to provide appropriate medical treatment as acknowledged by       Dr Shy Sheppard  · Appropriate medical records of the examination and treatment of the patient are provided at the time of transfer   [address] [facility], [address] _______  · Transfer will be performed by qualified personnel from    and appropriate transfer equipment as required, including the use of necessary and appropriate life support measures  Provider Certification: I have examined the patient and explained the following risks and benefits of being transferred/refusing transfer to the patient/family:  Consent was not obtained as patient is committed to psychiatric facility and transfer is mandated      Based on these reasonable risks and benefits to the patient and/or the unborn child(juan), and based upon the information available at the time of the patients examination, I certify that the medical benefits reasonably to be expected from the provision of appropriate medical treatments at another medical facility outweigh the increasing risks, if any, to the individuals medical condition, and in the case of labor to the unborn child, from effecting the transfer      X____________________________________________ DATE 02/28/22        TIME_______      ORIGINAL - SEND TO MEDICAL RECORDS   COPY - SEND WITH PATIENT DURING TRANSFER

## 2022-02-28 NOTE — ED NOTES
Patient is accepted at [facility]  Patient is accepted by Dr Chantal Zhao per Bruno Nunez    Waiting for transport time     Nurse report is to be called to [phone] prior to patient transfer
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
## 2022-02-28 NOTE — ED PROVIDER NOTES
History  Chief Complaint   Patient presents with    Psychiatric Evaluation     pt states she does not know why she is hear  per EMS she made comments to her therapist that she wanted to kill herself by hanging herself  pt denies she wants to kill her self "but has been feeling like that for a week because she is out of her anxiety medications"     Patient is a 62 y/o F with PMH bipolar, previous suicide attempt presenting via EMS for psychiatric evaluation  Patient reported to have told her mental health nurse on the phone that she had SI with plan of hanging herself  Patient on interview does report suicidal ideations, wishing she was dead since she has had so much loss in her life and saying her life as no meaning  At time of interview patient states she has no plan  She reports numerous life stressors that are causing her to have a lot of depression right now  She states she is no longer taking any medications, does not like the way they make her feel  She is not interested in inpatient psych because she has done before and states that does not help her, she is tired of doing the same things over and over  Denies medical illnesses  Prior to Admission Medications   Prescriptions Last Dose Informant Patient Reported? Taking?    Blood Pressure KIT   No No   Sig: Use 3 (three) times a week   Patient not taking: Reported on 2021   Diclofenac Sodium (VOLTAREN) 1 %   No No   Sig: Apply 2 g topically 3 (three) times a day as needed (Pain)   FLUoxetine (PROzac) 40 MG capsule   Yes No   Sig: Take 40 mg by mouth every morning   LORazepam (ATIVAN) 0 5 mg tablet   Yes No   Sig: Take by mouth every 8 (eight) hours as needed for anxiety   amLODIPine (NORVASC) 5 mg tablet   No No   Sig: Take 2 tablets (10 mg total) by mouth daily   atorvastatin (LIPITOR) 20 mg tablet   No No   Sig: Take 1 tablet (20 mg total) by mouth daily   fluticasone (FLONASE) 50 mcg/act nasal spray   No No   Si sprays into each nostril daily   gabapentin (NEURONTIN) 100 mg capsule   No No   Sig: Take 1 capsule (100 mg total) by mouth 3 (three) times a day   Patient not taking: Reported on 5/3/2021   lidocaine (LIDODERM) 5 %   No No   Sig: Apply 1 patch topically daily Remove & Discard patch within 12 hours or as directed by MD   lisinopril (ZESTRIL) 10 mg tablet   No No   Sig: Take 1 tablet (10 mg total) by mouth daily   loratadine (CLARITIN) 10 mg tablet   No No   Sig: Take 1 tablet (10 mg total) by mouth daily   Patient not taking: Reported on 9/21/2021   mirtazapine (REMERON) 15 mg tablet   Yes No   Sig: Take 15 mg by mouth daily at bedtime     naphazoline-pheniramine (NAPHCON-A) 0 025-0 3 % ophthalmic solution   No No   Sig: Administer 1 drop to both eyes 4 (four) times a day as needed for allergies or irritation   naproxen (NAPROSYN) 500 mg tablet   No No   Sig: Take 1 tablet (500 mg total) by mouth 2 (two) times a day as needed for moderate pain   risperiDONE (RisperDAL) 0 5 mg tablet   Yes No   Sig: Take 0 5 mg by mouth 2 (two) times a day      Facility-Administered Medications: None       Past Medical History:   Diagnosis Date    Anxiety     Arthritis     Bipolar affective disorder, depressed, severe ([facility] [address] ) 4/28/2019    Depression     Elevated bilirubin 8/15/2019    Hyperlipidemia     Hypertension     Hypokalemia 8/15/2019    Learning difficulty     Leukocytosis 7/28/2020    Obesity (BMI 30 0-34 9) 6/26/2020    Osteopenia     Ovarian failure     Previous known suicide attempt     Psychiatric disorder     Psychiatric illness        Past Surgical History:   Procedure Laterality Date    LAPAROSCOPY      as a child, per patient-normal findings       Family History   Problem Relation Age of Onset    Alzheimer's disease Mother     Depression Mother     Depression Brother     Bipolar disorder Brother     No Known Problems Maternal Aunt     No Known Problems Paternal Aunt     No Known Problems Maternal Uncle     No Known Problems Paternal Uncle     No Known Problems Cousin     ADD / ADHD Neg Hx     Alcohol abuse Neg Hx     Anxiety disorder Neg Hx     Dementia Neg Hx     Drug abuse Neg Hx     OCD Neg Hx     Paranoid behavior Neg Hx     Schizophrenia Neg Hx     Seizures Neg Hx     Self-Injury Neg Hx     Suicide Attempts Neg Hx      I have reviewed and agree with the history as documented  E-Cigarette/Vaping    E-Cigarette Use Never User      E-Cigarette/Vaping Substances    Nicotine No     THC No     CBD No     Flavoring No     Other No     Unknown No      Social History     Tobacco Use    Smoking status: Current Some Day Smoker     Packs/day: 0 25     Years: 15 00     Pack years: 3 75     Types: Cigarettes     Last attempt to quit: 1990     Years since quittin 1    Smokeless tobacco: Never Used    Tobacco comment: stoped smoking about 2-3 weeks as of 20   Vaping Use    Vaping Use: Never used   Substance Use Topics    Alcohol use: Not Currently     Alcohol/week: 2 0 standard drinks     Types: 2 Cans of beer per week     Comment: socially    Drug use: No        Review of Systems   Constitutional: Negative for chills and fever  HENT: Negative for ear pain and sore throat  Eyes: Negative for pain and visual disturbance  Respiratory: Negative for cough and shortness of breath  Cardiovascular: Negative for chest pain and palpitations  Gastrointestinal: Negative for abdominal pain and vomiting  Genitourinary: Negative for dysuria and hematuria  Musculoskeletal: Negative for arthralgias and back pain  Skin: Negative for color change and rash  Neurological: Negative for seizures and syncope  Psychiatric/Behavioral: Positive for dysphoric mood and suicidal ideas  All other systems reviewed and are negative        Physical Exam  ED Triage Vitals   Temperature Pulse Respirations Blood Pressure SpO2   22 1152 22 1152 22 1152 22 1152 22 1152   98 °F (36 7 °C) (!) 106 16 136/71 95 %      Temp src Heart Rate Source Patient Position - Orthostatic VS BP Location FiO2 (%)   -- 02/28/22 1737 -- 02/28/22 1152 --    Monitor  Right arm       Pain Score       02/28/22 1152       No Pain             Orthostatic Vital Signs  Vitals:    02/28/22 1152 02/28/22 1737   BP: 136/71 105/55   Pulse: (!) 106 73       Physical Exam  Vitals and nursing note reviewed  Constitutional:       General: She is not in acute distress  Appearance: She is obese  HENT:      Head: Normocephalic and atraumatic  Right Ear: External ear normal       Left Ear: External ear normal       Nose: Nose normal       Mouth/Throat:      Pharynx: Oropharynx is clear  Eyes:      Pupils: Pupils are equal, round, and reactive to light  Cardiovascular:      Rate and Rhythm: Normal rate and regular rhythm  Pulses: Normal pulses  Heart sounds: Normal heart sounds  No murmur heard  No friction rub  No gallop  Pulmonary:      Effort: Pulmonary effort is normal  No respiratory distress  Breath sounds: Normal breath sounds  No wheezing, rhonchi or rales  Abdominal:      General: Abdomen is flat  There is no distension  Palpations: Abdomen is soft  Tenderness: There is no abdominal tenderness  There is no guarding or rebound  Musculoskeletal:         General: No deformity  Normal range of motion  Cervical back: Normal range of motion  Right lower leg: No edema  Left lower leg: No edema  Skin:     General: Skin is warm and dry  Findings: No rash  Neurological:      General: No focal deficit present  Mental Status: She is alert and oriented to person, place, and time  Gait: Gait normal    Psychiatric:         Attention and Perception: Attention normal          Mood and Affect: Mood is depressed  Speech: Speech is delayed  Behavior: Behavior is slowed  Thought Content:  Thought content is not paranoid or delusional  Thought content includes suicidal ideation  Thought content does not include homicidal ideation  Thought content does not include homicidal or suicidal plan  ED Medications  Medications   LORazepam (ATIVAN) tablet 1 mg (1 mg Oral Given 2/28/22 1309)       Diagnostic Studies  Results Reviewed     Procedure Component Value Units Date/Time    Rapid drug screen, urine [[id number]]  (Normal) Collected: 02/28/22 1717    Lab Status: Final result Specimen: Urine, Clean Catch Updated: 02/28/22 1835     Amph/Meth UR Negative     Barbiturate Ur Negative     Benzodiazepine Urine Negative     Cocaine Urine Negative     Methadone Urine Negative     Opiate Urine Negative     PCP Ur Negative     THC Urine Negative     Oxycodone Urine Negative    Narrative:      FOR MEDICAL PURPOSES ONLY  IF CONFIRMATION NEEDED PLEASE CONTACT THE LAB WITHIN 5 DAYS  Drug Screen Cutoff Levels:  AMPHETAMINE/METHAMPHETAMINES  1000 ng/mL  BARBITURATES     200 ng/mL  BENZODIAZEPINES     200 ng/mL  COCAINE      300 ng/mL  METHADONE      300 ng/mL  OPIATES      300 ng/mL  PHENCYCLIDINE     25 ng/mL  THC       50 ng/mL  OXYCODONE      100 ng/mL    COVID/FLU/RSV - 2 hour TAT [[id number]]  (Normal) Collected: 02/28/22 1507    Lab Status: Final result Specimen: Nares from Nose Updated: 02/28/22 1613     SARS-CoV-2 Negative     INFLUENZA A PCR Negative     INFLUENZA B PCR Negative     RSV PCR Negative    Narrative:      FOR PEDIATRIC PATIENTS - copy/paste COVID Guidelines URL to browser: [email or web address]  [email or web address]    SARS-CoV-2 assay is a Nucleic Acid Amplification assay intended for the  qualitative detection of nucleic acid from SARS-CoV-2 in nasopharyngeal  swabs  Results are for the presumptive identification of SARS-CoV-2 RNA      Positive results are indicative of infection with SARS-CoV-2, the virus  causing COVID-19, but do not rule out bacterial infection or co-infection  with other viruses  Laboratories within the [place] and its  territories are required to report all positive results to the appropriate  public health authorities  Negative results do not preclude SARS-CoV-2  infection and should not be used as the sole basis for treatment or other  patient management decisions  Negative results must be combined with  clinical observations, patient history, and epidemiological information  This test has not been FDA cleared or approved  This test has been authorized by FDA under an Emergency Use Authorization  (EUA)  This test is only authorized for the duration of time the  declaration that circumstances exist justifying the authorization of the  emergency use of an in vitro diagnostic tests for detection of SARS-CoV-2  virus and/or diagnosis of COVID-19 infection under section 564(b)(1) of  the Act, 21 U  S C  400FGS-6(F)(6), unless the authorization is terminated  or revoked sooner  The test has been validated but independent review by FDA  and CLIA is pending  Test performed using [employer] GeneXpert: This RT-PCR assay targets N2,  a region unique to SARS-CoV-2  A conserved region in the E-gene was chosen  for pan-Sarbecovirus detection which includes SARS-CoV-2  TSH [[id number]]  (Normal) Collected: 02/28/22 1507    Lab Status: Final result Specimen: Blood from Arm, Left Updated: 02/28/22 1542     TSH 3RD GENERATON 1 230 uIU/mL     Narrative:      Patients undergoing fluorescein dye angiography may retain small amounts of fluorescein in the body for 48-72 hours post procedure  Samples containing fluorescein can produce falsely depressed TSH values  If the patient had this procedure,a specimen should be resubmitted post fluorescein clearance        Comprehensive metabolic panel [[id number]]  (Abnormal) Collected: 02/28/22 1507    Lab Status: Final result Specimen: Blood from Arm, Left Updated: 02/28/22 1536     Sodium 141 mmol/L      Potassium 3 7 mmol/L      Chloride 109 mmol/L CO2 26 mmol/L      ANION GAP 6 mmol/L      BUN 15 mg/dL      Creatinine 1 07 mg/dL      Glucose 106 mg/dL      Calcium 10 2 mg/dL      AST 15 U/L      ALT 35 U/L      Alkaline Phosphatase 98 U/L      Total Protein 8 3 g/dL      Albumin 3 9 g/dL      Total Bilirubin 0 71 mg/dL      eGFR 57 ml/min/1 73sq m     Narrative:      [place] guidelines for Chronic Kidney Disease (CKD):     Stage 1 with normal or high GFR (GFR > 90 mL/min/1 73 square meters)    Stage 2 Mild CKD (GFR = 60-89 mL/min/1 73 square meters)    Stage 3A Moderate CKD (GFR = 45-59 mL/min/1 73 square meters)    Stage 3B Moderate CKD (GFR = 30-44 mL/min/1 73 square meters)    Stage 4 Severe CKD (GFR = 15-29 mL/min/1 73 square meters)    Stage 5 End Stage CKD (GFR <15 mL/min/1 73 square meters)  Note: GFR calculation is accurate only with a steady state creatinine    CBC and differential [[id number]]  (Abnormal) Collected: 02/28/22 1507    Lab Status: Final result Specimen: Blood from Arm, Left Updated: 02/28/22 1523     WBC 15 99 Thousand/uL      RBC 4 87 Million/uL      Hemoglobin 14 2 g/dL      Hematocrit 42 6 %      MCV 88 fL      MCH 29 2 pg      MCHC 33 3 g/dL      RDW 14 1 %      MPV 9 8 fL      Platelets 704 Thousands/uL      nRBC 0 /100 WBCs      Neutrophils Relative 69 %      Immat GRANS % 1 %      Lymphocytes Relative 20 %      Monocytes Relative 8 %      Eosinophils Relative 2 %      Basophils Relative 0 %      Neutrophils Absolute 11 00 Thousands/µL      Immature Grans Absolute 0 10 Thousand/uL      Lymphocytes Absolute 3 19 Thousands/µL      Monocytes Absolute 1 31 Thousand/µL      Eosinophils Absolute 0 33 Thousand/µL      Basophils Absolute 0 06 Thousands/µL     POCT alcohol breath test [[id number]]  (Normal) Resulted: 02/28/22 1204    Lab Status: Final result Updated: 02/28/22 1237     EXTBreath Alcohol 0 000                 No orders to display         Procedures  Procedures      ED Course  ED Course as of 03/02/22 1537   Mon Feb 28, 2022   1500 302 signed   [address]  Number of Diagnoses or Management Options  Diagnosis management comments: 18-year-old female presenting via EMS for psychiatric evaluation  On evaluation, patient noted to appear depressed, but endorses suicidal ideations, denies plan suicide or homicide  Vital signs stable, physical exam within normal limits  Discussed with crisis who agreed to evaluate patient  Patient explained and offered 201, patient declined  At this time it is my judgment that patient suitable for 302, with active depression, suicidal ideations, reported plan for suicide  Do not feel patient can be treated as an outpatient at this time  302 signed  Crisis was able to secure placement at [facility], and follow-up paperwork completed  Patient transferred  Disposition  Final diagnoses:   Depression with suicidal ideation   Encounter for psychological evaluation     Time reflects when diagnosis was documented in both MDM as applicable and the Disposition within this note     Time User Action Codes Description Comment    3/2/2022  3:36 PM Wilian Galeana [F32  A,  R45 [id number]] Depression with suicidal ideation     3/2/2022  3:37 PM Wilian Galeana [Z00 8] Encounter for psychological evaluation       ED Disposition     ED Disposition Condition Date/Time Comment    Transfer to [facility] Feb 28, 2022  3:12 PM Misa Fairbanks Betty Townsend should be transferred out to [facility] and has been medically cleared          MD Documentation      Most Recent Value   Patient Condition The patient has been stabilized such that within reasonable medical probability, no material deterioration of the patient condition or the condition of the unborn child(juan) is likely to result from the transfer   Reason for Transfer Level of Care needed not available at this facility   Benefits of Transfer Specialized equipment and/or services available at the receiving facility (Include comment)________________________   Risks of Transfer Potential for delay in receiving treatment   Accepting Physician Dr Baldomero Jarrell Name, Isaac Salcedo   Sending MD Dr Deo Thorpe   Provider Certification Consent was not obtained as patient is committed to psychiatric facility and transfer is mandated      RN Documentation      Most [address] Name, [address]      Follow-up Information    None         Discharge Medication List as of 2/28/2022  6:09 PM      CONTINUE these medications which have NOT CHANGED    Details   amLODIPine (NORVASC) 5 mg tablet Take 2 tablets (10 mg total) by mouth daily, Starting Thu 2/24/2022, Normal      atorvastatin (LIPITOR) 20 mg tablet Take 1 tablet (20 mg total) by mouth daily, Starting Mon 1/10/2022, Normal      Blood Pressure KIT Use 3 (three) times a week, Starting Wed 11/11/2020, Normal      Diclofenac Sodium (VOLTAREN) 1 % Apply 2 g topically 3 (three) times a day as needed (Pain), Starting Wed 12/16/2020, Normal      FLUoxetine (PROzac) 40 MG capsule Take 40 mg by mouth every morning, Starting Thu 3/11/2021, Historical Med      fluticasone (FLONASE) 50 mcg/act nasal spray 2 sprays into each nostril daily, Starting Wed 11/11/2020, Normal      gabapentin (NEURONTIN) 100 mg capsule Take 1 capsule (100 mg total) by mouth 3 (three) times a day, Starting Thu 4/8/2021, Until Sat 5/8/2021, Normal      lidocaine (LIDODERM) 5 % Apply 1 patch topically daily Remove & Discard patch within 12 hours or as directed by MD, Starting Sat 4/17/2021, Normal      lisinopril (ZESTRIL) 10 mg tablet Take 1 tablet (10 mg total) by mouth daily, Starting Tue 11/23/2021, Normal      loratadine (CLARITIN) 10 mg tablet Take 1 tablet (10 mg total) by mouth daily, Starting Wed 11/11/2020, Normal      LORazepam (ATIVAN) 0 5 mg tablet Take by mouth every 8 (eight) hours as needed for anxiety, Historical Med      mirtazapine (REMERON) 15 mg tablet Take 15 mg by mouth daily at bedtime  , Starting Wed 9/8/2021, Historical Med      naphazoline-pheniramine (NAPHCON-A) 0 025-0 3 % ophthalmic solution Administer 1 drop to both eyes 4 (four) times a day as needed for allergies or irritation, Starting Wed 11/11/2020, Normal      naproxen (NAPROSYN) 500 mg tablet Take 1 tablet (500 mg total) by mouth 2 (two) times a day as needed for moderate pain, Starting Tue 11/10/2020, Normal      risperiDONE (RisperDAL) 0 5 mg tablet Take 0 5 mg by mouth 2 (two) times a day, Starting Wed 9/8/2021, Historical Med           No discharge procedures on file  PDMP Review     None           ED Provider  Attending physically available and evaluated Elsy MENDEZ managed the patient along with the ED Attending      Electronically Signed by         Tamea Krabbe, MD  03/02/22 9365

## 2022-02-28 NOTE — TELEPHONE ENCOUNTER
Patient states that Jennifer is refusing to refill the medication due to the fact that she missed appointments  And patient states she is going through a lot of stress and the anxiety medication helps  Patient is requesting a higher dose of 1 mg instead of the 0 5    Please check into this and call the patient to advise whether or not the medication was prescribed

## 2022-02-28 NOTE — ED NOTES
Belongings: Hoodie, leggings, bra, tank top, sneakers, socks, keys and broken screen cell phone (flash light won't turn off)  Two rings and a pair of earrings   One bag of belongings placed in zone 5/6 [facility], cabinet RANDOLPH Muñoz  02/28/22 91 21 06

## 2022-02-28 NOTE — ED ATTENDING ATTESTATION
2/28/2022  IGo MD, saw and evaluated the patient  I have discussed the patient with the resident/non-physician practitioner and agree with the resident's/non-physician practitioner's findings, Plan of Care, and MDM as documented in the resident's/non-physician practitioner's note, except where noted  All available labs and Radiology studies were reviewed  I was present for key portions of any procedure(s) performed by the resident/non-physician practitioner and I was immediately available to provide assistance  At this point I agree with the current assessment done in the Emergency Department  I have conducted an independent evaluation of this patient a history and physical is as follows:    58-year-old woman with history of bipolar disorder presenting from her therapist's office after she made suicidal statements to the therapist   Specifically she mentioned a plan to hang herself  Patient does admit to suicidal ideation here, although later tried to talk crisis worker she did not have this  She is intermittently agitated and verbally aggressive but can be redirected with some effort  Evaluated by crisis, at this point will likely sign 201 but if not would need to have 302 done      ED Course         Critical Care Time  Procedures

## 2022-03-01 LAB
ATRIAL RATE: 91 BPM
P AXIS: 75 DEGREES
PR INTERVAL: 148 MS
QRS AXIS: -41 DEGREES
QRSD INTERVAL: 86 MS
QT INTERVAL: 386 MS
QTC INTERVAL: 474 MS
T WAVE AXIS: 32 DEGREES
VENTRICULAR RATE: 91 BPM

## 2022-03-01 PROCEDURE — 93010 ELECTROCARDIOGRAM REPORT: CPT | Performed by: INTERNAL MEDICINE

## 2022-03-02 RX ORDER — LORAZEPAM 0.5 MG/1
TABLET ORAL EVERY 8 HOURS PRN
OUTPATIENT
Start: 2022-03-02

## 2022-03-02 NOTE — TELEPHONE ENCOUNTER
LMOM to c/b   Advised that I scheduled another appt for her and I do not hear from her by the end of the day then I will have to cancel the appt and no refills will be given

## 2022-03-02 NOTE — TELEPHONE ENCOUNTER
I called [facility] counseling services again and left a message on their voicemail to cancel the appt that I scheduled for the patient tomorrow 3/3/22 as I have not been able to reach her after several attempts  I left my number for them to call me back if needed  I also LMOM for patient again to advise her that I had the appt cancelled and she will need to contact their office directly for another appt and discuss medication with them moving forward

## 2022-03-09 ENCOUNTER — TELEPHONE (OUTPATIENT)
Dept: OBGYN CLINIC | Facility: HOSPITAL | Age: 57
End: 2022-03-09

## 2022-03-09 ENCOUNTER — OFFICE VISIT (OUTPATIENT)
Dept: INTERNAL MEDICINE CLINIC | Facility: CLINIC | Age: 57
End: 2022-03-09

## 2022-03-09 VITALS
HEART RATE: 105 BPM | TEMPERATURE: 98.1 F | BODY MASS INDEX: 34.38 KG/M2 | HEIGHT: 63 IN | WEIGHT: 194 LBS | DIASTOLIC BLOOD PRESSURE: 83 MMHG | SYSTOLIC BLOOD PRESSURE: 136 MMHG

## 2022-03-09 DIAGNOSIS — G89.29 CHRONIC PAIN OF BOTH KNEES: ICD-10-CM

## 2022-03-09 DIAGNOSIS — R58 ECCHYMOSIS: Primary | ICD-10-CM

## 2022-03-09 DIAGNOSIS — M25.561 CHRONIC PAIN OF BOTH KNEES: ICD-10-CM

## 2022-03-09 DIAGNOSIS — M25.562 CHRONIC PAIN OF BOTH KNEES: ICD-10-CM

## 2022-03-09 PROCEDURE — 99213 OFFICE O/P EST LOW 20 MIN: CPT | Performed by: INTERNAL MEDICINE

## 2022-03-09 RX ORDER — NAPROXEN 500 MG/1
500 TABLET ORAL 2 TIMES DAILY PRN
Qty: 60 TABLET | Refills: 1 | Status: SHIPPED | OUTPATIENT
Start: 2022-03-09 | End: 2022-04-27

## 2022-03-09 NOTE — PROGRESS NOTES
INTERNAL MEDICINE FOLLOW-UP OFFICE VISIT  [facility]  [address] [facility], [address]    NAME: Jay Dawson  AGE: 61 y o  SEX: female    DATE OF ENCOUNTER: 3/9/2022    Assessment and Plan     1  Ecchymosis  Unclear etiology though does not appear significant on exam   Has no prior history of coagulopathy  A slightly elevated protein gap on recent lab work  · Blood work including CBC, INR, PTT, SPEP, UPEP, ALFONSO, CRP, ESR ordered  · If the above is abnormal can consider screening for von Willebrand's disease versus referral to Hematology  · Instructed patient to follow up with the office if she develops bruising greater than 1 cm  Otherwise follow up in 6 weeks to further manage her ecchymosis  - CBC and Platelet; Future  - Protime-INR; Future  - APTT; Future  - Protein electrophoresis, serum; Future  - Protein electrophoresis, urine  - Sedimentation rate, automated; Future  - ALFONSO Screen w/ Reflex to Titer/Pattern; Future  - C-reactive protein; Future    No orders of the defined types were placed in this encounter  Healthcare Maintenance      Chief Complaint     Chief Complaint   Patient presents with    Follow-up     brusies on body       History of Present Illness     21-year-old female with past medical history of hypertension, hyperlipidemia, bipolar disorder who was attending an office visit for bruising  She reports she had developed small less than 1 cm bruises on her bilateral upper and lower extremities  She knows them about a week ago  They occasionally get worse before they get better  She denies any antecedent trauma to the area  She also denies any changes to her current medication regimen  Denies a history of ecchymosis  Also denies any chest pain, significant shortness of breath, nausea, vomiting  Continues to smoke intermittently        The following portions of the patient's history were reviewed and updated as appropriate: allergies, current medications, past family history, past medical history, past social history, past surgical history and problem list     Review of Systems       Review of Systems   Constitutional: Negative for chills and fever  HENT: Negative for congestion and sinus pressure  Respiratory: Negative for cough and shortness of breath  Cardiovascular: Negative for chest pain, palpitations and leg swelling  Gastrointestinal: Negative for abdominal pain, diarrhea, nausea and vomiting  Genitourinary: Negative for dysuria and hematuria  Musculoskeletal: Negative for arthralgias and back pain  Skin: Positive for rash  Negative for color change  Neurological: Negative for dizziness and headaches  Psychiatric/Behavioral: Negative for agitation and confusion  All other systems reviewed and are negative  All other systems reviewed and were negative      Medical History     Past Medical History:   Diagnosis Date    Anxiety     Arthritis     Bipolar affective disorder, depressed, severe ([facility] [address] ) 4/28/2019    Depression     Elevated bilirubin 8/15/2019    Hyperlipidemia     Hypertension     Hypokalemia 8/15/2019    Learning difficulty     Leukocytosis 7/28/2020    Obesity (BMI 30 0-34 9) 6/26/2020    Osteopenia     Ovarian failure     Previous known suicide attempt     Psychiatric disorder     Psychiatric illness      Past Surgical History:   Procedure Laterality Date    LAPAROSCOPY      as a child, per patient-normal findings       Current Outpatient Medications:     amLODIPine (NORVASC) 5 mg tablet, Take 2 tablets (10 mg total) by mouth daily, Disp: 60 tablet, Rfl: 2    atorvastatin (LIPITOR) 20 mg tablet, Take 1 tablet (20 mg total) by mouth daily, Disp: 90 tablet, Rfl: 0    Diclofenac Sodium (VOLTAREN) 1 %, Apply 2 g topically 3 (three) times a day as needed (Pain), Disp: 1 Tube, Rfl: 1    FLUoxetine (PROzac) 40 MG capsule, Take 40 mg by mouth every morning, Disp: , Rfl:    fluticasone (FLONASE) 50 mcg/act nasal spray, 2 sprays into each nostril daily, Disp: 11 1 mL, Rfl: 2    lidocaine (LIDODERM) 5 %, Apply 1 patch topically daily Remove & Discard patch within 12 hours or as directed by MD, Disp: 6 patch, Rfl: 0    lisinopril (ZESTRIL) 10 mg tablet, Take 1 tablet (10 mg total) by mouth daily, Disp: 90 tablet, Rfl: 3    LORazepam (ATIVAN) 0 5 mg tablet, Take by mouth every 8 (eight) hours as needed for anxiety, Disp: , Rfl:     mirtazapine (REMERON) 15 mg tablet, Take 15 mg by mouth daily at bedtime  , Disp: , Rfl:     naphazoline-pheniramine (NAPHCON-A) 0 025-0 3 % ophthalmic solution, Administer 1 drop to both eyes 4 (four) times a day as needed for allergies or irritation, Disp: 15 mL, Rfl: 2    naproxen (NAPROSYN) 500 mg tablet, Take 1 tablet (500 mg total) by mouth 2 (two) times a day as needed for moderate pain, Disp: 60 tablet, Rfl: 1    risperiDONE (RisperDAL) 0 5 mg tablet, Take 0 5 mg by mouth 2 (two) times a day, Disp: , Rfl:    Family History   Problem Relation Age of Onset    Alzheimer's disease Mother     Depression Mother     Depression Brother     Bipolar disorder Brother     No Known Problems Maternal Aunt     No Known Problems Paternal Aunt     No Known Problems Maternal Uncle     No Known Problems Paternal Uncle     No Known Problems Cousin     ADD / ADHD Neg Hx     Alcohol abuse Neg Hx     Anxiety disorder Neg Hx     Dementia Neg Hx     Drug abuse Neg Hx     OCD Neg Hx     Paranoid behavior Neg Hx     Schizophrenia Neg Hx     Seizures Neg Hx     Self-Injury Neg Hx     Suicide Attempts Neg Hx       Social History     Socioeconomic History    Marital status: Single     Spouse name: None    Number of children: None    Years of education: None    Highest education level: None   Occupational History    None   Tobacco Use    Smoking status: Current Some Day Smoker     Packs/day: 0 25     Years: 15 00     Pack years: 3 75     Types: Cigarettes     Last attempt to quit: 1990     Years since quittin 2    Smokeless tobacco: Never Used    Tobacco comment: stoped smoking about 2-3 weeks as of 20   Vaping Use    Vaping Use: Never used   Substance and Sexual Activity    Alcohol use: Not Currently     Alcohol/week: 2 0 standard drinks     Types: 2 Cans of beer per week     Comment: socially    Drug use: No    Sexual activity: Yes     Partners: Male     Birth control/protection: None   Other Topics Concern    None   Social History Narrative    None     Social Determinants of Health     Financial Resource Strain: Not on file   Food Insecurity: Not on file   Transportation Needs: Not on file   Physical Activity: Not on file   Stress: Not on file   Social Connections: Not on file   Intimate Partner Violence: Not on file   Housing Stability: Not on file      Allergies   Allergen Reactions    Other      Hay Fever    Oxycodone-Acetaminophen GI Intolerance        Active Problem List     Patient Active Problem List   Diagnosis    Amenorrhea    Essential hypertension    Hyperlipidemia    Tobacco use    Ovarian failure    Mild intellectual disability    Constipation    MDD (major depressive disorder), recurrent episode, severe (HCC)    Chronic pain of both knees    Bipolar disorder (HCC)    Anxiety    Insomnia    Obesity    Patellofemoral pain syndrome of both knees    Pulmonary nodule       Objective     Ht 5' 3" (1 6 m)   Wt 88 kg (194 lb)   LMP  (LMP Unknown)   BMI 34 37 kg/m²     Physical Exam  Vitals and nursing note reviewed  Constitutional:       General: She is not in acute distress  Appearance: Normal appearance  She is well-developed  She is obese  She is not ill-appearing  HENT:      Head: Normocephalic and atraumatic  Mouth/Throat:      Mouth: Mucous membranes are moist    Eyes:      Extraocular Movements: Extraocular movements intact        Conjunctiva/sclera: Conjunctivae normal       Pupils: Pupils are equal, round, and reactive to light  Cardiovascular:      Rate and Rhythm: Normal rate and regular rhythm  Pulses: Normal pulses  Heart sounds: Normal heart sounds  No murmur heard  No friction rub  No gallop  Pulmonary:      Effort: Pulmonary effort is normal  No respiratory distress  Breath sounds: Normal breath sounds  No wheezing or rales  Abdominal:      General: Abdomen is flat  Bowel sounds are normal  There is no distension  Palpations: Abdomen is soft  Tenderness: There is no abdominal tenderness  There is no guarding  Musculoskeletal:      Cervical back: Neck supple  Right lower leg: No edema  Left lower leg: No edema  Skin:     General: Skin is warm and dry  Findings: Ecchymosis present  Comments: Small less than 1 cm bruising present on left inner elbow and left inner knee  Neurological:      General: No focal deficit present  Mental Status: She is alert and oriented to person, place, and time     Psychiatric:         Mood and Affect: Mood normal          Behavior: Behavior normal           Pertinent Laboratory/Diagnostic Studies:  CBC:   Lab Results   Component Value Date/Time    WBC 15 99 (H) 02/28/2022 03:07 PM    RBC 4 87 02/28/2022 03:07 PM    HGB 14 2 02/28/2022 03:07 PM    HCT 42 6 02/28/2022 03:07 PM    MCV 88 02/28/2022 03:07 PM    MCH 29 2 02/28/2022 03:07 PM    MCHC 33 3 02/28/2022 03:07 PM    RDW 14 1 02/28/2022 03:07 PM    MPV 9 8 02/28/2022 03:07 PM     02/28/2022 03:07 PM    NRBC 0 02/28/2022 03:07 PM    NEUTOPHILPCT 69 02/28/2022 03:07 PM    LYMPHOPCT 20 02/28/2022 03:07 PM    MONOPCT 8 02/28/2022 03:07 PM    EOSPCT 2 02/28/2022 03:07 PM    BASOPCT 0 02/28/2022 03:07 PM    NEUTROABS 11 00 (H) 02/28/2022 03:07 PM    LYMPHSABS 3 19 02/28/2022 03:07 PM    MONOSABS 1 31 (H) 02/28/2022 03:07 PM    EOSABS 0 33 02/28/2022 03:07 PM     Chemistry Profile:   Lab Results   Component Value Date/Time    K 3 7 02/28/2022 03:07 PM     (H) 02/28/2022 03:07 PM    CO2 26 02/28/2022 03:07 PM    BUN 15 02/28/2022 03:07 PM    CREATININE 1 07 02/28/2022 03:07 PM    GLUC 106 02/28/2022 03:07 PM    GLUF 104 (H) 08/13/2020 05:59 AM    CALCIUM 10 2 (H) 02/28/2022 03:07 PM    CORRECTEDCA 10 2 (H) 02/03/2020 10:41 AM    MG 2 0 08/15/2019 07:09 PM    AST 15 02/28/2022 03:07 PM    ALT 35 02/28/2022 03:07 PM    ALKPHOS 98 02/28/2022 03:07 PM    EGFR 57 02/28/2022 03:07 PM     Endocrine Studies:   Lab Results   Component Value Date/Time    HGBA1C 5 6 04/29/2019 06:22 AM    UPT1AUMCIEFB 1 230 02/28/2022 03:07 PM    TRIG 142 08/13/2020 05:59 AM    CHOLESTEROL 170 08/13/2020 05:59 AM    HDL 30 (L) 08/13/2020 05:59 AM    LDLCALC 112 08/13/2020 05:59 AM    NONHDLC 140 08/13/2020 05:59 AM       Current Medications     Current Outpatient Medications:     amLODIPine (NORVASC) 5 mg tablet, Take 2 tablets (10 mg total) by mouth daily, Disp: 60 tablet, Rfl: 2    atorvastatin (LIPITOR) 20 mg tablet, Take 1 tablet (20 mg total) by mouth daily, Disp: 90 tablet, Rfl: 0    Diclofenac Sodium (VOLTAREN) 1 %, Apply 2 g topically 3 (three) times a day as needed (Pain), Disp: 1 Tube, Rfl: 1    FLUoxetine (PROzac) 40 MG capsule, Take 40 mg by mouth every morning, Disp: , Rfl:     fluticasone (FLONASE) 50 mcg/act nasal spray, 2 sprays into each nostril daily, Disp: 11 1 mL, Rfl: 2    lidocaine (LIDODERM) 5 %, Apply 1 patch topically daily Remove & Discard patch within 12 hours or as directed by MD, Disp: 6 patch, Rfl: 0    lisinopril (ZESTRIL) 10 mg tablet, Take 1 tablet (10 mg total) by mouth daily, Disp: 90 tablet, Rfl: 3    LORazepam (ATIVAN) 0 5 mg tablet, Take by mouth every 8 (eight) hours as needed for anxiety, Disp: , Rfl:     mirtazapine (REMERON) 15 mg tablet, Take 15 mg by mouth daily at bedtime  , Disp: , Rfl:     naphazoline-pheniramine (NAPHCON-A) 0 025-0 3 % ophthalmic solution, Administer 1 drop to both eyes 4 (four) times a day as needed for allergies or irritation, Disp: 15 mL, Rfl: 2    naproxen (NAPROSYN) 500 mg tablet, Take 1 tablet (500 mg total) by mouth 2 (two) times a day as needed for moderate pain, Disp: 60 tablet, Rfl: 1    risperiDONE (RisperDAL) 0 5 mg tablet, Take 0 5 mg by mouth 2 (two) times a day, Disp: , Rfl:     Health Maintenance     Health Maintenance   Topic Date Due    Osteoporosis Screening  Never done    Colorectal Cancer Screening  Never done    Breast Cancer Screening: Mammogram  07/28/2018   Branden [employer] Medicare Annual Wellness Visit (AWV)  02/26/2022    COVID-19 Vaccine (3 - Booster for Pfizer series) 03/12/2022    BMI: Followup Plan  01/10/2023    BMI: Adult  02/28/2023    Cervical Cancer Screening  03/13/2025    DTaP,Tdap,and Td Vaccines (3 - Td or Tdap) 10/06/2029    Pneumococcal Vaccine: Pediatrics (0 to 5 Years) and At-Risk Patients (6 to 59 Years) (2 of 2 - PPSV23) 11/16/2029    HIV Screening  Completed    Hepatitis C Screening  Completed    Influenza Vaccine  Completed    HIB Vaccine  Aged Out    Hepatitis B Vaccine  Aged Out    IPV Vaccine  Aged Out    Hepatitis A Vaccine  Aged Out    Meningococcal ACWY Vaccine  Aged Out    HPV Vaccine  Aged Out     Immunization History   Administered Date(s) Administered    COVID-19 PFIZER VACCINE 0 3 ML IM 03/01/2021, 10/12/2021    INFLUENZA 10/26/2014, 12/08/2015, 08/29/2017, 10/20/2021    Influenza, injectable, quadrivalent, preservative free 0 5 mL 10/06/2019, 08/23/2020    Pneumococcal Polysaccharide PPV23 02/03/2020, 09/22/2020    Tdap 07/11/2019, 10/06/2019    Zoster Vaccine Recombinant 05/21/2020, 08/23/2020       Brett Ann-Marie RUVALCABA    Internal Medicine PGY-3  3/9/2022 4:31 PM

## 2022-03-09 NOTE — PATIENT INSTRUCTIONS
Obtained lab work  Follow up in 6 weeks  Ecchymosis   WHAT YOU NEED TO KNOW:   Ecchymosis is a collection of blood under the skin  Blood leaks from blood vessels and collects in nearby tissues  This can happen anywhere just below the skin, or in a mucus membrane, such as your mouth  Ecchymosis may appear as a large red, blue, or purple area of skin  You may also have pain or swelling in the area  Signs and symptoms may move to nearby body areas  It is important for you to follow up with your healthcare provider  Some causes of ecchymosis are serious and need medical treatment  DISCHARGE INSTRUCTIONS:   Call your doctor if:   · You have new symptoms  · Your bruise suddenly gets bigger and feels hard  · The affected area does not improve within 2 weeks  · You have ecchymosis around your eye and you are having trouble seeing  · You have questions or concerns about your condition or care  Self-care:       · Rest  the area to help the tissues heal     · Apply ice  to the area to relieve pain and swelling  Ice can also help prevent tissue damage  Use an ice pack, or put crushed ice in a bag  Cover the ice pack or bag with a small towel before you apply it to your skin  Apply ice for 20 minutes every hour, or as directed  · Elevate  the affected area to reduce swelling, and to improve circulation  Prop the area on pillows to keep it elevated above the level of your heart  Do this as often as possible  · NSAID medicines  such as ibuprofen can help reduce pain and swelling  NSAIDs are available without a prescription  Ask your healthcare provider which medicine is right for you  Ask how much to take and how often to take it  Follow directions  NSAIDs can cause stomach bleeding and kidney damage if not taken correctly  If you take blood thinner medicine, always ask if NSAIDs are safe for you      Follow up with your healthcare provider as directed:  Write down your questions so you remember to ask them during your visits  © Copyright [employer] 2022 Information is for End User's use only and may not be sold, redistributed or otherwise used for commercial purposes  All illustrations and images included in CareNotes® are the copyrighted property of A D A M , Inc  or Mary Jo Caceres  The above information is an  only  It is not intended as medical advice for individual conditions or treatments  Talk to your doctor, nurse or pharmacist before following any medical regimen to see if it is safe and effective for you

## 2022-03-15 ENCOUNTER — LAB (OUTPATIENT)
Dept: LAB | Facility: CLINIC | Age: 57
End: 2022-03-15
Payer: MEDICARE

## 2022-03-15 DIAGNOSIS — R58 ECCHYMOSIS: ICD-10-CM

## 2022-03-15 LAB
APTT PPP: 26 SECONDS (ref 23–37)
CRP SERPL QL: 9.2 MG/L
ERYTHROCYTE [DISTWIDTH] IN BLOOD BY AUTOMATED COUNT: 14.5 % (ref 11.6–15.1)
ERYTHROCYTE [SEDIMENTATION RATE] IN BLOOD: 30 MM/HOUR (ref 0–29)
HCT VFR BLD AUTO: 42.4 % (ref 34.8–46.1)
HGB BLD-MCNC: 14 G/DL (ref 11.5–15.4)
INR PPP: 0.9 (ref 0.84–1.19)
MCH RBC QN AUTO: 28.6 PG (ref 26.8–34.3)
MCHC RBC AUTO-ENTMCNC: 33 G/DL (ref 31.4–37.4)
MCV RBC AUTO: 87 FL (ref 82–98)
PLATELET # BLD AUTO: 319 THOUSANDS/UL (ref 149–390)
PMV BLD AUTO: 10 FL (ref 8.9–12.7)
PROTHROMBIN TIME: 11.8 SECONDS (ref 11.6–14.5)
RBC # BLD AUTO: 4.89 MILLION/UL (ref 3.81–5.12)
WBC # BLD AUTO: 12.5 THOUSAND/UL (ref 4.31–10.16)

## 2022-03-15 PROCEDURE — 85730 THROMBOPLASTIN TIME PARTIAL: CPT

## 2022-03-15 PROCEDURE — 84165 PROTEIN E-PHORESIS SERUM: CPT | Performed by: SPECIALIST

## 2022-03-15 PROCEDURE — 84166 PROTEIN E-PHORESIS/URINE/CSF: CPT | Performed by: SPECIALIST

## 2022-03-15 PROCEDURE — 86140 C-REACTIVE PROTEIN: CPT

## 2022-03-15 PROCEDURE — 84165 PROTEIN E-PHORESIS SERUM: CPT

## 2022-03-15 PROCEDURE — 85652 RBC SED RATE AUTOMATED: CPT

## 2022-03-15 PROCEDURE — 85610 PROTHROMBIN TIME: CPT

## 2022-03-15 PROCEDURE — 36415 COLL VENOUS BLD VENIPUNCTURE: CPT

## 2022-03-15 PROCEDURE — 85027 COMPLETE CBC AUTOMATED: CPT

## 2022-03-15 PROCEDURE — 84166 PROTEIN E-PHORESIS/URINE/CSF: CPT | Performed by: STUDENT IN AN ORGANIZED HEALTH CARE EDUCATION/TRAINING PROGRAM

## 2022-03-15 PROCEDURE — 86038 ANTINUCLEAR ANTIBODIES: CPT

## 2022-03-15 PROCEDURE — 86039 ANTINUCLEAR ANTIBODIES (ANA): CPT

## 2022-03-16 LAB
ANA HOMOGEN SER QL IF: NORMAL
ANA HOMOGEN TITR SER: NORMAL {TITER}
RYE IGE QN: POSITIVE

## 2022-03-17 DIAGNOSIS — R58 ECCHYMOSIS: Primary | ICD-10-CM

## 2022-03-17 LAB
ALBUMIN SERPL ELPH-MCNC: 4.39 G/DL (ref 3.5–5)
ALBUMIN SERPL ELPH-MCNC: 58.5 % (ref 52–65)
ALBUMIN UR ELPH-MCNC: 100 %
ALPHA1 GLOB MFR UR ELPH: 0 %
ALPHA1 GLOB SERPL ELPH-MCNC: 0.4 G/DL (ref 0.1–0.4)
ALPHA1 GLOB SERPL ELPH-MCNC: 5.3 % (ref 2.5–5)
ALPHA2 GLOB MFR UR ELPH: 0 %
ALPHA2 GLOB SERPL ELPH-MCNC: 0.93 G/DL (ref 0.4–1.2)
ALPHA2 GLOB SERPL ELPH-MCNC: 12.4 % (ref 7–13)
B-GLOBULIN MFR UR ELPH: 0 %
BETA GLOB ABNORMAL SERPL ELPH-MCNC: 0.45 G/DL (ref 0.4–0.8)
BETA1 GLOB SERPL ELPH-MCNC: 6 % (ref 5–13)
BETA2 GLOB SERPL ELPH-MCNC: 5.3 % (ref 2–8)
BETA2+GAMMA GLOB SERPL ELPH-MCNC: 0.4 G/DL (ref 0.2–0.5)
GAMMA GLOB ABNORMAL SERPL ELPH-MCNC: 0.94 G/DL (ref 0.5–1.6)
GAMMA GLOB MFR UR ELPH: 0 %
GAMMA GLOB SERPL ELPH-MCNC: 12.5 % (ref 12–22)
IGG/ALB SER: 1.41 {RATIO} (ref 1.1–1.8)
PROT PATTERN SERPL ELPH-IMP: ABNORMAL
PROT PATTERN UR ELPH-IMP: ABNORMAL
PROT SERPL-MCNC: 7.5 G/DL (ref 6.4–8.2)
PROT UR-MCNC: 27 MG/DL

## 2022-03-17 NOTE — RESULT ENCOUNTER NOTE
Please call patient and inform her lab work interestingly shows that her body is in an inflammatory state  This could be an autoimmune condition  I would like her to get some additional blood work to assess and see both hematology and rheumatology  Thanks

## 2022-03-18 ENCOUNTER — TELEPHONE (OUTPATIENT)
Dept: HEMATOLOGY ONCOLOGY | Facility: CLINIC | Age: 57
End: 2022-03-18

## 2022-03-18 NOTE — TELEPHONE ENCOUNTER
New Patient Intake Form   Patient Details:    Lyubov Amaro  1965  [id number]    Appointment Information   Who is calling to schedule? Patient   If not self, what is the caller's name? Please put name of RBC nurse as well  n/a   Referring provider [address] [place] [address]    What is the diagnosis? Ecchymosis     Is there a confirmed tissue diagnosis? No   Is there a biopsy ordered or pending? Please specify dates   n/a     Is patient aware of diagnosis? Yes   Have you had any imaging or labs done? If yes, where? (If imaging done outside of [facility]'s, please remind patient to bring a disk ) Yes   [facility]     If imaging done at outside facility, did you instruct patient to obtain discs and bring to visit? n/a   Have you been seen by another Oncologist/Hematologist?  If so, who and where? no   Are the records in [facility] or Care Everywhere? yes   Does the patient have records at another facility/hospital? no   If yes, Name of facility, city and state where facility is located  Did you instruct patient to have records faxed to [facility] and provide rightfax number? n/a   Preferred [place]   Is the patient willing to be seen by another provider? (This is for breast patients only) n/a     Did you send new patient paperwork?   Email or mail? n/a   Miscellaneous Information: Scheduled appointment 4/1/22 2:00 pm   Patient decline first available appointment request afternoon appointment only

## 2022-03-22 ENCOUNTER — OFFICE VISIT (OUTPATIENT)
Dept: INTERNAL MEDICINE CLINIC | Facility: CLINIC | Age: 57
End: 2022-03-22

## 2022-03-22 ENCOUNTER — APPOINTMENT (OUTPATIENT)
Dept: LAB | Facility: CLINIC | Age: 57
End: 2022-03-22
Payer: MEDICARE

## 2022-03-22 VITALS
SYSTOLIC BLOOD PRESSURE: 108 MMHG | DIASTOLIC BLOOD PRESSURE: 73 MMHG | HEART RATE: 91 BPM | BODY MASS INDEX: 35.08 KG/M2 | HEIGHT: 63 IN | WEIGHT: 198 LBS | TEMPERATURE: 97.9 F

## 2022-03-22 DIAGNOSIS — R58 ECCHYMOSIS: ICD-10-CM

## 2022-03-22 DIAGNOSIS — R76.8 POSITIVE ANA (ANTINUCLEAR ANTIBODY): ICD-10-CM

## 2022-03-22 DIAGNOSIS — N39.0 UTI (URINARY TRACT INFECTION), UNCOMPLICATED: Primary | ICD-10-CM

## 2022-03-22 LAB
IGA SERPL-MCNC: 173 MG/DL (ref 70–400)
IGG SERPL-MCNC: 982 MG/DL (ref 700–1600)
IGM SERPL-MCNC: 39 MG/DL (ref 40–230)

## 2022-03-22 PROCEDURE — 86430 RHEUMATOID FACTOR TEST QUAL: CPT

## 2022-03-22 PROCEDURE — 82787 IGG 1 2 3 OR 4 EACH: CPT

## 2022-03-22 PROCEDURE — 86200 CCP ANTIBODY: CPT

## 2022-03-22 PROCEDURE — 86235 NUCLEAR ANTIGEN ANTIBODY: CPT

## 2022-03-22 PROCEDURE — 36415 COLL VENOUS BLD VENIPUNCTURE: CPT

## 2022-03-22 PROCEDURE — 99213 OFFICE O/P EST LOW 20 MIN: CPT | Performed by: INTERNAL MEDICINE

## 2022-03-22 PROCEDURE — 86225 DNA ANTIBODY NATIVE: CPT

## 2022-03-22 PROCEDURE — 82784 ASSAY IGA/IGD/IGG/IGM EACH: CPT

## 2022-03-22 PROCEDURE — 86146 BETA-2 GLYCOPROTEIN ANTIBODY: CPT

## 2022-03-22 RX ORDER — SULFAMETHOXAZOLE AND TRIMETHOPRIM 800; 160 MG/1; MG/1
1 TABLET ORAL EVERY 12 HOURS SCHEDULED
Qty: 6 TABLET | Refills: 0 | Status: SHIPPED | OUTPATIENT
Start: 2022-03-22 | End: 2022-03-25

## 2022-03-22 NOTE — PROGRESS NOTES
[facility]  INTERNAL MEDICINE OFFICE VISIT     PATIENT INFORMATION     Ruslan Tucker   61 y o  female   MRN: [id number]    ASSESSMENT/PLAN     Problem List Items Addressed This Visit     None      Visit Diagnoses     UTI (urinary tract infection), uncomplicated    -  Primary   Pt presents with 1 week of dysuria, urinary frequency and urgency, and lower abdominal pressure  No systemic symptoms or flank pain  Pt was unable to produce an adequate amount of urine for urine dipstick in office today  · Give Bactrim -160 mg BID x 3 days  · Encouraged pt to stay well hydrated   · If symptoms do not improve or worsen, she may call for a follow up appointment  · If she cannot tolerate PO intake, she should go to the ED       Patient has appointment scheduled for 4/28/22  HEALTH MAINTENANCE     Immunization History   Administered Date(s) Administered    COVID-19 PFIZER VACCINE 0 3 ML IM 03/01/2021, 10/12/2021    INFLUENZA 10/26/2014, 12/08/2015, 08/29/2017, 10/20/2021    Influenza, injectable, quadrivalent, preservative free 0 5 mL 10/06/2019, 08/23/2020    Pneumococcal Polysaccharide PPV23 02/03/2020, 09/22/2020    Tdap 07/11/2019, 10/06/2019    Zoster Vaccine Recombinant 05/21/2020, 08/23/2020     CHIEF COMPLAINT     Chief Complaint   Patient presents with    Urinary Frequency     burning, pressure, does not feel like she is emptying      HISTORY OF PRESENT ILLNESS     62 y/o female with history of bipolar disorder, HTN, HLD presenting for same day visit with dysuria  Started 25 minutes ago when she came in for blood work  Has been intermittent for the last week  Also describes lower abdominal pressure, urinary urgency  Has had similar symptoms when she was younger  Last bowel movement today  Has bowel movements every day but feels somewhat constipated  Sexually active 3 days ago without protection    Denies hematuria, fever, chills, flank pain, back pain, nausea, vomiting, history of kidney stones, hematochezia, melena, vaginal discharge, abdominal swelling  REVIEW OF SYSTEMS     Review of Systems   Constitutional: Negative for chills and fever  Respiratory: Negative for shortness of breath  Cardiovascular: Negative for chest pain  Gastrointestinal: Positive for abdominal pain (described as "pressure") and constipation  Negative for nausea and vomiting  Genitourinary: Positive for dysuria, frequency and urgency  Negative for flank pain, hematuria and vaginal discharge  Musculoskeletal: Negative for back pain  OBJECTIVE     Vitals:    03/22/22 1247   BP: 108/73   BP Location: Left arm   Patient Position: Sitting   Cuff Size: Large   Pulse: 91   Temp: 97 9 °F (36 6 °C)   TempSrc: Temporal   Weight: 89 8 kg (198 lb)   Height: 5' 3" (1 6 m)     Physical Exam  Constitutional:       General: She is not in acute distress  Appearance: She is obese  Eyes:      General: No scleral icterus  Cardiovascular:      Rate and Rhythm: Normal rate and regular rhythm  Pulses: Normal pulses  Heart sounds: Normal heart sounds  No murmur heard  Pulmonary:      Breath sounds: No wheezing, rhonchi or rales  Abdominal:      Palpations: Abdomen is soft  Tenderness: There is abdominal tenderness (mild tenderness at LUQ)  There is no right CVA tenderness, left CVA tenderness or guarding  Musculoskeletal:      Right lower leg: No edema  Left lower leg: No edema  Neurological:      Mental Status: She is alert     Psychiatric:         Mood and Affect: Mood normal          Behavior: Behavior normal        CURRENT MEDICATIONS     Current Outpatient Medications:     amLODIPine (NORVASC) 5 mg tablet, Take 2 tablets (10 mg total) by mouth daily, Disp: 60 tablet, Rfl: 2    atorvastatin (LIPITOR) 20 mg tablet, Take 1 tablet (20 mg total) by mouth daily, Disp: 90 tablet, Rfl: 0    Diclofenac Sodium (VOLTAREN) 1 %, Apply 2 g topically 3 (three) times a day as needed (Pain), Disp: 1 Tube, Rfl: 1    FLUoxetine (PROzac) 40 MG capsule, Take 40 mg by mouth every morning, Disp: , Rfl:     fluticasone (FLONASE) 50 mcg/act nasal spray, 2 sprays into each nostril daily, Disp: 11 1 mL, Rfl: 2    lidocaine (LIDODERM) 5 %, Apply 1 patch topically daily Remove & Discard patch within 12 hours or as directed by MD, Disp: 6 patch, Rfl: 0    lisinopril (ZESTRIL) 10 mg tablet, Take 1 tablet (10 mg total) by mouth daily, Disp: 90 tablet, Rfl: 3    LORazepam (ATIVAN) 0 5 mg tablet, Take by mouth every 8 (eight) hours as needed for anxiety, Disp: , Rfl:     mirtazapine (REMERON) 15 mg tablet, Take 15 mg by mouth daily at bedtime  , Disp: , Rfl:     naphazoline-pheniramine (NAPHCON-A) 0 025-0 3 % ophthalmic solution, Administer 1 drop to both eyes 4 (four) times a day as needed for allergies or irritation, Disp: 15 mL, Rfl: 2    naproxen (NAPROSYN) 500 mg tablet, Take 1 tablet (500 mg total) by mouth 2 (two) times a day as needed for moderate pain, Disp: 60 tablet, Rfl: 1    risperiDONE (RisperDAL) 0 5 mg tablet, Take 0 5 mg by mouth 2 (two) times a day, Disp: , Rfl:     PAST MEDICAL & SURGICAL HISTORY     Past Medical History:   Diagnosis Date    Anxiety     Arthritis     Bipolar affective disorder, depressed, severe (HCC) 4/28/2019    Depression     Elevated bilirubin 8/15/2019    Hyperlipidemia     Hypertension     Hypokalemia 8/15/2019    Learning difficulty     Leukocytosis 7/28/2020    Obesity (BMI 30 0-34 9) 6/26/2020    Osteopenia     Ovarian failure     Previous known suicide attempt     Psychiatric disorder     Psychiatric illness      Past Surgical History:   Procedure Laterality Date    LAPAROSCOPY      as a child, per patient-normal findings     SOCIAL & FAMILY HISTORY     Social History     Socioeconomic History    Marital status: Single     Spouse name: Not on file    Number of children: Not on file    Years of education: Not on file    Highest education level: Not on file   Occupational History    Not on file   Tobacco Use    Smoking status: Current Some Day Smoker     Packs/day: 0 25     Years: 15 00     Pack years: 3 75     Types: Cigarettes     Last attempt to quit: 1990     Years since quittin 2    Smokeless tobacco: Never Used    Tobacco comment: stoped smoking about 2-3 weeks as of 20   Vaping Use    Vaping Use: Never used   Substance and Sexual Activity    Alcohol use: Not Currently     Alcohol/week: 2 0 standard drinks     Types: 2 Cans of beer per week     Comment: socially    Drug use: No    Sexual activity: Yes     Partners: Male     Birth control/protection: None   Other Topics Concern    Not on file   Social History Narrative    Not on file     Social Determinants of Health     Financial Resource Strain: Not on file   Food Insecurity: Not on file   Transportation Needs: Not on file   Physical Activity: Not on file   Stress: Not on file   Social Connections: Not on file   Intimate Partner Violence: Not on file   Housing Stability: Not on file     Social History     Substance and Sexual Activity   Alcohol Use Not Currently    Alcohol/week: 2 0 standard drinks    Types: 2 Cans of beer per week    Comment: socially     Social History     Substance and Sexual Activity   Drug Use No     Social History     Tobacco Use   Smoking Status Current Some Day Smoker    Packs/day: 0 25    Years: 15 00    Pack years: 3 75    Types: Cigarettes    Last attempt to quit: 1990    Years since quittin 2   Smokeless Tobacco Never Used   Tobacco Comment    stoped smoking about 2-3 weeks as of 20     Family History   Problem Relation Age of Onset    Alzheimer's disease Mother     Depression Mother     Depression Brother     Bipolar disorder Brother     No Known Problems Maternal Aunt     No Known Problems Paternal Aunt     No Known Problems Maternal Uncle     No Known Problems Paternal Uncle     No Known Problems Domi Barrett ADD / ADHD Neg Hx     Alcohol abuse Neg Hx     Anxiety disorder Neg Hx     Dementia Neg Hx     Drug abuse Neg Hx     OCD Neg Hx     Paranoid behavior Neg Hx     Schizophrenia Neg Hx     Seizures Neg Hx     Self-Injury Neg Hx     Suicide Attempts Neg Hx          ==  Eli Nicolas,   Internal Medicine Resident, PGY-1  [facility]  [address]   [facility] , [address] [address] [facility] [address], [address] [facility]  Office: [phone]  Fax: [phone]

## 2022-03-23 LAB
DSDNA AB SER-ACNC: 1 IU/ML (ref 0–9)
ENA SS-A AB SER-ACNC: <0.2 AI (ref 0–0.9)
ENA SS-B AB SER-ACNC: <0.2 AI (ref 0–0.9)
RHEUMATOID FACT SER QL LA: NEGATIVE

## 2022-03-24 LAB
IGG SERPL-MCNC: 968 MG/DL (ref 586–1602)
IGG1 SER-MCNC: 460 MG/DL (ref 248–810)
IGG2 SER-MCNC: 446 MG/DL (ref 130–555)
IGG3 SER-MCNC: 22 MG/DL (ref 15–102)
IGG4 SER-MCNC: 43 MG/DL (ref 2–96)

## 2022-03-25 LAB — CCP AB SER IA-ACNC: 1.9

## 2022-03-29 ENCOUNTER — APPOINTMENT (EMERGENCY)
Dept: RADIOLOGY | Facility: HOSPITAL | Age: 57
End: 2022-03-29
Payer: MEDICARE

## 2022-03-29 ENCOUNTER — HOSPITAL ENCOUNTER (EMERGENCY)
Facility: HOSPITAL | Age: 57
Discharge: HOME | End: 2022-03-29
Attending: EMERGENCY MEDICINE | Admitting: EMERGENCY MEDICINE
Payer: MEDICARE

## 2022-03-29 VITALS
HEART RATE: 84 BPM | SYSTOLIC BLOOD PRESSURE: 150 MMHG | DIASTOLIC BLOOD PRESSURE: 83 MMHG | RESPIRATION RATE: 18 BRPM | OXYGEN SATURATION: 98 % | TEMPERATURE: 97.8 F

## 2022-03-29 DIAGNOSIS — S00.93XA HEAD CONTUSION: Primary | ICD-10-CM

## 2022-03-29 DIAGNOSIS — M25.561 RIGHT KNEE PAIN: ICD-10-CM

## 2022-03-29 DIAGNOSIS — M17.10 ARTHRITIS OF KNEE: ICD-10-CM

## 2022-03-29 DIAGNOSIS — S80.02XA CONTUSION OF LEFT KNEE, INITIAL ENCOUNTER: ICD-10-CM

## 2022-03-29 DIAGNOSIS — J33.9 NASAL POLYP: ICD-10-CM

## 2022-03-29 PROCEDURE — 73564 X-RAY EXAM KNEE 4 OR MORE: CPT

## 2022-03-29 PROCEDURE — G1004 CDSM NDSC: HCPCS

## 2022-03-29 PROCEDURE — 99284 EMERGENCY DEPT VISIT MOD MDM: CPT | Performed by: PHYSICIAN ASSISTANT

## 2022-03-29 PROCEDURE — 70450 CT HEAD/BRAIN W/O DYE: CPT

## 2022-03-29 PROCEDURE — 99284 EMERGENCY DEPT VISIT MOD MDM: CPT

## 2022-03-29 NOTE — ED PROVIDER NOTES
History  Chief Complaint   Patient presents with    Knee Injury     PT reports she hurt her knee approx 3 weeks ago and has been having pain and feeling like her knee is going to give out on her  Patient presents ER for evaluation of knee pain  Patient states that 3 weeks ago while getting off the bus she stepped awkwardly and felt a pop in her right knee  States that since then she has had persistent pain in the occasional feeling of her knee giving out  Patient states that yesterday she tripped while getting onto the bus and landed on her left knee causing a bruise and also hit her head on the ground  Patient denies any loss consciousness  Notes mild headache  Denies any anticoagulant use  States that she does follow with Orthopedics for known arthritis  Denies any medication PTA however states that she has taken naproxen over the last 3 weeks for her pain  Patient denies any fevers, numbness, tingling, weakness, dizziness or any other concerning symptoms  Prior to Admission Medications   Prescriptions Last Dose Informant Patient Reported? Taking?    Diclofenac Sodium (VOLTAREN) 1 %   No No   Sig: Apply 2 g topically 3 (three) times a day as needed (Pain)   FLUoxetine (PROzac) 40 MG capsule   Yes Yes   Sig: Take 40 mg by mouth every morning   LORazepam (ATIVAN) 0 5 mg tablet   Yes Yes   Sig: Take by mouth every 8 (eight) hours as needed for anxiety   amLODIPine (NORVASC) 5 mg tablet   No Yes   Sig: Take 2 tablets (10 mg total) by mouth daily   atorvastatin (LIPITOR) 20 mg tablet   No Yes   Sig: Take 1 tablet (20 mg total) by mouth daily   fluticasone (FLONASE) 50 mcg/act nasal spray   No Yes   Si sprays into each nostril daily   lidocaine (LIDODERM) 5 %   No Yes   Sig: Apply 1 patch topically daily Remove & Discard patch within 12 hours or as directed by MD   lisinopril (ZESTRIL) 10 mg tablet   No Yes   Sig: Take 1 tablet (10 mg total) by mouth daily   mirtazapine (REMERON) 15 mg tablet Yes Yes   Sig: Take 15 mg by mouth daily at bedtime     naphazoline-pheniramine (NAPHCON-A) 0 025-0 3 % ophthalmic solution   No Yes   Sig: Administer 1 drop to both eyes 4 (four) times a day as needed for allergies or irritation   naproxen (NAPROSYN) 500 mg tablet   No Yes   Sig: Take 1 tablet (500 mg total) by mouth 2 (two) times a day as needed for moderate pain   risperiDONE (RisperDAL) 0 5 mg tablet   Yes Yes   Sig: Take 0 5 mg by mouth 2 (two) times a day      Facility-Administered Medications: None       Past Medical History:   Diagnosis Date    Anxiety     Arthritis     Bipolar affective disorder, depressed, severe ([facility] [address] ) 4/28/2019    Depression     Elevated bilirubin 8/15/2019    Hyperlipidemia     Hypertension     Hypokalemia 8/15/2019    Learning difficulty     Leukocytosis 7/28/2020    Obesity (BMI 30 0-34 9) 6/26/2020    Osteopenia     Ovarian failure     Previous known suicide attempt     Psychiatric disorder     Psychiatric illness        Past Surgical History:   Procedure Laterality Date    LAPAROSCOPY      as a child, per patient-normal findings       Family History   Problem Relation Age of Onset    Alzheimer's disease Mother     Depression Mother     Depression Brother     Bipolar disorder Brother     No Known Problems Maternal Aunt     No Known Problems Paternal Aunt     No Known Problems Maternal Uncle     No Known Problems Paternal Uncle     No Known Problems Cousin     ADD / ADHD Neg Hx     Alcohol abuse Neg Hx     Anxiety disorder Neg Hx     Dementia Neg Hx     Drug abuse Neg Hx     OCD Neg Hx     Paranoid behavior Neg Hx     Schizophrenia Neg Hx     Seizures Neg Hx     Self-Injury Neg Hx     Suicide Attempts Neg Hx      I have reviewed and agree with the history as documented      E-Cigarette/Vaping    E-Cigarette Use Never User      E-Cigarette/Vaping Substances    Nicotine No     THC No     CBD No     Flavoring No     Other No     Unknown No Social History     Tobacco Use    Smoking status: Current Some Day Smoker     Packs/day: 0 25     Years: 15 00     Pack years: 3 75     Types: Cigarettes     Last attempt to quit: 1990     Years since quittin 2    Smokeless tobacco: Never Used    Tobacco comment: stoped smoking about 2-3 weeks as of 20   Vaping Use    Vaping Use: Never used   Substance Use Topics    Alcohol use: Not Currently     Alcohol/week: 2 0 standard drinks     Types: 2 Cans of beer per week     Comment: socially    Drug use: No       Review of Systems   Constitutional: Negative for fever  HENT: Negative for congestion, rhinorrhea and sore throat  Respiratory: Negative for shortness of breath  Cardiovascular: Negative for chest pain  Gastrointestinal: Negative for abdominal pain, nausea and vomiting  Genitourinary: Negative for dysuria  Musculoskeletal: Negative for back pain and neck pain  Skin: Negative for rash  Neurological: Negative for weakness and numbness  All other systems reviewed and are negative  Physical Exam  Physical Exam  Constitutional:       Appearance: She is well-developed  HENT:      Head: Normocephalic and atraumatic  Nose: Nose normal    Eyes:      Conjunctiva/sclera: Conjunctivae normal    Cardiovascular:      Rate and Rhythm: Normal rate  Pulses: Normal pulses  Pulmonary:      Effort: Pulmonary effort is normal    Abdominal:      Palpations: Abdomen is soft  Tenderness: There is no abdominal tenderness  Musculoskeletal:         General: Normal range of motion  Cervical back: Normal range of motion  Right lower leg: No edema  Left lower leg: No edema  Comments: Tenderness to palpation of medial right knee  Minimal swelling noted  No significant joint effusion  No erythema or warmth of right knee joint  Normal flexion and extension of right knee  Mild tenderness to palpation of left medial knee    No significant swelling noted   Ecchymosis noted over left patella  Normal flexion and extension of left knee  No erythema or warmth of left knee joint  Skin:     General: Skin is warm  Capillary Refill: Capillary refill takes less than 2 seconds  Neurological:      Mental Status: She is alert and oriented to person, place, and time  Vital Signs  ED Triage Vitals [03/29/22 0949]   Temperature Pulse Respirations Blood Pressure SpO2   97 8 °F (36 6 °C) 84 18 150/83 98 %      Temp Source Heart Rate Source Patient Position - Orthostatic VS BP Location FiO2 (%)   Oral Monitor Lying Right arm --      Pain Score       --           Vitals:    03/29/22 0949   BP: 150/83   Pulse: 84   Patient Position - Orthostatic VS: Lying         Visual Acuity      ED Medications  Medications - No data to display    Diagnostic Studies  Results Reviewed     None                 CT head without contrast   Final Result by Vimal Sellers MD (03/29 1320)      No acute intracranial process  No skull fracture  Chronic microangiopathy  Left mastoid opacification may be sequela of chronic uncomplicated mastoid effusion  Right posterior nasal polypoid soft tissue  This could be followed up with nonemergent ENT consultation  Workstation performed: [id number]         XR knee 4+ views left injury   Final Result by Denis Guadalupe MD (03/29 1134)      No acute osseous abnormality  Workstation performed: [id number]         XR knee 4+ views Right injury   Final Result by Denis Guadalupe MD (03/29 1133)      No acute osseous abnormality  Workstation performed: [id number]                    Procedures  Procedures         ED Course  ED Course as of 03/29/22 1728   Tue Mar 29, 2022   1022 Blood Pressure: 150/83   1022 Temperature: 97 8 °F (36 6 °C)   1022 Pulse: 84   1022 Respirations: 18   1022 SpO2: 98 %   1208 XR knee 4+ views left injury  IMPRESSION:     No acute osseous abnormality        1208 XR knee 4+ views Right injury  IMPRESSION:     No acute osseous abnormality       1323 CT head without contrast  IMPRESSION:     No acute intracranial process      No skull fracture      Chronic microangiopathy      Left mastoid opacification may be sequela of chronic uncomplicated mastoid effusion      Right posterior nasal polypoid soft tissue  This could be followed up with nonemergent ENT consultation  SBIRT 20yo+      Most Recent Value   SBIRT (22 yo +)    In order to provide better care to our patients, we are screening all of our patients for alcohol and drug use  Would it be okay to ask you these screening questions? No Filed at: 03/29/2022 2861                    [facility]     Patient well appearing in the ER  Imaging grossly benign for any acute abnormality  Reviewed findings of arthritis on knee x-rays as well as nasal polyp and other incidental findings on head CT  Discussed follow-up for these findings  Discussed use of a cane for stability at home  States that she has a and house nurse 2nd help her with this  Patient states that she has an orthopedics appointment next week  Discussed symptomatic treatment and strict return instructions  Patient in no acute distress throughout ER stay  Vitals stable and reassuring  Patient stable for discharge at this time  Reviewed plan with patient/family  Reviewed red flag symptoms and strict return instructions  Patient/family voiced understanding and agreement to plan  Patient/family had opportunity to ask questions and all questions were answered at bedside        Disposition  Final diagnoses:   Head contusion   Nasal polyp   Contusion of left knee, initial encounter   Right knee pain   Arthritis of knee     Time reflects when diagnosis was documented in both MDM as applicable and the Disposition within this note     Time User Action Codes Description Comment    3/29/2022  1:24 PM Deshawn Avila Add [S00 93XA] Head contusion 3/29/2022  1:24 PM Eleahsan Hugo Add [J33 9] Nasal polyp     3/29/2022  1:24 PM Pati Coronel Add [S80 02XA] Contusion of left knee, initial encounter     3/29/2022  1:24 PM Sander Hugo Add [M25 561] Right knee pain     3/29/2022  1:25 PM Sander Hugo Add [M17 10] Arthritis of knee       ED Disposition     ED Disposition Condition Date/Time Comment    Discharge Stable Tue Mar 29, 2022 1410 [address] discharge to home/self care              Follow-up Information     Follow up With Specialties Details Why Contact Info Additional [address] Emergency Department Emergency Medicine  If symptoms worsen [address]  [address] [facility] [address] Emergency Department, [address], [facility] 108    30 [facility] Orthopedic Surgery  Follow up with orthopedics for your knee pain [address] [facility] [facility], 600 [facility], [place], [place], [address] [facility]          Discharge Medication List as of 3/29/2022  1:25 PM      CONTINUE these medications which have NOT CHANGED    Details   amLODIPine (NORVASC) 5 mg tablet Take 2 tablets (10 mg total) by mouth daily, Starting Thu 2/24/2022, Normal      atorvastatin (LIPITOR) 20 mg tablet Take 1 tablet (20 mg total) by mouth daily, Starting Mon 1/10/2022, Normal      FLUoxetine (PROzac) 40 MG capsule Take 40 mg by mouth every morning, Starting Thu 3/11/2021, Historical Med      fluticasone (FLONASE) 50 mcg/act nasal spray 2 sprays into each nostril daily, Starting Wed 11/11/2020, Normal      lidocaine (LIDODERM) 5 % Apply 1 patch topically daily Remove & Discard patch within 12 hours or as directed by MD, Starting Sat 4/17/2021, Normal      lisinopril (ZESTRIL) 10 mg tablet Take 1 tablet (10 mg total) by mouth daily, Starting Tue 11/23/2021, Normal      LORazepam (ATIVAN) 0 5 mg tablet Take by mouth every 8 (eight) hours as needed for anxiety, Historical Med      mirtazapine (REMERON) 15 mg tablet Take 15 mg by mouth daily at bedtime  , Starting Wed 9/8/2021, Historical Med      naphazoline-pheniramine (NAPHCON-A) 0 025-0 3 % ophthalmic solution Administer 1 drop to both eyes 4 (four) times a day as needed for allergies or irritation, Starting Wed 11/11/2020, Normal      naproxen (NAPROSYN) 500 mg tablet Take 1 tablet (500 mg total) by mouth 2 (two) times a day as needed for moderate pain, Starting Wed 3/9/2022, Normal      risperiDONE (RisperDAL) 0 5 mg tablet Take 0 5 mg by mouth 2 (two) times a day, Starting Wed 9/8/2021, Historical Med      Diclofenac Sodium (VOLTAREN) 1 % Apply 2 g topically 3 (three) times a day as needed (Pain), Starting Wed 12/16/2020, Normal             No discharge procedures on file      PDMP Review     None          ED Provider  Electronically Signed by           Jyoti Pichardo PA-C  03/29/22 3955

## 2022-03-29 NOTE — DISCHARGE INSTRUCTIONS
Return to the ER with any new or worsening of symptoms such as but not limited to increased pain, numbness, tingling, weakness, leg swelling, dizziness, headaches, changes in vision, or any other concerning symptoms    Thank you for allowing us to be part of your care today

## 2022-03-30 ENCOUNTER — TELEPHONE (OUTPATIENT)
Dept: HEMATOLOGY ONCOLOGY | Facility: CLINIC | Age: 57
End: 2022-03-30

## 2022-04-01 ENCOUNTER — CONSULT (OUTPATIENT)
Dept: HEMATOLOGY ONCOLOGY | Facility: CLINIC | Age: 57
End: 2022-04-01
Payer: MEDICARE

## 2022-04-01 VITALS
WEIGHT: 201.5 LBS | OXYGEN SATURATION: 96 % | RESPIRATION RATE: 17 BRPM | HEART RATE: 73 BPM | DIASTOLIC BLOOD PRESSURE: 84 MMHG | HEIGHT: 63 IN | BODY MASS INDEX: 35.7 KG/M2 | TEMPERATURE: 97.9 F | SYSTOLIC BLOOD PRESSURE: 150 MMHG

## 2022-04-01 DIAGNOSIS — R58 ECCHYMOSIS: ICD-10-CM

## 2022-04-01 PROCEDURE — 99203 OFFICE O/P NEW LOW 30 MIN: CPT | Performed by: PHYSICIAN ASSISTANT

## 2022-04-01 NOTE — PROGRESS NOTES
Hematology/Oncology Outpatient Consult  Russell Padron 61 y o  female 1965 [id number]    Date:  4/1/2022      Assessment and Plan:    1  Ecchymosis  61year old female presents for consult for bruising  She states she had some bruises on extremities, denied on trunk  She has had surgeries without any bleeding complications  She has not had any new bruises  She does not have any pictures of bruises  Reviewed that the below workup is sufficient at this time for mild bruising on the extremities  Also fact that she had no bleeding with any of her surgeries  She is advised to take a photo if she were to have recurrent bruising  She can send a  copy to her PCP or show her PCP and then PCP can contact us for further assistance if needed  Follow up PRN  - Ambulatory Referral to Hematology / Oncology    Smoking cessation encouraged  HPI:  61year old female presents for consult for bruising  She was sent by her PCP  SPEP and UPEP completed and negative  PT/INR, PTT are all normal  CBC is normal     She has had oral surgery, hernia repair, and laparoscopy of the abdomen without bleeding issues  She has no bruises at this time  ROS: Review of Systems   Constitutional: Negative for activity change, appetite change, chills, fatigue, fever and unexpected weight change  Respiratory: Negative for cough and shortness of breath  Cardiovascular: Negative for chest pain, palpitations and leg swelling  Gastrointestinal: Negative for abdominal pain, constipation, diarrhea, nausea and vomiting  Genitourinary: Negative for difficulty urinating, dysuria and hematuria  Musculoskeletal: Positive for arthralgias (knees)  Skin: Negative  Neurological: Negative for dizziness, light-headedness, numbness and headaches  Hematological: Negative  Psychiatric/Behavioral: Negative          Past Medical History:   Diagnosis Date    Anxiety     Arthritis     Bipolar affective disorder, depressed, severe ([facility] [address] ) 2019    Depression     Elevated bilirubin 8/15/2019    Hyperlipidemia     Hypertension     Hypokalemia 8/15/2019    Learning difficulty     Leukocytosis 2020    Obesity (BMI 30 0-34 9) 2020    Osteopenia     Ovarian failure     Previous known suicide attempt     Psychiatric disorder     Psychiatric illness        Past Surgical History:   Procedure Laterality Date    LAPAROSCOPY      as a child, per patient-normal findings       Social History     Socioeconomic History    Marital status: Single     Spouse name: None    Number of children: None    Years of education: None    Highest education level: None   Occupational History    None   Tobacco Use    Smoking status: Current Some Day Smoker     Packs/day: 0 25     Years: 15 00     Pack years: 3 75     Types: Cigarettes     Last attempt to quit: 1990     Years since quittin 2    Smokeless tobacco: Never Used    Tobacco comment: stoped smoking about 2-3 weeks as of 20   Vaping Use    Vaping Use: Never used   Substance and Sexual Activity    Alcohol use: Not Currently     Alcohol/week: 2 0 standard drinks     Types: 2 Cans of beer per week     Comment: socially    Drug use: No    Sexual activity: Yes     Partners: Male     Birth control/protection: None   Other Topics Concern    None   Social History Narrative    None     Social Determinants of Health     Financial Resource Strain: Not on file   Food Insecurity: Not on file   Transportation Needs: Not on file   Physical Activity: Not on file   Stress: Not on file   Social Connections: Not on file   Intimate Partner Violence: Not on file   Housing Stability: Not on file       Family History   Problem Relation Age of Onset    Alzheimer's disease Mother     Depression Mother     Depression Brother     Bipolar disorder Brother     No Known Problems Maternal Aunt     No Known Problems Paternal Aunt     No Known Problems Maternal Uncle     No Known Problems Paternal Uncle     No Known Problems Cousin     ADD / ADHD Neg Hx     Alcohol abuse Neg Hx     Anxiety disorder Neg Hx     Dementia Neg Hx     Drug abuse Neg Hx     OCD Neg Hx     Paranoid behavior Neg Hx     Schizophrenia Neg Hx     Seizures Neg Hx     Self-Injury Neg Hx     Suicide Attempts Neg Hx        Allergies   Allergen Reactions    Other      Hay Fever    Oxycodone-Acetaminophen GI Intolerance         Current Outpatient Medications:     amLODIPine (NORVASC) 5 mg tablet, Take 2 tablets (10 mg total) by mouth daily, Disp: 60 tablet, Rfl: 2    atorvastatin (LIPITOR) 20 mg tablet, Take 1 tablet (20 mg total) by mouth daily, Disp: 90 tablet, Rfl: 0    Diclofenac Sodium (VOLTAREN) 1 %, Apply 2 g topically 3 (three) times a day as needed (Pain), Disp: 1 Tube, Rfl: 1    FLUoxetine (PROzac) 40 MG capsule, Take 40 mg by mouth every morning, Disp: , Rfl:     fluticasone (FLONASE) 50 mcg/act nasal spray, 2 sprays into each nostril daily, Disp: 11 1 mL, Rfl: 2    lidocaine (LIDODERM) 5 %, Apply 1 patch topically daily Remove & Discard patch within 12 hours or as directed by MD, Disp: 6 patch, Rfl: 0    lisinopril (ZESTRIL) 10 mg tablet, Take 1 tablet (10 mg total) by mouth daily, Disp: 90 tablet, Rfl: 3    LORazepam (ATIVAN) 0 5 mg tablet, Take by mouth every 8 (eight) hours as needed for anxiety, Disp: , Rfl:     mirtazapine (REMERON) 15 mg tablet, Take 15 mg by mouth daily at bedtime  , Disp: , Rfl:     naphazoline-pheniramine (NAPHCON-A) 0 025-0 3 % ophthalmic solution, Administer 1 drop to both eyes 4 (four) times a day as needed for allergies or irritation, Disp: 15 mL, Rfl: 2    naproxen (NAPROSYN) 500 mg tablet, Take 1 tablet (500 mg total) by mouth 2 (two) times a day as needed for moderate pain, Disp: 60 tablet, Rfl: 1    risperiDONE (RisperDAL) 0 5 mg tablet, Take 0 5 mg by mouth 2 (two) times a day, Disp: , Rfl:       Physical Exam:  /84 (BP Location: Left arm, Patient Position: Sitting, Cuff Size: Adult)   Pulse 73   Temp 97 9 °F (36 6 °C)   Resp 17   Ht 5' 3" (1 6 m)   Wt 91 4 kg (201 lb 8 oz)   LMP  (LMP Unknown)   SpO2 96%   BMI 35 69 kg/m²     Physical Exam  Vitals reviewed  Constitutional:       General: She is not in acute distress  Appearance: She is well-developed  She is obese  HENT:      Head: Normocephalic and atraumatic  Eyes:      General: No scleral icterus  Conjunctiva/sclera: Conjunctivae normal    Cardiovascular:      Rate and Rhythm: Normal rate and regular rhythm  Heart sounds: Normal heart sounds  No murmur heard  Pulmonary:      Effort: Pulmonary effort is normal  No respiratory distress  Breath sounds: Normal breath sounds  Abdominal:      Palpations: Abdomen is soft  Tenderness: There is no abdominal tenderness  Musculoskeletal:         General: No tenderness  Normal range of motion  Cervical back: Normal range of motion and neck supple  Right lower leg: No edema  Left lower leg: No edema  Lymphadenopathy:      Cervical: No cervical adenopathy  Skin:     General: Skin is warm and dry  Comments: No significant bruising; there is a healing bruise on the left knee   Neurological:      Mental Status: She is alert and oriented to person, place, and time  Cranial Nerves: No cranial nerve deficit  Psychiatric:         Mood and Affect: Mood normal          Behavior: Behavior normal        Labs:  Lab Results   Component Value Date    WBC 12 50 (H) 03/15/2022    HGB 14 0 03/15/2022    HCT 42 4 03/15/2022    MCV 87 03/15/2022     03/15/2022       Patient voiced understanding and agreement in the above discussion  Aware to contact our office with questions/symptoms in the interim  This note has been generated by voice recognition software system  Therefore, there may be spelling, grammar, and or syntax errors  Please contact if questions arise

## 2022-04-04 ENCOUNTER — OFFICE VISIT (OUTPATIENT)
Dept: OBGYN CLINIC | Facility: CLINIC | Age: 57
End: 2022-04-04
Payer: MEDICARE

## 2022-04-04 VITALS
HEIGHT: 63 IN | HEART RATE: 99 BPM | BODY MASS INDEX: 35.61 KG/M2 | SYSTOLIC BLOOD PRESSURE: 144 MMHG | WEIGHT: 201 LBS | DIASTOLIC BLOOD PRESSURE: 82 MMHG

## 2022-04-04 DIAGNOSIS — G89.29 CHRONIC PAIN OF BOTH KNEES: ICD-10-CM

## 2022-04-04 DIAGNOSIS — M25.562 CHRONIC PAIN OF BOTH KNEES: ICD-10-CM

## 2022-04-04 DIAGNOSIS — M22.2X1 PATELLOFEMORAL PAIN SYNDROME OF BOTH KNEES: Primary | ICD-10-CM

## 2022-04-04 DIAGNOSIS — M22.2X2 PATELLOFEMORAL PAIN SYNDROME OF BOTH KNEES: Primary | ICD-10-CM

## 2022-04-04 DIAGNOSIS — M25.561 CHRONIC PAIN OF BOTH KNEES: ICD-10-CM

## 2022-04-04 PROCEDURE — 99213 OFFICE O/P EST LOW 20 MIN: CPT | Performed by: PHYSICAL MEDICINE & REHABILITATION

## 2022-04-04 PROCEDURE — 20610 DRAIN/INJ JOINT/BURSA W/O US: CPT | Performed by: PHYSICAL MEDICINE & REHABILITATION

## 2022-04-04 RX ORDER — LIDOCAINE HYDROCHLORIDE 10 MG/ML
3 INJECTION, SOLUTION INFILTRATION; PERINEURAL
Status: COMPLETED | OUTPATIENT
Start: 2022-04-04 | End: 2022-04-04

## 2022-04-04 RX ORDER — TRIAMCINOLONE ACETONIDE 40 MG/ML
40 INJECTION, SUSPENSION INTRA-ARTICULAR; INTRAMUSCULAR
Status: COMPLETED | OUTPATIENT
Start: 2022-04-04 | End: 2022-04-04

## 2022-04-04 RX ADMIN — LIDOCAINE HYDROCHLORIDE 3 ML: 10 INJECTION, SOLUTION INFILTRATION; PERINEURAL at 14:07

## 2022-04-04 RX ADMIN — TRIAMCINOLONE ACETONIDE 40 MG: 40 INJECTION, SUSPENSION INTRA-ARTICULAR; INTRAMUSCULAR at 14:07

## 2022-04-04 NOTE — PATIENT INSTRUCTIONS
You had an injection of hyaluronic acid today  A small percentage of people can get a flare up of pain after the injection  It usually lasts for 1-2 days  You can use Tylenol and ice to help with this  Call if you experience redness or drainage at the injection site

## 2022-04-06 DIAGNOSIS — E78.2 MIXED HYPERLIPIDEMIA: ICD-10-CM

## 2022-04-06 RX ORDER — ATORVASTATIN CALCIUM 20 MG/1
20 TABLET, FILM COATED ORAL DAILY
Qty: 90 TABLET | Refills: 0 | Status: SHIPPED | OUTPATIENT
Start: 2022-04-06 | End: 2022-07-25 | Stop reason: SDUPTHER

## 2022-04-13 ENCOUNTER — HOSPITAL ENCOUNTER (OUTPATIENT)
Dept: RADIOLOGY | Facility: HOSPITAL | Age: 57
Discharge: HOME | End: 2022-04-13
Payer: MEDICARE

## 2022-04-13 ENCOUNTER — TELEPHONE (OUTPATIENT)
Dept: RADIOLOGY | Facility: HOSPITAL | Age: 57
End: 2022-04-13

## 2022-04-13 DIAGNOSIS — R91.1 PULMONARY NODULE: ICD-10-CM

## 2022-04-13 PROCEDURE — 71250 CT THORAX DX C-: CPT

## 2022-04-13 PROCEDURE — G1004 CDSM NDSC: HCPCS

## 2022-04-14 ENCOUNTER — TELEPHONE (OUTPATIENT)
Dept: OBGYN CLINIC | Facility: MEDICAL CENTER | Age: 57
End: 2022-04-14

## 2022-04-14 NOTE — TELEPHONE ENCOUNTER
Patient sees Dr Hal Peters  Patient is calling in stating she never saw the xrays with the dr and would like to review them, she is in great deal of pain of 8 when walking and she hear popping and is using the walker  She is concerned about walking afraid of knee popping out of socket  She is asking for a call relating this because she has very little relief  She would like the doctor to call her      CB # [phone]

## 2022-04-22 NOTE — RESULT ENCOUNTER NOTE
Please call patient to review CT scan results  Previously identified RLL nodule has resolved, or disappeared  She thankfully does not require further CT scans for this particular problem, but needs to remember to continue cutting back on her smoking, as further smoking will only increase her risk of further nodules or even lung cancer down the line  Thank you

## 2022-04-27 DIAGNOSIS — M25.561 CHRONIC PAIN OF BOTH KNEES: ICD-10-CM

## 2022-04-27 DIAGNOSIS — G89.29 CHRONIC PAIN OF BOTH KNEES: ICD-10-CM

## 2022-04-27 DIAGNOSIS — M25.562 CHRONIC PAIN OF BOTH KNEES: ICD-10-CM

## 2022-04-27 RX ORDER — NAPROXEN 500 MG/1
500 TABLET ORAL 2 TIMES DAILY PRN
Qty: 60 TABLET | Refills: 1 | Status: SHIPPED | OUTPATIENT
Start: 2022-04-27

## 2022-04-29 ENCOUNTER — TELEPHONE (OUTPATIENT)
Dept: INTERNAL MEDICINE CLINIC | Facility: CLINIC | Age: 57
End: 2022-04-29

## 2022-04-29 NOTE — TELEPHONE ENCOUNTER
We have been attempting to contact patient regarding CT chest results from 4/13/22 without success  Patient called in today and I made her aware of those results advising that previous lung nodule has resolved   No questions at this time

## 2022-05-02 ENCOUNTER — OFFICE VISIT (OUTPATIENT)
Dept: INTERNAL MEDICINE CLINIC | Facility: CLINIC | Age: 57
End: 2022-05-02

## 2022-05-02 ENCOUNTER — PATIENT OUTREACH (OUTPATIENT)
Dept: INTERNAL MEDICINE CLINIC | Facility: CLINIC | Age: 57
End: 2022-05-02

## 2022-05-02 VITALS
WEIGHT: 198.6 LBS | DIASTOLIC BLOOD PRESSURE: 80 MMHG | TEMPERATURE: 98 F | SYSTOLIC BLOOD PRESSURE: 121 MMHG | OXYGEN SATURATION: 97 % | HEART RATE: 97 BPM | BODY MASS INDEX: 35.18 KG/M2

## 2022-05-02 DIAGNOSIS — E66.9 CLASS 2 OBESITY WITH BODY MASS INDEX (BMI) OF 36.0 TO 36.9 IN ADULT, UNSPECIFIED OBESITY TYPE, UNSPECIFIED WHETHER SERIOUS COMORBIDITY PRESENT: Chronic | ICD-10-CM

## 2022-05-02 DIAGNOSIS — F41.9 ANXIETY: Chronic | ICD-10-CM

## 2022-05-02 DIAGNOSIS — R76.8 POSITIVE ANA (ANTINUCLEAR ANTIBODY): ICD-10-CM

## 2022-05-02 DIAGNOSIS — I10 ESSENTIAL HYPERTENSION: Primary | Chronic | ICD-10-CM

## 2022-05-02 DIAGNOSIS — F31.9 BIPOLAR AFFECTIVE DISORDER, REMISSION STATUS UNSPECIFIED (HCC): Chronic | ICD-10-CM

## 2022-05-02 DIAGNOSIS — M22.2X1 PATELLOFEMORAL PAIN SYNDROME OF BOTH KNEES: ICD-10-CM

## 2022-05-02 DIAGNOSIS — M22.2X2 PATELLOFEMORAL PAIN SYNDROME OF BOTH KNEES: ICD-10-CM

## 2022-05-02 DIAGNOSIS — F33.2 SEVERE EPISODE OF RECURRENT MAJOR DEPRESSIVE DISORDER, WITHOUT PSYCHOTIC FEATURES (HCC): ICD-10-CM

## 2022-05-02 DIAGNOSIS — R91.1 PULMONARY NODULE: ICD-10-CM

## 2022-05-02 DIAGNOSIS — F41.9 ANXIETY: Primary | ICD-10-CM

## 2022-05-02 PROCEDURE — 99213 OFFICE O/P EST LOW 20 MIN: CPT | Performed by: INTERNAL MEDICINE

## 2022-05-02 RX ORDER — RISPERIDONE 0.5 MG/1
0.5 TABLET, FILM COATED ORAL 2 TIMES DAILY
Qty: 60 TABLET | Refills: 0 | Status: SHIPPED | OUTPATIENT
Start: 2022-05-02 | End: 2022-05-27 | Stop reason: SDUPTHER

## 2022-05-02 RX ORDER — FLUOXETINE HYDROCHLORIDE 40 MG/1
40 CAPSULE ORAL EVERY MORNING
Qty: 30 CAPSULE | Refills: 0 | Status: SHIPPED | OUTPATIENT
Start: 2022-05-02 | End: 2022-06-27 | Stop reason: SDUPTHER

## 2022-05-02 RX ORDER — MIRTAZAPINE 15 MG/1
15 TABLET, FILM COATED ORAL
Qty: 30 TABLET | Refills: 0 | Status: SHIPPED | OUTPATIENT
Start: 2022-05-02 | End: 2022-05-27 | Stop reason: SDUPTHER

## 2022-05-02 RX ORDER — LORAZEPAM 0.5 MG/1
0.5 TABLET ORAL EVERY 8 HOURS PRN
Qty: 90 TABLET | Refills: 0 | Status: SHIPPED | OUTPATIENT
Start: 2022-05-02 | End: 2022-06-17 | Stop reason: SDUPTHER

## 2022-05-02 NOTE — PROGRESS NOTES
JOHN has met with pt this date to assist with OP MH referral    Pt shares that she was discharged from [address] because she had too many no shows  Pt has hx of depression, bipolar , anxiety with hx of past hospitalization  Pt denies any SI/HI and is interested in resuming OP MH care  She related she has a referral started at [facility] when they called her back she was afraid  she would not have a ride to get there or get home  Pt has also explained that she used to have ICM services through [facility]  SW relates she had gotten frustrated with them because she kept losing her ICM  Pt is open to returning to these services and did sign a PABLO so JOHN could help with referral for same  With pt's permission calls made to [facility] and [facility] but SW has to leave a message for them to return call to pt and to SW     Pt share that she had difficulty getting her VM messages  SW was able to help her get these messages  JOHN jefferso assisted with helping put these Resource Contacts in her phone so that she will recognize them when they call her back  JOHN also  assisted pt with calling her [facility]  [employer]  [phone]  and  reviewed with her she has 100 rides/ per year ( 80 left or 50 left)  JOHN encouraged pt to speak with her [facility] agency if she needs help with obtainning these rides as well encouraging mataining her ICM to help her keep on track with hers appointments  Pt agreed  SW to f/u and assist as indicated
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
## 2022-05-09 ENCOUNTER — PATIENT OUTREACH (OUTPATIENT)
Dept: INTERNAL MEDICINE CLINIC | Facility: CLINIC | Age: 57
End: 2022-05-09

## 2022-05-09 NOTE — PROGRESS NOTES
SW received a call from pt this date who shares she has not been able to get an OP [facility] appointment as yet  She has called her Insurance who are sending her a list of provides   JOHN  assisted her with a 3 way call to [facility] and we left a message for them to return pt call  JOHN gave resources for  [facility]  [phone] and [facility] [phone] and [address]   ( JOHN later learned there is a wait for appointments at [facility])   JOHN has encouraged pt to f/u with them as well and with them insurance in case they can help further   SW to remain availabe to assist as indicted

## 2022-05-11 DIAGNOSIS — I10 ESSENTIAL HYPERTENSION: ICD-10-CM

## 2022-05-11 RX ORDER — AMLODIPINE BESYLATE 5 MG/1
10 TABLET ORAL DAILY
Qty: 180 TABLET | Refills: 3 | Status: SHIPPED | OUTPATIENT
Start: 2022-05-11

## 2022-05-12 ENCOUNTER — TELEPHONE (OUTPATIENT)
Dept: OBGYN CLINIC | Facility: CLINIC | Age: 57
End: 2022-05-12

## 2022-05-20 ENCOUNTER — TELEPHONE (OUTPATIENT)
Dept: PSYCHIATRY | Facility: CLINIC | Age: 57
End: 2022-05-20

## 2022-05-23 ENCOUNTER — TELEPHONE (OUTPATIENT)
Dept: PSYCHIATRY | Facility: CLINIC | Age: 57
End: 2022-05-23

## 2022-05-27 ENCOUNTER — TELEPHONE (OUTPATIENT)
Dept: OBGYN CLINIC | Facility: HOSPITAL | Age: 57
End: 2022-05-27

## 2022-05-27 DIAGNOSIS — F31.9 BIPOLAR AFFECTIVE DISORDER, REMISSION STATUS UNSPECIFIED (HCC): Chronic | ICD-10-CM

## 2022-05-27 DIAGNOSIS — F41.9 ANXIETY: Chronic | ICD-10-CM

## 2022-05-27 DIAGNOSIS — F33.2 SEVERE EPISODE OF RECURRENT MAJOR DEPRESSIVE DISORDER, WITHOUT PSYCHOTIC FEATURES (HCC): ICD-10-CM

## 2022-05-27 RX ORDER — MIRTAZAPINE 15 MG/1
15 TABLET, FILM COATED ORAL
Qty: 90 TABLET | Refills: 3 | Status: SHIPPED | OUTPATIENT
Start: 2022-05-27

## 2022-05-27 RX ORDER — RISPERIDONE 0.5 MG/1
0.5 TABLET, FILM COATED ORAL 2 TIMES DAILY
Qty: 180 TABLET | Refills: 3 | Status: SHIPPED | OUTPATIENT
Start: 2022-05-27

## 2022-06-13 ENCOUNTER — PATIENT OUTREACH (OUTPATIENT)
Dept: INTERNAL MEDICINE CLINIC | Facility: CLINIC | Age: 57
End: 2022-06-13

## 2022-06-13 ENCOUNTER — TELEPHONE (OUTPATIENT)
Dept: INTERNAL MEDICINE CLINIC | Facility: CLINIC | Age: 57
End: 2022-06-13

## 2022-06-13 DIAGNOSIS — F41.9 ANXIETY: ICD-10-CM

## 2022-06-13 NOTE — PROGRESS NOTES
DEENA has received update from Clinical Team that pt still has not been able to get established with OP   Deena has attempted to reach pt via the phone but her VM was full  DEENA will try again  Deena notes that [employer] had attempted to call pt had to leave a message for her to return their call 5/23/22  DEENA called them to see if they can offer an appointment soon but had to leave a message for them to return DEENA call

## 2022-06-13 NOTE — TELEPHONE ENCOUNTER
I rcvd an automated request from pharmacy to refill patients Lorazepam  This is not something we normally fill for her  It appears last month we filled a one time script until she was able to get in with psychiatry  She stated she has not been able to get in with anyone and said she was told it would be six months  Last office note stated you would be talking to patient during that appt  Is this something you can assist her with?

## 2022-06-13 NOTE — TELEPHONE ENCOUNTER
Due 6/19/22  PDMP checked  Last script was supposed to be for 30 days but patient has not been able to get in with psychiatry   I will send message to Jaycee Grady,  to assist

## 2022-06-17 ENCOUNTER — PATIENT OUTREACH (OUTPATIENT)
Dept: INTERNAL MEDICINE CLINIC | Facility: CLINIC | Age: 57
End: 2022-06-17

## 2022-06-17 RX ORDER — LORAZEPAM 0.5 MG/1
0.5 TABLET ORAL EVERY 8 HOURS PRN
Qty: 90 TABLET | Refills: 0 | Status: SHIPPED | OUTPATIENT
Start: 2022-06-17 | End: 2022-07-15

## 2022-06-17 RX ORDER — LORAZEPAM 0.5 MG/1
0.5 TABLET ORAL EVERY 8 HOURS PRN
Qty: 90 TABLET | OUTPATIENT
Start: 2022-06-17

## 2022-06-17 NOTE — TELEPHONE ENCOUNTER
Spoke with Patient and have added pt to wait list for talk therapy and med mgmt  suggested [employer] for additional resources   Pt has referral

## 2022-06-17 NOTE — PROGRESS NOTES
SW has returned call to pt who is asking for help to get re-established with out pt   Pt relates that the places she called had a long waiting list and she knows knows she needs to get re-established and needs her Medications  Pt fearful  she will end up in the Hospital if she does not get re-established  SW pointed out she missed 2 calls from [facility]  SW offered to help pt call them back  We did get through and the  is to return call to pt  Deena has also provided pt with alternative with [facility] as a possible option  Pt will get back to  with where she gets an appointment  SW did share she hopefully her PCP will continue her medications as long as she is scheduled   Pt also shares she knows her insurance will provide transportation to her  medical [address] [facility] appointments  Pt also shares she is seeking Dental Care  She has left a message for [address]  Sw also suggested she can call her Insurance  for additional options  Sw reminded her of her upcoming PCP appointment

## 2022-06-24 ENCOUNTER — PATIENT OUTREACH (OUTPATIENT)
Dept: INTERNAL MEDICINE CLINIC | Facility: CLINIC | Age: 57
End: 2022-06-24

## 2022-06-24 NOTE — PROGRESS NOTES
SW has spoken with pt again this date and pt reports she has not been able to get any [address] appointments  Sw and pt made a 3 way call to [address] but had to again request a call back  [facility] has confirmed they can not take any new Medicare pts at present  Pt said [facility] said it would be a  6 month wait  SW to f/u with pt next week to again seek OP MH referrals

## 2022-06-27 ENCOUNTER — PATIENT OUTREACH (OUTPATIENT)
Dept: INTERNAL MEDICINE CLINIC | Facility: CLINIC | Age: 57
End: 2022-06-27

## 2022-06-27 DIAGNOSIS — F33.2 SEVERE EPISODE OF RECURRENT MAJOR DEPRESSIVE DISORDER, WITHOUT PSYCHOTIC FEATURES (HCC): ICD-10-CM

## 2022-06-27 DIAGNOSIS — F41.9 ANXIETY: Chronic | ICD-10-CM

## 2022-06-27 DIAGNOSIS — F31.9 BIPOLAR AFFECTIVE DISORDER, REMISSION STATUS UNSPECIFIED (HCC): Chronic | ICD-10-CM

## 2022-06-27 RX ORDER — FLUOXETINE HYDROCHLORIDE 40 MG/1
40 CAPSULE ORAL EVERY MORNING
Qty: 90 CAPSULE | Refills: 1 | Status: SHIPPED | OUTPATIENT
Start: 2022-06-27

## 2022-06-27 NOTE — PROGRESS NOTES
SW received a call from pt who was asking for help with getting established with OP MH  Pt has had difficulty getting a new pt appointment  Sw assisted with calls to:   [facility]  [phone] - No Medicare pt's at present  [facility] [phone]  Left a message   [facility] Lobo [phone] 4-5 month wait list and is on the list  [facility] [employer] [address] [phone] left message & sent in basket  [facility] [phone] left message  [facility] [phone]  Left message  Concern [phone] No new pts  [address] [place] [address] [phone] left message    Pt has also request help with getting her Prozac and Risperidone refilled as she is out of them  SWEspoke to Clinical Team Ira Martin who reports pt has a refill on her Risperidone but she will ask Provider to refill the Prozac to bridge pt until she gets re-established with OP MH  Pt to call her pharmacy later to day to see when they are ready

## 2022-06-27 NOTE — TELEPHONE ENCOUNTER
Wong Avalos came to me stating she is trying to get patient into mental health without success and in the meantime patient needs refills on medication  She is asking for Prozac and Risperdal  I advised her that she has 3 refills on the Risperdal and I will send the Prozac now

## 2022-06-27 NOTE — TELEPHONE ENCOUNTER
Called pt to let her know that she's on our wait list and we don't have anything available at this time

## 2022-06-29 ENCOUNTER — TELEPHONE (OUTPATIENT)
Dept: PSYCHIATRY | Facility: CLINIC | Age: 57
End: 2022-06-29

## 2022-07-12 DIAGNOSIS — F41.9 ANXIETY: ICD-10-CM

## 2022-07-15 RX ORDER — LORAZEPAM 0.5 MG/1
0.5 TABLET ORAL EVERY 8 HOURS PRN
Qty: 90 TABLET | Refills: 0 | Status: SHIPPED | OUTPATIENT
Start: 2022-07-15

## 2022-07-15 NOTE — TELEPHONE ENCOUNTER
[facility] script sent to the pharmacy and asked patient to call back to confirm if she has an appt yet with mental health provider as patient stated last time she was trying to get in

## 2022-07-25 ENCOUNTER — TELEPHONE (OUTPATIENT)
Dept: INTERNAL MEDICINE CLINIC | Facility: CLINIC | Age: 57
End: 2022-07-25

## 2022-07-25 DIAGNOSIS — E78.2 MIXED HYPERLIPIDEMIA: ICD-10-CM

## 2022-07-25 RX ORDER — ATORVASTATIN CALCIUM 20 MG/1
20 TABLET, FILM COATED ORAL DAILY
Qty: 90 TABLET | Refills: 0 | Status: ON HOLD | OUTPATIENT
Start: 2022-07-25 | End: 2022-08-22 | Stop reason: SDUPTHER

## 2022-07-28 ENCOUNTER — TELEPHONE (OUTPATIENT)
Dept: INTERNAL MEDICINE CLINIC | Facility: CLINIC | Age: 57
End: 2022-07-28

## 2022-07-28 ENCOUNTER — PATIENT OUTREACH (OUTPATIENT)
Dept: INTERNAL MEDICINE CLINIC | Facility: CLINIC | Age: 57
End: 2022-07-28
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
## 2022-07-28 NOTE — TELEPHONE ENCOUNTER
Patient left message on medline stating she cannot find her lisinopril and will need refills  Patient stated she will be moving and will need the [facility] [address] and per the pharmacist a new script will need to be sent and once the pharmacy receives it, they will call the insurance to request an override  Called patient to confirm she would like her pharmacy to [facility], or if she found her lisinopril  Patient did not answer  Left message to call back
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
## 2022-08-04 ENCOUNTER — TELEPHONE (OUTPATIENT)
Dept: PSYCHIATRY | Facility: CLINIC | Age: 57
End: 2022-08-04

## 2022-08-04 ENCOUNTER — PATIENT OUTREACH (OUTPATIENT)
Dept: INTERNAL MEDICINE CLINIC | Facility: CLINIC | Age: 57
End: 2022-08-04

## 2022-08-04 ENCOUNTER — TELEPHONE (OUTPATIENT)
Dept: INTERNAL MEDICINE CLINIC | Facility: CLINIC | Age: 57
End: 2022-08-04

## 2022-08-04 NOTE — TELEPHONE ENCOUNTER
Pt family doc called in spoke to someone named tino advised pt is till on the wait list unfortunately it would be a little bit of a wait for an open availability to get her scheduled

## 2022-08-04 NOTE — LETTER
[place] [id number]  [phone]    Re: Mental Health Appointment   8/4/2022       Dear Krishan Self,    We tried to reach you by phone on 8/4/22 and was unfortunately unable to reach you  It is important that you contact the [facility] as soon as possible at: [phone]  You did not come to your scheduled Primary Care Appointment today and I had important information to share with you  I was able to get you a [address] [place] [address] Appointment at  [facility] located at [place] [address]  Their # is [phone]     The APPOINTMENT with them is Wednesday AUGUST 17, 2022 @ 1:30 PM     Mary Carrasco [facility]   It is an Intake appointment so may take up to 2 1/2 hours  You need to bring your insurance cars and ID card with you  You can call your insurance to schedue a ride there  I can hep you do this if needed  PLease call Social Work to confirm you want this Mental Health appointment and the mainOffice to re-schedule your missed appointment here    Sincerely,         Nika Salmon

## 2022-08-04 NOTE — PROGRESS NOTES
JOHN has attempted to secure a new pt appointment for pt and SW has reached out to:    [facility] [phone] and left another message  [facility] [phone] and left another message  [facility] later returned call and shares they are NOT taking Medicare at this time  [facility] [phone]  who shares they are NOT taking any new Medicare pt's now but we can check back in a month  [employer] [phone] and left another message in their [address]  [address] [phone] who shares pt is on their wait list as of 6/29/22 but the wait list  Is about 10 Months  SW has recalled [facility] again @ [phone] to ask if they can re-consider her suspension and take her back sooner  JOHN had to leave a message  SW received a call from [address] [place] [address]   [phone] who has attempted to reach pt and does share that she has been given a new pt appointment for 8/17/22 at 1:30 PM      The appointment will take approximately 1 5 hours as it is new pt Intake appointment with theTherapist Marcia LOPEZ planed to share she me with pt in person at her appointment today but she was a NO SHOW to her PCP appointment  JOHN attempted to call pt re missed PCP appointment and this new Intake appointment with [facility] however her VM was full  SW did reach out to pt's Mother who is listed as an Emergency Contact  JOHN did speak with her who shares that she does not get to see her dgt as she lived in a care facility in [address] [place] [address]  She suggest we send her a letter if we can not reach her  Sw has done same and will f/u as indicated

## 2022-08-05 ENCOUNTER — TELEPHONE (OUTPATIENT)
Dept: OBGYN CLINIC | Facility: HOSPITAL | Age: 57
End: 2022-08-05

## 2022-08-05 ENCOUNTER — PATIENT OUTREACH (OUTPATIENT)
Dept: INTERNAL MEDICINE CLINIC | Facility: CLINIC | Age: 57
End: 2022-08-05

## 2022-08-05 NOTE — PROGRESS NOTES
Patient called because she seen a call from us and it was Shirin Rizvi trying to reschedule her No Show on 8/04/22  Also I seen the note that Karen written regarding patient appt at [facility] on 8/17/22  When I went over the appt patient immediately stated she will not be able to keep appt due to she is moving to another state by 8/15/22  I left a VM on Hansa ext explaining some of the conversation and then I also told patient I would transfer her to [facility] ext so she could leave a message as well

## 2022-08-05 NOTE — TELEPHONE ENCOUNTER
Pt sees Dr Dennis Arguello    Pt is calling stating that she has a follow up sonido for b/l knees on 8-29  pt states that she is moving out of the [place] 8-12 and would like to know if she can move her appt up and be seen before she moves    # [phone]

## 2022-08-09 ENCOUNTER — PATIENT OUTREACH (OUTPATIENT)
Dept: INTERNAL MEDICINE CLINIC | Facility: CLINIC | Age: 57
End: 2022-08-09

## 2022-08-09 NOTE — PROGRESS NOTES
DEENA has attempted to reach pt again this date but had to leave a message  Deena has reminded pt of her schedule at  [facility] appointment there for 8/17/22 at 1:30 PM located at [facility]  [phone]  If pt is npt able to keep it DEENA asked her to cancel it  SW has encouraged her to keep it if at all possible  Deena also reminded her she can call Customer Service # on the back of her Insurance card to set up a ride for same  Pt would need to give them 3 days notice for same  Deena offered to assist with same if needed  SW to remain available to assist if needed

## 2022-08-16 ENCOUNTER — PATIENT OUTREACH (OUTPATIENT)
Dept: INTERNAL MEDICINE CLINIC | Facility: CLINIC | Age: 57
End: 2022-08-16

## 2022-08-16 ENCOUNTER — HOSPITAL ENCOUNTER (EMERGENCY)
Facility: HOSPITAL | Age: 57
Discharge: OTHER INSTITUTION | End: 2022-08-17
Attending: EMERGENCY MEDICINE
Payer: MEDICARE

## 2022-08-16 DIAGNOSIS — F32.A DEPRESSION: ICD-10-CM

## 2022-08-16 DIAGNOSIS — R45.851 SUICIDAL IDEATION: Primary | ICD-10-CM

## 2022-08-16 LAB
ALBUMIN SERPL BCP-MCNC: 3.5 G/DL (ref 3.5–5)
ALP SERPL-CCNC: 85 U/L (ref 46–116)
ALT SERPL W P-5'-P-CCNC: 43 U/L (ref 12–78)
AMPHETAMINES SERPL QL SCN: NEGATIVE
ANION GAP SERPL CALCULATED.3IONS-SCNC: 7 MMOL/L (ref 4–13)
AST SERPL W P-5'-P-CCNC: 35 U/L (ref 5–45)
ATRIAL RATE: 103 BPM
BARBITURATES UR QL: NEGATIVE
BASOPHILS # BLD AUTO: 0.07 THOUSANDS/ΜL (ref 0–0.1)
BASOPHILS NFR BLD AUTO: 0 % (ref 0–1)
BENZODIAZ UR QL: NEGATIVE
BILIRUB SERPL-MCNC: 0.9 MG/DL (ref 0.2–1)
BUN SERPL-MCNC: 16 MG/DL (ref 5–25)
CALCIUM SERPL-MCNC: 9.9 MG/DL (ref 8.3–10.1)
CHLORIDE SERPL-SCNC: 109 MMOL/L (ref 96–108)
CO2 SERPL-SCNC: 23 MMOL/L (ref 21–32)
COCAINE UR QL: NEGATIVE
CREAT SERPL-MCNC: 0.93 MG/DL (ref 0.6–1.3)
EOSINOPHIL # BLD AUTO: 0.29 THOUSAND/ΜL (ref 0–0.61)
EOSINOPHIL NFR BLD AUTO: 2 % (ref 0–6)
ERYTHROCYTE [DISTWIDTH] IN BLOOD BY AUTOMATED COUNT: 14 % (ref 11.6–15.1)
ETHANOL EXG-MCNC: 0 MG/DL
EXT PREG TEST URINE: NEGATIVE
EXT. CONTROL ED NAV: NORMAL
FLUAV RNA RESP QL NAA+PROBE: NEGATIVE
FLUBV RNA RESP QL NAA+PROBE: NEGATIVE
GFR SERPL CREATININE-BSD FRML MDRD: 68 ML/MIN/1.73SQ M
GLUCOSE SERPL-MCNC: 141 MG/DL (ref 65–140)
HCT VFR BLD AUTO: 42 % (ref 34.8–46.1)
HGB BLD-MCNC: 13.8 G/DL (ref 11.5–15.4)
IMM GRANULOCYTES # BLD AUTO: 0.09 THOUSAND/UL (ref 0–0.2)
IMM GRANULOCYTES NFR BLD AUTO: 1 % (ref 0–2)
LYMPHOCYTES # BLD AUTO: 3.03 THOUSANDS/ΜL (ref 0.6–4.47)
LYMPHOCYTES NFR BLD AUTO: 18 % (ref 14–44)
MCH RBC QN AUTO: 28.8 PG (ref 26.8–34.3)
MCHC RBC AUTO-ENTMCNC: 32.9 G/DL (ref 31.4–37.4)
MCV RBC AUTO: 88 FL (ref 82–98)
METHADONE UR QL: NEGATIVE
MONOCYTES # BLD AUTO: 1.35 THOUSAND/ΜL (ref 0.17–1.22)
MONOCYTES NFR BLD AUTO: 8 % (ref 4–12)
NEUTROPHILS # BLD AUTO: 11.86 THOUSANDS/ΜL (ref 1.85–7.62)
NEUTS SEG NFR BLD AUTO: 71 % (ref 43–75)
NRBC BLD AUTO-RTO: 0 /100 WBCS
OPIATES UR QL SCN: NEGATIVE
OXYCODONE+OXYMORPHONE UR QL SCN: NEGATIVE
P AXIS: 66 DEGREES
PCP UR QL: NEGATIVE
PLATELET # BLD AUTO: 304 THOUSANDS/UL (ref 149–390)
PMV BLD AUTO: 10.5 FL (ref 8.9–12.7)
POTASSIUM SERPL-SCNC: 3.9 MMOL/L (ref 3.5–5.3)
PR INTERVAL: 168 MS
PROT SERPL-MCNC: 7.5 G/DL (ref 6.4–8.4)
QRS AXIS: -36 DEGREES
QRSD INTERVAL: 92 MS
QT INTERVAL: 336 MS
QTC INTERVAL: 440 MS
RBC # BLD AUTO: 4.79 MILLION/UL (ref 3.81–5.12)
RSV RNA RESP QL NAA+PROBE: NEGATIVE
SARS-COV-2 RNA RESP QL NAA+PROBE: NEGATIVE
SODIUM SERPL-SCNC: 139 MMOL/L (ref 135–147)
T WAVE AXIS: 52 DEGREES
THC UR QL: NEGATIVE
TSH SERPL DL<=0.05 MIU/L-ACNC: 0.91 UIU/ML (ref 0.45–4.5)
VENTRICULAR RATE: 103 BPM
WBC # BLD AUTO: 16.69 THOUSAND/UL (ref 4.31–10.16)

## 2022-08-16 PROCEDURE — 84443 ASSAY THYROID STIM HORMONE: CPT | Performed by: EMERGENCY MEDICINE

## 2022-08-16 PROCEDURE — 99284 EMERGENCY DEPT VISIT MOD MDM: CPT | Performed by: EMERGENCY MEDICINE

## 2022-08-16 PROCEDURE — 85025 COMPLETE CBC W/AUTO DIFF WBC: CPT | Performed by: EMERGENCY MEDICINE

## 2022-08-16 PROCEDURE — 80053 COMPREHEN METABOLIC PANEL: CPT | Performed by: EMERGENCY MEDICINE

## 2022-08-16 PROCEDURE — 0241U HB NFCT DS VIR RESP RNA 4 TRGT: CPT | Performed by: EMERGENCY MEDICINE

## 2022-08-16 PROCEDURE — 82075 ASSAY OF BREATH ETHANOL: CPT | Performed by: EMERGENCY MEDICINE

## 2022-08-16 PROCEDURE — 80307 DRUG TEST PRSMV CHEM ANLYZR: CPT | Performed by: EMERGENCY MEDICINE

## 2022-08-16 PROCEDURE — 93010 ELECTROCARDIOGRAM REPORT: CPT | Performed by: INTERNAL MEDICINE

## 2022-08-16 PROCEDURE — 99285 EMERGENCY DEPT VISIT HI MDM: CPT

## 2022-08-16 PROCEDURE — 81025 URINE PREGNANCY TEST: CPT | Performed by: EMERGENCY MEDICINE

## 2022-08-16 PROCEDURE — 93005 ELECTROCARDIOGRAM TRACING: CPT

## 2022-08-16 PROCEDURE — 36415 COLL VENOUS BLD VENIPUNCTURE: CPT | Performed by: EMERGENCY MEDICINE

## 2022-08-16 RX ORDER — BENZTROPINE MESYLATE 1 MG/1
1 TABLET ORAL
Status: CANCELLED | OUTPATIENT
Start: 2022-08-16

## 2022-08-16 RX ORDER — OLANZAPINE 10 MG/1
10 INJECTION, POWDER, LYOPHILIZED, FOR SOLUTION INTRAMUSCULAR ONCE
Status: DISCONTINUED | OUTPATIENT
Start: 2022-08-16 | End: 2022-08-17 | Stop reason: HOSPADM

## 2022-08-16 RX ORDER — BENZTROPINE MESYLATE 1 MG/ML
1 INJECTION INTRAMUSCULAR; INTRAVENOUS
Status: CANCELLED | OUTPATIENT
Start: 2022-08-16

## 2022-08-16 RX ORDER — AMOXICILLIN 250 MG
1 CAPSULE ORAL DAILY PRN
Status: CANCELLED | OUTPATIENT
Start: 2022-08-16

## 2022-08-16 RX ORDER — OLANZAPINE 10 MG/1
10 TABLET, ORALLY DISINTEGRATING ORAL ONCE
Status: COMPLETED | OUTPATIENT
Start: 2022-08-16 | End: 2022-08-16

## 2022-08-16 RX ORDER — HYDROXYZINE HYDROCHLORIDE 25 MG/1
50 TABLET, FILM COATED ORAL
Status: CANCELLED | OUTPATIENT
Start: 2022-08-16

## 2022-08-16 RX ORDER — HALOPERIDOL 5 MG/ML
5 INJECTION INTRAMUSCULAR
Status: CANCELLED | OUTPATIENT
Start: 2022-08-16

## 2022-08-16 RX ORDER — MAGNESIUM HYDROXIDE/ALUMINUM HYDROXICE/SIMETHICONE 120; 1200; 1200 MG/30ML; MG/30ML; MG/30ML
30 SUSPENSION ORAL EVERY 4 HOURS PRN
Status: CANCELLED | OUTPATIENT
Start: 2022-08-16

## 2022-08-16 RX ORDER — HALOPERIDOL 5 MG/ML
2.5 INJECTION INTRAMUSCULAR
Status: CANCELLED | OUTPATIENT
Start: 2022-08-16

## 2022-08-16 RX ORDER — LORAZEPAM 2 MG/ML
1 INJECTION INTRAMUSCULAR
Status: CANCELLED | OUTPATIENT
Start: 2022-08-16

## 2022-08-16 RX ORDER — LORAZEPAM 2 MG/ML
2 INJECTION INTRAMUSCULAR EVERY 6 HOURS PRN
Status: CANCELLED | OUTPATIENT
Start: 2022-08-16

## 2022-08-16 RX ORDER — HYDROXYZINE HYDROCHLORIDE 25 MG/1
25 TABLET, FILM COATED ORAL
Status: CANCELLED | OUTPATIENT
Start: 2022-08-16

## 2022-08-16 RX ORDER — HALOPERIDOL 5 MG/1
5 TABLET ORAL
Status: CANCELLED | OUTPATIENT
Start: 2022-08-16

## 2022-08-16 RX ORDER — LORAZEPAM 2 MG/ML
2 INJECTION INTRAMUSCULAR
Status: CANCELLED | OUTPATIENT
Start: 2022-08-16

## 2022-08-16 RX ORDER — BENZTROPINE MESYLATE 1 MG/ML
0.5 INJECTION INTRAMUSCULAR; INTRAVENOUS
Status: CANCELLED | OUTPATIENT
Start: 2022-08-16

## 2022-08-16 RX ORDER — ACETAMINOPHEN 325 MG/1
975 TABLET ORAL EVERY 6 HOURS PRN
Status: CANCELLED | OUTPATIENT
Start: 2022-08-16

## 2022-08-16 RX ORDER — BISACODYL 10 MG
10 SUPPOSITORY, RECTAL RECTAL DAILY PRN
Status: CANCELLED | OUTPATIENT
Start: 2022-08-16

## 2022-08-16 RX ORDER — DIPHENHYDRAMINE HYDROCHLORIDE 50 MG/ML
50 INJECTION INTRAMUSCULAR; INTRAVENOUS EVERY 6 HOURS PRN
Status: CANCELLED | OUTPATIENT
Start: 2022-08-16

## 2022-08-16 RX ORDER — LANOLIN ALCOHOL/MO/W.PET/CERES
3 CREAM (GRAM) TOPICAL
Status: CANCELLED | OUTPATIENT
Start: 2022-08-16

## 2022-08-16 RX ORDER — TRAZODONE HYDROCHLORIDE 50 MG/1
50 TABLET ORAL
Status: CANCELLED | OUTPATIENT
Start: 2022-08-16

## 2022-08-16 RX ORDER — ACETAMINOPHEN 325 MG/1
650 TABLET ORAL EVERY 6 HOURS PRN
Status: CANCELLED | OUTPATIENT
Start: 2022-08-16

## 2022-08-16 RX ORDER — LORAZEPAM 1 MG/1
1 TABLET ORAL ONCE
Status: COMPLETED | OUTPATIENT
Start: 2022-08-16 | End: 2022-08-16

## 2022-08-16 RX ORDER — POLYETHYLENE GLYCOL 3350 17 G/17G
17 POWDER, FOR SOLUTION ORAL DAILY PRN
Status: CANCELLED | OUTPATIENT
Start: 2022-08-16

## 2022-08-16 RX ORDER — WATER 1000 ML/1000ML
INJECTION, SOLUTION INTRAVENOUS
Status: COMPLETED
Start: 2022-08-16 | End: 2022-08-16

## 2022-08-16 RX ORDER — OLANZAPINE 10 MG/1
INJECTION, POWDER, LYOPHILIZED, FOR SOLUTION INTRAMUSCULAR
Status: DISPENSED
Start: 2022-08-16 | End: 2022-08-17

## 2022-08-16 RX ORDER — HALOPERIDOL 0.5 MG/1
0.5 TABLET ORAL EVERY 6 HOURS PRN
Status: CANCELLED | OUTPATIENT
Start: 2022-08-16

## 2022-08-16 RX ORDER — ACETAMINOPHEN 325 MG/1
650 TABLET ORAL EVERY 4 HOURS PRN
Status: CANCELLED | OUTPATIENT
Start: 2022-08-16

## 2022-08-16 RX ORDER — MINERAL OIL AND PETROLATUM 150; 830 MG/G; MG/G
1 OINTMENT OPHTHALMIC
Status: CANCELLED | OUTPATIENT
Start: 2022-08-16

## 2022-08-16 RX ORDER — HYDROXYZINE HYDROCHLORIDE 25 MG/1
100 TABLET, FILM COATED ORAL
Status: CANCELLED | OUTPATIENT
Start: 2022-08-16

## 2022-08-16 RX ADMIN — OLANZAPINE 10 MG: 10 TABLET, ORALLY DISINTEGRATING ORAL at 17:11

## 2022-08-16 RX ADMIN — LORAZEPAM 1 MG: 1 TABLET ORAL at 14:24

## 2022-08-16 NOTE — LETTER
[address] [facility] [place] [address]  Dept: [phone]              P[facility] TRANSFER CONSENT    NAME Brown Thakur 1965                              MRN [id number]    I have been informed of my rights regarding examination, treatment, and transfer   by Dr Mable Pappas DO    Benefits: Other benefits (Include comment)_______________________ (Inpt MH tx)    Risks: Potential for delay in receiving treatment      Consent for Transfer:  I acknowledge that my medical condition has been evaluated and explained to me by the emergency department physician or other qualified medical person and/or my attending physician, who has recommended that I be transferred to the service of  Accepting Physician: Dr Ines Olivas at [facility] Name, [address] : [place]  The above potential benefits of such transfer, the potential risks associated with such transfer, and the probable risks of not being transferred have been explained to me, and I fully understand them  The doctor has explained that, in my case, the benefits of transfer outweigh the risks  I agree to be transferred  I authorize the performance of emergency medical procedures and treatments upon me in both transit and upon arrival at the receiving facility  Additionally, I authorize the release of any and all medical records to the receiving facility and request they be transported with me, if possible  I understand that the safest mode of transportation during a medical emergency is an ambulance and that the Hospital advocates the use of this mode of transport  Risks of traveling to the receiving facility by car, including absence of medical control, life sustaining equipment, such as oxygen, and medical personnel has been explained to me and I fully understand them      (NATASHA CORRECT BOX BELOW)  [ X ]  I consent to the stated transfer and to be transported by ambulance/helicopter  [  ]  I consent to the stated transfer, but refuse transportation by ambulance and accept full responsibility for my transportation by car  I understand the risks of non-ambulance transfers and I exonerate the Hospital and its staff from any deterioration in my condition that results from this refusal     X___________________________________________    DATE  22  TIME________  Signature of patient or legally responsible individual signing on patient behalf           RELATIONSHIP TO PATIENT_________________________                      Provider Certification    NAME Jose Hilliard                          1965                              MRN [id number]    A medical screening exam was performed on the above named patient  Based on the examination:    Condition Necessitating Transfer The primary encounter diagnosis was Suicidal ideation  A diagnosis of Depression was also pertinent to this visit  Patient Condition: The patient has been stabilized such that within reasonable medical probability, no material deterioration of the patient condition or the condition of the unborn child(juan) is likely to result from the transfer    Reason for Transfer: Level of Care needed not available at this facility    [facility]   · Space available and qualified personnel available for treatment as acknowledged by VLADISLAV Dodd  · Agreed to accept transfer and to provide appropriate medical treatment as acknowledged by       Dr Elaine Greer  · Appropriate medical records of the examination and treatment of the patient are provided at the time of transfer   [address] __DC_____  · Transfer will be performed by qualified personnel from [facility]  and appropriate transfer equipment as required, including the use of necessary and appropriate life support measures      Provider Certification: I have examined the patient and explained the following risks and benefits of being transferred/refusing transfer to the patient/family:  General risk, such as traffic hazards, adverse weather conditions, rough terrain or turbulence, possible failure of equipment (including vehicle or aircraft), or consequences of actions of persons outside the control of the transport personnel      Based on these reasonable risks and benefits to the patient and/or the unborn child(juan), and based upon the information available at the time of the patients examination, I certify that the medical benefits reasonably to be expected from the provision of appropriate medical treatments at another medical facility outweigh the increasing risks, if any, to the individuals medical condition, and in the case of labor to the unborn child, from effecting the transfer      X____________________________________________ DATE 08/16/22        TIME_______      ORIGINAL - SEND TO MEDICAL RECORDS   COPY - SEND WITH PATIENT DURING TRANSFER
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
## 2022-08-16 NOTE — ED NOTES
302 faxed to DALY-Muna for review at [facility] or VLADISLAV Hidalgo  08/16/22   8169

## 2022-08-16 NOTE — ED NOTES
Per EVS, patient has primary Medicare A&B with [employer] and secondary [employer] (ID# [id number])      VLADISLAV Varner  08/16/22    8342

## 2022-08-16 NOTE — ED PROVIDER NOTES
History  Chief Complaint   Patient presents with    Psychiatric Evaluation     Per ems pt is a 302  Pt took 2mg of ativan prior to arrival   Pt made comments on the phone to the  at [facility] about wanting to kill herself  HPI    59-year-old female, past medical history significant for bipolar, depression, anxiety, multiple psychiatric admissions presenting for evaluation of hopelessness and suicidal ideation  Patient was 302'd by a  and [facility]  Per [facility], the patient was a victim of a "sweetheart scam" in which she sent money, sold all her stuff, ended believes in her apartment with the hope that she would be moving to [place] with a male  When this failed, she became depressed and hopeless  She called her  and told the  that she was feeling hopeless and depressed and that she end her life but would not because she has cats care for   and [facility] was placed on a 3 way call with the patient, 302'd the patient and all brought was her into the ED for further evaluation  Patient states that she feels hopeless due to stressors in her life  States that she has been "in and out, in an out, in and out" of psychiatric facilities, she feels better initially and then returns to her hopeless state after discharge  She states that she is going to die alone and no one cares for me"  Patient denied SI but states that she no longer wants to live, but does not think she will do anything because she needs to care for her cats  Denies HI, AH, VH  Admits to taking 2 mg Ativan prior to her arrival but denies any other alcohol, drug use  Denies any medical complaints at this time  Prior to Admission Medications   Prescriptions Last Dose Informant Patient Reported? Taking?    Diclofenac Sodium (VOLTAREN) 1 %   No No   Sig: Apply 2 g topically 3 (three) times a day as needed (Pain)   FLUoxetine (PROzac) 40 MG capsule   No No   Sig: Take 1 capsule (40 mg total) by mouth every morning   LORazepam (ATIVAN) 0 5 mg tablet   No No   Sig: TAKE 1 TABLET (0 5 MG TOTAL) BY MOUTH EVERY 8 (EIGHT) HOURS AS NEEDED FOR ANXIETY   Synvisc injection   Yes No   amLODIPine (NORVASC) 10 mg tablet   Yes No   Sig: Take 10 mg by mouth daily   amLODIPine (NORVASC) 5 mg tablet   No No   Sig: Take 2 tablets (10 mg total) by mouth in the morning     atorvastatin (LIPITOR) 20 mg tablet   No No   Sig: Take 1 tablet (20 mg total) by mouth daily   fluticasone (FLONASE) 50 mcg/act nasal spray   No No   Si sprays into each nostril daily   lidocaine (LIDODERM) 5 %   No No   Sig: Apply 1 patch topically daily Remove & Discard patch within 12 hours or as directed by MD   lisinopril (ZESTRIL) 10 mg tablet   No No   Sig: Take 1 tablet (10 mg total) by mouth daily   mirtazapine (REMERON) 15 mg tablet   No No   Sig: Take 1 tablet (15 mg total) by mouth daily at bedtime   naphazoline-pheniramine (NAPHCON-A) 0 025-0 3 % ophthalmic solution   No No   Sig: Administer 1 drop to both eyes 4 (four) times a day as needed for allergies or irritation   naproxen (NAPROSYN) 500 mg tablet   No No   Sig: TAKE 1 TABLET (500 MG TOTAL) BY MOUTH 2 (TWO) TIMES A DAY AS NEEDED FOR MODERATE PAIN   risperiDONE (RisperDAL) 0 5 mg tablet   No No   Sig: Take 1 tablet (0 5 mg total) by mouth 2 (two) times a day      Facility-Administered Medications: None       Past Medical History:   Diagnosis Date    Anxiety     Arthritis     Bipolar affective disorder, depressed, severe (HCC) 2019    Depression     Elevated bilirubin 8/15/2019    Hyperlipidemia     Hypertension     Hypokalemia 8/15/2019    Learning difficulty     Leukocytosis 2020    Obesity (BMI 30 0-34 9) 2020    Osteopenia     Ovarian failure     Previous known suicide attempt     Psychiatric disorder     Psychiatric illness        Past Surgical History:   Procedure Laterality Date    LAPAROSCOPY as a child, per patient-normal findings       Family History   Problem Relation Age of Onset    Alzheimer's disease Mother     Depression Mother     Depression Brother     Bipolar disorder Brother     No Known Problems Maternal Aunt     No Known Problems Paternal Aunt     No Known Problems Maternal Uncle     No Known Problems Paternal Uncle     No Known Problems Cousin     ADD / ADHD Neg Hx     Alcohol abuse Neg Hx     Anxiety disorder Neg Hx     Dementia Neg Hx     Drug abuse Neg Hx     OCD Neg Hx     Paranoid behavior Neg Hx     Schizophrenia Neg Hx     Seizures Neg Hx     Self-Injury Neg Hx     Suicide Attempts Neg Hx      I have reviewed and agree with the history as documented  E-Cigarette/Vaping    E-Cigarette Use Never User      E-Cigarette/Vaping Substances    Nicotine No     THC No     CBD No     Flavoring No     Other No     Unknown No      Social History     Tobacco Use    Smoking status: Current Some Day Smoker     Packs/day: 0 25     Years: 15 00     Pack years: 3 75     Types: Cigarettes     Last attempt to quit: 1990     Years since quittin 6    Smokeless tobacco: Never Used    Tobacco comment: 6 cigerattes a day   Vaping Use    Vaping Use: Never used   Substance Use Topics    Alcohol use: Not Currently     Alcohol/week: 2 0 standard drinks     Types: 2 Cans of beer per week     Comment: socially    Drug use: No        Review of Systems   Constitutional: Negative for chills and fever  HENT: Negative for sore throat  Respiratory: Negative for cough and shortness of breath  Cardiovascular: Negative for chest pain, palpitations and leg swelling  Gastrointestinal: Negative for abdominal pain, diarrhea, nausea and vomiting  Genitourinary: Negative for dysuria and frequency  Musculoskeletal: Negative for myalgias  Skin: Negative for rash and wound  Neurological: Negative for dizziness, light-headedness and headaches  Psychiatric/Behavioral: Positive for behavioral problems and suicidal ideas  All other systems reviewed and are negative  Physical Exam  ED Triage Vitals [08/16/22 1243]   Temperature Pulse Respirations Blood Pressure SpO2   99 3 °F (37 4 °C) 102 22 115/64 97 %      Temp Source Heart Rate Source Patient Position - Orthostatic VS BP Location FiO2 (%)   Tympanic Monitor Sitting Left arm --      Pain Score       --             Orthostatic Vital Signs  Vitals:    08/16/22 1243   BP: 115/64   Pulse: 102   Patient Position - Orthostatic VS: Sitting       Physical Exam  Vitals and nursing note reviewed  Constitutional:       General: She is not in acute distress  Appearance: Normal appearance  She is not ill-appearing or toxic-appearing  HENT:      Head: Normocephalic and atraumatic  Right Ear: External ear normal       Left Ear: External ear normal       Nose: Nose normal       Mouth/Throat:      Mouth: Mucous membranes are moist       Pharynx: Oropharynx is clear  Eyes:      General: No scleral icterus  Extraocular Movements: Extraocular movements intact  Cardiovascular:      Pulses: Normal pulses  Pulmonary:      Effort: Pulmonary effort is normal  No respiratory distress  Abdominal:      General: There is no distension  Musculoskeletal:         General: Normal range of motion  Cervical back: Normal range of motion and neck supple  Skin:     General: Skin is warm  Capillary Refill: Capillary refill takes less than 2 seconds  Neurological:      General: No focal deficit present  Mental Status: She is alert and oriented to person, place, and time  Psychiatric:         Attention and Perception: Attention and perception normal  She does not perceive auditory or visual hallucinations  Mood and Affect: Mood is depressed           Speech: Speech normal          ED Medications  Medications   OLANZapine (ZyPREXA) IM injection 10 mg (0 mg Intramuscular Hold 8/16/22 1828)   LORazepam (ATIVAN) tablet 1 mg (1 mg Oral Given 8/16/22 1424)   OLANZapine (ZyPREXA ZYDIS) dispersible tablet 10 mg (10 mg Oral Given 8/16/22 1711)   sterile water injection **ADS Override Pull** (  Override Pull 8/16/22 1829)       Diagnostic Studies  Results Reviewed     Procedure Component Value Units Date/Time    POCT pregnancy, urine [[id number]]  (Normal) Resulted: 08/16/22 1652    Lab Status: Final result Updated: 08/16/22 1653     EXT PREG TEST UR (Ref: Negative) negative     Control valid    POCT alcohol breath test [[id number]]  (Normal) Resulted: 08/16/22 1536    Lab Status: Final result Updated: 08/16/22 1536     EXTBreath Alcohol 0 000    FLU/RSV/COVID - if FLU/RSV clinically relevant [[id number]]  (Normal) Collected: 08/16/22 1417    Lab Status: Final result Specimen: Nares from Nose Updated: 08/16/22 1521     SARS-CoV-2 Negative     INFLUENZA A PCR Negative     INFLUENZA B PCR Negative     RSV PCR Negative    Narrative:      FOR PEDIATRIC PATIENTS - copy/paste COVID Guidelines URL to browser: [email or web address]  [email or web address]    SARS-CoV-2 assay is a Nucleic Acid Amplification assay intended for the  qualitative detection of nucleic acid from SARS-CoV-2 in nasopharyngeal  swabs  Results are for the presumptive identification of SARS-CoV-2 RNA  Positive results are indicative of infection with SARS-CoV-2, the virus  causing COVID-19, but do not rule out bacterial infection or co-infection  with other viruses  Laboratories within the [place] and its  territories are required to report all positive results to the appropriate  public health authorities  Negative results do not preclude SARS-CoV-2  infection and should not be used as the sole basis for treatment or other  patient management decisions  Negative results must be combined with  clinical observations, patient history, and epidemiological information    This test has not been FDA cleared or approved  This test has been authorized by FDA under an Emergency Use Authorization  (EUA)  This test is only authorized for the duration of time the  declaration that circumstances exist justifying the authorization of the  emergency use of an in vitro diagnostic tests for detection of SARS-CoV-2  virus and/or diagnosis of COVID-19 infection under section 564(b)(1) of  the Act, 21 U  S C  746CZI-6(H)(3), unless the authorization is terminated  or revoked sooner  The test has been validated but independent review by FDA  and CLIA is pending  Test performed using [employer] GeneXpert: This RT-PCR assay targets N2,  a region unique to SARS-CoV-2  A conserved region in the E-gene was chosen  for pan-Sarbecovirus detection which includes SARS-CoV-2  Rapid drug screen, urine [[id number]]  (Normal) Collected: 08/16/22 1417    Lab Status: Final result Specimen: Urine, Other Updated: 08/16/22 1509     Amph/Meth UR Negative     Barbiturate Ur Negative     Benzodiazepine Urine Negative     Cocaine Urine Negative     Methadone Urine Negative     Opiate Urine Negative     PCP Ur Negative     THC Urine Negative     Oxycodone Urine Negative    Narrative:      FOR MEDICAL PURPOSES ONLY  IF CONFIRMATION NEEDED PLEASE CONTACT THE LAB WITHIN 5 DAYS  Drug Screen Cutoff Levels:  AMPHETAMINE/METHAMPHETAMINES  1000 ng/mL  BARBITURATES     200 ng/mL  BENZODIAZEPINES     200 ng/mL  COCAINE      300 ng/mL  METHADONE      300 ng/mL  OPIATES      300 ng/mL  PHENCYCLIDINE     25 ng/mL  THC       50 ng/mL  OXYCODONE      100 ng/mL    TSH, 3rd generation with Free T4 reflex [[id number]]  (Normal) Collected: 08/16/22 1417    Lab Status: Final result Specimen: Blood from Arm, Right Updated: 08/16/22 1458     TSH 3RD GENERATON 0 906 uIU/mL     Narrative:      Patients undergoing fluorescein dye angiography may retain small amounts of fluorescein in the body for 48-72 hours post procedure   Samples containing fluorescein can produce falsely depressed TSH values  If the patient had this procedure,a specimen should be resubmitted post fluorescein clearance        Comprehensive metabolic panel [[id number]]  (Abnormal) Collected: 08/16/22 1417    Lab Status: Final result Specimen: Blood from Arm, Right Updated: 08/16/22 1452     Sodium 139 mmol/L      Potassium 3 9 mmol/L      Chloride 109 mmol/L      CO2 23 mmol/L      ANION GAP 7 mmol/L      BUN 16 mg/dL      Creatinine 0 93 mg/dL      Glucose 141 mg/dL      Calcium 9 9 mg/dL      AST 35 U/L      ALT 43 U/L      Alkaline Phosphatase 85 U/L      Total Protein 7 5 g/dL      Albumin 3 5 g/dL      Total Bilirubin 0 90 mg/dL      eGFR 68 ml/min/1 73sq m     Narrative:      [place] guidelines for Chronic Kidney Disease (CKD):     Stage 1 with normal or high GFR (GFR > 90 mL/min/1 73 square meters)    Stage 2 Mild CKD (GFR = 60-89 mL/min/1 73 square meters)    Stage 3A Moderate CKD (GFR = 45-59 mL/min/1 73 square meters)    Stage 3B Moderate CKD (GFR = 30-44 mL/min/1 73 square meters)    Stage 4 Severe CKD (GFR = 15-29 mL/min/1 73 square meters)    Stage 5 End Stage CKD (GFR <15 mL/min/1 73 square meters)  Note: GFR calculation is accurate only with a steady state creatinine    CBC and differential [[id number]]  (Abnormal) Collected: 08/16/22 1417    Lab Status: Final result Specimen: Blood from Arm, Right Updated: 08/16/22 1445     WBC 16 69 Thousand/uL      RBC 4 79 Million/uL      Hemoglobin 13 8 g/dL      Hematocrit 42 0 %      MCV 88 fL      MCH 28 8 pg      MCHC 32 9 g/dL      RDW 14 0 %      MPV 10 5 fL      Platelets 346 Thousands/uL      nRBC 0 /100 WBCs      Neutrophils Relative 71 %      Immat GRANS % 1 %      Lymphocytes Relative 18 %      Monocytes Relative 8 %      Eosinophils Relative 2 %      Basophils Relative 0 %      Neutrophils Absolute 11 86 Thousands/µL      Immature Grans Absolute 0 09 Thousand/uL      Lymphocytes Absolute 3 03 Thousands/µL      Monocytes Absolute 1 35 Thousand/µL      Eosinophils Absolute 0 29 Thousand/µL      Basophils Absolute 0 07 Thousands/µL                  No orders to display         Procedures  Procedures      ED Course  ED Course as of 08/16/22 1946   Tue Aug 16, 2022   1553 Medically cleared for behavioral health  Will add bHCG for placement  SBIRT 22yo+    Flowsheet Row Most Recent Value   SBIRT (23 yo +)    In order to provide better care to our patients, we are screening all of our patients for alcohol and drug use  Would it be okay to ask you these screening questions? Unable to answer at this time Filed at: 08/16/2022 1251                MDM  Number of Diagnoses or Management Options  Depression  Suicidal ideation  Diagnosis management comments: 60-year-old female presenting for SI and depression  Patient was 36'd by [facility] crisis and   Will medically clear the patient, offer 201  Will place on continuous one-to-one for SI, medicate as needed  Patient did not want to sign a 201  I upheld the 302 that was petitioned against the patient and [facility]  Bed search  Disposition  Final diagnoses:   Suicidal ideation   Depression     Time reflects when diagnosis was documented in both MDM as applicable and the Disposition within this note     Time User Action Codes Description Comment    8/16/2022  3:58 PM Marlene Confer Add [C94 923] Suicidal ideation     8/16/2022  3:58 PM Marlene Confer Add Mirna Jacobs  A] Depression       ED Disposition     ED Disposition   Transfer to Behavioral Health    Condition   --    Date/Time   Tue Aug 16, 2022  3:58 PM    Comment   Burnice Libman should be transferred out to [address] [place] [address]  MD Documentation    Eron Bradley Most Recent Value   Sending MD Dr Kevin Kearney    None         Patient's Medications   Discharge Prescriptions    No medications on file     No discharge procedures on file      PDMP Review     None           ED Provider  Attending physically available and evaluated Cochran Briellejordan MENDEZ managed the patient along with the ED Attending      Electronically Signed by         Noris Rooney DO  08/16/22 9396

## 2022-08-16 NOTE — ED ATTENDING ATTESTATION
8/16/2022  IFara DO, saw and evaluated the patient  I have discussed the patient with the resident/non-physician practitioner and agree with the resident's/non-physician practitioner's findings, Plan of Care, and MDM as documented in the resident's/non-physician practitioner's note, except where noted  All available labs and Radiology studies were reviewed  I was present for key portions of any procedure(s) performed by the resident/non-physician practitioner and I was immediately available to provide assistance  At this point I agree with the current assessment done in the Emergency Department  I have conducted an independent evaluation of this patient a history and physical is as follows:    Patient is a 58-year-old female with a history of depression, brought in by EMS under a 302 involuntary hospitalization  The patient has been chronically depressed after being a victim of a sweetheart scam"   She was speaking on the phone to a community health , making statements that she wanted to harm herself, they reportedly had a conference call with [facility], the patient continue to make statements like that, was very agitated  [facility] and the [facility] completed a 302 and the patient was brought to the ED  She does admit to taking 2 mg of Ativan earlier to help calm down and does say she feels more calm  The patient right now so she does not feel suicidal and thinks people may be over reacting  She tells me that she does not want to hurt herself currently, but does say earlier that if she had a gun she probably would have killed herself earlier  She denies any physical complaints, denies chest pain, denies shortness of breath, denies fevers, denies chills, denies dysuria or hematuria  Denies headache      General:  Patient is well-appearing  Head:  Atraumatic  Eyes:  Conjunctiva pink  ENT:  Mucous membranes are moist  Neck:  Supple  Cardiac:  S1-S2, without murmurs  Lungs: Clear to auscultation bilaterally  Abdomen:  Soft, nontender, normal bowel sounds, no CVA tenderness, no tympany, no rigidity, no guarding  Extremities:  Normal range of motion  Neurologic:  Awake, fluent speech, normal comprehension  AAOx3  Skin:  Pink warm and dry  Psychiatric:  Appearance:  Dressed in paper scrubs; Speech:  No evidence of pressure; Mood:  Agitated; Affect:  Mood congruent; Thought Process:  Normal, goal-directed;  Thought Content:  As above, denies current HI or AVH            ED Course     Patient refused voluntary admission, 302 signed, bed search pending    Critical Care Time  Procedures

## 2022-08-16 NOTE — ED NOTES
Pt cursing and yelling  Pt is not redirectable  Calming techniques are not effective  1:1 remains at the bedisde  Dr Santa Day made aware  Pt agreeable to po medications       Maulik Spann RN  97/22/82 7648

## 2022-08-16 NOTE — ED NOTES
Pt is a 61 y o  female who was brought to the ED on 302 petitioned by [facility]  Per 302, and chart documentation, patient called the 's office and expressed that she cannot continue on the way she is and she doesn't want to proceed with her life  Patient told the  that if she had a gun she would take her cats too so they wouldn't be left behind  Patient had expressed to the  that she had sold all her possessions and gave a 30 days notice on her apartment because she was told to from a jesse in [place], who also told her she should move there and live with him  Patient indicated that she did not believe this was a scheme and did not want to involve adult protective services  Patient continued to express to the  and [facility] crisis that she is hopeless and has no family, friends, or support at all  Patient has a history of inpatient admissions, most recently at [facility] in February  She was previously active with outpatient at [facility], but was discharged due to non-compliance  Patient's  has been attempting to schedule with outpatient services for patient  Upon meeting with patient, she was listening to music and did not answer any questions that were asked  After several attempts to engage patient in assessment, she was informed of petitioned 36  Patient then turned around and expressed that going to the hospital isn't going to help, nothing helps  Attempted to engage patient in assessment again, however, she continued to decline to answer any questions  Patient informed of 302 to be upheld by the ER doctor at this time  [facility] and patient's  made necessary connections to ensure that patient's cats will be taken care of and to rescind her 30 day notice request      Chief Complaint   Patient presents with    Psychiatric Evaluation     Per ems pt is a 302    Pt took 2mg of ativan prior to arrival   Pt made comments on the phone to the  at [facility] about wanting to kill herself         Intake Assessment completed, Safety risk Assessment completed    VLADISLAV Talbot  08/16/22   [id number]

## 2022-08-16 NOTE — ED NOTES
Report received from following RN at this time  Pt  Is resting and appears to be in no distress at this time  1:1 continued        Carlos Sandoval RN  08/16/22 7371

## 2022-08-17 ENCOUNTER — HOSPITAL ENCOUNTER (INPATIENT)
Facility: HOSPITAL | Age: 57
LOS: 6 days | Discharge: HOME | DRG: 885 | End: 2022-08-23
Attending: STUDENT IN AN ORGANIZED HEALTH CARE EDUCATION/TRAINING PROGRAM | Admitting: PSYCHIATRY & NEUROLOGY
Payer: MEDICARE

## 2022-08-17 VITALS
OXYGEN SATURATION: 95 % | HEART RATE: 58 BPM | DIASTOLIC BLOOD PRESSURE: 73 MMHG | TEMPERATURE: 99.3 F | SYSTOLIC BLOOD PRESSURE: 111 MMHG | RESPIRATION RATE: 17 BRPM

## 2022-08-17 DIAGNOSIS — E78.2 MIXED HYPERLIPIDEMIA: ICD-10-CM

## 2022-08-17 DIAGNOSIS — F33.2 MDD (MAJOR DEPRESSIVE DISORDER), RECURRENT EPISODE, SEVERE (HCC): Primary | ICD-10-CM

## 2022-08-17 DIAGNOSIS — R45.851 SUICIDAL IDEATION: ICD-10-CM

## 2022-08-17 DIAGNOSIS — F33.2 SEVERE EPISODE OF RECURRENT MAJOR DEPRESSIVE DISORDER, WITHOUT PSYCHOTIC FEATURES (HCC): ICD-10-CM

## 2022-08-17 DIAGNOSIS — F31.9 BIPOLAR AFFECTIVE DISORDER, REMISSION STATUS UNSPECIFIED (HCC): Chronic | ICD-10-CM

## 2022-08-17 DIAGNOSIS — F41.9 ANXIETY: Chronic | ICD-10-CM

## 2022-08-17 DIAGNOSIS — I10 ESSENTIAL HYPERTENSION: ICD-10-CM

## 2022-08-17 LAB
25(OH)D3 SERPL-MCNC: 22.4 NG/ML (ref 30–100)
CHOLEST SERPL-MCNC: 237 MG/DL
FOLATE SERPL-MCNC: 11.2 NG/ML (ref 3.1–17.5)
HDLC SERPL-MCNC: 54 MG/DL
LDLC SERPL CALC-MCNC: 131 MG/DL (ref 0–100)
NONHDLC SERPL-MCNC: 183 MG/DL
TRIGL SERPL-MCNC: 259 MG/DL
TSH SERPL DL<=0.05 MIU/L-ACNC: 1.92 UIU/ML (ref 0.45–4.5)
VIT B12 SERPL-MCNC: 282 PG/ML (ref 100–900)

## 2022-08-17 PROCEDURE — 99252 IP/OBS CONSLTJ NEW/EST SF 35: CPT | Performed by: PHYSICIAN ASSISTANT

## 2022-08-17 PROCEDURE — 82306 VITAMIN D 25 HYDROXY: CPT | Performed by: PSYCHIATRY & NEUROLOGY

## 2022-08-17 PROCEDURE — 80061 LIPID PANEL: CPT | Performed by: PSYCHIATRY & NEUROLOGY

## 2022-08-17 PROCEDURE — 82746 ASSAY OF FOLIC ACID SERUM: CPT | Performed by: PSYCHIATRY & NEUROLOGY

## 2022-08-17 PROCEDURE — 82607 VITAMIN B-12: CPT | Performed by: PSYCHIATRY & NEUROLOGY

## 2022-08-17 PROCEDURE — 84443 ASSAY THYROID STIM HORMONE: CPT | Performed by: PSYCHIATRY & NEUROLOGY

## 2022-08-17 PROCEDURE — 86592 SYPHILIS TEST NON-TREP QUAL: CPT | Performed by: PSYCHIATRY & NEUROLOGY

## 2022-08-17 PROCEDURE — 99221 1ST HOSP IP/OBS SF/LOW 40: CPT | Performed by: STUDENT IN AN ORGANIZED HEALTH CARE EDUCATION/TRAINING PROGRAM

## 2022-08-17 RX ORDER — HALOPERIDOL 5 MG/1
5 TABLET ORAL
Status: DISCONTINUED | OUTPATIENT
Start: 2022-08-17 | End: 2022-08-23 | Stop reason: HOSPADM

## 2022-08-17 RX ORDER — ACETAMINOPHEN 325 MG/1
650 TABLET ORAL EVERY 4 HOURS PRN
Status: DISCONTINUED | OUTPATIENT
Start: 2022-08-17 | End: 2022-08-23 | Stop reason: HOSPADM

## 2022-08-17 RX ORDER — MAGNESIUM HYDROXIDE/ALUMINUM HYDROXICE/SIMETHICONE 120; 1200; 1200 MG/30ML; MG/30ML; MG/30ML
30 SUSPENSION ORAL EVERY 4 HOURS PRN
Status: DISCONTINUED | OUTPATIENT
Start: 2022-08-17 | End: 2022-08-23 | Stop reason: HOSPADM

## 2022-08-17 RX ORDER — LORAZEPAM 2 MG/ML
2 INJECTION INTRAMUSCULAR
Status: DISCONTINUED | OUTPATIENT
Start: 2022-08-17 | End: 2022-08-23 | Stop reason: HOSPADM

## 2022-08-17 RX ORDER — HYDROXYZINE 50 MG/1
50 TABLET, FILM COATED ORAL
Status: DISCONTINUED | OUTPATIENT
Start: 2022-08-17 | End: 2022-08-23 | Stop reason: HOSPADM

## 2022-08-17 RX ORDER — LORAZEPAM 2 MG/ML
1 INJECTION INTRAMUSCULAR
Status: DISCONTINUED | OUTPATIENT
Start: 2022-08-17 | End: 2022-08-23 | Stop reason: HOSPADM

## 2022-08-17 RX ORDER — LORAZEPAM 2 MG/ML
2 INJECTION INTRAMUSCULAR EVERY 6 HOURS PRN
Status: DISCONTINUED | OUTPATIENT
Start: 2022-08-17 | End: 2022-08-23 | Stop reason: HOSPADM

## 2022-08-17 RX ORDER — BENZTROPINE MESYLATE 1 MG/ML
0.5 INJECTION INTRAMUSCULAR; INTRAVENOUS
Status: DISCONTINUED | OUTPATIENT
Start: 2022-08-17 | End: 2022-08-23 | Stop reason: HOSPADM

## 2022-08-17 RX ORDER — BENZTROPINE MESYLATE 1 MG/1
1 TABLET ORAL
Status: DISCONTINUED | OUTPATIENT
Start: 2022-08-17 | End: 2022-08-23 | Stop reason: HOSPADM

## 2022-08-17 RX ORDER — HALOPERIDOL 5 MG/ML
2.5 INJECTION INTRAMUSCULAR
Status: DISCONTINUED | OUTPATIENT
Start: 2022-08-17 | End: 2022-08-23 | Stop reason: HOSPADM

## 2022-08-17 RX ORDER — POLYETHYLENE GLYCOL 3350 17 G/17G
17 POWDER, FOR SOLUTION ORAL DAILY PRN
Status: DISCONTINUED | OUTPATIENT
Start: 2022-08-17 | End: 2022-08-23 | Stop reason: HOSPADM

## 2022-08-17 RX ORDER — ACETAMINOPHEN 325 MG/1
975 TABLET ORAL EVERY 6 HOURS PRN
Status: DISCONTINUED | OUTPATIENT
Start: 2022-08-17 | End: 2022-08-23 | Stop reason: HOSPADM

## 2022-08-17 RX ORDER — ATORVASTATIN CALCIUM 20 MG/1
20 TABLET, FILM COATED ORAL
Status: DISCONTINUED | OUTPATIENT
Start: 2022-08-17 | End: 2022-08-23 | Stop reason: HOSPADM

## 2022-08-17 RX ORDER — AMOXICILLIN 250 MG
1 CAPSULE ORAL DAILY PRN
Status: DISCONTINUED | OUTPATIENT
Start: 2022-08-17 | End: 2022-08-23 | Stop reason: HOSPADM

## 2022-08-17 RX ORDER — FLUOXETINE HYDROCHLORIDE 20 MG/1
40 CAPSULE ORAL DAILY
Status: DISCONTINUED | OUTPATIENT
Start: 2022-08-17 | End: 2022-08-21

## 2022-08-17 RX ORDER — LANOLIN ALCOHOL/MO/W.PET/CERES
3 CREAM (GRAM) TOPICAL
Status: DISCONTINUED | OUTPATIENT
Start: 2022-08-17 | End: 2022-08-17

## 2022-08-17 RX ORDER — POLYETHYLENE GLYCOL 3350 17 G/17G
17 POWDER, FOR SOLUTION ORAL DAILY PRN
Status: DISCONTINUED | OUTPATIENT
Start: 2022-08-17 | End: 2022-08-17

## 2022-08-17 RX ORDER — AMLODIPINE BESYLATE 5 MG/1
10 TABLET ORAL DAILY
Status: DISCONTINUED | OUTPATIENT
Start: 2022-08-17 | End: 2022-08-23 | Stop reason: HOSPADM

## 2022-08-17 RX ORDER — HALOPERIDOL 5 MG/1
2.5 TABLET ORAL
Status: DISCONTINUED | OUTPATIENT
Start: 2022-08-17 | End: 2022-08-23 | Stop reason: HOSPADM

## 2022-08-17 RX ORDER — BISACODYL 10 MG
10 SUPPOSITORY, RECTAL RECTAL DAILY PRN
Status: DISCONTINUED | OUTPATIENT
Start: 2022-08-17 | End: 2022-08-23 | Stop reason: HOSPADM

## 2022-08-17 RX ORDER — TRAZODONE HYDROCHLORIDE 50 MG/1
50 TABLET ORAL
Status: DISCONTINUED | OUTPATIENT
Start: 2022-08-17 | End: 2022-08-23 | Stop reason: HOSPADM

## 2022-08-17 RX ORDER — HALOPERIDOL 1 MG/1
0.5 TABLET ORAL EVERY 6 HOURS PRN
Status: DISCONTINUED | OUTPATIENT
Start: 2022-08-17 | End: 2022-08-23 | Stop reason: HOSPADM

## 2022-08-17 RX ORDER — HYDROXYZINE 50 MG/1
100 TABLET, FILM COATED ORAL
Status: DISCONTINUED | OUTPATIENT
Start: 2022-08-17 | End: 2022-08-17

## 2022-08-17 RX ORDER — BISACODYL 10 MG
10 SUPPOSITORY, RECTAL RECTAL DAILY PRN
Status: DISCONTINUED | OUTPATIENT
Start: 2022-08-17 | End: 2022-08-17

## 2022-08-17 RX ORDER — MIRTAZAPINE 15 MG/1
15 TABLET, FILM COATED ORAL
Status: DISCONTINUED | OUTPATIENT
Start: 2022-08-17 | End: 2022-08-23 | Stop reason: HOSPADM

## 2022-08-17 RX ORDER — BENZTROPINE MESYLATE 1 MG/ML
1 INJECTION INTRAMUSCULAR; INTRAVENOUS
Status: DISCONTINUED | OUTPATIENT
Start: 2022-08-17 | End: 2022-08-23 | Stop reason: HOSPADM

## 2022-08-17 RX ORDER — RISPERIDONE 0.5 MG/1
0.5 TABLET, FILM COATED ORAL
Status: DISCONTINUED | OUTPATIENT
Start: 2022-08-17 | End: 2022-08-21

## 2022-08-17 RX ORDER — DIPHENHYDRAMINE HYDROCHLORIDE 50 MG/ML
50 INJECTION INTRAMUSCULAR; INTRAVENOUS EVERY 6 HOURS PRN
Status: DISCONTINUED | OUTPATIENT
Start: 2022-08-17 | End: 2022-08-23 | Stop reason: HOSPADM

## 2022-08-17 RX ORDER — HALOPERIDOL 5 MG/ML
5 INJECTION INTRAMUSCULAR
Status: DISCONTINUED | OUTPATIENT
Start: 2022-08-17 | End: 2022-08-23 | Stop reason: HOSPADM

## 2022-08-17 RX ORDER — LORAZEPAM 0.5 MG/1
0.5 TABLET ORAL EVERY 8 HOURS PRN
Status: DISCONTINUED | OUTPATIENT
Start: 2022-08-17 | End: 2022-08-23 | Stop reason: HOSPADM

## 2022-08-17 RX ORDER — HYDROXYZINE HYDROCHLORIDE 25 MG/1
25 TABLET, FILM COATED ORAL
Status: DISCONTINUED | OUTPATIENT
Start: 2022-08-17 | End: 2022-08-23 | Stop reason: HOSPADM

## 2022-08-17 RX ORDER — MINERAL OIL AND PETROLATUM 150; 830 MG/G; MG/G
1 OINTMENT OPHTHALMIC
Status: DISCONTINUED | OUTPATIENT
Start: 2022-08-17 | End: 2022-08-23 | Stop reason: HOSPADM

## 2022-08-17 RX ORDER — ACETAMINOPHEN 325 MG/1
650 TABLET ORAL EVERY 6 HOURS PRN
Status: DISCONTINUED | OUTPATIENT
Start: 2022-08-17 | End: 2022-08-18

## 2022-08-17 RX ADMIN — FLUOXETINE 40 MG: 20 CAPSULE ORAL at 12:01

## 2022-08-17 RX ADMIN — MIRTAZAPINE 15 MG: 15 TABLET, FILM COATED ORAL at 21:10

## 2022-08-17 RX ADMIN — ATORVASTATIN CALCIUM 20 MG: 20 TABLET, FILM COATED ORAL at 17:30

## 2022-08-17 RX ADMIN — RISPERIDONE 0.5 MG: 0.5 TABLET ORAL at 21:11

## 2022-08-17 RX ADMIN — AMLODIPINE BESYLATE 10 MG: 5 TABLET ORAL at 12:01

## 2022-08-17 NOTE — ED NOTES
Pt resting  No signs of distress noted    1:1 remains at the bedside     Panfilo Moreno RN  19/37/85 0390

## 2022-08-17 NOTE — DISCHARGE INSTR - APPOINTMENTS
You will be discharged to: [address] [facility]   You confirmed that your cell phone number is: [phone]        Gael Brown or Gertrude, nia Prescott and Vandana, will be calling you after your discharge, on the phone number that you provided  They will be available as an additional support, if needed  If you wish to speak with one of them, you may contact Raya Quezada at [phone] or Stefany Mack at [phone]

## 2022-08-17 NOTE — ED NOTES
Pt  Awake and requesting gingerale  Gave pt  gingerale and checked vital signs  Pt  Denies any SI at this time  Encouraged to let this RN know if anything is needed  1:1 continued at this time        Meghan Allen, SHORTY  08/17/22 9566

## 2022-08-17 NOTE — CONSULTS
[address] [facility] 1965, 61 y o  female MRN: [id number]  Unit/Bed#: [facility] 260-01 Encounter: [id number]  Primary Care Provider: Chaya Hidalgo MD   Date and time admitted to hospital: 8/17/2022  8:41 AM    Inpatient consult for Medical Clearance for [facility] patient  Consult performed by: Timur Jesus PA-C  Consult ordered by: Danica Joiner MD        * Medical clearance for psychiatric admission  Assessment & Plan   Admission labs reviewed, CBC, CMP, RPR acceptable   RPR, HbA1c, lipid panel, TSH, UA labs pending   Vitals stable    UDS negative   COVID-19 negative   No ECG on admission   Medically stable for continued inpatient psychiatric treatment based on available results   Please contact SLIM with any questions or concerns      Obesity  Assessment & Plan  Encourage weight loss    Bipolar disorder [facility])  Assessment & Plan  Management by psychiatry team    MDD (major depressive disorder), recurrent episode, severe ([address] )  Assessment & Plan  Management by psychiatry    Hyperlipidemia  Assessment & Plan  Continue statin    HTN (hypertension)  Assessment & Plan  Continue amlodipine and lisinopril      Recommendations for Discharge:  · SLIM will continue to be available for any questions or concerns  · Follow up with PCP upon discharge  Counseling / Coordination of Care Time: 30 minutes  Greater than 50% of total time spent on patient counseling and coordination of care  Collaboration of Care: Were Recommendations Directly Discussed with Primary Treatment Team? - Yes     History of Present Illness:    Yudelka Lyosn is a 61 y o  female who is originally admitted to the psychiatric service due to hopelessness and suicidal ideatoin  [facility] [place] [facility]  [facility]  She has had multiple psychiatric admissions in the past  We are consulted for medical clearance for psychiatric hospitalization and medical management   From a medical standpoint she suffers from obesity, htn, hld, which are all controlled with statin, lisinopril, amlodipine  She denies any recent pain or sickness, recent sick contact, recent travel  She is medically stable for psychiatric admission  Review of Systems:    Review of Systems    Past Medical and Surgical History:     Past Medical History:   Diagnosis Date    Anxiety     Arthritis     Bipolar affective disorder, depressed, severe ([address] ) 2019    Depression     Elevated bilirubin 8/15/2019    Hyperlipidemia     Hypertension     Hypokalemia 8/15/2019    Learning difficulty     Leukocytosis 2020    Obesity (BMI 30 0-34 9) 2020    Osteopenia     Ovarian failure     Previous known suicide attempt     Psychiatric disorder     Psychiatric illness        Past Surgical History:   Procedure Laterality Date    LAPAROSCOPY      as a child, per patient-normal findings       Meds/Allergies:    all medications and allergies reviewed    Allergies:    Allergies   Allergen Reactions    Other      Hay Fever    Oxycodone-Acetaminophen GI Intolerance       Social History:     Marital Status: Single    Substance Use History:   Social History     Substance and Sexual Activity   Alcohol Use Not Currently    Alcohol/week: 2 0 standard drinks    Types: 2 Cans of beer per week    Comment: socially     Social History     Tobacco Use   Smoking Status Current Some Day Smoker    Packs/day: 0 25    Years: 15 00    Pack years: 3 75    Types: Cigarettes    Last attempt to quit: 1990    Years since quittin 6   Smokeless Tobacco Never Used   Tobacco Comment    6 cigerattes a day     Social History     Substance and Sexual Activity   Drug Use No       Family History:    non-contributory    Physical Exam:     Vitals:   Blood Pressure: 121/84 (22 0848)  Pulse: 67 (22 0848)  Temperature: 97 9 °F (36 6 °C) (22 0848)  Temp Source: Tympanic (22 0848)  Respirations: 18 (22 0848)  Height: 5' 3 39" (161 cm) (08/17/22 0848)  Weight - Scale: 86 kg (189 lb 9 6 oz) (08/17/22 0848)  SpO2: 98 % (08/17/22 0848)    Physical Exam      Additional Data:     Lab Results: I have personally reviewed pertinent reports  Results from last 7 days   Lab Units 08/16/22  1417   WBC Thousand/uL 16 69*   HEMOGLOBIN g/dL 13 8   HEMATOCRIT % 42 0   PLATELETS Thousands/uL 304   NEUTROS PCT % 71   LYMPHS PCT % 18   MONOS PCT % 8   EOS PCT % 2     Results from last 7 days   Lab Units 08/16/22  1417   SODIUM mmol/L 139   POTASSIUM mmol/L 3 9   CHLORIDE mmol/L 109*   CO2 mmol/L 23   BUN mg/dL 16   CREATININE mg/dL 0 93   ANION GAP mmol/L 7   CALCIUM mg/dL 9 9   ALBUMIN g/dL 3 5   TOTAL BILIRUBIN mg/dL 0 90   ALK PHOS U/L 85   ALT U/L 43   AST U/L 35   GLUCOSE RANDOM mg/dL 141*             Lab Results   Component Value Date/Time    HGBA1C 5 6 04/29/2019 06:22 AM               Imaging: I have personally reviewed pertinent reports  No orders to display       EKG, Pathology, and Other Studies Reviewed on Admission:   · EKG: No ECG on admission    ** Please Note: This note has been constructed using a voice recognition system   **

## 2022-08-17 NOTE — NURSING NOTE
Pt admitted on 302 from [facility] ED after making SI statements to [facility] crisis worker  Per pt, denies active SI, no plan or intent  Admits to passive SI that is not new and is present even when taking meds  States "I was extremely upset over the situation  Things did not work out but I was not attempting to hurt myself"  Pt discussed being in a relationship for a couple of months via online chat  Per pt, this man asked pt to move to [place] to live with him  Pt sold most of personal belongings and was preparing to move  This man was allegedly attempting to send money to pt's back account and account was flagged by the fraud dept  Pt became upset and had argument with this man after back employees warned pt of likely fraud  Pt states "I was so confident that he was genuine  I'm still not sure"  Pt reports limited supports  No close friends and not close to any family members  Mother resides in assisted living  States Peyton, with [employer] was getting pt set up with OP services PTA  PMH of HTN, hyperlipidemia, b/l knee pain  H/o Bipolar disorder  Denies substance abuse  Light smoker  Able to return to apartment however will need to replenish belongings that have been sold

## 2022-08-17 NOTE — ED NOTES
Coordination of Benefits completed with [employer] [place] [address] contact them when patient has been admitted      Debbie Brown  Crisis Intervention Specialist II  08/16/22

## 2022-08-17 NOTE — ASSESSMENT & PLAN NOTE
 Admission labs reviewed, CBC, CMP, RPR acceptable   RPR, HbA1c, lipid panel, TSH, UA labs pending   Vitals stable    UDS negative   COVID-19 negative   No ECG on admission   Medically stable for continued inpatient psychiatric treatment based on available results   Please contact SLIM with any questions or concerns

## 2022-08-17 NOTE — DISCHARGE INSTR - OTHER ORDERS
[address] [facility] INFORMATION   The PEER LINE is a toll-free telephone number for people in [facility] who are seeking a listening ear for additional support in their recovery from mental illness  The PEER LINE is peer-run and peer-friendly  You can call the Peer Line 24 hours a day  Phone: 4-920-CX-PEERS /(5-522.307.1340)   Emergency [facility]: [phone]  [address]  [address] [facility], [address] [place] [address]      Text CONNECT to 908118 from anywhere in the [place], anytime, about any type of crisis  A live, trained Crisis Counselor receives the text and lets you know that they are here to listen  The volunteer Crisis Counselor will help you move from a hot moment to a cool moment  Warm Line: [phone], [phone], [phone]  If it is not quite a crisis, but you want to talk to someone, 24 hours/day, 7 days/week:  Someone to listen; someone who cares  The [facility] on [facility] ([facility]) offers various education & support groups for you & your family  For more information visit their website at   http://www [employer]/      Dial 2-1-1 to get connected/get help  Free, confidential information & referral available 24/7: Aging Services, Child & Youth Services, Counseling, Education/Training, Food/Shelter/Clothing, Health Services, Parenting, Substance Abuse, Support Groups, Volunteer Opportunities, & much more    Phone: 2-1-1 or 855-610-1302, Web: FLOYD [email or web address] ALONDRA, Email: [email or web address]

## 2022-08-17 NOTE — CMS CERTIFICATION NOTE
Recertification: Based upon physical, mental and social evaluations, I certify that inpatient psychiatric services continue to be medically necessary for this patient for a duration of 7 midnights for the treatment of  MDD (major depressive disorder), recurrent episode, severe ([facility] )   Available alternative community resources still do not meet the patient's mental health care needs  I further attest that an established written individualized plan of care has been updated and is outlined in the patient's medical records

## 2022-08-17 NOTE — PROGRESS NOTES
1 suitcase  1 necklace  15 underwear  5 bras  9 tank tops  1 nightgown   2 dresses  4 shorts  3 leggings  1 blouse  1 tshirt

## 2022-08-17 NOTE — ED NOTES
Patient is accepted at [address]  Patient is accepted by Dr Elaine Greer per Jayson Koo in Intake  Transportation is arranged with [facility]  Transportation is scheduled for 0800  Patient may go to the floor after 2300  *Nurse report is to be called to [phone] prior to patient transfer  VLADISLAV Phipps  08/16/22   0502

## 2022-08-17 NOTE — TREATMENT TEAM
08/17/22 1230   Activity/Group Checklist   Group Life Skills  (positive affirmations)   Attendance Attended   Attendance Duration (min) 16-30   Interactions Interacted appropriately   Affect/Mood Appropriate   Goals Achieved Identified feelings; Able to listen to others; Able to engage in interactions; Other (Comment)  (identified an affirmation needed the most right now)

## 2022-08-17 NOTE — ED NOTES
[address] emailed to Jadon Jurado at [facility]       Yisel Gill, VLADISLAV  08/16/22   9443

## 2022-08-17 NOTE — PLAN OF CARE
Problem: SELF HARM/SUICIDALITY  Goal: Will have no self-injury during hospital stay  Description: INTERVENTIONS:  - Q 15 MINUTES: Routine safety checks  - Q WAKING SHIFT & PRN: Assess risk to determine if routine checks are adequate to maintain patient safety  - Encourage patient to participate actively in care by formulating a plan to combat response to suicidal ideation, identify supports and resources  Outcome: Progressing     Problem: DEPRESSION  Goal: Will be euthymic at discharge  Description: INTERVENTIONS:  - Administer medication as ordered  - Provide emotional support via 1:1 interaction with staff  - Encourage involvement in milieu/groups/activities  - Monitor for social isolation  Outcome: Not Progressing     Problem: ANXIETY  Goal: Will report anxiety at manageable levels  Description: INTERVENTIONS:  - Administer medication as ordered  - Teach and encourage coping skills  - Provide emotional support  - Assess patient/family for anxiety and ability to cope  Outcome: Not Progressing  Goal: By discharge: Patient will verbalize 2 strategies to deal with anxiety  Description: Interventions:  - Identify any obvious source/trigger to anxiety  - Staff will assist patient in applying identified coping technique/skills  - Encourage attendance of scheduled groups and activities  Outcome: Not Progressing     New admission

## 2022-08-17 NOTE — TREATMENT PLAN
TREATMENT PLAN REVIEW - Behavioral Health Tamela Hahn 61 y o  1965 female MRN: [id number]    [address] [place] [address] Room / Bed: [facility] 260/[facility] 260-01 Encounter: [id number]          Admit Date/Time:  8/17/2022  8:41 AM    Treatment Team: Attending Provider: Chet Barnard MD; Licensed Practical Nurse: Remigio Cleveland LPN; Registered Nurse: Candelario Damon RN; Security: Anthony Pop;  : Pam Lundberg    Diagnosis: Principal Problem:    MDD (major depressive disorder), recurrent episode, severe ([facility] )  Active Problems:    HTN (hypertension)    Hyperlipidemia    Mild intellectual disability    Medical clearance for psychiatric admission    Bipolar disorder ([facility] )    Obesity      Patient Strengths/Assets: cooperative, motivation for treatment/growth, patient is on a voluntary commitment    Patient Barriers/Limitations: lack of social/family support, limited support system    Short Term Goals: decrease in depressive symptoms, decrease in anxiety symptoms, decrease in suicidal thoughts, improvement in insight, improvement in self care    Long Term Goals: improvement in depression, improvement in anxiety, resolution of depressive symptoms, stabilization of mood, free of suicidal thoughts, improved insight, adequate self care, adequate sleep, adequate appetite    Progress Towards Goals: starting psychiatric medications as prescribed    Recommended Treatment: medication management, patient medication education, group therapy, milieu therapy, continued Behavioral Health psychiatric evaluation/assessment process    Treatment Frequency: daily medication monitoring, group and milieu therapy daily, monitoring through interdisciplinary rounds, monitoring through weekly patient care conferences    Expected Discharge Date:  5-7 days    Discharge Plan: referral for outpatient medication management with a psychiatrist, referral for outpatient psychotherapy    Treatment Plan Created/Updated By: Kandy Pisano MD

## 2022-08-17 NOTE — PLAN OF CARE
Nursing team will meet with you twice a day to assess for suicidal thoughts, depression and anxiety  We will educate you on your diagnosis, medications and help you develop alternative coping skills to deal with life stressors

## 2022-08-17 NOTE — H&P
Psychiatric Evaluation - Behavioral Health   Juliano Young 61 y o  female MRN: [id number]  Unit/Bed#: U 260-01 Encounter: [id number]    Assessment/Plan   Principal Problem:    MDD (major depressive disorder), recurrent episode, severe ([facility] )  Active Problems:    HTN (hypertension)    Hyperlipidemia    Mild intellectual disability    Medical clearance for psychiatric admission    Bipolar disorder ([facility] )    Obesity    Plan:   Continue Prozac 40 mg daily  Remeron 15 mg PO HS  Decrease risperdal to 0 5 mg PO HS  Admit to [address] on 201 status for safety and treatment  No 1:1 CO needed at this time as patient feels safe on the unit  Check admission labs  Get collaterals  Collaborate with family for baseline assessment and disposition planning  Case discussed with treatment team     Treatment options and alternatives were reviewed with the patient, who concurs with the above plan  Risks, benefits, and possible side effects of medications were explained to the patient, and she verbalizes understanding       -----------------------------------    Chief Complaint: "I had thought to hurt self"    History of Present Illness     Per ED provider on 8/16: "Patient is a 67-year-old female with a history of depre`1ssion, brought in by EMS under a 302 involuntary hospitalization  The patient has been chronically depressed after being a victim of a sweetheart scam"   She was speaking on the phone to a community health , making statements that she wanted to harm herself, they reportedly had a conference call with [facility], the patient continue to make statements like that, was very agitated  [facility] and the [facility] completed a 302 and the patient was brought to the ED  She does admit to taking 2 mg of Ativan earlier to help calm down and does say she feels more calm  The patient right now so she does not feel suicidal and thinks people may be over reacting    She tells me that she does not want to hurt herself currently, but does say earlier that if she had a gun she probably would have killed herself earlier  She denies any physical complaints, denies chest pain, denies shortness of breath, denies fevers, denies chills, denies dysuria or hematuria  Denies headache"    Per Crisis worker on 8/16: "Pt is a 61 y o  female who was brought to the ED on 36 petitioned by [facility]  Per 302, and chart documentation, patient called the 's office and expressed that she cannot continue on the way she is and she doesn't want to proceed with her life  Patient told the  that if she had a gun she would take her cats too so they wouldn't be left behind  Patient had expressed to the  that she had sold all her possessions and gave a 30 days notice on her apartment because she was told to from a jesse in [place], who also told her she should move there and live with him  Patient indicated that she did not believe this was a scheme and did not want to involve adult protective services  Patient continued to express to the  and [facility] crisis that she is hopeless and has no family, friends, or support at all  Patient has a history of inpatient admissions, most recently at [facility] in February  She was previously active with outpatient at [facility], but was discharged due to non-compliance  Patient's  has been attempting to schedule with outpatient services for patient  Upon meeting with patient, she was listening to music and did not answer any questions that were asked  After several attempts to engage patient in assessment, she was informed of petitioned 36  Patient then turned around and expressed that going to the hospital isn't going to help, nothing helps  Attempted to engage patient in assessment again, however, she continued to decline to answer any questions  Patient informed of 302 to be upheld by the ER doctor at this time  [facility] crisis and patient's  made necessary connections to ensure that patient's cats will be taken care of and to rescind her 30 day notice request"    This is 62 yo female with hx of depression/anxiety admitted to inpatient unit on involuntary status for worsening of mood and suicidal ideation with plan to shoot self  Patient says that she was in relation with some body in [place] through a dating site for the past few months  She thought that she was genuine and planned to move over there and sold her belongings and gave notice to landlord  The wanted wanted to send money, but her online bank account was closed and found that it was fraud "sweetziyadrt romance scam"  Patient endorses depressed mood and passive thoughts to hurt self  Denies any intent or plan to hurt self "I was upset at that time  I couldn't believe it was a scam  I thought he was genuine"    Psychiatric Review Of Systems:  Medication side effects: none  Sleep: decreased at times  Appetite: no change  Hygiene: able to tend to instrumental and basic ADLs  Anxiety: Yes  Psychotic Symptoms: denies  Depression Symptoms: Yes  Manic Symptoms: denies  PTSD Symptoms: denies  Suicidal Thoughts: denies  Homicidal Thoughts: denies    Medical Review Of Systems:   Complete ROS is negative, except as noted above      7/15/2022  1   07/15/2022  Lorazepam 0 5 MG Tablet    [address]   [id number]   Bet (6038)     [place]icare [place]   06/17/2022  1   06/17/2022  Lorazepam 0 5 MG Tablet    [address]   [id number]   Bet (5239)     [place]are [place]   05/19/2022  1   05/02/2022  Lorazepam 0 5 MG Tablet    [address] 00  [address] [facility]   [id number]   Bet ([id number])     Medicare [place]   04/26/2022  2   12/16/2021  Lorazepam 0 5 MG Tablet    [address]   [id number]   Rit ([id number])     Medicare [place]   12/16/2021  2   12/16/2021  Lorazepam 0 5 MG Tablet    [address] 00  [address]   [id number]   Rit ([id number])     Medicare [place]         Historical Information     Psychiatric History:   Psychiatry diagnosis:MDD/anxiety  Inpatient Hx: "more than 10 times"  Suicidal Hx:Denies  Self harming behavior Hx: yes once, 20 years ago  Violent behavior Hx:Denies  Outpatient Hx: No "yue trying to get through "  Medications/Trials: Prozac, abilify, risperdal    Substance Abuse History:  Patient denies any hx of substance use  I spent time with Misa in counseling and education on risk of substance abuse  I assessed motivation and encouraged her for treatment as appropriate       Family Psychiatric History:   Brother- Bipolar disorder  Patient denies any known family history of psychiatric illness, suicide attempt, or substance abuse    Social History:  Education: 10th grade  Learning Disabilities:Special education  Marital history: Single  Living arrangement: Lives by herself  Occupational History: unemployed, on disability  Functioning Relationships:   Other Pertinent History: NOne      Traumatic History:   Denies    Past Medical History:   Past Medical History:   Diagnosis Date    Anxiety     Arthritis     Bipolar affective disorder, depressed, severe ([facility] ) 4/28/2019    Depression     Elevated bilirubin 8/15/2019    Hyperlipidemia     Hypertension     Hypokalemia 8/15/2019    Learning difficulty     Leukocytosis 7/28/2020    Obesity (BMI 30 0-34 9) 6/26/2020    Osteopenia     Ovarian failure     Previous known suicide attempt     Psychiatric disorder     Psychiatric illness         -----------------------------------  Objective    Temp:  [97 9 °F (36 6 °C)-98 °F (36 7 °C)] 98 °F (36 7 °C)  HR:  [58-90] 90  Resp:  [17-18] 18  BP: (111-151)/(73-97) 151/97    Mental Status Evaluation:    Appearance:  overweight   Behavior:  guarded   Speech:  soft and slow   Mood:  anxious and depressed   Affect:  constricted   Thought Process:  goal directed and logical   Thought Content:  normal   Perceptual Disturbances: None   Risk Potential: Suicidal Ideations none  Homicidal Ideations none  Potential for Aggression No   Sensorium:  person, place and time/date   Memory:  recent and remote memory grossly intact   Consciousness:  alert and awake    Attention: attention span appeared shorter than expected for age   Insight:  fair   Judgment: fair   Gait/Station: normal gait/station   Motor Activity: no abnormal movements       Meds/Allergies   Allergies   Allergen Reactions    Other      Hay Fever    Oxycodone-Acetaminophen GI Intolerance     all current active meds have been reviewed    Behavioral Health Medications: all current active meds have been reviewed  Changes as above  Laboratory results:  I have personally reviewed all pertinent laboratory/tests results    Recent Results (from the past 48 hour(s))   ECG 12 lead    Collection Time: 08/16/22  2:00 PM   Result Value Ref Range    Ventricular Rate 103 BPM    Atrial Rate 103 BPM    UT Interval 168 ms    QRSD Interval 92 ms    QT Interval 336 ms    QTC Interval 440 ms    P Axis 66 degrees    QRS Axis -36 degrees    T Wave Axis 52 degrees   Rapid drug screen, urine    Collection Time: 08/16/22  2:17 PM   Result Value Ref Range    Amph/Meth UR Negative Negative    Barbiturate Ur Negative Negative    Benzodiazepine Urine Negative Negative    Cocaine Urine Negative Negative    Methadone Urine Negative Negative    Opiate Urine Negative Negative    PCP Ur Negative Negative    THC Urine Negative Negative    Oxycodone Urine Negative Negative   CBC and differential    Collection Time: 08/16/22  2:17 PM   Result Value Ref Range    WBC 16 69 (H) 4 31 - 10 16 Thousand/uL    RBC 4 79 3 81 - 5 12 Million/uL    Hemoglobin 13 8 11 5 - 15 4 g/dL    Hematocrit 42 0 34 8 - 46 1 %    MCV 88 82 - 98 fL    MCH 28 8 26 8 - 34 3 pg    MCHC 32 9 31 4 - 37 4 g/dL    RDW 14 0 11 6 - 15 1 %    MPV 10 5 8 9 - 12 7 fL    Platelets 816 590 - 405 Thousands/uL    nRBC 0 /100 WBCs    Neutrophils Relative 71 43 - 75 %    Immat GRANS % 1 0 - 2 %    Lymphocytes Relative 18 14 - 44 %    Monocytes Relative 8 4 - 12 %    Eosinophils Relative 2 0 - 6 %    Basophils Relative 0 0 - 1 %    Neutrophils Absolute 11 86 (H) 1 85 - 7 62 Thousands/µL    Immature Grans Absolute 0 09 0 00 - 0 20 Thousand/uL    Lymphocytes Absolute 3 03 0 60 - 4 47 Thousands/µL    Monocytes Absolute 1 35 (H) 0 17 - 1 22 Thousand/µL    Eosinophils Absolute 0 29 0 00 - 0 61 Thousand/µL    Basophils Absolute 0 07 0 00 - 0 10 Thousands/µL   Comprehensive metabolic panel    Collection Time: 08/16/22  2:17 PM   Result Value Ref Range    Sodium 139 135 - 147 mmol/L    Potassium 3 9 3 5 - 5 3 mmol/L    Chloride 109 (H) 96 - 108 mmol/L    CO2 23 21 - 32 mmol/L    ANION GAP 7 4 - 13 mmol/L    BUN 16 5 - 25 mg/dL    Creatinine 0 93 0 60 - 1 30 mg/dL    Glucose 141 (H) 65 - 140 mg/dL    Calcium 9 9 8 3 - 10 1 mg/dL    AST 35 5 - 45 U/L    ALT 43 12 - 78 U/L    Alkaline Phosphatase 85 46 - 116 U/L    Total Protein 7 5 6 4 - 8 4 g/dL    Albumin 3 5 3 5 - 5 0 g/dL    Total Bilirubin 0 90 0 20 - 1 00 mg/dL    eGFR 68 ml/min/1 73sq m   TSH, 3rd generation with Free T4 reflex    Collection Time: 08/16/22  2:17 PM   Result Value Ref Range    TSH 3RD GENERATON 0 906 0 450 - 4 500 uIU/mL   FLU/RSV/COVID - if FLU/RSV clinically relevant    Collection Time: 08/16/22  2:17 PM    Specimen: Nose; Nares   Result Value Ref Range    SARS-CoV-2 Negative Negative    INFLUENZA A PCR Negative Negative    INFLUENZA B PCR Negative Negative    RSV PCR Negative Negative   POCT alcohol breath test    Collection Time: 08/16/22  3:36 PM   Result Value Ref Range    EXTBreath Alcohol 0 000    POCT pregnancy, urine    Collection Time: 08/16/22  4:52 PM   Result Value Ref Range    EXT PREG TEST UR (Ref: Negative) negative     Control valid               -----------------------------------    Risks / Benefits of Treatment:     Risks, benefits, and possible side effects of medications explained to patient  The patient verbalizes understanding and agreement for treatment  Counseling / Coordination of Care:     Patient's presentation on admission and proposed treatment plan were discussed with the treatment team   Diagnosis, medication changes and treatment plan were reviewed with the patient  Recent stressors were discussed with the patient  Events leading to admission were reviewed with the patient  Importance of medication and treatment compliance was reviewed with the patient  Inpatient Psychiatric Certification:     Certification: Based upon physical, mental and social evaluations, I certify that inpatient psychiatric services are medically necessary for this patient for a duration of 7 midnights for the treatment of MDD (major depressive disorder), recurrent episode, severe ([facility] )          This note has been constructed using a voice recognition system  There may be translation, syntax, or grammatical errors  If you have any questions, please contact the dictating provider

## 2022-08-17 NOTE — ED NOTES
[facility] request packet completed and faxed for review       VLADISLAV Dodd  08/16/22   [id number]

## 2022-08-18 LAB — RPR SER QL: NORMAL

## 2022-08-18 PROCEDURE — 99232 SBSQ HOSP IP/OBS MODERATE 35: CPT | Performed by: STUDENT IN AN ORGANIZED HEALTH CARE EDUCATION/TRAINING PROGRAM

## 2022-08-18 RX ORDER — NAPROXEN 250 MG/1
500 TABLET ORAL 2 TIMES DAILY PRN
Status: DISCONTINUED | OUTPATIENT
Start: 2022-08-18 | End: 2022-08-23 | Stop reason: HOSPADM

## 2022-08-18 RX ADMIN — FLUOXETINE 40 MG: 20 CAPSULE ORAL at 09:14

## 2022-08-18 RX ADMIN — AMLODIPINE BESYLATE 10 MG: 5 TABLET ORAL at 09:14

## 2022-08-18 RX ADMIN — RISPERIDONE 0.5 MG: 0.5 TABLET ORAL at 21:51

## 2022-08-18 RX ADMIN — ATORVASTATIN CALCIUM 20 MG: 20 TABLET, FILM COATED ORAL at 17:09

## 2022-08-18 RX ADMIN — MIRTAZAPINE 15 MG: 15 TABLET, FILM COATED ORAL at 21:51

## 2022-08-18 NOTE — NURSING NOTE
Pt verbal in conversation about feeling lonely and allowing herself to be taken advantage of by a scammer as a result and feeling embarrassed, became tearful stating she has no family or social supports  States she has been in similar situations before and never learns  Acknowledges feeling depressed, denies current SI  C/o right knee pain r/t arthritis, pain with ambulation, some swelling noted  Pt takes naproxen 500mg BID prn at home, Dr Estrada Asif aware and will order  Pt visible in milieu and attending groups, social with select peers

## 2022-08-18 NOTE — TREATMENT TEAM
08/18/22 1000   Activity/Group Checklist   Group Community meeting   Attendance Attended   Attendance Duration (min) 16-30   Interactions Interacted appropriately   Affect/Mood Appropriate   Goals Achieved Able to listen to others; Able to engage in interactions; Other (Comment)  (identified goals for the day)

## 2022-08-18 NOTE — NURSING NOTE
Feeling sad and embarrassed by her poor judgement, stated she was just lonely, encouraged to check out senior center in her area to make new friends, denies SI at this time

## 2022-08-18 NOTE — PROGRESS NOTES
Progress Note - Behavioral Health   Cecetori Masters 61 y o  female MRN: [id number]  Unit/Bed#: [facility] 260-01 Encounter: [id number]    Assessment/Plan   Principal Problem:    MDD (major depressive disorder), recurrent episode, severe ([address] )  Active Problems:    HTN (hypertension)    Hyperlipidemia    Mild intellectual disability    Medical clearance for psychiatric admission    Obesity      Subjective: Patient was seen, chart was reviewed, and case was discussed with the team  Patient appears calm and cooperative  She endorses depressed mood and anxiety  "I was scammed  I can not believe  It could have been worse  I am upset" Sleep was ok, had some difficulty falling asleep  Appetite is ok  She is compliant with medications      Behavior over the last 24 hours:  unchanged  Sleep: normal  Appetite: normal  Medication side effects: No    Medical ROS: Pertinent items are noted in HPI all other systems are negative    Current Medications:  Current Facility-Administered Medications   Medication Dose Route Frequency    acetaminophen (TYLENOL) tablet 650 mg  650 mg Oral Q4H PRN    acetaminophen (TYLENOL) tablet 975 mg  975 mg Oral Q6H PRN    aluminum-magnesium hydroxide-simethicone (MYLANTA) oral suspension 30 mL  30 mL Oral Q4H PRN    amLODIPine (NORVASC) tablet 10 mg  10 mg Oral Daily    artificial tear (LUBRIFRESH P M ) ophthalmic ointment 1 application  1 application Both Eyes U5L PRN    atorvastatin (LIPITOR) tablet 20 mg  20 mg Oral Daily With Dinner    haloperidol lactate (HALDOL) injection 2 5 mg  2 5 mg Intramuscular Q4H PRN Max 4/day    And    LORazepam (ATIVAN) injection 1 mg  1 mg Intramuscular Q4H PRN Max 4/day    And    benztropine (COGENTIN) injection 0 5 mg  0 5 mg Intramuscular Q4H PRN Max 4/day    haloperidol lactate (HALDOL) injection 5 mg  5 mg Intramuscular Q4H PRN Max 4/day    And    LORazepam (ATIVAN) injection 2 mg  2 mg Intramuscular Q4H PRN Max 4/day    And    benztropine (COGENTIN) injection 1 mg  1 mg Intramuscular Q4H PRN Max 4/day    benztropine (COGENTIN) tablet 1 mg  1 mg Oral Q4H PRN Max 6/day    bisacodyl (DULCOLAX) rectal suppository 10 mg  10 mg Rectal Daily PRN    hydrOXYzine HCL (ATARAX) tablet 50 mg  50 mg Oral Q6H PRN Max 4/day    Or    diphenhydrAMINE (BENADRYL) injection 50 mg  50 mg Intramuscular Q6H PRN    FLUoxetine (PROzac) capsule 40 mg  40 mg Oral Daily    haloperidol (HALDOL) tablet 0 5 mg  0 5 mg Oral Q6H PRN    haloperidol (HALDOL) tablet 2 5 mg  2 5 mg Oral Q4H PRN Max 4/day    haloperidol (HALDOL) tablet 5 mg  5 mg Oral Q4H PRN Max 4/day    hydrOXYzine HCL (ATARAX) tablet 25 mg  25 mg Oral Q6H PRN Max 4/day    LORazepam (ATIVAN) injection 2 mg  2 mg Intramuscular Q6H PRN    LORazepam (ATIVAN) tablet 0 5 mg  0 5 mg Oral Q8H PRN    mirtazapine (REMERON) tablet 15 mg  15 mg Oral HS    naproxen sodium (ANAPROX) tablet 550 mg  550 mg Oral BID PRN    nicotine polacrilex (NICORETTE) gum 2 mg  2 mg Oral Q2H PRN    polyethylene glycol (MIRALAX) packet 17 g  17 g Oral Daily PRN    risperiDONE (RisperDAL) tablet 0 5 mg  0 5 mg Oral HS    senna-docusate sodium (SENOKOT S) 8 6-50 mg per tablet 1 tablet  1 tablet Oral Daily PRN    traZODone (DESYREL) tablet 50 mg  50 mg Oral HS PRN       Behavioral Health Medications:   all current active meds have been reviewed  Vitals:  Vitals:    08/18/22 0713   BP: 125/81   Pulse: 60   Resp: 18   Temp: 97 5 °F (36 4 °C)   SpO2:        Laboratory results:    I have personally reviewed all pertinent laboratory/tests results      Mental Status Evaluation:    Appearance:  age appropriate   Behavior:  normal   Speech:  normal pitch and normal volume   Mood:  anxious and depressed   Affect:  mood-congruent   Thought Process:  goal directed and logical   Thought Content:  normal   Perceptual Disturbances: None   Risk Potential: Suicidal Ideations none  Homicidal Ideations none  Potential for Aggression No   Sensorium:  person, place and time/date   Memory:  recent and remote memory grossly intact   Consciousness:  alert and awake    Attention: attention span appeared shorter than expected for age   Insight:  fair   Judgment: fair   Gait/Station: normal gait/station   Motor Activity: no abnormal movements       Progress Toward Goals: Progressing    Recommended Treatment: Continue with pharmacotherapy, group therapy, milieu therapy and occupational therapy  Risks, benefits and possible side effects of Medications:   Risks, benefits, alternatives, and possible side effects of patient's psychiatric medications were discussed with patient

## 2022-08-18 NOTE — TREATMENT TEAM
08/18/22 1230   Activity/Group Checklist   Group Life Skills   Attendance Attended   Attendance Duration (min) 16-30   Interactions Interacted appropriately   Affect/Mood Appropriate   Goals Achieved Able to engage in interactions; Able to listen to others; Identified feelings

## 2022-08-19 PROCEDURE — 99232 SBSQ HOSP IP/OBS MODERATE 35: CPT | Performed by: STUDENT IN AN ORGANIZED HEALTH CARE EDUCATION/TRAINING PROGRAM

## 2022-08-19 RX ORDER — LOPERAMIDE HYDROCHLORIDE 2 MG/1
2 CAPSULE ORAL 4 TIMES DAILY PRN
Status: CANCELLED | OUTPATIENT
Start: 2022-08-19

## 2022-08-19 RX ORDER — LOPERAMIDE HYDROCHLORIDE 2 MG/1
2 CAPSULE ORAL 4 TIMES DAILY PRN
Status: DISCONTINUED | OUTPATIENT
Start: 2022-08-19 | End: 2022-08-23 | Stop reason: HOSPADM

## 2022-08-19 RX ORDER — LISINOPRIL 10 MG/1
10 TABLET ORAL DAILY
Status: DISCONTINUED | OUTPATIENT
Start: 2022-08-19 | End: 2022-08-23 | Stop reason: HOSPADM

## 2022-08-19 RX ADMIN — FLUOXETINE 40 MG: 20 CAPSULE ORAL at 09:18

## 2022-08-19 RX ADMIN — NAPROXEN 500 MG: 250 TABLET ORAL at 09:19

## 2022-08-19 RX ADMIN — ATORVASTATIN CALCIUM 20 MG: 20 TABLET, FILM COATED ORAL at 16:22

## 2022-08-19 RX ADMIN — LISINOPRIL 10 MG: 10 TABLET ORAL at 10:31

## 2022-08-19 RX ADMIN — AMLODIPINE BESYLATE 10 MG: 5 TABLET ORAL at 09:18

## 2022-08-19 RX ADMIN — MIRTAZAPINE 15 MG: 15 TABLET, FILM COATED ORAL at 21:39

## 2022-08-19 RX ADMIN — RISPERIDONE 0.5 MG: 0.5 TABLET ORAL at 21:39

## 2022-08-19 NOTE — TREATMENT TEAM
08/18/22 1330   Activity/Group Checklist   Group Other (Comment)  (art therapy)   Attendance Attended   Attendance Duration (min) 31-45   Interactions Interacted appropriately   Affect/Mood Appropriate   Goals Achieved Able to engage in interactions; Able to listen to others; Other (Comment)  (authentic, spontaneous self expression, connection and insight)

## 2022-08-19 NOTE — NURSING NOTE
Pt awake, pleasant during interaction  C/O Bilateral knee pain, arthritis  Requested and received PRN Naprosyn 500mg, provided with ice pack  Pt denies SI, HI, AVH  Reports sleep was "okay"  No questions or concerns at this time

## 2022-08-19 NOTE — TREATMENT TEAM
08/19/22 1230   Activity/Group Checklist   Group Life Skills   Attendance Attended   Attendance Duration (min) 16-30   Interactions Interacted appropriately   Affect/Mood Appropriate   Goals Achieved Identified feelings; Able to listen to others; Able to engage in interactions; Able to recieve feedback; Able to give feedback to another

## 2022-08-19 NOTE — TREATMENT TEAM
08/19/22 1330   Activity/Group Checklist   Group Other (Comment)  (art therapy)   Attendance Attended   Attendance Duration (min) 46-60   Interactions Interacted appropriately   Affect/Mood Appropriate   Goals Achieved Able to listen to others; Able to engage in interactions; Other (Comment)  (spontaneous, authentic self-expression, connection, and inisght)

## 2022-08-19 NOTE — NURSING NOTE
Pt is pleasant, social with roommate, visible on unit  Denies SI, reports no anxiety or depression  States knees are doing "okay" at this time  Verbalized understanding that ice and PRN meds can be used to help manage knee pain

## 2022-08-19 NOTE — NURSING NOTE
[employer]  Shoes   9 dollars   cellphone   Set of keys   Bra   Pants   Underwear   Two white envelopes were  Keys and money were at

## 2022-08-19 NOTE — PLAN OF CARE
Problem: SELF HARM/SUICIDALITY  Goal: Will have no self-injury during hospital stay  Description: INTERVENTIONS:  - Q 15 MINUTES: Routine safety checks  - Q WAKING SHIFT & PRN: Assess risk to determine if routine checks are adequate to maintain patient safety  - Encourage patient to participate actively in care by formulating a plan to combat response to suicidal ideation, identify supports and resources  Outcome: Progressing     Problem: DEPRESSION  Goal: Will be euthymic at discharge  Description: INTERVENTIONS:  - Administer medication as ordered  - Provide emotional support via 1:1 interaction with staff  - Encourage involvement in milieu/groups/activities  - Monitor for social isolation  Outcome: Progressing     Problem: ANXIETY  Goal: Will report anxiety at manageable levels  Description: INTERVENTIONS:  - Administer medication as ordered  - Teach and encourage coping skills  - Provide emotional support  - Assess patient/family for anxiety and ability to cope  Outcome: Progressing  Goal: By discharge: Patient will verbalize 2 strategies to deal with anxiety  Description: Interventions:  - Identify any obvious source/trigger to anxiety  - Staff will assist patient in applying identified coping technique/skills  - Encourage attendance of scheduled groups and activities  Outcome: Progressing     Problem: DISCHARGE PLANNING  Goal: Discharge to home or other facility with appropriate resources  Description: INTERVENTIONS:  - Identify barriers to discharge w/patient and caregiver  - Arrange for needed discharge resources and transportation as appropriate  - Identify discharge learning needs (meds, wound care, etc )  - Arrange for interpretive services to assist at discharge as needed  - Refer to Case Management Department for coordinating discharge planning if the patient needs post-hospital services based on physician/advanced practitioner order or complex needs related to functional status, cognitive ability, or social support system  Outcome: Progressing     Problem: Ineffective Coping  Goal: Identifies healthy coping skills  Outcome: Progressing  Goal: Demonstrates healthy coping skills  Outcome: Progressing  Goal: Participates in unit activities  Description: Interventions:  - Provide therapeutic environment   - Provide required programming   - Redirect inappropriate behaviors   Outcome: Progressing

## 2022-08-20 PROCEDURE — 99232 SBSQ HOSP IP/OBS MODERATE 35: CPT | Performed by: PSYCHIATRY & NEUROLOGY

## 2022-08-20 RX ADMIN — LISINOPRIL 10 MG: 10 TABLET ORAL at 09:21

## 2022-08-20 RX ADMIN — HYDROXYZINE HYDROCHLORIDE 50 MG: 50 TABLET, FILM COATED ORAL at 20:20

## 2022-08-20 RX ADMIN — HYDROXYZINE HYDROCHLORIDE 50 MG: 50 TABLET, FILM COATED ORAL at 09:21

## 2022-08-20 RX ADMIN — FLUOXETINE 40 MG: 20 CAPSULE ORAL at 09:21

## 2022-08-20 RX ADMIN — AMLODIPINE BESYLATE 10 MG: 5 TABLET ORAL at 09:21

## 2022-08-20 RX ADMIN — ATORVASTATIN CALCIUM 20 MG: 20 TABLET, FILM COATED ORAL at 17:55

## 2022-08-20 NOTE — PLAN OF CARE
Problem: SELF HARM/SUICIDALITY  Goal: Will have no self-injury during hospital stay  Description: INTERVENTIONS:  - Q 15 MINUTES: Routine safety checks  - Q WAKING SHIFT & PRN: Assess risk to determine if routine checks are adequate to maintain patient safety  - Encourage patient to participate actively in care by formulating a plan to combat response to suicidal ideation, identify supports and resources  Outcome: Progressing     Problem: DEPRESSION  Goal: Will be euthymic at discharge  Description: INTERVENTIONS:  - Administer medication as ordered  - Provide emotional support via 1:1 interaction with staff  - Encourage involvement in milieu/groups/activities  - Monitor for social isolation  Outcome: Progressing     Problem: ANXIETY  Goal: Will report anxiety at manageable levels  Description: INTERVENTIONS:  - Administer medication as ordered  - Teach and encourage coping skills  - Provide emotional support  - Assess patient/family for anxiety and ability to cope  Outcome: Progressing  Goal: By discharge: Patient will verbalize 2 strategies to deal with anxiety  Description: Interventions:  - Identify any obvious source/trigger to anxiety  - Staff will assist patient in applying identified coping technique/skills  - Encourage attendance of scheduled groups and activities  Outcome: Progressing     Problem: Ineffective Coping  Goal: Identifies healthy coping skills  Outcome: Progressing  Goal: Demonstrates healthy coping skills  Outcome: Progressing  Goal: Participates in unit activities  Description: Interventions:  - Provide therapeutic environment   - Provide required programming   - Redirect inappropriate behaviors   Outcome: Progressing

## 2022-08-20 NOTE — NURSING NOTE
Misa rolled her eyes when RN asked to speak with her  Pt reported she "doesn't want to talk about it" when asked how she was feeling  RN reminded pt that we are responsible for assessing whether she feels suicidal or like hurting herself at least twice a day  Pt, "I am not suicidal"  RN reminded pt that staff is here to help her and available to listen if needed  Pt nodded and walked away  Pt's affect constricted with limited eye contact  Staff to continue to monitor for safety and support

## 2022-08-20 NOTE — NURSING NOTE
Pt tearful after conversation with RN  Given atarax 50mg at 0921 for elevated anxiety, Case scale 23  Pt spoke to MHT at length also, perseverative in conversation, able to calm after some time, prn was effective in decreasing anxiety
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
## 2022-08-21 PROCEDURE — 99232 SBSQ HOSP IP/OBS MODERATE 35: CPT | Performed by: PSYCHIATRY & NEUROLOGY

## 2022-08-21 RX ORDER — FLUOXETINE HYDROCHLORIDE 20 MG/1
60 CAPSULE ORAL DAILY
Status: DISCONTINUED | OUTPATIENT
Start: 2022-08-22 | End: 2022-08-23 | Stop reason: HOSPADM

## 2022-08-21 RX ADMIN — ATORVASTATIN CALCIUM 20 MG: 20 TABLET, FILM COATED ORAL at 16:25

## 2022-08-21 RX ADMIN — HYDROXYZINE HYDROCHLORIDE 50 MG: 50 TABLET, FILM COATED ORAL at 10:45

## 2022-08-21 RX ADMIN — AMLODIPINE BESYLATE 10 MG: 5 TABLET ORAL at 09:20

## 2022-08-21 RX ADMIN — FLUOXETINE 40 MG: 20 CAPSULE ORAL at 09:21

## 2022-08-21 RX ADMIN — HYDROXYZINE HYDROCHLORIDE 50 MG: 50 TABLET, FILM COATED ORAL at 22:04

## 2022-08-21 RX ADMIN — LISINOPRIL 10 MG: 10 TABLET ORAL at 09:21

## 2022-08-21 RX ADMIN — MIRTAZAPINE 15 MG: 15 TABLET, FILM COATED ORAL at 21:42

## 2022-08-21 NOTE — PROGRESS NOTES
Progress Note - Behavioral Health   Burnice Libman 61 y o  female MRN: [id number]  Unit/Bed#: [facility] 260-01 Encounter: [id number]    Assessment/Plan   Principal Problem:    MDD (major depressive disorder), recurrent episode, severe ([address] )  Active Problems:    HTN (hypertension)    Hyperlipidemia    Mild intellectual disability    Medical clearance for psychiatric admission    Obesity       PLANS:  - Discontinue Risperidal 0 5mg QHS  - Increased Fluoxetine to 60mg  In the morning for anxiety   - Mirtazpaine 15mg  Therapeutic supports: groups and milieu  Discuss with treatment team     Behavior over the last 24 hours:  unchanged  Sleep: normal  Appetite: normal  Medication side effects: restlessness  ROS: no complaints     Subjective:   Misa was seen individually in the quiet room  Reported restless thru the night   " tossing and turning'  Sometime not thinking about stuff  , later reported on anxiety  bc " she's by herself most days, have no family or friends  " While home she stays up at night while home  Denied SI  Reported likely Ativan and taken that more regularly while not in the hospital  Was questioned about Atarax as a option, Misa then inquired about anxiety mediation and wanting the option of increase dose of Fluoxetine like discussed yesterday      Mental Status Evaluation:  Appearance:  Royer Rangel is a 61 yr old  female, age appropriate and casually dressed      Behavior:  guarded    Speech:  normal pitch, normal volume and non pressured, WNL prosody    Mood:  Depressed and anxious   Affect:  constricted, mood-congruent and irritability also   Thought Process:  normal and logical   Thought Content:  normal   Perceptual Disturbances: None   Risk Potential: Suicidal Ideations none  Homicidal Ideations none  Potential for Aggression No   Sensorium:  person, place and situation   Memory:  recent and remote memory: unable to assess due to lack of cooperation   Consciousness:  alert and awake    Attention: attention span and concentration were age appropriate   Insight:  limited   Judgment: limited   Gait/Station: normal gait/station   Motor Activity: no abnormal movements     Progress Toward Goals:     Recommended Treatment: Continue with group therapy, milieu therapy and occupational therapy  Risks, benefits and possible side effects of Medications:   Risks, benefits, and possible side effects of medications explained to patient and patient verbalizes understanding        Medications:   all current active meds have been reviewed and current meds:   Current Facility-Administered Medications   Medication Dose Route Frequency    acetaminophen (TYLENOL) tablet 650 mg  650 mg Oral Q4H PRN    acetaminophen (TYLENOL) tablet 975 mg  975 mg Oral Q6H PRN    aluminum-magnesium hydroxide-simethicone (MYLANTA) oral suspension 30 mL  30 mL Oral Q4H PRN    amLODIPine (NORVASC) tablet 10 mg  10 mg Oral Daily    artificial tear (LUBRIFRESH P M ) ophthalmic ointment 1 application  1 application Both Eyes U2B PRN    atorvastatin (LIPITOR) tablet 20 mg  20 mg Oral Daily With Dinner    haloperidol lactate (HALDOL) injection 2 5 mg  2 5 mg Intramuscular Q4H PRN Max 4/day    And    LORazepam (ATIVAN) injection 1 mg  1 mg Intramuscular Q4H PRN Max 4/day    And    benztropine (COGENTIN) injection 0 5 mg  0 5 mg Intramuscular Q4H PRN Max 4/day    haloperidol lactate (HALDOL) injection 5 mg  5 mg Intramuscular Q4H PRN Max 4/day    And    LORazepam (ATIVAN) injection 2 mg  2 mg Intramuscular Q4H PRN Max 4/day    And    benztropine (COGENTIN) injection 1 mg  1 mg Intramuscular Q4H PRN Max 4/day    benztropine (COGENTIN) tablet 1 mg  1 mg Oral Q4H PRN Max 6/day    bisacodyl (DULCOLAX) rectal suppository 10 mg  10 mg Rectal Daily PRN    hydrOXYzine HCL (ATARAX) tablet 50 mg  50 mg Oral Q6H PRN Max 4/day    Or    diphenhydrAMINE (BENADRYL) injection 50 mg  50 mg Intramuscular Q6H PRN    [START ON 8/22/2022] FLUoxetine (PROzac) capsule 60 mg  60 mg Oral Daily    haloperidol (HALDOL) tablet 0 5 mg  0 5 mg Oral Q6H PRN    haloperidol (HALDOL) tablet 2 5 mg  2 5 mg Oral Q4H PRN Max 4/day    haloperidol (HALDOL) tablet 5 mg  5 mg Oral Q4H PRN Max 4/day    hydrOXYzine HCL (ATARAX) tablet 25 mg  25 mg Oral Q6H PRN Max 4/day    lisinopril (ZESTRIL) tablet 10 mg  10 mg Oral Daily    loperamide (IMODIUM) capsule 2 mg  2 mg Oral 4x Daily PRN    LORazepam (ATIVAN) injection 2 mg  2 mg Intramuscular Q6H PRN    LORazepam (ATIVAN) tablet 0 5 mg  0 5 mg Oral Q8H PRN    mirtazapine (REMERON) tablet 15 mg  15 mg Oral HS    naproxen (NAPROSYN) tablet 500 mg  500 mg Oral BID PRN    nicotine polacrilex (NICORETTE) gum 2 mg  2 mg Oral Q2H PRN    polyethylene glycol (MIRALAX) packet 17 g  17 g Oral Daily PRN    senna-docusate sodium (SENOKOT S) 8 6-50 mg per tablet 1 tablet  1 tablet Oral Daily PRN    traZODone (DESYREL) tablet 50 mg  50 mg Oral HS PRN     Labs: I have personally reviewed all pertinent laboratory/tests results  Counseling / Coordination of Care  Total floor / unit time spent today 20 minutes  Greater than 50% of total time was spent with the patient and / or family counseling and / or coordination of care   A description of the counseling / coordination of care: Treatment planning and inpatient supports
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
## 2022-08-21 NOTE — NURSING NOTE
Pt concerned about her 2 cats at her apartment because she doesn't know if anyone is taking care of them  Pt had let someone (a worker) at her apartment building know that she was coming to hospital but that person told her they were allergic to cats  Pt states there is no one she can call about it today, but tomorrow the office will be open and she was reassured we would assist her in calling if needed

## 2022-08-21 NOTE — NURSING NOTE
Pt is loud and perseverative in conversation, although less irritable this shift  Mood remains labile, however pt seen socializing appropriately with peers and has been attending groups/activities  Did require prn atarax 50mg at 1045 for elevated anxiety Case scale #18  Prn was effective  Pt denies any SI  Cont's to be verbal about incidents leading to admission, expresses dissatisfaction with family, relationships in past, has difficulty making and maintaining friends

## 2022-08-21 NOTE — NURSING NOTE
Pt reported feeling "very upset" after meeting with the doctor, "She really pissed me off"  On questioning pt reported, She wanted to change me from 60mg of Prozac all the way up to 150mg of Wellbutrin  That's a lot  She doesn't even know me!"  RN explained differences in dosing of medication and explained that because pt said that nothing was helping her, the doctor was trying to help  Pt replied that she understands we are here to help her  50mg Atarax administered  On follow up, pt still angry about doctor trying to change her medication and continues to say that the doctor doesn't know her  Pt seemed agitated more than anxious  Pt also refused her HS medication

## 2022-08-21 NOTE — TREATMENT TEAM
08/20/22 1200   Team Meeting   Meeting Type Daily Rounds   Team Members Present   Team Members Present Physician;Nurse   Physician Team Member Dr Andrew Beyer Team Member José Antonio Rudolph   Patient/Family Present   Patient Present No   Patient's Family Present No     Daily Rounds: Pt pleasant and cooperative  Denies SI  Social and visible on unit

## 2022-08-21 NOTE — PROGRESS NOTES
Progress Note - Behavioral Health   Moshe Cheyanne 61 y o  female MRN: [id number]  Unit/Bed#: [facility] 260-01 Encounter: [id number]    Assessment/Plan   Principal Problem:    MDD (major depressive disorder), recurrent episode, severe ([address] )  Active Problems:    HTN (hypertension)    Hyperlipidemia    Mild intellectual disability    Medical clearance for psychiatric admission    Obesity      PLANS:  Continue medications as prescribed   - Fluoxetine 40mg   - Mirtazpaine 15mg  - Risperidal 0 5mg QHS  Therapeutic supports: groups and milieu  Discuss with treatment team    Behavior over the last 24 hours:  unchanged  Sleep: normal  Appetite: normal  Medication side effects: No  ROS: no complaints    Subjective:   Misa was seen in the quiet room later in the day  Denied SI  Reported not going to groups , "brain is tired of talking about it  " Depressed"  When asked if she spoke to her family since hospitalization  Misa responded" don't have any family, don't have any friends , and meds are not working " With brief pauses  Later stated distress regarding depression mood, "then go home to the same situation and depressed again  Increased agitation regarding also medication ineffectiveness  Prior trial of Zoloft > 100mg - "was not working> "Now on Prozac, thinks all the meds are not working  Vented " whats the point, all the meds are messed up and then situational stressors are still there  Expressed no interest in medication adjustment  Hopelessness regarding improvement  Mental Status Evaluation:  Appearance:  Jose  is a 61 yr old  female, age appropriate and casually dressed      Behavior:  guarded    Speech:  normal pitch, normal volume and non pressured, WNL prosody    Mood:  depressed   Affect:  constricted, mood-congruent and irritability also   Thought Process:  normal and logical   Thought Content:  normal   Perceptual Disturbances: None   Risk Potential: Suicidal Ideations none  Homicidal Ideations none  Potential for Aggression No   Sensorium:  person, place and situation   Memory:  recent and remote memory: unable to assess due to lack of cooperation   Consciousness:  alert and awake    Attention: attention span and concentration were age appropriate   Insight:  limited   Judgment: limited   Gait/Station: normal gait/station   Motor Activity: no abnormal movements     Progress Toward Goals: Unchanged     Recommended Treatment: Continue with group therapy, milieu therapy and occupational therapy  Risks, benefits and possible side effects of Medications:   Risks, benefits, and possible side effects of medications explained to patient and patient verbalizes understanding        Medications:   all current active meds have been reviewed, continue current psychiatric medications and current meds:   Current Facility-Administered Medications   Medication Dose Route Frequency    acetaminophen (TYLENOL) tablet 650 mg  650 mg Oral Q4H PRN    acetaminophen (TYLENOL) tablet 975 mg  975 mg Oral Q6H PRN    aluminum-magnesium hydroxide-simethicone (MYLANTA) oral suspension 30 mL  30 mL Oral Q4H PRN    amLODIPine (NORVASC) tablet 10 mg  10 mg Oral Daily    artificial tear (LUBRIFRESH P M ) ophthalmic ointment 1 application  1 application Both Eyes Y9L PRN    atorvastatin (LIPITOR) tablet 20 mg  20 mg Oral Daily With Dinner    haloperidol lactate (HALDOL) injection 2 5 mg  2 5 mg Intramuscular Q4H PRN Max 4/day    And    LORazepam (ATIVAN) injection 1 mg  1 mg Intramuscular Q4H PRN Max 4/day    And    benztropine (COGENTIN) injection 0 5 mg  0 5 mg Intramuscular Q4H PRN Max 4/day    haloperidol lactate (HALDOL) injection 5 mg  5 mg Intramuscular Q4H PRN Max 4/day    And    LORazepam (ATIVAN) injection 2 mg  2 mg Intramuscular Q4H PRN Max 4/day    And    benztropine (COGENTIN) injection 1 mg  1 mg Intramuscular Q4H PRN Max 4/day    benztropine (COGENTIN) tablet 1 mg  1 mg Oral Q4H PRN Max 6/day    bisacodyl (DULCOLAX) rectal suppository 10 mg  10 mg Rectal Daily PRN    hydrOXYzine HCL (ATARAX) tablet 50 mg  50 mg Oral Q6H PRN Max 4/day    Or    diphenhydrAMINE (BENADRYL) injection 50 mg  50 mg Intramuscular Q6H PRN    FLUoxetine (PROzac) capsule 40 mg  40 mg Oral Daily    haloperidol (HALDOL) tablet 0 5 mg  0 5 mg Oral Q6H PRN    haloperidol (HALDOL) tablet 2 5 mg  2 5 mg Oral Q4H PRN Max 4/day    haloperidol (HALDOL) tablet 5 mg  5 mg Oral Q4H PRN Max 4/day    hydrOXYzine HCL (ATARAX) tablet 25 mg  25 mg Oral Q6H PRN Max 4/day    lisinopril (ZESTRIL) tablet 10 mg  10 mg Oral Daily    loperamide (IMODIUM) capsule 2 mg  2 mg Oral 4x Daily PRN    LORazepam (ATIVAN) injection 2 mg  2 mg Intramuscular Q6H PRN    LORazepam (ATIVAN) tablet 0 5 mg  0 5 mg Oral Q8H PRN    mirtazapine (REMERON) tablet 15 mg  15 mg Oral HS    naproxen (NAPROSYN) tablet 500 mg  500 mg Oral BID PRN    nicotine polacrilex (NICORETTE) gum 2 mg  2 mg Oral Q2H PRN    polyethylene glycol (MIRALAX) packet 17 g  17 g Oral Daily PRN    risperiDONE (RisperDAL) tablet 0 5 mg  0 5 mg Oral HS    senna-docusate sodium (SENOKOT S) 8 6-50 mg per tablet 1 tablet  1 tablet Oral Daily PRN    traZODone (DESYREL) tablet 50 mg  50 mg Oral HS PRN     Labs: I have personally reviewed all pertinent laboratory/tests results  Counseling / Coordination of Care  Total floor / unit time spent today 18 minutes  Greater than 50% of total time was spent with the patient and / or family counseling and / or coordination of care   A description of the counseling / coordination of care: Treatment  Plan, inpatient supports

## 2022-08-22 PROBLEM — Z00.8 MEDICAL CLEARANCE FOR PSYCHIATRIC ADMISSION: Status: RESOLVED | Noted: 2020-08-13 | Resolved: 2022-08-22

## 2022-08-22 PROCEDURE — 99232 SBSQ HOSP IP/OBS MODERATE 35: CPT | Performed by: PHYSICIAN ASSISTANT

## 2022-08-22 RX ADMIN — LISINOPRIL 10 MG: 10 TABLET ORAL at 08:45

## 2022-08-22 RX ADMIN — FLUOXETINE 60 MG: 20 CAPSULE ORAL at 08:45

## 2022-08-22 RX ADMIN — HYDROXYZINE HYDROCHLORIDE 50 MG: 50 TABLET, FILM COATED ORAL at 08:46

## 2022-08-22 RX ADMIN — MIRTAZAPINE 15 MG: 15 TABLET, FILM COATED ORAL at 21:53

## 2022-08-22 RX ADMIN — ATORVASTATIN CALCIUM 20 MG: 20 TABLET, FILM COATED ORAL at 16:18

## 2022-08-22 RX ADMIN — AMLODIPINE BESYLATE 10 MG: 5 TABLET ORAL at 08:45

## 2022-08-22 NOTE — NURSING NOTE
Pt initially refusing breakfast  Pt had an irritable edge when asked to get out of bed for medication  Pt demanding anxiety medication  Pt received Atarax 50 mg po @0846  Case score 21  Shortly after receiving medication, pt slammed door in room  Pt educated on appropriate behaviors on the unit  Pt disinterested in education  Pt went down to dining for breakfast  Pt brought a breakfast bagel sandwich to room  Pt redirected to eat in dining room  Pt stormed out of room, cursing at staff to "get the f*ck off my back"  Pt pacing the halls  Pt in dayroom talking about the situation with other peers on the unit  Pt redirected  Medication not effective   Pt declined using coping skills
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
## 2022-08-22 NOTE — CASE MANAGEMENT
CM received a call back from Coleen Martins at [facility] ([phone]) and she reported she can schedule pt for an In person  intake on Wednesday 8/31/22 @ 2pm with therapist Sha Bobo CM added to pt AVS

## 2022-08-22 NOTE — DISCHARGE SUMMARY
Discharge Summary - [facility] 61 y o  female MRN: [id number]  Unit/Bed#: -01 Encounter: [id number]     Admission Date: 8/17/2022         Discharge Date: 8/23/22    Attending Psychiatrist: Drea Bailey*    Reason for Admission/HPI:     Per initial H&P from Dr Erna Cline on 8/17/22:    "Per ED provider on 8/16: "Patient is a 77-year-old female with a history of depre`1ssion, brought in by EMS under a 302 involuntary hospitalization   The patient has been chronically depressed after being a victim of a sweetheart scam"    She was speaking on the phone to a [facility] , making statements that she wanted to harm herself, they reportedly had a conference call with , the patient continue to make statements like that, was very agitated   [facility] and the [facility] hospital completed a 302 and the patient was brought to the ED  Christie Mariscal does admit to taking 2 mg of Ativan earlier to help calm down and does say she feels more calm   The patient right now so she does not feel suicidal and thinks people may be over reacting  Christietori Mariscal tells me that she does not want to hurt herself currently, but does say earlier that if she had a gun she probably would have killed herself earlier  Christie Sabbib denies any physical complaints, denies chest pain, denies shortness of breath, denies fevers, denies chills, denies dysuria or hematuria   Denies headache"     Per Crisis worker on 8/16: "Pt is a 56 y  o  female who was brought to the ED on 302 petitioned by [facility]  Per 302, and chart documentation, patient called the 's office and expressed that she cannot continue on the way she is and she doesn't want to proceed with her life  Patient told the  that if she had a gun she would take her cats too so they wouldn't be left behind   Patient had expressed to the  that she had sold all her possessions and gave a 30 days notice on her apartment because she was told to from a jesse in [place], who also told her she should move there and live with him  Patient indicated that she did not believe this was a scheme and did not want to involve adult protective services  Patient continued to express to the  and [facility] crisis that she is hopeless and has no family, friends, or support at all  Patient has a history of inpatient admissions, most recently at [facility] in February  She was previously active with outpatient at [facility], but was discharged due to non-compliance  Patient's  has been attempting to schedule with outpatient services for patient  Upon meeting with patient, she was listening to music and did not answer any questions that were asked  After several attempts to engage patient in assessment, she was informed of petitioned 36  Patient then turned around and expressed that going to the hospital isn't going to help, nothing helps  Attempted to engage patient in assessment again, however, she continued to decline to answer any questions  Patient informed of 302 to be upheld by the ER doctor at this time    [facility] and patient's  made necessary connections to ensure that patient's cats will be taken care of and to rescind her 30 day notice request"     This is 62 yo female with hx of depression/anxiety admitted to inpatient unit on involuntary status for worsening of mood and suicidal ideation with plan to shoot self  Patient says that she was in relation [place] some body [place] through a dating site for the past few months  She thought that she was genuine and planned to move over there and sold her belongings and gave notice to landlord  The wanted wanted to send money, but her online bank account was closed and found that it was fraud "sweetheart romance scam"  Patient endorses depressed mood and passive thoughts to hurt self  Denies any intent or plan to hurt self "I was upset at that time  I couldn't believe it was a scam  I thought he was genuine" "      Social History     Tobacco History     Smoking Status  Current Some Day Smoker Last attempt to quit  1/1/1990 Smoking Frequency  0 25 packs/day for 15 years (3 75 pk yrs) Smoking Tobacco Type  Cigarettes    Smokeless Tobacco Use  Never Used    Tobacco Comment  6 cigerattes a day          Alcohol History     Alcohol Use Status  Not Currently Drinks/Week  2 Cans of beer per week Amount  2 0 standard drinks of alcohol/wk Comment  socially          Drug Use     Drug Use Status  No          Sexual Activity     Sexually Active  Not Currently Partners  Male Birth Control/Protection  None          Activities of Daily Living    Not Asked               Additional Substance Use Detail     Questions Responses    Problems Due to Past Use of Alcohol? No    Problems Due to Past Use of Substances?  No    Substance Use Assessment Denies substance use within the past 12 months    Alcohol Use Frequency Denies use in past 12 months    Cannabis frequency Never used    Comment: Denies use in past 12 months ->"" on 5/1/2019 Never used on 5/3/2019     Heroin Frequency Denies use in past 12 months    Cocaine frequency Never used    Comment:  Denies past use in past 12 months -> Never used on 8/17/2022     Crack Cocaine Frequency Denies use in past 12 months    Methamphetamine Frequency Denies use in past 12 months    Narcotic Frequency Denies use in past 12 months    Benzodiazepine Frequency Denies use in past 12 months    Amphetamine frequency Denies use in past 12 months    Barbituate Frequency Denies use use in past 12 months    Inhalant frequency Never used    Comment:  Denies use in past 12 months -> Never used on 8/17/2022     Hallucinogen frequency Never used    Comment:  Denies use in past 12 months -> Never used on 8/17/2022     Ecstasy frequency Never used    Comment:  Denies use past 12 months -> Never used on 8/17/2022     Other drug frequency Never used Comment:  Denies use in past 12 months -> Never used on 2022     Opiate frequency Denies use in past 12 months    Last reviewed by Dwayne Gutiérrez RN on 2022          Past Medical History:   Diagnosis Date    Anxiety     Arthritis     Bipolar affective disorder, depressed, severe ([facility] [address] ) 2019    Depression     Elevated bilirubin 8/15/2019    Hyperlipidemia     Hypertension     Hypokalemia 8/15/2019    Learning difficulty     Leukocytosis 2020    Obesity (BMI 30 0-34 9) 2020    Osteopenia     Ovarian failure     Previous known suicide attempt     Psychiatric disorder     Psychiatric illness      Past Surgical History:   Procedure Laterality Date    LAPAROSCOPY      as a child, per patient-normal findings       Medications: All current active medications have been reviewed  Medications prior to admission:    Prior to Admission Medications   Prescriptions Last Dose Informant Patient Reported? Taking?    FLUoxetine (PROzac) 40 MG capsule   No No   Sig: Take 1 capsule (40 mg total) by mouth every morning   LORazepam (ATIVAN) 0 5 mg tablet   No No   Sig: TAKE 1 TABLET (0 5 MG TOTAL) BY MOUTH EVERY 8 (EIGHT) HOURS AS NEEDED FOR ANXIETY   Synvisc injection Not Taking at Unknown time  Yes No   Patient not taking: Reported on 2022   amLODIPine (NORVASC) 10 mg tablet   Yes No   Sig: Take 10 mg by mouth daily   atorvastatin (LIPITOR) 20 mg tablet   No No   Sig: Take 1 tablet (20 mg total) by mouth daily   fluticasone (FLONASE) 50 mcg/act nasal spray Not Taking at Unknown time  No No   Si sprays into each nostril daily   Patient not taking: Reported on 2022   lidocaine (LIDODERM) 5 % Not Taking at Unknown time  No No   Sig: Apply 1 patch topically daily Remove & Discard patch within 12 hours or as directed by MD   Patient not taking: Reported on 2022   lisinopril (ZESTRIL) 10 mg tablet   No No   Sig: Take 1 tablet (10 mg total) by mouth daily   mirtazapine (REMERON) 15 mg tablet   No No   Sig: Take 1 tablet (15 mg total) by mouth daily at bedtime   naphazoline-pheniramine (NAPHCON-A) 0 025-0 3 % ophthalmic solution Not Taking at Unknown time  No No   Sig: Administer 1 drop to both eyes 4 (four) times a day as needed for allergies or irritation   Patient not taking: Reported on 8/17/2022   naproxen (NAPROSYN) 500 mg tablet Not Taking at Unknown time  No No   Sig: TAKE 1 TABLET (500 MG TOTAL) BY MOUTH 2 (TWO) TIMES A DAY AS NEEDED FOR MODERATE PAIN   Patient not taking: Reported on 8/17/2022   risperiDONE (RisperDAL) 0 5 mg tablet   No No   Sig: Take 1 tablet (0 5 mg total) by mouth 2 (two) times a day      Facility-Administered Medications: None       Allergies: Allergies   Allergen Reactions    Other      Hay Fever    Oxycodone-Acetaminophen GI Intolerance       Objective     Vital signs in last 24 hours:    Temp:  [97 5 °F (36 4 °C)-98 3 °F (36 8 °C)] 98 3 °F (36 8 °C)  HR:  [70-97] 97  Resp:  [17-18] 18  BP: (112-119)/(72-80) 112/80    No intake or output data in the 24 hours ending 08/23/22 0245 Madelaine Ramos was admitted to the inpatient psychiatric unit and started on Behavioral Health checks every 7 minutes  During the hospitalization she was encouraged to attend individual therapy, group therapy, milieu therapy and occupational therapy  Psychiatric medications were adjusted over the hospital stay  To address depressive symptoms and self-abusive behavior, Misa was treated with antidepressant Prozac and Remeron and antipsychotic medication Risperdal  Medication doses were adjusted during the hospital course  Prozac was adjusted to 60mg daily  Remeron was continued at 15mg qhs  Risperdal was tapered off   Prior to beginning of treatment medications risks and benefits and possible side effects including risk of parkinsonian symptoms, Tardive Dyskinesia and metabolic syndrome related to treatment with antipsychotic medications, risk of cardiovascular events in elderly related to treatment with antipsychotic medications and risk of suicidality and serotonin syndrome related to treatment with antidepressants were reviewed with Misa  She verbalized understanding and agreement for treatment  Upon admission Misa was seen by medical service for medical clearance for inpatient treatment and medical follow up  Misa's symptoms slowly improved over the hospital course  Initially after admission she was still feeling depressed, frustrated, overwhelmed, labile and irritable  With adjustment of medications and therapeutic milieu her symptoms gradually improved  At the end of treatment Misa was improved  Her mood was more stable at the time of discharge, though she remained irritable and argumentative in the context of difficulty following unit rules and protocol  Misa denied suicidal ideation, intent or plan at the time of discharge and denied homicidal ideation, intent or plan at the time of discharge  Misa was participating appropriately in milieu at the time of discharge  Behavior was appropriate on the unit at the time of discharge  Sleep and appetite were improved  Misa was tolerating medications and was not reporting any significant side effects at the time of discharge  We felt that Misa achieved the maximum benefit of inpatient stay at that point, was at baseline at the end of the hospitalization and could now follow up with outpatient treatment  Misa also felt stable and ready for discharge at the end of the hospital stay  Case management confirmed that there were no guns in the home      Mental Status at Time of Discharge:     Appearance:  age appropriate, casually dressed, adequate grooming   Behavior:  cooperative, calm   Speech:  normal rate and volume   Mood:  irritable   Affect:  constricted   Thought Process:  perseverative on discharge, otherwise organized and linear   Associations: intact associations   Thought Content:  no overt delusions Perceptual Disturbances: no auditory hallucinations, no visual hallucinations, does not appear responding to internal stimuli   Risk Potential: Suicidal ideation - None at present, contracts for safety on the unit, would talk to staff if not feeling safe on the unit  Homicidal ideation - None at present  Potential for aggression - No   Sensorium:  oriented to person, place and time/date   Memory:  recent and remote memory grossly intact   Consciousness:  alert and awake   Attention/Concentration: attention span and concentration appear shorter than expected for age   Insight:  fair   Judgment: fair   Gait/Station: normal gait/station   Motor Activity: no abnormal movements       Admission Diagnosis:    Principal Problem:    MD[facility]major depressive disorder), [facility] )  Active Problems:    HTN (hypertension)    Hyperlipidemia    Mild intellectual disability    Obesity      Discharge Diagnosis:     Principal Problem:    MDD (major depressive disorder), recurrent episode, severe ([facility] )  Active Problems:    HTN (hypertension)    Hyperlipidemia    Mild intellectual disability    Obesity  Resolved Problems:    Medical clearance for psychiatric admission      Lab Results:   I have personally reviewed all pertinent laboratory/tests results    Most Recent Labs:   Lab Results   Component Value Date    WBC 16 69 (H) 08/16/2022    RBC 4 79 08/16/2022    HGB 13 8 08/16/2022    HCT 42 0 08/16/2022     08/16/2022    RDW 14 0 08/16/2022    NEUTROABS 11 86 (H) 08/16/2022    SODIUM 139 08/16/2022    K 3 9 08/16/2022     (H) 08/16/2022    CO2 23 08/16/2022    BUN 16 08/16/2022    CREATININE 0 93 08/16/2022    GLUC 141 (H) 08/16/2022    GLUF 104 (H) 08/13/2020    CALCIUM 9 9 08/16/2022    AST 35 08/16/2022    ALT 43 08/16/2022    ALKPHOS 85 08/16/2022    TP 7 5 08/16/2022    ALB 3 5 08/16/2022    TBILI 0 90 08/16/2022    CHOLESTEROL 237 (H) 08/17/2022    HDL 54 08/17/2022    TRIG 259 (H) 08/17/2022 LDLCALC 131 (H) 08/17/2022    [place] 183 08/17/2022    KCW4MBDVHQMQ 1 922 08/17/2022    PREGUR negative 08/16/2022    PREGSERUM Negative 08/13/2020    RPR Non-Reactive 08/17/2022    HGBA1C 5 6 04/29/2019     04/29/2019       Discharge Medications:    See after visit summary for all reconciled discharge medications provided to patient and family  Discharge instructions/Information to patient and family:     See after visit summary for information provided to patient and family  Provisions for Follow-Up Care:    See after visit summary for information related to follow-up care and any pertinent home health orders  Discharge Statement:    I spent 33 minutes discharging the patient  This time was spent on the day of discharge  I had direct contact with the patient on the day of discharge  Additional documentation is required if more than 30 minutes were spent on discharge:    I reviewed with Misa importance of compliance with medications and outpatient treatment after discharge  I discussed the medication regimen and possible side effects of the medications with Misa prior to discharge  At the time of discharge she was tolerating psychiatric medications  I discussed outpatient follow up with Misa  I reviewed with Misa crisis plan and safety plan upon discharge  Misa was competent to understand risks and benefits of withholding information and risks and benefits of her actions      Discharge on Two Antipsychotic Medications: Sonia Fierro PA-C 08/23/22

## 2022-08-22 NOTE — CASE MANAGEMENT
SABA left voicemail at [facility] ([phone]) as pt chart shows that she had an intake scheduled for 8/17/22 but reportedly she called them and told them she needed to cancel as she was going [place] out [place] the [place]  SABA asked for a call back to see if pt can be rescheduled for another intake as she will be discharged on Tuesday 8/23/22

## 2022-08-22 NOTE — CASE MANAGEMENT
CM met with pt to check in about dc plans for tomorrow  Pt was scant and guarded but thanked CM at end of meeting  Pt reported that she is feeling ready for dc  Pt reported she will need a ride home  Pt signed LYFT waiver and CM will coordinate transportation

## 2022-08-22 NOTE — NURSING NOTE
Pt reports having difficulty sleeping due to racing thoughts at [facility]  Writer offered PRN Haldol but pt declined requested water  Writer encouraged pt to seek staff support when in crises

## 2022-08-22 NOTE — TREATMENT TEAM
08/22/22 1000   Activity/Group Checklist   Group Community meeting   Attendance Attended   Attendance Duration (min) 0-15  (came to group late, did not fill out goal card, remained quiet at back table )   Interactions Did not interact   Affect/Mood Calm   Goals Achieved Able to listen to others

## 2022-08-22 NOTE — NURSING NOTE
Labile this AM   Ambulated to dining room ate a few bites of her omelette and returned to room  RN observed patient sitting in her room eating a breakfast sandwich and when she was asked why she returned to room and was encouraged not to eat in her room patient became angry, yelling at staff and threw her breakfast sandwich on the floor, walked out the room and slammed the door  Patient focused on discharge and states she wants to go home and can return to her apartment  Elevated anxiety and racing thoughts prior to bed  Utilizes sound machine

## 2022-08-22 NOTE — BH TRANSITION RECORD
Contact Information: If you have any questions, concerns, pended studies, tests and/or procedures, or emergencies regarding your inpatient behavioral health visit  Please contact [place] behavioral [facility] unit [phone] and ask to speak to a , nurse or physician  A contact is available 24 hours/ 7 days a week at this number  Summary of Procedures Performed During your Stay:  Below is a list of major procedures performed during your hospital stay and a summary of results:  - Cardiac Procedures/Studies: ekg  Pending Studies (From admission, onward)    None        Please follow up on the above pending studies with your PCP and/or referring provider

## 2022-08-22 NOTE — TREATMENT TEAM
08/22/22 1330   Activity/Group Checklist   Group Other (Comment)  (art therapy)   Attendance Attended   Attendance Duration (min) 46-60   Interactions Interacted appropriately   Affect/Mood Appropriate   Goals Achieved Able to listen to others; Able to engage in interactions; Other (Comment)  (authentic, spontaneous self expression, connection, and insight)

## 2022-08-22 NOTE — TREATMENT TEAM
08/22/22 1420   Activity/Group Checklist   Group Admission/Discharge   Attendance Attended   Attendance Duration (min) 0-15   Interactions Interacted appropriately   Affect/Mood Appropriate   Goals Achieved Able to engage in interactions     Pt independently filled out the relapse prevention plan form  Once completed, she had no questions or concerns for this therapist at this time

## 2022-08-22 NOTE — PROGRESS NOTES
Progress Note - Behavioral Health     Lyubov Amaro 61 y o  female MRN: [id number]   Unit/Bed#: [facility] 260-01 Encounter: [id number]    Behavior over the last 24 hours: carlos Self is a 50-year-old female with a history of MDD who presents for psychiatric follow-up  Staff reports that she has been labile, at times argumentative and yelling at staff about not taking food into her room  She is irritable upon approach and focused on discharge  She states that she is only in the hospital because she was upset over a relationship that did not work out    She states that she is feeling better, adamantly denying any suicidal thoughts, just wants to return home to her cats and move on with her life  No HI  Denies any manic or psychotic symptoms as well  Reports tolerating her medications without any side effects  Unpleasant demeanor for today's interview  Sleep: normal  Appetite: increased  Medication side effects: No   ROS: all other systems are negative    Mental Status Evaluation:    Appearance: casually dressed, appears consistent with stated age, normal grooming  Motor: no psychomotor disturbances, no gait abnormalities  Behavior: demanding, angry  Speech: normal rate, rhythm, and volume  Mood: "upset," she is irritable today  Affect: constricted, labile, mood-congruent  Thought Process: organized, linear, perseverative on d/c; intact associations  Thought Content: denies any delusional material, no preoccupation  Perception: denies any auditory or visual hallucinations, denies other perceptual disturbances  Risk Potential: denies suicidal ideation, plan, or intent   Denies homicidal ideation  Sensorium: Oriented to person, place, time, and situation  Cognition: cognitive ability appears intact but was not quantitatively tested  Consciousness: alert and awake  Attention/Concentration: able to focus without difficulty, attention and concentration are age appropriate  Insight: improved  Judgement: improved    Vital signs in last 24 hours:    Temp:  [97 6 °F (36 4 °C)-98 °F (36 7 °C)] 97 6 °F (36 4 °C)  HR:  [73-88] 73  Resp:  [16] 16  BP: (104-118)/(73-78) 118/73    Laboratory results: I have personally reviewed all pertinent laboratory/tests results    Results from the past 24 hours: No results found for this or any previous visit (from the past 24 hour(s))  Most Recent Labs:   Lab Results   Component Value Date    WBC 16 69 (H) 08/16/2022    RBC 4 79 08/16/2022    HGB 13 8 08/16/2022    HCT 42 0 08/16/2022     08/16/2022    RDW 14 0 08/16/2022    NEUTROABS 11 86 (H) 08/16/2022    SODIUM 139 08/16/2022    K 3 9 08/16/2022     (H) 08/16/2022    CO2 23 08/16/2022    BUN 16 08/16/2022    CREATININE 0 93 08/16/2022    GLUC 141 (H) 08/16/2022    CALCIUM 9 9 08/16/2022    AST 35 08/16/2022    ALT 43 08/16/2022    ALKPHOS 85 08/16/2022    TP 7 5 08/16/2022    ALB 3 5 08/16/2022    TBILI 0 90 08/16/2022    CHOLESTEROL 237 (H) 08/17/2022    HDL 54 08/17/2022    TRIG 259 (H) 08/17/2022    LDLCALC 131 (H) 08/17/2022    [place] 183 08/17/2022    OBE2UBWEYXSG 1 922 08/17/2022    PREGUR negative 08/16/2022    PREGSERUM Negative 08/13/2020    RPR Non-Reactive 08/17/2022    HGBA1C 5 6 04/29/2019     04/29/2019       Progress Toward Goals: progressing, less depressed, still very irritable, at times labile, no longer suicidal, working on coping skills, discharge planning    Assessment/Plan   Principal Problem:    MDD (major depressive disorder), recurrent episode, severe ([address] )  Active Problems:    HTN (hypertension)    Hyperlipidemia    Mild intellectual disability    Medical clearance for psychiatric admission    Obesity      Recommended Treatment:     Planned medication and treatment changes: All current active medications have been reviewed  Encourage group therapy, milieu therapy and occupational therapy  Behavioral Health checks every 7 minutes    Continue current medications   Discharge planning for tomorrow      Current Facility-Administered Medications   Medication Dose Route Frequency Provider Last Rate    acetaminophen  650 mg Oral Q4H PRN Viral Lambert, MD      acetaminophen  975 mg Oral Q6H PRN Viral Lambert, MD      aluminum-magnesium hydroxide-simethicone  30 mL Oral Q4H PRN Viral Lambert, MD      amLODIPine  10 mg Oral Daily Scott Gibbons MD      artificial tear  1 application Both Eyes B0F PRN Viral Lambert MD      atorvastatin  20 mg Oral Daily With Wade Hicks MD      haloperidol lactate  2 5 mg Intramuscular Q4H PRN Max 4/day Viral Lambert MD      And    LORazepam  1 mg Intramuscular Q4H PRN Max 4/day Viral Lambert MD      And    benztropine  0 5 mg Intramuscular Q4H PRN Max 4/day Viral Lambert MD      haloperidol lactate  5 mg Intramuscular Q4H PRN Max 4/day Viral Lambert MD      And    LORazepam  2 mg Intramuscular Q4H PRN Max 4/day Viral Lambert MD      And    benztropine  1 mg Intramuscular Q4H PRN Max 4/day Viral Lambert MD      benztropine  1 mg Oral Q4H PRN Max 6/day Viral Lambert MD      bisacodyl  10 mg Rectal Daily PRN Scott Gibbons MD      hydrOXYzine HCL  50 mg Oral Q6H PRN Max 4/day Viral Lambert MD      Or    diphenhydrAMINE  50 mg Intramuscular Q6H PRN Viral Lambert MD      FLUoxetine  60 mg Oral Daily Ousmanea Geovany, DO      haloperidol  0 5 mg Oral Q6H PRN Viral Lambert MD      haloperidol  2 5 mg Oral Q4H PRN Max 4/day Viral Lambert MD      haloperidol  5 mg Oral Q4H PRN Max 4/day Viral Lambert MD      hydrOXYzine HCL  25 mg Oral Q6H PRN Max 4/day Viral Lambert MD      lisinopril  10 mg Oral Daily Scott Gibbons MD      loperamide  2 mg Oral 4x Daily PRN Gurwinder Tay MD      LORazepam  2 mg Intramuscular Q6H PRN Viral Lambert MD      LORazepam  0 5 mg Oral Q8H PRN MD Sarah Valle mirtazapine  15 mg Oral HS Luisana Duval MD      naproxen  500 mg Oral BID PRN Luisana Duval MD      nicotine polacrilex  2 mg Oral Q2H PRN Argelia King MD      polyethylene glycol  17 g Oral Daily PRN Luisana Duval MD      senna-docusate sodium  1 tablet Oral Daily PRN Argelia King MD      traZODone  50 mg Oral HS PRN Argelia King MD       Risks / Benefits of Treatment:    Risks, benefits, and possible side effects of medications explained to patient and patient verbalizes understanding and agreement for treatment  Counseling / Coordination of Care:    Patient's progress discussed with staff in treatment team meeting  Medications, treatment progress and treatment plan reviewed with patient      Emma Raines PA-C 08/22/22

## 2022-08-22 NOTE — PROGRESS NOTES
Diagnosis of  MDD (major depressive disorder), recurrent episode, severe reviewed  Short term goals for decrease in depressive symptoms, decrease in anxiety symptoms, decrease in suicidal thoughts, improvement in insight, improvement in self care discussed  All present parties in agreement and treatment plan signed      08/19/22 1221   Team Meeting   Meeting Type Tx Team Meeting   Team Members Present   Team Members Present Nurse;;Physician   Physician Team Member Dr Evelin Silva Team Member [address] Management Team Member [place]   Patient/Family Present   Patient Present No  (pt declined to meet with treatment team)   Patient's Family Present No

## 2022-08-23 ENCOUNTER — PATIENT OUTREACH (OUTPATIENT)
Dept: INTERNAL MEDICINE CLINIC | Facility: CLINIC | Age: 57
End: 2022-08-23

## 2022-08-23 VITALS
HEIGHT: 63 IN | DIASTOLIC BLOOD PRESSURE: 80 MMHG | HEART RATE: 97 BPM | WEIGHT: 189.6 LBS | SYSTOLIC BLOOD PRESSURE: 112 MMHG | BODY MASS INDEX: 33.59 KG/M2 | TEMPERATURE: 98.3 F | RESPIRATION RATE: 18 BRPM | OXYGEN SATURATION: 93 %

## 2022-08-23 PROCEDURE — 99239 HOSP IP/OBS DSCHRG MGMT >30: CPT | Performed by: PHYSICIAN ASSISTANT

## 2022-08-23 RX ORDER — AMLODIPINE BESYLATE 10 MG/1
10 TABLET ORAL DAILY
Qty: 30 TABLET | Refills: 0 | Status: SHIPPED | OUTPATIENT
Start: 2022-08-23 | End: 2022-09-22

## 2022-08-23 RX ORDER — LISINOPRIL 10 MG/1
10 TABLET ORAL DAILY
Qty: 30 TABLET | Refills: 0 | Status: SHIPPED | OUTPATIENT
Start: 2022-08-23 | End: 2022-09-22

## 2022-08-23 RX ORDER — ATORVASTATIN CALCIUM 20 MG/1
20 TABLET, FILM COATED ORAL DAILY
Qty: 30 TABLET | Refills: 0 | Status: SHIPPED | OUTPATIENT
Start: 2022-08-23 | End: 2022-09-22

## 2022-08-23 RX ORDER — MIRTAZAPINE 15 MG/1
15 TABLET, FILM COATED ORAL
Qty: 30 TABLET | Refills: 0 | Status: SHIPPED | OUTPATIENT
Start: 2022-08-23 | End: 2022-09-22

## 2022-08-23 RX ORDER — FLUOXETINE HYDROCHLORIDE 20 MG/1
60 CAPSULE ORAL DAILY
Qty: 90 CAPSULE | Refills: 1 | Status: SHIPPED | OUTPATIENT
Start: 2022-08-23 | End: 2022-10-22

## 2022-08-23 RX ADMIN — LISINOPRIL 10 MG: 10 TABLET ORAL at 09:10

## 2022-08-23 RX ADMIN — AMLODIPINE BESYLATE 10 MG: 5 TABLET ORAL at 09:10

## 2022-08-23 RX ADMIN — FLUOXETINE 60 MG: 20 CAPSULE ORAL at 09:10

## 2022-08-23 NOTE — PLAN OF CARE
Problem: SELF HARM/SUICIDALITY  Goal: Will have no self-injury during hospital stay  Description: INTERVENTIONS:  - Q 15 MINUTES: Routine safety checks  - Q WAKING SHIFT & PRN: Assess risk to determine if routine checks are adequate to maintain patient safety  - Encourage patient to participate actively in care by formulating a plan to combat response to suicidal ideation, identify supports and resources  Outcome: Adequate for Discharge     Problem: DEPRESSION  Goal: Will be euthymic at discharge  Description: INTERVENTIONS:  - Administer medication as ordered  - Provide emotional support via 1:1 interaction with staff  - Encourage involvement in milieu/groups/activities  - Monitor for social isolation  Outcome: Adequate for Discharge     Problem: ANXIETY  Goal: Will report anxiety at manageable levels  Description: INTERVENTIONS:  - Administer medication as ordered  - Teach and encourage coping skills  - Provide emotional support  - Assess patient/family for anxiety and ability to cope  Outcome: Adequate for Discharge  Goal: By discharge: Patient will verbalize 2 strategies to deal with anxiety  Description: Interventions:  - Identify any obvious source/trigger to anxiety  - Staff will assist patient in applying identified coping technique/skills  - Encourage attendance of scheduled groups and activities  Outcome: Adequate for Discharge     Problem: DISCHARGE PLANNING  Goal: Discharge to home or other facility with appropriate resources  Description: INTERVENTIONS:  - Identify barriers to discharge w/patient and caregiver  - Arrange for needed discharge resources and transportation as appropriate  - Identify discharge learning needs (meds, wound care, etc )  - Arrange for interpretive services to assist at discharge as needed  - Refer to Case Management Department for coordinating discharge planning if the patient needs post-hospital services based on physician/advanced practitioner order or complex needs related to functional status, cognitive ability, or social support system  Outcome: Adequate for Discharge     Problem: Ineffective Coping  Goal: Identifies healthy coping skills  Outcome: Adequate for Discharge  Goal: Demonstrates healthy coping skills  Outcome: Adequate for Discharge  Goal: Participates in unit activities  Description: Interventions:  - Provide therapeutic environment   - Provide required programming   - Redirect inappropriate behaviors   Outcome: Adequate for Discharge     Expressed readiness for discharge

## 2022-08-23 NOTE — NURSING NOTE
Patient resting in bed with an ice pack, due to an arthritis flare to R knee  She denies any SI, and reports readiness for discharge in the morning  Pt hopeful to get her cortisone injections shortly after leaving this facility

## 2022-08-23 NOTE — PROGRESS NOTES
SW received returned call from [address]  unit who confirmed pt was discharged home to today and "was doing better and ready to get back to her life "   She is now scheduled for an in person Intake at [facility] on Wednesday 8/31/22 at   2 PM   SW has reached out to pt to see how she was doing   and to see if she wants a referral to the   [address] [facility] and Sat 9 AM to 2 PM a program who helps individuals get furniture and house holdgoods  JOHN also wanted to see if she needed assisance scheudling her insurance transportation or Stephen Job to get to her INTAKE appointment  SW had to leave a message for pt to return SW call

## 2022-08-23 NOTE — NURSING NOTE
Expressed readiness for discharge  Apologized for outburst the previous AM   Present in milieu, social with peers and watching television  Denies SI/HI and hallucinations  Improved mood  Discussed healthy coping skills: enjoys walking and listening to music  Encouraged to contact [facility] for support groups upon discharge  Encouraged compliance with medications and OP appointments  AVS reviewed  No questions or concerns

## 2022-08-24 NOTE — PROGRESS NOTES
Pt irritable, difficult to redirect  DC: Today - 10am via LYFT       08/23/22 0807   Team Meeting   Meeting Type Daily Rounds   Team Members Present   Team Members Present Physician;Nurse;; Other (Discipline and Name)   Physician Team Member Dr Holly Diamond / Narda Franks Team Member Slade Juarez / Norbert Keysha Management Team Member Rosie Morris   Other (Discipline and Name) [address] [facility]   Patient/Family Present   Patient Present No   Patient's Family Present No

## 2022-08-24 NOTE — PROGRESS NOTES
Pt denies SI  Reported he meds are not working  Was social on Sunday  Talking negatively about doctor and staff  DC: Tuesday 08/22/22 0806   Team Meeting   Meeting Type Daily Rounds   Team Members Present   Team Members Present Physician; Other (Discipline and Name); Nurse;   Physician Team Member Dr Noble Carbajal / Hung Quinn Team Member GERARDO [facility] Management Team Member [place]   Other (Discipline and Name) [facility]   Patient/Family Present   Patient Present No   Patient's Family Present No

## 2022-08-29 ENCOUNTER — OFFICE VISIT (OUTPATIENT)
Dept: OBGYN CLINIC | Facility: CLINIC | Age: 57
End: 2022-08-29
Payer: MEDICARE

## 2022-08-29 ENCOUNTER — PATIENT OUTREACH (OUTPATIENT)
Dept: INTERNAL MEDICINE CLINIC | Facility: CLINIC | Age: 57
End: 2022-08-29

## 2022-08-29 VITALS
SYSTOLIC BLOOD PRESSURE: 143 MMHG | BODY MASS INDEX: 33.49 KG/M2 | HEART RATE: 92 BPM | DIASTOLIC BLOOD PRESSURE: 82 MMHG | HEIGHT: 63 IN | WEIGHT: 189 LBS

## 2022-08-29 DIAGNOSIS — E66.01 MORBID OBESITY (HCC): ICD-10-CM

## 2022-08-29 DIAGNOSIS — M22.2X2 PATELLOFEMORAL PAIN SYNDROME OF BOTH KNEES: Primary | ICD-10-CM

## 2022-08-29 DIAGNOSIS — M22.2X1 PATELLOFEMORAL PAIN SYNDROME OF BOTH KNEES: Primary | ICD-10-CM

## 2022-08-29 PROCEDURE — 20610 DRAIN/INJ JOINT/BURSA W/O US: CPT | Performed by: PHYSICAL MEDICINE & REHABILITATION

## 2022-08-29 PROCEDURE — 99213 OFFICE O/P EST LOW 20 MIN: CPT | Performed by: PHYSICAL MEDICINE & REHABILITATION

## 2022-08-29 RX ORDER — LIDOCAINE HYDROCHLORIDE 10 MG/ML
3 INJECTION, SOLUTION INFILTRATION; PERINEURAL
Status: COMPLETED | OUTPATIENT
Start: 2022-08-29 | End: 2022-08-29

## 2022-08-29 RX ORDER — TRIAMCINOLONE ACETONIDE 40 MG/ML
80 INJECTION, SUSPENSION INTRA-ARTICULAR; INTRAMUSCULAR
Status: COMPLETED | OUTPATIENT
Start: 2022-08-29 | End: 2022-08-29

## 2022-08-29 RX ADMIN — LIDOCAINE HYDROCHLORIDE 3 ML: 10 INJECTION, SOLUTION INFILTRATION; PERINEURAL at 10:51

## 2022-08-29 RX ADMIN — TRIAMCINOLONE ACETONIDE 80 MG: 40 INJECTION, SUSPENSION INTRA-ARTICULAR; INTRAMUSCULAR at 10:51

## 2022-08-29 NOTE — LETTER
[facility] [phone]  [phone]    Re: ***   8/29/2022       Dear Patricia Pacheco am a [address] [facility]  and wanted to be certain you had information to contact me should you desire assistance with or have questions about non-medical aspects of your care such as [but not limited to] medical insurance, housing, transportation, material needs, or emergency needs  If I do not have an answer I will assist you in finding the appropriate agency or individual who can help  Please feel free to contact me at Dept: [phone]  Thank You      Sincerely,         [unfilled]

## 2022-08-29 NOTE — PROGRESS NOTES
1  Patellofemoral pain syndrome of both knees  Large joint arthrocentesis: bilateral knee    Injection procedure prior authorization    Ambulatory Referral to Weight Management   2  Morbid obesity ([address] )  Ambulatory Referral to Weight Management     Orders Placed This Encounter   Procedures    Large joint arthrocentesis: bilateral knee    Injection procedure prior authorization    Ambulatory Referral to Weight Management        Impression:  Patient is here in follow up of chronic bilateral knee pain likely secondary to mild OA and PFPS    Patient had steroid injections earlier this year which have been helpful for her  Baby [place] continue with hinged knee brace   Continue home exercise program   She has had physical therapy in the past   We decided to proceed with repeat steroid injections today  I have also ordered viscosupplementation  I have also placed an order for her to see weight management      Imaging Studies (I personally reviewed images in PACS and report):  Bilateral knee x-rays most recent to this encounter reviewed   These images show moderate osteoarthritis of mostly affects the medial joint space   No acute osseous abnormalities  Likely enchondroma in the proximal tibia bilaterally  No follow-ups on file  Patient is in agreement with the above plan  HPI:  Juliano Young is a 61 y o  female  who presents in follow up  Here for   Chief Complaint   Patient presents with    Left Knee - Follow-up    Right Knee - Follow-up       Since last visit: See above      Following history reviewed and updated:  Past Medical History:   Diagnosis Date    Anxiety     Arthritis     Bipolar affective disorder, depressed, severe ([address] ) 4/28/2019    Depression     Elevated bilirubin 8/15/2019    Hyperlipidemia     Hypertension     Hypokalemia 8/15/2019    Learning difficulty     Leukocytosis 7/28/2020    Obesity (BMI 30 0-34 9) 6/26/2020    Osteopenia     Ovarian failure     Previous known suicide attempt     Psychiatric disorder     Psychiatric illness      Past Surgical History:   Procedure Laterality Date    LAPAROSCOPY      as a child, per patient-normal findings     Social History   Social History     Substance and Sexual Activity   Alcohol Use Not Currently    Alcohol/week: 2 0 standard drinks    Types: 2 Cans of beer per week    Comment: socially     Social History     Substance and Sexual Activity   Drug Use No     Social History     Tobacco Use   Smoking Status Current Some Day Smoker    Packs/day: 0 25    Years: 15 00    Pack years: 3 75    Types: Cigarettes    Last attempt to quit: 1990    Years since quittin 6   Smokeless Tobacco Never Used   Tobacco Comment    6 cigerattes a day     Family History   Problem Relation Age of Onset    Alzheimer's disease Mother     Depression Mother     Depression Brother     Bipolar disorder Brother     No Known Problems Maternal Aunt     No Known Problems Paternal Aunt     No Known Problems Maternal Uncle     No Known Problems Paternal Uncle     No Known Problems Cousin     ADD / ADHD Neg Hx     Alcohol abuse Neg Hx     Anxiety disorder Neg Hx     Dementia Neg Hx     Drug abuse Neg Hx     OCD Neg Hx     Paranoid behavior Neg Hx     Schizophrenia Neg Hx     Seizures Neg Hx     Self-Injury Neg Hx     Suicide Attempts Neg Hx      Allergies   Allergen Reactions    Other      Hay Fever    Oxycodone-Acetaminophen GI Intolerance        Constitutional:  /82 (BP Location: Left arm, Patient Position: Sitting, Cuff Size: Adult)   Pulse 92   Ht 5' 3 39" (1 61 m)   Wt 85 7 kg (189 lb)   LMP  (LMP Unknown)   BMI 33 07 kg/m²    General: NAD  Eyes: Clear sclerae  ENT: No inflammation, lesion, or mass of lips  No tracheal deviation  Musculoskeletal: As mentioned below  Integumentary: No visible rashes or skin lesions  Pulmonary/Chest: Effort normal  No respiratory distress  Neuro: CN's grossly intact, FAIR    Psych: Normal affect and judgement  Vascular: WWP  Right Knee Exam     Tenderness   The patient is experiencing tenderness in the medial joint line and medial retinaculum  Range of Motion   The patient has normal right knee ROM  Tests   Varus: negative Valgus: negative      Left Knee Exam     Tenderness   The patient is experiencing tenderness in the medial joint line and medial retinaculum  Range of Motion   The patient has normal left knee ROM  Tests   Varus: negative Valgus: negative             Large joint arthrocentesis: bilateral knee  Universal Protocol:  Consent: Verbal consent obtained  Written consent not obtained  Risks and benefits: risks, benefits and alternatives were discussed  Consent given by: patient  Timeout called at: 8/29/2022 10:50 AM   Patient understanding: patient states understanding of the procedure being performed  Patient consent: the patient's understanding of the procedure matches consent given  Site marked: the operative site was marked  Radiology Images displayed and confirmed  If images not available, report reviewed: imaging studies available  Patient identity confirmed: verbally with patient    Supporting Documentation  Indications: pain   Procedure Details  Location: knee - bilateral knee  Needle size: 22 G  Ultrasound guidance: no  Approach: Inferolateral to patella  Medications (Right): 3 mL lidocaine 1 %; 80 mg triamcinolone acetonide 40 mg/mLMedications (Left): 3 mL lidocaine 1 %; 80 mg triamcinolone acetonide 40 mg/mL   Patient tolerance: patient tolerated the procedure well with no immediate complications  Dressing:  Sterile dressing applied    There was little to no resistance encountered during the injection  Prior to the procedure, the patient was informed of the following risks in layman terms:    - Risk of bleeding since a needle is involved  - Risk of infection (1/10,000 chance as per recent studies)    Signs/symptoms were discussed and they would prompt an urgent evaluation at an emergency department   - Risk of pigmentation or skin dimpling in the skin (2-3% chance as per recent studies) from the steroid  - Risk of increased pain from steroid flare (1% chance as per recent studies) that typically lasts 24-48 hours  - Risk of increased blood sugars from the steroid medication that can last for a few weeks  If the patient is a diabetic or pre-diabetic, they were encouraged to closely monitor their blood sugars and discuss with PCP if elevated more than usual or if having symptoms  After going over these risks, we decided that the benefits outweigh the risks and proceeded with the procedure

## 2022-08-29 NOTE — LETTER
[facility] [address] [phone]  [phone]    Re: RESOURCES   8/29/2022       Dear Gil Polanco,   I tried to reach you on 8/29/22 and was unfortunately unable to reach you  If you are in need of any further assistance you can contact me at  [address]  at: Dept: Trevor Urias [phone]      Sincerely,          Mary Ellen Gleason

## 2022-09-23 ENCOUNTER — TELEPHONE (OUTPATIENT)
Dept: OBGYN CLINIC | Facility: HOSPITAL | Age: 57
End: 2022-09-23

## 2022-09-23 ENCOUNTER — TELEPHONE (OUTPATIENT)
Dept: INTERNAL MEDICINE CLINIC | Facility: CLINIC | Age: 57
End: 2022-09-23

## 2022-09-23 NOTE — TELEPHONE ENCOUNTER
There was a referral placed under other orders, called patient advised and provided her with weight management phone number

## 2022-09-23 NOTE — TELEPHONE ENCOUNTER
* Patient called to check on her Gel Injections, patient started cursing and yelling at me, I asked her to please stop and she kept yelling, and then I hung up   *

## 2022-09-23 NOTE — TELEPHONE ENCOUNTER
----- Message from Beth Garcia sent at 9/23/2022  9:27 AM EDT -----  Regarding: Referral for Weight Management  Good morning,    I was speaking with the patient  She asked if there doctor put in anything for weight management  I did not see anything in her referrals  She said they were going set her up with a nutritionist     Can you please advise patient on next steps?     Thank you,    Hannah Trotter

## 2022-10-14 ENCOUNTER — TELEPHONE (OUTPATIENT)
Dept: PSYCHIATRY | Facility: CLINIC | Age: 57
End: 2022-10-14

## 2022-10-14 NOTE — TELEPHONE ENCOUNTER
Contacted patient off med mgmt wait list to verify needs of services and verify what location patient would prefer to be seen at so writer could send pt chart to proper  for scheduling  lvm for patient to contact intake dept

## 2022-11-18 ENCOUNTER — HOSPITAL ENCOUNTER (INPATIENT)
Facility: HOSPITAL | Age: 57
LOS: 11 days | Discharge: HOME | End: 2022-11-29
Attending: STUDENT IN AN ORGANIZED HEALTH CARE EDUCATION/TRAINING PROGRAM | Admitting: STUDENT IN AN ORGANIZED HEALTH CARE EDUCATION/TRAINING PROGRAM

## 2022-11-18 ENCOUNTER — HOSPITAL ENCOUNTER (EMERGENCY)
Facility: HOSPITAL | Age: 57
End: 2022-11-18
Attending: EMERGENCY MEDICINE

## 2022-11-18 VITALS
RESPIRATION RATE: 18 BRPM | DIASTOLIC BLOOD PRESSURE: 62 MMHG | TEMPERATURE: 99.1 F | OXYGEN SATURATION: 100 % | SYSTOLIC BLOOD PRESSURE: 145 MMHG | HEART RATE: 98 BPM

## 2022-11-18 DIAGNOSIS — R46.89 THREATENING BEHAVIOR: ICD-10-CM

## 2022-11-18 DIAGNOSIS — R46.89 THREATENING BEHAVIOR: Primary | ICD-10-CM

## 2022-11-18 DIAGNOSIS — F31.9 BIPOLAR AFFECTIVE DISORDER, REMISSION STATUS UNSPECIFIED (HCC): Primary | Chronic | ICD-10-CM

## 2022-11-18 PROBLEM — Z00.8 MEDICAL CLEARANCE FOR PSYCHIATRIC ADMISSION: Status: ACTIVE | Noted: 2022-11-18

## 2022-11-18 LAB
ALBUMIN SERPL BCP-MCNC: 3.6 G/DL (ref 3.5–5)
ALP SERPL-CCNC: 86 U/L (ref 46–116)
ALT SERPL W P-5'-P-CCNC: 47 U/L (ref 12–78)
AMPHETAMINES SERPL QL SCN: NEGATIVE
ANION GAP SERPL CALCULATED.3IONS-SCNC: 6 MMOL/L (ref 4–13)
AST SERPL W P-5'-P-CCNC: 23 U/L (ref 5–45)
ATRIAL RATE: 90 BPM
BARBITURATES UR QL: NEGATIVE
BASOPHILS # BLD AUTO: 0.04 THOUSANDS/ÂΜL (ref 0–0.1)
BASOPHILS NFR BLD AUTO: 0 % (ref 0–1)
BENZODIAZ UR QL: NEGATIVE
BILIRUB SERPL-MCNC: 1.16 MG/DL (ref 0.2–1)
BILIRUB UR QL STRIP: NEGATIVE
BUN SERPL-MCNC: 14 MG/DL (ref 5–25)
CALCIUM SERPL-MCNC: 10 MG/DL (ref 8.3–10.1)
CHLORIDE SERPL-SCNC: 107 MMOL/L (ref 96–108)
CLARITY UR: CLEAR
CLARITY, POC: CLEAR
CO2 SERPL-SCNC: 23 MMOL/L (ref 21–32)
COCAINE UR QL: NEGATIVE
COLOR UR: YELLOW
CREAT SERPL-MCNC: 0.75 MG/DL (ref 0.6–1.3)
EOSINOPHIL # BLD AUTO: 0.16 THOUSAND/ÂΜL (ref 0–0.61)
EOSINOPHIL NFR BLD AUTO: 1 % (ref 0–6)
ERYTHROCYTE [DISTWIDTH] IN BLOOD BY AUTOMATED COUNT: 14.1 % (ref 11.6–15.1)
ETHANOL EXG-MCNC: 0 MG/DL
FLUAV RNA RESP QL NAA+PROBE: NEGATIVE
FLUBV RNA RESP QL NAA+PROBE: NEGATIVE
GFR SERPL CREATININE-BSD FRML MDRD: 88 ML/MIN/1.73SQ M
GLUCOSE SERPL-MCNC: 124 MG/DL (ref 65–140)
GLUCOSE UR STRIP-MCNC: NEGATIVE MG/DL
HCT VFR BLD AUTO: 41.9 % (ref 34.8–46.1)
HGB BLD-MCNC: 13.9 G/DL (ref 11.5–15.4)
HGB UR QL STRIP.AUTO: NEGATIVE
IMM GRANULOCYTES # BLD AUTO: 0.09 THOUSAND/UL (ref 0–0.2)
IMM GRANULOCYTES NFR BLD AUTO: 1 % (ref 0–2)
KETONES UR STRIP-MCNC: NEGATIVE MG/DL
LEUKOCYTE ESTERASE UR QL STRIP: NEGATIVE
LYMPHOCYTES # BLD AUTO: 2.18 THOUSANDS/ÂΜL (ref 0.6–4.47)
LYMPHOCYTES NFR BLD AUTO: 17 % (ref 14–44)
MCH RBC QN AUTO: 29.1 PG (ref 26.8–34.3)
MCHC RBC AUTO-ENTMCNC: 33.2 G/DL (ref 31.4–37.4)
MCV RBC AUTO: 88 FL (ref 82–98)
METHADONE UR QL: NEGATIVE
MONOCYTES # BLD AUTO: 1.21 THOUSAND/ÂΜL (ref 0.17–1.22)
MONOCYTES NFR BLD AUTO: 9 % (ref 4–12)
NEUTROPHILS # BLD AUTO: 9.15 THOUSANDS/ÂΜL (ref 1.85–7.62)
NEUTS SEG NFR BLD AUTO: 72 % (ref 43–75)
NITRITE UR QL STRIP: NEGATIVE
NRBC BLD AUTO-RTO: 0 /100 WBCS
OPIATES UR QL SCN: NEGATIVE
OXYCODONE+OXYMORPHONE UR QL SCN: NEGATIVE
P AXIS: 44 DEGREES
PCP UR QL: NEGATIVE
PH UR STRIP.AUTO: 7 [PH] (ref 4.5–8)
PLATELET # BLD AUTO: 285 THOUSANDS/UL (ref 149–390)
PMV BLD AUTO: 9.8 FL (ref 8.9–12.7)
POTASSIUM SERPL-SCNC: 3.6 MMOL/L (ref 3.5–5.3)
PR INTERVAL: 140 MS
PROT SERPL-MCNC: 7.6 G/DL (ref 6.4–8.4)
PROT UR STRIP-MCNC: NEGATIVE MG/DL
QRS AXIS: -9 DEGREES
QRSD INTERVAL: 86 MS
QT INTERVAL: 366 MS
QTC INTERVAL: 447 MS
RBC # BLD AUTO: 4.77 MILLION/UL (ref 3.81–5.12)
RSV RNA RESP QL NAA+PROBE: NEGATIVE
SARS-COV-2 RNA RESP QL NAA+PROBE: NEGATIVE
SODIUM SERPL-SCNC: 136 MMOL/L (ref 135–147)
SP GR UR STRIP.AUTO: 1.02 (ref 1–1.03)
T WAVE AXIS: 30 DEGREES
THC UR QL: NEGATIVE
TSH SERPL DL<=0.05 MIU/L-ACNC: 1.21 UIU/ML (ref 0.45–4.5)
UROBILINOGEN UR QL STRIP.AUTO: 0.2 E.U./DL
VENTRICULAR RATE: 90 BPM
WBC # BLD AUTO: 12.83 THOUSAND/UL (ref 4.31–10.16)

## 2022-11-18 RX ORDER — RISPERIDONE 1 MG/1
1 TABLET ORAL
Status: CANCELLED | OUTPATIENT
Start: 2022-11-18

## 2022-11-18 RX ORDER — LORAZEPAM 1 MG/1
1 TABLET ORAL
Status: CANCELLED | OUTPATIENT
Start: 2022-11-18

## 2022-11-18 RX ORDER — TRAZODONE HYDROCHLORIDE 50 MG/1
50 TABLET ORAL
Status: DISCONTINUED | OUTPATIENT
Start: 2022-11-18 | End: 2022-11-29 | Stop reason: HOSPADM

## 2022-11-18 RX ORDER — LISINOPRIL 10 MG/1
10 TABLET ORAL DAILY
Status: DISCONTINUED | OUTPATIENT
Start: 2022-11-18 | End: 2022-11-18 | Stop reason: HOSPADM

## 2022-11-18 RX ORDER — LORAZEPAM 1 MG/1
1 TABLET ORAL
Status: DISCONTINUED | OUTPATIENT
Start: 2022-11-18 | End: 2022-11-27 | Stop reason: ALTCHOICE

## 2022-11-18 RX ORDER — HALOPERIDOL 5 MG/ML
5 INJECTION INTRAMUSCULAR
Status: DISCONTINUED | OUTPATIENT
Start: 2022-11-18 | End: 2022-11-29 | Stop reason: HOSPADM

## 2022-11-18 RX ORDER — RISPERIDONE 0.25 MG/1
0.5 TABLET ORAL
Status: CANCELLED | OUTPATIENT
Start: 2022-11-18

## 2022-11-18 RX ORDER — AMLODIPINE BESYLATE 5 MG/1
10 TABLET ORAL DAILY
Status: CANCELLED | OUTPATIENT
Start: 2022-11-19

## 2022-11-18 RX ORDER — HYDROXYZINE HYDROCHLORIDE 25 MG/1
25 TABLET, FILM COATED ORAL
Status: CANCELLED | OUTPATIENT
Start: 2022-11-18

## 2022-11-18 RX ORDER — LISINOPRIL 10 MG/1
10 TABLET ORAL DAILY
Status: DISCONTINUED | OUTPATIENT
Start: 2022-11-19 | End: 2022-11-29 | Stop reason: HOSPADM

## 2022-11-18 RX ORDER — RISPERIDONE 0.5 MG/1
0.5 TABLET ORAL
Status: DISCONTINUED | OUTPATIENT
Start: 2022-11-18 | End: 2022-11-29 | Stop reason: HOSPADM

## 2022-11-18 RX ORDER — ASENAPINE 10 MG/1
10 TABLET SUBLINGUAL ONCE
Status: COMPLETED | OUTPATIENT
Start: 2022-11-18 | End: 2022-11-18

## 2022-11-18 RX ORDER — TRAZODONE HYDROCHLORIDE 50 MG/1
50 TABLET ORAL
Status: CANCELLED | OUTPATIENT
Start: 2022-11-18

## 2022-11-18 RX ORDER — LORAZEPAM 0.5 MG/1
0.5 TABLET ORAL
Status: CANCELLED | OUTPATIENT
Start: 2022-11-18

## 2022-11-18 RX ORDER — LORAZEPAM 2 MG/ML
1 INJECTION INTRAMUSCULAR
Status: CANCELLED | OUTPATIENT
Start: 2022-11-18

## 2022-11-18 RX ORDER — LORAZEPAM 0.5 MG/1
0.5 TABLET ORAL
Status: DISCONTINUED | OUTPATIENT
Start: 2022-11-18 | End: 2022-11-27 | Stop reason: ALTCHOICE

## 2022-11-18 RX ORDER — RISPERIDONE 1 MG/1
1 TABLET ORAL
Status: DISCONTINUED | OUTPATIENT
Start: 2022-11-18 | End: 2022-11-19

## 2022-11-18 RX ORDER — HYDROXYZINE HYDROCHLORIDE 25 MG/1
25 TABLET, FILM COATED ORAL
Status: DISCONTINUED | OUTPATIENT
Start: 2022-11-18 | End: 2022-11-27

## 2022-11-18 RX ORDER — RISPERIDONE 1 MG/1
1 TABLET ORAL
Status: DISCONTINUED | OUTPATIENT
Start: 2022-11-18 | End: 2022-11-29 | Stop reason: HOSPADM

## 2022-11-18 RX ORDER — LISINOPRIL 10 MG/1
10 TABLET ORAL DAILY
Status: CANCELLED | OUTPATIENT
Start: 2022-11-19

## 2022-11-18 RX ORDER — AMLODIPINE BESYLATE 5 MG/1
10 TABLET ORAL DAILY
Status: DISCONTINUED | OUTPATIENT
Start: 2022-11-18 | End: 2022-11-18 | Stop reason: HOSPADM

## 2022-11-18 RX ORDER — AMLODIPINE BESYLATE 10 MG/1
10 TABLET ORAL DAILY
Status: DISCONTINUED | OUTPATIENT
Start: 2022-11-19 | End: 2022-11-29 | Stop reason: HOSPADM

## 2022-11-18 RX ORDER — RISPERIDONE 0.25 MG/1
0.25 TABLET ORAL
Status: DISCONTINUED | OUTPATIENT
Start: 2022-11-18 | End: 2022-11-29 | Stop reason: HOSPADM

## 2022-11-18 RX ORDER — HALOPERIDOL 5 MG/ML
5 INJECTION INTRAMUSCULAR
Status: CANCELLED | OUTPATIENT
Start: 2022-11-18

## 2022-11-18 RX ORDER — RISPERIDONE 0.25 MG/1
0.25 TABLET ORAL
Status: CANCELLED | OUTPATIENT
Start: 2022-11-18

## 2022-11-18 RX ORDER — FLUOXETINE HYDROCHLORIDE 20 MG/1
60 CAPSULE ORAL DAILY
Status: DISCONTINUED | OUTPATIENT
Start: 2022-11-18 | End: 2022-11-18 | Stop reason: HOSPADM

## 2022-11-18 RX ORDER — LORAZEPAM 2 MG/ML
1 INJECTION INTRAMUSCULAR
Status: DISCONTINUED | OUTPATIENT
Start: 2022-11-18 | End: 2022-11-19

## 2022-11-18 RX ADMIN — AMLODIPINE BESYLATE 10 MG: 5 TABLET ORAL at 12:49

## 2022-11-18 RX ADMIN — FLUOXETINE 60 MG: 20 CAPSULE ORAL at 12:50

## 2022-11-18 RX ADMIN — ASENAPINE MALEATE 10 MG: 10 TABLET SUBLINGUAL at 12:52

## 2022-11-18 RX ADMIN — LISINOPRIL 10 MG: 10 TABLET ORAL at 12:50

## 2022-11-18 NOTE — ED PROVIDER NOTES
History  Chief Complaint   Patient presents with   • Behavior Problem     Pt upset that there is bugs at her apartment, she has been requesting assistance without help  Pt stopped buying her rent and this week stated to a friend that lives there that she is going to burn the place down  [place] [facility] is requesting a 302 due to above c/o and states she has had a change in her behavior that is concerning to them that will be written out in 302  -SI/HI       57F presented to the emergency department for psychiatric evaluation  Patient brought in by police due to making threats against her apartment building that have been escalating and she has now been allegedly leaving notes around the apartment building threatening to burn down the building  Patient currently denying SI or plans to hurt herself, she admits to making these threats and states she has been angry about cockroaches in the building  Patient agitated and yelling, limited insight  The patient has history of prior involuntary psychiatric admissions  Patient states she has been taking her medications regularly but did not take them today and is requesting them  She denies recent drug use, she states she occasionally drinks 2 wine coolers  Prior to Admission Medications   Prescriptions Last Dose Informant Patient Reported? Taking?    FLUoxetine (PROzac) 20 mg capsule   No No   Sig: Take 3 capsules (60 mg total) by mouth daily   LORazepam (ATIVAN) 0 5 mg tablet Past Week  No Yes   Sig: TAKE 1 TABLET (0 5 MG TOTAL) BY MOUTH EVERY 8 (EIGHT) HOURS AS NEEDED FOR ANXIETY   amLODIPine (NORVASC) 10 mg tablet Past Week  No Yes   Sig: Take 1 tablet (10 mg total) by mouth daily   atorvastatin (LIPITOR) 20 mg tablet   No No   Sig: Take 1 tablet (20 mg total) by mouth daily   lisinopril (ZESTRIL) 10 mg tablet Past Week  No Yes   Sig: Take 1 tablet (10 mg total) by mouth daily   mirtazapine (REMERON) 15 mg tablet   No No   Sig: Take 1 tablet (15 mg total) by mouth daily at bedtime      Facility-Administered Medications: None       Past Medical History:   Diagnosis Date   • Anxiety    • Arthritis    • Bipolar affective disorder, depressed, severe ([address] ) 2019   • Depression    • Elevated bilirubin 8/15/2019   • Hyperlipidemia    • Hypertension    • Hypokalemia 8/15/2019   • Learning difficulty    • Leukocytosis 2020   • Obesity (BMI 30 0-34 9) 2020   • Osteopenia    • Ovarian failure    • Previous known suicide attempt    • Psychiatric disorder    • Psychiatric illness        Past Surgical History:   Procedure Laterality Date   • LAPAROSCOPY      as a child, per patient-normal findings       Family History   Problem Relation Age of Onset   • Alzheimer's disease Mother    • Depression Mother    • Depression Brother    • Bipolar disorder Brother    • No Known Problems Maternal Aunt    • No Known Problems Paternal Aunt    • No Known Problems Maternal Uncle    • No Known Problems Paternal Uncle    • No Known Problems Cousin    • ADD / ADHD Neg Hx    • Alcohol abuse Neg Hx    • Anxiety disorder Neg Hx    • Dementia Neg Hx    • Drug abuse Neg Hx    • OCD Neg Hx    • Paranoid behavior Neg Hx    • Schizophrenia Neg Hx    • Seizures Neg Hx    • Self-Injury Neg Hx    • Suicide Attempts Neg Hx      I have reviewed and agree with the history as documented  E-Cigarette/Vaping   • E-Cigarette Use Never User      E-Cigarette/Vaping Substances   • Nicotine No    • THC No    • CBD No    • Flavoring No    • Other No    • Unknown No      Social History     Tobacco Use   • Smoking status: Some Days     Packs/day: 0 25     Years: 15 00     Pack years: 3 75     Types: Cigarettes     Last attempt to quit: 1990     Years since quittin 9   • Smokeless tobacco: Never   • Tobacco comments:     6 cigerattes a day   Vaping Use   • Vaping Use: Never used   Substance Use Topics   • Alcohol use:  Yes     Alcohol/week: 2 0 standard drinks     Types: 2 Cans of beer per week Comment: daily   • Drug use: No        Review of Systems   Constitutional: Negative for fever  Respiratory: Negative for shortness of breath  Cardiovascular: Negative for chest pain  Gastrointestinal: Negative for abdominal pain, nausea and vomiting  All other systems reviewed and are negative  Physical Exam  ED Triage Vitals   Temperature Pulse Respirations Blood Pressure SpO2   11/18/22 1337 11/18/22 1211 11/18/22 1211 11/18/22 1234 11/18/22 1211   99 1 °F (37 3 °C) (!) 111 22 143/68 100 %      Temp Source Heart Rate Source Patient Position - Orthostatic VS BP Location FiO2 (%)   11/18/22 1337 -- -- -- --   Oral          Pain Score       11/18/22 1200       No Pain             Orthostatic Vital Signs  Vitals:    11/18/22 1211 11/18/22 1234   BP:  143/68   Pulse: (!) 111        Physical Exam  Vitals and nursing note reviewed  Constitutional:       General: She is not in acute distress  Appearance: She is not ill-appearing  HENT:      Head: Normocephalic  Mouth/Throat:      Mouth: Mucous membranes are moist    Eyes:      Pupils: Pupils are equal, round, and reactive to light  Cardiovascular:      Rate and Rhythm: Normal rate and regular rhythm  Heart sounds: No murmur heard  Pulmonary:      Effort: Pulmonary effort is normal  No respiratory distress  Breath sounds: Normal breath sounds  No wheezing, rhonchi or rales  Abdominal:      General: Abdomen is flat  There is no distension  Palpations: Abdomen is soft  Tenderness: There is no abdominal tenderness  There is no guarding or rebound  Musculoskeletal:      Right lower leg: No edema  Left lower leg: No edema  Skin:     General: Skin is warm and dry  Neurological:      Mental Status: She is alert  Psychiatric:      Comments: Agitated, yelling  Answering questions but requiring frequent redirection           ED Medications  Medications   FLUoxetine (PROzac) capsule 60 mg (60 mg Oral Given 11/18/22 1250)   lisinopril (ZESTRIL) tablet 10 mg (10 mg Oral Given 11/18/22 1250)   amLODIPine (NORVASC) tablet 10 mg (10 mg Oral Given 11/18/22 1249)   asenapine (SAPHRIS) SL tablet 10 mg (10 mg Sublingual Given 11/18/22 1252)       Diagnostic Studies  Results Reviewed     Procedure Component Value Units Date/Time    POCT urinalysis dipstick [[id number]]  (Normal) Resulted: 11/18/22 1359    Lab Status: Final result Specimen: Urine, Other Updated: 11/18/22 1359     Color, UA --     Clarity, UA Clear     EXT Leukocytes, UA --     Nitrite, UA --     Protein, UA -- mg/dl      Glucose, UA --     Ketones, UA -- mg/dl      EXT Urobilinogen, UA --      Bilirubin, UA --     Blood, UA --    Rapid drug screen, urine [[id number]] Collected: 11/18/22 1344    Lab Status: In process Specimen: Urine, Clean Catch Updated: 11/18/22 1352    Urine Macroscopic, POC [[id number]] Collected: 11/18/22 1347    Lab Status: Final result Specimen: Urine Updated: 11/18/22 1348     Color, UA Yellow     Clarity, UA Clear     pH, UA 7 0     Leukocytes, UA Negative     Nitrite, UA Negative     Protein, UA Negative mg/dl      Glucose, UA Negative mg/dl      Ketones, UA Negative mg/dl      Urobilinogen, UA 0 2 E U /dl      Bilirubin, UA Negative     Occult Blood, UA Negative     Specific Gravity, UA 1 020    Narrative:      CLINITEK RESULT    COVID/FLU/RSV [[id number]]  (Normal) Collected: 11/18/22 1226    Lab Status: Final result Specimen: Nares from Nose Updated: 11/18/22 1336     SARS-CoV-2 Negative     INFLUENZA A PCR Negative     INFLUENZA B PCR Negative     RSV PCR Negative    Narrative:      FOR PEDIATRIC PATIENTS - copy/paste COVID Guidelines URL to browser: [email or web address]    SARS-CoV-2 assay is a Nucleic Acid Amplification assay intended for the  qualitative detection of nucleic acid from SARS-CoV-2 in nasopharyngeal  swabs   Results are for the presumptive identification of SARS-CoV-2 RNA     Positive results are indicative of infection with SARS-CoV-2, the virus  causing COVID-19, but do not rule out bacterial infection or co-infection  with other viruses  Laboratories within the [place] and its  territories are required to report all positive results to the appropriate  public health authorities  Negative results do not preclude SARS-CoV-2  infection and should not be used as the sole basis for treatment or other  patient management decisions  Negative results must be combined with  clinical observations, patient history, and epidemiological information  This test has not been FDA cleared or approved  This test has been authorized by FDA under an Emergency Use Authorization  (EUA)  This test is only authorized for the duration of time the  declaration that circumstances exist justifying the authorization of the  emergency use of an in vitro diagnostic tests for detection of SARS-CoV-2  virus and/or diagnosis of COVID-19 infection under section 564(b)(1) of  the Act, 21 U  S C  040CLQ-0(F)(1), unless the authorization is terminated  or revoked sooner  The test has been validated but independent review by FDA  and CLIA is pending  Test performed using [employer] GeneXpert: This RT-PCR assay targets N2,  a region unique to SARS-CoV-2  A conserved region in the E-gene was chosen  for pan-Sarbecovirus detection which includes SARS-CoV-2  According to CMS-2020-01-R, this platform meets the definition of high-throughput technology      POCT alcohol breath test [[id number]]  (Normal) Resulted: 11/18/22 1334    Lab Status: Final result Updated: 11/18/22 1334     EXTBreath Alcohol 0 000    Comprehensive metabolic panel [[id number]]  (Abnormal) Collected: 11/18/22 1224    Lab Status: Final result Specimen: Blood from Arm, Left Updated: 11/18/22 1324     Sodium 136 mmol/L      Potassium 3 6 mmol/L      Chloride 107 mmol/L      CO2 23 mmol/L      ANION GAP 6 mmol/L      BUN 14 mg/dL      Creatinine 0 75 mg/dL      Glucose 124 mg/dL      Calcium 10 0 mg/dL      AST 23 U/L      ALT 47 U/L      Alkaline Phosphatase 86 U/L      Total Protein 7 6 g/dL      Albumin 3 6 g/dL      Total Bilirubin 1 16 mg/dL      eGFR 88 ml/min/1 73sq m     Narrative:      [place] guidelines for Chronic Kidney Disease (CKD):   •  Stage 1 with normal or high GFR (GFR > 90 mL/min/1 73 square meters)  •  Stage 2 Mild CKD (GFR = 60-89 mL/min/1 73 square meters)  •  Stage 3A Moderate CKD (GFR = 45-59 mL/min/1 73 square meters)  •  Stage 3B Moderate CKD (GFR = 30-44 mL/min/1 73 square meters)  •  Stage 4 Severe CKD (GFR = 15-29 mL/min/1 73 square meters)  •  Stage 5 End Stage CKD (GFR <15 mL/min/1 73 square meters)  Note: GFR calculation is accurate only with a steady state creatinine    TSH [[id number]]  (Normal) Collected: 11/18/22 1224    Lab Status: Final result Specimen: Blood from Arm, Left Updated: 11/18/22 1324     TSH 3RD GENERATON 1 210 uIU/mL     Narrative:      Patients undergoing fluorescein dye angiography may retain small amounts of fluorescein in the body for 48-72 hours post procedure  Samples containing fluorescein can produce falsely depressed TSH values  If the patient had this procedure,a specimen should be resubmitted post fluorescein clearance        CBC and differential [[id number]]  (Abnormal) Collected: 11/18/22 1224    Lab Status: Final result Specimen: Blood from Arm, Left Updated: 11/18/22 1251     WBC 12 83 Thousand/uL      RBC 4 77 Million/uL      Hemoglobin 13 9 g/dL      Hematocrit 41 9 %      MCV 88 fL      MCH 29 1 pg      MCHC 33 2 g/dL      RDW 14 1 %      MPV 9 8 fL      Platelets 605 Thousands/uL      nRBC 0 /100 WBCs      Neutrophils Relative 72 %      Immat GRANS % 1 %      Lymphocytes Relative 17 %      Monocytes Relative 9 %      Eosinophils Relative 1 %      Basophils Relative 0 %      Neutrophils Absolute 9 15 Thousands/µL      Immature Grans Absolute 0 09 Thousand/uL Lymphocytes Absolute 2 18 Thousands/µL      Monocytes Absolute 1 21 Thousand/µL      Eosinophils Absolute 0 16 Thousand/µL      Basophils Absolute 0 04 Thousands/µL                  No orders to display         Procedures  Procedures      ED Course                                       MDM  Number of Diagnoses or Management Options  Diagnosis management comments: 57F presented to the emergency department for psychiatric evaluation  Workup including vital signs, physical exam, labs, EKG  [facility] to patient making threats to burn down apartment building  Patient admitting to making threats, limited insight, patient not willing to stay for voluntary psychiatric admission, plan to uphold 302  Patient medically cleared for psychiatry  Disposition  Final diagnoses:   None     ED Disposition     None      MD Documentation    Flowsheet Row Most Recent Value   Sending MD Peterson      Follow-up Information    None         Patient's Medications   Discharge Prescriptions    No medications on file     No discharge procedures on file  PDMP Review     None           ED Provider  Attending physically available and evaluated Elsy MENDEZ managed the patient along with the ED Attending      Electronically Signed by         Danilo Paulino MD  11/18/22 6742

## 2022-11-18 NOTE — ED NOTES
Per Donald Gold at [facility], Anselmo Nelson at the [facility] will care for pts cats while she is inpatient

## 2022-11-18 NOTE — ED NOTES
Patient is accepted at [facility] 6T  Patient is accepted by Dr Farshad Jaramillo  per Gloria Puente in Intake  Transportation is arranged with [facility]  Transportation is scheduled for 1930  Patient may go to the floor at anytime  *Nurse report is to be called to [phone] prior to patient transfer  VLADISLAV Tsang  11/18/22    1581

## 2022-11-18 NOTE — ED ATTENDING ATTESTATION
Final Diagnosis:  1  Threatening behavior      ED Course as of 11/18/22 1556   Fri Nov 18, 2022   1421 302-ed         I, Sultana Zurita MD, saw and evaluated the patient  All available labs and X-rays were ordered by me or the resident and have been reviewed by myself  I discussed the patient with the resident / non-physician and agree with the resident's / non-physician practitioner's findings and plan as documented in the resident's / non-physician practicitioner's note, except where noted  At this point, I agree with the current assessment done in the ED  I was present during key portions of all procedures performed unless otherwise stated  Chief Complaint   Patient presents with   • Behavior Problem     Pt upset that there is bugs at her apartment, she has been requesting assistance without help  Pt stopped buying her rent and this week stated to a friend that lives there that she is going to burn the [facility] crisis is requesting a 302 due to above c/o and states she has had a change in her behavior that is concerning to them that will be written out in 302  -SI/HI       This is a 61 y o  female presenting for evaluation of plan to burn down her [facility] crisis would like to 302 given her behaviors  Very complaint with her benzos; not very compliant with other drug classes  Hx difficult to get from patient but has explicit plan  PMH:   has a past medical history of Anxiety, Arthritis, Bipolar affective disorder, depressed, severe ([address] ) (4/28/2019), Depression, Elevated bilirubin (8/15/2019), Hyperlipidemia, Hypertension, Hypokalemia (8/15/2019), Learning difficulty, Leukocytosis (7/28/2020), Obesity (BMI 30 0-34 9) (6/26/2020), Osteopenia, Ovarian failure, Previous known suicide attempt, Psychiatric disorder, and Psychiatric illness  PSH:   has a past surgical history that includes LAPAROSCOPY      Social:  Social History     Substance and Sexual Activity   Alcohol Use Yes • Alcohol/week: 2 0 standard drinks   • Types: 2 Cans of beer per week    Comment: daily     Social History     Tobacco Use   Smoking Status Some Days   • Packs/day: 0 25   • Years: 15 00   • Pack years: 3 75   • Types: Cigarettes   • Last attempt to quit: 1990   • Years since quittin 9   Smokeless Tobacco Never   Tobacco Comments    6 cigerattes a day     Social History     Substance and Sexual Activity   Drug Use No     PE:  Vitals:    22 1211 22 1234 22 1337   BP:  143/68    Pulse: (!) 111     Resp: 22     Temp:   99 1 °F (37 3 °C)   TempSrc:   Oral   SpO2: 100%     General: VS reviewed  Appears in NAD  awake, alert  Well-nourished, well-developed  Appears stated age  Speaking normally in full sentences  Head: Normocephalic, atraumatic  Eyes: EOM-I  No diplopia  No hyphema  No subconjunctival hemorrhages  Symmetrical lids  ENT: Atraumatic external nose and ears  MMM  No malocclusion  No stridor  Normal phonation  No drooling  Normal swallowing  Neck: No JVD  CV: No pallor noted  Lungs:   No tachypnea  No respiratory distress  MSK:   FROM spontaneously  Skin: Dry, intact  Neuro: Awake, alert, GCS15, CN II-XII grossly intact  Motor grossly intact  Psychiatric/Behavioral: yelling, difficult to redirect   Exam: deferred  A:  - 302  P:  - 302    - 13 point ROS was performed and all are normal unless stated in the history above  - Nursing note reviewed  Vitals reviewed  - Orders placed by myself and/or advanced practitioner / resident     - Previous chart was reviewed  - No language barrier    - History obtained from patient  - There are no limitations to the history obtained  - Critical care time: Not applicable for this patient       Code Status: Prior  Advance Directive and Living Will:      Power of :    POLST:      Medications   FLUoxetine (PROzac) capsule 60 mg (60 mg Oral Given 22 1250)   lisinopril (ZESTRIL) tablet 10 mg (10 mg Oral Given 11/18/22 1250)   amLODIPine (NORVASC) tablet 10 mg (10 mg Oral Given 11/18/22 1249)   asenapine (SAPHRIS) SL tablet 10 mg (10 mg Sublingual Given 11/18/22 1252)     No orders to display     Orders Placed This Encounter   Procedures   • COVID/FLU/RSV   • CBC and differential   • Comprehensive metabolic panel   • TSH   • Rapid drug screen, urine   • Diet Regular; Finger Foods   • Nursing communication Prepare room for behavioral health patient   • Nursing communication Remove belongings from room and secure   Change patient into paper scrubs   • Continual Observation   • Nursing communication Okay to have phone   • Consult to ED Crisis Worker   • POCT alcohol breath test   • POCT urinalysis dipstick   • ECG 12 lead   • ECG 12 lead   • Discharge Readmit to [facility]  [facility] Reviewed   CBC AND DIFFERENTIAL - Abnormal       Result Value Ref Range Status    WBC 12 83 (*) 4 31 - 10 16 Thousand/uL Final    RBC 4 77  3 81 - 5 12 Million/uL Final    Hemoglobin 13 9  11 5 - 15 4 g/dL Final    Hematocrit 41 9  34 8 - 46 1 % Final    MCV 88  82 - 98 fL Final    MCH 29 1  26 8 - 34 3 pg Final    MCHC 33 2  31 4 - 37 4 g/dL Final    RDW 14 1  11 6 - 15 1 % Final    MPV 9 8  8 9 - 12 7 fL Final    Platelets 828  128 - 390 Thousands/uL Final    nRBC 0  /100 WBCs Final    Neutrophils Relative 72  43 - 75 % Final    Immat GRANS % 1  0 - 2 % Final    Lymphocytes Relative 17  14 - 44 % Final    Monocytes Relative 9  4 - 12 % Final    Eosinophils Relative 1  0 - 6 % Final    Basophils Relative 0  0 - 1 % Final    Neutrophils Absolute 9 15 (*) 1 85 - 7 62 Thousands/µL Final    Immature Grans Absolute 0 09  0 00 - 0 20 Thousand/uL Final    Lymphocytes Absolute 2 18  0 60 - 4 47 Thousands/µL Final    Monocytes Absolute 1 21  0 17 - 1 22 Thousand/µL Final    Eosinophils Absolute 0 16  0 00 - 0 61 Thousand/µL Final    Basophils Absolute 0 04  0 00 - 0 10 Thousands/µL Final   COMPREHENSIVE METABOLIC PANEL - Abnormal Sodium 136  135 - 147 mmol/L Final    Potassium 3 6  3 5 - 5 3 mmol/L Final    Chloride 107  96 - 108 mmol/L Final    CO2 23  21 - 32 mmol/L Final    ANION GAP 6  4 - 13 mmol/L Final    BUN 14  5 - 25 mg/dL Final    Creatinine 0 75  0 60 - 1 30 mg/dL Final    Comment: Standardized to IDMS reference method    Glucose 124  65 - 140 mg/dL Final    Comment: If the patient is fasting, the ADA then defines impaired fasting glucose as > 100 mg/dL and diabetes as > or equal to 123 mg/dL  Specimen collection should occur prior to Sulfasalazine administration due to the potential for falsely depressed results  Specimen collection should occur prior to Sulfapyridine administration due to the potential for falsely elevated results  Calcium 10 0  8 3 - 10 1 mg/dL Final    AST 23  5 - 45 U/L Final    Comment: Specimen collection should occur prior to Sulfasalazine administration due to the potential for falsely depressed results  ALT 47  12 - 78 U/L Final    Comment: Specimen collection should occur prior to Sulfasalazine and/or Sulfapyridine administration due to the potential for falsely depressed results  Alkaline Phosphatase 86  46 - 116 U/L Final    Total Protein 7 6  6 4 - 8 4 g/dL Final    Albumin 3 6  3 5 - 5 0 g/dL Final    Total Bilirubin 1 16 (*) 0 20 - 1 00 mg/dL Final    Comment: Use of this assay is not recommended for patients undergoing treatment with eltrombopag due to the potential for falsely elevated results      eGFR 88  ml/min/1 73sq m Final    Narrative:     [place] guidelines for Chronic Kidney Disease (CKD):   •  Stage 1 with normal or high GFR (GFR > 90 mL/min/1 73 square meters)  •  Stage 2 Mild CKD (GFR = 60-89 mL/min/1 73 square meters)  •  Stage 3A Moderate CKD (GFR = 45-59 mL/min/1 73 square meters)  •  Stage 3B Moderate CKD (GFR = 30-44 mL/min/1 73 square meters)  •  Stage 4 Severe CKD (GFR = 15-29 mL/min/1 73 square meters)  •  Stage 5 End Stage CKD (GFR <15 mL/min/1 73 square meters)  Note: GFR calculation is accurate only with a steady state creatinine   COVID19, INFLUENZA A/B, RSV PCR, [facility] - Normal    SARS-CoV-2 Negative  Negative Final    Comment:      INFLUENZA A PCR Negative  Negative Final    Comment:      INFLUENZA B PCR Negative  Negative Final    Comment:      RSV PCR Negative  Negative Final    Comment:      Narrative:     FOR PEDIATRIC PATIENTS - copy/paste COVID Guidelines URL to browser: [email or web address]  [employer]    SARS-CoV-2 assay is a Nucleic Acid Amplification assay intended for the  qualitative detection of nucleic acid from SARS-CoV-2 in nasopharyngeal  swabs  Results are for the presumptive identification of SARS-CoV-2 RNA  Positive results are indicative of infection with SARS-CoV-2, the virus  causing COVID-19, but do not rule out bacterial infection or co-infection  with other viruses  Laboratories within the [place] and its  territories are required to report all positive results to the appropriate  public health authorities  Negative results do not preclude SARS-CoV-2  infection and should not be used as the sole basis for treatment or other  patient management decisions  Negative results must be combined with  clinical observations, patient history, and epidemiological information  This test has not been FDA cleared or approved  This test has been authorized by FDA under an Emergency Use Authorization  (EUA)  This test is only authorized for the duration of time the  declaration that circumstances exist justifying the authorization of the  emergency use of an in vitro diagnostic tests for detection of SARS-CoV-2  virus and/or diagnosis of COVID-19 infection under section 564(b)(1) of  the Act, 21 U  S C  923ZFS-5(V)(3), unless the authorization is terminated  or revoked sooner  The test has been validated but independent review by FDA  and CLIA is pending      Test performed using [employer] GeneXpert: This RT-PCR assay targets N2,  a region unique to SARS-CoV-2  A conserved region in the E-gene was chosen  for pan-Sarbecovirus detection which includes SARS-CoV-2  According to CMS-2020-01-R, this platform meets the definition of high-throughput technology  TSH, 3RD GENERATION - Normal    TSH 3RD GENERATON 1 210  0 450 - 4 500 uIU/mL Final    Comment: The recommended reference ranges for TSH during pregnancy are as follows:   First trimester 0 1 to 2 5 uIU/mL   Second trimester  0 2 to 3 0 uIU/mL   Third trimester 0 3 to 3 0 uIU/m    Note: Normal ranges may not apply to patients who are transgender, non-binary, or whose legal sex, sex at birth, and gender identity differ  Adult TSH (3rd generation) reference range follows the recommended guidelines of the American Thyroid Association, January, 2020  Narrative:     Patients undergoing fluorescein dye angiography may retain small amounts of fluorescein in the body for 48-72 hours post procedure  Samples containing fluorescein can produce falsely depressed TSH values  If the patient had this procedure,a specimen should be resubmitted post fluorescein clearance  RAPID DRUG SCREEN, URINE - Normal    Amph/Meth UR Negative  Negative Final    Barbiturate Ur Negative  Negative Final    Benzodiazepine Urine Negative  Negative Final    Cocaine Urine Negative  Negative Final    Methadone Urine Negative  Negative Final    Opiate Urine Negative  Negative Final    PCP Ur Negative  Negative Final    THC Urine Negative  Negative Final    Oxycodone Urine Negative  Negative Final    Narrative:     FOR MEDICAL PURPOSES ONLY  IF CONFIRMATION NEEDED PLEASE CONTACT THE LAB WITHIN 5 DAYS      Drug Screen Cutoff Levels:  AMPHETAMINE/METHAMPHETAMINES  1000 ng/mL  BARBITURATES     200 ng/mL  BENZODIAZEPINES     200 ng/mL  COCAINE      300 ng/mL  METHADONE      300 ng/mL  OPIATES      300 ng/mL  PHENCYCLIDINE     25 ng/mL  THC       50 ng/mL  OXYCODONE      100 ng/mL   POCT ALCOHOL BREATH TEST - Normal    EXTBreath Alcohol 0 000   Final   POCT URINALYSIS DIPSTICK - Normal    Color, UA         Clarity, UA Clear   Final    EXT Leukocytes, UA         Nitrite, UA         Protein, UA    mg/dl     Glucose, UA         Ketones, UA    mg/dl     EXT Urobilinogen, UA          Bilirubin, UA         Blood, UA        URINE MACROSCOPIC, POC    Color, UA Yellow   Final    Clarity, UA Clear   Final    pH, UA 7 0  4 5 - 8 0 Final    Leukocytes, UA Negative  Negative Final    Nitrite, UA Negative  Negative Final    Protein, UA Negative  Negative mg/dl Final    Glucose, UA Negative  Negative mg/dl Final    Ketones, UA Negative  Negative mg/dl Final    Urobilinogen, UA 0 2  0 2, 1 0 E U /dl E U /dl Final    Bilirubin, UA Negative  Negative Final    Occult Blood, UA Negative  Negative Final    Specific Gravity, UA 1 020  1 003 - 1 030 Final    Narrative:     CLINITEK RESULT     Time reflects when diagnosis was documented in both MDM as applicable and the Disposition within this note     Time User Action Codes Description Comment    11/18/2022  2:03 PM Varghese Rivers Add [R46 [id number]] Threatening behavior       ED Disposition     ED Disposition   Transfer to Behavioral [facility]    Condition   --    Date/Time   Fri Nov 18, 2022  2:03 PM    Comment   Moshe Edward should be transferred out to behavioral health and has been medically cleared  MD Documentation    Flowsheet Row Most Recent Value   Sending MD Mable Segal      Follow-up Information    None       Patient's Medications   Discharge Prescriptions    No medications on file     No discharge procedures on file  Prior to Admission Medications   Prescriptions Last Dose Informant Patient Reported? Taking?    FLUoxetine (PROzac) 20 mg capsule   No No   Sig: Take 3 capsules (60 mg total) by mouth daily   LORazepam (ATIVAN) 0 5 mg tablet Past Week  No Yes   Sig: TAKE 1 TABLET (0 5 MG TOTAL) BY MOUTH EVERY 8 (EIGHT) HOURS AS NEEDED FOR ANXIETY   amLODIPine (NORVASC) 10 mg tablet Past Week  No Yes   Sig: Take 1 tablet (10 mg total) by mouth daily   atorvastatin (LIPITOR) 20 mg tablet   No No   Sig: Take 1 tablet (20 mg total) by mouth daily   lisinopril (ZESTRIL) 10 mg tablet Past Week  No Yes   Sig: Take 1 tablet (10 mg total) by mouth daily   mirtazapine (REMERON) 15 mg tablet   No No   Sig: Take 1 tablet (15 mg total) by mouth daily at bedtime      Facility-Administered Medications: None       Portions of the record may have been created with voice recognition software  Occasional wrong word or "sound a like" substitutions may have occurred due to the inherent limitations of voice recognition software  Read the chart carefully and recognize, using context, where substitutions have occurred      Electronically signed by:  Bill Tavares

## 2022-11-18 NOTE — ED NOTES
Pt was brought to the ed via ems and [employer]  Sudha Monroy from [facility] crisis and Jackie Gomez 46 worker from [facility] are also present  Police stated they received a call from staff at the [facility] where pt lives stating pt has been threatening to burn the building down with residents in it  Pt has been leaving notes on other residents door saying she was going to burn the place down as well  Pt has been upset due to cockroaches in the building  She allegedly collected dead bugs and placed them in a zip lock bag and placed it under administration's door  Staff stated pt has been very manic and buying random costumes to wear  Pt has been ranting to staff and other residents  Pt was told she may be evicted for not paying rent  She also sent texts to residents saying she would burn the place down  Pt is currently irritable and loud  She appears manic and her speech is tangential  Pt denies suicidal ideations as well as hallucinations but admitted to threatening to burn the building  It is unclear if pt is med compliant  She has been inpatient multiple times in the past  Pt will not face charges per [employer]  She declined to sign a 201 and will be a 36 with [facility] for danger to others  A staff member Tono Ramirez petitioned the 36 with [facility] and was upheld by ed Dr Zoran Haddad

## 2022-11-18 NOTE — ED NOTES
Pajama pants, shirt and grey sweater all placed in one clear plastic patient belongings bag and stored in zone 5 med room cabinet **G**     Sylvia Jamison  11/18/22 [id number]

## 2022-11-19 LAB
25(OH)D3 SERPL-MCNC: 20.7 NG/ML (ref 30–100)
ALBUMIN SERPL BCP-MCNC: 4.4 G/DL (ref 3.5–5)
ALP SERPL-CCNC: 91 U/L (ref 43–122)
ALT SERPL W P-5'-P-CCNC: 53 U/L
ANION GAP SERPL CALCULATED.3IONS-SCNC: 7 MMOL/L (ref 5–14)
AST SERPL W P-5'-P-CCNC: 32 U/L (ref 14–36)
ATRIAL RATE: 51 BPM
BASOPHILS # BLD AUTO: 0.06 THOUSANDS/ÂΜL (ref 0–0.1)
BASOPHILS NFR BLD AUTO: 0 % (ref 0–1)
BILIRUB SERPL-MCNC: 1.4 MG/DL (ref 0.2–1)
BUN SERPL-MCNC: 18 MG/DL (ref 5–25)
CALCIUM SERPL-MCNC: 10.5 MG/DL (ref 8.4–10.2)
CHLORIDE SERPL-SCNC: 102 MMOL/L (ref 96–108)
CHOLEST SERPL-MCNC: 225 MG/DL
CO2 SERPL-SCNC: 31 MMOL/L (ref 21–32)
CREAT SERPL-MCNC: 0.75 MG/DL (ref 0.6–1.2)
EOSINOPHIL # BLD AUTO: 0.31 THOUSAND/ÂΜL (ref 0–0.61)
EOSINOPHIL NFR BLD AUTO: 2 % (ref 0–6)
ERYTHROCYTE [DISTWIDTH] IN BLOOD BY AUTOMATED COUNT: 14.1 % (ref 11.6–15.1)
GFR SERPL CREATININE-BSD FRML MDRD: 88 ML/MIN/1.73SQ M
GLUCOSE P FAST SERPL-MCNC: 111 MG/DL (ref 70–99)
GLUCOSE SERPL-MCNC: 111 MG/DL (ref 70–99)
HCT VFR BLD AUTO: 45.7 % (ref 34.8–46.1)
HDLC SERPL-MCNC: 45 MG/DL
HGB BLD-MCNC: 15.1 G/DL (ref 11.5–15.4)
IMM GRANULOCYTES # BLD AUTO: 0.08 THOUSAND/UL (ref 0–0.2)
IMM GRANULOCYTES NFR BLD AUTO: 1 % (ref 0–2)
LDLC SERPL CALC-MCNC: 147 MG/DL
LYMPHOCYTES # BLD AUTO: 3.06 THOUSANDS/ÂΜL (ref 0.6–4.47)
LYMPHOCYTES NFR BLD AUTO: 22 % (ref 14–44)
MCH RBC QN AUTO: 29.1 PG (ref 26.8–34.3)
MCHC RBC AUTO-ENTMCNC: 33 G/DL (ref 31.4–37.4)
MCV RBC AUTO: 88 FL (ref 82–98)
MONOCYTES # BLD AUTO: 1.44 THOUSAND/ÂΜL (ref 0.17–1.22)
MONOCYTES NFR BLD AUTO: 10 % (ref 4–12)
NEUTROPHILS # BLD AUTO: 9.03 THOUSANDS/ÂΜL (ref 1.85–7.62)
NEUTS SEG NFR BLD AUTO: 65 % (ref 43–75)
NONHDLC SERPL-MCNC: 180 MG/DL
NRBC BLD AUTO-RTO: 0 /100 WBCS
P AXIS: -16 DEGREES
PLATELET # BLD AUTO: 302 THOUSANDS/UL (ref 149–390)
PMV BLD AUTO: 10 FL (ref 8.9–12.7)
POTASSIUM SERPL-SCNC: 4.4 MMOL/L (ref 3.5–5.3)
PR INTERVAL: 124 MS
PROT SERPL-MCNC: 8.2 G/DL (ref 6.4–8.4)
QRS AXIS: -2 DEGREES
QRSD INTERVAL: 94 MS
QT INTERVAL: 470 MS
QTC INTERVAL: 433 MS
RBC # BLD AUTO: 5.19 MILLION/UL (ref 3.81–5.12)
SODIUM SERPL-SCNC: 140 MMOL/L (ref 135–147)
T WAVE AXIS: 24 DEGREES
TRIGL SERPL-MCNC: 163 MG/DL
VENTRICULAR RATE: 51 BPM
VIT B12 SERPL-MCNC: 264 PG/ML (ref 100–900)
WBC # BLD AUTO: 13.98 THOUSAND/UL (ref 4.31–10.16)

## 2022-11-19 RX ORDER — RISPERIDONE 2 MG/1
2 TABLET ORAL
Status: DISCONTINUED | OUTPATIENT
Start: 2022-11-19 | End: 2022-11-29 | Stop reason: HOSPADM

## 2022-11-19 RX ORDER — LORAZEPAM 2 MG/ML
2 INJECTION INTRAMUSCULAR
Status: DISCONTINUED | OUTPATIENT
Start: 2022-11-19 | End: 2022-11-29 | Stop reason: HOSPADM

## 2022-11-19 RX ADMIN — LORAZEPAM 2 MG: 2 INJECTION INTRAMUSCULAR; INTRAVENOUS at 09:14

## 2022-11-19 RX ADMIN — HALOPERIDOL LACTATE 5 MG: 5 INJECTION, SOLUTION INTRAMUSCULAR at 09:12

## 2022-11-19 RX ADMIN — AMLODIPINE BESYLATE 10 MG: 10 TABLET ORAL at 08:13

## 2022-11-19 RX ADMIN — RISPERIDONE 2 MG: 2 TABLET ORAL at 21:03

## 2022-11-19 RX ADMIN — LISINOPRIL 10 MG: 10 TABLET ORAL at 08:13

## 2022-11-19 NOTE — PLAN OF CARE
Problem: Alteration in Thoughts and Perception  Goal: Treatment Goal: Gain control of psychotic behaviors/thinking, reduce/eliminate presenting symptoms and demonstrate improved reality functioning upon discharge  Outcome: Not Progressing     Problem: Alteration in Thoughts and Perception  Goal: Verbalize thoughts and feelings  Description: Interventions:  - Promote a nonjudgmental and trusting relationship with the patient through active listening and therapeutic communication  - Assess patient's level of functioning, behavior and potential for risk  - Engage patient in 1 on 1 interactions  - Encourage patient to express fears, feelings, frustrations, and discuss symptoms    - Union patient to reality, help patient recognize reality-based thinking   - Administer medications as ordered and assess for potential side effects  - Provide the patient education related to the signs and symptoms of the illness and desired effects of prescribed medications  Outcome: Not Progressing     Problem: Alteration in Thoughts and Perception  Goal: Agree to be compliant with medication regime, as prescribed and report medication side effects  Description: Interventions:  - Offer appropriate PRN medication and supervise ingestion; conduct AIMS, as needed   Outcome: Not Progressing     Problem: Alteration in Thoughts and Perception  Goal: Complete daily ADLs, including personal hygiene independently, as able  Description: Interventions:  - Observe, teach, and assist patient with ADLS  - Monitor and promote a balance of rest/activity, with adequate nutrition and elimination   Outcome: Not Progressing

## 2022-11-19 NOTE — ED NOTES
Call received from [facility] [address] from [place], needing to confirm the patient's arrival  Juan Arce was pended 11/18  It is now completed
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
## 2022-11-19 NOTE — ASSESSMENT & PLAN NOTE
Patient is medically cleared for admission to the [facility] for treatment of the underlying psychiatric illness  SLIM will sign off   Please call with questions or concerns

## 2022-11-19 NOTE — ASSESSMENT & PLAN NOTE
· BP stable since arrival to the unit  · Continue pre hospital amoldipine 10mg daily and lisinopril 10mg daily  · Continue to monitor vs per unit protocol
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
## 2022-11-19 NOTE — PLAN OF CARE
Problem: Alteration in Thoughts and Perception  Goal: Treatment Goal: Gain control of psychotic behaviors/thinking, reduce/eliminate presenting symptoms and demonstrate improved reality functioning upon discharge  Outcome: Progressing  Goal: Verbalize thoughts and feelings  Description: Interventions:  - Promote a nonjudgmental and trusting relationship with the patient through active listening and therapeutic communication  - Assess patient's level of functioning, behavior and potential for risk  - Engage patient in 1 on 1 interactions  - Encourage patient to express fears, feelings, frustrations, and discuss symptoms    - Baxter patient to reality, help patient recognize reality-based thinking   - Administer medications as ordered and assess for potential side effects  - Provide the patient education related to the signs and symptoms of the illness and desired effects of prescribed medications  Outcome: Progressing  Goal: Refrain from acting on delusional thinking/internal stimuli  Description: Interventions:  - Monitor patient closely, per order   - Utilize least restrictive measures   - Set reasonable limits, give positive feedback for acceptable   - Administer medications as ordered and monitor of potential side effects  Outcome: Progressing  Goal: Agree to be compliant with medication regime, as prescribed and report medication side effects  Description: Interventions:  - Offer appropriate PRN medication and supervise ingestion; conduct AIMS, as needed   Outcome: Progressing  Goal: Attend and participate in unit activities, including therapeutic, recreational, and educational groups  Description: Interventions:  -Encourage Visitation and family involvement in care  Outcome: Progressing  Goal: Recognize dysfunctional thoughts, communicate reality-based thoughts at the time of discharge  Description: Interventions:  - Provide medication and psycho-education to assist patient in compliance and developing insight into his/her illness   Outcome: Progressing  Goal: Complete daily ADLs, including personal hygiene independently, as able  Description: Interventions:  - Observe, teach, and assist patient with ADLS  - Monitor and promote a balance of rest/activity, with adequate nutrition and elimination   Outcome: Progressing     Problem: Risk for Self Injury/Neglect  Goal: Treatment Goal: Remain safe during length of stay, learn and adopt new coping skills, and be free of self-injurious ideation, impulses and acts at the time of discharge  Outcome: Progressing  Goal: Verbalize thoughts and feelings  Description: Interventions:  - Assess and re-assess patient's lethality and potential for self-injury  - Engage patient in 1:1 interactions, daily, for a minimum of 15 minutes  - Encourage patient to express feelings, fears, frustrations, hopes  - Establish rapport/trust with patient   Outcome: Progressing  Goal: Refrain from harming self  Description: Interventions:  - Monitor patient closely, per order  - Develop a trusting relationship  - Supervise medication ingestion, monitor effects and side effects   Outcome: Progressing  Goal: Attend and participate in unit activities, including therapeutic, recreational, and educational groups  Description: Interventions:  - Provide therapeutic and educational activities daily, encourage attendance and participation, and document same in the medical record  - Obtain collateral information, encourage visitation and family involvement in care   Outcome: Progressing  Goal: Recognize maladaptive responses and adopt new coping mechanisms  Outcome: Progressing  Goal: Complete daily ADLs, including personal hygiene independently, as able  Description: Interventions:  - Observe, teach, and assist patient with ADLS  - Monitor and promote a balance of rest/activity, with adequate nutrition and elimination  Outcome: Progressing     Problem: Risk for Violence/Aggression Toward Others  Goal: Treatment Goal: Refrain from acts of violence/aggression during length of stay, and demonstrate improved impulse control at the time of discharge  Outcome: Progressing  Goal: Verbalize thoughts and feelings  Description: Interventions:  - Assess and re-assess patient's level of risk, every waking shift  - Engage patient in 1:1 interactions, daily, for a minimum of 15 minutes   - Allow patient to express feelings and frustrations in a safe and non-threatening manner   - Establish rapport/trust with patient   Outcome: Progressing  Goal: Refrain from harming others  Outcome: Progressing  Goal: Refrain from destructive acts on the environment or property  Outcome: Progressing  Goal: Control angry outbursts  Description: Interventions:  - Monitor patient closely, per order  - Ensure early verbal de-escalation  - Monitor prn medication needs  - Set reasonable/therapeutic limits, outline behavioral expectations, and consequences   - Provide a non-threatening milieu, utilizing the least restrictive interventions   Outcome: Progressing  Goal: Attend and participate in unit activities, including therapeutic, recreational, and educational groups  Description: Interventions:  - Provide therapeutic and educational activities daily, encourage attendance and participation, and document same in the medical record   Outcome: Progressing  Goal: Identify appropriate positive anger management techniques  Description: Interventions:  - Offer anger management and coping skills groups   - Staff will provide positive feedback for appropriate anger control  Outcome: Progressing

## 2022-11-19 NOTE — TREATMENT PLAN
TREATMENT PLAN REVIEW - Behavioral Health Ju Car 61 y o  1965 female MRN: [id number]    [address] [facility] Encounter: [id number]          Admit Date/Time:  11/18/2022  8:09 PM    Treatment Team: Attending Provider: Denisha Simons MD; Graduate Nurse: Shey Funez;  Patient Care Technician: Johanne Early; Licensed Practical Nurse: Antonio Barbosa LPN; Patient Care Technician: Adan Diaz    Diagnosis: Principal Problem:    Bipolar disorder ([facility] [address] )  Active Problems:    HTN (hypertension)    Hyperlipidemia    Tobacco use    Mild intellectual disability    Anxiety    Obesity    Medical clearance for psychiatric admission      Patient Strengths/Assets: communication skills    Patient Barriers/Limitations: noncompliant with medication, poor insight, poor interpersonal skills, uncooperative    Short Term Goals: decrease in paranoid thoughts, decrease in level of agitation, improvement in ability to express basic needs, improvement in insight, improvement in reality testing, mood stabilization    Long Term Goals: stabilization of mood, free of suicidal thoughts, free of homicidal thoughts, improvement in reality testing, improvement in reasoning ability, improved insight    Progress Towards Goals: starting psychiatric medications as prescribed    Recommended Treatment: medication management, patient medication education, group therapy, milieu therapy, continued Behavioral Health psychiatric evaluation/assessment process    Treatment Frequency: daily medication monitoring, group and milieu therapy daily, monitoring through interdisciplinary rounds, monitoring through weekly patient care conferences    Expected Discharge Date:  21 days    Discharge Plan: referral for outpatient medication management with a psychiatrist, referral for outpatient psychotherapy, return to previous living arrangement    Treatment Plan Created/Updated By: Sarath Olmedo MD

## 2022-11-19 NOTE — ED NOTES
[id number] Medicare Auth Request form completed and faxed  COB completed with Phillip Griffin at [facility]       VLADISLAV Barnes  11/18/22    [id number]

## 2022-11-19 NOTE — CMS CERTIFICATION NOTE
Certification: Based upon physical, mental and social evaluations, I certify that inpatient psychiatric services are medically necessary for this patient for a duration of 21 midnights for the treatment of Bipolar disorder [facility])    Available alternative community resources do not meet the patient's mental health care needs  I further attest that an established written individualized plan of care has been implemented and is outlined in the patient's medical records

## 2022-11-19 NOTE — NURSING NOTE
Patient with increased agitation, screaming/cursing, disrupting unit  When asked to remove her sweatshirt patient escalated, swung sweatshirt at [address], continued to scream and disrupt unit  PRN IM Ativan 2 mg given at 0914, PRN Haldol 5 mg IM given at 0912  Will continue to monitor frequently

## 2022-11-19 NOTE — CONSULTS
[address] 1965, 61 y o  female MRN: [id number]  Unit/Bed#: [facility] 594-19 Encounter: [id number]  Primary Care Provider: Aure Burton MD   Date and time admitted to hospital: 11/18/2022  8:09 PM    Inpatient consult for Medical Clearance for [address] patient  Consult performed by: Mazin Cormier PA-C  Consult ordered by: Kareem Hollis MD          Medical clearance for psychiatric admission  Assessment & Plan  Patient is medically cleared for admission to the [facility] for treatment of the underlying psychiatric illness  SLIM will sign off  Please call with questions or concerns    Obesity  Assessment & Plan  · BMI 33  · Encourage lifestyle modifications    Tobacco use  Assessment & Plan  · Encourage cessation  · Offer nicotine replacement      Hyperlipidemia  Assessment & Plan  · Continue prehospital atorvastatin 20mg daily    HTN (hypertension)  Assessment & Plan  · BP stable since arrival to the unit  · Continue pre hospital amoldipine 10mg daily and lisinopril 10mg daily  · Continue to monitor vs per unit protocol      Counseling / Coordination of Care Time: 30 minutes  Greater than 50% of total time spent on patient counseling and coordination of care  Collaboration of Care: Were Recommendations Directly Discussed with Primary Treatment Team? - No     History of Present Illness:    Devi Lewis is a 61 y o  female who is originally admitted to the psychiatry service due to change in behavoir  We are consulted for medical clearance for admission to [address] [place] [address] and treatment of underlying psychiatric illness  This patient has a past medical history significant for hyperlipidemia and hypertension  Per chart review  She initially presented to the ED for change in behavior and threats  On evaluation, patient appears very sleepy and does not offer information in regards to her past medical history       Review of Systems:    Review of Systems   Constitutional: Negative for activity change, chills, diaphoresis and fever  HENT: Negative for congestion, rhinorrhea, sinus pressure, sinus pain and sore throat  Eyes: Negative for visual disturbance  Respiratory: Negative for cough, shortness of breath and wheezing  Cardiovascular: Negative for chest pain and palpitations  Gastrointestinal: Negative for abdominal distention, abdominal pain, constipation, diarrhea, nausea and vomiting  Genitourinary: Negative for dysuria, frequency, hematuria and urgency  Musculoskeletal: Negative for arthralgias, back pain and myalgias  Skin: Negative for rash  Neurological: Negative for dizziness, weakness, light-headedness and headaches  Past Medical and Surgical History:     Past Medical History:   Diagnosis Date   • Anxiety    • Arthritis    • Bipolar affective disorder, depressed, severe ([facility] [address] ) 4/28/2019   • Depression    • Elevated bilirubin 8/15/2019   • Hyperlipidemia    • Hypertension    • Hypokalemia 8/15/2019   • Learning difficulty    • Leukocytosis 7/28/2020   • Obesity (BMI 30 0-34 9) 6/26/2020   • Osteopenia    • Ovarian failure    • Previous known suicide attempt    • Psychiatric disorder    • Psychiatric illness        Past Surgical History:   Procedure Laterality Date   • LAPAROSCOPY      as a child, per patient-normal findings       Meds/Allergies:    all medications and allergies reviewed    Allergies:    Allergies   Allergen Reactions   • Other      Hay Fever   • Oxycodone-Acetaminophen GI Intolerance       Social History:     Marital Status: Single    Substance Use History:   Social History     Substance and Sexual Activity   Alcohol Use Yes   • Alcohol/week: 2 0 standard drinks   • Types: 2 Cans of beer per week    Comment: drinks 2 bottle of wine per day     Social History     Tobacco Use   Smoking Status Some Days   • Packs/day: 0 25   • Years: 15 00   • Pack years: 3 75   • Types: Cigarettes   • Last attempt to quit: 1990   • Years since quittin 9   Smokeless Tobacco Never   Tobacco Comments    6 cigerattes a day     Social History     Substance and Sexual Activity   Drug Use No       Family History:    non-contributory    Physical Exam:     Vitals:   Blood Pressure: 104/63 (22)  Pulse: 60 (22)  Temperature: 97 5 °F (36 4 °C) (22)  Temp Source: Temporal (22)  Respirations: 18 (22)  Height: 5' 3" (160 cm) (22)  SpO2: 93 % (22)    Physical Exam  Constitutional:       General: She is not in acute distress  Appearance: Normal appearance  She is not ill-appearing, toxic-appearing or diaphoretic  HENT:      Head: Normocephalic and atraumatic  Nose: Nose normal  No congestion or rhinorrhea  Mouth/Throat:      Mouth: Mucous membranes are moist    Eyes:      General: No scleral icterus  Extraocular Movements: Extraocular movements intact  Cardiovascular:      Rate and Rhythm: Normal rate and regular rhythm  Heart sounds: Normal heart sounds  No murmur heard  Pulmonary:      Effort: Pulmonary effort is normal  No respiratory distress  Breath sounds: Normal breath sounds  No wheezing  Abdominal:      General: Abdomen is flat  Bowel sounds are normal  There is no distension  Palpations: Abdomen is soft  Tenderness: There is no abdominal tenderness  Musculoskeletal:      Right lower leg: No edema  Left lower leg: No edema  Skin:     General: Skin is warm and dry  Coloration: Skin is not jaundiced  Neurological:      Mental Status: She is alert  Additional Data:     Lab Results: I have personally reviewed pertinent reports        Results from last 7 days   Lab Units 22  1224   WBC Thousand/uL 12 83*   HEMOGLOBIN g/dL 13 9   HEMATOCRIT % 41 9   PLATELETS Thousands/uL 285   NEUTROS PCT % 72   LYMPHS PCT % 17   MONOS PCT % 9   EOS PCT % 1     Results from last 7 days   Lab Units 11/18/22  1224   SODIUM mmol/L 136   POTASSIUM mmol/L 3 6   CHLORIDE mmol/L 107   CO2 mmol/L 23   BUN mg/dL 14   CREATININE mg/dL 0 75   ANION GAP mmol/L 6   CALCIUM mg/dL 10 0   ALBUMIN g/dL 3 6   TOTAL BILIRUBIN mg/dL 1 16*   ALK PHOS U/L 86   ALT U/L 47   AST U/L 23   GLUCOSE RANDOM mg/dL 124             Lab Results   Component Value Date/Time    HGBA1C 5 6 04/29/2019 06:22 AM           EKG, Pathology, and Other Studies Reviewed on Admission:   · EKG on 11/18/2022 shows sinus bradycardia with T-wave abnormality  Heart rate 51      ** Please Note: This note has been constructed using a voice recognition system   **

## 2022-11-20 LAB
RPR SER QL: NORMAL
TSH SERPL DL<=0.05 MIU/L-ACNC: 0.86 UIU/ML (ref 0.45–4.5)

## 2022-11-20 RX ADMIN — RISPERIDONE 2 MG: 2 TABLET ORAL at 21:21

## 2022-11-20 NOTE — NURSING NOTE
Pt resting in room, visible for meals with good intake  Pt showered  Visible in dayroom, working on word search activity  Pt reports depression and anxiety as "not too bad", denies SI  Pt appears calm during shift, no agitation noted

## 2022-11-20 NOTE — PLAN OF CARE
Problem: Alteration in Thoughts and Perception  Goal: Treatment Goal: Gain control of psychotic behaviors/thinking, reduce/eliminate presenting symptoms and demonstrate improved reality functioning upon discharge  Outcome: Progressing  Goal: Verbalize thoughts and feelings  Description: Interventions:  - Promote a nonjudgmental and trusting relationship with the patient through active listening and therapeutic communication  - Assess patient's level of functioning, behavior and potential for risk  - Engage patient in 1 on 1 interactions  - Encourage patient to express fears, feelings, frustrations, and discuss symptoms    - Dexter patient to reality, help patient recognize reality-based thinking   - Administer medications as ordered and assess for potential side effects  - Provide the patient education related to the signs and symptoms of the illness and desired effects of prescribed medications  Outcome: Progressing  Goal: Refrain from acting on delusional thinking/internal stimuli  Description: Interventions:  - Monitor patient closely, per order   - Utilize least restrictive measures   - Set reasonable limits, give positive feedback for acceptable   - Administer medications as ordered and monitor of potential side effects  Outcome: Progressing  Goal: Agree to be compliant with medication regime, as prescribed and report medication side effects  Description: Interventions:  - Offer appropriate PRN medication and supervise ingestion; conduct AIMS, as needed   Outcome: Progressing  Goal: Attend and participate in unit activities, including therapeutic, recreational, and educational groups  Description: Interventions:  -Encourage Visitation and family involvement in care  Outcome: Progressing  Goal: Recognize dysfunctional thoughts, communicate reality-based thoughts at the time of discharge  Description: Interventions:  - Provide medication and psycho-education to assist patient in compliance and developing insight into his/her illness   Outcome: Progressing  Goal: Complete daily ADLs, including personal hygiene independently, as able  Description: Interventions:  - Observe, teach, and assist patient with ADLS  - Monitor and promote a balance of rest/activity, with adequate nutrition and elimination   Outcome: Progressing     Problem: Risk for Self Injury/Neglect  Goal: Treatment Goal: Remain safe during length of stay, learn and adopt new coping skills, and be free of self-injurious ideation, impulses and acts at the time of discharge  Outcome: Progressing  Goal: Verbalize thoughts and feelings  Description: Interventions:  - Assess and re-assess patient's lethality and potential for self-injury  - Engage patient in 1:1 interactions, daily, for a minimum of 15 minutes  - Encourage patient to express feelings, fears, frustrations, hopes  - Establish rapport/trust with patient   Outcome: Progressing  Goal: Refrain from harming self  Description: Interventions:  - Monitor patient closely, per order  - Develop a trusting relationship  - Supervise medication ingestion, monitor effects and side effects   Outcome: Progressing  Goal: Attend and participate in unit activities, including therapeutic, recreational, and educational groups  Description: Interventions:  - Provide therapeutic and educational activities daily, encourage attendance and participation, and document same in the medical record  - Obtain collateral information, encourage visitation and family involvement in care   Outcome: Progressing  Goal: Recognize maladaptive responses and adopt new coping mechanisms  Outcome: Progressing  Goal: Complete daily ADLs, including personal hygiene independently, as able  Description: Interventions:  - Observe, teach, and assist patient with ADLS  - Monitor and promote a balance of rest/activity, with adequate nutrition and elimination  Outcome: Progressing     Problem: Anxiety  Goal: Anxiety is at manageable level  Description: Interventions:  - Assess and monitor patient's anxiety level  - Monitor for signs and symptoms (heart palpitations, chest pain, shortness of breath, headaches, nausea, feeling jumpy, restlessness, irritable, apprehensive)  - Collaborate with interdisciplinary team and initiate plan and interventions as ordered    - Success patient to unit/surroundings  - Explain treatment plan  - Encourage participation in care  - Encourage verbalization of concerns/fears  - Identify coping mechanisms  - Assist in developing anxiety-reducing skills  - Administer/offer alternative therapies  - Limit or eliminate stimulants  Outcome: Progressing     Problem: Risk for Violence/Aggression Toward Others  Goal: Treatment Goal: Refrain from acts of violence/aggression during length of stay, and demonstrate improved impulse control at the time of discharge  Outcome: Progressing  Goal: Verbalize thoughts and feelings  Description: Interventions:  - Assess and re-assess patient's level of risk, every waking shift  - Engage patient in 1:1 interactions, daily, for a minimum of 15 minutes   - Allow patient to express feelings and frustrations in a safe and non-threatening manner   - Establish rapport/trust with patient   Outcome: Progressing  Goal: Refrain from harming others  Outcome: Progressing  Goal: Refrain from destructive acts on the environment or property  Outcome: Progressing  Goal: Control angry outbursts  Description: Interventions:  - Monitor patient closely, per order  - Ensure early verbal de-escalation  - Monitor prn medication needs  - Set reasonable/therapeutic limits, outline behavioral expectations, and consequences   - Provide a non-threatening milieu, utilizing the least restrictive interventions   Outcome: Progressing  Goal: Attend and participate in unit activities, including therapeutic, recreational, and educational groups  Description: Interventions:  - Provide therapeutic and educational activities daily, encourage attendance and participation, and document same in the medical record   Outcome: Progressing  Goal: Identify appropriate positive anger management techniques  Description: Interventions:  - Offer anger management and coping skills groups   - Staff will provide positive feedback for appropriate anger control  Outcome: Progressing

## 2022-11-20 NOTE — PROGRESS NOTES
Progress Note - Behavioral Health   Johnny Benitez 61 y o  female MRN: [id number]  Unit/Bed#: [facility]ia Cole 970-24 Encounter: [id number]       Patient was visited on unit for continuing care; chart reviewed and discussed with the nursing staff  On approach, the patient was calmer and more cooperative  She admitted feeling better since yesterday and as took her medication  She reported feeling concerned about getting evicted from her current place as reportedly she did not pay the rent due to "bugs in the building", but admitted having manic sxs lately, described as lack of sleep, increased energy and always being on edge, with racing thoughts and impulsive shopping, and noted that she spent her money buying things, so could not pay the rent  Endorsed good appetite, and energy level  No problem initiating and maintaining sleep  Denied A/VH currently  Denied SI/HI, intent or plan upon direct inquiry at this time  Patient has been mostly withdrawn to her room with limited interactions with staff and peers  No reports of aggression or self-injurious behavior on unit after the incident yesterday morning, and did not receive any other PRN meds in the past 24 hours  Patient accepted all offered medications and reported feeling better  No adverse effects of medications noted or reported  Prozac was held due to concerns for manic sxs, and will continue Risperdal as mood stabilizer; dose to be adjusted as indicated       Current Mental Status Evaluation:  Appearance and attitude: appeared as stated age, casually dressed, with good hygiene  Eye contact: good  Motor Function: within normal limits, intact gait, No PMA/PMR  Gait/station: normal gait/station and normal balance  Speech: normal for rate, rhythm, volume, and latency with decreased amount  Language: No overt abnormality  Mood/affect: "better"; objectively less dysphoric and more pleasant / Affect was constricted but reactive, mood congruent  Thought Processes: concrete, circumstantial  Thought content: denied suicidal ideations or homicidal ideations, some paranoia  Associations: concrete associations  Perceptual disturbances: denies Auditory/Visual/Tactile Hallucinations  Orientation: oriented to time, person, place and to the situational context  Cognitive Function: intact  Memory: not cooperative with formal MMSE  Intellect: h/o learning disability  Fund of knowledge: diminished  Impulse control: good  Insight/judgment: fair/fair    Pain: denied  Pain scale: 0    Vital signs in last 24 hours:    Temp:  [97 5 °F (36 4 °C)-97 8 °F (36 6 °C)] 97 8 °F (36 6 °C)  HR:  [67-80] 80  Resp:  [16-18] 16  BP: ()/(60-64) 107/64    Laboratory results: I have personally reviewed all pertinent laboratory/tests results    Results from the past 24 hours:   Recent Results (from the past 24 hour(s))   TSH, 3rd generation with Free T4 reflex    Collection Time: 11/20/22  5:38 AM   Result Value Ref Range    TSH 3RD GENERATON 0 860 0 450 - 4 500 uIU/mL       Progress Toward Goals: improving    Assessment:  Principal Problem:    Bipolar disorder ([facility] [address] )  Active Problems:    HTN (hypertension)    Hyperlipidemia    Tobacco use    Mild intellectual disability    Anxiety    Obesity    Medical clearance for psychiatric admission        Plan:  - f/u SLIM recs regarding the medical problems  - Continue medication titration and treatment plan; adjust medication to optimize treatment response and as clinically indicated       Scheduled medications:  Current Facility-Administered Medications   Medication Dose Route Frequency Provider Last Rate   • amLODIPine  10 mg Oral Daily Lorena Pham MD     • haloperidol lactate  5 mg Intramuscular Q4H PRN Max 4/day Lorena Pham MD     • hydrOXYzine HCL  25 mg Oral Q6H PRN Max 4/day Lorena Pham MD     • lisinopril  10 mg Oral Daily Lorena Pham MD     • LORazepam  2 mg Intramuscular Q6H PRN Max 3/day Fausto Traylor MD     • LORazepam  0 5 mg Oral Q6H PRN Max 4/day Sabine Leon MD     • LORazepam  1 mg Oral Q6H PRN Max 3/day Samy Aparicio MD     • nicotine polacrilex  2 mg Oral Q2H PRN Samy Aparicio MD     • risperiDONE  0 25 mg Oral Q4H PRN Max 6/day Sabine Leon MD     • risperiDONE  0 5 mg Oral Q4H PRN Max 3/day Sabine Leon MD     • risperiDONE  1 mg Oral Q2H PRN Max 3/day Sabine Leon MD     • risperiDONE  2 mg Oral HS Samy Aparicio MD     • traZODone  50 mg Oral HS PRN Sabine Leon MD          PRN:  •  haloperidol lactate  •  hydrOXYzine HCL  •  LORazepam  •  LORazepam  •  LORazepam  •  nicotine polacrilex  •  risperiDONE  •  risperiDONE  •  risperiDONE  •  traZODone    - Observation: routine    - VS: as per unit protocol  - Diet: Regular diet  - Psychoeducation (benefits and potential risks) discussed, importance of compliance with the psychiatric treatment reiterated, and the patient verbalized understanding of the matter  - Encourage group attendance and milieu therapy    - The pt was educated and agreed to verbalize any suicidal thoughts, frustrations or concerns to the nursing staff, immediately  - Dispo: TBD       Next of Kin  · Extended Emergency Contact Information  · Primary Emergency Contact: Guille Arshad  · Home Phone: [phone]  · Relation: Mother      Counseling / Coordination of Care     Total floor / unit time spent today 20 minutes  Greater than 50% of total time was spent with the patient and / or family counseling and / or coordination of care  A description of the counseling / coordination of care  Patient's Rights, confidentiality and exceptions to confidentiality, use of automated medical record, [address] [place] [address] staff access to medical record, and consent to treatment reviewed      Samy Aparicio MD  Attending LEE [address]

## 2022-11-20 NOTE — NURSING NOTE
Patient was withdrawn to her room sleeping but came out for snack  VSS  Denies all psych s/s  No complaints offered  No behavior noted  Compliant with HS medication  Safety checks ongoing

## 2022-11-21 RX ADMIN — RISPERIDONE 2 MG: 2 TABLET ORAL at 21:23

## 2022-11-21 RX ADMIN — AMLODIPINE BESYLATE 10 MG: 10 TABLET ORAL at 08:43

## 2022-11-21 RX ADMIN — LISINOPRIL 10 MG: 10 TABLET ORAL at 08:43

## 2022-11-21 NOTE — NURSING NOTE
Patient was withdrawn to her room but came out to the milieu for snack and stayed for a while watching tv, VSS  Denies all psych s/s  No complaints offered  No behavior noted  Compliant with HS medications  Safety checks ongoing

## 2022-11-21 NOTE — PROGRESS NOTES
11/21/22 1557   Referral Data   Referral Reason Psych   Readmission Root Cause   30 Day Readmission No   Patient Information   Mental Status Alert   Primary Caregiver Self   Support System Friends   Zoroastrianism/Cultural Requests none   Legal Information   Tx Plan Signed Yes   Current Status: 201   Legal Issues denies   Health Care Proxy Appointed No   Activities of Daily Living Prior to Admission   Functional Status Independent   Living Arrangement Apartment   Ambulation Independent   Access to Firearms   Access to Firearms No   [address] SSI/SSD   [place] a[place] of Transportation   [place] a[place] of Transport to Appts: Public Transportation - Bus

## 2022-11-21 NOTE — PLAN OF CARE
Problem: Alteration in Thoughts and Perception  Goal: Treatment Goal: Gain control of psychotic behaviors/thinking, reduce/eliminate presenting symptoms and demonstrate improved reality functioning upon discharge  Outcome: Progressing  Goal: Verbalize thoughts and feelings  Description: Interventions:  - Promote a nonjudgmental and trusting relationship with the patient through active listening and therapeutic communication  - Assess patient's level of functioning, behavior and potential for risk  - Engage patient in 1 on 1 interactions  - Encourage patient to express fears, feelings, frustrations, and discuss symptoms    - [place] patient to reality, help patient recognize reality-based thinking   - Administer medications as ordered and assess for potential side effects  - Provide the patient education related to the signs and symptoms of the illness and desired effects of prescribed medications  Outcome: Progressing  Goal: Refrain from acting on delusional thinking/internal stimuli  Description: Interventions:  - Monitor patient closely, per order   - Utilize least restrictive measures   - Set reasonable limits, give positive feedback for acceptable   - Administer medications as ordered and monitor of potential side effects  Outcome: Progressing  Goal: Agree to be compliant with medication regime, as prescribed and report medication side effects  Description: Interventions:  - Offer appropriate PRN medication and supervise ingestion; conduct AIMS, as needed   Outcome: Progressing  Goal: Attend and participate in unit activities, including therapeutic, recreational, and educational groups  Description: Interventions:  -Encourage Visitation and family involvement in care  Outcome: Progressing  Goal: Recognize dysfunctional thoughts, communicate reality-based thoughts at the time of discharge  Description: Interventions:  - Provide medication and psycho-education to assist patient in compliance and developing insight into his/her illness   Outcome: Progressing  Goal: Complete daily ADLs, including personal hygiene independently, as able  Description: Interventions:  - Observe, teach, and assist patient with ADLS  - Monitor and promote a balance of rest/activity, with adequate nutrition and elimination   Outcome: Progressing     Problem: Risk for Self Injury/Neglect  Goal: Treatment Goal: Remain safe during length of stay, learn and adopt new coping skills, and be free of self-injurious ideation, impulses and acts at the time of discharge  Outcome: Progressing  Goal: Verbalize thoughts and feelings  Description: Interventions:  - Assess and re-assess patient's lethality and potential for self-injury  - Engage patient in 1:1 interactions, daily, for a minimum of 15 minutes  - Encourage patient to express feelings, fears, frustrations, hopes  - Establish rapport/trust with patient   Outcome: Progressing  Goal: Refrain from harming self  Description: Interventions:  - Monitor patient closely, per order  - Develop a trusting relationship  - Supervise medication ingestion, monitor effects and side effects   Outcome: Progressing  Goal: Attend and participate in unit activities, including therapeutic, recreational, and educational groups  Description: Interventions:  - Provide therapeutic and educational activities daily, encourage attendance and participation, and document same in the medical record  - Obtain collateral information, encourage visitation and family involvement in care   Outcome: Progressing  Goal: Recognize maladaptive responses and adopt new coping mechanisms  Outcome: Progressing  Goal: Complete daily ADLs, including personal hygiene independently, as able  Description: Interventions:  - Observe, teach, and assist patient with ADLS  - Monitor and promote a balance of rest/activity, with adequate nutrition and elimination  Outcome: Progressing       Problem: Anxiety  Goal: Anxiety is at manageable level  Description: Interventions:  - Assess and monitor patient's anxiety level  - Monitor for signs and symptoms (heart palpitations, chest pain, shortness of breath, headaches, nausea, feeling jumpy, restlessness, irritable, apprehensive)  - Collaborate with interdisciplinary team and initiate plan and interventions as ordered    - [place] patient to unit/surroundings  - Explain treatment plan  - Encourage participation in care  - Encourage verbalization of concerns/fears  - Identify coping mechanisms  - Assist in developing anxiety-reducing skills  - Administer/offer alternative therapies  - Limit or eliminate stimulants  Outcome: Progressing     Problem: Risk for Violence/Aggression Toward Others  Goal: Treatment Goal: Refrain from acts of violence/aggression during length of stay, and demonstrate improved impulse control at the time of discharge  Outcome: Progressing  Goal: Verbalize thoughts and feelings  Description: Interventions:  - Assess and re-assess patient's level of risk, every waking shift  - Engage patient in 1:1 interactions, daily, for a minimum of 15 minutes   - Allow patient to express feelings and frustrations in a safe and non-threatening manner   - Establish rapport/trust with patient   Outcome: Progressing  Goal: Refrain from harming others  Outcome: Progressing  Goal: Refrain from destructive acts on the environment or property  Outcome: Progressing  Goal: Control angry outbursts  Description: Interventions:  - Monitor patient closely, per order  - Ensure early verbal de-escalation  - Monitor prn medication needs  - Set reasonable/therapeutic limits, outline behavioral expectations, and consequences   - Provide a non-threatening milieu, utilizing the least restrictive interventions   Outcome: Progressing  Goal: Attend and participate in unit activities, including therapeutic, recreational, and educational groups  Description: Interventions:  - Provide therapeutic and educational activities daily, encourage attendance and participation, and document same in the medical record   Outcome: Progressing  Goal: Identify appropriate positive anger management techniques  Description: Interventions:  - Offer anger management and coping skills groups   - Staff will provide positive feedback for appropriate anger control  Outcome: Progressing

## 2022-11-21 NOTE — PLAN OF CARE
Pt  Cooperative and spontaneously joined all groups today    Problem: Ineffective Coping  Goal: Participates in unit activities  Description: Interventions:  - Provide therapeutic environment   - Provide required programming   - Redirect inappropriate behaviors   Outcome: Progressing

## 2022-11-21 NOTE — PROGRESS NOTES
11/21/22 1559   Patient Intake   Special Needs none   Living Arrangement Apartment   Can patient return home?  Yes   Address to be Discharge to: [address], [place], [address] [facility]   Patient's Telephone Number [phone]   Access to Firearms No   Type of Work disabled   Work History Other (comment)  (disabled)   School Grade/Year 9th  (some high school)   Admission Status   Status of Admission 201  (patient signed a 12 today 11/21)   Patient History   Treatment History inpatient multiple times   Currently in Treatment Yes   Current Psychiatrist/Therapist [facility]   Current Treatment Appt Info Nov 30   [facility]   Medical Problems high BP and cholesterol   Legal Issues denies   Substance Abuse No   Crisis Info   Release of Information Signed Yes  (Radha 46; [address] [facility] [phone]; [address] [facility])

## 2022-11-21 NOTE — DISCHARGE INSTR - OTHER ORDERS
Upon discharge you will be [facility] [place] [address] [facility] [address] at [phone] ext [phone] to speak to Denzel Arellano he is your Community Based , to set up a time for him to come out to visit you  After discharge, if you find your coping skills are not as effective and you continue to feel distressed please call Rickey Castañeda services are available 24 hours a day by calling [address] : [phone]    [facility] : [phone]     If you feel you are a danger to yourself or others please contact 911 or go to nearest Emergency room to seek immediate help  Rao Harrington or Gertrude, our [place] and Vandana, will be calling you after your discharge, on the phone number that you provided  They will be available as an additional support, if needed  If you wish to speak with one of them, you may contact Bakari Anton at [phone] or Romulo Yanez at [phone]

## 2022-11-21 NOTE — CASE MANAGEMENT
Case Management Assessment    Patient name Bebe Amado  Location /-53 MRN [id number]  : 1965 Date 2022       Current Admission Date: 2022  Current Admission Diagnosis:Bipolar disorder [facility])   Patient Active Problem List    Diagnosis Date Noted   • Medical clearance for psychiatric admission 2022   • Pulmonary nodule 2021   • Patellofemoral pain syndrome of both knees 12/15/2020   • Bipolar disorder ([facility] [address] ) 2020   • Anxiety 2020   • Insomnia 2020   • Obesity 2020   • Chronic pain of both knees 11/10/2020   • MDD (major depressive disorder), recurrent episode, severe ([facility] [address] ) 2020   • Constipation 2020   • Ovarian failure 2020   • Mild intellectual disability 2020   • Tobacco use 2020   • HTN (hypertension) 2019   • Amenorrhea 2013   • Hyperlipidemia 2013      LOS (days): 3  Geometric Mean LOS (GMLOS) (days):   Days to GMLOS:     OBJECTIVE:    Risk of Unplanned Readmission Score: 9 05         Current admission status: Inpatient Psych  Referral Reason: Psych    Preferred Pharmacy:   [address] [place] [address] [address] [place] [address]  Phone: [phone] Fax: [phone]    Primary Care Provider: Leonardo Lawson MD    Primary Insurance: [address] [place] [address],[address] [employer] REP  Secondary Insurance: [address] [place] [address]    ASSESSMENT:  Sarah 26 Proxies    There are no active Health Care Proxies on file  Readmission Root Cause  30 Day Readmission: No    Patient Information  Mental Status: Alert  Primary Caregiver: Self              Patient Information Continued  Income Source: SSI/SSD  Current Status[de-identified] 201         Means of Transportation  Means of Transport to [facility][de-identified] Public Transportation - Bus    CM met with patient  Pt was dressed and groomed appropriately  Pt completed some high school but did not finish   Pt told this PROGRESS NOTE        PATIENT NAME: Jacklyn  [facility] RECORD NO. [id number]  DATE: 3/29/2021    PRIMARY CARE PHYSICIAN: ALLISON Scales - CNP    HD: # 3    ASSESSMENT    Patient Active Problem List   Diagnosis    Trauma       3/27: Left FOREARM EXPLORATION BRACHIAL ARTERY BYPASS WITH SVG, Ulnar/radial artery thrombectomy, TPA infusion   3/28: L Forearm fasciotomy, Excision and debridement, nonviable muscle, Repair, major peripheral nerve, left median nerve with a nerve    MEDICAL DECISION MAKING AND PLAN    1. Neuro:  1. Pain/Sedation   1. Tylenol 1000mg Q8hr, Neurontin 300mg TID, Robaxin 750mg QID, Fentanyl 50mcg Q1hr prn, Rosi 10mg Q4hr prn.  2. Bipolar hx   1. Will evaluate restarting home psych meds s/p psych consult tomorrow   2. Consider starting Seroquel 25-50 BID today  3. Psych consult 3/28: danger to self and others; recommend sitter and d/c to inpatient psych once medically stable.     2. CV  1. HR: 70s-110s  2. Hgb 8.6 (9.2, 9.0, 9.7,12.5,13.8)    3. APTT 51.3 - therapeutic on Heparin gtt  4. CXR: pending        3. Pulm  1. Room air - sats wnl  2. IS >2000      4. GI/Nutrition  1. General Diet        5. Renal/lytes   1. Good UOP, unmeasured     6. Heme  1. DVT prophylaxis  - Heparin   2. Hgb 8.6 (9.2, 9.0, 9.7,12.5,13.8)   3. Plt 211        7.   Endocrine        1. Glucose 180 - HISS ordered      8. MSK  [phone] [id number] 66. PT/OT         9. S/p ortho (3/28) and vascular trip (3/27) to OR        3. Neurovascular checks Q2H     9. Skin        1. Laceration on LUE and right hand - repaired - see OP notes for further details     10. Micro         1. afebrile  [id number]. SXO 01.4, (13.2, 11.7, 15.5)          3. Ancef  given 3/27 and 3/26, complete     11. Family/dispo   1. Step down      2. Psych recs: recommend sitter and d/c to inpatient psych once medically stable.      12. Lines  1.  PIV x2         CHECKLIST    CAM-ICU RASS: GCS15  RESTRAINTS: None   IVF: None  NUTRITION: General diet  ANTIBIOTICS: Ancef GI: Pepcid   DVT: Hep  GLYCEMIC CONTROL: HISS   HOB >45: Y  MOBILITY: PT/OT   IS: >2000    SUBJECTIVE    Darrol Dross is s/p left forearm fasciotomy of ant compartment w/ L median and L superficial radial nerve with nerve conduit repairs, as well as brachial artery bypass with SVG, ulnar/radial artery thrombectomy with TPA infusion. Pt doing well overnight, no overnight events. Pt tolerating general diet, urinating independently without difficulty. Complains of 8/10 LUE pain. Afebrile, Tmax 37.8. OBJECTIVE  VITALS: Temp: Temp: 98.8 °F (37.1 °C)Temp  Av.1 °F (37.3 °C)  Min: 98.7 °F (37.1 °C)  Max: 100 °F (97.8 °C) BP Systolic (88NPN), JGR:216 , Min:115 , FBD:841   Diastolic (48HIE), QDR:33, Min:71, Max:84   Pulse Pulse  Av.3  Min: 76  Max: 114 Resp Resp  Av  Min: 10  Max: 21 Pulse ox SpO2  Av.8 %  Min: 94 %  Max: 98 %    Physical Exam  Constitutional:      alert and cooperative  HENT:      Head: normocephalic, atraumatic    Eyes:      Pupils: equal round and reactive to light   Cardiovascular:      Rate and Rhythm: RRR     Pulses: (R) RP 2+, (L) RP present on doppler, L Ulnar signal on doppler   Pulmonary:      Effort: no resp distress     Breath sounds: CTA bilaterally  Abdominal:      General: non distended, normal BS     Palpations: soft, non tender   Skin:     General: Warm and dry. Left digits have sluggish cap refill. LAB:  CBC:   Recent Labs     21  0503 21  03421  0708   WBC 11.7*  --  13.2* 13.5*   HGB 9.7* 9.0* 9.2* 8.6*   HCT 30.4* 27.2* 27.4* 25.2*   .7  --  98.2 97.7     --  215 211     BMP:   Recent Labs     21  03421  0708    134* 132*   K 4.4 3.9 4.1    100 100   CO2 26 27 26   BUN 7 8 8   CREATININE 0.63* 0.62* 0.54*   GLUCOSE 126* 180* 120*         RADIOLOGY:  No results found.       Yoshi Bermudez DO  3/29/2021  8:29 AM              Trauma Attending Attestation      I writer that she was learning disabled and that her mother pulled her out of school for being tardy too many times  Pt was never  and has no children  Pt lives in [address] [facility] [address]  Pt states she has some cats and that the nurse in the bldg is currently caring for them while she is here  Pt states she has a mother and two brothers whom she is estranged from at this time  Pt notes that she does not have a support system and that her only friend, Jill Hassan, also lives in her building  Pt states she has been friends with him for the past 2 of the 4-5 years that she has lived there  Pt enjoys taking walks and spending time with her friend Jill Hassan  Pt states she is here because she threatened to burn her building down because she is frustrated with the cockroaches that are in the building and she feels that maintenance refuses to do anything about them  Pt told this writer that she had lost it regarding these cockroaches and that she could not take it anymore  Pt originally came here on a 302, but later changed this to a 201 and signed this with Dr Darryle Lloyd  Pt states that the bugs and being alone are her main stressors  Pt receives disability in the amount of just over $800 a month and is interested in [place] [employer] to come see if they can help her with rental assistance  Patient does not drive and utilizes uber and lyft to get around to appointments  Pt states she went to her therapist appointment with [facility] and that she had an intake appointment for the psychiatrist on 11/30  PABLO: [facility] [phone]; [facility]; [facility]  have reviewed the above GCS note(s) and confirmed the key elements of the medical history and physical exam. I have seen and examined the pt. I have discussed the findings, established the care plan and recommendations with Resident, GCS RN, bedside nurse. Vascular and ortho following   Left thumb and middle distal phalynx with some duskiness   Minimal to no sensation in median nerve distribution   Pt complained of some intermittent chest discomfort sometimes palpable other not.  Will work up  Pt on hep gtt   Psych eval recommends sitter and perhaps [facility] dispo     Manpreet Vasquez DO  3/29/2021  12:09 PM

## 2022-11-21 NOTE — PROGRESS NOTES
11/21/22 1632   Team Meeting   Meeting Type Tx Team Meeting   Initial Conference Date 11/21/22   Next Conference Date 12/21/22   Team Members Present   Team Members Present Physician;Nurse;   Physician Team Member Dr Alvino Zapata Team Member [address] Management Team Member Emerita   Patient/Family Present   Patient Present Yes   Patient's Family Present No     Pt was agreeable

## 2022-11-21 NOTE — PROGRESS NOTES
11/21/22 1358   Team Meeting   Meeting Type Daily Rounds   Initial Conference Date 11/21/22   Next Conference Date 11/22/22   Team Members Present   Team Members Present Physician;Nurse;;Occupational Therapist;   Physician Team Member Dr Dwayne Moots Dr Terre Canavan Dr Kattie Clipper Team Member Kamala Work Team Member Jozef   OT Team Member Brisa Daniels   Patient/Family Present   Patient Present No   Patient's Family Present No     Deny s/s, throwing things around the dayroom over weekend, but then better and social

## 2022-11-21 NOTE — NURSING NOTE
Pt appears calm and cooperative on unit, no agitation noted  Visible for meals with good intake  Pt makes needs known appropriately  Medication compliant  No symptoms reported during shift

## 2022-11-21 NOTE — PROGRESS NOTES
Progress Note - Behavioral Health   Amada Cruz 61 y o  female MRN: [id number]  Unit/Bed#: [facility] 402-30 Encounter: [id number]    Assessment/Plan   Principal Problem:    Bipolar disorder ([address] )  Active Problems:    HTN (hypertension)    Hyperlipidemia    Tobacco use    Mild intellectual disability    Anxiety    Obesity    Medical clearance for psychiatric admission    Misa appears to be doing better showing remorse for what she had sent in text about wanting to burn down her apartment  She notes being frustrated and making these statements without meaning them and feeling bad about it  She noted symptoms concerning for ranjit and describes history of manic symptoms  Prior to arrival, she was on SSRI without any mood stabilizer, but appears to no longer be in a manic state since being compliant with risperdal  Because of her improvements in behavior and insight, patient was offered a 201 for voluntary continuation of inpatient evaluation and treatment  Patient did note some sedation, and will monitor before making any adjustments to her medications  Recommended Treatment:   No psychopharmacologic changes necessary at this moment; will continue to assess daily for further optimization  Continue risperdal 2mg HS for mood stability and impulisitivity  Continue remainder of non psychiatric/medical medications    Continue with pharmacotherapy, group therapy, milieu therapy and occupational therapy  Continue to assess for adverse medication side effects  Encourage Amada Cruz to participate in nonverbal forms of therapy including journaling and art/music therapy  Continue frequent safety checks and vitals per unit protocol  Continue to engage CM/SW to assist with collateral, disposition planning, and the implementation of an individualized, patient-centered plan of care    Continue medical management by medical team   Case discussed with treatment team     Legal Status: previously 302 & transitioned to 201  ------------------------------------------------------------    Subjective: All documentation including nursing notes, medication history to ensure medication adherence on the unit, labs, and vitals were reviewed  Misa was evaluated this morning for continuity of care and no acute distress noted throughout the evaluation  Over the past 24 hours per nursing report, Jacqueline Seth has been cooperative on the unit and compliant with medications  Today, Jacqueline Seth is consenting for safety on the unit  Misa reports feeling "good " Misa notes having no issues with sleep getting 7-8 hours yesterday  Misa states having a normal appetite  Misa has been taking the medications as prescribed and reporting no major side effects but noticed some sleepiness  She states that when she first came in she had a lot of energy that lasted 3 or 4 days  She notes it was hard to focus and her mind was racing  She states having similar episodes to this previously  She notes being medicated upon arrival to hospital because of how anxious she felt but was not threatening anyone or herself  She states making a threat to burn down her building because of being angry at the administration not addressing the bugs in the building, but would never do anything of that nature  She states having 2 cats and would not want them to be harmed (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)  She also admitted not paying her rent because of being upset about the roaches  She is agreeable to working with the team and having her medications adjusted  She prefers not to have a 303 hearing and feels she can continue to receive help and be compliant and in behavioral control  Misa denies any suicidal ideations  Misa denies any homicidal ideations  Regarding hallucinations, Misa denies any auditory or visual hallucinations      PRNs overnight: none   VS: Reviewed, within normal limits    Progress Toward Goals: slow improvement    Psychiatric Review of Systems:  Behavior over the last 24 hours:  improved  Sleep: normal  Appetite: normal  Medication side effects: Some sedation   ROS: all other systems are negative    Vital signs in last 24 hours:  Temp:  [97 5 °F (36 4 °C)-97 7 °F (36 5 °C)] 97 5 °F (36 4 °C)  HR:  [73-86] 73  Resp:  [16-18] 16  BP: (111-130)/(63-75) 111/75    Laboratory results:  I have personally reviewed all pertinent laboratory/tests results    Recent Results (from the past 48 hour(s))   TSH, 3rd generation with Free T4 reflex    Collection Time: 11/20/22  5:38 AM   Result Value Ref Range    TSH 3RD GENERATON 0 860 0 450 - 4 500 uIU/mL         Mental Status Evaluation:    Appearance:  age appropriate, penguin pajamas, dark t-shirt, long painted nails with writing "scorpio" on 2 nails, appropriate hygiene   Behavior:  pleasant, cooperative, calm   Speech:  normal volume, normal pitch, overall normal rate at times slower   Mood:  "good"   Affect:  constricted but reactive   Thought Process:  concrete, mostly linear but at times tangential but redirectable   Associations: concrete associations   Thought Content:  normal, no overt delusions   Perceptual Disturbances: Denies auditory or visual hallucinations   Risk Potential: Suicidal ideation - None at present  Homicidal ideation - None at present  Potential for aggression - Not at present   Sensorium:  oriented to person, place and time/date   Memory:  recent and remote memory grossly intact   Consciousness:  alert and awake   Attention/Concentration: attention span and concentration are age appropriate   Insight:  limited   Judgment: limited   Gait/Station: slow gait   Motor Activity: no abnormal movements       Current Medications:  Current Facility-Administered Medications   Medication Dose Route Frequency Provider Last Rate   • amLODIPine  10 mg Oral Daily Lupis López MD     • haloperidol lactate  5 mg Intramuscular Q4H PRN Max 4/day Lupis López MD     • hydrOXYzine HCL  25 mg Oral Q6H PRN Max 4/day Moe Novoa MD     • lisinopril  10 mg Oral Daily Moe Novoa MD     • LORazepam  2 mg Intramuscular Q6H PRN Max 3/day Leanna Truong MD     • LORazepam  0 5 mg Oral Q6H PRN Max 4/day Moe Novoa MD     • LORazepam  1 mg Oral Q6H PRN Max 3/day Leanna Truong MD     • nicotine polacrilex  2 mg Oral Q2H PRN Leanna Truong MD     • risperiDONE  0 25 mg Oral Q4H PRN Max 6/day Moe Novoa MD     • risperiDONE  0 5 mg Oral Q4H PRN Max 3/day Moe Novoa MD     • risperiDONE  1 mg Oral Q2H PRN Max 3/day Moe Novoa MD     • risperiDONE  2 mg Oral HS Leanna Truong MD     • traZODone  50 mg Oral HS PRN Moe Novoa MD       The following interventions are recommended: behavioral checks every 7 minutes, continued hospitalization on locked unit    Behavioral Health Medications: All current active meds have been reviewed  Changes as in plan section above  Risks, benefits and possible side effects of Medications:   Risks, benefits, and possible side effects of medications explained to patient and patient verbalizes understanding  Counseling / Coordination of Care:  Patient's progress discussed with staff in treatment team meeting  Medications, treatment progress and treatment plan reviewed with patient  Erich Muñiz DO 11/21/22  Psychiatry Resident, PGY-II    This note was completed in part utilizing [employer] Software  Grammatical, translation, syntax errors, random word insertions, spelling mistakes, and incomplete sentences may be an occasional consequence of this system secondary to software limitations with voice recognition, ambient noise, and hardware issues  If you have any questions or concerns about the content, text, or information contained within the body of this dictation, please contact the provider for clarification

## 2022-11-21 NOTE — PROGRESS NOTES
11/21/22 1000 11/21/22 1045 11/21/22 9720   Activity/Group Checklist   Group Community meeting  (unit rules review,orientation,and joke telling ) Admission/Discharge  (pt  completed self assessment interview with staff assistance for writing ) Life Skills  (topic "responsibilty")   Attendance Attended Attended Attended   Attendance Duration (min) 16-30 0-15  (1-1) 46-60   Interactions Interacted appropriately  (pt alert,pleasant and social did tell a provided joke to the group ) Other (Comment)  (initially pt  displayed thought blocking when attempting to complete the form herself  Once staff read and wrote for her,she was able to clearly state that seeing cockroaches in her building is the reason she stopped paying rent ) Interacted appropriately  (Pt  volunteered to read the responsibility poem to the group and reported that it really meant something to her  )   Affect/Mood Appropriate;Calm Wide Calm   Goals Achieved Able to listen to others; Able to engage in interactions; Discussed coping strategies Able to engage in interactions; Able to reflect/comment on own behavior;Able to self-disclose; Identified feelings; Able to listen to others Able to listen to others; Able to engage in interactions; Discussed coping strategies; Able to manage/cope with feelings

## 2022-11-22 RX ADMIN — LISINOPRIL 10 MG: 10 TABLET ORAL at 08:21

## 2022-11-22 RX ADMIN — AMLODIPINE BESYLATE 10 MG: 10 TABLET ORAL at 08:21

## 2022-11-22 RX ADMIN — RISPERIDONE 2 MG: 2 TABLET ORAL at 21:21

## 2022-11-22 NOTE — NURSING NOTE
Patient visible on the unit sitting with peers in the dayroom for the majority of the shift, social with staff and peers on approach  Patient brightens slightly during assessment questions, currently denies depression, anxiety, SI/HI/AVH  Patient compliant with medications and meals, appetite good  No further signs of distress noted  VSS

## 2022-11-22 NOTE — NURSING NOTE
Pt is calm, cooperative and visible on unit  Pt denies depression and anxiety; denies SI/HI/AH/VH  Pt interacts appropriately with peers and reports sleeping well  Pt is able to make needs known and is medication compliant  Will maintain q7 min checks

## 2022-11-22 NOTE — PROGRESS NOTES
11/22/22 0816   Team Meeting   Meeting Type Daily Rounds   Initial Conference Date 11/22/22   Next Conference Date 11/23/22   Team Members Present   Team Members Present Physician;Nurse;;Occupational Therapist;   Physician Team Member Dr Fredrick Almonte & Dr Chu Sinclair & Dr Robin Vera Management Team Member Kamala Work Team Member Jozef   OT Team Member Rosa Joy   Patient/Family Present   Patient Present No   Patient's Family Present No     Deny s/s, calm, cooperative, regrets threatening to burn building down, attending groups, d/c next week

## 2022-11-22 NOTE — PLAN OF CARE
Problem: Alteration in Thoughts and Perception  Goal: Treatment Goal: Gain control of psychotic behaviors/thinking, reduce/eliminate presenting symptoms and demonstrate improved reality functioning upon discharge  Outcome: Progressing  Goal: Verbalize thoughts and feelings  Description: Interventions:  - Promote a nonjudgmental and trusting relationship with the patient through active listening and therapeutic communication  - Assess patient's level of functioning, behavior and potential for risk  - Engage patient in 1 on 1 interactions  - Encourage patient to express fears, feelings, frustrations, and discuss symptoms    - [place] patient to reality, help patient recognize reality-based thinking   - Administer medications as ordered and assess for potential side effects  - Provide the patient education related to the signs and symptoms of the illness and desired effects of prescribed medications  Outcome: Progressing  Goal: Refrain from acting on delusional thinking/internal stimuli  Description: Interventions:  - Monitor patient closely, per order   - Utilize least restrictive measures   - Set reasonable limits, give positive feedback for acceptable   - Administer medications as ordered and monitor of potential side effects  Outcome: Progressing  Goal: Agree to be compliant with medication regime, as prescribed and report medication side effects  Description: Interventions:  - Offer appropriate PRN medication and supervise ingestion; conduct AIMS, as needed   Outcome: Progressing  Goal: Recognize dysfunctional thoughts, communicate reality-based thoughts at the time of discharge  Description: Interventions:  - Provide medication and psycho-education to assist patient in compliance and developing insight into his/her illness   Outcome: Progressing  Goal: Complete daily ADLs, including personal hygiene independently, as able  Description: Interventions:  - Observe, teach, and assist patient with ADLS  - Monitor and promote a balance of rest/activity, with adequate nutrition and elimination   Outcome: Progressing     Problem: Ineffective Coping  Goal: Cooperates with admission process  Description: Interventions:   - Complete admission process  Outcome: Progressing  Goal: Identifies ineffective coping skills  Outcome: Progressing  Goal: Identifies healthy coping skills  Outcome: Progressing  Goal: Demonstrates healthy coping skills  Outcome: Progressing  Goal: Patient/Family participate in treatment and DC plans  Description: Interventions:  - Provide therapeutic environment  Outcome: Progressing  Goal: Patient/Family verbalizes awareness of resources  Outcome: Progressing  Goal: Understands least restrictive measures  Description: Interventions:  - Utilize least restrictive behavior  Outcome: Progressing  Goal: Free from restraint events  Description: - Utilize least restrictive measures   - Provide behavioral interventions   - Redirect inappropriate behaviors   Outcome: Progressing     Problem: Risk for Self Injury/Neglect  Goal: Treatment Goal: Remain safe during length of stay, learn and adopt new coping skills, and be free of self-injurious ideation, impulses and acts at the time of discharge  Outcome: Progressing  Goal: Verbalize thoughts and feelings  Description: Interventions:  - Assess and re-assess patient's lethality and potential for self-injury  - Engage patient in 1:1 interactions, daily, for a minimum of 15 minutes  - Encourage patient to express feelings, fears, frustrations, hopes  - Establish rapport/trust with patient   Outcome: Progressing  Goal: Refrain from harming self  Description: Interventions:  - Monitor patient closely, per order  - Develop a trusting relationship  - Supervise medication ingestion, monitor effects and side effects   Outcome: Progressing  Goal: Recognize maladaptive responses and adopt new coping mechanisms  Outcome: Progressing  Goal: Complete daily ADLs, including personal hygiene independently, as able  Description: Interventions:  - Observe, teach, and assist patient with ADLS  - Monitor and promote a balance of rest/activity, with adequate nutrition and elimination  Outcome: Progressing     Problem: Anxiety  Goal: Anxiety is at manageable level  Description: Interventions:  - Assess and monitor patient's anxiety level  - Monitor for signs and symptoms (heart palpitations, chest pain, shortness of breath, headaches, nausea, feeling jumpy, restlessness, irritable, apprehensive)  - Collaborate with interdisciplinary team and initiate plan and interventions as ordered    - [place] patient to unit/surroundings  - Explain treatment plan  - Encourage participation in care  - Encourage verbalization of concerns/fears  - Identify coping mechanisms  - Assist in developing anxiety-reducing skills  - Administer/offer alternative therapies  - Limit or eliminate stimulants  Outcome: Progressing     Problem: Risk for Violence/Aggression Toward Others  Goal: Treatment Goal: Refrain from acts of violence/aggression during length of stay, and demonstrate improved impulse control at the time of discharge  Outcome: Progressing  Goal: Verbalize thoughts and feelings  Description: Interventions:  - Assess and re-assess patient's level of risk, every waking shift  - Engage patient in 1:1 interactions, daily, for a minimum of 15 minutes   - Allow patient to express feelings and frustrations in a safe and non-threatening manner   - Establish rapport/trust with patient   Outcome: Progressing  Goal: Refrain from harming others  Outcome: Progressing  Goal: Refrain from destructive acts on the environment or property  Outcome: Progressing  Goal: Control angry outbursts  Description: Interventions:  - Monitor patient closely, per order  - Ensure early verbal de-escalation  - Monitor prn medication needs  - Set reasonable/therapeutic limits, outline behavioral expectations, and consequences   - Provide a non-threatening milieu, utilizing the least restrictive interventions   Outcome: Progressing  Goal: Identify appropriate positive anger management techniques  Description: Interventions:  - Offer anger management and coping skills groups   - Staff will provide positive feedback for appropriate anger control  Outcome: Progressing     Problem: Nutrition/Hydration-ADULT  Goal: Nutrient/Hydration intake appropriate for improving, restoring or maintaining nutritional needs  Description: Monitor and assess patient's nutrition/hydration status for malnutrition  Collaborate with interdisciplinary team and initiate plan and interventions as ordered  Monitor patient's weight and dietary intake as ordered or per policy  Utilize nutrition screening tool and intervene as necessary  Determine patient's food preferences and provide high-protein, high-caloric foods as appropriate       INTERVENTIONS:  - Monitor oral intake, urinary output, labs, and treatment plans  - Assess nutrition and hydration status and recommend course of action  - Evaluate amount of meals eaten  - Assist patient with eating if necessary   - Allow adequate time for meals  - Recommend/ encourage appropriate diets, oral nutritional supplements, and vitamin/mineral supplements  - Order, calculate, and assess calorie counts as needed  - Recommend, monitor, and adjust tube feedings and TPN/PPN based on assessed needs  - Assess need for intravenous fluids  - Provide specific nutrition/hydration education as appropriate  - Include patient/family/caregiver in decisions related to nutrition  Outcome: Progressing

## 2022-11-22 NOTE — PLAN OF CARE
Pt  Cooperative in all three groups and socially appropriate with peers in the dayroom    Problem: Ineffective Coping  Goal: Participates in unit activities  Description: Interventions:  - Provide therapeutic environment   - Provide required programming   - Redirect inappropriate behaviors   Outcome: Progressing

## 2022-11-22 NOTE — PROGRESS NOTES
Progress Note - Behavioral Health   Vinh Mitchell 61 y o  female MRN: [id number]  Unit/Bed#: [facility] 530-35 Encounter: [id number]    Assessment/Plan   Principal Problem:    Bipolar disorder ([address] )  Active Problems:    HTN (hypertension)    Hyperlipidemia    Tobacco use    Mild intellectual disability    Anxiety    Obesity    Medical clearance for psychiatric admission    Misa appears to continue show improvement with her situation and understanding of what is an appropriate response when she is angered  She has been compliant with her medications and feeling less sedated than initially  Her thoughts appear to have calmed and no longer is experiencing the same heightened energy that she initially presented with  At this time, will not make any medication adjustments and continue to monitor while arranging outpatient resources including management of rent  Recommended Treatment:   No psychopharmacologic changes necessary at this moment; will continue to assess daily for further optimization  Continue Risperdal 2mg HS for mood stability  Continue remaining medications    Continue with pharmacotherapy, group therapy, milieu therapy and occupational therapy  Continue to assess for adverse medication side effects  Encourage Vinh Mitchell to participate in nonverbal forms of therapy including journaling and art/music therapy  Continue frequent safety checks and vitals per unit protocol  Continue to engage CM/SW to assist with collateral, disposition planning, and the implementation of an individualized, patient-centered plan of care  Continue medical management by medical team   Case discussed with treatment team     Legal Status: 201 (previously 302)  ------------------------------------------------------------    Subjective: All documentation including nursing notes, medication history to ensure medication adherence on the unit, labs, and vitals were reviewed   Misa was evaluated this morning for continuity of care and no acute distress noted throughout the evaluation  Over the past 24 hours per nursing report, Rojelio Leventhal has been cooperative on the unit and compliant with medications  Today, Rojelio Leventhal is consenting for safety on the unit  Misa reports feeling "good " Misa notes having good sleep without any awakening or nightmares  Misa states having a good appetite and eating her meals  Misa has been taking the medications as prescribed and reporting less sedation that previous  She notes her energy is overall fine and feeling "less tired "     Misa denies any suicidal ideations  Misa denies any homicidal ideations  Regarding hallucinations, Misa denies any auditory or visual hallucinations  She states if she were to leave, she would be able to act appropriately and not make threats to burn buildings  When asked about her rent, she notes being set up with [facility] [employer] to help with rental assistance since she has not paid her rent for roughly 2 months  She understands the importance of continuing her medications and the plan is to hold off on the prozac for concerns of pushing her into a manic episode as well as not having depressive symptoms at this time  PRNs overnight: none   VS: Reviewed, within normal limits    Progress Toward Goals: slow improvement    Psychiatric Review of Systems:  Behavior over the last 24 hours:  improved  Sleep: normal  Appetite: normal  Medication side effects: No - no longer feeling same sedation as previously  ROS: all other systems are negative    Vital signs in last 24 hours:  Temp:  [95 8 °F (35 4 °C)-97 2 °F (36 2 °C)] 97 2 °F (36 2 °C)  HR:  [81-85] 82  Resp:  [16-18] 16  BP: ()/(61-67) 113/67    Laboratory results:  I have personally reviewed all pertinent laboratory/tests results  No results found for this or any previous visit (from the past 48 hour(s))        Mental Status Evaluation:    Appearance:  age appropriate, penguin pattern light blue pajamas, navy plain t-shirt, long painted nails (some written 'scorpio')   Behavior:  normal, pleasant, cooperative, calm   Speech:  normal volume, normal pitch, at times slowed but overall appropriate   Mood:  "good"   Affect:  constricted but more reactive   Thought Process:  logical, linear, concrete   Associations: concrete associations   Thought Content:  normal, no overt delusions   Perceptual Disturbances: Denies auditory or visual hallucinations   Risk Potential: Suicidal ideation - None at present  Homicidal ideation - None at present  Potential for aggression - Not at present   Sensorium:  oriented to person, place and time/date   Memory:  recent and remote memory grossly intact   Consciousness:  alert and awake   Attention/Concentration: attention span and concentration are age appropriate   Insight:  limited and improving   Judgment: limited and improving   Gait/Station: slow gait   Motor Activity: no abnormal movements       Current Medications:  Current Facility-Administered Medications   Medication Dose Route Frequency Provider Last Rate   • amLODIPine  10 mg Oral Daily Smooth Sanchez MD     • haloperidol lactate  5 mg Intramuscular Q4H PRN Max 4/day Smooth Sanchez MD     • hydrOXYzine HCL  25 mg Oral Q6H PRN Max 4/day Smooth Sanchez MD     • lisinopril  10 mg Oral Daily Smooth Sanchez MD     • LORazepam  2 mg Intramuscular Q6H PRN Max 3/day Starr Duque MD     • LORazepam  0 5 mg Oral Q6H PRN Max 4/day Smooth Sanchez MD     • LORazepam  1 mg Oral Q6H PRN Max 3/day Starr Duque MD     • nicotine polacrilex  2 mg Oral Q2H PRN Starr Duque MD     • risperiDONE  0 25 mg Oral Q4H PRN Max 6/day Smooth Sanchez MD     • risperiDONE  0 5 mg Oral Q4H PRN Max 3/day Smooth Snachez MD     • risperiDONE  1 mg Oral Q2H PRN Max 3/day Smooth Sanchez MD     • risperiDONE  2 mg Oral HS Starr Duque MD     • traZODone  50 mg Oral HS PRN Smooth Sanchez MD       The following interventions are recommended: behavioral checks every 7 minutes, continued hospitalization on locked unit    Behavioral Health Medications: All current active meds have been reviewed  Changes as in plan section above  Risks, benefits and possible side effects of Medications:   Risks, benefits, and possible side effects of medications explained to patient and patient verbalizes understanding  Counseling / Coordination of Care:  Patient's progress discussed with staff in treatment team meeting  Medications, treatment progress and treatment plan reviewed with patient  Anni Robertson DO 11/22/22  Psychiatry Resident, PGY-II    This note was completed in part utilizing [employer] Direct Software  Grammatical, translation, syntax errors, random word insertions, spelling mistakes, and incomplete sentences may be an occasional consequence of this system secondary to software limitations with voice recognition, ambient noise, and hardware issues  If you have any questions or concerns about the content, text, or information contained within the body of this dictation, please contact the provider for clarification

## 2022-11-22 NOTE — PROGRESS NOTES
Pt  Well focused and fully engaged in sessions     11/22/22 1300   Activity/Group Checklist   Interactions Interacted appropriately

## 2022-11-23 RX ADMIN — RISPERIDONE 2 MG: 2 TABLET ORAL at 21:07

## 2022-11-23 RX ADMIN — LISINOPRIL 10 MG: 10 TABLET ORAL at 08:47

## 2022-11-23 RX ADMIN — AMLODIPINE BESYLATE 10 MG: 10 TABLET ORAL at 08:47

## 2022-11-23 NOTE — TREATMENT TEAM
Pt attended [facility] recovery: gratittude and strengths group  Pt disorganized interaction  Pt stated she has difficulty hearing  Pt did move closer to therapist   Pt mainly observed when prompted  11/23/22 1100   Activity/Group Checklist   Group Other (Comment)  ( recovery: gratittude and strengths group)   Attendance Attended   Attendance Duration (min) 31-45   Interactions Disorganized interaction   Affect/Mood Wide   Goals Achieved Identified feelings; Able to listen to others; Able to engage in interactions; Discussed coping strategies

## 2022-11-23 NOTE — CASE MANAGEMENT
Called and left a message for Alison Castro with [employer] regarding pt, spoke to her earlier in the week, pt signed a release for them  Called and spoke to the , Tiago Reinoso, at the [facility]  She advised that the pt can go back to her apartment but that she needs to control her threats and cursing at staff  She also advised that she needs to pay her rent on time as well  The unit manager advised that the patient is spending her money on other things and not on her rent, so she asked if we can initiate a rep payee for the patient  *Update - Conference of pat will be here to see the patient between 2:00-2:30 today

## 2022-11-23 NOTE — PROGRESS NOTES
Progress Note - Behavioral Health   Volodymyr Ferreira 61 y o  female MRN: [id number]  Unit/Bed#: [facility] 506-98 Encounter: [id number]    Assessment/Plan   Principal Problem:    Bipolar disorder ([address] )  Active Problems:    HTN (hypertension)    Hyperlipidemia    Tobacco use    Mild intellectual disability    Anxiety    Obesity    Medical clearance for psychiatric admission    Misa continues to show improvements with her insight and remain in behavioral control  She appears to be tolerating the medication better and though was seen resting in bed, denied the sedation she initially experienced with the medication  She is planned to have a meeting with [facility] to help her with her rent/living situation  At this time, will not make any adjustments; however, if patient does exhibit disorganization, poor behaviors, etc  can consider increasing risperdal dose  Recommended Treatment:   No psychopharmacologic changes necessary at this moment; will continue to assess daily for further optimization  Continue Risperdal 2mg HS for mood stability  Continue remaining medications    Continue with pharmacotherapy, group therapy, milieu therapy and occupational therapy  Continue to assess for adverse medication side effects  Encourage Volodymyr Ferreira to participate in nonverbal forms of therapy including journaling and art/music therapy  Continue frequent safety checks and vitals per unit protocol  Continue to engage CM/SW to assist with collateral, disposition planning, and the implementation of an individualized, patient-centered plan of care  Continue medical management by medical team   Case discussed with treatment team     Legal Status: 201  ------------------------------------------------------------    Subjective: All documentation including nursing notes, medication history to ensure medication adherence on the unit, labs, and vitals were reviewed   Misa was evaluated this morning for continuity of care and no acute distress noted throughout the evaluation  Over the past 24 hours per nursing report, Zuleyka Humphrey has been cooperative on the unit and compliant with medications  Today, Zuleyka Humphrey is consenting for safety on the unit  Misa reports feeling "good " Misa notes having a nightmare during the night, but was able to fall back asleep  Misa states having a good appetite  Misa has been taking the medications as prescribed and reporting no more sedation side effects as initially reported  She notes understanding that the conference of pat is in the works to help her with her rent  Misa denies any suicidal ideations  Misa denies any homicidal ideations  Regarding hallucinations, Misa denies any auditory or visual hallucinations  PRNs overnight: None   VS: Reviewed, within normal limits    Progress Toward Goals: slow improvement    Psychiatric Review of Systems:  Behavior over the last 24 hours:  improved  Sleep: nightmares but able to sleep  Appetite: normal  Medication side effects: No   ROS: all other systems are negative    Vital signs in last 24 hours:  Temp:  [97 8 °F (36 6 °C)-98 °F (36 7 °C)] 97 8 °F (36 6 °C)  HR:  [79-88] 86  Resp:  [15-18] 15  BP: (109-132)/(66-76) 132/76    Laboratory results:  I have personally reviewed all pertinent laboratory/tests results  No results found for this or any previous visit (from the past 48 hour(s))        Mental Status Evaluation:    Appearance:  age appropriate, hospital attire, some grooming/hygiene, long painted nails (some written "scorpio" & one nail fallen off)   Behavior:  normal, pleasant, cooperative, calm   Speech:  normal rate, normal volume, normal pitch   Mood:  "good"   Affect:  constricted   Thought Process:  logical, linear, concrete   Associations: concrete associations   Thought Content:  normal, no overt delusions   Perceptual Disturbances: Denies auditory or visual hallucinations   Risk Potential: Suicidal ideation - None at present  Homicidal ideation - None at present  Potential for aggression - Not at present   Sensorium:  oriented to person, place and time/date   Memory:  recent and remote memory grossly intact   Consciousness:  alert and awake   Attention/Concentration: attention span and concentration are age appropriate   Insight:  limited but improving   Judgment: limited but improving    Gait/Station: slow gait   Motor Activity: no abnormal movements       Current Medications:  Current Facility-Administered Medications   Medication Dose Route Frequency Provider Last Rate   • amLODIPine  10 mg Oral Daily Edward Patel MD     • haloperidol lactate  5 mg Intramuscular Q4H PRN Max 4/day Edward Patel MD     • hydrOXYzine HCL  25 mg Oral Q6H PRN Max 4/day Edward Patel MD     • lisinopril  10 mg Oral Daily Edward Patel MD     • LORazepam  2 mg Intramuscular Q6H PRN Max 3/day Giuliano Cerrato MD     • LORazepam  0 5 mg Oral Q6H PRN Max 4/day Edward Patel MD     • LORazepam  1 mg Oral Q6H PRN Max 3/day Giuliano Cerrato MD     • nicotine polacrilex  2 mg Oral Q2H PRN Giuliano Cerrato MD     • risperiDONE  0 25 mg Oral Q4H PRN Max 6/day Edward Patel MD     • risperiDONE  0 5 mg Oral Q4H PRN Max 3/day Edward Patel MD     • risperiDONE  1 mg Oral Q2H PRN Max 3/day Edward Patel MD     • risperiDONE  2 mg Oral HS Giuliano Cerrato MD     • traZODone  50 mg Oral HS PRN Edward Patel MD       The following interventions are recommended: behavioral checks every 7 minutes, continued hospitalization on locked unit    Behavioral Health Medications: All current active meds have been reviewed  Changes as in plan section above  Risks, benefits and possible side effects of Medications:   Risks, benefits, and possible side effects of medications explained to patient and patient verbalizes understanding  Counseling / Coordination of Care:  Patient's progress discussed with staff in treatment team meeting    Medications, treatment progress and treatment plan reviewed with patient  Samantha WaltonDO 11/23/22  Psychiatry Resident, PGY-II    This note was completed in part utilizing [employer] Direct Software  Grammatical, translation, syntax errors, random word insertions, spelling mistakes, and incomplete sentences may be an occasional consequence of this system secondary to software limitations with voice recognition, ambient noise, and hardware issues  If you have any questions or concerns about the content, text, or information contained within the body of this dictation, please contact the provider for clarification

## 2022-11-23 NOTE — NURSING NOTE
Patient is pleasant and cooperative with care and medications  She is visible on the unit and stated her depression 4/10 and Anxiety 2/4  Denies SI,HI   Denies A,V/H

## 2022-11-23 NOTE — PROGRESS NOTES
11/23/22 1347   Team Meeting   Meeting Type Daily Rounds   Initial Conference Date 11/23/22   Next Conference Date 11/24/22   Team Members Present   Team Members Present Physician;Nurse;;   Physician Team Member Dr Aditya Fox, Dr Jerald Chadwick, Dr Chan Rivers Team [facility] Member Kamala Work Team Member Braeden Short   Patient/Family Present   Patient Present No   Patient's Family Present No     Deny s/s, cooperative, sleeping, eating

## 2022-11-23 NOTE — PLAN OF CARE
Pt attends group and visible      Problem: Alteration in Thoughts and Perception  Goal: Attend and participate in unit activities, including therapeutic, recreational, and educational groups  Description: Interventions:  -Encourage Visitation and family involvement in care  Outcome: Progressing

## 2022-11-24 LAB
BASOPHILS # BLD AUTO: 0.05 THOUSANDS/ÂΜL (ref 0–0.1)
BASOPHILS NFR BLD AUTO: 0 % (ref 0–1)
EOSINOPHIL # BLD AUTO: 0.29 THOUSAND/ÂΜL (ref 0–0.61)
EOSINOPHIL NFR BLD AUTO: 2 % (ref 0–6)
ERYTHROCYTE [DISTWIDTH] IN BLOOD BY AUTOMATED COUNT: 14 % (ref 11.6–15.1)
HCT VFR BLD AUTO: 39.3 % (ref 34.8–46.1)
HGB BLD-MCNC: 12.6 G/DL (ref 11.5–15.4)
IMM GRANULOCYTES # BLD AUTO: 0.11 THOUSAND/UL (ref 0–0.2)
IMM GRANULOCYTES NFR BLD AUTO: 1 % (ref 0–2)
LYMPHOCYTES # BLD AUTO: 2.77 THOUSANDS/ÂΜL (ref 0.6–4.47)
LYMPHOCYTES NFR BLD AUTO: 22 % (ref 14–44)
MCH RBC QN AUTO: 28.6 PG (ref 26.8–34.3)
MCHC RBC AUTO-ENTMCNC: 32.1 G/DL (ref 31.4–37.4)
MCV RBC AUTO: 89 FL (ref 82–98)
MONOCYTES # BLD AUTO: 1.37 THOUSAND/ÂΜL (ref 0.17–1.22)
MONOCYTES NFR BLD AUTO: 11 % (ref 4–12)
NEUTROPHILS # BLD AUTO: 8.02 THOUSANDS/ÂΜL (ref 1.85–7.62)
NEUTS SEG NFR BLD AUTO: 64 % (ref 43–75)
NRBC BLD AUTO-RTO: 0 /100 WBCS
PLATELET # BLD AUTO: 261 THOUSANDS/UL (ref 149–390)
PMV BLD AUTO: 9.9 FL (ref 8.9–12.7)
RBC # BLD AUTO: 4.4 MILLION/UL (ref 3.81–5.12)
WBC # BLD AUTO: 12.61 THOUSAND/UL (ref 4.31–10.16)

## 2022-11-24 RX ADMIN — LISINOPRIL 10 MG: 10 TABLET ORAL at 09:13

## 2022-11-24 RX ADMIN — RISPERIDONE 2 MG: 2 TABLET ORAL at 21:14

## 2022-11-24 RX ADMIN — AMLODIPINE BESYLATE 10 MG: 10 TABLET ORAL at 09:10

## 2022-11-24 NOTE — PLAN OF CARE
Problem: Alteration in Thoughts and Perception  Goal: Treatment Goal: Gain control of psychotic behaviors/thinking, reduce/eliminate presenting symptoms and demonstrate improved reality functioning upon discharge  Outcome: Progressing  Goal: Verbalize thoughts and feelings  Description: Interventions:  - Promote a nonjudgmental and trusting relationship with the patient through active listening and therapeutic communication  - Assess patient's level of functioning, behavior and potential for risk  - Engage patient in 1 on 1 interactions  - Encourage patient to express fears, feelings, frustrations, and discuss symptoms    - [place] patient to reality, help patient recognize reality-based thinking   - Administer medications as ordered and assess for potential side effects  - Provide the patient education related to the signs and symptoms of the illness and desired effects of prescribed medications  Outcome: Progressing  Goal: Refrain from acting on delusional thinking/internal stimuli  Description: Interventions:  - Monitor patient closely, per order   - Utilize least restrictive measures   - Set reasonable limits, give positive feedback for acceptable   - Administer medications as ordered and monitor of potential side effects  Outcome: Progressing  Goal: Agree to be compliant with medication regime, as prescribed and report medication side effects  Description: Interventions:  - Offer appropriate PRN medication and supervise ingestion; conduct AIMS, as needed   Outcome: Progressing  Goal: Recognize dysfunctional thoughts, communicate reality-based thoughts at the time of discharge  Description: Interventions:  - Provide medication and psycho-education to assist patient in compliance and developing insight into his/her illness   Outcome: Progressing  Goal: Complete daily ADLs, including personal hygiene independently, as able  Description: Interventions:  - Observe, teach, and assist patient with ADLS  - Monitor and promote a balance of rest/activity, with adequate nutrition and elimination   Outcome: Progressing     Problem: Ineffective Coping  Goal: Cooperates with admission process  Description: Interventions:   - Complete admission process  Outcome: Progressing  Goal: Identifies ineffective coping skills  Outcome: Progressing  Goal: Identifies healthy coping skills  Outcome: Progressing  Goal: Demonstrates healthy coping skills  Outcome: Progressing  Goal: Patient/Family participate in treatment and DC plans  Description: Interventions:  - Provide therapeutic environment  Outcome: Progressing  Goal: Patient/Family verbalizes awareness of resources  Outcome: Progressing  Goal: Understands least restrictive measures  Description: Interventions:  - Utilize least restrictive behavior  Outcome: Progressing  Goal: Free from restraint events  Description: - Utilize least restrictive measures   - Provide behavioral interventions   - Redirect inappropriate behaviors   Outcome: Progressing     Problem: Risk for Self Injury/Neglect  Goal: Treatment Goal: Remain safe during length of stay, learn and adopt new coping skills, and be free of self-injurious ideation, impulses and acts at the time of discharge  Outcome: Progressing  Goal: Verbalize thoughts and feelings  Description: Interventions:  - Assess and re-assess patient's lethality and potential for self-injury  - Engage patient in 1:1 interactions, daily, for a minimum of 15 minutes  - Encourage patient to express feelings, fears, frustrations, hopes  - Establish rapport/trust with patient   Outcome: Progressing  Goal: Refrain from harming self  Description: Interventions:  - Monitor patient closely, per order  - Develop a trusting relationship  - Supervise medication ingestion, monitor effects and side effects   Outcome: Progressing  Goal: Recognize maladaptive responses and adopt new coping mechanisms  Outcome: Progressing  Goal: Complete daily ADLs, including personal hygiene independently, as able  Description: Interventions:  - Observe, teach, and assist patient with ADLS  - Monitor and promote a balance of rest/activity, with adequate nutrition and elimination  Outcome: Progressing     Problem: Anxiety  Goal: Anxiety is at manageable level  Description: Interventions:  - Assess and monitor patient's anxiety level  - Monitor for signs and symptoms (heart palpitations, chest pain, shortness of breath, headaches, nausea, feeling jumpy, restlessness, irritable, apprehensive)  - Collaborate with interdisciplinary team and initiate plan and interventions as ordered    - [place] patient to unit/surroundings  - Explain treatment plan  - Encourage participation in care  - Encourage verbalization of concerns/fears  - Identify coping mechanisms  - Assist in developing anxiety-reducing skills  - Administer/offer alternative therapies  - Limit or eliminate stimulants  Outcome: Progressing     Problem: Risk for Violence/Aggression Toward Others  Goal: Treatment Goal: Refrain from acts of violence/aggression during length of stay, and demonstrate improved impulse control at the time of discharge  Outcome: Progressing  Goal: Verbalize thoughts and feelings  Description: Interventions:  - Assess and re-assess patient's level of risk, every waking shift  - Engage patient in 1:1 interactions, daily, for a minimum of 15 minutes   - Allow patient to express feelings and frustrations in a safe and non-threatening manner   - Establish rapport/trust with patient   Outcome: Progressing  Goal: Refrain from harming others  Outcome: Progressing  Goal: Refrain from destructive acts on the environment or property  Outcome: Progressing  Goal: Control angry outbursts  Description: Interventions:  - Monitor patient closely, per order  - Ensure early verbal de-escalation  - Monitor prn medication needs  - Set reasonable/therapeutic limits, outline behavioral expectations, and consequences   - Provide a non-threatening milieu, utilizing the least restrictive interventions   Outcome: Progressing  Goal: Identify appropriate positive anger management techniques  Description: Interventions:  - Offer anger management and coping skills groups   - Staff will provide positive feedback for appropriate anger control  Outcome: Progressing     Problem: Nutrition/Hydration-ADULT  Goal: Nutrient/Hydration intake appropriate for improving, restoring or maintaining nutritional needs  Description: Monitor and assess patient's nutrition/hydration status for malnutrition  Collaborate with interdisciplinary team and initiate plan and interventions as ordered  Monitor patient's weight and dietary intake as ordered or per policy  Utilize nutrition screening tool and intervene as necessary  Determine patient's food preferences and provide high-protein, high-caloric foods as appropriate       INTERVENTIONS:  - Monitor oral intake, urinary output, labs, and treatment plans  - Assess nutrition and hydration status and recommend course of action  - Evaluate amount of meals eaten  - Assist patient with eating if necessary   - Allow adequate time for meals  - Recommend/ encourage appropriate diets, oral nutritional supplements, and vitamin/mineral supplements  - Order, calculate, and assess calorie counts as needed  - Recommend, monitor, and adjust tube feedings and TPN/PPN based on assessed needs  - Assess need for intravenous fluids  - Provide specific nutrition/hydration education as appropriate  - Include patient/family/caregiver in decisions related to nutrition  Outcome: Progressing

## 2022-11-24 NOTE — NURSING NOTE
Patient was visible in the milieu with minimal peer interaction  VSS  Denies all psych s/s  No complaints offered  No behavior noted  Compliant with HS medications  Safety checks ongoing

## 2022-11-24 NOTE — PROGRESS NOTES
Progress Note - Behavioral Health   Ryan Brandt 61 y o  female MRN: [id number]  Unit/Bed#: Tania Marvin 122-26 Encounter: [id number]    Patient was seen today for continuation of care, records reviewed and patient was discussed with the morning case review team   Per staff, the patient is pleasant and cooperative  She is visible in the milieu with limited interaction with peers  Misa was seen today for psychiatric follow-up  On assessment today, Misa was seen in the group room  Misa was pleasant, cooperative and bright throughout the interview  She states she feels "very good"  She feels the medication she is on has helped her mood  She reports having been on Prozac in the past and that made her "very manic" and the risperidone make her feel "better"  Misa denies any anxiety or depression  She does not appear overtly manic  Misa denies acute suicidal/self-harm ideation/intent/plan upon direct inquiry at this time  Misa remains behaviorally appropriate, no agitation or aggression noted on exam or in report  Misa  denies HI/AH/VH  No overt delusions or paranoia are verbalized  Misa remains adherent to her current psychotropic medication regimen and denies any side effects from medications, as well as none noted on exam     Vitals:  Vitals:    11/24/22 0752   BP: 120/62   Pulse: 72   Resp: 16   Temp: (!) 97 2 °F (36 2 °C)   SpO2: 93%       Laboratory Results:    I have personally reviewed all pertinent laboratory/tests results    Most Recent Labs:   Lab Results   Component Value Date    WBC 12 61 (H) 11/24/2022    RBC 4 40 11/24/2022    HGB 12 6 11/24/2022    HCT 39 3 11/24/2022     11/24/2022    RDW 14 0 11/24/2022    NEUTROABS 8 02 (H) 11/24/2022    SODIUM 140 11/19/2022    K 4 4 11/19/2022     11/19/2022    CO2 31 11/19/2022    BUN 18 11/19/2022    CREATININE 0 75 11/19/2022    GLUC 111 (H) 11/19/2022    GLUF 111 (H) 11/19/2022    CALCIUM 10 5 (H) 11/19/2022    AST 32 11/19/2022 ALT 53 (H) 11/19/2022    ALKPHOS 91 11/19/2022    TP 8 2 11/19/2022    ALB 4 4 11/19/2022    TBILI 1 40 (H) 11/19/2022    CHOLESTEROL 225 (H) 11/19/2022    HDL 45 (L) 11/19/2022    TRIG 163 (H) 11/19/2022    LDLCALC 147 (H) 11/19/2022    NONHDLC 180 11/19/2022    ICH0UTYPVCPD 0 860 11/20/2022    PREGUR negative 08/16/2022    PREGSERUM Negative 08/13/2020    RPR Non-Reactive 11/19/2022    HGBA1C 5 6 04/29/2019     04/29/2019       Psychiatric Review of Systems:  Behavior over the last 24 hours:  improved  Sleep: good  Appetite: good  Medication side effects:   ROS: no complaints, denies shortness of breath or chest pain and all other systems are negative for acute changes    Mental Status Evaluation:  Appearance:  casually dressed, adequate grooming   Behavior:  pleasant, cooperative   Speech:  normal rate and volume   Mood:  euthymic   Affect:  bright   Thought Process:  goal directed, linear   Thought Content:  no overt delusions, no overt paranoia noted on exam   Perceptual Disturbances: denies auditory or visual hallucinations when asked   Risk Potential: Suicidal ideation - None  Homicidal ideation - None  Potential for aggression - No   Memory:  recent and remote memory grossly intact   Sensorium  person and situation      Consciousness:  alert and awake   Attention: attention span and concentration are age appropriate   Insight:  improving   Judgment: improving   Gait/Station: normal gait/station   Motor Activity: no abnormal movements   Progress Toward Goals:   Misa is progressing towards goals of inpatient psychiatric treatment by continued medication compliance and is attending therapeutic modalities on the milieu  However, the patient continues to require inpatient psychiatric hospitalization for continued medication management and titration to optimize symptom reduction, improve sleep hygiene, and demonstrate adequate self-care      Assessment/Plan   Principal Problem:    Bipolar disorder [facility])  Active Problems:    HTN (hypertension)    Hyperlipidemia    Tobacco use    Mild intellectual disability    Anxiety    Obesity    Medical clearance for psychiatric admission      Recommended Treatment: Treatment plan and medication changes discussed and per the attending physician the plan is: 1  Continue with group therapy, milieu therapy and occupational therapy  2  Behavioral Health checks every 7 minutes  3  Continue frequent safety checks and vitals per unit protocol  4  Continue with SLIM medical management as indicated  5  Continue with current medication regimen  6  Will review labs in the a m  7 Disposition Planning: Discharge planning and efforts remain ongoing    Behavioral Health Medications: all current active meds have been reviewed and continue current psychiatric medications  Current Facility-Administered Medications   Medication Dose Route Frequency Provider Last Rate   • amLODIPine  10 mg Oral Daily Etta Locke MD     • haloperidol lactate  5 mg Intramuscular Q4H PRN Max 4/day Etta Locke MD     • hydrOXYzine HCL  25 mg Oral Q6H PRN Max 4/day Etta Locke MD     • lisinopril  10 mg Oral Daily Etta Locke MD     • LORazepam  2 mg Intramuscular Q6H PRN Max 3/day Mita Galvez MD     • LORazepam  0 5 mg Oral Q6H PRN Max 4/day Etta Locke MD     • LORazepam  1 mg Oral Q6H PRN Max 3/day Mita Galvez MD     • nicotine polacrilex  2 mg Oral Q2H PRN Mita Galvez MD     • risperiDONE  0 25 mg Oral Q4H PRN Max 6/day Etta Locke MD     • risperiDONE  0 5 mg Oral Q4H PRN Max 3/day Etta Locke MD     • risperiDONE  1 mg Oral Q2H PRN Max 3/day Etta Locke MD     • risperiDONE  2 mg Oral HS Mita Galvez MD     • traZODone  50 mg Oral HS PRN Etta Locke MD         Risks / Benefits of Treatment:  Risks, benefits, and possible side effects of medications explained to patient and patient verbalizes understanding and agreement for treatment      Counseling / Coordination of Care:  Patient's progress reviewed with nursing staff  Medications, treatment progress and treatment plan reviewed with patient  Supportive counseling provided to the patient  Total floor/unit time spent today 25 minutes  Greater than 50% of total time was spent with the patient and / or family counseling and / or coordination of care  A description of the counseling / coordination of care: medication education, treatment plan, supportive therapy

## 2022-11-24 NOTE — NURSING NOTE
Patient visible on the unit sitting with select peers in the small tv room, bright and social on approach  Patient cooperative with assessment questions, denies depression, anxiety, SI/HI/AVH  Patient compliant with medications and meals, appetite good  No further signs of distress noted  VSS

## 2022-11-24 NOTE — NURSING NOTE
Patient is pleasant and cooperative  Visible on the unit and appears in very good mood  Patient denied anxiety or depression  Denies SI,HI   A,V/H

## 2022-11-25 RX ADMIN — LORAZEPAM 0.5 MG: 0.5 TABLET ORAL at 21:53

## 2022-11-25 RX ADMIN — RISPERIDONE 2 MG: 2 TABLET ORAL at 21:09

## 2022-11-25 RX ADMIN — AMLODIPINE BESYLATE 10 MG: 10 TABLET ORAL at 08:44

## 2022-11-25 RX ADMIN — LISINOPRIL 10 MG: 10 TABLET ORAL at 08:44

## 2022-11-25 NOTE — PROGRESS NOTES
11/25/22 1000   Activity/Group Checklist   Group Community meeting  (goal setting )   Attendance Attended   Attendance Duration (min) 16-30   Interactions Interacted appropriately  (Pt  displaying up beat demeanor and set goal to engage in journaling task in afternoon group )   Affect/Mood Appropriate;Bright   Goals Achieved Able to engage in interactions; Discussed coping strategies; Able to listen to others

## 2022-11-25 NOTE — PLAN OF CARE
Pt  Appropriately interactive and attending groups without prompting    Problem: Ineffective Coping  Goal: Participates in unit activities  Description: Interventions:  - Provide therapeutic environment   - Provide required programming   - Redirect inappropriate behaviors   Outcome: Progressing

## 2022-11-25 NOTE — CASE MANAGEMENT
CM met with the patient, patient states she had interrupted sleep last night and that she was tired  Pt feels like she is improving and feels so much better  The plan is to discharge early next week

## 2022-11-25 NOTE — PROGRESS NOTES
11/25/22 1008   Team Meeting   Meeting Type Daily Rounds   Initial Conference Date 11/25/22   Next Conference Date 11/28/22   Team Members Present   Team Members Present Physician;Nurse;;Occupational Therapist   Physician Team Member Dr Caty Valentin, Dr Silver Samples Team Member [facility] Management Team Member Ashlee   OT Team Member Jose Haines   Patient/Family Present   Patient Present No   Patient's Family Present No     Deny s/s, appetite good, sleeping well,  Mark Ansari was here for her on Wednesday and they are opening her

## 2022-11-25 NOTE — PROGRESS NOTES
Progress Note - Behavioral Health   Brittanie Andino 61 y o  female MRN: [id number]  Unit/Bed#: Rakan Navarro 633-32 Encounter: [id number]    Assessment/Plan   Principal Problem:    Bipolar disorder ([address] )  Active Problems:    HTN (hypertension)    Hyperlipidemia    Tobacco use    Mild intellectual disability    Anxiety    Obesity    Medical clearance for psychiatric admission    Misa reports doing well and adjusting well to the medications  She notes that the energy she was feeling when she first came in has been subsided and she is shocked that she was acting that way compared to how much she has improved  At this time she is held on 2mg HS and tolerating it well without any more sedation  She was able to meet with [facility] but because it happened to be a family member the care is going to be changed  At this time, patient's ranjit seems to have resolved and patient is not currently experiencing depressive symptoms so will not make changes to current medications  Recommended Treatment:   No psychopharmacologic changes necessary at this moment; will continue to assess daily for further optimization  Continue risperdal 2mg HS  Continue remaining medications    Continue with pharmacotherapy, group therapy, milieu therapy and occupational therapy  Continue to assess for adverse medication side effects  Encourage Brittanie Andino to participate in nonverbal forms of therapy including journaling and art/music therapy  Continue frequent safety checks and vitals per unit protocol  Continue to engage CM/SW to assist with collateral, disposition planning, and the implementation of an individualized, patient-centered plan of care  Continue medical management by medical team   Case discussed with treatment team     Legal Status: 201  ------------------------------------------------------------    Subjective:  All documentation including nursing notes, medication history to ensure medication adherence on the unit, labs, and vitals were reviewed  Misa was evaluated this morning for continuity of care and no acute distress noted throughout the evaluation  Over the past 24 hours per nursing report, Dangelo Head has been cooperative on the unit and compliant with medications  Today, Dangelo Head is consenting for safety on the unit  Misa reports feeling "good " Misa notes having no issues with sleep though did wake up a little earlier than normal today  Misa states having a good appetite  Misa has been taking the medications as prescribed and reporting no noticeable side effects  She states the medications seem to be "wonderful" and she is no longer as tired  She notes meeting with [facility] on Wednesday  She states that when the worker came she found out she knew the individual but has not seen her since her teen years  She states care may be transferred to another individual and will plan to meet with working with them sometime soon    Misa denies any suicidal ideations  Misa denies any homicidal ideations  Regarding hallucinations, Misa denies any auditory or visual hallucinations  PRNs overnight: none   VS: Reviewed, within normal limits    Progress Toward Goals: slow improvement    Psychiatric Review of Systems:  Behavior over the last 24 hours:  improved  Sleep: earlier awakening than previous  Appetite: normal  Medication side effects: No   ROS: all other systems are negative    Vital signs in last 24 hours:  Temp:  [97 °F (36 1 °C)-98 6 °F (37 °C)] 97 °F (36 1 °C)  HR:  [78-89] 78  Resp:  [16-17] 16  BP: (116-139)/(65-72) 120/65    Laboratory results:  I have personally reviewed all pertinent laboratory/tests results    Recent Results (from the past 48 hour(s))   CBC and differential    Collection Time: 11/24/22  6:33 AM   Result Value Ref Range    WBC 12 61 (H) 4 31 - 10 16 Thousand/uL    RBC 4 40 3 81 - 5 12 Million/uL    Hemoglobin 12 6 11 5 - 15 4 g/dL    Hematocrit 39 3 34 8 - 46 1 %    MCV 89 82 - 98 fL    MCH 28 6 26 8 - 34 3 pg    MCHC 32 1 31 4 - 37 4 g/dL    RDW 14 0 11 6 - 15 1 %    MPV 9 9 8 9 - 12 7 fL    Platelets 300 783 - 113 Thousands/uL    nRBC 0 /100 WBCs    Neutrophils Relative 64 43 - 75 %    Immat GRANS % 1 0 - 2 %    Lymphocytes Relative 22 14 - 44 %    Monocytes Relative 11 4 - 12 %    Eosinophils Relative 2 0 - 6 %    Basophils Relative 0 0 - 1 %    Neutrophils Absolute 8 02 (H) 1 85 - 7 62 Thousands/µL    Immature Grans Absolute 0 11 0 00 - 0 20 Thousand/uL    Lymphocytes Absolute 2 77 0 60 - 4 47 Thousands/µL    Monocytes Absolute 1 37 (H) 0 17 - 1 22 Thousand/µL    Eosinophils Absolute 0 29 0 00 - 0 61 Thousand/µL    Basophils Absolute 0 05 0 00 - 0 10 Thousands/µL         Mental Status Evaluation:    Appearance:  age appropriate, adequate grooming, blue shirt, black sweats, long fake nails   Behavior:  pleasant, cooperative, calm   Speech:  normal volume, normal pitch, at times slower rate   Mood:  "good"   Affect:  reactive, slightly brighter   Thought Process:  logical, goal directed, linear   Associations: concrete associations   Thought Content:  normal, no overt delusions   Perceptual Disturbances: Denies auditory or visual hallucinations   Risk Potential: Suicidal ideation - None at present  Homicidal ideation - None at present  Potential for aggression - Not at present   Sensorium:  oriented to person, place and time/date   Memory:  recent and remote memory grossly intact   Consciousness:  alert and awake   Attention/Concentration: attention span and concentration are age appropriate   Insight:  limited and improving   Judgment: fair   Gait/Station: normal balance, stiff/slower gait   Motor Activity: no abnormal movements       Current Medications:  Current Facility-Administered Medications   Medication Dose Route Frequency Provider Last Rate   • amLODIPine  10 mg Oral Daily Derik Moreira MD     • haloperidol lactate  5 mg Intramuscular Q4H PRN Max 4/day Derik Moreira MD     • hydrOXYzine HCL  25 mg Oral Q6H PRN Max 4/day Nahomi Mcfarlane MD     • lisinopril  10 mg Oral Daily Nahomi Mcfarlane MD     • LORazepam  2 mg Intramuscular Q6H PRN Max 3/day Elijah De Luna MD     • LORazepam  0 5 mg Oral Q6H PRN Max 4/day Nahomi Mcfarlane MD     • LORazepam  1 mg Oral Q6H PRN Max 3/day Elijah De Luna MD     • nicotine polacrilex  2 mg Oral Q2H PRN Elijah De Luna MD     • risperiDONE  0 25 mg Oral Q4H PRN Max 6/day Nahomi Mcfarlane MD     • risperiDONE  0 5 mg Oral Q4H PRN Max 3/day Nahomi Mcfarlane MD     • risperiDONE  1 mg Oral Q2H PRN Max 3/day Nahomi Mcfarlane MD     • risperiDONE  2 mg Oral HS Elijah De Luna MD     • traZODone  50 mg Oral HS PRN Nahomi Mcfarlane MD       The following interventions are recommended: behavioral checks every 7 minutes, continued hospitalization on locked unit    Behavioral Health Medications: All current active meds have been reviewed  Changes as in plan section above  Risks, benefits and possible side effects of Medications:   Risks, benefits, and possible side effects of medications explained to patient and patient verbalizes understanding  Counseling / Coordination of Care:  Patient's progress discussed with staff in treatment team meeting  Medications, treatment progress and treatment plan reviewed with patient  Lulu DO Tae 11/25/22  Psychiatry Resident, PGY-II    This note was completed in part utilizing [employer] Software  Grammatical, translation, syntax errors, random word insertions, spelling mistakes, and incomplete sentences may be an occasional consequence of this system secondary to software limitations with voice recognition, ambient noise, and hardware issues  If you have any questions or concerns about the content, text, or information contained within the body of this dictation, please contact the provider for clarification

## 2022-11-25 NOTE — PROGRESS NOTES
11/25/22 1890   Activity/Group Checklist   Group Life Skills  (journaling topic)   Attendance Attended   Attendance Duration (min) 46-60   Interactions Interacted appropriately  (pt  was supplied with a journal and prompts to try on the weekend )   Affect/Mood Appropriate;Calm   Goals Achieved Able to engage in interactions; Able to listen to others;Discussed coping strategies

## 2022-11-26 RX ADMIN — LISINOPRIL 10 MG: 10 TABLET ORAL at 08:42

## 2022-11-26 RX ADMIN — RISPERIDONE 2 MG: 2 TABLET ORAL at 22:11

## 2022-11-26 RX ADMIN — AMLODIPINE BESYLATE 10 MG: 10 TABLET ORAL at 08:42

## 2022-11-26 NOTE — PROGRESS NOTES
Progress Note - Behavioral Health   Tamiko Verde 61 y o  female MRN: [id number]  Unit/Bed#: Celio Meyres 018-22 Encounter: [id number]    The patient was seen for continuing care and reviewed with treatment team    patient has been calm and cooperative  Seen in her bed today, she denies all complaints  She received ativan 0 5mg PO yesterday for anxiety  She reporting feeling worried about her cat( cat is being fed but states she is unsure if the litter is properly taken care of)  No AVH, no paranoia, no Delusions  She is tolerating the risperidone  ROS - negative , no EPS    /59 (BP Location: Right arm)   Pulse 89   Temp 97 9 °F (36 6 °C) (Temporal)   Resp 16   Ht 5' 3" (1 6 m)   Wt 84 2 kg (185 lb 10 oz)   LMP  (LMP Unknown)   SpO2 97%   BMI 32 88 kg/m²   Current Mental Status Evaluation:  Appearance:  fair hygiene and grooming    Behavior:  calm, cooperative and friendly   Mood:  Anxious   Affect: mood-congruent   Speech: Soft and Normal volume   Thought Process:  Goal directed and coherent   Thought Content:  Does not verbalize delusional material   Perceptual Disturbances: Denies hallucinations and does not appear to be responding to internal stimuli   Risk Potential: No suicidal or homicidal ideation   Orientation:   Patient is alert, awake and oriented x 3   Patient has fair insight, Judgment and impulse control       Recent Results (from the past 168 hour(s))   TSH, 3rd generation with Free T4 reflex    Collection Time: 11/20/22  5:38 AM   Result Value Ref Range    TSH 3RD GENERATON 0 860 0 450 - 4 500 uIU/mL   CBC and differential    Collection Time: 11/24/22  6:33 AM   Result Value Ref Range    WBC 12 61 (H) 4 31 - 10 16 Thousand/uL    RBC 4 40 3 81 - 5 12 Million/uL    Hemoglobin 12 6 11 5 - 15 4 g/dL    Hematocrit 39 3 34 8 - 46 1 %    MCV 89 82 - 98 fL    MCH 28 6 26 8 - 34 3 pg    MCHC 32 1 31 4 - 37 4 g/dL    RDW 14 0 11 6 - 15 1 %    MPV 9 9 8 9 - 12 7 fL    Platelets 501 835 - 896 Thousands/uL    nRBC 0 /100 WBCs    Neutrophils Relative 64 43 - 75 %    Immat GRANS % 1 0 - 2 %    Lymphocytes Relative 22 14 - 44 %    Monocytes Relative 11 4 - 12 %    Eosinophils Relative 2 0 - 6 %    Basophils Relative 0 0 - 1 %    Neutrophils Absolute 8 02 (H) 1 85 - 7 62 Thousands/µL    Immature Grans Absolute 0 11 0 00 - 0 20 Thousand/uL    Lymphocytes Absolute 2 77 0 60 - 4 47 Thousands/µL    Monocytes Absolute 1 37 (H) 0 17 - 1 22 Thousand/µL    Eosinophils Absolute 0 29 0 00 - 0 61 Thousand/µL    Basophils Absolute 0 05 0 00 - 0 10 Thousands/µL     Progress Toward Goals: No significant events in the past 24 hours, scheduled meeting with conference of [facility], plan for discharge early next week  Principal Problem:    Bipolar disorder ([facility] [address] )  Active Problems:    HTN (hypertension)    Hyperlipidemia    Tobacco use    Mild intellectual disability    Anxiety    Obesity    Medical clearance for psychiatric admission    Discharge planning update: The patient will return to previous living arrangement    Recommended Treatment: Continue with pharmacotherapy, group therapy, milieu therapy and occupational therapy    The patient will be maintained on the following medications:  Current Facility-Administered Medications   Medication Dose Route Frequency Provider Last Rate   • amLODIPine  10 mg Oral Daily Aidan Nair MD     • haloperidol lactate  5 mg Intramuscular Q4H PRN Max 4/day Aidan Nair MD     • hydrOXYzine HCL  25 mg Oral Q6H PRN Max 4/day Aidan Nair MD     • lisinopril  10 mg Oral Daily Aidan Nair MD     • LORazepam  2 mg Intramuscular Q6H PRN Max 3/day Markos Flor MD     • LORazepam  0 5 mg Oral Q6H PRN Max 4/day Aidan Nair MD     • LORazepam  1 mg Oral Q6H PRN Max 3/day Markos Flor MD     • nicotine polacrilex  2 mg Oral Q2H PRN Markos Flor MD     • risperiDONE  0 25 mg Oral Q4H PRN Max 6/day Aidan Nair MD     • risperiDONE  0 5 mg Oral Q4H PRN Max 3/day Aidan Nair MD • risperiDONE  1 mg Oral Q2H PRN Max 3/day Lorena Pham MD     • risperiDONE  2 mg Oral HS Fausto Traylor MD     • traZODone  50 mg Oral HS PRN Lorena Pham MD

## 2022-11-26 NOTE — NURSING NOTE
Ativan 0 5 mg PRN given per pt's request at 2153 appeared to be effective  Pt reported no anxiety, was noted sleeping entire night with no distress

## 2022-11-26 NOTE — PLAN OF CARE
Problem: Alteration in Thoughts and Perception  Goal: Treatment Goal: Gain control of psychotic behaviors/thinking, reduce/eliminate presenting symptoms and demonstrate improved reality functioning upon discharge  Outcome: Progressing  Goal: Verbalize thoughts and feelings  Description: Interventions:  - Promote a nonjudgmental and trusting relationship with the patient through active listening and therapeutic communication  - Assess patient's level of functioning, behavior and potential for risk  - Engage patient in 1 on 1 interactions  - Encourage patient to express fears, feelings, frustrations, and discuss symptoms    - [place] patient to reality, help patient recognize reality-based thinking   - Administer medications as ordered and assess for potential side effects  - Provide the patient education related to the signs and symptoms of the illness and desired effects of prescribed medications  Outcome: Progressing  Goal: Refrain from acting on delusional thinking/internal stimuli  Description: Interventions:  - Monitor patient closely, per order   - Utilize least restrictive measures   - Set reasonable limits, give positive feedback for acceptable   - Administer medications as ordered and monitor of potential side effects  Outcome: Progressing  Goal: Agree to be compliant with medication regime, as prescribed and report medication side effects  Description: Interventions:  - Offer appropriate PRN medication and supervise ingestion; conduct AIMS, as needed   Outcome: Progressing  Goal: Recognize dysfunctional thoughts, communicate reality-based thoughts at the time of discharge  Description: Interventions:  - Provide medication and psycho-education to assist patient in compliance and developing insight into his/her illness   Outcome: Progressing  Goal: Complete daily ADLs, including personal hygiene independently, as able  Description: Interventions:  - Observe, teach, and assist patient with ADLS  - Monitor and promote a balance of rest/activity, with adequate nutrition and elimination   Outcome: Progressing     Problem: Ineffective Coping  Goal: Cooperates with admission process  Description: Interventions:   - Complete admission process  Outcome: Progressing  Goal: Identifies ineffective coping skills  Outcome: Progressing  Goal: Identifies healthy coping skills  Outcome: Progressing  Goal: Demonstrates healthy coping skills  Outcome: Progressing  Goal: Patient/Family participate in treatment and DC plans  Description: Interventions:  - Provide therapeutic environment  Outcome: Progressing  Goal: Patient/Family verbalizes awareness of resources  Outcome: Progressing  Goal: Understands least restrictive measures  Description: Interventions:  - Utilize least restrictive behavior  Outcome: Progressing  Goal: Free from restraint events  Description: - Utilize least restrictive measures   - Provide behavioral interventions   - Redirect inappropriate behaviors   Outcome: Progressing     Problem: Risk for Self Injury/Neglect  Goal: Treatment Goal: Remain safe during length of stay, learn and adopt new coping skills, and be free of self-injurious ideation, impulses and acts at the time of discharge  Outcome: Progressing  Goal: Verbalize thoughts and feelings  Description: Interventions:  - Assess and re-assess patient's lethality and potential for self-injury  - Engage patient in 1:1 interactions, daily, for a minimum of 15 minutes  - Encourage patient to express feelings, fears, frustrations, hopes  - Establish rapport/trust with patient   Outcome: Progressing  Goal: Refrain from harming self  Description: Interventions:  - Monitor patient closely, per order  - Develop a trusting relationship  - Supervise medication ingestion, monitor effects and side effects   Outcome: Progressing  Goal: Recognize maladaptive responses and adopt new coping mechanisms  Outcome: Progressing  Goal: Complete daily ADLs, including personal hygiene independently, as able  Description: Interventions:  - Observe, teach, and assist patient with ADLS  - Monitor and promote a balance of rest/activity, with adequate nutrition and elimination  Outcome: Progressing     Problem: Anxiety  Goal: Anxiety is at manageable level  Description: Interventions:  - Assess and monitor patient's anxiety level  - Monitor for signs and symptoms (heart palpitations, chest pain, shortness of breath, headaches, nausea, feeling jumpy, restlessness, irritable, apprehensive)  - Collaborate with interdisciplinary team and initiate plan and interventions as ordered    - [place] patient to unit/surroundings  - Explain treatment plan  - Encourage participation in care  - Encourage verbalization of concerns/fears  - Identify coping mechanisms  - Assist in developing anxiety-reducing skills  - Administer/offer alternative therapies  - Limit or eliminate stimulants  Outcome: Progressing     Problem: Risk for Violence/Aggression Toward Others  Goal: Treatment Goal: Refrain from acts of violence/aggression during length of stay, and demonstrate improved impulse control at the time of discharge  Outcome: Progressing  Goal: Verbalize thoughts and feelings  Description: Interventions:  - Assess and re-assess patient's level of risk, every waking shift  - Engage patient in 1:1 interactions, daily, for a minimum of 15 minutes   - Allow patient to express feelings and frustrations in a safe and non-threatening manner   - Establish rapport/trust with patient   Outcome: Progressing  Goal: Refrain from harming others  Outcome: Progressing  Goal: Refrain from destructive acts on the environment or property  Outcome: Progressing  Goal: Control angry outbursts  Description: Interventions:  - Monitor patient closely, per order  - Ensure early verbal de-escalation  - Monitor prn medication needs  - Set reasonable/therapeutic limits, outline behavioral expectations, and consequences   - Provide a non-threatening milieu, utilizing the least restrictive interventions   Outcome: Progressing  Goal: Identify appropriate positive anger management techniques  Description: Interventions:  - Offer anger management and coping skills groups   - Staff will provide positive feedback for appropriate anger control  Outcome: Progressing     Problem: Nutrition/Hydration-ADULT  Goal: Nutrient/Hydration intake appropriate for improving, restoring or maintaining nutritional needs  Description: Monitor and assess patient's nutrition/hydration status for malnutrition  Collaborate with interdisciplinary team and initiate plan and interventions as ordered  Monitor patient's weight and dietary intake as ordered or per policy  Utilize nutrition screening tool and intervene as necessary  Determine patient's food preferences and provide high-protein, high-caloric foods as appropriate       INTERVENTIONS:  - Monitor oral intake, urinary output, labs, and treatment plans  - Assess nutrition and hydration status and recommend course of action  - Evaluate amount of meals eaten  - Assist patient with eating if necessary   - Allow adequate time for meals  - Recommend/ encourage appropriate diets, oral nutritional supplements, and vitamin/mineral supplements  - Order, calculate, and assess calorie counts as needed  - Recommend, monitor, and adjust tube feedings and TPN/PPN based on assessed needs  - Assess need for intravenous fluids  - Provide specific nutrition/hydration education as appropriate  - Include patient/family/caregiver in decisions related to nutrition  Outcome: Progressing

## 2022-11-26 NOTE — PLAN OF CARE
Problem: Alteration in Thoughts and Perception  Goal: Treatment Goal: Gain control of psychotic behaviors/thinking, reduce/eliminate presenting symptoms and demonstrate improved reality functioning upon discharge  Outcome: Progressing  Goal: Verbalize thoughts and feelings  Description: Interventions:  - Promote a nonjudgmental and trusting relationship with the patient through active listening and therapeutic communication  - Assess patient's level of functioning, behavior and potential for risk  - Engage patient in 1 on 1 interactions  - Encourage patient to express fears, feelings, frustrations, and discuss symptoms    - [place] patient to reality, help patient recognize reality-based thinking   - Administer medications as ordered and assess for potential side effects  - Provide the patient education related to the signs and symptoms of the illness and desired effects of prescribed medications  Outcome: Progressing  Goal: Refrain from acting on delusional thinking/internal stimuli  Description: Interventions:  - Monitor patient closely, per order   - Utilize least restrictive measures   - Set reasonable limits, give positive feedback for acceptable   - Administer medications as ordered and monitor of potential side effects  Outcome: Progressing  Goal: Agree to be compliant with medication regime, as prescribed and report medication side effects  Description: Interventions:  - Offer appropriate PRN medication and supervise ingestion; conduct AIMS, as needed   Outcome: Progressing  Goal: Recognize dysfunctional thoughts, communicate reality-based thoughts at the time of discharge  Description: Interventions:  - Provide medication and psycho-education to assist patient in compliance and developing insight into his/her illness   Outcome: Progressing  Goal: Complete daily ADLs, including personal hygiene independently, as able  Description: Interventions:  - Observe, teach, and assist patient with ADLS  - Monitor and promote a balance of rest/activity, with adequate nutrition and elimination   Outcome: Progressing     Problem: Ineffective Coping  Goal: Cooperates with admission process  Description: Interventions:   - Complete admission process  Outcome: Progressing  Goal: Identifies ineffective coping skills  Outcome: Progressing  Goal: Identifies healthy coping skills  Outcome: Progressing  Goal: Demonstrates healthy coping skills  Outcome: Progressing  Goal: Patient/Family participate in treatment and DC plans  Description: Interventions:  - Provide therapeutic environment  Outcome: Progressing  Goal: Patient/Family verbalizes awareness of resources  Outcome: Progressing  Goal: Understands least restrictive measures  Description: Interventions:  - Utilize least restrictive behavior  Outcome: Progressing  Goal: Free from restraint events  Description: - Utilize least restrictive measures   - Provide behavioral interventions   - Redirect inappropriate behaviors   Outcome: Progressing     Problem: Risk for Self Injury/Neglect  Goal: Treatment Goal: Remain safe during length of stay, learn and adopt new coping skills, and be free of self-injurious ideation, impulses and acts at the time of discharge  Outcome: Progressing  Goal: Verbalize thoughts and feelings  Description: Interventions:  - Assess and re-assess patient's lethality and potential for self-injury  - Engage patient in 1:1 interactions, daily, for a minimum of 15 minutes  - Encourage patient to express feelings, fears, frustrations, hopes  - Establish rapport/trust with patient   Outcome: Progressing  Goal: Refrain from harming self  Description: Interventions:  - Monitor patient closely, per order  - Develop a trusting relationship  - Supervise medication ingestion, monitor effects and side effects   Outcome: Progressing  Goal: Recognize maladaptive responses and adopt new coping mechanisms  Outcome: Progressing  Goal: Complete daily ADLs, including personal hygiene independently, as able  Description: Interventions:  - Observe, teach, and assist patient with ADLS  - Monitor and promote a balance of rest/activity, with adequate nutrition and elimination  Outcome: Progressing     Problem: Anxiety  Goal: Anxiety is at manageable level  Description: Interventions:  - Assess and monitor patient's anxiety level  - Monitor for signs and symptoms (heart palpitations, chest pain, shortness of breath, headaches, nausea, feeling jumpy, restlessness, irritable, apprehensive)  - Collaborate with interdisciplinary team and initiate plan and interventions as ordered    - [place] patient to unit/surroundings  - Explain treatment plan  - Encourage participation in care  - Encourage verbalization of concerns/fears  - Identify coping mechanisms  - Assist in developing anxiety-reducing skills  - Administer/offer alternative therapies  - Limit or eliminate stimulants  Outcome: Progressing     Problem: Risk for Violence/Aggression Toward Others  Goal: Treatment Goal: Refrain from acts of violence/aggression during length of stay, and demonstrate improved impulse control at the time of discharge  Outcome: Progressing  Goal: Verbalize thoughts and feelings  Description: Interventions:  - Assess and re-assess patient's level of risk, every waking shift  - Engage patient in 1:1 interactions, daily, for a minimum of 15 minutes   - Allow patient to express feelings and frustrations in a safe and non-threatening manner   - Establish rapport/trust with patient   Outcome: Progressing  Goal: Refrain from harming others  Outcome: Progressing  Goal: Refrain from destructive acts on the environment or property  Outcome: Progressing  Goal: Control angry outbursts  Description: Interventions:  - Monitor patient closely, per order  - Ensure early verbal de-escalation  - Monitor prn medication needs  - Set reasonable/therapeutic limits, outline behavioral expectations, and consequences   - Provide a non-threatening milieu, utilizing the least restrictive interventions   Outcome: Progressing  Goal: Identify appropriate positive anger management techniques  Description: Interventions:  - Offer anger management and coping skills groups   - Staff will provide positive feedback for appropriate anger control  Outcome: Progressing     Problem: Nutrition/Hydration-ADULT  Goal: Nutrient/Hydration intake appropriate for improving, restoring or maintaining nutritional needs  Description: Monitor and assess patient's nutrition/hydration status for malnutrition  Collaborate with interdisciplinary team and initiate plan and interventions as ordered  Monitor patient's weight and dietary intake as ordered or per policy  Utilize nutrition screening tool and intervene as necessary  Determine patient's food preferences and provide high-protein, high-caloric foods as appropriate       INTERVENTIONS:  - Monitor oral intake, urinary output, labs, and treatment plans  - Assess nutrition and hydration status and recommend course of action  - Evaluate amount of meals eaten  - Assist patient with eating if necessary   - Allow adequate time for meals  - Recommend/ encourage appropriate diets, oral nutritional supplements, and vitamin/mineral supplements  - Order, calculate, and assess calorie counts as needed  - Recommend, monitor, and adjust tube feedings and TPN/PPN based on assessed needs  - Assess need for intravenous fluids  - Provide specific nutrition/hydration education as appropriate  - Include patient/family/caregiver in decisions related to nutrition  Outcome: Progressing

## 2022-11-27 RX ORDER — HYDROXYZINE 50 MG/1
50 TABLET, FILM COATED ORAL
Status: DISCONTINUED | OUTPATIENT
Start: 2022-11-27 | End: 2022-11-29 | Stop reason: HOSPADM

## 2022-11-27 RX ADMIN — LORAZEPAM 1 MG: 1 TABLET ORAL at 00:24

## 2022-11-27 RX ADMIN — RISPERIDONE 2 MG: 2 TABLET ORAL at 22:06

## 2022-11-27 NOTE — PLAN OF CARE
Problem: Alteration in Thoughts and Perception  Goal: Treatment Goal: Gain control of psychotic behaviors/thinking, reduce/eliminate presenting symptoms and demonstrate improved reality functioning upon discharge  Outcome: Progressing  Goal: Verbalize thoughts and feelings  Description: Interventions:  - Promote a nonjudgmental and trusting relationship with the patient through active listening and therapeutic communication  - Assess patient's level of functioning, behavior and potential for risk  - Engage patient in 1 on 1 interactions  - Encourage patient to express fears, feelings, frustrations, and discuss symptoms    - [place] patient to reality, help patient recognize reality-based thinking   - Administer medications as ordered and assess for potential side effects  - Provide the patient education related to the signs and symptoms of the illness and desired effects of prescribed medications  Outcome: Progressing  Goal: Refrain from acting on delusional thinking/internal stimuli  Description: Interventions:  - Monitor patient closely, per order   - Utilize least restrictive measures   - Set reasonable limits, give positive feedback for acceptable   - Administer medications as ordered and monitor of potential side effects  Outcome: Progressing  Goal: Agree to be compliant with medication regime, as prescribed and report medication side effects  Description: Interventions:  - Offer appropriate PRN medication and supervise ingestion; conduct AIMS, as needed   Outcome: Progressing  Goal: Recognize dysfunctional thoughts, communicate reality-based thoughts at the time of discharge  Description: Interventions:  - Provide medication and psycho-education to assist patient in compliance and developing insight into his/her illness   Outcome: Progressing  Goal: Complete daily ADLs, including personal hygiene independently, as able  Description: Interventions:  - Observe, teach, and assist patient with ADLS  - Monitor and promote a balance of rest/activity, with adequate nutrition and elimination   Outcome: Progressing     Problem: Ineffective Coping  Goal: Cooperates with admission process  Description: Interventions:   - Complete admission process  Outcome: Progressing  Goal: Identifies ineffective coping skills  Outcome: Progressing  Goal: Identifies healthy coping skills  Outcome: Progressing  Goal: Demonstrates healthy coping skills  Outcome: Progressing  Goal: Patient/Family participate in treatment and DC plans  Description: Interventions:  - Provide therapeutic environment  Outcome: Progressing  Goal: Patient/Family verbalizes awareness of resources  Outcome: Progressing  Goal: Understands least restrictive measures  Description: Interventions:  - Utilize least restrictive behavior  Outcome: Progressing  Goal: Free from restraint events  Description: - Utilize least restrictive measures   - Provide behavioral interventions   - Redirect inappropriate behaviors   Outcome: Progressing     Problem: Risk for Self Injury/Neglect  Goal: Treatment Goal: Remain safe during length of stay, learn and adopt new coping skills, and be free of self-injurious ideation, impulses and acts at the time of discharge  Outcome: Progressing  Goal: Verbalize thoughts and feelings  Description: Interventions:  - Assess and re-assess patient's lethality and potential for self-injury  - Engage patient in 1:1 interactions, daily, for a minimum of 15 minutes  - Encourage patient to express feelings, fears, frustrations, hopes  - Establish rapport/trust with patient   Outcome: Progressing  Goal: Refrain from harming self  Description: Interventions:  - Monitor patient closely, per order  - Develop a trusting relationship  - Supervise medication ingestion, monitor effects and side effects   Outcome: Progressing  Goal: Recognize maladaptive responses and adopt new coping mechanisms  Outcome: Progressing  Goal: Complete daily ADLs, including personal hygiene independently, as able  Description: Interventions:  - Observe, teach, and assist patient with ADLS  - Monitor and promote a balance of rest/activity, with adequate nutrition and elimination  Outcome: Progressing     Problem: Anxiety  Goal: Anxiety is at manageable level  Description: Interventions:  - Assess and monitor patient's anxiety level  - Monitor for signs and symptoms (heart palpitations, chest pain, shortness of breath, headaches, nausea, feeling jumpy, restlessness, irritable, apprehensive)  - Collaborate with interdisciplinary team and initiate plan and interventions as ordered    - [place] patient to unit/surroundings  - Explain treatment plan  - Encourage participation in care  - Encourage verbalization of concerns/fears  - Identify coping mechanisms  - Assist in developing anxiety-reducing skills  - Administer/offer alternative therapies  - Limit or eliminate stimulants  Outcome: Progressing     Problem: Risk for Violence/Aggression Toward Others  Goal: Treatment Goal: Refrain from acts of violence/aggression during length of stay, and demonstrate improved impulse control at the time of discharge  Outcome: Progressing  Goal: Verbalize thoughts and feelings  Description: Interventions:  - Assess and re-assess patient's level of risk, every waking shift  - Engage patient in 1:1 interactions, daily, for a minimum of 15 minutes   - Allow patient to express feelings and frustrations in a safe and non-threatening manner   - Establish rapport/trust with patient   Outcome: Progressing  Goal: Refrain from harming others  Outcome: Progressing  Goal: Refrain from destructive acts on the environment or property  Outcome: Progressing  Goal: Control angry outbursts  Description: Interventions:  - Monitor patient closely, per order  - Ensure early verbal de-escalation  - Monitor prn medication needs  - Set reasonable/therapeutic limits, outline behavioral expectations, and consequences   - Provide a non-threatening milieu, utilizing the least restrictive interventions   Outcome: Progressing  Goal: Identify appropriate positive anger management techniques  Description: Interventions:  - Offer anger management and coping skills groups   - Staff will provide positive feedback for appropriate anger control  Outcome: Progressing     Problem: Nutrition/Hydration-ADULT  Goal: Nutrient/Hydration intake appropriate for improving, restoring or maintaining nutritional needs  Description: Monitor and assess patient's nutrition/hydration status for malnutrition  Collaborate with interdisciplinary team and initiate plan and interventions as ordered  Monitor patient's weight and dietary intake as ordered or per policy  Utilize nutrition screening tool and intervene as necessary  Determine patient's food preferences and provide high-protein, high-caloric foods as appropriate       INTERVENTIONS:  - Monitor oral intake, urinary output, labs, and treatment plans  - Assess nutrition and hydration status and recommend course of action  - Evaluate amount of meals eaten  - Assist patient with eating if necessary   - Allow adequate time for meals  - Recommend/ encourage appropriate diets, oral nutritional supplements, and vitamin/mineral supplements  - Order, calculate, and assess calorie counts as needed  - Recommend, monitor, and adjust tube feedings and TPN/PPN based on assessed needs  - Assess need for intravenous fluids  - Provide specific nutrition/hydration education as appropriate  - Include patient/family/caregiver in decisions related to nutrition  Outcome: Progressing

## 2022-11-27 NOTE — NURSING NOTE
Patient is calm and cooperative  Present in the milieu  Does not express any needs or concerns  Bright affect, discussed the weather with this writer

## 2022-11-27 NOTE — NURSING NOTE
Pt is calm and pleasant brightens on approach  Cooperative with care and med compliant  Denies all  Currently resting in her room

## 2022-11-27 NOTE — PROGRESS NOTES
Progress Note - Behavioral Health   Ju Car 61 y o  female MRN: [id number]  Unit/Bed#: [facility] 675-24 Encounter: [id number]    The patient was seen for continuing care and reviewed with treatment team    ROS - negative , no EPS  She denies any acute complaints  She has been visible  Made no threats, no paranoia, no AVH  No symptoms of ranjit or psychosis noted  She is interacting well with peers and staff  Compliant with her medications  Sleep, appetite and energy are optimal     /60 (BP Location: Right arm)   Pulse 69   Temp 97 5 °F (36 4 °C) (Temporal)   Resp 16   Ht 5' 3" (1 6 m)   Wt 84 2 kg (185 lb 10 oz)   LMP  (LMP Unknown)   SpO2 93%   BMI 32 88 kg/m²   Current Mental Status Evaluation:  Appearance:  fair hygiene and grooming    Behavior:  calm, cooperative and friendly   Mood:  Anxious   Affect: mood-congruent   Speech: Soft and Normal volume   Thought Process:  Goal directed and coherent   Thought Content:  Does not verbalize delusional material   Perceptual Disturbances: Denies hallucinations and does not appear to be responding to internal stimuli   Risk Potential: No suicidal or homicidal ideation   Orientation:   Patient is alert, awake and oriented x 3   Patient has fair insight, Judgment and impulse control       Recent Results (from the past 168 hour(s))   CBC and differential    Collection Time: 11/24/22  6:33 AM   Result Value Ref Range    WBC 12 61 (H) 4 31 - 10 16 Thousand/uL    RBC 4 40 3 81 - 5 12 Million/uL    Hemoglobin 12 6 11 5 - 15 4 g/dL    Hematocrit 39 3 34 8 - 46 1 %    MCV 89 82 - 98 fL    MCH 28 6 26 8 - 34 3 pg    MCHC 32 1 31 4 - 37 4 g/dL    RDW 14 0 11 6 - 15 1 %    MPV 9 9 8 9 - 12 7 fL    Platelets 053 857 - 636 Thousands/uL    nRBC 0 /100 WBCs    Neutrophils Relative 64 43 - 75 %    Immat GRANS % 1 0 - 2 %    Lymphocytes Relative 22 14 - 44 %    Monocytes Relative 11 4 - 12 %    Eosinophils Relative 2 0 - 6 %    Basophils Relative 0 0 - 1 %    Neutrophils Absolute 8 02 (H) 1 85 - 7 62 Thousands/µL    Immature Grans Absolute 0 11 0 00 - 0 20 Thousand/uL    Lymphocytes Absolute 2 77 0 60 - 4 47 Thousands/µL    Monocytes Absolute 1 37 (H) 0 17 - 1 22 Thousand/µL    Eosinophils Absolute 0 29 0 00 - 0 61 Thousand/µL    Basophils Absolute 0 05 0 00 - 0 10 Thousands/µL     Progress Toward Goals: No significant events in the past 24 hours, scheduled meeting with conference of pat, plan for discharge early next week  Principal Problem:    Bipolar disorder ([facility] [address] )  Active Problems:    HTN (hypertension)    Hyperlipidemia    Tobacco use    Mild intellectual disability    Anxiety    Obesity    Medical clearance for psychiatric admission    Discharge planning update: The patient will return to previous living arrangement    Recommended Treatment: Continue with pharmacotherapy, group therapy, milieu therapy and occupational therapy    The patient will be maintained on the following medications:  Current Facility-Administered Medications   Medication Dose Route Frequency Provider Last Rate   • amLODIPine  10 mg Oral Daily Marquis Danny MD     • haloperidol lactate  5 mg Intramuscular Q4H PRN Max 4/day Marquis Danny MD     • hydrOXYzine HCL  25 mg Oral Q6H PRN Max 4/day Marquis Danny MD     • lisinopril  10 mg Oral Daily Marquis Danny MD     • LORazepam  2 mg Intramuscular Q6H PRN Max 3/day Mir Cleary MD     • LORazepam  0 5 mg Oral Q6H PRN Max 4/day Marquis Danny MD     • LORazepam  1 mg Oral Q6H PRN Max 3/day Mir Cleary MD     • nicotine polacrilex  2 mg Oral Q2H PRN Mir Cleary MD     • risperiDONE  0 25 mg Oral Q4H PRN Max 6/day Marquis Danny MD     • risperiDONE  0 5 mg Oral Q4H PRN Max 3/day Marquis Danny MD     • risperiDONE  1 mg Oral Q2H PRN Max 3/day Marquis Danny MD     • risperiDONE  2 mg Oral HS Mir Cleary MD     • traZODone  50 mg Oral HS PRN Marquis Danny MD

## 2022-11-28 PROBLEM — Z00.8 MEDICAL CLEARANCE FOR PSYCHIATRIC ADMISSION: Status: RESOLVED | Noted: 2022-11-18 | Resolved: 2022-11-28

## 2022-11-28 RX ADMIN — RISPERIDONE 2 MG: 2 TABLET ORAL at 21:07

## 2022-11-28 RX ADMIN — HYDROXYZINE HYDROCHLORIDE 50 MG: 50 TABLET, FILM COATED ORAL at 17:45

## 2022-11-28 RX ADMIN — LISINOPRIL 10 MG: 10 TABLET ORAL at 08:00

## 2022-11-28 RX ADMIN — AMLODIPINE BESYLATE 10 MG: 10 TABLET ORAL at 08:00

## 2022-11-28 NOTE — NURSING NOTE
Pt is calm and pleasant brightens on approach  Pt is cooperative with care and med compliant  Will continue to monitor

## 2022-11-28 NOTE — NURSING NOTE
Pt requested prn for anxiety, accepted atarax  Pt appeared tearful, stating she was having thoughts that caused her to feel emotional  Pt expressed wanting to work on issues with anger in the future  Prn atarax effective, pt appears calm while coloring in dayroom

## 2022-11-28 NOTE — PROGRESS NOTES
Progress Note - Behavioral Health   Damion Miller 61 y o  female MRN: [id number]  Unit/Bed#: [facility] 763-71 Encounter: [id number]    Assessment/Plan   Principal Problem:    Bipolar disorder ([address] )  Active Problems:    HTN (hypertension)    Hyperlipidemia    Tobacco use    Mild intellectual disability    Anxiety    Obesity    Medical clearance for psychiatric admission    Misa reports doing well and having a good weekend  She has been worried about her cats, but is otherwise without any other concerns  She feels as if she is ready, and understands that [facility] can work with meeting her at home instead of in the hospital  She has been tolerating the medications well and been in good behavioral control  At this time will not make any adjustments and will plan for discharge tomorrow  Recommended Treatment:   No psychopharmacologic changes necessary at this moment; will continue to assess daily for further optimization  Continue Risperdal 2mg for mood  Continue remaining medications    Continue with pharmacotherapy, group therapy, milieu therapy and occupational therapy  Continue to assess for adverse medication side effects  Encourage Damion Miller to participate in nonverbal forms of therapy including journaling and art/music therapy  Continue frequent safety checks and vitals per unit protocol  Continue to engage CM/SW to assist with collateral, disposition planning, and the implementation of an individualized, patient-centered plan of care  Continue medical management by medical team   Case discussed with treatment team     Legal Status: 201  ------------------------------------------------------------    Subjective: All documentation including nursing notes, medication history to ensure medication adherence on the unit, labs, and vitals were reviewed  Misa was evaluated this morning for continuity of care and no acute distress noted throughout the evaluation   Over the past 24 hours per nursing report, Anthony London has been cooperative on the unit and compliant with medications  Today, Anthony London is consenting for safety on the unit  Misa reports feeling "good " Misa notes having fine sleep  Misa states having a no issues with her appetite  Misa has been taking the medications as prescribed and reporting no noticeable side effects  She states that "I feel well " She notes that her room was changed because her roommate was disturbing her at night making it harder to sleep, but since the room change has been sleeping well  She notes having been just on the phone to check in on her cats  She states feeling ready and having no thoughts of hurting others  Misa denies any suicidal ideations  Misa denies any homicidal ideations  Regarding hallucinations, Misa denies any auditory or visual hallucinations  PRNs overnight: ativan 1mg po   VS: Reviewed, within normal limits    Progress Toward Goals: slow improvement    Psychiatric Review of Systems:  Behavior over the last 24 hours:  improved  Sleep: normal, improved  Appetite: normal  Medication side effects: No   ROS: all other systems are negative    Vital signs in last 24 hours:  Temp:  [97 1 °F (36 2 °C)-97 7 °F (36 5 °C)] 97 1 °F (36 2 °C)  HR:  [80-91] 80  Resp:  [16-18] 18  BP: (121-131)/(70-77) 121/74    Laboratory results:  I have personally reviewed all pertinent laboratory/tests results  No results found for this or any previous visit (from the past 48 hour(s))        Mental Status Evaluation:    Appearance:  decent hygiene/grooming, dyed hair, white adidas long sleeve, polka dot black with white dot pants, long fake nails with ring on left hand   Behavior:  pleasant, cooperative, calm   Speech:  normal volume, normal pitch, overall normal rate but at times some slowing   Mood:  "good"   Affect:  brighter, constricted   Thought Process:  logical, goal directed, linear, normal rate of thoughts   Associations: concrete associations Thought Content:  normal, no overt delusions   Perceptual Disturbances: Denies auditory or visual hallucinations   Risk Potential: Suicidal ideation - None at present  Homicidal ideation - None at present  Potential for aggression - Not at present   Sensorium:  oriented to person, place and time/date   Memory:  recent and remote memory grossly intact   Consciousness:  alert and awake   Attention/Concentration: attention span and concentration are age appropriate   Insight:  limited and improved   Judgment: fair   Gait/Station: stiffness to gait, normal balance   Motor Activity: no abnormal movements       Current Medications:  Current Facility-Administered Medications   Medication Dose Route Frequency Provider Last Rate   • amLODIPine  10 mg Oral Daily Smooth Sanchez MD     • haloperidol lactate  5 mg Intramuscular Q4H PRN Max 4/day Smooth Sanchez MD     • hydrOXYzine HCL  50 mg Oral Q6H PRN Max 4/day Smooth Sanchez MD     • lisinopril  10 mg Oral Daily Smooth Sanchez MD     • LORazepam  2 mg Intramuscular Q6H PRN Max 3/day Starr Duque MD     • nicotine polacrilex  2 mg Oral Q2H PRN Starr Duque MD     • risperiDONE  0 25 mg Oral Q4H PRN Max 6/day Smooth Sanchez MD     • risperiDONE  0 5 mg Oral Q4H PRN Max 3/day Smooth Sanchez MD     • risperiDONE  1 mg Oral Q2H PRN Max 3/day Smooth Sanchez MD     • risperiDONE  2 mg Oral HS Starr Duque MD     • traZODone  50 mg Oral HS PRN Smooth Sanchez MD       The following interventions are recommended: behavioral checks every 7 minutes, continued hospitalization on locked unit    Behavioral Health Medications: All current active meds have been reviewed  Changes as in plan section above  Risks, benefits and possible side effects of Medications:   Risks, benefits, and possible side effects of medications explained to patient and patient verbalizes understanding        Counseling / Coordination of Care:  Patient's progress discussed with staff in treatment team meeting  Medications, treatment progress and treatment plan reviewed with patient  Audi Thomas DO 11/28/22  Psychiatry Resident, PGY-II    This note was completed in part utilizing [employer] Software  Grammatical, translation, syntax errors, random word insertions, spelling mistakes, and incomplete sentences may be an occasional consequence of this system secondary to software limitations with voice recognition, ambient noise, and hardware issues  If you have any questions or concerns about the content, text, or information contained within the body of this dictation, please contact the provider for clarification

## 2022-11-28 NOTE — CASE MANAGEMENT
CM met with the patient to discuss discharge tomorrow  Patient states that she is ready to go back to her apartment and understands that she needs to control her behavior better and the repercussions of what could happen as a result of her anger  Advised the patient to talk to [address] when they see her about support groups  Pt told this writer that she does not like to be alone and that is where most of her outbursts come from

## 2022-11-28 NOTE — PROGRESS NOTES
11/28/22 1158   Team Meeting   Meeting Type Daily Rounds   Initial Conference Date 11/28/22   Next Conference Date 11/29/22   Team Members Present   Team Members Present Physician;Nurse;;Occupational Therapist;   Physician Team Member Dr Carol Hammond, Dr Zulay Navarro, Dr Silvio Hudson Team Member Kamala Work Team Member Jozef AGUDELO Team Member Erica James   Patient/Family Present   Patient Present No   Patient's Family Present No     Doing great, deny s/s, pleasant, cooperative, social, slept ok   D/C tomorrow

## 2022-11-28 NOTE — NURSING NOTE
Pt pleasant and cooperative, visible on unit  Pt brightens upon approach  Calm behaviors, no agitation noted  Pt expresses readiness for discharge, denies all symptoms currently  Medication compliant

## 2022-11-28 NOTE — PLAN OF CARE
Pt calm and engaging in groups as scheduled    Problem: Ineffective Coping  Goal: Participates in unit activities  Description: Interventions:  - Provide therapeutic environment   - Provide required programming   - Redirect inappropriate behaviors   Outcome: Progressing

## 2022-11-28 NOTE — PLAN OF CARE
Problem: Alteration in Thoughts and Perception  Goal: Treatment Goal: Gain control of psychotic behaviors/thinking, reduce/eliminate presenting symptoms and demonstrate improved reality functioning upon discharge  Outcome: Progressing  Goal: Verbalize thoughts and feelings  Description: Interventions:  - Promote a nonjudgmental and trusting relationship with the patient through active listening and therapeutic communication  - Assess patient's level of functioning, behavior and potential for risk  - Engage patient in 1 on 1 interactions  - Encourage patient to express fears, feelings, frustrations, and discuss symptoms    - Purlear patient to reality, help patient recognize reality-based thinking   - Administer medications as ordered and assess for potential side effects  - Provide the patient education related to the signs and symptoms of the illness and desired effects of prescribed medications  Outcome: Progressing  Goal: Refrain from acting on delusional thinking/internal stimuli  Description: Interventions:  - Monitor patient closely, per order   - Utilize least restrictive measures   - Set reasonable limits, give positive feedback for acceptable   - Administer medications as ordered and monitor of potential side effects  Outcome: Progressing  Goal: Agree to be compliant with medication regime, as prescribed and report medication side effects  Description: Interventions:  - Offer appropriate PRN medication and supervise ingestion; conduct AIMS, as needed   Outcome: Progressing  Goal: Attend and participate in unit activities, including therapeutic, recreational, and educational groups  Description: Interventions:  -Encourage Visitation and family involvement in care  Outcome: Progressing  Goal: Recognize dysfunctional thoughts, communicate reality-based thoughts at the time of discharge  Description: Interventions:  - Provide medication and psycho-education to assist patient in compliance and developing insight into his/her illness   Outcome: Progressing  Goal: Complete daily ADLs, including personal hygiene independently, as able  Description: Interventions:  - Observe, teach, and assist patient with ADLS  - Monitor and promote a balance of rest/activity, with adequate nutrition and elimination   Outcome: Progressing     Problem: Risk for Self Injury/Neglect  Goal: Treatment Goal: Remain safe during length of stay, learn and adopt new coping skills, and be free of self-injurious ideation, impulses and acts at the time of discharge  Outcome: Progressing  Goal: Verbalize thoughts and feelings  Description: Interventions:  - Assess and re-assess patient's lethality and potential for self-injury  - Engage patient in 1:1 interactions, daily, for a minimum of 15 minutes  - Encourage patient to express feelings, fears, frustrations, hopes  - Establish rapport/trust with patient   Outcome: Progressing  Goal: Refrain from harming self  Description: Interventions:  - Monitor patient closely, per order  - Develop a trusting relationship  - Supervise medication ingestion, monitor effects and side effects   Outcome: Progressing  Goal: Attend and participate in unit activities, including therapeutic, recreational, and educational groups  Description: Interventions:  - Provide therapeutic and educational activities daily, encourage attendance and participation, and document same in the medical record  - Obtain collateral information, encourage visitation and family involvement in care   Outcome: Progressing  Goal: Recognize maladaptive responses and adopt new coping mechanisms  Outcome: Progressing  Goal: Complete daily ADLs, including personal hygiene independently, as able  Description: Interventions:  - Observe, teach, and assist patient with ADLS  - Monitor and promote a balance of rest/activity, with adequate nutrition and elimination  Outcome: Progressing     Problem: Risk for Violence/Aggression Toward Others  Goal: Treatment Goal: Refrain from acts of violence/aggression during length of stay, and demonstrate improved impulse control at the time of discharge  Outcome: Progressing  Goal: Verbalize thoughts and feelings  Description: Interventions:  - Assess and re-assess patient's level of risk, every waking shift  - Engage patient in 1:1 interactions, daily, for a minimum of 15 minutes   - Allow patient to express feelings and frustrations in a safe and non-threatening manner   - Establish rapport/trust with patient   Outcome: Progressing  Goal: Refrain from harming others  Outcome: Progressing  Goal: Refrain from destructive acts on the environment or property  Outcome: Progressing  Goal: Control angry outbursts  Description: Interventions:  - Monitor patient closely, per order  - Ensure early verbal de-escalation  - Monitor prn medication needs  - Set reasonable/therapeutic limits, outline behavioral expectations, and consequences   - Provide a non-threatening milieu, utilizing the least restrictive interventions   Outcome: Progressing  Goal: Attend and participate in unit activities, including therapeutic, recreational, and educational groups  Description: Interventions:  - Provide therapeutic and educational activities daily, encourage attendance and participation, and document same in the medical record   Outcome: Progressing  Goal: Identify appropriate positive anger management techniques  Description: Interventions:  - Offer anger management and coping skills groups   - Staff will provide positive feedback for appropriate anger control  Outcome: Progressing

## 2022-11-28 NOTE — PROGRESS NOTES
11/28/22 1000 11/28/22 1330   Activity/Group Checklist   Group Community meeting  (guided breathing taechniques ) Life Skills  (memory game on vitality )   Attendance Attended Attended   Attendance Duration (min) 31-45 31-45   Interactions Interacted appropriately Interacted appropriately  (pt  able to match the cars and win the card game )   Affect/Mood Appropriate;Calm;Normal range Appropriate;Bright;Normal range   Goals Achieved Able to engage in interactions; Discussed coping strategies Able to engage in interactions; Discussed coping strategies; Able to manage/cope with feelings

## 2022-11-29 VITALS
DIASTOLIC BLOOD PRESSURE: 63 MMHG | SYSTOLIC BLOOD PRESSURE: 103 MMHG | TEMPERATURE: 97.7 F | BODY MASS INDEX: 33.4 KG/M2 | OXYGEN SATURATION: 94 % | RESPIRATION RATE: 17 BRPM | HEIGHT: 63 IN | HEART RATE: 66 BPM | WEIGHT: 188.49 LBS

## 2022-11-29 RX ORDER — RISPERIDONE 2 MG/1
2 TABLET ORAL
Qty: 30 TABLET | Refills: 0 | Status: SHIPPED | OUTPATIENT
Start: 2022-11-29 | End: 2022-12-29

## 2022-11-29 NOTE — BH TRANSITION RECORD
Contact Information: If you have any questions, concerns, pended studies, tests and/or procedures, or emergencies regarding your inpatient behavioral health visit  Please contact [facility] older adult behavioral health unit 6T [phone] and ask to speak to a , nurse or physician  A contact is available 24 hours/ 7 days a week at this number  Summary of Procedures Performed During your Stay:  Below is a list of major procedures performed during your hospital stay and a summary of results:  - Cardiac Procedures/Studies: EKG -   Sinus Mabel Odor  Pending Studies (From admission, onward)    None        Please follow up on the above pending studies with your PCP and/or referring provider

## 2022-11-29 NOTE — PLAN OF CARE
Problem: Alteration in Thoughts and Perception  Goal: Treatment Goal: Gain control of psychotic behaviors/thinking, reduce/eliminate presenting symptoms and demonstrate improved reality functioning upon discharge  Outcome: Adequate for Discharge  Goal: Verbalize thoughts and feelings  Description: Interventions:  - Promote a nonjudgmental and trusting relationship with the patient through active listening and therapeutic communication  - Assess patient's level of functioning, behavior and potential for risk  - Engage patient in 1 on 1 interactions  - Encourage patient to express fears, feelings, frustrations, and discuss symptoms    - [place] patient to reality, help patient recognize reality-based thinking   - Administer medications as ordered and assess for potential side effects  - Provide the patient education related to the signs and symptoms of the illness and desired effects of prescribed medications  Outcome: Adequate for Discharge  Goal: Refrain from acting on delusional thinking/internal stimuli  Description: Interventions:  - Monitor patient closely, per order   - Utilize least restrictive measures   - Set reasonable limits, give positive feedback for acceptable   - Administer medications as ordered and monitor of potential side effects  Outcome: Adequate for Discharge  Goal: Agree to be compliant with medication regime, as prescribed and report medication side effects  Description: Interventions:  - Offer appropriate PRN medication and supervise ingestion; conduct AIMS, as needed   Outcome: Adequate for Discharge  Goal: Attend and participate in unit activities, including therapeutic, recreational, and educational groups  Description: Interventions:  -Encourage Visitation and family involvement in care  Outcome: Adequate for Discharge  Goal: Recognize dysfunctional thoughts, communicate reality-based thoughts at the time of discharge  Description: Interventions:  - Provide medication and psycho-education to assist patient in compliance and developing insight into his/her illness   Outcome: Adequate for Discharge  Goal: Complete daily ADLs, including personal hygiene independently, as able  Description: Interventions:  - Observe, teach, and assist patient with ADLS  - Monitor and promote a balance of rest/activity, with adequate nutrition and elimination   Outcome: Adequate for Discharge     Problem: Ineffective Coping  Goal: Cooperates with admission process  Description: Interventions:   - Complete admission process  Outcome: Adequate for Discharge  Goal: Identifies ineffective coping skills  Outcome: Adequate for Discharge  Goal: Identifies healthy coping skills  Outcome: Adequate for Discharge  Goal: Demonstrates healthy coping skills  Outcome: Adequate for Discharge  Goal: Participates in unit activities  Description: Interventions:  - Provide therapeutic environment   - Provide required programming   - Redirect inappropriate behaviors   Outcome: Adequate for Discharge  Goal: Patient/Family participate in treatment and DC plans  Description: Interventions:  - Provide therapeutic environment  Outcome: Adequate for Discharge  Goal: Patient/Family verbalizes awareness of resources  Outcome: Adequate for Discharge  Goal: Understands least restrictive measures  Description: Interventions:  - Utilize least restrictive behavior  Outcome: Adequate for Discharge  Goal: Free from restraint events  Description: - Utilize least restrictive measures   - Provide behavioral interventions   - Redirect inappropriate behaviors   Outcome: Adequate for Discharge     Problem: Risk for Self Injury/Neglect  Goal: Treatment Goal: Remain safe during length of stay, learn and adopt new coping skills, and be free of self-injurious ideation, impulses and acts at the time of discharge  Outcome: Adequate for Discharge  Goal: Verbalize thoughts and feelings  Description: Interventions:  - Assess and re-assess patient's lethality and potential for self-injury  - Engage patient in 1:1 interactions, daily, for a minimum of 15 minutes  - Encourage patient to express feelings, fears, frustrations, hopes  - Establish rapport/trust with patient   Outcome: Adequate for Discharge  Goal: Refrain from harming self  Description: Interventions:  - Monitor patient closely, per order  - Develop a trusting relationship  - Supervise medication ingestion, monitor effects and side effects   Outcome: Adequate for Discharge  Goal: Attend and participate in unit activities, including therapeutic, recreational, and educational groups  Description: Interventions:  - Provide therapeutic and educational activities daily, encourage attendance and participation, and document same in the medical record  - Obtain collateral information, encourage visitation and family involvement in care   Outcome: Adequate for Discharge  Goal: Recognize maladaptive responses and adopt new coping mechanisms  Outcome: Adequate for Discharge  Goal: Complete daily ADLs, including personal hygiene independently, as able  Description: Interventions:  - Observe, teach, and assist patient with ADLS  - Monitor and promote a balance of rest/activity, with adequate nutrition and elimination  Outcome: Adequate for Discharge     Problem: Anxiety  Goal: Anxiety is at manageable level  Description: Interventions:  - Assess and monitor patient's anxiety level  - Monitor for signs and symptoms (heart palpitations, chest pain, shortness of breath, headaches, nausea, feeling jumpy, restlessness, irritable, apprehensive)  - Collaborate with interdisciplinary team and initiate plan and interventions as ordered    - [place] patient to unit/surroundings  - Explain treatment plan  - Encourage participation in care  - Encourage verbalization of concerns/fears  - Identify coping mechanisms  - Assist in developing anxiety-reducing skills  - Administer/offer alternative therapies  - Limit or eliminate stimulants  Outcome: Adequate for Discharge     Problem: Risk for Violence/Aggression Toward Others  Goal: Treatment Goal: Refrain from acts of violence/aggression during length of stay, and demonstrate improved impulse control at the time of discharge  Outcome: Adequate for Discharge  Goal: Verbalize thoughts and feelings  Description: Interventions:  - Assess and re-assess patient's level of risk, every waking shift  - Engage patient in 1:1 interactions, daily, for a minimum of 15 minutes   - Allow patient to express feelings and frustrations in a safe and non-threatening manner   - Establish rapport/trust with patient   Outcome: Adequate for Discharge  Goal: Refrain from harming others  Outcome: Adequate for Discharge  Goal: Refrain from destructive acts on the environment or property  Outcome: Adequate for Discharge  Goal: Control angry outbursts  Description: Interventions:  - Monitor patient closely, per order  - Ensure early verbal de-escalation  - Monitor prn medication needs  - Set reasonable/therapeutic limits, outline behavioral expectations, and consequences   - Provide a non-threatening milieu, utilizing the least restrictive interventions   Outcome: Adequate for Discharge  Goal: Attend and participate in unit activities, including therapeutic, recreational, and educational groups  Description: Interventions:  - Provide therapeutic and educational activities daily, encourage attendance and participation, and document same in the medical record   Outcome: Adequate for Discharge  Goal: Identify appropriate positive anger management techniques  Description: Interventions:  - Offer anger management and coping skills groups   - Staff will provide positive feedback for appropriate anger control  Outcome: Adequate for Discharge     Problem: Nutrition/Hydration-ADULT  Goal: Nutrient/Hydration intake appropriate for improving, restoring or maintaining nutritional needs  Description: Monitor and assess patient's nutrition/hydration status for malnutrition  Collaborate with interdisciplinary team and initiate plan and interventions as ordered  Monitor patient's weight and dietary intake as ordered or per policy  Utilize nutrition screening tool and intervene as necessary  Determine patient's food preferences and provide high-protein, high-caloric foods as appropriate       INTERVENTIONS:  - Monitor oral intake, urinary output, labs, and treatment plans  - Assess nutrition and hydration status and recommend course of action  - Evaluate amount of meals eaten  - Assist patient with eating if necessary   - Allow adequate time for meals  - Recommend/ encourage appropriate diets, oral nutritional supplements, and vitamin/mineral supplements  - Order, calculate, and assess calorie counts as needed  - Recommend, monitor, and adjust tube feedings and TPN/PPN based on assessed needs  - Assess need for intravenous fluids  - Provide specific nutrition/hydration education as appropriate  - Include patient/family/caregiver in decisions related to nutrition  Outcome: Adequate for Discharge     Problem: DISCHARGE PLANNING  Goal: Discharge to home or other facility with appropriate resources  Description: INTERVENTIONS:  - Identify barriers to discharge w/patient and caregiver  - Arrange for needed discharge resources and transportation as appropriate  - Identify discharge learning needs (meds, wound care, etc )  - Arrange for interpretive services to assist at discharge as needed  - Refer to Case Management Department for coordinating discharge planning if the patient needs post-hospital services based on physician/advanced practitioner order or complex needs related to functional status, cognitive ability, or social support system  Outcome: Adequate for Discharge

## 2022-11-29 NOTE — NURSING NOTE
Patient is D/C to home today, denies SI, AH, VH  Patient rports no anxiety, and no depression  Patient is nura, visible, and social  Patient is medication, and meals compliant  Patient vss, good intake, and steady gait  Medication, and F/U instruction reviewed with patient  Patient verbalizes understanding  Patient left unit with all belonging to lobby

## 2022-11-29 NOTE — DISCHARGE SUMMARY
Discharge Summary - [facility] 61 y o  female MRN: [id number]  Unit/Bed#: [facility] 586-07 Encounter: [id number]     Admission Date:   Admission Orders (From admission, onward)     Ordered        11/18/22 2011  ED TO DIFFERENT CAMPUS [facility] UNIT or INPATIENT MEDICAL UNIT to [facility] UNIT (using Discharge Readmit Navigator) - Admit Patient to [address] [place] [address]  Once                            Discharge Date: 11/29/22     Attending Psychiatrist: Gurwinder Matthews MD     Admission Diagnosis:    Principal Problem:    Bipolar disorder ([facility] )  Active Problems:    HTN (hypertension)    Hyperlipidemia    Tobacco use    Mild intellectual disability    Anxiety    Obesity      Discharge Diagnosis:     Principal Problem:    Bipolar disorder ([facility] )  Active Problems:    HTN (hypertension)    Hyperlipidemia    Tobacco use    Mild intellectual disability    Anxiety    Obesity  Resolved Problems:    Medical clearance for psychiatric admission      Reason for Admission/HPI:   Anthony London is a 61 y o  female, single, domiciled alone, with PPHx of Intellectual disability and MDD vs Bipolar Disorder with multiple prior admissions (last 8/17-8/23/22 at [facility] for depression/SI & d/angela on Prozac 60mg, remeron 15) who initially presented to the [facility] at [facility] with disorganized bheaviors and agitation with concerns for ranjit  Misa initially reported "I want to go home" and showing poor insight at the time of being brought in  Misa was admitted to the psychiatric unit initially under a 302 petition, but was on a 201 at the time of discharge       Past Medical History:   Diagnosis Date   • Anxiety    • Arthritis    • Bipolar affective disorder, depressed, severe ([facility] ) 4/28/2019   • Depression    • Elevated bilirubin 8/15/2019   • Hyperlipidemia    • Hypertension    • Hypokalemia 8/15/2019   • Learning difficulty    • Leukocytosis 7/28/2020   • Obesity (BMI 30 0-34 9) 6/26/2020   • Osteopenia    • Ovarian failure    • Previous known suicide attempt    • Psychiatric disorder    • Psychiatric illness      Past Surgical History:   Procedure Laterality Date   • LAPAROSCOPY      as a child, per patient-normal findings       Medications: All current active medications have been reviewed  Allergies: Allergies   Allergen Reactions   • Other      Hay Fever   • Oxycodone-Acetaminophen GI Intolerance       Please refer to the initial H&P for full details  Vital signs in last 24 hours:    Temp:  [97 1 °F (36 2 °C)-97 7 °F (36 5 °C)] 97 7 °F (36 5 °C)  HR:  [66-82] 66  Resp:  [16-17] 17  BP: (102-122)/(61-63) 103/63      Intake/Output Summary (Last 24 hours) at 11/29/2022 0851  Last data filed at 11/28/2022 1700  Gross per 24 hour   Intake 720 ml   Output --   Net 720 ml         Hospital Course: On admission, Angelia Allen was admitted to the inpatient psychiatric unit and started on Behavioral Health checks every 7 minutes  During the hospitalization she was attending individual therapy, group therapy, milieu therapy and occupational therapy  Jakub Carlson Upon admission she was seen by medical service for medical clearance for inpatient treatment and medical follow up  Misa was started on Risperdal as it was previously effective for her  She was discontinued on her Prozac because she denied depressive symptoms and the concerns for ranjit  Misa was also discontinued on her remeron, as she was taking it for sleep and found Risperdal to be sedating and her sleep to be without issues  Misa's medications were titrated as appropriate until discharge, including:    • Risperdal 2mg HS for mood    Prior to beginning of treatment medications risks and benefits and possible side effects including risk of cardiovascular events in elderly related to treatment with antipsychotic medications were reviewed with Misa Bynum verbalized understanding and agreement for treatment    Misa tolerated these medications initially with some sedation which was tolerable and then appeared to dissapear as she continued to take the medication  The patient's mood brightened over the course of treatment, and she was seen in [facility] interacting appropriately with peers  Misa did not demonstrate dangerous behavior to self or others during her inpatient stay  On the day of discharge, she denied suicidal ideation, intent or plan at the time of discharge and denied homicidal ideation, intent or plan at the time of discharge  She denies any auditory or visual hallucinations  She reported feeling overall "good" and "ready" to return to her previous living  She states having no issues with her appetite or sleep  She states tolerating the medications without any issues  At the time of discharge she notes she will utilize her coping skills which include walking away or talking with others  She notes she can talk to her therapist, doctor, and in the case of emergency could call 911  Since she was doing well at the end of the hospitalization, treatment team felt that she could be safely discharged to outpatient care  The outpatient follow up with Dr Miguel Lang was arranged by the unit  upon discharge  I reviewed with Misa the importance of compliance with medications and outpatient treatment after discharge  and I reviewed with Misa crisis plan and safety plan upon discharge  The patient understands the importance of taking their medications and attending their outpatient appointments  The patient knows that if there are concerns for safety to utilize their coping skills and can call the suicide hotline as well as 911 if there are concerns for safety  Behavioral Health Medications: all current active meds have been reviewed  Discharge on Two Antipsychotic Medications: No    Labs/Imaging:   I have personally reviewed all pertinent laboratory/tests results      Mental Status at time of Discharge:   Appearance:  age appropriate, adequate hygiene and grooming, pink polka dot slippers, patterned shirt, black pants   Behavior:  cooperative, pleasant, calm   Speech:  normal rate, normal volume and normal pitch   Mood:  "good"   Affect:  mood-congruent, mood stable   Language Within normal limits   Thought Process:  organized, logical, goal directed, normal rate of thoughts   Associations: concrete associations      Thought Content:  No verbalized delusions   Perceptual Disturbances: Denies auditory or visual hallucinations   Risk Potential: Denies suicidal or homicidal ideation, intent, or plan   Sensorium:  person, place, time and current situation   Cognition:  Grossly intact   Consciousness:  alert and awake   Attention: attention span and concentration were age appropriate   Insight:  limited but improved   Judgment: fair   Intellect appears to be of average intelligence   Gait/Station: normal balance, stiffness to gait   Motor Activity: no abnormal movements     Suicide/Homicide Risk Assessment:  Risk of Harm to Self:   • The following ratings are based on assessment at the time of discharge  • Demographic risk factors include: never , age: over 48 or older  • Historical Risk Factors include: chronic psychiatric problems, chronic mood disorder  • Current Specific Risk Factors include: recent inpatient psychiatric admission - being discharged today, diagnosis of mood disorder  • Protective Factors: no current suicidal ideation, improved mood, outpatient psychiatric follow up established, having pets, resiliency  • Weapons/Firearms: none  The following steps have been taken to ensure weapons are properly secured: not applicable  • Based on today's assessment, Misa presents the following risk of harm to self: low    Risk of Harm to Others:  • The following ratings are based on assessment at the time of discharge  • Demographic Risk Factors include: unemployed  • Historical Risk Factors include: none    • Current Specific Risk Factors include: recent episode of mood instability, multiple stressors  • Protective Factors: no current homicidal ideation, stable mood, improved mood, willing to continue psychiatric treatment, resilience, restricted access to lethal means  • Weapons/Firearms: none  The following steps have been taken to ensure weapons are properly secured: not applicable  • Based on today's Christiane Kins presents the following risk of harm to others: low    The following interventions are recommended: outpatient follow up with a psychiatrist, refferals as needed    Discharge Medications:  See list above, as well as the after visit summary for all reconciled discharge medications provided to patient and family  Discharge instructions/Information to patient and family:   See after visit summary for information provided to patient and family  Provisions for Follow-Up Care:  See after visit summary for information related to follow-up care and any pertinent home health orders  Noris Clarke DO 11/29/22  Psychiatry Resident, PGY-II    This note was completed in part utilizing M-Modal Fluency Direct Software  Grammatical, translation, syntax errors, random word insertions, spelling mistakes, and incomplete sentences may be an occasional consequence of this system secondary to software limitations with voice recognition, ambient noise, and hardware issues  If you have any questions or concerns about the content, text, or information contained within the body of this dictation, please contact the provider for clarification

## 2022-11-29 NOTE — PROGRESS NOTES
11/29/22 1107   Discharge Planning   Living Arrangements Lives Alone   Support Systems /;Friends/neighbors   Assistance Needed No   Type of Current Residence Private residence   [address] No   Other Referral/Resources/Interventions Provided:   Referrals Provided: Crisis Hotline; Other (Specify); Psychiatrist  [employer] [facility])   Government Services:   (Submitted to Hina [facility] [address] to get patient a rep-payee )   Other Transportation   Discharge Communications   Discharge planning discussed with: Patient   Freedom of Choice Yes   IMM Given (Date): 11/28/22   IMM Given to: Patient   Transportation at Discharge?  Yes  (LYFT)   Transport at Discharge  [address] Yes   Accompanied by Alone   Contacts   Patient Contacts no family, [employer] of [address] [employer]   Relationship to Patient: Treatment Provider   Contact Method Phone   Phone Number [phone]   Reason/Outcome Continuity of Care;Discharge Planning   [facility]   Would you like to participate in our [address] service program?   No - Declined

## 2022-11-29 NOTE — PROGRESS NOTES
Pt  Displayed positive demeanor re:tentative discharge today  11/29/22 1000   Activity/Group Checklist   Group Community meeting  (holiday trivia/exercise challenge )   Attendance Attended   Attendance Duration (min) 16-30   Interactions Interacted appropriately  (Pt  with upbeat demeanor and social with another peer being D/C today as well   Pt  unable to share any thoughts or advice to peers on her last day on the unit )   Affect/Mood Normal range   Goals Achieved Able to engage in interactions

## 2022-11-29 NOTE — NURSING NOTE
Pt visible, pleasant  Rates anxiety 2/4, denies all other s/s  Pt reports earliers atarax effective in reduction of anxiety  Will maintain q7min checks

## 2022-11-29 NOTE — TREATMENT TEAM
Pt attended [facility] Recovery group  Pt was cooperative and able to self express  Crisis, 65 warmline and SARBJIT was discussed with goal setting and routine  11/29/22 1100   Activity/Group Checklist   Group Other (Comment)  ( Recovery)   Attendance Attended   Attendance Duration (min) 31-45   Interactions Interacted appropriately   Affect/Mood Appropriate   Goals Achieved Identified feelings; Identified relapse prevention strategies; Discussed discharge plans; Identified resources and support systems; Able to listen to others; Able to engage in interactions

## 2022-11-29 NOTE — PROGRESS NOTES
11/29/22 1159   Team Meeting   Meeting Type Daily Rounds   Initial Conference Date 11/29/22   Next Conference Date 11/30/22   Team Members Present   Team Members Present Physician;;Nurse;;Occupational Therapist   Physician Team Member Dr Valeda Halsted, Dr Mckenzie Clark, Dr Griselda Hove Team Member Kamala Work Team Member Jozef   OT Team Member Daisy Hunter   Patient/Family Present   Patient Present No   Patient's Family Present No     2/4, 0/10, atarax given 17:50 and was effective, wants to work on anger issues, taking meds and meal compliant   D/C today at 1 pm

## 2022-11-29 NOTE — PLAN OF CARE
Pt  Spontaneously joins groups and interacts in recovery based discussion when staff prompted    Problem: Ineffective Coping  Goal: Participates in unit activities  Description: Interventions:  - Provide therapeutic environment   - Provide required programming   - Redirect inappropriate behaviors   Outcome: Adequate for Discharge

## 2022-11-29 NOTE — CASE MANAGEMENT
Case Management Assessment & Discharge Planning Note    Patient name Janene Ear  Location /-79 MRN [id number]  : 1965 Date 2022       Current Admission Date: 2022  Current Admission Diagnosis:Bipolar disorder [facility])   Patient Active Problem List    Diagnosis Date Noted   • Pulmonary nodule 2021   • Patellofemoral pain syndrome of both knees 12/15/2020   • Bipolar disorder ([facility] [address] ) 2020   • Anxiety 2020   • Insomnia 2020   • Obesity 2020   • Chronic pain of both knees 11/10/2020   • MDD (major depressive disorder), recurrent episode, severe ([facility] [address] ) 2020   • Constipation 2020   • Ovarian failure 2020   • Mild intellectual disability 2020   • Tobacco use 2020   • HTN (hypertension) 2019   • Amenorrhea 2013   • Hyperlipidemia 2013      LOS (days): 11  Geometric Mean LOS (GMLOS) (days):   Days to GMLOS:     OBJECTIVE:    Risk of Unplanned Readmission Score: 9 99         Current admission status: Inpatient Psych  Referral Reason: Psych    Preferred Pharmacy:   [address] [place] [address] [address] [place] [id number]  Phone: [phone] Fax: [phone]    Primary Care Provider: Warren Aguilera MD    Primary Insurance: [address] [place] [address],[address] [facility]  Secondary Insurance: [address] [place] [address]    ASSESSMENT:  Sarah 26 Proxies    There are no active Health Care Proxies on file                        Patient Information  Support Systems: /, Friends/neighbors  Type of Current Residence: Private residence  Living Arrangements: Lives Alone                                 DISCHARGE DETAILS:    Discharge planning discussed with[de-identified] Patient  Freedom of Choice: Yes                   Contacts  Patient Contacts: [employer]  Relationship to Patient[de-identified] Treatment Provider  Contact Method: Phone  Phone Number: [phone]  Reason/Outcome: Continuity of Care, Discharge Planning              Other Referral/Resources/Interventions Provided:  Referrals Provided[de-identified] Crisis Hotline, Other (Specify), Psychiatrist (Oni Simmons)  Government Services[de-identified]  (Submitted to Hina Montez to get patient a rep-payee )  Other: Transportation    Would you like to participate in our [address] service program?  : No - Declined          Transport at Discharge : [facility]  Dispatcher Contacted: Yes                    Accompanied by: Alone     IMM Given (Date):: 11/28/22  IMM Given to[de-identified] Patient        CM met with patient to discuss discharge  Pt was excited to go home and see her cats  Pt mentioned previously that she feels lonely at home so she is planning on getting out of her apartment more to interact with her neighbors  She also wants to look into [employer] ( [employer]) setting her up with some support groups with peers and for anger management  Pt does not have any emergency contacts or PABLO's that she wanted to contact in her family  CM called  [employer] and left a message with them that she was discharging today so that they can visit with her at her home  CM sent a form to the [employer] office to get the patient set up with a rep-payee to help manage her money         Appointments:  [facility] - Psychiatry Dr Felix Sena: 11/30/22 @ 2:15 pm     Leonardo Lawson MD (PCP): 12/2/22 @ 9:00 am    [address] [place] [address] Road: [address] [place] [address]

## 2022-12-02 ENCOUNTER — TELEPHONE (OUTPATIENT)
Dept: PSYCHIATRY | Facility: CLINIC | Age: 57
End: 2022-12-02

## 2022-12-02 ENCOUNTER — TELEPHONE (OUTPATIENT)
Dept: INTERNAL MEDICINE CLINIC | Facility: CLINIC | Age: 57
End: 2022-12-02

## 2022-12-02 NOTE — TELEPHONE ENCOUNTER
Called Patient off med mgmt wait list to determine if serivces are still needed, unable to lvm due to "call could not be completed as dialed   Patient will be scheduled at this time

## 2022-12-02 NOTE — CASE MANAGEMENT
Misa called and left a voicemail stating her complex gave her an eviction notice for unpaid rent  Called [place] [employer] and left a message for Erasmo Munoz to call Misa regarding this   I also called Misa and she said that she called them too with the information that was given to her on her AVS

## 2022-12-02 NOTE — TELEPHONE ENCOUNTER
Attempted to call patient to discuss today's missed appointment  Unfortunately, the call did not go through

## 2023-01-05 DIAGNOSIS — I10 ESSENTIAL HYPERTENSION: ICD-10-CM

## 2023-01-05 DIAGNOSIS — E78.2 MIXED HYPERLIPIDEMIA: ICD-10-CM

## 2023-01-06 RX ORDER — ATORVASTATIN CALCIUM 20 MG/1
20 TABLET, FILM COATED ORAL DAILY
Qty: 30 TABLET | Refills: 0 | Status: SHIPPED | OUTPATIENT
Start: 2023-01-06 | End: 2023-02-05

## 2023-01-06 RX ORDER — AMLODIPINE BESYLATE 10 MG/1
10 TABLET ORAL DAILY
Qty: 30 TABLET | Refills: 0 | Status: SHIPPED | OUTPATIENT
Start: 2023-01-06 | End: 2023-02-05

## 2023-01-17 ENCOUNTER — OFFICE VISIT (OUTPATIENT)
Dept: INTERNAL MEDICINE CLINIC | Facility: CLINIC | Age: 58
End: 2023-01-17

## 2023-01-17 VITALS
WEIGHT: 196 LBS | HEART RATE: 65 BPM | HEIGHT: 63 IN | BODY MASS INDEX: 34.73 KG/M2 | TEMPERATURE: 97.8 F | DIASTOLIC BLOOD PRESSURE: 79 MMHG | SYSTOLIC BLOOD PRESSURE: 116 MMHG

## 2023-01-17 DIAGNOSIS — E78.2 MIXED HYPERLIPIDEMIA: ICD-10-CM

## 2023-01-17 DIAGNOSIS — E66.9 CLASS 1 OBESITY WITH BODY MASS INDEX (BMI) OF 34.0 TO 34.9 IN ADULT, UNSPECIFIED OBESITY TYPE, UNSPECIFIED WHETHER SERIOUS COMORBIDITY PRESENT: ICD-10-CM

## 2023-01-17 DIAGNOSIS — Z13.820 OSTEOPOROSIS SCREENING: ICD-10-CM

## 2023-01-17 DIAGNOSIS — Z12.31 ENCOUNTER FOR SCREENING MAMMOGRAM FOR MALIGNANT NEOPLASM OF BREAST: ICD-10-CM

## 2023-01-17 DIAGNOSIS — I10 PRIMARY HYPERTENSION: Primary | ICD-10-CM

## 2023-01-17 DIAGNOSIS — E55.9 VITAMIN D INSUFFICIENCY: ICD-10-CM

## 2023-01-17 DIAGNOSIS — Z72.0 TOBACCO USE: Chronic | ICD-10-CM

## 2023-01-17 DIAGNOSIS — Z23 ENCOUNTER FOR IMMUNIZATION: ICD-10-CM

## 2023-01-17 DIAGNOSIS — F31.9 BIPOLAR AFFECTIVE DISORDER, REMISSION STATUS UNSPECIFIED (HCC): Chronic | ICD-10-CM

## 2023-01-17 DIAGNOSIS — Z23 NEED FOR INFLUENZA VACCINATION: ICD-10-CM

## 2023-01-17 DIAGNOSIS — I10 ESSENTIAL HYPERTENSION: ICD-10-CM

## 2023-01-17 RX ORDER — ACETAMINOPHEN 160 MG
2000 TABLET,DISINTEGRATING ORAL DAILY
Qty: 90 CAPSULE | Refills: 1 | Status: SHIPPED | OUTPATIENT
Start: 2023-01-17

## 2023-01-17 RX ORDER — POLYETHYLENE GLYCOL 3350 17 G
POWDER IN PACKET (EA) ORAL
Qty: 100 EACH | Refills: 0 | Status: SHIPPED | OUTPATIENT
Start: 2023-01-17

## 2023-01-17 RX ORDER — ATORVASTATIN CALCIUM 20 MG/1
20 TABLET, FILM COATED ORAL DAILY
Qty: 90 TABLET | Refills: 1 | Status: SHIPPED | OUTPATIENT
Start: 2023-01-17 | End: 2023-02-16

## 2023-01-17 RX ORDER — AMLODIPINE BESYLATE 10 MG/1
10 TABLET ORAL DAILY
Qty: 90 TABLET | Refills: 1 | Status: SHIPPED | OUTPATIENT
Start: 2023-01-17 | End: 2023-02-16

## 2023-01-17 RX ORDER — LISINOPRIL 10 MG/1
10 TABLET ORAL DAILY
Qty: 90 TABLET | Refills: 1 | Status: SHIPPED | OUTPATIENT
Start: 2023-01-17 | End: 2023-02-16

## 2023-01-17 NOTE — PROGRESS NOTES
Name: Eduard Helton      : 1965      MRN: [id number]  Encounter Provider: LENNOX Mederos  Encounter Date: 2023   Encounter department: [address]     1  Primary hypertension  Assessment & Plan:  BP Readings from Last 1 Encounters:   23 116/79     BP at goal at today's visit  Continue lisinopril 10 mg daily and amlodipine 10 mg daily  2  Essential hypertension  -     amLODIPine (NORVASC) 10 mg tablet; Take 1 tablet (10 mg total) by mouth daily  -     lisinopril (ZESTRIL) 10 mg tablet; Take 1 tablet (10 mg total) by mouth daily  -     Comprehensive metabolic panel; Future  -     CBC and differential; Future    3  Mixed hyperlipidemia  Assessment & Plan:  Lab Results   Component Value Date    CHOLESTEROL 225 (H) 2022    CHOLESTEROL 237 (H) 2022    CHOLESTEROL 170 2020     Lab Results   Component Value Date    HDL 45 (L) 2022    HDL 54 2022    HDL 30 (L) 2020     Lab Results   Component Value Date    TRIG 163 (H) 2022    TRIG 259 (H) 2022    TRIG 142 2020     Lab Results   Component Value Date    NONHDLC 180 2022    [place] 183 2022    [place] 140 2020     Continue atorvastatin 20 mg daily  Counseled on lifestyle modifications with diet and exercise  Orders:  -     atorvastatin (LIPITOR) 20 mg tablet; Take 1 tablet (20 mg total) by mouth daily  -     Comprehensive metabolic panel; Future  -     CBC and differential; Future    4  Bipolar affective disorder, remission status unspecified [facility] )  Assessment & Plan:  Patient had [facility] admission 22-22 due to agitation and suicidal ideation  Prozac was dc'd during hospitalization due to concern of increased ranjit on medication  Patient reports that she feels mood is well controlled on current regimen--risperdal 2 mg nightly and lorazepam 0 5 mg every 8 hours as needed     She denies any current SIs/HIs  Recommend continued follow up with [facility] for psychiatry and therapy services  5  Tobacco use  Assessment & Plan:  Smoking cessation counseling provided  Patient reports she is considering quitting smoking and is interested in nicotine replacement lozenges (unable to chew gum due to only having partial dentures)  She does have concerns with life stressors that are triggers for her tobacco use  Recommend nicotine 4 mg lozenges as patient normally has first cigarette within 30 minutes of waking up--script sent to pharmacy, and continued follow up with therapist     Orders:  -     nicotine polacrilex (COMMIT) 4 MG lozenge; Use lozenge every 1-2 hours as needed x 6 weeks (no more than 20 lozenges/24 hours); then 1 lozenge every 2-4 hours x 3 weeks, then 1 lozenge every 4-8 hours x 3 weeks    6  Need for influenza vaccination  -     influenza vaccine, quadrivalent, recombinant, PF, 0 5 mL, for patients 18 yr+ (FLUBLOK)    7  Vitamin D insufficiency  Assessment & Plan:  Vit D, 25-Hydroxy   Date Value Ref Range Status   11/19/2022 20 7 (L) 30 0 - 100 0 ng/mL Final   08/17/2022 22 4 (L) 30 0 - 100 0 ng/mL Final     Recommend supplementation with Vitamin D3 2000 units daily  Orders:  -     Cholecalciferol (Vitamin D3) 50 MCG (2000 UT) capsule; Take 1 capsule (2,000 Units total) by mouth daily    8  Encounter for immunization  -     Pneumococcal Conjugate Vaccine 20-valent (Pcv20)    9  Encounter for screening mammogram for malignant neoplasm of breast  -     Mammo screening bilateral w 3d & cad; Future; Expected date: 01/17/2023    10  Osteoporosis screening  -     DXA bone density spine hip and pelvis; Future; Expected date: 01/17/2023    11  Class 1 obesity with body mass index (BMI) of 34 0 to 34 9 in adult, unspecified obesity type, unspecified whether serious comorbidity present  -     HEMOGLOBIN A1C W/ EAG ESTIMATION;  Future  -     CBC and differential; Future    BMI Counseling: Body mass index is 34 72 kg/m²  The BMI is above normal  Nutrition recommendations include decreasing portion sizes, encouraging healthy choices of fruits and vegetables, moderation in carbohydrate intake and increasing intake of lean protein  Exercise recommendations include moderate physical activity 150 minutes/week  No pharmacotherapy was ordered  Rationale for BMI follow-up plan is due to patient being overweight or obese  Subjective      Patient with  has a past medical history of Anxiety, Arthritis, Bipolar affective disorder, depressed, severe ([address] ) (4/28/2019), Depression, Elevated bilirubin (8/15/2019), Hyperlipidemia, Hypertension, Hypokalemia (8/15/2019), Learning difficulty, Leukocytosis (7/28/2020), Obesity (BMI 30 0-34 9) (6/26/2020), Osteopenia, Ovarian failure, Previous known suicide attempt, Psychiatric disorder, and Psychiatric illness presents for follow up today on hypertension and hyperlipidemia  Overall feels well today  Has been trying to lose weight and feels like her weight is down, specifically feels like her stomach is smaller but weight is up today at office  She has been walking  Diet: chicken, breaded fish, pasta, fruits and vegetables    Smokes 3/4 of a pack of cigarettes/day--started at around age 11years old but reports has been intermittent periods when she doesn't smoke (a couple weeks at a time)    Follows with Psych and therapist (2-3 times per month)--Prozac dc'd at during hospitalization due to hypomania--feels better off medication, moods better, denies SI/HI--[place] [facility]          Review of Systems   Constitutional: Negative for chills and fever  HENT: Negative for ear pain and sore throat  Eyes: Negative for pain and visual disturbance  Respiratory: Negative for cough and shortness of breath  Cardiovascular: Negative for chest pain, palpitations and leg swelling     Gastrointestinal: Negative for abdominal pain, constipation, diarrhea and vomiting  Genitourinary: Negative for dysuria and hematuria  Musculoskeletal: Positive for arthralgias (Right knee pain)  Negative for back pain  Skin: Negative for color change and rash  Neurological: Negative for seizures and syncope  Psychiatric/Behavioral: Negative for agitation, dysphoric mood, self-injury and suicidal ideas  All other systems reviewed and are negative  Current Outpatient Medications on File Prior to Visit   Medication Sig   • LORazepam (ATIVAN) 0 5 mg tablet TAKE 1 TABLET (0 5 MG TOTAL) BY MOUTH EVERY 8 (EIGHT) HOURS AS NEEDED FOR ANXIETY   • risperiDONE (RisperDAL) 2 mg tablet Take 1 tablet (2 mg total) by mouth daily at bedtime   • [DISCONTINUED] amLODIPine (NORVASC) 10 mg tablet Take 1 tablet (10 mg total) by mouth daily   • [DISCONTINUED] atorvastatin (LIPITOR) 20 mg tablet Take 1 tablet (20 mg total) by mouth daily   • [DISCONTINUED] lisinopril (ZESTRIL) 10 mg tablet Take 1 tablet (10 mg total) by mouth daily       Objective     /79 (BP Location: Right arm, Patient Position: Sitting, Cuff Size: Large)   Pulse 65   Temp 97 8 °F (36 6 °C) (Temporal)   Ht 5' 3" (1 6 m)   Wt 88 9 kg (196 lb)   LMP  (LMP Unknown)   BMI 34 72 kg/m²     Physical Exam  Vitals and nursing note reviewed  Constitutional:       General: She is not in acute distress  Appearance: Normal appearance  HENT:      Head: Normocephalic  Right Ear: Tympanic membrane and external ear normal       Left Ear: Tympanic membrane and external ear normal       Nose: Nose normal       Mouth/Throat:      Mouth: Mucous membranes are moist    Eyes:      Conjunctiva/sclera: Conjunctivae normal       Pupils: Pupils are equal, round, and reactive to light  Cardiovascular:      Rate and Rhythm: Normal rate and regular rhythm  Pulses: Normal pulses  Heart sounds: Normal heart sounds     Pulmonary:      Effort: Pulmonary effort is normal       Breath sounds: Normal breath sounds  Abdominal:      General: Bowel sounds are normal  There is no distension  Palpations: Abdomen is soft  Tenderness: There is no abdominal tenderness  Musculoskeletal:      Cervical back: Normal range of motion  Right knee: Swelling (mild swelling noted in distal mediolateral knee) present  No erythema or crepitus  Normal range of motion  Tenderness present  Skin:     General: Skin is warm and dry  Capillary Refill: Capillary refill takes less than 2 seconds  Neurological:      Mental Status: She is alert and oriented to person, place, and time     Psychiatric:         Mood and Affect: Mood normal          Behavior: Behavior normal        LENNOX Snow

## 2023-01-17 NOTE — ASSESSMENT & PLAN NOTE
Vit D, 25-Hydroxy   Date Value Ref Range Status   11/19/2022 20 7 (L) 30 0 - 100 0 ng/mL Final   08/17/2022 22 4 (L) 30 0 - 100 0 ng/mL Final     Recommend supplementation with Vitamin D3 2000 units daily

## 2023-01-17 NOTE — PATIENT INSTRUCTIONS
Hypertension   AMBULATORY CARE:   Hypertension  is high blood pressure  Your blood pressure is the force of your blood moving against the walls of your arteries  Hypertension causes your blood pressure to get so high that your heart has to work much harder than normal  This can damage your heart  The cause of hypertension may not be known  This is called essential or primary hypertension  Hypertension caused by another medical condition, such as kidney disease, is called secondary hypertension  Common symptoms include the following:   Headache    Blurred vision    Chest pain    Dizziness or weakness    Trouble breathing    Nosebleeds    Call your local emergency number (911 in the [facility]) or have someone call if:   You have chest pain  You have any of the following signs of a heart attack:      Squeezing, pressure, or pain in your chest    You may  also have any of the following:     Discomfort or pain in your back, neck, jaw, stomach, or arm    Shortness of breath    Nausea or vomiting    Lightheadedness or a sudden cold sweat    You become confused or have trouble speaking  You suddenly feel lightheaded or have trouble breathing  Seek care immediately if:   You have a severe headache or vision loss  You have weakness in an arm or leg  Call your doctor or cardiologist if:   You feel faint, dizzy, confused, or drowsy  You have been taking your blood pressure medicine but your pressure is higher than your provider says it should be  You have questions or concerns about your condition or care  What you need to know about the stages of hypertension:       Normal blood pressure is 119/79 or lower   Your healthcare provider may only check your blood pressure each year if it stays at a normal level  Elevated blood pressure is 120/79 to 129/79   This is sometimes called prehypertension  Your healthcare provider may suggest lifestyle changes to help lower your blood pressure to a normal level   He or she may then check it again in 3 to 6 months  Stage 1 hypertension is 130/80  to 139/89   Your provider may recommend lifestyle changes, medication, and checks every 3 to 6 months until your blood pressure is controlled  Stage 2 hypertension is 140/90 or higher   Your provider will recommend lifestyle changes and have you take 2 kinds of hypertension medicines  You will also need to have your blood pressure checked monthly until it is controlled  Treatment for hypertension  may include medicine to lower your blood pressure and lower your cholesterol level  A low cholesterol level helps prevent heart disease and makes it easier to control your blood pressure  Take your medicine exactly as directed  You may also need to make lifestyle changes  Manage hypertension:   Check your blood pressure at home  Avoid smoking, caffeine, and exercise at least 30 minutes before checking your blood pressure  Sit and rest for 5 minutes before you take your blood pressure  Extend your arm and support it on a flat surface  Your arm should be at the same level as your heart  Follow the directions that came with your blood pressure monitor  Check your blood pressure 2 times, 1 minute apart, before you take your medicine in the morning  Also check your blood pressure before your evening meal  Keep a record of your readings and bring it to your follow-up visits  Ask your healthcare provider what your blood pressure should be  Manage any other health conditions you have  Health conditions such as diabetes can increase your risk for hypertension  Follow your healthcare provider's instructions and take all your medicines as directed  Ask about all medicines  Certain medicines can increase your blood pressure  Examples include oral birth control pills, decongestants, herbal supplements, and NSAIDs, such as ibuprofen  Your healthcare provider can tell you which medicines are safe for you to take   This includes prescription and over-the-counter medicines  Lifestyle changes you can make to manage hypertension:  Your healthcare provider may recommend you work with a team to manage hypertension  The team may include medical experts such as a dietitian, an exercise or physical therapist, and a behavior therapist  Your family members may be included in helping you create lifestyle changes  Limit sodium (salt) as directed  Too much sodium can affect your fluid balance  Check labels to find low-sodium or no-salt-added foods  Some low-sodium foods use potassium salts for flavor  Too much potassium can also cause health problems  Your healthcare provider will tell you how much sodium and potassium are safe for you to have in a day  He or she may recommend that you limit sodium to 2,300 mg a day  Follow the meal plan recommended by your healthcare provider  A dietitian or your provider can give you more information on low-sodium plans or the DASH (Dietary Approaches to Stop Hypertension) eating plan  The DASH plan is low in sodium, processed sugar, unhealthy fats, and total fat  It is high in potassium, calcium, and fiber  These can be found in vegetables, fruit, and whole-grain foods  Be physically active throughout the day  Physical activity, such as exercise, can help control your blood pressure and your weight  Be physically active for at least 30 minutes per day, on most days of the week  Include aerobic activity, such as walking or riding a bicycle  Also include strength training at least 2 times each week  Your healthcare providers can help you create a physical activity plan  Decrease stress  This may help lower your blood pressure  Learn ways to relax, such as deep breathing or listening to music  Limit alcohol as directed  Alcohol can increase your blood pressure  A drink of alcohol is 12 ounces of beer, 5 ounces of wine, or 1½ ounces of liquor  Do not smoke    Nicotine and other chemicals in cigarettes and cigars can increase your blood pressure and also cause lung damage  Ask your healthcare provider for information if you currently smoke and need help to quit  E-cigarettes or smokeless tobacco still contain nicotine  Talk to your healthcare provider before you use these products  Follow up with your doctor or cardiologist as directed: You will need to return to have your blood pressure checked and to have other lab tests done  Write down your questions so you remember to ask them during your visits  © Copyright [employer] 2022 Information is for End User's use only and may not be sold, redistributed or otherwise used for commercial purposes  All illustrations and images included in CareNotes® are the copyrighted property of A D A M , Inc  or [facility] [employer]riett Cates   The above information is an  only  It is not intended as medical advice for individual conditions or treatments  Talk to your doctor, nurse or pharmacist before following any medical regimen to see if it is safe and effective for you

## 2023-01-17 NOTE — ASSESSMENT & PLAN NOTE
Patient with chronic B/L knee pain however notes right knee has been more painful recently  On exam, mild swelling noted to distal mediolateral knee  Tenderness noted on palpation  ROM intact  Recommend follow up with Ortho as she had been seeing them in the past for this and future injections were discussed

## 2023-01-17 NOTE — ASSESSMENT & PLAN NOTE
Lab Results   Component Value Date    CHOLESTEROL 225 (H) 11/19/2022    CHOLESTEROL 237 (H) 08/17/2022    CHOLESTEROL 170 08/13/2020     Lab Results   Component Value Date    HDL 45 (L) 11/19/2022    HDL 54 08/17/2022    HDL 30 (L) 08/13/2020     Lab Results   Component Value Date    TRIG 163 (H) 11/19/2022    TRIG 259 (H) 08/17/2022    TRIG 142 08/13/2020     Lab Results   Component Value Date    NONHDLC 180 11/19/2022    [place] 183 08/17/2022    [place] 140 08/13/2020     Continue atorvastatin 20 mg daily  Counseled on lifestyle modifications with diet and exercise

## 2023-01-17 NOTE — ASSESSMENT & PLAN NOTE
BP Readings from Last 1 Encounters:   01/17/23 116/79     BP at goal at today's visit  Continue lisinopril 10 mg daily and amlodipine 10 mg daily

## 2023-01-17 NOTE — ASSESSMENT & PLAN NOTE
Smoking cessation counseling provided  Patient reports she is considering quitting smoking and is interested in nicotine replacement lozenges (unable to chew gum due to only having partial dentures)  She does have concerns with life stressors that are triggers for her tobacco use     Recommend nicotine 4 mg lozenges as patient normally has first cigarette within 30 minutes of waking up--script sent to pharmacy, and continued follow up with therapist

## 2023-01-17 NOTE — ASSESSMENT & PLAN NOTE
Patient had [facility] admission 11/18/22-11/29/22 due to agitation and suicidal ideation  Prozac was dc'd during hospitalization due to concern of increased ranjit on medication  Patient reports that she feels mood is well controlled on current regimen--risperdal 2 mg nightly and lorazepam 0 5 mg every 8 hours as needed  She denies any current SIs/HIs  Recommend continued follow up with [facility] for psychiatry and therapy services

## 2023-01-25 ENCOUNTER — TELEPHONE (OUTPATIENT)
Dept: PSYCHIATRY | Facility: CLINIC | Age: 58
End: 2023-01-25

## 2023-02-03 ENCOUNTER — PROCEDURE VISIT (OUTPATIENT)
Dept: OBGYN CLINIC | Facility: CLINIC | Age: 58
End: 2023-02-03

## 2023-02-03 VITALS
HEART RATE: 70 BPM | DIASTOLIC BLOOD PRESSURE: 84 MMHG | SYSTOLIC BLOOD PRESSURE: 130 MMHG | WEIGHT: 196 LBS | BODY MASS INDEX: 34.73 KG/M2 | HEIGHT: 63 IN

## 2023-02-03 DIAGNOSIS — F41.9 ANXIETY: ICD-10-CM

## 2023-02-03 DIAGNOSIS — M22.2X2 PATELLOFEMORAL PAIN SYNDROME OF BOTH KNEES: ICD-10-CM

## 2023-02-03 DIAGNOSIS — F31.9 BIPOLAR AFFECTIVE DISORDER, REMISSION STATUS UNSPECIFIED (HCC): Chronic | ICD-10-CM

## 2023-02-03 DIAGNOSIS — M22.2X1 PATELLOFEMORAL PAIN SYNDROME OF BOTH KNEES: ICD-10-CM

## 2023-02-03 DIAGNOSIS — M17.0 BILATERAL PRIMARY OSTEOARTHRITIS OF KNEE: Primary | ICD-10-CM

## 2023-02-03 RX ORDER — LORAZEPAM 0.5 MG/1
0.5 TABLET ORAL EVERY 8 HOURS PRN
Qty: 90 TABLET | Refills: 0 | Status: CANCELLED | OUTPATIENT
Start: 2023-02-03

## 2023-02-03 RX ADMIN — TRIAMCINOLONE ACETONIDE 80 MG: 40 INJECTION, SUSPENSION INTRA-ARTICULAR; INTRAMUSCULAR at 14:48

## 2023-02-03 RX ADMIN — ROPIVACAINE HYDROCHLORIDE 10 ML: 5 INJECTION, SOLUTION EPIDURAL; INFILTRATION; PERINEURAL at 14:48

## 2023-02-03 NOTE — PROGRESS NOTES
1  Bilateral primary osteoarthritis of knee  Large joint arthrocentesis: bilateral knee    Injection procedure prior authorization      2  Patellofemoral pain syndrome of both knees          Orders Placed This Encounter   Procedures   • Large joint arthrocentesis: bilateral knee   • Injection procedure prior authorization        Impression:  Patient is here in follow up of chronic bilateral knee pain likely secondary to mild OA and PFPS    Treatment has included steroid injections, hinged knee brace, PT/home exercise program   We provided her with a reaction knee brace  We decided to proceed with repeat steroid injection for right knee today  I have also ordered viscosupplementation for both knees      Imaging Studies (I personally reviewed images in PACS and report):  Bilateral knee x-rays most recent to this encounter reviewed   These images show moderate osteoarthritis of mostly affects the medial joint space   No acute osseous abnormalities   Likely enchondroma in the proximal tibia bilaterally  No follow-ups on file  Patient is in agreement with the above plan  HPI:  Antonio Almonte is a 61 y o  female  who presents in follow up  Here for   Chief Complaint   Patient presents with   • Right Knee - Pain, Follow-up   • Left Knee - Pain, Follow-up       Since last visit: See above      Following history reviewed and updated:  Past Medical History:   Diagnosis Date   • Anxiety    • Arthritis    • Bipolar affective disorder, depressed, severe ([address] ) 4/28/2019   • Depression    • Elevated bilirubin 8/15/2019   • Hyperlipidemia    • Hypertension    • Hypokalemia 8/15/2019   • Learning difficulty    • Leukocytosis 7/28/2020   • Obesity (BMI 30 0-34 9) 6/26/2020   • Osteopenia    • Ovarian failure    • Previous known suicide attempt    • Psychiatric disorder    • Psychiatric illness      Past Surgical History:   Procedure Laterality Date   • LAPAROSCOPY      as a child, per patient-normal findings     Social History   Social History     Substance and Sexual Activity   Alcohol Use Yes   • Alcohol/week: 2 0 standard drinks   • Types: 2 Cans of beer per week    Comment: drinks 2 bottle of wine per day     Social History     Substance and Sexual Activity   Drug Use No     Social History     Tobacco Use   Smoking Status Some Days   • Packs/day: 0 25   • Years: 15 00   • Pack years: 3 75   • Types: Cigarettes   • Last attempt to quit: 1990   • Years since quittin 1   Smokeless Tobacco Never   Tobacco Comments    6 cigerattes a day     Family History   Problem Relation Age of Onset   • Alzheimer's disease Mother    • Depression Mother    • Depression Brother    • Bipolar disorder Brother    • No Known Problems Maternal Aunt    • No Known Problems Paternal Aunt    • No Known Problems Maternal Uncle    • No Known Problems Paternal Uncle    • No Known Problems Cousin    • ADD / ADHD Neg Hx    • Alcohol abuse Neg Hx    • Anxiety disorder Neg Hx    • Dementia Neg Hx    • Drug abuse Neg Hx    • OCD Neg Hx    • Paranoid behavior Neg Hx    • Schizophrenia Neg Hx    • Seizures Neg Hx    • Self-Injury Neg Hx    • Suicide Attempts Neg Hx      Allergies   Allergen Reactions   • Other      Hay Fever   • Oxycodone-Acetaminophen GI Intolerance        Constitutional:  Ht 5' 3" (1 6 m)   Wt 88 9 kg (196 lb)   LMP  (LMP Unknown)   BMI 34 72 kg/m²    General: NAD  Eyes: Clear sclerae  ENT: No inflammation, lesion, or mass of lips  No tracheal deviation  Musculoskeletal: As mentioned below  Integumentary: No visible rashes or skin lesions  Pulmonary/Chest: Effort normal  No respiratory distress  Neuro: CN's grossly intact, FAIR  Psych: Normal affect and judgement  Vascular: WWP  Right Knee Exam     Tenderness   The patient is experiencing tenderness in the medial joint line      Range of Motion   Extension: normal     Tests   Varus: negative Valgus: negative    Other   Erythema: absent  Scars: absent  Sensation: normal  Pulse: present  Swelling: none  Effusion: no effusion present      Left Knee Exam     Tenderness   The patient is experiencing tenderness in the medial joint line  Range of Motion   Extension: normal     Tests   Varus: negative Valgus: negative    Other   Erythema: absent  Scars: absent  Sensation: normal  Pulse: present  Swelling: none  Effusion: no effusion present             Large joint arthrocentesis: R knee  Universal Protocol:  Consent: Verbal consent obtained  Written consent not obtained  Risks and benefits: risks, benefits and alternatives were discussed  Consent given by: patient  Timeout called at: 2/3/2023 2:36 PM   Patient understanding: patient states understanding of the procedure being performed  Patient consent: the patient's understanding of the procedure matches consent given  Site marked: the operative site was marked  Radiology Images displayed and confirmed  If images not available, report reviewed: imaging studies available  Patient identity confirmed: verbally with patient    Supporting Documentation  Indications: pain   Procedure Details  Location: knee - R knee  Needle size: 22 G  Ultrasound guidance: no  Approach: Inferolateral to patella  Medications administered: 10 mL ropivacaine 0 5 %; 80 mg triamcinolone acetonide 40 mg/mL    Patient tolerance: patient tolerated the procedure well with no immediate complications  Dressing:  Sterile dressing applied    There was little to no resistance encountered during the injection  Risks of this procedure include:    - Risk of bleeding since a needle is involved  - Risk of infection (1/10,000 chance as per recent studies)  Signs/symptoms were discussed and they would prompt an urgent evaluation at an emergency department   - Risk of pigmentation or skin dimpling in the skin (2-3% chance as per recent studies) from the steroid    - Risk of increased pain from steroid flare (1% chance as per recent studies) that typically lasts 24-48 hours  - Risk of increased blood sugars from the steroid medication that can last for a few weeks  If the patient is a diabetic or pre-diabetic, they were encouraged to closely monitor their blood sugars and discuss with PCP if elevated more than usual or if having symptoms  The benefits outweigh the risks and so the procedure was completed

## 2023-02-03 NOTE — TELEPHONE ENCOUNTER
Patient states that she is having problems getting an appointment with the psychiatrist due to transportation issues  She will be seen 2/20/23 by a therapist and she will need to go 3 times before the medication will be provided  Please check into this and call the patient to advise whether or not the medication was prescribed  Patient is requesting a 30 day supply

## 2023-02-03 NOTE — TELEPHONE ENCOUNTER
I spoke with patient and asked where she has been getting this script from as last given by us in July 2022  She said she has been getting from her doctor at [facility]  I asked her for the number so I can confirm with them that they have been giving it to her since I cannot find anything in the database  She was reluctant to give me the number but I advised her that we cannot refill medication without speaking with them  I called [phone] and spoke to Edie Pepper who confirmed that patient was only seen there one time on 11/30/22 and no meds were prescribed  She had a follow up appt scheduled on 12/28/22 and no showed  She has an appt scheduled with the counselor on 2/20/23 and will need to see her twice before she can see the Psychiatrist to be prescribed any meds   I called [facility] to see when patient last picked up Lorazepam and the pharmacist stated 4/26/22 for 90 pills     I called and [facility] for patient to call back to discuss

## 2023-02-03 NOTE — TELEPHONE ENCOUNTER
Per PDMP last given 7/15/22  [facility] for patient to c/b to see when the last time she took the medication

## 2023-02-03 NOTE — TELEPHONE ENCOUNTER
I spoke with patient and asked where she has been getting this script from as last given by us in July 2022  She said she has been getting from her doctor at [facility]  I asked her for the number so I can confirm with them that they have been giving it to her since I cannot find anything in the database  She was reluctant to give me the number but I advised her that we cannot refill medication without speaking with them  I called [phone] and spoke to Johnny Hawk who confirmed that patient was only seen there one time on 11/30/22 and no meds were prescribed  She had a follow up appt scheduled on 12/28/22 and no showed  She has an appt scheduled with the counselor on 2/20/23 and will need to see her twice before she can see the Psychiatrist to be prescribed any meds  I called [facility] to see when patient last picked up the Risperdal and the pharmacist advised last picked up 11/29/22      I called and [facility] for patient to call back to discuss

## 2023-02-06 RX ORDER — TRIAMCINOLONE ACETONIDE 40 MG/ML
80 INJECTION, SUSPENSION INTRA-ARTICULAR; INTRAMUSCULAR
Status: COMPLETED | OUTPATIENT
Start: 2023-02-03 | End: 2023-02-03

## 2023-02-06 RX ORDER — ROPIVACAINE HYDROCHLORIDE 5 MG/ML
10 INJECTION, SOLUTION EPIDURAL; INFILTRATION; PERINEURAL
Status: COMPLETED | OUTPATIENT
Start: 2023-02-03 | End: 2023-02-03

## 2023-02-07 RX ORDER — RISPERIDONE 2 MG/1
2 TABLET ORAL
Qty: 30 TABLET | Refills: 0 | OUTPATIENT
Start: 2023-02-07 | End: 2023-03-09

## 2023-02-07 RX ORDER — LORAZEPAM 0.5 MG/1
0.5 TABLET ORAL EVERY 8 HOURS PRN
Qty: 90 TABLET | Refills: 0 | OUTPATIENT
Start: 2023-02-07

## 2023-02-07 NOTE — TELEPHONE ENCOUNTER
I spoke with patient and made her aware that per [facility] they have never prescribed this medication to her  I advised her that I have no records that she has received this medication  I advised her of the message I received about her appointments there and made her aware that she needs to make sure she attends that 2/20/23 appointment so she doesn't get dismissed from that practice  She verbalized understanding of this and said that she will attend

## 2023-02-07 NOTE — TELEPHONE ENCOUNTER
I spoke with patient and made her aware that per [facility] they have never prescribed this medication to her  I advised her that I have no records that she has received this medication  I advised her of the message I received about her appointments there and made her aware that she needs to make sure she attends that 2/20/23 appointment so she doesn't get dismissed from that practice  She verbalized understanding of this and said that she will attend

## 2023-02-24 ENCOUNTER — HOSPITAL ENCOUNTER (INPATIENT)
Facility: HOSPITAL | Age: 58
LOS: 3 days | Discharge: HOME | End: 2023-02-27
Attending: STUDENT IN AN ORGANIZED HEALTH CARE EDUCATION/TRAINING PROGRAM | Admitting: STUDENT IN AN ORGANIZED HEALTH CARE EDUCATION/TRAINING PROGRAM

## 2023-02-24 ENCOUNTER — HOSPITAL ENCOUNTER (EMERGENCY)
Facility: HOSPITAL | Age: 58
End: 2023-02-24
Attending: EMERGENCY MEDICINE

## 2023-02-24 VITALS
HEART RATE: 82 BPM | SYSTOLIC BLOOD PRESSURE: 121 MMHG | OXYGEN SATURATION: 95 % | DIASTOLIC BLOOD PRESSURE: 83 MMHG | TEMPERATURE: 97.9 F | RESPIRATION RATE: 16 BRPM

## 2023-02-24 DIAGNOSIS — I10 ESSENTIAL HYPERTENSION: ICD-10-CM

## 2023-02-24 DIAGNOSIS — E78.5 HYPERLIPIDEMIA, UNSPECIFIED HYPERLIPIDEMIA TYPE: ICD-10-CM

## 2023-02-24 DIAGNOSIS — R03.0 ELEVATED BLOOD PRESSURE READING: ICD-10-CM

## 2023-02-24 DIAGNOSIS — F31.9 BIPOLAR AFFECTIVE (HCC): ICD-10-CM

## 2023-02-24 DIAGNOSIS — R45.851 SUICIDAL IDEATIONS: Primary | ICD-10-CM

## 2023-02-24 DIAGNOSIS — E55.9 VITAMIN D INSUFFICIENCY: ICD-10-CM

## 2023-02-24 DIAGNOSIS — Z72.0 TOBACCO USE: Chronic | ICD-10-CM

## 2023-02-24 DIAGNOSIS — I10 PRIMARY HYPERTENSION: ICD-10-CM

## 2023-02-24 DIAGNOSIS — E78.2 MIXED HYPERLIPIDEMIA: ICD-10-CM

## 2023-02-24 DIAGNOSIS — F31.9 BIPOLAR DISORDER (HCC): Primary | Chronic | ICD-10-CM

## 2023-02-24 LAB
ALBUMIN SERPL BCP-MCNC: 3.2 G/DL (ref 3.5–5)
ALP SERPL-CCNC: 79 U/L (ref 46–116)
ALT SERPL W P-5'-P-CCNC: 21 U/L (ref 12–78)
AMPHETAMINES SERPL QL SCN: NEGATIVE
ANION GAP SERPL CALCULATED.3IONS-SCNC: 5 MMOL/L (ref 4–13)
APAP SERPL-MCNC: <2 UG/ML (ref 10–20)
AST SERPL W P-5'-P-CCNC: 13 U/L (ref 5–45)
ATRIAL RATE: 63 BPM
BARBITURATES UR QL: NEGATIVE
BASOPHILS # BLD AUTO: 0.06 THOUSANDS/ÂΜL (ref 0–0.1)
BASOPHILS NFR BLD AUTO: 1 % (ref 0–1)
BENZODIAZ UR QL: NEGATIVE
BILIRUB SERPL-MCNC: 0.49 MG/DL (ref 0.2–1)
BILIRUB UR QL STRIP: NEGATIVE
BUN SERPL-MCNC: 13 MG/DL (ref 5–25)
CALCIUM ALBUM COR SERPL-MCNC: 9.7 MG/DL (ref 8.3–10.1)
CALCIUM SERPL-MCNC: 9.1 MG/DL (ref 8.3–10.1)
CHLORIDE SERPL-SCNC: 111 MMOL/L (ref 96–108)
CK SERPL-CCNC: 41 U/L (ref 26–192)
CLARITY UR: CLEAR
CO2 SERPL-SCNC: 26 MMOL/L (ref 21–32)
COCAINE UR QL: NEGATIVE
COLOR UR: NORMAL
CREAT SERPL-MCNC: 0.79 MG/DL (ref 0.6–1.3)
EOSINOPHIL # BLD AUTO: 0.28 THOUSAND/ÂΜL (ref 0–0.61)
EOSINOPHIL NFR BLD AUTO: 2 % (ref 0–6)
ERYTHROCYTE [DISTWIDTH] IN BLOOD BY AUTOMATED COUNT: 13.2 % (ref 11.6–15.1)
ETHANOL SERPL-MCNC: <3 MG/DL (ref 0–3)
ETHANOL SERPL-MCNC: <3 MG/DL (ref 0–3)
EXT PREGNANCY TEST URINE: NEGATIVE
EXT. CONTROL: NORMAL
FLUAV RNA RESP QL NAA+PROBE: NEGATIVE
FLUBV RNA RESP QL NAA+PROBE: NEGATIVE
GFR SERPL CREATININE-BSD FRML MDRD: 82 ML/MIN/1.73SQ M
GLUCOSE SERPL-MCNC: 114 MG/DL (ref 65–140)
GLUCOSE UR STRIP-MCNC: NEGATIVE MG/DL
HCT VFR BLD AUTO: 41.8 % (ref 34.8–46.1)
HGB BLD-MCNC: 13.7 G/DL (ref 11.5–15.4)
HGB UR QL STRIP.AUTO: NEGATIVE
IMM GRANULOCYTES # BLD AUTO: 0.07 THOUSAND/UL (ref 0–0.2)
IMM GRANULOCYTES NFR BLD AUTO: 1 % (ref 0–2)
KETONES UR STRIP-MCNC: NEGATIVE MG/DL
LEUKOCYTE ESTERASE UR QL STRIP: NEGATIVE
LYMPHOCYTES # BLD AUTO: 3.46 THOUSANDS/ÂΜL (ref 0.6–4.47)
LYMPHOCYTES NFR BLD AUTO: 27 % (ref 14–44)
MCH RBC QN AUTO: 28.8 PG (ref 26.8–34.3)
MCHC RBC AUTO-ENTMCNC: 32.8 G/DL (ref 31.4–37.4)
MCV RBC AUTO: 88 FL (ref 82–98)
METHADONE UR QL: NEGATIVE
MONOCYTES # BLD AUTO: 1.08 THOUSAND/ÂΜL (ref 0.17–1.22)
MONOCYTES NFR BLD AUTO: 8 % (ref 4–12)
NEUTROPHILS # BLD AUTO: 7.93 THOUSANDS/ÂΜL (ref 1.85–7.62)
NEUTS SEG NFR BLD AUTO: 61 % (ref 43–75)
NITRITE UR QL STRIP: NEGATIVE
NRBC BLD AUTO-RTO: 0 /100 WBCS
OPIATES UR QL SCN: NEGATIVE
OXYCODONE+OXYMORPHONE UR QL SCN: NEGATIVE
P AXIS: 43 DEGREES
PCP UR QL: NEGATIVE
PH UR STRIP.AUTO: 6 [PH]
PLATELET # BLD AUTO: 283 THOUSANDS/UL (ref 149–390)
PMV BLD AUTO: 9.5 FL (ref 8.9–12.7)
POTASSIUM SERPL-SCNC: 4.1 MMOL/L (ref 3.5–5.3)
PR INTERVAL: 150 MS
PROT SERPL-MCNC: 6.7 G/DL (ref 6.4–8.4)
PROT UR STRIP-MCNC: NEGATIVE MG/DL
QRS AXIS: -1 DEGREES
QRSD INTERVAL: 84 MS
QT INTERVAL: 414 MS
QTC INTERVAL: 423 MS
RBC # BLD AUTO: 4.76 MILLION/UL (ref 3.81–5.12)
RSV RNA RESP QL NAA+PROBE: NEGATIVE
SALICYLATES SERPL-MCNC: <3 MG/DL (ref 3–20)
SARS-COV-2 RNA RESP QL NAA+PROBE: NEGATIVE
SODIUM SERPL-SCNC: 142 MMOL/L (ref 135–147)
SP GR UR STRIP.AUTO: 1.01 (ref 1–1.03)
T WAVE AXIS: 22 DEGREES
THC UR QL: NEGATIVE
TSH SERPL DL<=0.05 MIU/L-ACNC: 2.75 UIU/ML (ref 0.45–4.5)
UROBILINOGEN UR STRIP-ACNC: <2 MG/DL
VENTRICULAR RATE: 63 BPM
WBC # BLD AUTO: 12.88 THOUSAND/UL (ref 4.31–10.16)

## 2023-02-24 RX ORDER — TRAZODONE HYDROCHLORIDE 50 MG/1
50 TABLET ORAL
Status: CANCELLED | OUTPATIENT
Start: 2023-02-24

## 2023-02-24 RX ORDER — LISINOPRIL 10 MG/1
10 TABLET ORAL DAILY
Status: DISCONTINUED | OUTPATIENT
Start: 2023-02-25 | End: 2023-02-27 | Stop reason: HOSPADM

## 2023-02-24 RX ORDER — ATORVASTATIN CALCIUM 20 MG/1
20 TABLET, FILM COATED ORAL DAILY
Status: CANCELLED | OUTPATIENT
Start: 2023-02-24

## 2023-02-24 RX ORDER — PROPRANOLOL HYDROCHLORIDE 10 MG/1
10 TABLET ORAL EVERY 8 HOURS PRN
Status: CANCELLED | OUTPATIENT
Start: 2023-02-24

## 2023-02-24 RX ORDER — HALOPERIDOL 5 MG/ML
2.5 INJECTION INTRAMUSCULAR
Status: DISCONTINUED | OUTPATIENT
Start: 2023-02-24 | End: 2023-02-27 | Stop reason: HOSPADM

## 2023-02-24 RX ORDER — HALOPERIDOL 5 MG/ML
2.5 INJECTION INTRAMUSCULAR
Status: CANCELLED | OUTPATIENT
Start: 2023-02-24

## 2023-02-24 RX ORDER — RISPERIDONE 2 MG/1
2 TABLET ORAL
Status: DISCONTINUED | OUTPATIENT
Start: 2023-02-24 | End: 2023-02-27 | Stop reason: HOSPADM

## 2023-02-24 RX ORDER — HYDROXYZINE HYDROCHLORIDE 25 MG/1
50 TABLET, FILM COATED ORAL
Status: CANCELLED | OUTPATIENT
Start: 2023-02-24

## 2023-02-24 RX ORDER — LISINOPRIL 10 MG/1
10 TABLET ORAL DAILY
Status: CANCELLED | OUTPATIENT
Start: 2023-02-24

## 2023-02-24 RX ORDER — HYDROXYZINE 50 MG/1
50 TABLET, FILM COATED ORAL
Status: DISCONTINUED | OUTPATIENT
Start: 2023-02-24 | End: 2023-02-27 | Stop reason: HOSPADM

## 2023-02-24 RX ORDER — LORAZEPAM 2 MG/ML
1 INJECTION INTRAMUSCULAR
Status: DISCONTINUED | OUTPATIENT
Start: 2023-02-24 | End: 2023-02-27 | Stop reason: HOSPADM

## 2023-02-24 RX ORDER — HYDROXYZINE HYDROCHLORIDE 25 MG/1
25 TABLET, FILM COATED ORAL
Status: DISCONTINUED | OUTPATIENT
Start: 2023-02-24 | End: 2023-02-27 | Stop reason: HOSPADM

## 2023-02-24 RX ORDER — HALOPERIDOL 5 MG/ML
5 INJECTION INTRAMUSCULAR
Status: DISCONTINUED | OUTPATIENT
Start: 2023-02-24 | End: 2023-02-27 | Stop reason: HOSPADM

## 2023-02-24 RX ORDER — LORAZEPAM 0.5 MG/1
0.5 TABLET ORAL EVERY 8 HOURS PRN
Status: CANCELLED | OUTPATIENT
Start: 2023-02-24

## 2023-02-24 RX ORDER — BENZTROPINE MESYLATE 1 MG/ML
1 INJECTION INTRAMUSCULAR; INTRAVENOUS
Status: CANCELLED | OUTPATIENT
Start: 2023-02-24

## 2023-02-24 RX ORDER — IBUPROFEN 400 MG/1
400 TABLET ORAL EVERY 4 HOURS PRN
Status: CANCELLED | OUTPATIENT
Start: 2023-02-24

## 2023-02-24 RX ORDER — AMLODIPINE BESYLATE 5 MG/1
10 TABLET ORAL DAILY
Status: DISCONTINUED | OUTPATIENT
Start: 2023-02-25 | End: 2023-02-27 | Stop reason: HOSPADM

## 2023-02-24 RX ORDER — BENZTROPINE MESYLATE 1 MG/ML
1 INJECTION INTRAMUSCULAR; INTRAVENOUS
Status: DISCONTINUED | OUTPATIENT
Start: 2023-02-24 | End: 2023-02-27 | Stop reason: HOSPADM

## 2023-02-24 RX ORDER — TRAZODONE HYDROCHLORIDE 50 MG/1
50 TABLET ORAL
Status: DISCONTINUED | OUTPATIENT
Start: 2023-02-24 | End: 2023-02-27 | Stop reason: HOSPADM

## 2023-02-24 RX ORDER — RISPERIDONE 1 MG/1
1 TABLET ORAL
Status: DISCONTINUED | OUTPATIENT
Start: 2023-02-24 | End: 2023-02-27 | Stop reason: HOSPADM

## 2023-02-24 RX ORDER — DIPHENHYDRAMINE HYDROCHLORIDE 50 MG/ML
50 INJECTION INTRAMUSCULAR; INTRAVENOUS EVERY 6 HOURS PRN
Status: CANCELLED | OUTPATIENT
Start: 2023-02-24

## 2023-02-24 RX ORDER — AMLODIPINE BESYLATE 5 MG/1
10 TABLET ORAL DAILY
Status: CANCELLED | OUTPATIENT
Start: 2023-02-24

## 2023-02-24 RX ORDER — PROPRANOLOL HYDROCHLORIDE 10 MG/1
10 TABLET ORAL EVERY 8 HOURS PRN
Status: DISCONTINUED | OUTPATIENT
Start: 2023-02-24 | End: 2023-02-27 | Stop reason: HOSPADM

## 2023-02-24 RX ORDER — IBUPROFEN 600 MG/1
600 TABLET ORAL EVERY 6 HOURS PRN
Status: CANCELLED | OUTPATIENT
Start: 2023-02-24

## 2023-02-24 RX ORDER — BENZTROPINE MESYLATE 1 MG/ML
0.5 INJECTION INTRAMUSCULAR; INTRAVENOUS
Status: CANCELLED | OUTPATIENT
Start: 2023-02-24

## 2023-02-24 RX ORDER — IBUPROFEN 800 MG/1
800 TABLET ORAL EVERY 8 HOURS PRN
Status: DISCONTINUED | OUTPATIENT
Start: 2023-02-24 | End: 2023-02-27 | Stop reason: HOSPADM

## 2023-02-24 RX ORDER — HYDROXYZINE HYDROCHLORIDE 25 MG/1
100 TABLET, FILM COATED ORAL
Status: CANCELLED | OUTPATIENT
Start: 2023-02-24

## 2023-02-24 RX ORDER — ATORVASTATIN CALCIUM 20 MG/1
20 TABLET, FILM COATED ORAL DAILY
Status: DISCONTINUED | OUTPATIENT
Start: 2023-02-25 | End: 2023-02-27 | Stop reason: HOSPADM

## 2023-02-24 RX ORDER — RISPERIDONE 2 MG/1
2 TABLET ORAL
Status: DISCONTINUED | OUTPATIENT
Start: 2023-02-24 | End: 2023-02-25

## 2023-02-24 RX ORDER — LORAZEPAM 2 MG/ML
2 INJECTION INTRAMUSCULAR EVERY 6 HOURS PRN
Status: DISCONTINUED | OUTPATIENT
Start: 2023-02-24 | End: 2023-02-27 | Stop reason: HOSPADM

## 2023-02-24 RX ORDER — IBUPROFEN 400 MG/1
800 TABLET ORAL EVERY 8 HOURS PRN
Status: CANCELLED | OUTPATIENT
Start: 2023-02-24

## 2023-02-24 RX ORDER — RISPERIDONE 1 MG/1
2 TABLET ORAL
Status: CANCELLED | OUTPATIENT
Start: 2023-02-24

## 2023-02-24 RX ORDER — RISPERIDONE 0.5 MG/1
0.5 TABLET ORAL
Status: DISCONTINUED | OUTPATIENT
Start: 2023-02-24 | End: 2023-02-27 | Stop reason: HOSPADM

## 2023-02-24 RX ORDER — BENZTROPINE MESYLATE 1 MG/ML
0.5 INJECTION INTRAMUSCULAR; INTRAVENOUS
Status: DISCONTINUED | OUTPATIENT
Start: 2023-02-24 | End: 2023-02-27 | Stop reason: HOSPADM

## 2023-02-24 RX ORDER — LORAZEPAM 2 MG/ML
2 INJECTION INTRAMUSCULAR
Status: CANCELLED | OUTPATIENT
Start: 2023-02-24

## 2023-02-24 RX ORDER — IBUPROFEN 400 MG/1
400 TABLET ORAL EVERY 4 HOURS PRN
Status: DISCONTINUED | OUTPATIENT
Start: 2023-02-24 | End: 2023-02-27 | Stop reason: HOSPADM

## 2023-02-24 RX ORDER — LORAZEPAM 2 MG/ML
2 INJECTION INTRAMUSCULAR EVERY 6 HOURS PRN
Status: CANCELLED | OUTPATIENT
Start: 2023-02-24

## 2023-02-24 RX ORDER — BENZTROPINE MESYLATE 1 MG/1
1 TABLET ORAL
Status: DISCONTINUED | OUTPATIENT
Start: 2023-02-24 | End: 2023-02-27 | Stop reason: HOSPADM

## 2023-02-24 RX ORDER — HYDROXYZINE 50 MG/1
100 TABLET, FILM COATED ORAL
Status: DISCONTINUED | OUTPATIENT
Start: 2023-02-24 | End: 2023-02-27 | Stop reason: HOSPADM

## 2023-02-24 RX ORDER — IBUPROFEN 600 MG/1
600 TABLET ORAL EVERY 6 HOURS PRN
Status: DISCONTINUED | OUTPATIENT
Start: 2023-02-24 | End: 2023-02-27 | Stop reason: HOSPADM

## 2023-02-24 RX ORDER — RISPERIDONE 0.25 MG/1
0.5 TABLET ORAL
Status: CANCELLED | OUTPATIENT
Start: 2023-02-24

## 2023-02-24 RX ORDER — DIPHENHYDRAMINE HYDROCHLORIDE 50 MG/ML
50 INJECTION INTRAMUSCULAR; INTRAVENOUS EVERY 6 HOURS PRN
Status: DISCONTINUED | OUTPATIENT
Start: 2023-02-24 | End: 2023-02-27 | Stop reason: HOSPADM

## 2023-02-24 RX ORDER — CHOLECALCIFEROL (VITAMIN D3) 10(400)/ML
1000 DROPS ORAL DAILY
Status: CANCELLED | OUTPATIENT
Start: 2023-02-24

## 2023-02-24 RX ORDER — HYDROXYZINE HYDROCHLORIDE 25 MG/1
25 TABLET, FILM COATED ORAL
Status: CANCELLED | OUTPATIENT
Start: 2023-02-24

## 2023-02-24 RX ORDER — OLANZAPINE 10 MG/1
10 TABLET, ORALLY DISINTEGRATING ORAL ONCE
Status: COMPLETED | OUTPATIENT
Start: 2023-02-24 | End: 2023-02-24

## 2023-02-24 RX ORDER — LORAZEPAM 2 MG/ML
1 INJECTION INTRAMUSCULAR
Status: CANCELLED | OUTPATIENT
Start: 2023-02-24

## 2023-02-24 RX ORDER — HALOPERIDOL 5 MG/ML
5 INJECTION INTRAMUSCULAR
Status: CANCELLED | OUTPATIENT
Start: 2023-02-24

## 2023-02-24 RX ORDER — BENZTROPINE MESYLATE 1 MG/1
1 TABLET ORAL
Status: CANCELLED | OUTPATIENT
Start: 2023-02-24

## 2023-02-24 RX ORDER — RISPERIDONE 1 MG/1
1 TABLET ORAL
Status: CANCELLED | OUTPATIENT
Start: 2023-02-24

## 2023-02-24 RX ORDER — LORAZEPAM 2 MG/ML
2 INJECTION INTRAMUSCULAR
Status: DISCONTINUED | OUTPATIENT
Start: 2023-02-24 | End: 2023-02-27 | Stop reason: HOSPADM

## 2023-02-24 RX ADMIN — OLANZAPINE 10 MG: 10 TABLET, ORALLY DISINTEGRATING ORAL at 09:46

## 2023-02-24 RX ADMIN — RISPERIDONE 2 MG: 2 TABLET ORAL at 22:09

## 2023-02-24 RX ADMIN — SODIUM CHLORIDE 500 ML: 0.9 INJECTION, SOLUTION INTRAVENOUS at 05:46

## 2023-02-24 NOTE — ED CARE HANDOFF
Emergency Department Sign Out Note        Sign out and transfer of care from Dr Boaz Macario  See Separate Emergency Department note  The patient, Kala Jimenez, was evaluated by the previous provider after making a TikTok video in which she stated SI which alerted the [employer] who sent [employer] to bring her here for evaluation  Patient reports she was "just messing around" but understands the gravity of such statements  (+) psych hx with prior gestures  Workup Completed:  Medical clearance, 201 signed  [employer] will petition 36 is patient rescinds 201  ED Course / Workup Pending (followup):  Crisis eval and bed placement  Procedures  MDM        Disposition  Final diagnoses:   Bipolar affective ([facility] [address] )   Elevated blood pressure reading   Suicidal ideations     Time reflects when diagnosis was documented in both MDM as applicable and the Disposition within this note     Time User Action Codes Description Comment    2/24/2023  5:59 AM Faraz Merna A Add [F31 9] Bipolar affective ([facility] [address] )     2/24/2023  5:59 AM Cinthya Suárez A Add [R03 0] Elevated blood pressure reading     2/24/2023  5:59 AM Faraz Merna A Add [R45 851] Suicidal ideations     2/24/2023  5:59 AM Ruth Merna A Modify [F31 9] Bipolar affective ([facility] [address] )     2/24/2023  5:59 AM Faraz Bauman Modify [V46 282] Suicidal ideations       ED Disposition     ED Disposition   Transfer to Behavioral Health    [facility]   --    Date/Time   Fri Feb 24, 2023  6:47 AM    Comment   Kala Jimenez should be transferred out to Inpatient psychiatric care and has been medically cleared  Follow-up Information    None       Patient's Medications   Discharge Prescriptions    No medications on file     No discharge procedures on file         ED Provider  Electronically Signed by     Marcie Bolden DO  02/24/23 9719

## 2023-02-24 NOTE — ED NOTES
Report given to receiving RN at [facility], [facility]  02/24/23 2069

## 2023-02-24 NOTE — ED NOTES
Pt became agitated and excessively loud stated "I want to be discharged" "If I wanted to commit suicide I would"  Dr Jessica Mc and security at bedside  Meds given   In person 1:1 initiated             Scott Rivas RN  02/24/23 2144

## 2023-02-24 NOTE — ED ATTENDING ATTESTATION
2/24/2023  I, Sea Mays MD, saw and evaluated the patient  I have discussed the patient with the resident/non-physician practitioner and agree with the resident's/non-physician practitioner's findings, Plan of Care, and MDM as documented in the resident's/non-physician practitioner's note, except where noted  All available labs and Radiology studies were reviewed  I was present for key portions of any procedure(s) performed by the resident/non-physician practitioner and I was immediately available to provide assistance  At this point I agree with the current assessment done in the Emergency Department  I have conducted an independent evaluation of this patient a history and physical is as follows:    ED Course         Critical Care Time  Procedures    62 yo female made suicidal statements on tik tok and police informed and willing to sign 302  Pt currently denying si or hi  No hallucinations  Pt with psych hx, compliant with meds  No alcohol or drug use  Vss, afebrile, lungs cta, rrr, abodmen soft nontender, no neuro deficits  bo psych workup, pt will be offered 201, crisis

## 2023-02-24 NOTE — ED PROVIDER NOTES
History  Chief Complaint   Patient presents with   • Psychiatric Evaluation     Pt commented on TikTok reporting wanting to die by taking pills  TikTok reported it to the [facility] and police were contacted and pt was brought to the ED for 302 if pt does not sign a 201  Pt not answering triage questions     Patient is a 56-year-old female, with a history significant for bipolar affective disorder, who was brought to the ED today, via EMS and police, due to suicidal statements made on TikTok  Per EMS/police, patient stated that she was alone on TikTok and made suicidal statements  This prompted the algorithm to alert the FBI who involved local police  Police state that they will 302 patient if she does not sign 201  Currently, patient is without complaints except for wanting to leave the emergency department at this time  She denies suicidal ideation and states that she was " just messing around" when she made the statements on TikTok  Patient willing to sign 201  Patient states that she has been compliant with her medication regimen  Patient denies fever, dysphagia, vision change, chest pain, dyspnea, abdominal pain, urinary symptoms, weakness, numbness, any other concerns at this time  Prior to Admission Medications   Prescriptions Last Dose Informant Patient Reported? Taking?    Cholecalciferol (Vitamin D3) 50 MCG (2000 UT) capsule   No No   Sig: Take 1 capsule (2,000 Units total) by mouth daily   LORazepam (ATIVAN) 0 5 mg tablet   No No   Sig: TAKE 1 TABLET (0 5 MG TOTAL) BY MOUTH EVERY 8 (EIGHT) HOURS AS NEEDED FOR ANXIETY   amLODIPine (NORVASC) 10 mg tablet   No No   Sig: Take 1 tablet (10 mg total) by mouth daily   atorvastatin (LIPITOR) 20 mg tablet   No No   Sig: Take 1 tablet (20 mg total) by mouth daily   lisinopril (ZESTRIL) 10 mg tablet   No No   Sig: Take 1 tablet (10 mg total) by mouth daily   nicotine polacrilex (COMMIT) 4 MG lozenge   No No   Sig: Use lozenge every 1-2 hours as needed x 6 weeks (no more than 20 lozenges/24 hours); then 1 lozenge every 2-4 hours x 3 weeks, then 1 lozenge every 4-8 hours x 3 weeks   risperiDONE (RisperDAL) 2 mg tablet   No No   Sig: Take 1 tablet (2 mg total) by mouth daily at bedtime      Facility-Administered Medications: None       Past Medical History:   Diagnosis Date   • Anxiety    • Arthritis    • Bipolar affective disorder, depressed, severe ([facility] [address] ) 2019   • Depression    • Elevated bilirubin 8/15/2019   • Hyperlipidemia    • Hypertension    • Hypokalemia 8/15/2019   • Learning difficulty    • Leukocytosis 2020   • Obesity (BMI 30 0-34 9) 2020   • Osteopenia    • Ovarian failure    • Previous known suicide attempt    • Psychiatric disorder    • Psychiatric illness        Past Surgical History:   Procedure Laterality Date   • LAPAROSCOPY      as a child, per patient-normal findings       Family History   Problem Relation Age of Onset   • Alzheimer's disease Mother    • Depression Mother    • Depression Brother    • Bipolar disorder Brother    • No Known Problems Maternal Aunt    • No Known Problems Paternal Aunt    • No Known Problems Maternal Uncle    • No Known Problems Paternal Uncle    • No Known Problems Cousin    • ADD / ADHD Neg Hx    • Alcohol abuse Neg Hx    • Anxiety disorder Neg Hx    • Dementia Neg Hx    • Drug abuse Neg Hx    • OCD Neg Hx    • Paranoid behavior Neg Hx    • Schizophrenia Neg Hx    • Seizures Neg Hx    • Self-Injury Neg Hx    • Suicide Attempts Neg Hx      I have reviewed and agree with the history as documented      E-Cigarette/Vaping   • E-Cigarette Use Never User      E-Cigarette/Vaping Substances   • Nicotine No    • THC No    • CBD No    • Flavoring No    • Other No    • Unknown No      Social History     Tobacco Use   • Smoking status: Some Days     Packs/day: 0 25     Years: 15 00     Pack years: 3 75     Types: Cigarettes     Last attempt to quit: 1990     Years since quittin 1   • Smokeless tobacco: Never • Tobacco comments:     6 cigerattes a day   Vaping Use   • Vaping Use: Never used   Substance Use Topics   • Alcohol use: Yes     Alcohol/week: 2 0 standard drinks     Types: 2 Cans of beer per week     Comment: drinks 2 bottle of wine per day   • Drug use: No        Review of Systems   Constitutional: Negative for fever  HENT: Negative for trouble swallowing  Eyes: Negative for visual disturbance  Respiratory: Negative for shortness of breath  Cardiovascular: Negative for chest pain  Gastrointestinal: Negative for abdominal pain  Endocrine: Negative for polyuria  Genitourinary: Negative for dysuria  Musculoskeletal: Negative for gait problem  Skin: Negative for rash  Allergic/Immunologic: Positive for environmental allergies  Neurological: Negative for weakness and numbness  Hematological: Negative for adenopathy  Psychiatric/Behavioral: Negative for suicidal ideas  All other systems reviewed and are negative  Physical Exam  ED Triage Vitals [02/24/23 0512]   Temperature Pulse Respirations Blood Pressure SpO2   97 9 °F (36 6 °C) (!) 111 18 148/69 95 %      Temp Source Heart Rate Source Patient Position - Orthostatic VS BP Location FiO2 (%)   Tympanic Monitor Sitting Right arm --      Pain Score       --             Orthostatic Vital Signs  Vitals:    02/24/23 0512   BP: 148/69   Pulse: (!) 111   Patient Position - Orthostatic VS: Sitting       Physical Exam  Vitals and nursing note reviewed  Constitutional:       General: She is not in acute distress  Appearance: She is not toxic-appearing or diaphoretic  Comments: Patient appears comfortable during my evaluation  Unkempt appearance   HENT:      Head: Normocephalic and atraumatic  Right Ear: External ear normal       Left Ear: External ear normal       Nose: Nose normal  No rhinorrhea  Mouth/Throat:      Mouth: Mucous membranes are moist       Pharynx: Oropharynx is clear   No oropharyngeal exudate or posterior oropharyngeal erythema  Comments: Uvula midline  No oropharyngeal or submandibular mass/swelling  Eyes:      General: No scleral icterus  Right eye: No discharge  Left eye: No discharge  Conjunctiva/sclera: Conjunctivae normal       Pupils: Pupils are equal, round, and reactive to light  Neck:      Comments: Patient is spontaneously rotating their neck to the left and right during the history and physical exam interaction without difficulty or apparent discomfort    Cardiovascular:      Rate and Rhythm: Normal rate and regular rhythm  Pulses: Normal pulses  Heart sounds: Normal heart sounds  No murmur heard  No friction rub  No gallop  Comments: 2+ Radial  Pulmonary:      Effort: Pulmonary effort is normal  No respiratory distress  Breath sounds: Normal breath sounds  No stridor  No wheezing, rhonchi or rales  Abdominal:      General: Abdomen is flat  There is no distension  Palpations: Abdomen is soft  Tenderness: There is no abdominal tenderness  There is no right CVA tenderness, left CVA tenderness, guarding or rebound  Musculoskeletal:         General: No tenderness (No calf pain to squeeze) or deformity  Cervical back: Neck supple  No tenderness  No muscular tenderness  Right lower leg: No edema  Left lower leg: No edema  Lymphadenopathy:      Cervical: No cervical adenopathy  Skin:     General: Skin is warm and dry  Capillary Refill: Capillary refill takes less than 2 seconds  Neurological:      Mental Status: She is alert  Comments: Patient is speaking clearly in short and intermittently agitated sentences  Patient is answering remittent leg and able follow commands  Patient is moving all four extremities spontaneously  No facial droop  Tongue midline  Psychiatric:      Comments: Patient dressed and unkempt street close  Patient intermittently cooperative, agitated    Patient denies SI/HI    Linear thought process  ED Medications  Medications   sodium chloride 0 9 % bolus 500 mL (500 mL Intravenous New Bag 2/24/23 0546)       Diagnostic Studies  Results Reviewed     Procedure Component Value Units Date/Time    Ethanol [[id number]]  (Normal) Collected: 02/24/23 0554    Lab Status: Final result Specimen: Blood from Arm, Right Updated: 02/24/23 0635     Ethanol Lvl <3 mg/dL     TSH, 3rd generation [[id number]]  (Normal) Collected: 02/24/23 0546    Lab Status: Final result Specimen: Blood from Arm, Right Updated: 02/24/23 0632     TSH 3RD GENERATON 2 750 uIU/mL     Narrative:      Patients undergoing fluorescein dye angiography may retain small amounts of fluorescein in the body for 48-72 hours post procedure  Samples containing fluorescein can produce falsely depressed TSH values  If the patient had this procedure,a specimen should be resubmitted post fluorescein clearance        CK (with reflex to MB) [[id number]]  (Normal) Collected: 02/24/23 0546    Lab Status: Final result Specimen: Blood from Arm, Right Updated: 02/24/23 0632     Total CK 41 U/L     CBC and differential [[id number]]  (Abnormal) Collected: 02/24/23 0546    Lab Status: Final result Specimen: Blood from Arm, Right Updated: 02/24/23 0601     WBC 12 88 Thousand/uL      RBC 4 76 Million/uL      Hemoglobin 13 7 g/dL      Hematocrit 41 8 %      MCV 88 fL      MCH 28 8 pg      MCHC 32 8 g/dL      RDW 13 2 %      MPV 9 5 fL      Platelets 218 Thousands/uL      nRBC 0 /100 WBCs      Neutrophils Relative 61 %      Immat GRANS % 1 %      Lymphocytes Relative 27 %      Monocytes Relative 8 %      Eosinophils Relative 2 %      Basophils Relative 1 %      Neutrophils Absolute 7 93 Thousands/µL      Immature Grans Absolute 0 07 Thousand/uL      Lymphocytes Absolute 3 46 Thousands/µL      Monocytes Absolute 1 08 Thousand/µL      Eosinophils Absolute 0 28 Thousand/µL      Basophils Absolute 0 06 Thousands/µL     UA w Reflex to Microscopic w Reflex to Culture [[id number]]     Lab Status: No result Specimen: Urine     POCT pregnancy, urine [[id number]]     Lab Status: No result     POCT alcohol breath test [[id number]]     Lab Status: No result     Rapid drug screen, urine [[id number]]     Lab Status: No result Specimen: Urine                  No orders to display         Procedures  Procedures      ED Course  ED Course as of 02/24/23 0701   Fri Feb 24, 2023   0634 Total CK: 41  Normal                             SBIRT 22yo+    Flowsheet Row Most Recent Value   SBIRT (23 yo +)    In order to provide better care to our patients, we are screening all of our patients for alcohol and drug use  Would it be okay to ask you these screening questions? Unable to answer at this time Filed at: 02/24/2023 Juan Storey 89 Making  Patient is a 58-year-old female, with a history significant for bipolar affective disorder, who was brought to the ED today, via EMS and police, due to suicidal statements made on TikTok  Per EMS/police, patient stated that she was alone on TikTok and made suicidal statements  This prompted the algorithm to alert the FBI who involved local police  Police state that they will 302 patient if she does not sign 201  Currently, patient is without complaints except for wanting to leave the emergency department at this time  She denies suicidal ideation and states that she was " just messing around" when she made the statements on TikTok  Patient willing to sign 201  Patient states that she has been compliant with her medication regimen  Patient denies fever, dysphagia, vision change, chest pain, dyspnea, abdominal pain, urinary symptoms, weakness, numbness, any other concerns at this time  Patient is currently afebrile, tachycardic, otherwise hemodynamically stable  Her physical exam is notable for unkempt appearance and intermittent agitation  Her exam is also notable for small burn appearing marks on her lower lip  This presentation is concerning for: Suicidal ideation, bipolar affective disorder  I also considered rhabdo  Will investigate with geriatric psychiatric work-up, CK, crisis evaluation  Will manage with fluids, continue observation and further based upon work-up  Bipolar affective ([place] [address] ): acute illness or injury  Elevated blood pressure reading: acute illness or injury  Suicidal ideations: acute illness or injury  Amount and/or Complexity of Data Reviewed  Labs: ordered  Decision-making details documented in ED Course  Disposition  Final diagnoses:   Bipolar affective ([place] [address] )   Elevated blood pressure reading   Suicidal ideations     Time reflects when diagnosis was documented in both MDM as applicable and the Disposition within this note     Time User Action Codes Description Comment    2/24/2023  5:59 AM Wm Hedges A Add [F31 9] Bipolar affective ([place] [address] )     2/24/2023  5:59 AM Ry DoctorLaly A Add [R03 0] Elevated blood pressure reading     2/24/2023  5:59 AM Wm Hedges A Add [R45 851] Suicidal ideations     2/24/2023  5:59 AM Wm Hedges A Modify [F31 9] Bipolar affective ([place] [address] )     2/24/2023  5:59 AM Wm Hedges Modify [Y00 112] Suicidal ideations       ED Disposition     ED Disposition   Transfer to Behavioral Health    Condition   --    Date/Time   Fri Feb 24, 2023  6:47 AM    Comment   Katelin Estrada should be transferred out to Inpatient psychiatric care and has been medically cleared  Follow-up Information    None         Patient's Medications   Discharge Prescriptions    No medications on file     No discharge procedures on file  PDMP Review       Value Time User    PDMP Reviewed  Yes 11/21/2022 10:34 AM Adan Nichols MD           ED Provider  Attending physically available and evaluated Katelin Estrada  I managed the patient along with the ED Attending      Electronically Signed by         Aye Hernandez MD  02/24/23 2056

## 2023-02-24 NOTE — ED NOTES
Pt was brought to the ed by [employer] after pt made tik tok videos implying she was suicidal  Pt denies that she was suicidal and claims she was just joking  Pt is irritable about being in the ed but overall cooperative at this time  Pt denies homicidal ideation as well as hallucinations  She claims to be med compliant  Pt reports good appetite and good sleep  She is diagnosed with bipolar and has been inpatient many times in the past  Pt did sign a 201 for inpatient tx  [employer] was going to 302 otherwise

## 2023-02-24 NOTE — ED NOTES
Patient is accepted at [facility]  Patient is accepted by [facility] per Samantha Shown    Waiting for transport time  Patient may go to the floor after 1900      Nurse report is to be called to [phone] prior to patient transfer

## 2023-02-25 PROBLEM — F43.10 PTSD (POST-TRAUMATIC STRESS DISORDER): Status: ACTIVE | Noted: 2023-02-25

## 2023-02-25 RX ORDER — ESCITALOPRAM OXALATE 5 MG/1
5 TABLET ORAL DAILY
Status: DISCONTINUED | OUTPATIENT
Start: 2023-02-25 | End: 2023-02-27 | Stop reason: HOSPADM

## 2023-02-25 RX ADMIN — AMLODIPINE BESYLATE 10 MG: 5 TABLET ORAL at 12:45

## 2023-02-25 RX ADMIN — LISINOPRIL 10 MG: 10 TABLET ORAL at 12:44

## 2023-02-25 RX ADMIN — RISPERIDONE 2.5 MG: 2 TABLET ORAL at 21:51

## 2023-02-25 RX ADMIN — ATORVASTATIN CALCIUM 20 MG: 20 TABLET, FILM COATED ORAL at 12:45

## 2023-02-25 RX ADMIN — ESCITALOPRAM 5 MG: 5 TABLET, FILM COATED ORAL at 12:45

## 2023-02-25 NOTE — TREATMENT TEAM
02/25/23 1000   Team Meeting   Meeting Type Tx Team Meeting   Team Members Present   Team Members Present Physician;Nurse   Physician Team Member Dr Bob Escalera Team Member Zulema Deutsch   Patient/Family Present   Patient Present No   Patient's Family Present No       AM rounds-  New admit, made tik tok with suicidal ideation comment  Pt denies stating she was trying to be funny  Pt reports some depression and has been biting lip due to depression  Irritable edge  Signed 72 hour notice  Slept well       Readmit score-28

## 2023-02-25 NOTE — ED NOTES
COB completed with Darlene at [facility]  Auth# [id number]      Dunia Gerardo, [id number]  02/24/23 1958

## 2023-02-25 NOTE — PLAN OF CARE
Problem: SELF HARM/SUICIDALITY  Goal: Will have no self-injury during hospital stay  Description: INTERVENTIONS:  - Q 15 MINUTES: Routine safety checks  - Q WAKING SHIFT & PRN: Assess risk to determine if routine checks are adequate to maintain patient safety  - Encourage patient to participate actively in care by formulating a plan to combat response to suicidal ideation, identify supports and resources  Outcome: Progressing     Problem: DEPRESSION  Goal: Will be euthymic at discharge  Description: INTERVENTIONS:  - Administer medication as ordered  - Provide emotional support via 1:1 interaction with staff  - Encourage involvement in milieu/groups/activities  - Monitor for social isolation  Outcome: Progressing     Problem: ANXIETY  Goal: Will report anxiety at manageable levels  Description: INTERVENTIONS:  - Administer medication as ordered  - Teach and encourage coping skills  - Provide emotional support  - Assess patient/family for anxiety and ability to cope  Outcome: Progressing  Goal: By discharge: Patient will verbalize 2 strategies to deal with anxiety  Description: Interventions:  - Identify any obvious source/trigger to anxiety  - Staff will assist patient in applying identified coping technique/skills  - Encourage attendance of scheduled groups and activities  Outcome: Progressing     Problem: SLEEP DISTURBANCE  Goal: Will exhibit normal sleeping pattern  Description: Interventions:  -  Assess the patients sleep pattern, noting recent changes  - Administer medication as ordered  - Decrease environmental stimuli, including noise, as appropriate during the night  - Encourage the patient to actively participate in unit groups and or exercise during the day to enhance ability to achieve adequate sleep at night  - Assess the patient, in the morning, encouraging a description of sleep experience  Outcome: Progressing     Problem: SELF CARE DEFICIT  Goal: Return ADL status to a safe level of function  Description: INTERVENTIONS:  - Administer medication as ordered  - Assess ADL deficits and provide assistive devices as needed  - Obtain PT/OT consults as needed  - Assist and instruct patient to increase activity and self care as tolerated  Outcome: Progressing

## 2023-02-25 NOTE — NURSING NOTE
Pt had irritable edge in the morning  Denies SI/HI, states wanting to be discharged from the hospital and disinterested in speaking with this writer  Pt declined breakfast, later in the day pt woke and was less irritable  Attended groups and social with select peers  Statement Selected

## 2023-02-25 NOTE — CONSULTS
[address] [facility] 1965, 61 y o  female MRN: [id number]  Unit/Bed#: [facility] 251-02 Encounter: [id number]  Primary Care Provider: Liberty Dupree MD   Date and time admitted to hospital: 2/24/2023  8:16 PM    Inpatient consult for Medical Clearance for [facility] patient  Consult performed by: Lori Machado PA-C  Consult ordered by: LENNOX Ariza        Medical clearance for psychiatric admission  Assessment & Plan  · Vital signs stable at time of assessment  · CBC, CMP, TSH reviewed - WNL aside from mild leukocytosis, appears chronic  · EKG: NSR normal QTc HR 63  · Patient appears medically stable at this time for inpatient psychiatric treatment    Bipolar disorder [facility])  Assessment & Plan  · Presented to the ED after reportedly making suicidal comment on social media  · Further plan per psychiatry    Obesity (BMI 30 0-34  9)  Assessment & Plan  · BMI 34 47  · Lifestyle modifications    Hyperlipidemia  Assessment & Plan  · Continue statin    HTN (hypertension)  Assessment & Plan  · Blood pressure stable  · Continue lisinopril, amlodipine    VTE Prophylaxis:   Low Risk (Score 0-2) - Encourage Ambulation  Recommendations for Discharge:  · Follow up with PCP after discharge    Total Time Spent on Date of Encounter in care of patient: 35 minutes This time was spent on one or more of the following: performing physical exam; counseling and coordination of care; obtaining or reviewing history; documenting in the medical record; reviewing/ordering tests, medications or procedures; communicating with other healthcare professionals and discussing with patient's family/caregivers  Collaboration of Care: Were Recommendations Directly Discussed with Primary Treatment Team? No    History of Present Illness:  Zach Hillman is a 61 y o  female who is originally admitted to the psychiatry service due to suicidal ideation   We are consulted for medical clearance  Past medical history significant for bipolar disorder, hyperlipidemia, hypertension, obesity  Patient presented to the ED after reportedly making suicidal comments on social media  Currently denies any complaints  Declined physical exam, states she is tired and would like to continue sleeping  Review of Systems:  Review of Systems   Constitutional: Negative for chills, fatigue, fever and unexpected weight change  HENT: Negative for congestion, sore throat and trouble swallowing  Eyes: Negative for photophobia, pain and visual disturbance  Respiratory: Negative for cough, shortness of breath and wheezing  Cardiovascular: Negative for chest pain, palpitations and leg swelling  Gastrointestinal: Negative for abdominal pain, constipation, diarrhea, nausea and vomiting  Endocrine: Negative for polyuria  Genitourinary: Negative for difficulty urinating, dysuria, flank pain, hematuria and urgency  Musculoskeletal: Negative for back pain, myalgias, neck pain and neck stiffness  Skin: Negative for pallor and rash  Neurological: Negative for dizziness, tremors, syncope, weakness, light-headedness, numbness and headaches  Hematological: Does not bruise/bleed easily  Psychiatric/Behavioral: Positive for suicidal ideas  Negative for agitation and confusion         Past Medical and Surgical History:   Past Medical History:   Diagnosis Date   • Anxiety    • Arthritis    • Bipolar affective disorder, depressed, severe ([facility] [address] ) 4/28/2019   • Depression    • Elevated bilirubin 8/15/2019   • Hyperlipidemia    • Hypertension    • Hypokalemia 8/15/2019   • Learning difficulty    • Leukocytosis 7/28/2020   • Obesity (BMI 30 0-34 9) 6/26/2020   • Osteopenia    • Ovarian failure    • Previous known suicide attempt    • Psychiatric disorder    • Psychiatric illness        Past Surgical History:   Procedure Laterality Date   • LAPAROSCOPY      as a child, per patient-normal findings Meds/Allergies:  all medications and allergies reviewed    Allergies: Allergies   Allergen Reactions   • Other      Hay Fever   • Oxycodone-Acetaminophen GI Intolerance       Social History:  Marital Status: Single  Substance Use History:   Social History     Substance and Sexual Activity   Alcohol Use Not Currently     Social History     Tobacco Use   Smoking Status Some Days   • Packs/day: 1 00   • Years: 20 00   • Pack years: 20 00   • Types: Cigarettes   Smokeless Tobacco Never     Social History     Substance and Sexual Activity   Drug Use Not Currently       Family History:  Family History   Problem Relation Age of Onset   • Alzheimer's disease Mother    • Depression Mother    • Depression Brother    • Bipolar disorder Brother    • No Known Problems Maternal Aunt    • No Known Problems Paternal Aunt    • No Known Problems Maternal Uncle    • No Known Problems Paternal Uncle    • No Known Problems Cousin    • ADD / ADHD Neg Hx    • Alcohol abuse Neg Hx    • Anxiety disorder Neg Hx    • Dementia Neg Hx    • Drug abuse Neg Hx    • OCD Neg Hx    • Paranoid behavior Neg Hx    • Schizophrenia Neg Hx    • Seizures Neg Hx    • Self-Injury Neg Hx    • Suicide Attempts Neg Hx        Physical Exam:   Vitals:   Blood Pressure: 119/82 (02/24/23 2019)  Pulse: 56 (02/24/23 2019)  Temperature: (!) 96 4 °F (35 8 °C) (02/24/23 2019)  Temp Source: Tympanic (02/24/23 2019)  Respirations: 16 (02/24/23 2019)  Height: 5' 3" (160 cm) (02/24/23 2019)  Weight - Scale: 88 3 kg (194 lb 9 6 oz) (02/24/23 2019)  SpO2: 94 % (02/24/23 2019)    Physical Exam  Vitals and nursing note reviewed  Constitutional:       Appearance: Normal appearance  Comments: No acute distress   HENT:      Head: Normocephalic  Eyes:      General: No scleral icterus  Extraocular Movements: Extraocular movements intact        Conjunctiva/sclera: Conjunctivae normal    Cardiovascular:      Comments: Patient refused  Pulmonary:      Comments: Patient refused  Abdominal:      Comments: Patient refused   Musculoskeletal:         General: No swelling, tenderness or deformity  Cervical back: Normal range of motion  Comments: Able to move upper/lower ext bilaterally, no visible edema   Skin:     General: Skin is warm and dry  Neurological:      Mental Status: She is alert and oriented to person, place, and time  Psychiatric:         Mood and Affect: Affect is flat  Behavior: Behavior is withdrawn  Additional Data:   Lab Results:    Results from last 7 days   Lab Units 02/24/23  0546   WBC Thousand/uL 12 88*   HEMOGLOBIN g/dL 13 7   HEMATOCRIT % 41 8   PLATELETS Thousands/uL 283   NEUTROS PCT % 61   LYMPHS PCT % 27   MONOS PCT % 8   EOS PCT % 2     Results from last 7 days   Lab Units 02/24/23  0816   SODIUM mmol/L 142   POTASSIUM mmol/L 4 1   CHLORIDE mmol/L 111*   CO2 mmol/L 26   BUN mg/dL 13   CREATININE mg/dL 0 79   ANION GAP mmol/L 5   CALCIUM mg/dL 9 1   ALBUMIN g/dL 3 2*   TOTAL BILIRUBIN mg/dL 0 49   ALK PHOS U/L 79   ALT U/L 21   AST U/L 13   GLUCOSE RANDOM mg/dL 114             Lab Results   Component Value Date/Time    HGBA1C 5 6 04/29/2019 06:22 AM               Imaging: No pertinent imaging reviewed  No orders to display       EKG, Pathology, and Other Studies Reviewed on Admission:   · EKG: NSR  HR 63     ** Please Note: This note may have been constructed using a voice recognition system   **

## 2023-02-25 NOTE — NURSING NOTE
Msia scored a moderate risk on CSSRS LT assessment and low risk on reassessment  She presently denies SI and reports she was never suicidal to begin with  Pt expresses desire to live for her 2 cats and denies any current plans or intentions to harm herself  At this time provider concurred with this RN, 1:1 not needed, q7 min rounds are maintained

## 2023-02-25 NOTE — ASSESSMENT & PLAN NOTE
· Presented to the ED after reportedly making suicidal comment on social media  · Further plan per psychiatry
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
## 2023-02-25 NOTE — NURSING NOTE
Pt has been visible this afternoon, watching tv with peers  Pleasant during interaction  Denies SI, HI, AVH  Reporting improvement in mood  Denies questions or concerns at this time  car
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
## 2023-02-25 NOTE — ASSESSMENT & PLAN NOTE
· Vital signs stable at time of assessment  · CBC, CMP, TSH reviewed - WNL aside from mild leukocytosis, appears chronic  · EKG: NSR normal QTc HR 63  · Patient appears medically stable at this time for inpatient psychiatric treatment

## 2023-02-25 NOTE — NURSING NOTE
Patient belongings in locker:      1 cell phone  1   1 SCORPIO necklace  1 pink ring  1 change purse with key attatched  $6 28 change  1 [place] EBT card  1 pair pink slippers with gold chunky chain  1 pair rainbow Scetchers  1 pair pink pj bottems (with strings)  1 maroon coat   1 pair red plaid PJ bottems (with strings)  1 pair black shiney pants  1 flower (cut-out) shirt          Patient has at bedside:    1 green AERO shirt  1 grey tshirt  1 black white and grey camo shirt  1 white tshirt  1 pair black leggings  1 pink fuzzy sweatshirt  1 pair cheetah print leggings  1 pair blue jeans  1 white bra  1 hot pink bra  3 pairs underwear  3 pairs white socks  1 hospital pair socks

## 2023-02-26 RX ADMIN — RISPERIDONE 2.5 MG: 2 TABLET ORAL at 21:35

## 2023-02-26 RX ADMIN — AMLODIPINE BESYLATE 10 MG: 5 TABLET ORAL at 09:25

## 2023-02-26 RX ADMIN — ESCITALOPRAM 5 MG: 5 TABLET, FILM COATED ORAL at 09:25

## 2023-02-26 RX ADMIN — LISINOPRIL 10 MG: 10 TABLET ORAL at 09:25

## 2023-02-26 RX ADMIN — ATORVASTATIN CALCIUM 20 MG: 20 TABLET, FILM COATED ORAL at 09:25

## 2023-02-26 NOTE — TREATMENT TEAM
02/26/23 0700   Team Meeting   Meeting Type Daily Rounds   Team Members Present   Team Members Present Physician;Nurse   Physician Team Member Dr Todd Leslie Team [facility]   Patient/Family Present   Patient Present No   Patient's Family Present No       AM rounds- irritable in the morning however more pleasant throughout the day  Denies SI/HI, poor insight into admission and states wanting to be discharged Monday  Pt social with peers and attends groups  Slept well

## 2023-02-26 NOTE — NURSING NOTE
Pt denies SI/HI, remains pleasant and social on the unit  Pt reports sleeping well overnight  Attends some groups  Minimal in conversation however remains cooperative  Denies any questions at this time

## 2023-02-26 NOTE — PROGRESS NOTES
Progress Note - Behavioral Health   Peggy Valenzuela 61 y o  female MRN: [id number]  Unit/Bed#: [facility] 251-02 Encounter: [id number]    Assessment/Plan   Principal Problem:    Bipolar disorder ([address] )  Active Problems:    HTN (hypertension)    Hyperlipidemia    Obesity (BMI 30 0-34  9)    Medical clearance for psychiatric admission    Anxiety    PTSD (post-traumatic stress disorder)    Recommended Treatment:   · Continue Risperdal 2 5 mg at bedtime for mood stability  · Continue Lexapro 5 mg daily for depressive symptoms  · Misa signed a 72-hour release form on 2/24/23  · Continue with group therapy, milieu therapy and occupational therapy  · Continue frequent safety checks and vitals per unit protocol  Case discussed with treatment team   Risks, benefits and possible side effects of Medications: Risks, benefits, and possible side effects of medications have been explained to the patient, who verbalizes understanding       ------------------------------------------------------------  Chief Complaint:  "I feel better"    Interval History: Per staff, patient slept through the night, has been pleasant, social with peers and reporting improvement in mood  Patient reports she is going to get rid of TikTok and doesn't feel the same way she did when she sent the [employer] messages  She reports feeling much better, denies depressive symptoms, reports sleeping well, reports enjoying socialization on the unit  She has no side effects from medication increase and reports that she would like to be discharged tomorrow  Medication compliant  Adverse effects of medications: Denies    Progress Toward Goals: slow improvement    Psychiatric Review of Systems:  Behavior over the last 24 hours: improved  Sleep: normal  Appetite: good  Medication side effects: none verbalized  ROS: Complete review of systems is negative except as noted above      Vital signs in last 24 hours:  Temp:  [97 5 °F (36 4 °C)-98 2 °F (36 8 °C)] 98 2 °F (36 8 °C)  HR:  [71-79] 79  Resp:  [16-18] 18  BP: (105-138)/(77-96) 111/82    Mental Status Evaluation:  Appearance:  alert, good eye contact, appears stated age, casually dressed and appropriate grooming and hygiene   Behavior:  calm and cooperative   Attitude:  pleasant and cooperative   Speech:  spontaneous, hyperverbal and coherent   Mood:  "Good"   Affect:  mood-congruent   Thought Process:  generally linear and goal-directed but Circumstantial at times   Thought Content: no verbalized delusions or overt paranoia   Perceptual disturbances: no reported hallucinations and does not appear to be responding to internal stimuli at this time   Risk Potential: No active or passive suicidal or homicidal ideation was verbalized during interview   Cognition: oriented to self and situation, appears to be of average intelligence and cognition not formally tested   Insight:  Improving   Judgment: Improving     Current Medications:  Current Facility-Administered Medications   Medication Dose Route Frequency Provider Last Rate   • amLODIPine  10 mg Oral Daily HUDSON DesaiNP     • atorvastatin  20 mg Oral Daily Peyton HUDSON DillardNP     • haloperidol lactate  2 5 mg Intramuscular Q4H PRN Max 6/day LENNOX Desai      And   • LORazepam  1 mg Intramuscular Q4H PRN Max 6/day LENNOX Desai      And   • benztropine  0 5 mg Intramuscular Q4H PRN Max 6/day Peyton HUDSON AndrewsNP     • haloperidol lactate  5 mg Intramuscular Q4H PRN Max 4/day Peyton HUDSON AndrewsNP      And   • LORazepam  2 mg Intramuscular Q4H PRN Max 4/day Peyton HUDSON AndrewsNP      And   • benztropine  1 mg Intramuscular Q4H PRN Max 4/day Peyton HUDSON AndrewsNP     • benztropine  1 mg Intramuscular Q4H PRN Max 6/day Peyton HUDSON AndrewsNP     • benztropine  1 mg Oral Q4H PRN Max 6/day Peyton HUDSON AndrewsNP     • hydrOXYzine HCL  50 mg Oral Q6H PRN Max 4/day Peyton LENNOX Dillard      Or   • diphenhydrAMINE  50 mg Intramuscular Q6H PRN Peyton Stacy Sumi, CRNP     • escitalopram  5 mg Oral Daily Crawford Nose, DO     • hydrOXYzine HCL  100 mg Oral Q6H PRN Max 4/day Peytonjohnny Kumar Sumi, CRNP      Or   • LORazepam  2 mg Intramuscular Q6H PRN Peyton Epptasha Sumi, CRNP     • hydrOXYzine HCL  25 mg Oral Q6H PRN Max 4/day Peyton Joelle Saida, CRNP     • ibuprofen  400 mg Oral Q4H PRN Peyton Epptasha Sumi, CRNP     • ibuprofen  600 mg Oral Q6H PRN Peyton Epptasha Sumi, CRNP     • ibuprofen  800 mg Oral Q8H PRN Peyton Epptasha Sumi, CRNP     • lisinopril  10 mg Oral Daily Peyton Epptasha Sumi, CRNP     • nicotine polacrilex  2 mg Oral Q2H PRN Peyton Epptasha Sumi, CRNP     • propranolol  10 mg Oral Q8H PRN Peyton Stacy Sumi, CRNP     • risperiDONE  0 5 mg Oral Q4H PRN Max 6/day Peyton Joelle Saida, CRNP     • risperiDONE  1 mg Oral Q4H PRN Max 3/day Peyton Joelle [place], CRNP     • risperiDONE  2 mg Oral Q4H PRN Max 3/day Peytonjohnny Lai Saida, CRNP     • risperiDONE  2 5 mg Oral HS Crawford Nose, DO     • traZODone  50 mg Oral HS PRN Peytonjohnny Kumar Sumi, CRNP         Behavioral Health Medications: all current active meds have been reviewed  Changes as in plan section above  Laboratory results:  I have personally reviewed all pertinent laboratory/tests results  Recent Results (from the past 48 hour(s))   FLU/RSV/COVID - if FLU/RSV clinically relevant    Collection Time: 02/24/23 10:03 AM    Specimen: Nose; Nares   Result Value Ref Range    SARS-CoV-2 Negative Negative    INFLUENZA A PCR Negative Negative    INFLUENZA B PCR Negative Negative    RSV PCR Negative Negative        This note has been constructed using a voice recognition system  There may be translation, syntax, or grammatical errors  If you have any questions, please contact the dictating provider

## 2023-02-26 NOTE — NURSING NOTE
Pt awake and visible throughout much of the day  Laying in room since Art group finished, awaiting dinner  Denies SI, HI, AVH  Mood continues to improve  No questions or concerns at this time

## 2023-02-26 NOTE — TREATMENT PLAN
TREATMENT PLAN REVIEW - Behavioral Health Silvana Ball 61 y o  1965 female MRN: [id number]    [address] [place] [address] Room / Bed: Beatriz To [facility] 815-77 Encounter: [id number]          Admit Date/Time:  2/24/2023  8:16 PM    Treatment Team: Attending Provider: June Banda MD; Consulting Physician: Davis Gonzalez MD; Patient Care Assistant: Mery Trevino; Patient Care Assistant: Zahraa Kwong; Patient Care Assistant: Pernell Dykes; Registered Nurse: Sunny Perales, RN; Registered Nurse: Tyrel Ocampo, SHORTY; Licensed Practical Nurse: Susan Sutton LPN; Patient Care Technician: Ebonie Butt    Diagnosis: Principal Problem:    Bipolar disorder [facility])  Active Problems:    HTN (hypertension)    Hyperlipidemia    Obesity (BMI 30 0-34  9)    Medical clearance for psychiatric admission    Anxiety    PTSD (post-traumatic stress disorder)      Patient Strengths/Assets: capable of independent living, cooperative, communication skills, compliant with medication, financial means, good past treatment response, patient is willing to work on problems, well educated    Patient Barriers/Limitations: limited family ties, low self esteem, poor support system    Short Term Goals: decrease in depressive symptoms, decrease in anxiety symptoms, decrease in suicidal thoughts, mood stabilization    Long Term Goals: improvement in depression, improvement in anxiety, stabilization of mood, free of suicidal thoughts    Progress Towards Goals: continue psychiatric medications as prescribed    Recommended Treatment: medication management, patient medication education, group therapy, milieu therapy, continued Behavioral Health psychiatric evaluation/assessment process    Treatment Frequency: daily medication monitoring, group and milieu therapy daily, monitoring through interdisciplinary rounds, monitoring through weekly patient care conferences    Expected Discharge Date: TBD    Discharge Plan: referrals as indicated, return to previous living arrangement    Treatment Plan Created/Updated By: Audrey Giron DO

## 2023-02-27 VITALS
DIASTOLIC BLOOD PRESSURE: 76 MMHG | WEIGHT: 191.8 LBS | RESPIRATION RATE: 17 BRPM | TEMPERATURE: 97.8 F | SYSTOLIC BLOOD PRESSURE: 108 MMHG | BODY MASS INDEX: 33.98 KG/M2 | HEIGHT: 63 IN | OXYGEN SATURATION: 100 % | HEART RATE: 67 BPM

## 2023-02-27 PROBLEM — Z00.8 MEDICAL CLEARANCE FOR PSYCHIATRIC ADMISSION: Status: RESOLVED | Noted: 2020-08-13 | Resolved: 2023-02-27

## 2023-02-27 RX ORDER — ESCITALOPRAM OXALATE 5 MG/1
5 TABLET ORAL DAILY
Qty: 30 TABLET | Refills: 0 | Status: SHIPPED | OUTPATIENT
Start: 2023-02-27 | End: 2023-03-29

## 2023-02-27 RX ORDER — AMLODIPINE BESYLATE 10 MG/1
10 TABLET ORAL DAILY
Qty: 30 TABLET | Refills: 0 | Status: SHIPPED | OUTPATIENT
Start: 2023-02-27 | End: 2023-03-29

## 2023-02-27 RX ORDER — ATORVASTATIN CALCIUM 20 MG/1
20 TABLET, FILM COATED ORAL DAILY
Qty: 30 TABLET | Refills: 0 | Status: SHIPPED | OUTPATIENT
Start: 2023-02-27 | End: 2023-03-29

## 2023-02-27 RX ORDER — RISPERIDONE 0.5 MG/1
0.5 TABLET ORAL
Qty: 30 TABLET | Refills: 0 | Status: SHIPPED | OUTPATIENT
Start: 2023-02-27 | End: 2023-03-29

## 2023-02-27 RX ORDER — LISINOPRIL 10 MG/1
10 TABLET ORAL DAILY
Qty: 30 TABLET | Refills: 0 | Status: SHIPPED | OUTPATIENT
Start: 2023-02-27 | End: 2023-03-29

## 2023-02-27 RX ORDER — RISPERIDONE 2 MG/1
2 TABLET ORAL
Qty: 30 TABLET | Refills: 0 | Status: SHIPPED | OUTPATIENT
Start: 2023-02-27 | End: 2023-03-29

## 2023-02-27 RX ADMIN — ATORVASTATIN CALCIUM 20 MG: 20 TABLET, FILM COATED ORAL at 09:10

## 2023-02-27 RX ADMIN — ESCITALOPRAM 5 MG: 5 TABLET, FILM COATED ORAL at 09:10

## 2023-02-27 NOTE — NURSING NOTE
AVS reviewed and prescriptions given  Pt expresses understanding of all  Denies any further questions or concerns  Pt discharged from unit via [facility] transport

## 2023-02-27 NOTE — DISCHARGE INSTR - APPOINTMENTS
Ricky Metz or Gertrude, our [place] and Vandana, will be calling you after your discharge, on the phone number that you provided  They will be available as an additional support, if needed  If you wish to speak with one of them, you may contact Augusto Cano at [phone] or Jose Armando Saravia at [phone]  You are being discharged home to [address] [place], [place] [address]Mir, [address] [place] [address]  You provided cell phone [phone], as the best way to contact you

## 2023-02-27 NOTE — TREATMENT TEAM
02/27/23 4183   Activity/Group Checklist   Group Community meeting   Attendance Attended   Attendance Duration (min) 0-15   Interactions Interacted appropriately   Affect/Mood Appropriate  (Came late so missed filling out goals  Participated in icebreaker )   Goals Achieved Able to listen to others; Able to engage in interactions

## 2023-02-27 NOTE — DISCHARGE SUMMARY
Discharge Summary - [facility] 61 y o  female MRN: [id number]  Unit/Bed#: Beatriz To 251-02 Encounter: [id number]     Admission Date: 2/24/2023         Discharge Date: 2/27/23    Attending Psychiatrist: Claude Burn*    Reason for Admission/HPI:     Per initial H&P from Dr Marty Hi on 2/25/23:    Rosa Mcdermott is a 61 y o  single, caucasion female on permanent disability, extensive history of past hospitalizations, connected with [facility], with past medical hx of HLD, and HTN and past psychiatric history of bipolar; currently depressed who presents voluntarily for suicidal thoughts and threats      Per ED provider Dr Kelsea Lujan on 2/24/23:  " Patient is a 77-year-old female, with a history significant for bipolar affective disorder, who was brought to the ED today, via EMS and police, due to suicidal statements made on TikTok   Per EMS/police, patient stated that she was alone on TikTok and made suicidal statements   This prompted the algorithm to alert the FBI who involved local police  Hugo Oconnor state that they will 302 patient if she does not sign 12   Currently, patient is without complaints except for wanting to leave the emergency department at this time  Chiqui Levi denies suicidal ideation and states that she was " just messing around" when she made the statements on TikTok   Patient willing to sign 201   Patient states that she has been compliant with her medication regimen  Patient denies fever, dysphagia, vision change, chest pain, dyspnea, abdominal pain, urinary symptoms, weakness, numbness, any other concerns at this time  "     Per Crisis worker Milton Orourke on 2/24/23:  " Pt was brought to the ed by [employer] after pt made tik tok videos implying she was suicidal  Pt denies that she was suicidal and claims she was just joking  Pt is irritable about being in the ed but overall cooperative at this time  Pt denies homicidal ideation as well as hallucinations  She claims to be med compliant   Pt reports good appetite and good sleep  She is diagnosed with bipolar and has been inpatient many times in the past  Pt did sign a 201 for inpatient tx  [employer] was going to 302 otherwise   "     Misa states that she was on TikTok live and writing messages that stated "I don't want to live anymore    I have no purpose    no meaning " She reports sending these messages and then sent her home address, someone from the [employer] contacted that  and they showed up at her house  States that she told police she was feeling depressed, but did not intend to kill herself  She admitted to police that she was lonely  Misa reported that she explained to police that she had not done anything to hurt herself and she did not want to be hospitalized  Misa reports she felt depressed because she lives alone and says she has been focusing on the things she does not have in her life (no children, lack of support, unmarried)  Misa states that her feelings of depression come and go  She denies missing doses of Risperdal, states she was taken off of Prozac in 11/2022  Reports that depressive symptoms were managed on Prozac but after manic episode this was discontinued  Misa states she follows with a psychiatrist, therapist, and ICM but has no other supports "      Social History     Tobacco History     Smoking Status  Some Days Smoking Frequency  1 pack/day for 20 00 years (20 00 pk-yrs) Smoking Tobacco Type  Cigarettes    Smokeless Tobacco Use  Never          Alcohol History     Alcohol Use Status  Not Currently          Drug Use     Drug Use Status  Not Currently          Sexual Activity     Sexually Active  Yes Partners  Male Birth Control/Protection  None          Activities of Daily Living    Not Asked               Additional Substance Use Detail     Questions Responses    Problems Due to Past Use of Alcohol? Yes    Problems Due to Past Use of Substances?  No    Substance Use Assessment Denies substance use within the past 12 months    Alcohol Use Frequency Past abuse    Cannabis frequency Past regular use    Comment: Denies use in past 12 months ->"" on 5/1/2019 Never used on 5/3/2019 Never used -> Past regular use on 2/24/2023     Heroin Frequency Denies use in past 12 months    Cannabis method Smoke    Comment:  Smoke on 2/24/2023     Last Use of Alcohol & Amount "years ago" - 2/24/23    Cannabis last use     Comment: "years ago" -2/24/23     Cocaine frequency Never used    Comment:  Denies past use in past 12 months -> Never used on 8/17/2022     Crack Cocaine Frequency Denies use in past 12 months    Methamphetamine Frequency Denies use in past 12 months    Methamphetamine Last Use & Amount "years ago" -2/24/23    Narcotic Frequency Denies use in past 12 months    Benzodiazepine Frequency Denies use in past 12 months    Amphetamine frequency Denies use in past 12 months    Barbituate Frequency Denies use use in past 12 months    Inhalant frequency Never used    Comment:  Denies use in past 12 months -> Never used on 8/17/2022     Hallucinogen frequency Never used    Comment:  Denies use in past 12 months -> Never used on 8/17/2022     Hallucinogen last use     Comment: "years ago" -2/24/23     Ecstasy frequency Never used    Comment:  Denies use past 12 months -> Never used on 8/17/2022     Other drug frequency Never used    Comment:  Denies use in past 12 months -> Never used on 8/17/2022     Opiate frequency Denies use in past 12 months    Last reviewed by Gene Joe RN on 2/24/2023          Past Medical History:   Diagnosis Date   • Anxiety    • Arthritis    • Bipolar affective disorder, depressed, severe ([facility] [address] ) 4/28/2019   • Depression    • Elevated bilirubin 8/15/2019   • Hyperlipidemia    • Hypertension    • Hypokalemia 8/15/2019   • Learning difficulty    • Leukocytosis 7/28/2020   • Obesity (BMI 30 0-34 9) 6/26/2020   • Osteopenia    • Ovarian failure    • Previous known suicide attempt    • Psychiatric disorder    • Psychiatric illness • PTSD (post-traumatic stress disorder) 2/25/2023     Past Surgical History:   Procedure Laterality Date   • LAPAROSCOPY      as a child, per patient-normal findings       Medications: All current active medications have been reviewed  Medications prior to admission:    Prior to Admission Medications   Prescriptions Last Dose Informant Patient Reported? Taking? Cholecalciferol (Vitamin D3) 50 MCG (2000 UT) capsule   No No   Sig: Take 1 capsule (2,000 Units total) by mouth daily   LORazepam (ATIVAN) 0 5 mg tablet   No No   Sig: TAKE 1 TABLET (0 5 MG TOTAL) BY MOUTH EVERY 8 (EIGHT) HOURS AS NEEDED FOR ANXIETY   amLODIPine (NORVASC) 10 mg tablet   No No   Sig: Take 1 tablet (10 mg total) by mouth daily   atorvastatin (LIPITOR) 20 mg tablet   No No   Sig: Take 1 tablet (20 mg total) by mouth daily   lisinopril (ZESTRIL) 10 mg tablet   No No   Sig: Take 1 tablet (10 mg total) by mouth daily   nicotine polacrilex (COMMIT) 4 MG lozenge   No No   Sig: Use lozenge every 1-2 hours as needed x 6 weeks (no more than 20 lozenges/24 hours); then 1 lozenge every 2-4 hours x 3 weeks, then 1 lozenge every 4-8 hours x 3 weeks   risperiDONE (RisperDAL) 2 mg tablet   No No   Sig: Take 1 tablet (2 mg total) by mouth daily at bedtime      Facility-Administered Medications: None       Allergies: Allergies   Allergen Reactions   • Other      Hay Fever   • Oxycodone-Acetaminophen GI Intolerance       Objective     Vital signs in last 24 hours:    Temp:  [96 7 °F (35 9 °C)-97 8 °F (36 6 °C)] 97 8 °F (36 6 °C)  HR:  [67-70] 67  Resp:  [16-17] 17  BP: (107-118)/(46-76) 108/76      Intake/Output Summary (Last 24 hours) at 2/27/2023 1253  Last data filed at 2/26/2023 2201  Gross per 24 hour   Intake 240 ml   Output --   Net 240 ml       Hospital Course:     Misa was admitted to the inpatient psychiatric unit and started on Behavioral Health checks every 7 minutes   During the hospitalization she was encouraged to attend individual therapy, group therapy, milieu therapy and occupational therapy  Psychiatric medications were adjusted over the hospital stay  To address depressive symptoms and self-abusive behavior, Misa was treated with antidepressant Lexapro and antipsychotic medication Risperdal  Medication doses were adjusted during the hospital course  Lexapro was added at the dose of 5mg daily  Risperdal was adjusted to 2 5mg qhs  Prior to beginning of treatment medications risks and benefits and possible side effects including risk of parkinsonian symptoms, Tardive Dyskinesia and metabolic syndrome related to treatment with antipsychotic medications and risk of suicidality and serotonin syndrome related to treatment with antidepressants were reviewed with Misa  She verbalized understanding and agreement for treatment  Upon admission Misa was seen by medical service for medical clearance for inpatient treatment and medical follow up  Misa's symptoms slowly improved over the hospital course  Initially after admission she was still feeling depressed and frustrated  With adjustment of medications and therapeutic milieu her symptoms gradually resolved  Patient signed a 72 hour notice voluntarily withdrawing herself from treatment  At the end of treatment Misa was improved  Her mood was more stable at the time of discharge  Misa denied suicidal ideation, intent or plan at the time of discharge and denied homicidal ideation, intent or plan at the time of discharge  There was no overt psychosis at the time of discharge  Misa was participating appropriately in milieu at the time of discharge  Behavior was appropriate on the unit at the time of discharge  Sleep and appetite were improved  Misa was tolerating medications and was not reporting any significant side effects at the time of discharge  Misa signed 72 hour notice and requested discharge   At the time of the 72 hour notice expiration Misa had no criteria for involuntary commitment and denied any suicidal or homicidal ideation  Patient was attending to all ADLs, taking medications appropriately, eating meals, and not demonstrating any clinical stigmata of acute psychosis  At the time of discharge, they were goal oriented, forward thinking and optimistic about the future  The outpatient follow up with Intensive  [facility] was arranged by the unit  upon discharge  Mental Status at Time of Discharge:      Appearance: casually dressed, appears older than stated age, normal grooming  Motor: no psychomotor disturbances, no gait abnormalities  Behavior: calm, cooperative, interacts with this writer appropriately  Speech: slow, loud  Mood: "I'm feeling better" no longer depressed  Affect: brighter, mood-congruent  Thought Process: organized, linear, and goal-oriented; intact associations  Thought Content: denies any delusional material, no preoccupation, minimizing events leading to hospitalization  Perception: denies any auditory or visual hallucinations, denies other perceptual disturbances  Risk Potential: adamantly denies suicidal ideation, plan, or intent  Denies homicidal ideation  Sensorium: Oriented to person, place, time, and situation  Cognition: cognitive ability appears slightly below average but was not quantitatively tested  Consciousness: alert and awake  Attention/Concentration: able to focus without difficulty, attention and concentration are shorter than expected for age  Insight: limited but improved  Judgement: fair    Admission Diagnosis:    Principal Problem:    Bipolar disorder ([facility] )  Active Problems:    HTN (hypertension)    Hyperlipidemia    Obesity (BMI 30 0-34  9)    Anxiety    PTSD (post-traumatic stress disorder)      Discharge Diagnosis:     Principal Problem:    Bipolar disorder ([facility] )  Active Problems:    HTN (hypertension)    Hyperlipidemia    Obesity (BMI 30 0-34  9)    Anxiety    PTSD (post-traumatic stress disorder)  Resolved Problems:    Medical clearance for psychiatric admission      Lab Results:   I have personally reviewed all pertinent laboratory/tests results  Most Recent Labs:   Lab Results   Component Value Date    WBC 12 88 (H) 02/24/2023    RBC 4 76 02/24/2023    HGB 13 7 02/24/2023    HCT 41 8 02/24/2023     02/24/2023    RDW 13 2 02/24/2023    NEUTROABS 7 93 (H) 02/24/2023    SODIUM 142 02/24/2023    K 4 1 02/24/2023     (H) 02/24/2023    CO2 26 02/24/2023    BUN 13 02/24/2023    CREATININE 0 79 02/24/2023    GLUC 114 02/24/2023    GLUF 111 (H) 11/19/2022    CALCIUM 9 1 02/24/2023    AST 13 02/24/2023    ALT 21 02/24/2023    ALKPHOS 79 02/24/2023    TP 6 7 02/24/2023    ALB 3 2 (L) 02/24/2023    TBILI 0 49 02/24/2023    CHOLESTEROL 225 (H) 11/19/2022    HDL 45 (L) 11/19/2022    TRIG 163 (H) 11/19/2022    LDLCALC 147 (H) 11/19/2022    NONHDLC 180 11/19/2022    VLM4LEJKSOJB 2 750 02/24/2023    PREGUR negative 08/16/2022    PREGSERUM Negative 08/13/2020    RPR Non-Reactive 11/19/2022    HGBA1C 5 6 04/29/2019     04/29/2019       Discharge Medications:    See after visit summary for all reconciled discharge medications provided to patient and family  Discharge instructions/Information to patient and family:     See after visit summary for information provided to patient and family  Provisions for Follow-Up Care:    See after visit summary for information related to follow-up care and any pertinent home health orders  Discharge Statement:    I spent 35 minutes discharging the patient  This time was spent on the day of discharge  I had direct contact with the patient on the day of discharge  Additional documentation is required if more than 30 minutes were spent on discharge:    I reviewed with Misa importance of compliance with medications and outpatient treatment after discharge    I discussed the medication regimen and possible side effects of the medications with Misa prior to discharge  At the time of discharge she was tolerating psychiatric medications  I discussed outpatient follow up with Misa  I reviewed with Misa crisis plan and safety plan upon discharge  Misa was competent to understand risks and benefits of withholding information and risks and benefits of her actions  Misa signed 72 hour notice and requested discharge  At the time of the 72 hour notice expiration she had no criteria for involuntary commitment and denied any suicidal or homicidal ideation      Discharge on Two Antipsychotic Medications: No    Zahraatori Davis PA-C 02/27/23

## 2023-02-27 NOTE — PROGRESS NOTES
Patient is a 11 from [place]  Patient has a reported history significant for bipolar affective disorder  Patient was taken to the ED via EMS and police, due to suicidal statements made on TikTok  Per EMS/police, patient stated that she was alone on TikTok and made suicidal statements  As per ED note "This prompted the algorithm to alert the FBI who involved local police  Police state that they will 302 patient if she does not sign 201 "    Patient denies SI/HI  UDS, PAWSS, BAT, and AUDIT reviewed  Discharge Date: 2/27/23 02/27/23 0406   Team Meeting   Meeting Type Daily Rounds   Team Members Present   Team Members Present Physician;Nurse;; Other (Discipline and Name)   Physician Team Member Nida Dawn/ PA Student   Nursing Team Member [address] [place] [address] Management Team Member Luis Fernando/ Opal Drake   Other (Discipline and Name) Art Therapist, Jam/ Art Therapy Intern   Patient/Family Present   Patient Present No   Patient's Family Present No

## 2023-02-27 NOTE — DISCHARGE INSTR - OTHER ORDERS
Crisis Intervention services are available 24 hours a day by calling [phone]  The crisis unit is located at [place] [address]  Services include telephone counseling, mobile crisis, walk-in crisis, and assistance in accessing inpatient care and short-term crisis residential services  The PEER LINE is a toll-free phone number for people in [facility] who are seeking a listening ear for additional support in their recovery from mental illness  The PEER LINE is peer-run and peer-friendly  Callers to the [address] will speak directly to other individuals who have experienced the mental health recovery process  Hours of operation: 8:30 am - 4:30 pm, Monday through Friday [phone])   [facility]  [address] [place] [address]   [id number] [place] [address], [address]    Text CONNECT to 626609 from anywhere in the [place], anytime, about any type of crisis  A live, trained Crisis Counselor receives the text and lets you know that they are here to listen  The volunteer Crisis Counselor will help you move from a hot moment to a cool moment  Warm Line: [phone], [phone], [phone]  If it is not quite a crisis, but you want to talk to someone, 24 hours/day, 7 days/week:  Someone to listen; someone who cares  The [facility] on [facility]ness ([facility]) offers various education & support groups for you & your family  For more information visit their website at http://www [employer]/     Dial 2-1-1 to get connected/get help  Free, confidential information & referral available 24/7: Aging Services, Child & Youth Services, Counseling, Education/Training, Food/Shelter/Clothing, Health Services, Parenting, Substance Abuse, Support Groups, Volunteer Opportunities, & much more    Phone: 2-1-1 or [phone], Web: Gina Hector, Email: Law@[employer]    The [address] are open to all mental health consumers in [facility] who are interested in meeting people and making new friends  They provide a friendly social atmosphere with scheduled daily activities including games, arts & crafts, discussion and education groups, vocational activities, and much more  Light refreshments are served daily  The locations are:    [facility] - operated by [address]   [address] [facility] [address]   Phone: [phone]   Fax: [phone]   E-mail: [email or web address]  Hours of Operation:  Monday 3:00 PM - 8:00 PM  Tuesday 12:00 PM - 8:00 PM   Wednesday 3:00 PM - 8:00 PM  Thursday 12:00 PM - 8:00 PM   Friday 3:00 PM - 8:00 PM   Saturday Closed     [facility] - operated by [place] [address]  Phone: [phone]  Fax: [phone]  Hours of Operation  Monday 12:00 PM - 8:00 PM  Tuesday 12:00 PM - 8:00 PM  Wednesday 12:00 PM - 8:00 PM   Thursday 12:00 PM - 8:00 PM  Friday 12:00 PM - 9:00 PM   Saturday 11:00 AM - 4:00 PM  [employer] transportation is available on scheduled days for transportation  Psych Rehab Program:  Modeled after the [employer] program in [place], the [employer] offers consumers interested in fulfilling work a guaranteed place to come, to belong, and to enjoy meaningful relationships as they seek the confidence and skills necessary to lead vocationally productive and socially satisfying lives  Here is their contact information:  [address], [address] [place] [address]  Phone: [phone]  [employer]  com  This site is open Monday thru Thursday from 8:30 am till 4:00 pm and Fridays from 8:30 am till 3:00 pm  Consumers are encouraged to stop by for a visit  After-hour social activities are also scheduled  Support & Referral Helpline  Support and Referral Helpline is a 24-hour, 7 days-a-week, listening and referral service provided to all of [place]    Please call [phone] or Y [phone] to speak with one of our specialists  The helpline is possible through partnerships with the [employer]  The [employer] (formerly known as the [employer]) provides free and confidential emotional support to people in suicidal crisis or emotional distress 24 hours a day, 7 days a week, across the [place]  The Lif[facility] is comprised of a national network of over 200 local crisis centers, combining custom local care and resources with national standards and best practices

## 2023-02-27 NOTE — TREATMENT TEAM
02/27/23 0915   Activity/Group Checklist   Group Admission/Discharge   Attendance Attended   Attendance Duration (min) 0-15   Interactions Interacted appropriately   Affect/Mood Appropriate   Goals Achieved Identified relapse prevention strategies; Other (Comment)  (Filled out relapse prevention plan form with this therapist )

## 2023-02-27 NOTE — BH TRANSITION RECORD
Contact Information: If you have any questions, concerns, pended studies, tests and/or procedures, or emergencies regarding your inpatient behavioral health visit  Please contact [facility] [phone] and ask to speak to a , nurse or physician  A contact is available 24 hours/ 7 days a week at this number  Summary of Procedures Performed During your Stay:  Below is a list of major procedures performed during your hospital stay and a summary of results:  - Cardiac Procedures/Studies: ekg - normal sinus rhythm  Pending Studies (From admission, onward)    None        Please follow up on the above pending studies with your PCP and/or referring provider

## 2023-02-27 NOTE — CASE MANAGEMENT
CM left a voicemail message for patient's mental health providers requesting a call back to confirm patient's appointments  Patient attends [facility], [address] , [address] [facility], [facility], [phone]

## 2023-02-27 NOTE — TREATMENT TEAM
02/27/23 1000   Activity/Group Checklist   Group Exercise  (30 min walk)   Attendance Attended   Attendance Duration (min) 16-30   Interactions Interacted appropriately   Affect/Mood Appropriate   Goals Achieved Able to engage in interactions; Other (Comment)  (walked laps on unit)

## 2023-02-27 NOTE — CASE MANAGEMENT
Intake    READMIT SCORE:     Patient denies any substance use or alcohol use presently  Admit Date/Time:     2/24/23 8:16 pm  Discharge: 2/27/23    Confirmed Address:      [facility]:  [facility] [address]   Resides in the home with:     alone    Will Return Home at Discharge:     Address listed above    Confirmed Phone Number:     [phone]    Confirmed Email Address:     [email or web address]     Marital Status:   Children:     Single   No children    Commitment Status:      Status Changes:   201   Signed a 72     Admitted from:     [address] [facility] [place]     Presenting C/O:              Patient reports feeling depressed and hopeless and making comments on TIK [employer]K that led to the EMS and [employer] showing up at her home     Presently patient denies SI or any intentions to self-harm      Past Inpatient Tx:     At least 2 other times that patient can recall         Past Suicide Attempts:     20 plus years ago (via cutting)     Current outpatient:  [address] [place] [address], Jack [address], [place], [address] [address]   Psychiatrist:     Patient reports seeing Psychiatrist at [address] [place] [address]  Patient could not recall the name (CM left a voicemail at this facility)    Therapist:     Patient reports seeing a therapist at [address] [place] [address]  Patient could not recall the name (CM left a voicemail at this facility)    ACT/ICM/CPS/WRT/SC:     [facility] ICM Program (enrolled)    Medications:      Pharmacy:  Norvasc 10 mg, Risperdal 2 mg       [id number] [place] [address] - [address]    Work/Income:      Preferred time for appts:  SSI/SSD $900/monthly      No preference    Legal:       Probation/[place] Ofc:  n/a     Access to Firearms:     No     Transportation:     Takes public transportation     Referrals Needed:        NO    Transport at Discharge:     Will be arranged by CM     IMM:       Emergency Contact:      n/a    ROIs obtained:        [place] ([facility])     Insurance:      [facility] [employer]   [employer]  [facility] MEDICARE ASSURED          Audit:    0  PAWSS0    UDS: Negative

## 2023-02-27 NOTE — NURSING NOTE
Pt is pleasant and cooperative during interaction this morning  Reports readiness for discharge  Denies SI/HI/AVH

## 2023-02-27 NOTE — PROGRESS NOTES
Discussed Dx of: Principal Problem:    Bipolar disorder ([facility] [address] )  Active Problems:    HTN (hypertension)    Hyperlipidemia    Obesity (BMI 30 0-34  9)    Medical clearance for psychiatric admission    Anxiety    PTSD (post-traumatic stress disorder)    Discussed Short Term Goals of: decrease in depressive symptoms, decrease in anxiety symptoms, decrease in suicidal thoughts, mood stabilization         All parties in agreement        02/27/23 1005   Team Meeting   Meeting Type Tx Team Meeting   Team Members Present   Team Members Present Physician;Nurse;   Physician Team Member [id number] [facility] [employer] Team Member Bart Spangler   Patient/Family Present   Patient Present No  (pt declined to participate)   Patient's Family Present No

## 2023-03-14 ENCOUNTER — TELEPHONE (OUTPATIENT)
Dept: PSYCHIATRY | Facility: CLINIC | Age: 58
End: 2023-03-14

## 2023-03-17 ENCOUNTER — TELEPHONE (OUTPATIENT)
Dept: OBGYN CLINIC | Facility: HOSPITAL | Age: 58
End: 2023-03-17

## 2023-03-17 NOTE — TELEPHONE ENCOUNTER
Caller: Garfield Foote    Doctor: Chilo Fischer    Reason for call: Checking on a phone number for the patient, isn't able to reach her    Call back#: [phone]

## 2023-03-28 ENCOUNTER — PROCEDURE VISIT (OUTPATIENT)
Dept: OBGYN CLINIC | Facility: CLINIC | Age: 58
End: 2023-03-28

## 2023-03-28 VITALS
DIASTOLIC BLOOD PRESSURE: 70 MMHG | BODY MASS INDEX: 33.84 KG/M2 | HEART RATE: 73 BPM | WEIGHT: 191 LBS | SYSTOLIC BLOOD PRESSURE: 100 MMHG | HEIGHT: 63 IN

## 2023-03-28 DIAGNOSIS — I10 PRIMARY HYPERTENSION: ICD-10-CM

## 2023-03-28 DIAGNOSIS — M22.2X2 PATELLOFEMORAL PAIN SYNDROME OF BOTH KNEES: Primary | ICD-10-CM

## 2023-03-28 DIAGNOSIS — M22.2X1 PATELLOFEMORAL PAIN SYNDROME OF BOTH KNEES: Primary | ICD-10-CM

## 2023-03-28 NOTE — PROGRESS NOTES
1  Patellofemoral pain syndrome of both knees  Large joint arthrocentesis: bilateral knee      2  Primary hypertension          Orders Placed This Encounter   Procedures   • Large joint arthrocentesis: bilateral knee        Impression:  Patient is here in follow up of chronic bilateral knee pain likely secondary to mild OA and PFPS    Treatment has included steroid injections, hinged knee brace, reaction knee brace, PT/home exercise program   We provided her with a reaction knee brace  We decided to proceed with viscosupplementation for both knees  She will return to clinic if needed  The patient's blood pressure was WNL but lower than usual    We discussed current medications and nutrition  The patient denied any symptoms of headaches, nausea, dizziness, chest pain, trouble breathing, or visual disturbances  We discussed symptoms that would warrant a medical emergency  Patient will be checking their blood pressure over the next couple of days so that they can discuss the trend with their primary care physician  We rechecked it a few times and it was increasing and she felt well  She was accompanied to the  and felt well  She left comfortably with normal gait      Imaging Studies (I personally reviewed images in PACS and report):  Bilateral knee x-rays most recent to this encounter reviewed   These images show moderate osteoarthritis of mostly affects the medial joint space   No acute osseous abnormalities   Likely enchondroma in the proximal tibia bilaterally  No follow-ups on file  Patient is in agreement with the above plan  HPI:  Agnes Dakins is a 61 y o  female  who presents in follow up  Here for   Chief Complaint   Patient presents with   • Right Knee - Pain, Follow-up, Injections   • Left Knee - Pain, Follow-up, Injections       Since last visit: See above      Following history reviewed and updated:  Past Medical History:   Diagnosis Date   • Anxiety    • Arthritis    • Bipolar "affective disorder, depressed, severe ([facility] [address] ) 4/28/2019   • Depression    • Elevated bilirubin 8/15/2019   • Hyperlipidemia    • Hypertension    • Hypokalemia 8/15/2019   • Learning difficulty    • Leukocytosis 7/28/2020   • Obesity (BMI 30 0-34 9) 6/26/2020   • Osteopenia    • Ovarian failure    • Previous known suicide attempt    • Psychiatric disorder    • Psychiatric illness    • PTSD (post-traumatic stress disorder) 2/25/2023     Past Surgical History:   Procedure Laterality Date   • LAPAROSCOPY      as a child, per patient-normal findings     Social History   Social History     Substance and Sexual Activity   Alcohol Use Not Currently     Social History     Substance and Sexual Activity   Drug Use Not Currently     Social History     Tobacco Use   Smoking Status Some Days   • Packs/day: 1 00   • Years: 20 00   • Pack years: 20 00   • Types: Cigarettes   Smokeless Tobacco Never     Family History   Problem Relation Age of Onset   • Alzheimer's disease Mother    • Depression Mother    • Depression Brother    • Bipolar disorder Brother    • No Known Problems Maternal Aunt    • No Known Problems Paternal Aunt    • No Known Problems Maternal Uncle    • No Known Problems Paternal Uncle    • No Known Problems Cousin    • ADD / ADHD Neg Hx    • Alcohol abuse Neg Hx    • Anxiety disorder Neg Hx    • Dementia Neg Hx    • Drug abuse Neg Hx    • OCD Neg Hx    • Paranoid behavior Neg Hx    • Schizophrenia Neg Hx    • Seizures Neg Hx    • Self-Injury Neg Hx    • Suicide Attempts Neg Hx      Allergies   Allergen Reactions   • Other      Hay Fever   • Oxycodone-Acetaminophen GI Intolerance        Constitutional:  /70   Pulse 73   Ht 5' 3\" (1 6 m)   Wt 86 6 kg (191 lb)   LMP  (LMP Unknown)   BMI 33 83 kg/m²    General: NAD  Eyes: Clear sclerae  ENT: No inflammation, lesion, or mass of lips  No tracheal deviation  Musculoskeletal: As mentioned below    Integumentary: No visible rashes or skin " lesions  Pulmonary/Chest: Effort normal  No respiratory distress  Neuro: CN's grossly intact, FAIR  Psych: Normal affect and judgement  Vascular: WWP  Right Knee Exam     Tenderness   The patient is experiencing tenderness in the medial joint line  Range of Motion   Extension: normal     Tests   Varus: negative Valgus: negative    Other   Erythema: absent  Scars: absent  Sensation: normal      Left Knee Exam     Tenderness   The patient is experiencing tenderness in the medial joint line  Range of Motion   Extension: normal     Tests   Varus: negative Valgus: negative    Other   Erythema: absent  Scars: absent  Sensation: normal             Large joint arthrocentesis: bilateral knee  Universal Protocol:  Consent: Verbal consent obtained  Written consent not obtained  Risks and benefits: risks, benefits and alternatives were discussed  Consent given by: patient  Timeout called at: 3/28/2023 2:14 PM   Patient understanding: patient states understanding of the procedure being performed  Site marked: the operative site was marked  Radiology Images displayed and confirmed  If images not available, report reviewed: imaging studies available  Patient identity confirmed: verbally with patient    Supporting Documentation  Indications: pain   Procedure Details  Location: knee - bilateral knee  Needle size: 22 G  Ultrasound guidance: no  Approach: Inferolateral to patella  Medications (Right): 48 mg hylan 48 MG/6MLMedications (Left): 48 mg hylan 48 MG/6ML   Patient tolerance: patient tolerated the procedure well with no immediate complications  Dressing:  Sterile dressing applied    Risks of this procedure include:    - Risk of bleeding since a needle is involved  - Risk of infection (1/10,000 chance as per recent studies)  Signs/symptoms were discussed and they would prompt an urgent evaluation at an emergency department   - Risk of synovitis from the viscosupplementation      The benefits outweigh the risks and so we proceeded with the procedure

## 2023-03-29 ENCOUNTER — TELEPHONE (OUTPATIENT)
Dept: INTERNAL MEDICINE CLINIC | Facility: CLINIC | Age: 58
End: 2023-03-29

## 2023-03-29 DIAGNOSIS — F31.9 BIPOLAR DISORDER (HCC): Chronic | ICD-10-CM

## 2023-03-29 RX ORDER — RISPERIDONE 0.5 MG/1
0.5 TABLET ORAL
Qty: 30 TABLET | Refills: 0 | Status: CANCELLED | OUTPATIENT
Start: 2023-03-29 | End: 2023-04-28

## 2023-03-29 RX ORDER — RISPERIDONE 2 MG/1
2 TABLET ORAL
Qty: 30 TABLET | Refills: 0 | Status: CANCELLED | OUTPATIENT
Start: 2023-03-29 | End: 2023-04-28

## 2023-03-29 NOTE — TELEPHONE ENCOUNTER
Patient is requesting a refill for RiseperiDone 0 5mg and 2mg Patient is going to a new physiatrist  It can take up to 3 weeks for patient to been seen  Please reach put to patient

## 2023-04-03 RX ORDER — RISPERIDONE 0.5 MG/1
0.5 TABLET ORAL
Qty: 30 TABLET | Refills: 0 | Status: SHIPPED | OUTPATIENT
Start: 2023-04-03 | End: 2023-05-03

## 2023-04-03 RX ORDER — RISPERIDONE 2 MG/1
2 TABLET ORAL
Qty: 30 TABLET | Refills: 0 | Status: SHIPPED | OUTPATIENT
Start: 2023-04-03 | End: 2023-05-03

## 2023-04-04 NOTE — TELEPHONE ENCOUNTER
"Called patient, patient did not answer   Could not leave a VM as \" this person can not receives calls right now\" places on   "

## 2023-05-12 ENCOUNTER — OFFICE VISIT (OUTPATIENT)
Dept: OBGYN CLINIC | Facility: CLINIC | Age: 58
End: 2023-05-12

## 2023-05-12 VITALS
HEART RATE: 109 BPM | DIASTOLIC BLOOD PRESSURE: 89 MMHG | RESPIRATION RATE: 18 BRPM | HEIGHT: 63 IN | SYSTOLIC BLOOD PRESSURE: 154 MMHG | WEIGHT: 191 LBS | BODY MASS INDEX: 33.84 KG/M2

## 2023-05-12 DIAGNOSIS — M17.0 BILATERAL PRIMARY OSTEOARTHRITIS OF KNEE: Primary | ICD-10-CM

## 2023-05-12 RX ORDER — LORAZEPAM 0.5 MG/1
0.5 TABLET ORAL DAILY
COMMUNITY
Start: 2023-04-26

## 2023-05-12 NOTE — PROGRESS NOTES
Ortho Sports Medicine Knee New Patient Visit     Assesment:   61 y o  female bilateral knee moderate tricompartmental osteoarthritis    Plan:  The patient's diagnosis and treatment were discussed at length today  We discussed no treatment, non-operative treatment, and operative treatment  The patient's finding and exam are consistent with bilateral knee moderate tricompartmental osteoarthritis  I did discuss with her both nonsurgical treatment options and surgical treatment  I discussed with her that given that she has had cortisone and viscosupplementation injections in the past, that we attempt another Visco shot and 1 was ordered during today's visit  Her most recent viscosupplementation injection was on 3/28/2023 and she would need to wait approximately 6 months until she can receive the next injection which would be 9/28/2023 at the earliest   I discussed with her that she should continue to wear her knee brace as well as physical therapy for hip and thigh strengthening range of motion exercises  She is frustrated with her lack of improvement with conservative measures and interested in discussing surgery  I explained that she has moderate osteoarthritis isolated to the medial compartment however I do not feel any arthroscopic surgery would be significantly beneficial for her  If she has interest in discussing knee arthroplasty surgery I will defer to one of my arthroplasty colleagues in the [facility] where she lives  Conservative treatment:    Ice to knee for 20 minutes at least 1-2 times daily  OTC NSAIDS prn for pain  Let pain guide gradual return activities  Continue HEP and outpatient PT    Imaging: All imaging from today was reviewed by myself and explained to the patient  Injection:    A viscosupplementation injection was ordered and will be given at a future visit        Surgery:     No surgery is recommended at this point, continue with conservative treatment plan as noted       Follow up:    Return in about 5 months (around 10/12/2023) for Visco         Chief Complaint   Patient presents with   • Left Knee - Pain   • Right Knee - Pain       History of Present Illness: The patient is a 61 y o  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, referred to me by their primary care physician, seen in clinic for evaluation of bilateral knee pain  Pain is located anterior, medial   The patient rates the pain as a 5/10 on her left, a 8/10 on right  The patient states she has been having ongoing pain for 2 5 years now and has tried multiple treatment options consisting of outpatient physical therapy, bracing, NSAIDs, corticosteroid injections, and visco injections  She received bilateral Synvisc injections on 3/28/23 by Dr Alexander Olivera and states no change in symptoms  Pain is aggravated by stairs, weight bearing, walking and standing  Symptoms include swelling             Knee Surgical History:  None    Past Medical, Social and Family History:  Past Medical History:   Diagnosis Date   • Anxiety    • Arthritis    • Bipolar affective disorder, depressed, severe ([facility] [address] ) 4/28/2019   • Depression    • Elevated bilirubin 8/15/2019   • Hyperlipidemia    • Hypertension    • Hypokalemia 8/15/2019   • Learning difficulty    • Leukocytosis 7/28/2020   • Obesity (BMI 30 0-34 9) 6/26/2020   • Osteopenia    • Ovarian failure    • Previous known suicide attempt    • Psychiatric disorder    • Psychiatric illness    • PTSD (post-traumatic stress disorder) 2/25/2023     Past Surgical History:   Procedure Laterality Date   • LAPAROSCOPY      as a child, per patient-normal findings     Allergies   Allergen Reactions   • Other      Hay Fever   • Oxycodone-Acetaminophen GI Intolerance     Current Outpatient Medications on File Prior to Visit   Medication Sig Dispense Refill   • amLODIPine (NORVASC) 10 mg tablet Take 1 tablet (10 mg total) by mouth daily 90 tablet 2   • LORazepam (ATIVAN) 0 5 mg tablet Take 0 5 mg by mouth daily     • atorvastatin (LIPITOR) 20 mg tablet Take 1 tablet (20 mg total) by mouth daily 30 tablet 0   • escitalopram (LEXAPRO) 5 mg tablet Take 1 tablet (5 mg total) by mouth daily 30 tablet 0   • lisinopril (ZESTRIL) 10 mg tablet Take 1 tablet (10 mg total) by mouth daily 30 tablet 0   • risperiDONE (RisperDAL) 0 5 mg tablet Take 1 tablet (0 5 mg total) by mouth daily at bedtime Take one 2mg tab with one 0 5mg tab every night at bedtime for total dose of 2 5mg qhs 30 tablet 0   • risperiDONE (RisperDAL) 2 mg tablet Take 1 tablet (2 mg total) by mouth daily at bedtime Take one 2mg tab with one 0 5mg tab every night at bedtime for total dose of 2 5mg qhs 30 tablet 0     No current facility-administered medications on file prior to visit       Social History     Socioeconomic History   • Marital status: Single     Spouse name: Not on file   • Number of children: Not on file   • Years of education: Not on file   • Highest education level: Not on file   Occupational History   • Not on file   Tobacco Use   • Smoking status: Some Days     Packs/day: 1 00     Years: 20 00     Pack years: 20 00     Types: Cigarettes   • Smokeless tobacco: Never   Vaping Use   • Vaping Use: Never used   Substance and Sexual Activity   • Alcohol use: Not Currently   • Drug use: Not Currently   • Sexual activity: Yes     Partners: Male     Birth control/protection: None   Other Topics Concern   • Not on file   Social History Narrative   • Not on file     Social Determinants of Health     Financial Resource Strain: Low Risk    • Difficulty of Paying Living Expenses: Not hard at all   Food Insecurity: No Food Insecurity   • Worried About Running Out of Food in the Last Year: Never true   • Ran Out of Food in the Last Year: Never true   Transportation Needs: Not on file   Physical Activity: Not on file   Stress: Not on file   Social Connections: Not on file   Intimate Partner Violence: Not on file   Housing Stability: Not on file "        I have reviewed the past medical, surgical, social and family history, medications and allergies as documented in the EMR  Review of systems: ROS is negative other than that noted in the HPI  Constitutional: Negative for fatigue and fever  HENT: Negative for sore throat  Respiratory: Negative for shortness of breath  Cardiovascular: Negative for chest pain  Gastrointestinal: Negative for abdominal pain  Endocrine: Negative for cold intolerance and heat intolerance  Genitourinary: Negative for flank pain  Musculoskeletal: Negative for back pain  Skin: Negative for rash  Allergic/Immunologic: Negative for immunocompromised state  Neurological: Negative for dizziness  Psychiatric/Behavioral: Negative for agitation  Physical Exam:    Blood pressure 154/89, pulse (!) 109, resp  rate 18, height 5' 3\" (1 6 m), weight 86 6 kg (191 lb)  General/Constitutional: NAD, well developed, well nourished  HENT: Normocephalic, atraumatic  CV: Intact distal pulses, regular rate  Resp: No respiratory distress or labored breathing  Lymphatic: No lymphadenopathy palpated  Neuro: Alert and Oriented x 3, no focal deficits  Psych: Normal mood, normal affect, normal judgement, normal behavior  Skin: Warm, dry, no rashes, no erythema      Knee Exam (focused):  Visual inspection of the Bialteral knee demonstrates normal contour without atrophy  No previous incisions   There is no significant erythema or edema  No significant joint effusion   Range of motion is full from 0-130 degrees of flexion   Able to straight leg raise   Mild patellar tender to palpation  Moderate medial joint line tenderness, Mild lateral joint line tenderness  - medial Aster's, - lateral Aster's  1A Lachman exam, Stable posterior drawer  - dial test  Stable to varus and valgus stress at both 0 and 30°  Patella tracks normally  No J sign  No apprehension    Translation is approximately 2 quadrants and is equal to the " contralateral side  Patellar eversion is similar to the contralateral side    Examination of the patient's ipsilateral hip demonstrates full painless range of motion  No crepitus  LE NV Exam: +2 DP/PT pulses bilaterally  Sensation intact to light touch L2-S1 bilaterally     Bilateral hip ROM demonstrates no pain actively or passively    No calf tenderness to palpation bilaterally    Knee Imaging    X-rays of the bilateral knee were reviewed, which demonstrate moderate tricompartmental osteoarthritis with joint space narrowing, osteophyte formation  I have reviewed the radiology report and agree with their impression      Scribe Attestation    I,:  Javon Haynes am acting as a scribe while in the presence of the attending physician :       I,:  Asha Jacobs, DO personally performed the services described in this documentation    as scribed in my presence :

## 2023-05-24 ENCOUNTER — TELEPHONE (OUTPATIENT)
Dept: INTERNAL MEDICINE CLINIC | Facility: CLINIC | Age: 58
End: 2023-05-24

## 2023-05-24 ENCOUNTER — PATIENT OUTREACH (OUTPATIENT)
Dept: INTERNAL MEDICINE CLINIC | Facility: CLINIC | Age: 58
End: 2023-05-24

## 2023-05-24 NOTE — PROGRESS NOTES
SW received call from pt  And Geraldine Kwong RN at the [facility]  Pt is under great stress as her Mother is actively dying at [facility] and will be put on Hospice  Pt share she is very anxious and has constant diarrhea whenever she tries to eat anything  She is asking if her Doctor will give her any thing for her anxiety  She denies any SI/HI at preset but dies have a recent ED visit at [address] [facility] 2/24/23 with SI      Ms Kesha Lockhart RN has reached out to [address] to ask her Therapist to call her and she will further reach out to see if her psychiatrist will prescribe for her  SW has asked if there is an appointment available tomorrow but there is not at the moment  Pt can stop in to see if the is a same day as she will be in out building for a dentist tomorrow  Deena now notes this dental [appointment was cancelled  Support offered to pt  SW will ask the Clinical Team to ask if any Medication can be ordered as per pt's request to help with her anxiety and diarrhea

## 2023-05-24 NOTE — TELEPHONE ENCOUNTER
See Patient Outreach note from 5/24/23  I called and spoke to patient, she states her mother is very ill and currently in the hospital and will most likely pass away very soon  She advised that she has been very stressed out and that is causing her to have diarrhea  She denies any SI/HI at this time and stated she just wants to know if she can get something for her anxiety  She stated she sees a Psychiatrist at [facility] and is currently being prescribed Ativan by that provider  She stated that she usually takes this only as needed but has been taking it at least three times a day due to her elevated stress and said she is almost out of the medication  I advised her that she will need an appointment to review this with the provider but I feel it is best that she see her Psychiatrist for this since they are prescribing the Ativan  Patient agrees with that and said that Oly Point which is the nurse from the [facility] where she lives did reach out to the Psychiatrist today and left a message as she was told the doctor couldn't speak to the patient at that time  She advised they are expecting a call back from them tomorrow  I asked the patient if it would be ok for me to reach out to [facility] tomorrow to see how we can work this out for her to ensure she is able to get in with her Psychiatrist  Patient stated she is fine with that  I told her I will do this in the morning and call her with an update  Patient was very thankful and will wait for my call  I did speak to Ella and confirmed that her last Ativan script was filled on 4/26/23 with directions to take one tablet daily

## 2023-05-26 DIAGNOSIS — F41.9 ANXIETY: Primary | ICD-10-CM

## 2023-05-26 RX ORDER — LORAZEPAM 0.5 MG/1
0.5 TABLET ORAL EVERY 8 HOURS PRN
Qty: 21 TABLET | Refills: 0 | Status: SHIPPED | OUTPATIENT
Start: 2023-05-26

## 2023-05-26 NOTE — TELEPHONE ENCOUNTER
I contacted patient to see if she has heard from her Psychiatrist yet and she said no  She said she is getting a lot of phone calls from different people but doesn't feel like anyone is helping her get the medication  I advised her that I can contact Oly Smith at the [facility] where she lives and she said she is already gone for the day  I asked if she had the phone number for [facility] where she sees her Psychiatrist, she gave me the number but said they are very hard to get a hold of and typically don't call back  She does have an upcoming appt with them but it is not until the end of June  I called [facility] ([phone]) and LMOM that I need someone to call me back asap so patient can get her meds   I spoke with Dr Nba Mata and he is fine with giving her a weeks supply of the Ativan to hold her over until we can get a hold of her Psychiatrist  Script sent to [facility] on [address]

## 2023-06-26 ENCOUNTER — HOSPITAL ENCOUNTER (EMERGENCY)
Facility: HOSPITAL | Age: 58
Discharge: HOME | End: 2023-06-26
Attending: EMERGENCY MEDICINE | Admitting: EMERGENCY MEDICINE
Payer: MEDICARE

## 2023-06-26 VITALS
DIASTOLIC BLOOD PRESSURE: 86 MMHG | HEART RATE: 92 BPM | TEMPERATURE: 97.3 F | OXYGEN SATURATION: 98 % | SYSTOLIC BLOOD PRESSURE: 118 MMHG | RESPIRATION RATE: 16 BRPM

## 2023-06-26 DIAGNOSIS — N39.0 UTI (URINARY TRACT INFECTION): Primary | ICD-10-CM

## 2023-06-26 LAB
BACTERIA UR QL AUTO: ABNORMAL /HPF
BILIRUB UR QL STRIP: NEGATIVE
CLARITY UR: CLEAR
COLOR UR: YELLOW
GLUCOSE UR STRIP-MCNC: NEGATIVE MG/DL
HGB UR QL STRIP.AUTO: ABNORMAL
KETONES UR STRIP-MCNC: NEGATIVE MG/DL
LEUKOCYTE ESTERASE UR QL STRIP: ABNORMAL
MUCOUS THREADS UR QL AUTO: ABNORMAL
NITRITE UR QL STRIP: NEGATIVE
NON-SQ EPI CELLS URNS QL MICRO: ABNORMAL /HPF
PH UR STRIP.AUTO: 7 [PH] (ref 4.5–8)
PROT UR STRIP-MCNC: NEGATIVE MG/DL
RBC #/AREA URNS AUTO: ABNORMAL /HPF
SP GR UR STRIP.AUTO: 1.02 (ref 1–1.03)
UROBILINOGEN UR QL STRIP.AUTO: 1 E.U./DL
WBC #/AREA URNS AUTO: ABNORMAL /HPF

## 2023-06-26 PROCEDURE — 87086 URINE CULTURE/COLONY COUNT: CPT

## 2023-06-26 PROCEDURE — 99283 EMERGENCY DEPT VISIT LOW MDM: CPT

## 2023-06-26 PROCEDURE — 81001 URINALYSIS AUTO W/SCOPE: CPT

## 2023-06-26 RX ORDER — CEPHALEXIN 500 MG/1
500 CAPSULE ORAL EVERY 6 HOURS SCHEDULED
Qty: 24 CAPSULE | Refills: 0 | Status: SHIPPED | OUTPATIENT
Start: 2023-06-26 | End: 2023-07-02

## 2023-06-26 RX ORDER — CEPHALEXIN 500 MG/1
500 CAPSULE ORAL ONCE
Status: COMPLETED | OUTPATIENT
Start: 2023-06-26 | End: 2023-06-26

## 2023-06-26 RX ADMIN — CEPHALEXIN 500 MG: 500 CAPSULE ORAL at 18:51

## 2023-06-26 NOTE — ED ATTENDING ATTESTATION
6/26/2023  ITata MD, saw and evaluated the patient  I have discussed the patient with the resident/non-physician practitioner and agree with the resident's/non-physician practitioner's findings, Plan of Care, and MDM as documented in the resident's/non-physician practitioner's note, except where noted  All available labs and Radiology studies were reviewed  I was present for key portions of any procedure(s) performed by the resident/non-physician practitioner and I was immediately available to provide assistance  At this point I agree with the current assessment done in the Emergency Department  I have conducted an independent evaluation of this patient a history and physical is as follows:    ED Course         Critical Care Time  Procedures    60 yo female with increased urinary pressure and frequency, increased thirst  Burning with urination  Pt having incomplete emptying  No previous abdominal surgery  No fever, no n/v/d, no back pain  Vss, afebrile, lungs cta, rrr, abdomen soft nontender, no cva tenderness  No urinary retention on bladder scan   Urine dip,

## 2023-06-26 NOTE — DISCHARGE INSTRUCTIONS
You are seen here in the emergency department for concerns about UTI, it is confirmed that you have UTI, the culture still pending  We are prescribing an antibiotic please take this for the next 6 days    If you have new or worsening symptoms please return to the ER for reevaluation

## 2023-06-27 ENCOUNTER — VBI (OUTPATIENT)
Dept: ADMINISTRATIVE | Facility: OTHER | Age: 58
End: 2023-06-27

## 2023-06-27 NOTE — ED PROVIDER NOTES
History  Chief Complaint   Patient presents with   • Possible UTI     Pt c/o burning with urination, increased frequency with retention x2 days     D5year-old female past medical history of anxiety, arthritis, hypertension, hyperlipidemia, psychiatric illness, PTSD presenting to the ER for urinary symptoms  Patient states she has been having increased urinary frequency, with burning on urination  She denies any nausea or vomiting, any back pain and with fever, shortness of breath  Patient has somewhat of a flat affect, and is somewhat slow to respond, think this is patient baseline  Prior to Admission Medications   Prescriptions Last Dose Informant Patient Reported? Taking?    LORazepam (ATIVAN) 0 5 mg tablet  Self Yes No   Sig: Take 0 5 mg by mouth daily   LORazepam (Ativan) 0 5 mg tablet   No No   Sig: Take 1 tablet (0 5 mg total) by mouth every 8 (eight) hours as needed for anxiety   amLODIPine (NORVASC) 10 mg tablet  Self No No   Sig: Take 1 tablet (10 mg total) by mouth daily   atorvastatin (LIPITOR) 20 mg tablet   No No   Sig: Take 1 tablet (20 mg total) by mouth daily   escitalopram (LEXAPRO) 5 mg tablet   No No   Sig: Take 1 tablet (5 mg total) by mouth daily   lisinopril (ZESTRIL) 10 mg tablet   No No   Sig: Take 1 tablet (10 mg total) by mouth daily   risperiDONE (RisperDAL) 0 5 mg tablet   No No   Sig: Take 1 tablet (0 5 mg total) by mouth daily at bedtime Take one 2mg tab with one 0 5mg tab every night at bedtime for total dose of 2 5mg qhs   risperiDONE (RisperDAL) 2 mg tablet   No No   Sig: Take 1 tablet (2 mg total) by mouth daily at bedtime Take one 2mg tab with one 0 5mg tab every night at bedtime for total dose of 2 5mg qhs      Facility-Administered Medications: None       Past Medical History:   Diagnosis Date   • Anxiety    • Arthritis    • Bipolar affective disorder, depressed, severe ([facility] [address] ) 4/28/2019   • Depression    • Elevated bilirubin 8/15/2019   • Hyperlipidemia    • Hypertension    • Hypokalemia 8/15/2019   • Learning difficulty    • Leukocytosis 7/28/2020   • Obesity (BMI 30 0-34 9) 6/26/2020   • Osteopenia    • Ovarian failure    • Previous known suicide attempt    • Psychiatric disorder    • Psychiatric illness    • PTSD (post-traumatic stress disorder) 2/25/2023       Past Surgical History:   Procedure Laterality Date   • LAPAROSCOPY      as a child, per patient-normal findings       Family History   Problem Relation Age of Onset   • Alzheimer's disease Mother    • Depression Mother    • Depression Brother    • Bipolar disorder Brother    • No Known Problems Maternal Aunt    • No Known Problems Paternal Aunt    • No Known Problems Maternal Uncle    • No Known Problems Paternal Uncle    • No Known Problems Cousin    • ADD / ADHD Neg Hx    • Alcohol abuse Neg Hx    • Anxiety disorder Neg Hx    • Dementia Neg Hx    • Drug abuse Neg Hx    • OCD Neg Hx    • Paranoid behavior Neg Hx    • Schizophrenia Neg Hx    • Seizures Neg Hx    • Self-Injury Neg Hx    • Suicide Attempts Neg Hx      I have reviewed and agree with the history as documented  E-Cigarette/Vaping   • E-Cigarette Use Never User      E-Cigarette/Vaping Substances   • Nicotine No    • THC No    • CBD No    • Flavoring No    • Other No    • Unknown No      Social History     Tobacco Use   • Smoking status: Some Days     Packs/day: 1 00     Years: 20 00     Total pack years: 20 00     Types: Cigarettes   • Smokeless tobacco: Never   Vaping Use   • Vaping Use: Never used   Substance Use Topics   • Alcohol use: Not Currently   • Drug use: Not Currently        Review of Systems   Genitourinary: Positive for dysuria and frequency         Physical Exam  ED Triage Vitals [06/26/23 1600]   Temperature Pulse Respirations Blood Pressure SpO2   (!) 97 3 °F (36 3 °C) 92 16 118/86 98 %      Temp Source Heart Rate Source Patient Position - Orthostatic VS BP Location FiO2 (%)   Tympanic Monitor Sitting Left arm --      Pain Score --             Orthostatic Vital Signs  Vitals:    06/26/23 1600   BP: 118/86   Pulse: 92   Patient Position - Orthostatic VS: Sitting       Physical Exam  Vitals and nursing note reviewed  Constitutional:       General: She is not in acute distress  Appearance: She is well-developed  HENT:      Head: Normocephalic and atraumatic  Eyes:      Conjunctiva/sclera: Conjunctivae normal    Cardiovascular:      Rate and Rhythm: Normal rate and regular rhythm  Heart sounds: No murmur heard  Pulmonary:      Effort: Pulmonary effort is normal  No respiratory distress  Breath sounds: Normal breath sounds  Abdominal:      Palpations: Abdomen is soft  Tenderness: There is no abdominal tenderness  Musculoskeletal:         General: No swelling  Cervical back: Neck supple  Skin:     General: Skin is warm and dry  Capillary Refill: Capillary refill takes less than 2 seconds  Neurological:      Mental Status: She is alert  Psychiatric:         Mood and Affect: Mood normal          ED Medications  Medications   cephalexin (KEFLEX) capsule 500 mg (500 mg Oral Given 6/26/23 1851)       Diagnostic Studies  Results Reviewed     Procedure Component Value Units Date/Time    Urine Microscopic [[id number]]  (Abnormal) Collected: 06/26/23 1744    Lab Status: Final result Specimen: Urine, Other Updated: 06/26/23 1828     RBC, UA 1-2 /hpf      WBC, UA 30-50 /hpf      Epithelial Cells Occasional /hpf      Bacteria, UA Occasional /hpf      MUCUS THREADS Occasional    Urine culture [[id number]] Collected: 06/26/23 1744    Lab Status:  In process Specimen: Urine, Other Updated: 06/26/23 1828    Urine Macroscopic, POC [[id number]]  (Abnormal) Collected: 06/26/23 1744    Lab Status: Final result Specimen: Urine Updated: 06/26/23 1746     Color, UA Yellow     Clarity, UA Clear     pH, UA 7 0     Leukocytes, UA Trace     Nitrite, UA Negative     Protein, UA Negative mg/dl      Glucose, UA Negative mg/dl Ketones, UA Negative mg/dl      Urobilinogen, UA 1 0 E U /dl      Bilirubin, UA Negative     Occult Blood, UA Trace     Specific Celoron, UA 1 020    Narrative:      CLINITEK RESULT                 No orders to display         Procedures  Procedures      ED Course  ED Course as of 06/27/23 0057   Mon Jun 26, 2023   1829 WBC, UA(!): 30-50                             SBIRT 20yo+    Flowsheet Row Most Recent Value   Initial Alcohol Screen: US AUDIT-C     1  How often do you have a drink containing alcohol? 0 Filed at: 06/26/2023 1600   2  How many drinks containing alcohol do you have on a typical day you are drinking? 0 Filed at: 06/26/2023 1600   3a  Male UNDER 65: How often do you have five or more drinks on one occasion? 0 Filed at: 06/26/2023 1600   3b  FEMALE Any Age, or MALE 65+: How often do you have 4 or more drinks on one occassion? 0 Filed at: 06/26/2023 1600   Audit-C Score 0 Filed at: 06/26/2023 1600   MARYELLEN: How many times in the past year have you    Used an illegal drug or used a prescription medication for non-medical reasons? Never Filed at: 06/26/2023 1600                Medical Decision Making  History and physical most consistent with UTI  We will check urine dip here  Urine dip positive for 30-50 WBC, will treat empirically with Keflex  Urine culture pending  We will have patient follow-up with primary care physician return if she has any new or worsening symptoms  Amount and/or Complexity of Data Reviewed  Labs: ordered  Decision-making details documented in ED Course  Risk  Prescription drug management              Disposition  Final diagnoses:   UTI (urinary tract infection)     Time reflects when diagnosis was documented in both MDM as applicable and the Disposition within this note     Time User Action Codes Description Comment    6/26/2023  6:39 PM Davion SMITH Add [N39 0] UTI (urinary tract infection)       ED Disposition     ED Disposition   Discharge    Condition   Stable Date/Time   Mon Jun 26, 2023  6:40 PM    Comment   Zandra Deutsch discharge to home/self care  Follow-up Information     Follow up With Specialties Details Why Contact Info Additional [address] Emergency Department Emergency Medicine Go to  If symptoms worsen [address] Emergency [address], [address], [place], [place], [address]          Discharge Medication List as of 6/26/2023  6:47 PM      START taking these medications    Details   cephalexin (KEFLEX) 500 mg capsule Take 1 capsule (500 mg total) by mouth every 6 (six) hours for 6 days, Starting Mon 6/26/2023, Until Sun 7/2/2023, Normal         CONTINUE these medications which have NOT CHANGED    Details   amLODIPine (NORVASC) 10 mg tablet Take 1 tablet (10 mg total) by mouth daily, Starting Tue 4/18/2023, Until Thu 5/18/2023, Normal      atorvastatin (LIPITOR) 20 mg tablet Take 1 tablet (20 mg total) by mouth daily, Starting Mon 2/27/2023, Until Wed 3/29/2023, Print      escitalopram (LEXAPRO) 5 mg tablet Take 1 tablet (5 mg total) by mouth daily, Starting Mon 2/27/2023, Until Wed 3/29/2023, Print      lisinopril (ZESTRIL) 10 mg tablet Take 1 tablet (10 mg total) by mouth daily, Starting Mon 2/27/2023, Until Wed 3/29/2023, Print      !! LORazepam (ATIVAN) 0 5 mg tablet Take 0 5 mg by mouth daily, Starting Wed 4/26/2023, Historical Med      !!  LORazepam (Ativan) 0 5 mg tablet Take 1 tablet (0 5 mg total) by mouth every 8 (eight) hours as needed for anxiety, Starting Fri 5/26/2023, Normal      risperiDONE (RisperDAL) 0 5 mg tablet Take 1 tablet (0 5 mg total) by mouth daily at bedtime Take one 2mg tab with one 0 5mg tab every night at bedtime for total dose of 2 5mg qhs, Starting Mon 4/3/2023, Until Wed 5/3/2023, Normal      risperiDONE (RisperDAL) 2 mg tablet Take 1 tablet (2 mg total) by mouth daily at bedtime Take one 2mg tab with one 0 5mg tab every night at bedtime for total dose of 2 5mg qhs, Starting Mon 4/3/2023, Until Wed 5/3/2023, Normal       !! - Potential duplicate medications found  Please discuss with provider  No discharge procedures on file  PDMP Review       Value Time User    PDMP Reviewed  Yes 11/21/2022 10:34 AM Chago Bronson MD           ED Provider  Attending physically available and evaluated [facility]  I managed the patient along with the ED Attending      Electronically Signed by         Edwardo Essex, DO  06/27/23 3141

## 2023-06-28 LAB — BACTERIA UR CULT: NORMAL

## 2023-06-29 NOTE — TELEPHONE ENCOUNTER
Giovana Coronado    ED Visit Information     Ed visit date: 6-26-23  Diagnosis Description: UTI  In Network? Yes [facility]  Discharge status: Home  Discharged with meds ? Yes  Number of ED visits to date: 2  ED Severity:3     Outreach Information    Outreach successful: No 3  Date letter mailed no my chart  Date [address]  97  Coordination    Follow up appointment with pcp: no no  Transportation issues ? No    Value Base Outreach    Outreach type: 3 Day Outreach  Care Coordination Status: Referred to [facility]  Emergent necessity warranted by diagnosis: Yes  [facility] PCP: Yes  Transportation: Self Transport  Reason Patient went to ED instead of Urgent Care or PCP?: Perceived Severity of Illness  06/27/2023 03:35 PM EDT by Iris Davis 06/27/2023 03:35 PM EDT by Zohreh Arce (Self) [phone] ([phone] (Mobile)  [phone] (Mobile)   - Left MessageCommunicated - 1st attempt  06/28/2023 09:46 AM EDT by Iris Davis 06/28/2023 09:46 AM EDT by Zohreh Arce (Self) [phone] ([email or web address])[phone] (Mobile)  [phone] (Mobile)   - Left MessageCommunicated - 2nd attempt  06/29/2023 09:16 AM EDT by Iris Davis 06/29/2023 09:16 AM EDT by Misa Moscoso (Self) [phone] ([id number])[phone] (Mobile)  [phone] (Mobile)   - Left MessageCommunicated - 3rd attempt no my chart

## 2023-06-30 DIAGNOSIS — Z71.89 COMPLEX CARE COORDINATION: Primary | ICD-10-CM

## 2023-07-06 ENCOUNTER — PATIENT OUTREACH (OUTPATIENT)
Dept: INTERNAL MEDICINE CLINIC | Facility: CLINIC | Age: 58
End: 2023-07-06

## 2023-07-06 NOTE — PROGRESS NOTES
Outpatient Care Management Note:    Patient referred to outpatient nurse care management referred  for [facility] patient with ED risk score of 61+. Patient was at [facility] on 6/26/23 for a UTI and given Cephalexin 500 mg every 6 hours for 6 days and discharged home. I called and spoke with patient and explained my role and reason for call. Patient reports she finished antibiotic and is feeling better and denies urinary symptoms at this time. Patient reports she called PCP office but was given an appointment for 2-3 weeks out so her ICM worker took her to an urgent care center and was told they could not see her because she did not have photo ID and she had never been seen there before and was not in their system so patient ended up going to the ED. I did make patient aware PCP office does same day appointments for when acute issues such as UTI's come up and should be able to be seen same day or next. I did make her aware if this would happen again when she calls to request to speak to a nurse or supervisor. Patient aware she should schedule routine follow up with PCP office as she was last seen in January. Patient reports she has been getting bruises on her legs that do go away but does not recall bumping herself and wants the doctor to further evaluate her. Will send this note to clerical team to reach out to patient to schedule follow up appointment. Patient reports she lives in the [address] [place] [address]. Patient does not have any further questions, concerns, or other needs at this time. Patient denies need for further outreach at this time.

## 2023-07-12 ENCOUNTER — OFFICE VISIT (OUTPATIENT)
Dept: INTERNAL MEDICINE CLINIC | Facility: CLINIC | Age: 58
End: 2023-07-12

## 2023-07-12 ENCOUNTER — TELEPHONE (OUTPATIENT)
Age: 58
End: 2023-07-12

## 2023-07-12 VITALS
WEIGHT: 191 LBS | BODY MASS INDEX: 33.83 KG/M2 | DIASTOLIC BLOOD PRESSURE: 73 MMHG | HEART RATE: 90 BPM | OXYGEN SATURATION: 98 % | TEMPERATURE: 98.6 F | SYSTOLIC BLOOD PRESSURE: 110 MMHG

## 2023-07-12 DIAGNOSIS — F31.9 BIPOLAR DISORDER (HCC): Chronic | ICD-10-CM

## 2023-07-12 DIAGNOSIS — I10 PRIMARY HYPERTENSION: ICD-10-CM

## 2023-07-12 DIAGNOSIS — R91.1 PULMONARY NODULE: ICD-10-CM

## 2023-07-12 DIAGNOSIS — D72.829 LEUKOCYTOSIS, UNSPECIFIED TYPE: ICD-10-CM

## 2023-07-12 DIAGNOSIS — E66.9 OBESITY (BMI 30.0-34.9): Chronic | ICD-10-CM

## 2023-07-12 DIAGNOSIS — N39.0 URINARY TRACT INFECTION WITHOUT HEMATURIA, SITE UNSPECIFIED: Primary | ICD-10-CM

## 2023-07-12 DIAGNOSIS — E78.5 HYPERLIPIDEMIA, UNSPECIFIED HYPERLIPIDEMIA TYPE: ICD-10-CM

## 2023-07-12 DIAGNOSIS — Z72.0 TOBACCO USE: ICD-10-CM

## 2023-07-12 DIAGNOSIS — R23.3 EASY BRUISING: ICD-10-CM

## 2023-07-12 DIAGNOSIS — Z12.12 SCREENING FOR COLORECTAL CANCER: ICD-10-CM

## 2023-07-12 DIAGNOSIS — Z12.11 SCREENING FOR COLORECTAL CANCER: ICD-10-CM

## 2023-07-12 DIAGNOSIS — Z72.0 TOBACCO ABUSE: ICD-10-CM

## 2023-07-12 DIAGNOSIS — E28.39 PRIMARY OVARIAN FAILURE: ICD-10-CM

## 2023-07-12 RX ORDER — ESCITALOPRAM OXALATE 5 MG/1
10 TABLET ORAL DAILY
Qty: 60 TABLET | Refills: 0 | Status: SHIPPED | OUTPATIENT
Start: 2023-07-12 | End: 2023-07-12

## 2023-07-12 RX ORDER — DEXAMETHASONE 1 MG
1 TABLET ORAL 2 TIMES DAILY WITH MEALS
Qty: 1 TABLET | Refills: 0 | Status: SHIPPED | OUTPATIENT
Start: 2023-07-12 | End: 2023-07-12 | Stop reason: SDUPTHER

## 2023-07-12 RX ORDER — ESCITALOPRAM OXALATE 5 MG/1
5 TABLET ORAL DAILY
Qty: 30 TABLET | Refills: 0 | Status: SHIPPED | OUTPATIENT
Start: 2023-07-12 | End: 2023-07-12 | Stop reason: SDUPTHER

## 2023-07-12 RX ORDER — DEXAMETHASONE 1 MG
1 TABLET ORAL 2 TIMES DAILY WITH MEALS
Qty: 1 TABLET | Refills: 0 | Status: SHIPPED | OUTPATIENT
Start: 2023-07-12 | End: 2023-07-13

## 2023-07-12 RX ORDER — ESCITALOPRAM OXALATE 5 MG/1
5 TABLET ORAL DAILY
Qty: 30 TABLET | Refills: 0 | Status: SHIPPED | OUTPATIENT
Start: 2023-07-12 | End: 2023-08-11

## 2023-07-12 NOTE — PROGRESS NOTES
Assessment/Plan:    1. Urinary tract infection without hematuria, site unspecified  -Presenting complaint UTI follow-up after being treated in the emergency room with course of Keflex  -Currently denies any symptoms and reports her last UTI was years ago  -Discussed urinary hygiene    2. Bipolar disorder ([address])  -History of bipolar following with psychiatry currently on Risperdal 2.5, Ativan 0.5, Lexapro 10 and reports her Lexapro was recently increased from 5 to 10 due to recent death in her family by her psychiatrist  -Patient reports symptoms are well controlled with current medication regimen    3. Leukocytosis, unspecified type  -Per chart review patient has persistent leukocytosis on routine labs within normal differential; patient also examines with truncal obesity and upper back fat pad may be consistent with cushing syndrome in the setting of osteopenia and easy bruising  -We will further evaluate with dexamethasone suppression test and 24-hour urine collection    4. Tobacco use  - Patient reports she stopped smoking cold turkey 3 weeks ago; she previously has more multiple times to stop smoking which have been unsuccessful  -Encourage patient to continue to not smoke discussed medication assistance if needed  -CT lung cancer screening follow-up follow-up    5. Screening for colorectal cancer  -Patient denies any GI bleeding discussed colonoscopy for colorectal cancer screening    6. Primary ovarian failure  -History of primary ovarian failure available for work-up  We will further work-up with karyotype to rule out Murray syndrome given-physical exam findings    7. Pulmonary nodule  - repeat CT lung cancer screen based on previous nodule that is no longer present    8.  Primary hypertension  -History of hypertension well-controlled with current medication regimen including lisinopril 10 and amlodipine 10; blood pressure at today's visit is 110/73  - di stress test scussed continuing low-salt diet and exercise  -   will also rule out Cushing disease given clinical suspicion     9. Obesity (BMI 30.0-34.9)  -History of obesity with BMI of 33.8  -Discussed lifestyle changes including exercising and healthier diet    10. Easy bruising  -Patient reports history of easy bruising on her lower extremities without trauma that resolves in 1-2 days without any other symptoms of coagulopathy  -Previous PT/INR and glucose were within normal limits  -No bruising noted on exam today  -We will rule out Cushing's disease and continue to monitor for symptoms and further work-up if symptoms worsen     11. Hyperlipidemia   -Hyperlipidemia with most recent  may be contributed by Risperdal  -Currently on atorvastatin 20  -Given findings of coronary calcium on CT, coronary artery disease will set goal of LDL less than 70  -Discussed lifestyle changes      Tobacco Cessation Counseling: Tobacco cessation counseling was provided. The patient is sincerely urged to quit consumption of tobacco. She is ready to quit tobacco. Medication options and side effects of medication discussed. Patient refused medication. Patient Instructions   1) Please compete 24 hr urine  2) Take 1mg dexamethasone at 11pm and get the labs completed the next morning  3) complete colonoscopy, mammogram, dexa scan, and CT lung       Return in about 3 months (around 10/12/2023) for Next scheduled follow up. Subjective:      Patient ID: Brenton Xie is a 61 y.o. female. Chief Complaint   Patient presents with   • Follow-up     ED UTI       The patient is a 79-year-old female with history of hypertension, hyperlipidemia, bipolar, tobacco use, vitamin D deficiency, learning difficulty, osteopenia, obesity; presenting for UTI follow-up. She reported to the ED on 26 June planing of burning with urination that has now resolved after course of Keflex.   She reports her last urinary tract infection was 2 years ago and currently denies any symptoms. She also complains of easy lower extremity bruising that been going on for years. She states she did not want to spots of bruising without any trauma to the area that resolved after 1 to 2 days. She denies any family history of easy bruising, bleeding with brushing her teeth, nosebleeds, blood in her stool, and reports she never menstruated. Patient also states that she is aware she is due for screening including DEXA scan, colonoscopy, mammogram, and repeat CT lung screen. She states she is aware that she is overweight and is willing to make lifestyle changes including eating healthier and exercising to help lose weight. The following portions of the patient's history were reviewed and updated as appropriate: allergies, current medications, past family history, past medical history, past social history, past surgical history and problem list.    Review of Systems   Constitutional: Negative for chills, fatigue and fever. Respiratory: Negative for cough, shortness of breath and wheezing. Cardiovascular: Negative for chest pain and leg swelling. Gastrointestinal: Negative for abdominal distention, abdominal pain, constipation, diarrhea and vomiting. Genitourinary: Negative for difficulty urinating.          Current Outpatient Medications   Medication Sig Dispense Refill   • amLODIPine (NORVASC) 10 mg tablet Take 1 tablet (10 mg total) by mouth daily 90 tablet 2   • atorvastatin (LIPITOR) 20 mg tablet Take 1 tablet (20 mg total) by mouth daily 30 tablet 0   • dexamethasone (DECADRON) 1 mg tablet Take 1 tablet (1 mg total) by mouth 2 (two) times a day with meals for 1 day 1 tablet 0   • escitalopram (LEXAPRO) 5 mg tablet Take 1 tablet (5 mg total) by mouth daily 30 tablet 0   • lisinopril (ZESTRIL) 10 mg tablet Take 1 tablet (10 mg total) by mouth daily 30 tablet 0   • LORazepam (Ativan) 0.5 mg tablet Take 1 tablet (0.5 mg total) by mouth every 8 (eight) hours as needed for anxiety 21 tablet 0   • risperiDONE (RisperDAL) 0.5 mg tablet Take 1 tablet (0.5 mg total) by mouth daily at bedtime Take one 2mg tab with one 0.5mg tab every night at bedtime for total dose of 2.5mg qhs 30 tablet 0   • risperiDONE (RisperDAL) 2 mg tablet Take 1 tablet (2 mg total) by mouth daily at bedtime Take one 2mg tab with one 0.5mg tab every night at bedtime for total dose of 2.5mg qhs 30 tablet 0   • LORazepam (ATIVAN) 0.5 mg tablet Take 0.5 mg by mouth daily (Patient not taking: Reported on 7/12/2023)       No current facility-administered medications for this visit. Objective:    /73 (BP Location: Right arm, Patient Position: Sitting, Cuff Size: Standard)   Pulse 90   Temp 98.6 °F (37 °C) (Temporal)   Wt 86.6 kg (191 lb)   LMP  (LMP Unknown)   SpO2 98%   BMI 33.83 kg/m²        Physical Exam  Constitutional:       General: She is not in acute distress. Appearance: Normal appearance. She is obese. She is not ill-appearing or toxic-appearing. HENT:      Head: Normocephalic and atraumatic. Cardiovascular:      Rate and Rhythm: Normal rate and regular rhythm. Heart sounds: No murmur heard. No gallop. Pulmonary:      Effort: Pulmonary effort is normal. No respiratory distress. Breath sounds: No wheezing or rales. Abdominal:      General: Abdomen is flat. There is no distension. Tenderness: There is no abdominal tenderness. There is no guarding. Comments: Truncal obesity noted on exam along with rounding face and upper back hump  No abdominal striae noted    Musculoskeletal:      Right lower leg: No edema. Left lower leg: No edema. Neurological:      Mental Status: She is alert and oriented to person, place, and time.    Psychiatric:         Mood and Affect: Mood normal.         Behavior: Behavior normal.                Esther Rosas, [address] [place] [address] Internal Medicine PGY-1

## 2023-07-12 NOTE — TELEPHONE ENCOUNTER
Patients GI provider:  Dr. Damita Lesch    Number to return call: [phone]    Reason for call: Pt calling to set up new patient appt. As per recommendation from Internal medicine doctor.     Scheduled procedure/appointment date if applicable: Appt 8/80/37

## 2023-07-12 NOTE — PATIENT INSTRUCTIONS
1) Please compete 24 hr urine  2) Take 1mg dexamethasone at 11pm and get the labs completed the next morning  3) complete colonoscopy, mammogram, dexa scan, and CT lung

## 2023-07-26 ENCOUNTER — OFFICE VISIT (OUTPATIENT)
Dept: DENTISTRY | Facility: CLINIC | Age: 58
End: 2023-07-26

## 2023-07-26 VITALS — DIASTOLIC BLOOD PRESSURE: 77 MMHG | SYSTOLIC BLOOD PRESSURE: 118 MMHG | HEART RATE: 70 BPM

## 2023-07-26 DIAGNOSIS — Z01.21 ENCOUNTER FOR DENTAL EXAMINATION AND CLEANING WITH ABNORMAL FINDINGS: Primary | ICD-10-CM

## 2023-07-26 PROCEDURE — D0330 PANORAMIC RADIOGRAPHIC IMAGE: HCPCS

## 2023-07-26 PROCEDURE — D0150 COMPREHENSIVE ORAL EVALUATION - NEW OR ESTABLISHED PATIENT: HCPCS

## 2023-07-26 RX ORDER — ESCITALOPRAM OXALATE 10 MG/1
10 TABLET ORAL EVERY MORNING
COMMUNITY
Start: 2023-07-05

## 2023-07-26 NOTE — DENTAL PROCEDURE DETAILS
Sharad Finnegan presents for a Comprehensive exam. Verbal consent for treatment given in addition to the forms. Reviewed health history - Patient is ASA II  Consents signed: Yes     Perio: Max and Elvi dentures  Pain Scale: 0  Caries Assessment: Low  Radiographs: Panorex     Oral Hygiene instruction reviewed and given. Recommended Hygiene recall visits with the [address] COMP EXAM, PAN     REVIEWED MED HX: meds, allergies, health changes reviewed in EPIC  CHIEF CONCERN: Patient currently has a Max complete denture that was fabricated apx. 1 5 years ago and is interested in having a new one made. PAIN SCALE:  0  ASA CLASS:  II    Visual and Tactile Intraoral/ Extraoral evaluation: Oral and Oropharyngeal cancer evaluation. No findings     Dr. Gemrania Jenkins exam=   Reviewed with patient clinical and radiographic findings and patient verbalized understanding. All questions and concerns addressed. Doctor explained to patient that a new Max CUD would be fine to have done but doesn't advise a Elvi CUD due to no ridge.          NEXT VISIT:   1)Impressions for new Max denture    Last PAN:  7/26/2023

## 2023-08-03 ENCOUNTER — TELEPHONE (OUTPATIENT)
Age: 58
End: 2023-08-03

## 2023-08-03 DIAGNOSIS — I10 ESSENTIAL HYPERTENSION: ICD-10-CM

## 2023-08-03 NOTE — TELEPHONE ENCOUNTER
Pt contacted Call Center requested refill of their medication. Medication Name: Naproxen      Dosage of Med: 500mg      Frequency of Med: twice daily      Remaining Medication: 20      Pharmacy and Location: [facility]. Preferred Callback Phone Number: [phone]      Thank you. PLEASE ADVISE PATIENTS:    REFILL REQUESTS WILL BE PROCESSED WITHIN 24-48 HOURS.

## 2023-08-04 RX ORDER — LISINOPRIL 10 MG/1
10 TABLET ORAL DAILY
Qty: 90 TABLET | Refills: 3 | Status: SHIPPED | OUTPATIENT
Start: 2023-08-04

## 2023-09-05 ENCOUNTER — TELEPHONE (OUTPATIENT)
Dept: INTERNAL MEDICINE CLINIC | Facility: CLINIC | Age: 58
End: 2023-09-05

## 2023-09-05 NOTE — TELEPHONE ENCOUNTER
Folder Color- Purple     Name of Form-  Physician Certification     Form to be filled out by- Dr. Chang Fox     Form to be Faxed [phone]    Patient made aware of 10 business day policy.

## 2023-09-07 ENCOUNTER — TELEPHONE (OUTPATIENT)
Dept: INTERNAL MEDICINE CLINIC | Facility: CLINIC | Age: 58
End: 2023-09-07

## 2023-09-07 NOTE — TELEPHONE ENCOUNTER
Ramon Bran from [facility] called to get the status of the Physician Certification form. I Did let Heath García know that the form was placed in folder and then given to  for his review.

## 2023-09-13 NOTE — NURSING NOTE
Pt visible, pleasant  Denies all s/s  States her last BM 11/21  Will maintain q7min checks  Suturegard Intro: Intraoperative tissue expansion was performed, utilizing the SUTUREGARD device, in order to reduce wound tension.

## 2023-10-06 ENCOUNTER — HOSPITAL ENCOUNTER (OUTPATIENT)
Dept: RADIOLOGY | Age: 58
Discharge: HOME | End: 2023-10-06
Payer: MEDICARE

## 2023-10-06 DIAGNOSIS — Z72.0 TOBACCO USE: ICD-10-CM

## 2023-10-06 PROCEDURE — 71271 CT THORAX LUNG CANCER SCR C-: CPT

## 2023-10-18 ENCOUNTER — TELEPHONE (OUTPATIENT)
Dept: OTHER | Facility: HOSPITAL | Age: 58
End: 2023-10-18

## 2023-10-18 NOTE — TELEPHONE ENCOUNTER
Discussed findings of CT lung screen. Discussed further smoking cessation. Discussed routine mammogram and DEXA scan which will be further addressed at upcoming appointment in 1 week.

## 2023-10-25 ENCOUNTER — TELEPHONE (OUTPATIENT)
Dept: INTERNAL MEDICINE CLINIC | Facility: CLINIC | Age: 58
End: 2023-10-25

## 2023-10-31 DIAGNOSIS — I10 ESSENTIAL HYPERTENSION: ICD-10-CM

## 2023-10-31 RX ORDER — LISINOPRIL 10 MG/1
10 TABLET ORAL DAILY
Qty: 90 TABLET | Refills: 3 | Status: SHIPPED | OUTPATIENT
Start: 2023-10-31

## 2023-10-31 RX ORDER — AMLODIPINE BESYLATE 10 MG/1
10 TABLET ORAL DAILY
Qty: 90 TABLET | Refills: 3 | Status: SHIPPED | OUTPATIENT
Start: 2023-10-31 | End: 2023-11-30

## 2023-11-02 ENCOUNTER — TELEPHONE (OUTPATIENT)
Dept: INTERNAL MEDICINE CLINIC | Facility: CLINIC | Age: 58
End: 2023-11-02

## 2023-11-17 ENCOUNTER — HOSPITAL ENCOUNTER (OUTPATIENT)
Dept: RADIOLOGY | Age: 58
Discharge: HOME | End: 2023-11-17
Payer: MEDICARE

## 2023-11-17 DIAGNOSIS — E78.2 MIXED HYPERLIPIDEMIA: ICD-10-CM

## 2023-11-17 DIAGNOSIS — Z13.820 OSTEOPOROSIS SCREENING: ICD-10-CM

## 2023-11-17 DIAGNOSIS — F32.89 OTHER DEPRESSION: Primary | ICD-10-CM

## 2023-11-17 PROCEDURE — 77080 DXA BONE DENSITY AXIAL: CPT

## 2023-11-17 NOTE — TELEPHONE ENCOUNTER
Please refill all pt medications that she needs, she is moving to [place] on December 1st and will need her medication until she finds a new PCP office in [place]

## 2023-11-19 ENCOUNTER — HOSPITAL ENCOUNTER (EMERGENCY)
Facility: HOSPITAL | Age: 58
Discharge: HOME | End: 2023-11-19
Attending: EMERGENCY MEDICINE
Payer: MEDICARE

## 2023-11-19 VITALS
HEART RATE: 70 BPM | DIASTOLIC BLOOD PRESSURE: 90 MMHG | TEMPERATURE: 97.4 F | RESPIRATION RATE: 18 BRPM | OXYGEN SATURATION: 96 % | SYSTOLIC BLOOD PRESSURE: 114 MMHG

## 2023-11-19 DIAGNOSIS — M54.50 ACUTE RIGHT-SIDED LOW BACK PAIN WITHOUT SCIATICA: Primary | ICD-10-CM

## 2023-11-19 PROCEDURE — 99284 EMERGENCY DEPT VISIT MOD MDM: CPT | Performed by: EMERGENCY MEDICINE

## 2023-11-19 PROCEDURE — 99283 EMERGENCY DEPT VISIT LOW MDM: CPT

## 2023-11-19 PROCEDURE — 96372 THER/PROPH/DIAG INJ SC/IM: CPT

## 2023-11-19 RX ORDER — METHOCARBAMOL 500 MG/1
500 TABLET, FILM COATED ORAL ONCE
Status: COMPLETED | OUTPATIENT
Start: 2023-11-19 | End: 2023-11-19

## 2023-11-19 RX ORDER — LIDOCAINE 50 MG/G
1 PATCH TOPICAL ONCE
Status: DISCONTINUED | OUTPATIENT
Start: 2023-11-19 | End: 2023-11-19 | Stop reason: HOSPADM

## 2023-11-19 RX ORDER — METHOCARBAMOL 500 MG/1
500 TABLET, FILM COATED ORAL 2 TIMES DAILY
Qty: 20 TABLET | Refills: 0 | Status: SHIPPED | OUTPATIENT
Start: 2023-11-19 | End: 2023-11-20 | Stop reason: SDUPTHER

## 2023-11-19 RX ORDER — KETOROLAC TROMETHAMINE 30 MG/ML
15 INJECTION, SOLUTION INTRAMUSCULAR; INTRAVENOUS ONCE
Status: COMPLETED | OUTPATIENT
Start: 2023-11-19 | End: 2023-11-19

## 2023-11-19 RX ADMIN — LIDOCAINE 5% 1 PATCH: 700 PATCH TOPICAL at 00:54

## 2023-11-19 RX ADMIN — METHOCARBAMOL 500 MG: 500 TABLET ORAL at 00:54

## 2023-11-19 RX ADMIN — KETOROLAC TROMETHAMINE 15 MG: 30 INJECTION, SOLUTION INTRAMUSCULAR at 00:54

## 2023-11-19 NOTE — DISCHARGE INSTRUCTIONS
Please take medications as prescribed. Continue taking Tylenol every 8 hours, and Aleve every 12 hours. Take Robaxin as needed for spasm. May make you drowsy. Return to the ED with any new/concerning issues. When you get to [place], find a primary care doctor and you may need physical therapy as well.

## 2023-11-19 NOTE — ED ATTENDING ATTESTATION
11/19/2023  IRafal DO, saw and evaluated the patient. I have discussed the patient with the resident/non-physician practitioner and agree with the resident's/non-physician practitioner's findings, Plan of Care, and MDM as documented in the resident's/non-physician practitioner's note, except where noted. All available labs and Radiology studies were reviewed. I was present for key portions of any procedure(s) performed by the resident/non-physician practitioner and I was immediately available to provide assistance. At this point I agree with the current assessment done in the Emergency Department. I have conducted an independent evaluation of this patient a history and physical is as follows:    60 yof with back pain, lower right, occurred while moving boxes while preparing to move to [facility] (dec 1st). Denies urinary retention or incontinence, saddle anesthesia, leg weakness, paresthesias.    Tenderness over right SI  Normal exam otherwise  Diff dx: strain, sacroilitis, doubt discogenic disease  Plan toradol, robaxin, lidocaine patch    ED Course         Critical Care Time  Procedures

## 2023-11-19 NOTE — ED PROVIDER NOTES
History  Chief Complaint   Patient presents with    Back Pain     Lower back, was lifting boxes last week when it started. Has some tenderness to R lower back. Radiates to "right above the butt" on the right. No changes to uo/bm     HPI  Patient is a 76-year-old female with past medical history of anxiety, arthritis, bipolar affective disorder, hypertension, presenting to the ED for evaluation of right lower back pain. Patient states that she is in the process of moving from her house, down to [facility]. She has been lifting heavy boxes over the past week, and has developed some pain in the right side of her low back. Patient states that the pain occasionally radiates to the right hip. She denies any paresthesias, acute trauma, urinary or bowel incontinence, IV drug abuse, difficulty ambulating. Patient has been applying some muscle cream, but feels that is not really improving her symptoms. She last took Tylenol at 5:00 this afternoon. Prior to Admission Medications   Prescriptions Last Dose Informant Patient Reported? Taking?    LORazepam (Ativan) 0.5 mg tablet   No No   Sig: Take 1 tablet (0.5 mg total) by mouth every 8 (eight) hours as needed for anxiety   Synvisc One 48 MG/6ML injection   Yes No   amLODIPine (NORVASC) 10 mg tablet   No No   Sig: Take 1 tablet (10 mg total) by mouth daily   atorvastatin (LIPITOR) 20 mg tablet   No No   Sig: Take 1 tablet (20 mg total) by mouth daily   dexamethasone (DECADRON) 1 mg tablet   Yes No   Sig: TAKE 1 TABLET BY MOUTH TWICE A DAY WITH MEALS FOR 1 DAY   escitalopram (LEXAPRO) 10 mg tablet   Yes No   Sig: Take 10 mg by mouth every morning   escitalopram (LEXAPRO) 5 mg tablet   No No   Sig: Take 1 tablet (5 mg total) by mouth daily   lisinopril (ZESTRIL) 10 mg tablet   No No   Sig: Take 1 tablet (10 mg total) by mouth daily   risperiDONE (RisperDAL) 0.5 mg tablet   No No   Sig: Take 1 tablet (0.5 mg total) by mouth daily at bedtime Take one 2mg tab with one 0.5mg tab every night at bedtime for total dose of 2.5mg qhs   risperiDONE (RisperDAL) 2 mg tablet   No No   Sig: Take 1 tablet (2 mg total) by mouth daily at bedtime Take one 2mg tab with one 0.5mg tab every night at bedtime for total dose of 2.5mg qhs      Facility-Administered Medications: None       Past Medical History:   Diagnosis Date    Anxiety     Arthritis     Bipolar affective disorder, depressed, severe ([address]) 4/28/2019    Depression     Elevated bilirubin 8/15/2019    Hyperlipidemia     Hypertension     Hypokalemia 8/15/2019    Learning difficulty     Leukocytosis 7/28/2020    Obesity (BMI 30.0-34.9) 6/26/2020    Osteopenia     Ovarian failure     Previous known suicide attempt     Psychiatric disorder     Psychiatric illness     PTSD (post-traumatic stress disorder) 2/25/2023       Past Surgical History:   Procedure Laterality Date    LAPAROSCOPY      as a child, per patient-normal findings       Family History   Problem Relation Age of Onset    Alzheimer's disease Mother     Depression Mother     Depression Brother     Bipolar disorder Brother     No Known Problems Maternal Aunt     No Known Problems Paternal Aunt     No Known Problems Maternal Uncle     No Known Problems Paternal Uncle     No Known Problems Cousin     ADD / ADHD Neg Hx     Alcohol abuse Neg Hx     Anxiety disorder Neg Hx     Dementia Neg Hx     Drug abuse Neg Hx     OCD Neg Hx     Paranoid behavior Neg Hx     Schizophrenia Neg Hx     Seizures Neg Hx     Self-Injury Neg Hx     Suicide Attempts Neg Hx      I have reviewed and agree with the history as documented.     E-Cigarette/Vaping    E-Cigarette Use Never User      E-Cigarette/Vaping Substances    Nicotine No     THC No     CBD No     Flavoring No     Other No     Unknown No      Social History     Tobacco Use    Smoking status: Some Days     Packs/day: 1.00     Years: 20.00     Total pack years: 20.00     Types: Cigarettes    Smokeless tobacco: Never   Vaping Use    Vaping Use: Never used   Substance Use Topics    Alcohol use: Not Currently    Drug use: Not Currently        Review of Systems   All other systems reviewed and are negative. Physical Exam  ED Triage Vitals [11/19/23 0021]   Temperature Pulse Respirations Blood Pressure SpO2   (!) 97.4 °F (36.3 °C) 70 18 114/90 96 %      Temp src Heart Rate Source Patient Position - Orthostatic VS BP Location FiO2 (%)   -- -- -- -- --      Pain Score       9             Orthostatic Vital Signs  Vitals:    11/19/23 0021   BP: 114/90   Pulse: 70       Physical Exam  Vitals and nursing note reviewed. Constitutional:       General: She is not in acute distress. Appearance: Normal appearance. She is well-developed. She is not toxic-appearing. HENT:      Head: Normocephalic and atraumatic. Right Ear: External ear normal.      Left Ear: External ear normal.      Nose: Nose normal. No congestion. Mouth/Throat:      Mouth: Mucous membranes are moist.      Pharynx: Oropharynx is clear. Eyes:      Extraocular Movements: Extraocular movements intact. Conjunctiva/sclera: Conjunctivae normal.   Cardiovascular:      Rate and Rhythm: Normal rate and regular rhythm. Pulses: Normal pulses. Heart sounds: Normal heart sounds. No murmur heard. Pulmonary:      Effort: Pulmonary effort is normal. No respiratory distress. Breath sounds: Normal breath sounds. No wheezing, rhonchi or rales. Abdominal:      General: Abdomen is flat. Palpations: Abdomen is soft. Tenderness: There is no abdominal tenderness. There is no guarding or rebound. Musculoskeletal:         General: No swelling. Cervical back: Normal, normal range of motion and neck supple. Thoracic back: Normal.      Lumbar back: Tenderness (R SI joint tenderness) present. No swelling, edema, deformity, signs of trauma or lacerations. Normal range of motion. Skin:     General: Skin is warm and dry.       Capillary Refill: Capillary refill takes less than 2 seconds. Neurological:      General: No focal deficit present. Mental Status: She is alert and oriented to person, place, and time. Cranial Nerves: No cranial nerve deficit. Sensory: No sensory deficit. Motor: No weakness. Gait: Gait normal.      Comments: Muscle strength 5/5 bilateral lower extremities     Psychiatric:         Mood and Affect: Mood normal.         ED Medications  Medications   ketorolac (TORADOL) injection 15 mg (15 mg Intramuscular Given 11/19/23 0054)   methocarbamol (ROBAXIN) tablet 500 mg (500 mg Oral Given 11/19/23 0054)       Diagnostic Studies  Results Reviewed       None                   No orders to display         Procedures  Procedures      ED Course       Patient presenting with low back pain on the R side after lifting heavy boxes this week while moving. Differential diagnoses includes lumbago versus musculoskeletal spasm / strain versus sciatica. No back pain red flags on history or physical. Presentation not consistent with malignancy (lack of history of malignancy, lack of B symptoms), fracture (no trauma, no bony tenderness to palpation), cauda equina (no bowel or urinary incontinence/retention, no saddle anesthesia, no distal weakness), AAA, viscus perforation, osteomyelitis or epidural abscess (no IVDU, vertebral tenderness), renal colic, pyelonephritis (afebrile, no CVA tenderness, no urinary symptoms). Given the clinical picture, no indication for imaging at this time. Plan: pain control, supportive care, reassessment, test ambulation. Patient moving December 1, therefore will not have appropriate time for Comprehensive spine follow up. Rx Robaxin. I gave oral return precautions for what to return for in addition to the written return precautions. The patient verbalized understanding of the discharge instructions and warnings that would necessitate return to the Emergency Department.   I specifically highlighted areas of special concern regarding the written and verbal discharge instructions and return precautions. All questions were answered prior to discharge. SBIRT 22yo+      Flowsheet Row Most Recent Value   Initial Alcohol Screen: US AUDIT-C     1. How often do you have a drink containing alcohol? 0 Filed at: 11/19/2023 0023   2. How many drinks containing alcohol do you have on a typical day you are drinking? 0 Filed at: 11/19/2023 0023   3b. FEMALE Any Age, or MALE 65+: How often do you have 4 or more drinks on one occassion? 0 Filed at: 11/19/2023 0023   Audit-C Score 0 Filed at: 11/19/2023 0023   MARYELLEN: How many times in the past year have you. .. Used an illegal drug or used a prescription medication for non-medical reasons? Never Filed at: 11/19/2023 0023                  Medical Decision Making  Risk  Prescription drug management. Disposition  Final diagnoses:   Acute right-sided low back pain without sciatica     Time reflects when diagnosis was documented in both MDM as applicable and the Disposition within this note       Time User Action Codes Description Comment    11/19/2023 12:40 AM Mary Stone Add [M54.50] Acute right-sided low back pain without sciatica           ED Disposition       ED Disposition   Discharge    Condition   Stable    Date/Time   Sun Nov 19, 2023 [address] discharge to home/self care.                    Follow-up Information    None         Discharge Medication List as of 11/19/2023 12:42 AM        START taking these medications    Details   methocarbamol (ROBAXIN) 500 mg tablet Take 1 tablet (500 mg total) by mouth 2 (two) times a day, Starting Sun 11/19/2023, Normal           CONTINUE these medications which have NOT CHANGED    Details   amLODIPine (NORVASC) 10 mg tablet Take 1 tablet (10 mg total) by mouth daily, Starting Tue 10/31/2023, Until Thu 11/30/2023, Normal      atorvastatin (LIPITOR) 20 mg tablet Take 1 tablet (20 mg total) by mouth daily, Starting Mon 2/27/2023, Until Wed 7/12/2023, Print      dexamethasone (DECADRON) 1 mg tablet TAKE 1 TABLET BY MOUTH TWICE A DAY WITH MEALS FOR 1 DAY, Historical Med      escitalopram (LEXAPRO) 10 mg tablet Take 10 mg by mouth every morning, Starting Wed 7/5/2023, Historical Med      lisinopril (ZESTRIL) 10 mg tablet Take 1 tablet (10 mg total) by mouth daily, Starting Tue 10/31/2023, Normal      LORazepam (Ativan) 0.5 mg tablet Take 1 tablet (0.5 mg total) by mouth every 8 (eight) hours as needed for anxiety, Starting Fri 5/26/2023, Normal      risperiDONE (RisperDAL) 0.5 mg tablet Take 1 tablet (0.5 mg total) by mouth daily at bedtime Take one 2mg tab with one 0.5mg tab every night at bedtime for total dose of 2.5mg qhs, Starting Mon 4/3/2023, Until Wed 7/12/2023, Normal      risperiDONE (RisperDAL) 2 mg tablet Take 1 tablet (2 mg total) by mouth daily at bedtime Take one 2mg tab with one 0.5mg tab every night at bedtime for total dose of 2.5mg qhs, Starting Mon 4/3/2023, Until Wed 7/12/2023, Normal      Synvisc One 48 MG/6ML injection Starting Fri 9/1/2023, Historical Med           No discharge procedures on file. PDMP Review         Value Time User    PDMP Reviewed  Yes 11/21/2022 10:34 AM Logan Ball MD             ED Provider  Attending physically available and evaluated Rio Davis. I managed the patient along with the ED Attending.     Electronically Signed by           Krista Wells,   11/19/23 [id number]

## 2023-11-20 ENCOUNTER — VBI (OUTPATIENT)
Dept: INTERNAL MEDICINE CLINIC | Facility: CLINIC | Age: 58
End: 2023-11-20

## 2023-11-20 DIAGNOSIS — I10 ESSENTIAL HYPERTENSION: ICD-10-CM

## 2023-11-20 DIAGNOSIS — M54.50 ACUTE RIGHT-SIDED LOW BACK PAIN WITHOUT SCIATICA: ICD-10-CM

## 2023-11-20 DIAGNOSIS — Z71.89 COMPLEX CARE COORDINATION: Primary | ICD-10-CM

## 2023-11-20 RX ORDER — ATORVASTATIN CALCIUM 20 MG/1
20 TABLET, FILM COATED ORAL DAILY
Qty: 30 TABLET | Refills: 2 | Status: SHIPPED | OUTPATIENT
Start: 2023-11-20 | End: 2023-11-27

## 2023-11-20 RX ORDER — LISINOPRIL 10 MG/1
10 TABLET ORAL DAILY
Qty: 90 TABLET | Refills: 3 | Status: SHIPPED | OUTPATIENT
Start: 2023-11-20

## 2023-11-20 RX ORDER — ESCITALOPRAM OXALATE 10 MG/1
10 TABLET ORAL EVERY MORNING
Qty: 30 TABLET | Refills: 2 | Status: SHIPPED | OUTPATIENT
Start: 2023-11-20 | End: 2024-02-18

## 2023-11-20 RX ORDER — AMLODIPINE BESYLATE 10 MG/1
10 TABLET ORAL DAILY
Qty: 30 TABLET | Refills: 2 | Status: SHIPPED | OUTPATIENT
Start: 2023-11-20 | End: 2024-02-18

## 2023-11-20 RX ORDER — METHOCARBAMOL 500 MG/1
500 TABLET, FILM COATED ORAL 3 TIMES DAILY
Qty: 42 TABLET | Refills: 0 | Status: SHIPPED | OUTPATIENT
Start: 2023-11-20 | End: 2023-12-04

## 2023-11-20 NOTE — TELEPHONE ENCOUNTER
11/20/23 11:25 AM    Patient contacted post ED visit, [facility] department spoke with patient/caregiver and outreach was successful. Thank you.   Esha Celeste, [facility]  [facility] VALUE BASED VIR

## 2023-11-26 DIAGNOSIS — E78.2 MIXED HYPERLIPIDEMIA: ICD-10-CM

## 2023-11-27 ENCOUNTER — PATIENT OUTREACH (OUTPATIENT)
Dept: INTERNAL MEDICINE CLINIC | Facility: CLINIC | Age: 58
End: 2023-11-27

## 2023-11-27 RX ORDER — ATORVASTATIN CALCIUM 20 MG/1
20 TABLET, FILM COATED ORAL DAILY
Qty: 90 TABLET | Refills: 2 | Status: SHIPPED | OUTPATIENT
Start: 2023-11-27

## 2023-11-27 NOTE — PROGRESS NOTES
Outpatient Care Management Note:    Patient referred to outpatient nurse care management by  as patient has [facility] w/ ED risk score of 61+. Patient in ED on 11/19/23 for acute right sided low back pain without sciatica. Patient prescribed methocarbamol. I called patient and explained my role and reason for outreach. Patient reports her back pain improved some. She reports she had been packing boxes and doing some heavy lifting that brought on the pain. She reports resting helped but is moving on Friday 12/1 and needs to finish packing. She reports she has Voltaren gel to put on her back and the Methocarbamol to take as needed. Patient declining need to follow up with PCP office at this time. I did strongly encourage her to establish with a PCP when she gets down to [place] and applying for [facility]. Patient reports she has appointment on Thursday with her therapist and plans to establish with another mental health provider in [place]. Strongly encouraged this be a priority. Encouraged for her to follow up at pharmacy regarding her blood pressure medication and cholesterol medication and if any further issues with obtaining medication to reach out to PCP office. Patient reports she is moving to [place] and will be staying with her boyfriend. She has a truck arranged to take her and her belongings to [place]. Patient does not have any further questions, concerns, or other needs at this time. Patient has PCP office number [phone] if needed. Patient declining need for further outreach at this time and due to her moving to [place].

## 2023-12-04 ENCOUNTER — HOSPITAL ENCOUNTER (EMERGENCY)
Facility: HOSPITAL | Age: 58
Discharge: HOME | End: 2023-12-04
Attending: EMERGENCY MEDICINE
Payer: MEDICARE

## 2023-12-04 VITALS
RESPIRATION RATE: 20 BRPM | OXYGEN SATURATION: 95 % | TEMPERATURE: 98.5 F | SYSTOLIC BLOOD PRESSURE: 168 MMHG | DIASTOLIC BLOOD PRESSURE: 93 MMHG | HEART RATE: 125 BPM

## 2023-12-04 DIAGNOSIS — R45.851 SUICIDAL IDEATION: Primary | ICD-10-CM

## 2023-12-04 LAB — ETHANOL EXG-MCNC: 0 MG/DL

## 2023-12-04 PROCEDURE — 99285 EMERGENCY DEPT VISIT HI MDM: CPT | Performed by: EMERGENCY MEDICINE

## 2023-12-04 PROCEDURE — 82075 ASSAY OF BREATH ETHANOL: CPT

## 2023-12-04 PROCEDURE — 99285 EMERGENCY DEPT VISIT HI MDM: CPT

## 2023-12-04 NOTE — ED NOTES
This writer discussed the patients current presentation and recommended discharge plan with . They agree with the patient being discharged at this time with referrals and/or information about outpatient psychiatry. The patient was Instructed to follow up with their psychiatrist.     This writer and the patient completed a safety plan. The patient was provided with a copy of their safety plan with encouragement to utilize the plan following discharge. In addition, the patient was instructed to call local [facility] crisis, other crisis services, [facility] or to go to the nearest ER immediately if their situation changes at any time. This writer discussed discharge plans with the patient, who agrees with and understands the discharge plans.          SAFETY PLAN  Warning Signs (thoughts, images, mood, behavior, situations) of a potential crisis: thoughts of self harm/suicide, depression      Coping Skills (what can I do to take my mind off the problem, or to keep myself safe): exercise, journal, read a book, work on a hobby      Outside Support (who can I reach out to for support and help): call your ICM, therapist, call [facility] Suicide Prevention Hotline:  [address] [place] [address] Drive [address]: [address]: [address] [place] [address] [address]   [phone] - Peer Support Talk Line (1-9pm daily)  [phone] - Teen Support Talk Line (1-9pm daily)  [address] [place] [address] [address] [place] [address] [address] [place] [address]) [phone] - [address] [facility]

## 2023-12-04 NOTE — ED NOTES
Patient is currently unable to differentiate reality vs non reality with events in her personal life. She has made statements about wanting to harm herself, but denies these thoughts now. Our conversation was back and forth between real and not real, and the inability to make sense of the current situation. Patient is currently not changed into paper scrubs as she is unable to understand the reasoning behind changing at this time.       Michelle Naik RN  12/04/23 [id number]

## 2023-12-04 NOTE — ED NOTES
Pt comes to the ed along with her ICM. Pt stated four police officers showed up at her door this morning after someone she texted yesterday called them. Pt reports being upset yesterday and sent a text of a picture of her with a knife. Pt denies making any suicidal statements. She denies suicidal or homicidal ideation as well as hallucinations. Pt is med compliant and has a psychiatrist and therapist. Pt is requesting d/c home. Her ICM also feels pt is safe for d/c home. Pt is known to this writer and appears safe for d/c home.
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
## 2023-12-04 NOTE — ED ATTENDING ATTESTATION
12/4/2023  I, Miriam Bird MD, saw and evaluated the patient. I have discussed the patient with the resident/non-physician practitioner and agree with the resident's/non-physician practitioner's findings, Plan of Care, and MDM as documented in the resident's/non-physician practitioner's note, except where noted. All available labs and Radiology studies were reviewed. I was present for key portions of any procedure(s) performed by the resident/non-physician practitioner and I was immediately available to provide assistance. At this point I agree with the current assessment done in the Emergency Department. I have conducted an independent evaluation of this patient a history and physical is as follows:   The patient Alexandro Stark for psychiatric evaluation the patient has a history of schizoaffective bipolar disorder PTSD  Depression  Patient was brought in by her   Patient states she is not suicidal or homicidal at the present time  Plan: Crisis consult  ED Course         Critical Care Time  Procedures
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
## 2023-12-05 ENCOUNTER — VBI (OUTPATIENT)
Dept: INTERNAL MEDICINE CLINIC | Facility: CLINIC | Age: 58
End: 2023-12-05

## 2023-12-05 ENCOUNTER — PATIENT OUTREACH (OUTPATIENT)
Dept: INTERNAL MEDICINE CLINIC | Facility: CLINIC | Age: 58
End: 2023-12-05

## 2023-12-05 DIAGNOSIS — Z78.9 NEED FOR FOLLOW-UP BY SOCIAL WORKER: Primary | ICD-10-CM

## 2023-12-05 NOTE — TELEPHONE ENCOUNTER
12/05/23 10:35 AM     VB [employer] SmartForm used to document caregap status. Letter sent to CM high risk and Ed prob.   Betsey Key
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
## 2023-12-06 ENCOUNTER — PATIENT OUTREACH (OUTPATIENT)
Dept: INTERNAL MEDICINE CLINIC | Facility: CLINIC | Age: 58
End: 2023-12-06
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
## 2023-12-08 ENCOUNTER — OFFICE VISIT (OUTPATIENT)
Dept: INTERNAL MEDICINE CLINIC | Facility: CLINIC | Age: 58
End: 2023-12-08

## 2023-12-08 ENCOUNTER — PATIENT OUTREACH (OUTPATIENT)
Dept: INTERNAL MEDICINE CLINIC | Facility: CLINIC | Age: 58
End: 2023-12-08

## 2023-12-08 VITALS
SYSTOLIC BLOOD PRESSURE: 109 MMHG | BODY MASS INDEX: 33.84 KG/M2 | HEIGHT: 63 IN | HEART RATE: 99 BPM | RESPIRATION RATE: 16 BRPM | WEIGHT: 191 LBS | DIASTOLIC BLOOD PRESSURE: 76 MMHG | TEMPERATURE: 97.6 F

## 2023-12-08 DIAGNOSIS — M54.50 ACUTE RIGHT-SIDED LOW BACK PAIN WITHOUT SCIATICA: Primary | ICD-10-CM

## 2023-12-08 PROBLEM — E66.9 OBESITY (BMI 30.0-34.9): Chronic | Status: RESOLVED | Noted: 2020-06-26 | Resolved: 2023-12-08

## 2023-12-08 PROBLEM — E66.811 OBESITY (BMI 30.0-34.9): Chronic | Status: RESOLVED | Noted: 2020-06-26 | Resolved: 2023-12-08

## 2023-12-08 PROCEDURE — 99214 OFFICE O/P EST MOD 30 MIN: CPT | Performed by: PHYSICIAN ASSISTANT

## 2023-12-08 RX ORDER — NAPROXEN 500 MG/1
500 TABLET ORAL 2 TIMES DAILY WITH MEALS
Qty: 60 TABLET | Refills: 0 | Status: SHIPPED | OUTPATIENT
Start: 2023-12-08

## 2023-12-08 RX ORDER — QUETIAPINE FUMARATE 25 MG/1
25 TABLET, FILM COATED ORAL EVERY EVENING
COMMUNITY
Start: 2023-11-22

## 2023-12-08 RX ORDER — ESCITALOPRAM OXALATE 20 MG/1
20 TABLET ORAL DAILY
COMMUNITY
Start: 2023-11-22

## 2023-12-08 RX ORDER — LIDOCAINE 50 MG/G
1 PATCH TOPICAL DAILY
Qty: 30 PATCH | Refills: 2 | Status: SHIPPED | OUTPATIENT
Start: 2023-12-08

## 2023-12-08 RX ORDER — METHOCARBAMOL 750 MG/1
750 TABLET, FILM COATED ORAL EVERY 6 HOURS PRN
Qty: 60 TABLET | Refills: 0 | Status: SHIPPED | OUTPATIENT
Start: 2023-12-08

## 2023-12-08 NOTE — PROGRESS NOTES
DEENA reached out to [facility] pt previous [facility] Provider. They have closed her referral since she indicated she was leaving the area and they are now not taking any new referrals. Deena has reached out to her ICM Clara Camejo [phone] her ICM from the [facility] [address] [phone] re same but had to leave a message.

## 2023-12-11 ENCOUNTER — TELEPHONE (OUTPATIENT)
Dept: PSYCHIATRY | Facility: CLINIC | Age: 58
End: 2023-12-11

## 2023-12-11 ENCOUNTER — PATIENT OUTREACH (OUTPATIENT)
Dept: INTERNAL MEDICINE CLINIC | Facility: CLINIC | Age: 58
End: 2023-12-11

## 2023-12-11 PROBLEM — M54.50 ACUTE RIGHT-SIDED LOW BACK PAIN WITHOUT SCIATICA: Status: ACTIVE | Noted: 2023-12-11
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
## 2023-12-11 NOTE — PROGRESS NOTES
Name: Lauren Del Cid      : 1965      MRN: [id number]  Encounter Provider: Param Barfield PA-C  Encounter Date: 2023   Encounter department: [address]     1. Acute right-sided low back pain without sciatica  Assessment & Plan:  Difficulty verifying medication list with patient, but she does believe she has been taking naproxen and methocarbamol 500 mg. Continue naproxen 500 mg BID prn with food. Will increase methocarbamol to 750 mg every 6 hours as needed. Discussed possible side effects. Start lidocaine patches once daily as needed. Recommend warm/cool compresses and stretching throughout the day. Use good body mechanics. Declines formal physical therapy at this time. Return to care if symptoms worsen or fail to improve. Orders:  -     naproxen (Naprosyn) 500 mg tablet; Take 1 tablet (500 mg total) by mouth 2 (two) times a day with meals  -     methocarbamol (Robaxin-750) 750 mg tablet; Take 1 tablet (750 mg total) by mouth every 6 (six) hours as needed for muscle spasms  -     lidocaine (Lidoderm) 5 %; Apply 1 patch topically over 12 hours daily Remove & Discard patch within 12 hours or as directed by MD           Lianet      Patient is a 60year old female presenting for a same day appointment. She is complaining of right back pain for awhile. She is unsure how long. Maybe days or longer than a week. Denies injury or trauma. She does admit it started after moving boxes and things in her home. States it is her right low back and radiates into her buttock and outer hip. The pain is constant. She can not describe the pain. Denies numbness, tingling, or weakness. Denies rash. Denies redness, swelling, or ecchymosis. She has had back pain in the past as well. She has a prescription on methocarbamol at home that she has been taking twice daily, possibly 3 times daily. She does not feel it is helping. Denies saddle anesthesia.  Denies fecal or urinary incontinence. Review of Systems   Constitutional:  Negative for appetite change, chills, diaphoresis, fatigue, fever and unexpected weight change. Eyes:  Negative for visual disturbance. Respiratory:  Negative for cough, chest tightness, shortness of breath and wheezing. Cardiovascular:  Negative for chest pain, palpitations and leg swelling. Gastrointestinal:  Negative for abdominal distention, abdominal pain, blood in stool, constipation, diarrhea, nausea and vomiting. Endocrine: Negative for cold intolerance, heat intolerance, polydipsia, polyphagia and polyuria. Genitourinary:  Negative for decreased urine volume, difficulty urinating, dysuria, flank pain, frequency, hematuria, pelvic pain and urgency. Musculoskeletal:  Positive for back pain. Negative for arthralgias, gait problem, joint swelling and myalgias. Skin:  Negative for rash. Neurological:  Negative for dizziness, weakness, light-headedness, numbness and headaches. Hematological:  Negative for adenopathy.        Current Outpatient Medications on File Prior to Visit   Medication Sig    escitalopram (LEXAPRO) 20 mg tablet Take 20 mg by mouth daily    QUEtiapine (SEROquel) 25 mg tablet Take 25 mg by mouth every evening    amLODIPine (NORVASC) 10 mg tablet Take 1 tablet (10 mg total) by mouth daily    atorvastatin (LIPITOR) 20 mg tablet take 1 tablet by mouth daily    dexamethasone (DECADRON) 1 mg tablet TAKE 1 TABLET BY MOUTH TWICE A DAY WITH MEALS FOR 1 DAY    lisinopril (ZESTRIL) 10 mg tablet Take 1 tablet (10 mg total) by mouth daily    LORazepam (Ativan) 0.5 mg tablet Take 1 tablet (0.5 mg total) by mouth every 8 (eight) hours as needed for anxiety    risperiDONE (RisperDAL) 0.5 mg tablet Take 1 tablet (0.5 mg total) by mouth daily at bedtime Take one 2mg tab with one 0.5mg tab every night at bedtime for total dose of 2.5mg qhs    risperiDONE (RisperDAL) 2 mg tablet Take 1 tablet (2 mg total) by mouth daily at bedtime Take one 2mg tab with one 0.5mg tab every night at bedtime for total dose of 2.5mg qhs    Synvisc One 48 MG/6ML injection        Objective     /76 (BP Location: Right arm, Patient Position: Sitting, Cuff Size: Large)   Pulse 99   Temp 97.6 °F (36.4 °C) (Temporal)   Resp 16   Ht 5' 3" (1.6 m)   Wt 86.6 kg (191 lb)   LMP  (LMP Unknown)   BMI 33.83 kg/m²     Physical Exam  Vitals and nursing note reviewed. Constitutional:       General: She is awake. She is not in acute distress. Appearance: Normal appearance. She is well-developed and well-groomed. She is obese. She is not ill-appearing. HENT:      Head: Normocephalic and atraumatic. Cardiovascular:      Rate and Rhythm: Normal rate and regular rhythm. Pulses: Normal pulses. Heart sounds: Normal heart sounds. No murmur heard. Pulmonary:      Effort: Pulmonary effort is normal. No respiratory distress. Breath sounds: Normal breath sounds and air entry. No decreased air movement. No decreased breath sounds, wheezing, rhonchi or rales. Abdominal:      General: Abdomen is flat. Bowel sounds are normal. There is no distension. Palpations: Abdomen is soft. There is no mass. Tenderness: There is no abdominal tenderness. There is no right CVA tenderness, left CVA tenderness, guarding or rebound. Hernia: No hernia is present. Musculoskeletal:         General: Normal range of motion. Cervical back: Neck supple. Thoracic back: Tenderness present. No swelling, edema, deformity, signs of trauma, lacerations, spasms or bony tenderness. Normal range of motion. No scoliosis. Lumbar back: Tenderness present. No swelling, edema, deformity, signs of trauma, lacerations, spasms or bony tenderness. Normal range of motion. Negative right straight leg raise test and negative left straight leg raise test. No scoliosis. Right lower leg: No edema. Left lower leg: No edema.       Comments: Generalized tenderness to palpation over right thoracic and lumbar paraspinals and right lower ribs along mid axillary line. No step offs. No erythema, edema, or ecchymosis. No rashes. Increased pain to this location with all ROM. Lymphadenopathy:      Cervical: No cervical adenopathy. Skin:     General: Skin is warm. Coloration: Skin is not jaundiced. Findings: No rash. Neurological:      General: No focal deficit present. Mental Status: She is alert and oriented to person, place, and time. Mental status is at baseline. Sensory: Sensation is intact. Motor: Motor function is intact. No weakness, tremor or abnormal muscle tone. Coordination: Coordination is intact. Gait: Gait abnormal (using walker). Psychiatric:         Attention and Perception: Attention normal.         Mood and Affect: Mood and affect normal.         Speech: Speech normal.         Behavior: Behavior normal. Behavior is cooperative.        Melani Padron PA-C

## 2023-12-11 NOTE — ASSESSMENT & PLAN NOTE
Difficulty verifying medication list with patient, but she does believe she has been taking naproxen and methocarbamol 500 mg. Continue naproxen 500 mg BID prn with food. Will increase methocarbamol to 750 mg every 6 hours as needed. Discussed possible side effects. Start lidocaine patches once daily as needed. Recommend warm/cool compresses and stretching throughout the day. Use good body mechanics. Declines formal physical therapy at this time. Return to care if symptoms worsen or fail to improve.

## 2023-12-11 NOTE — TELEPHONE ENCOUNTER
DEISI from [address] to add pt to wait lists.     Patient has been added to the Medication Management and Talk Therapy wait list without a referral.    Insurance: Corrinne Nip  Insurance Type:    Commercial []   Medicaid [x]   [place] (if applicable)   Medicare []  Location Preference: [facility]  Provider Preference: none  Virtual: Yes [] No [x]  Were outside resources sent: Yes [] No [x]

## 2023-12-19 ENCOUNTER — PATIENT OUTREACH (OUTPATIENT)
Dept: INTERNAL MEDICINE CLINIC | Facility: CLINIC | Age: 58
End: 2023-12-19
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
## 2023-12-22 ENCOUNTER — HOSPITAL ENCOUNTER (EMERGENCY)
Facility: HOSPITAL | Age: 58
Discharge: HOME | End: 2023-12-22
Attending: EMERGENCY MEDICINE | Admitting: EMERGENCY MEDICINE
Payer: MEDICARE

## 2023-12-22 VITALS
SYSTOLIC BLOOD PRESSURE: 137 MMHG | TEMPERATURE: 97.5 F | HEART RATE: 98 BPM | RESPIRATION RATE: 20 BRPM | OXYGEN SATURATION: 96 % | DIASTOLIC BLOOD PRESSURE: 91 MMHG

## 2023-12-22 DIAGNOSIS — F32.A DEPRESSION: Primary | ICD-10-CM

## 2023-12-22 PROCEDURE — 99283 EMERGENCY DEPT VISIT LOW MDM: CPT | Performed by: EMERGENCY MEDICINE

## 2023-12-22 PROCEDURE — 99284 EMERGENCY DEPT VISIT MOD MDM: CPT
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
## 2023-12-22 NOTE — ED PROVIDER NOTES
History  Chief Complaint   Patient presents with    Depression     Patient states having these feelings of depression that have increased due to being scammed online, money troubles, housing issues and dealing with the death of close friends/family, patient denies SI/HI     HPI  Patient is a 58 y.o. female with history of depression, anxiety presenting to the emergency department for depression. Patient states that she has been feeling depressed for several months. States that her mother and some friends recently  and that was upsetting to her. She also states that she has fallen for multiple scams and has given away all of her money to random strangers online and is now having difficulty affording her rent. Patient has a  that she follows with and has an appointment with tomorrow. Patient has no thoughts of hurting herself or anyone else, has no hallucinations.     Prior to Admission Medications   Prescriptions Last Dose Informant Patient Reported? Taking?   LORazepam (Ativan) 0.5 mg tablet   No No   Sig: Take 1 tablet (0.5 mg total) by mouth every 8 (eight) hours as needed for anxiety   QUEtiapine (SEROquel) 25 mg tablet   Yes No   Sig: Take 25 mg by mouth every evening   Synvisc One 48 MG/6ML injection   Yes No   amLODIPine (NORVASC) 10 mg tablet   No No   Sig: Take 1 tablet (10 mg total) by mouth daily   atorvastatin (LIPITOR) 20 mg tablet   No No   Sig: take 1 tablet by mouth daily   dexamethasone (DECADRON) 1 mg tablet   Yes No   Sig: TAKE 1 TABLET BY MOUTH TWICE A DAY WITH MEALS FOR 1 DAY   escitalopram (LEXAPRO) 20 mg tablet   Yes No   Sig: Take 20 mg by mouth daily   lidocaine (Lidoderm) 5 %   No No   Sig: Apply 1 patch topically over 12 hours daily Remove & Discard patch within 12 hours or as directed by MD   lisinopril (ZESTRIL) 10 mg tablet   No No   Sig: Take 1 tablet (10 mg total) by mouth daily   methocarbamol (Robaxin-750) 750 mg tablet   No No   Sig: Take 1 tablet (750 mg total) by  mouth every 6 (six) hours as needed for muscle spasms   naproxen (Naprosyn) 500 mg tablet   No No   Sig: Take 1 tablet (500 mg total) by mouth 2 (two) times a day with meals   risperiDONE (RisperDAL) 0.5 mg tablet   No No   Sig: Take 1 tablet (0.5 mg total) by mouth daily at bedtime Take one 2mg tab with one 0.5mg tab every night at bedtime for total dose of 2.5mg qhs   risperiDONE (RisperDAL) 2 mg tablet   No No   Sig: Take 1 tablet (2 mg total) by mouth daily at bedtime Take one 2mg tab with one 0.5mg tab every night at bedtime for total dose of 2.5mg qhs      Facility-Administered Medications: None       Past Medical History:   Diagnosis Date    Anxiety     Arthritis     Bipolar affective disorder, depressed, severe (HCC) 4/28/2019    Depression     Elevated bilirubin 8/15/2019    Hyperlipidemia     Hypertension     Hypokalemia 8/15/2019    Learning difficulty     Leukocytosis 7/28/2020    Obesity (BMI 30.0-34.9) 6/26/2020    Osteopenia     Ovarian failure     Previous known suicide attempt     Psychiatric disorder     Psychiatric illness     PTSD (post-traumatic stress disorder) 2/25/2023       Past Surgical History:   Procedure Laterality Date    LAPAROSCOPY      as a child, per patient-normal findings       Family History   Problem Relation Age of Onset    Alzheimer's disease Mother     Depression Mother     Depression Brother     Bipolar disorder Brother     No Known Problems Maternal Aunt     No Known Problems Paternal Aunt     No Known Problems Maternal Uncle     No Known Problems Paternal Uncle     No Known Problems Cousin     ADD / ADHD Neg Hx     Alcohol abuse Neg Hx     Anxiety disorder Neg Hx     Dementia Neg Hx     Drug abuse Neg Hx     OCD Neg Hx     Paranoid behavior Neg Hx     Schizophrenia Neg Hx     Seizures Neg Hx     Self-Injury Neg Hx     Suicide Attempts Neg Hx      I have reviewed and agree with the history as documented.    E-Cigarette/Vaping    E-Cigarette Use Never User       E-Cigarette/Vaping Substances    Nicotine No     THC No     CBD No     Flavoring No     Other No     Unknown No      Social History     Tobacco Use    Smoking status: Some Days     Current packs/day: 1.00     Average packs/day: 1 pack/day for 20.0 years (20.0 ttl pk-yrs)     Types: Cigarettes    Smokeless tobacco: Never   Vaping Use    Vaping status: Never Used   Substance Use Topics    Alcohol use: Not Currently    Drug use: Not Currently        Review of Systems   Constitutional:  Negative for chills and fever.   HENT:  Negative for congestion.    Eyes:  Negative for redness.   Respiratory:  Negative for cough and shortness of breath.    Cardiovascular:  Negative for chest pain and palpitations.   Gastrointestinal:  Negative for abdominal pain, diarrhea and vomiting.   Endocrine: Negative for polyuria.   Genitourinary:  Negative for dysuria and hematuria.   Musculoskeletal:  Negative for arthralgias and back pain.   Skin:  Negative for rash.   Neurological:  Negative for light-headedness and headaches.   All other systems reviewed and are negative.      Physical Exam  ED Triage Vitals [12/22/23 0109]   Temperature Pulse Respirations Blood Pressure SpO2   97.5 °F (36.4 °C) 98 20 137/91 96 %      Temp Source Heart Rate Source Patient Position - Orthostatic VS BP Location FiO2 (%)   Tympanic Monitor Lying Right arm --      Pain Score       --             Orthostatic Vital Signs  Vitals:    12/22/23 0109   BP: 137/91   Pulse: 98   Patient Position - Orthostatic VS: Lying       Physical Exam  Vitals and nursing note reviewed.   Constitutional:       General: She is not in acute distress.     Appearance: She is not ill-appearing.   HENT:      Head: Normocephalic and atraumatic.      Mouth/Throat:      Mouth: Mucous membranes are moist.   Eyes:      Conjunctiva/sclera: Conjunctivae normal.   Cardiovascular:      Rate and Rhythm: Normal rate.   Pulmonary:      Effort: Pulmonary effort is normal. No respiratory  distress.   Abdominal:      General: There is no distension.   Musculoskeletal:         General: Normal range of motion.      Cervical back: Neck supple.   Skin:     General: Skin is warm.   Neurological:      General: No focal deficit present.      Mental Status: She is alert.   Psychiatric:         Behavior: Behavior is not agitated.         Thought Content: Thought content does not include homicidal or suicidal ideation. Thought content does not include homicidal or suicidal plan.         ED Medications  Medications - No data to display    Diagnostic Studies  Results Reviewed       None                   No orders to display         Procedures  Procedures      ED Course                                       Medical Decision Making  Patient is a 58 y.o. female with PMH of depression who presents to the ED with depression.    Vital signs stable. On exam well appearing, no SI/HI or hallucinations.    Plan: discussed options with the patient - she has current outpatient follow up established and has an appointment with her  tomorrow - no SI/HI. Will discharge home.    View ED course above for further discussion on patient workup. .    Upon re-evaluation patient resting comfortably, no complaints at this time.    Disposition: I have reviewed the patient's vital signs, nursing notes, and other relevant tests/information. I had a detailed discussion with the patient regarding the history, exam findings, and any diagnostic results.   Plan to discharge home in stable condition, follow up with .  Discussed with patient who is agreeable to plan.  I discussed discharge instructions, need for follow-up, and oral return precautions for what to return for in addition to the written return precautions and discharge instructions, specifically highlighting areas of special concern.  The patient verbalized understanding of the discharge instructions and warnings that would necessitate return to the Emergency  "Department including SI/HI.  All questions the patient had were answered prior to discharge to the best of my ability.       Portions of the record may have been created with voice recognition software. Occasional wrong word or \"sound a like\" substitutions may have occurred due to the inherent limitations of voice recognition software. Read the chart carefully and recognize, using context, where substitutions have occurred.        Disposition  Final diagnoses:   Depression     Time reflects when diagnosis was documented in both MDM as applicable and the Disposition within this note       Time User Action Codes Description Comment    12/22/2023  1:11 AM Jolly Kong Add [F32.A] Depression           ED Disposition       ED Disposition   Discharge    Condition   Stable    Date/Time   Fri Dec 22, 2023  1:11 AM    Comment   Misa Zuleta discharge to home/self care.                   Follow-up Information    None         Patient's Medications   Discharge Prescriptions    No medications on file     No discharge procedures on file.    PDMP Review         Value Time User    PDMP Reviewed  Yes 11/21/2022 10:34 AM Danielle Carey MD             ED Provider  Attending physically available and evaluated Misadany Zuleta. I managed the patient along with the ED Attending.    Electronically Signed by           Jolly Kong DO  12/22/23 0313    "

## 2023-12-22 NOTE — DISCHARGE INSTRUCTIONS
You were seen in the emergency department today for depression.    Follow up with your  as scheduled tomorrow.     Take your normal medications as prescribed.     Return to the emergency department for any new or concerning symptoms including thoughts of hurting yourself or anyone else.     Thank you for choosing [facility] for your care today.

## 2023-12-27 ENCOUNTER — TELEPHONE (OUTPATIENT)
Dept: INTERNAL MEDICINE CLINIC | Facility: CLINIC | Age: 58
End: 2023-12-27

## 2023-12-27 NOTE — TELEPHONE ENCOUNTER
Patient was scheduled for an AWV today 12/27 but needed to reschedule. PCP next available for AWV is not until Feb. Patient was rescheduled to 2/15. However, patient said that she will need refills for her cholesterol and hypertension medications. I did inform patient that it looks like she had refills for atorvastatin (LIPITOR) 20 mg tablet and lisinopril (ZESTRIL) 10 mg tablet. Is patient prescribed any other meds to treat symptoms that she may need refills for.  Please advise.

## 2023-12-28 DIAGNOSIS — I10 ESSENTIAL HYPERTENSION: ICD-10-CM

## 2023-12-28 RX ORDER — AMLODIPINE BESYLATE 10 MG/1
10 TABLET ORAL DAILY
Qty: 30 TABLET | Refills: 2 | Status: SHIPPED | OUTPATIENT
Start: 2023-12-28 | End: 2024-03-27

## 2023-12-28 NOTE — TELEPHONE ENCOUNTER
Called and spoke to patient and per patient she needs a refill on Amlodipine, Lisinopril and Atorvastatin. I advise patient she has refills at the pharmacy for Lisinopril and Atorvastatin and for the Amlodipine I will send refill's to her pharmacy as she has 1 left. Patient understood and had no questions at this time.

## 2024-01-05 ENCOUNTER — HOSPITAL ENCOUNTER (EMERGENCY)
Facility: HOSPITAL | Age: 59
Discharge: HOME | End: 2024-01-05
Attending: EMERGENCY MEDICINE
Payer: MEDICARE

## 2024-01-05 VITALS
DIASTOLIC BLOOD PRESSURE: 78 MMHG | TEMPERATURE: 98.2 F | SYSTOLIC BLOOD PRESSURE: 142 MMHG | OXYGEN SATURATION: 98 % | HEART RATE: 103 BPM | RESPIRATION RATE: 18 BRPM

## 2024-01-05 DIAGNOSIS — T74.21XA SEXUAL ASSAULT OF ADULT, INITIAL ENCOUNTER: Primary | ICD-10-CM

## 2024-01-05 LAB
ALBUMIN SERPL BCP-MCNC: 4.1 G/DL (ref 3.5–5)
ALP SERPL-CCNC: 81 U/L (ref 34–104)
ALT SERPL W P-5'-P-CCNC: 16 U/L (ref 7–52)
AMPHETAMINES SERPL QL SCN: NEGATIVE
ANION GAP SERPL CALCULATED.3IONS-SCNC: 8 MMOL/L
AST SERPL W P-5'-P-CCNC: 14 U/L (ref 13–39)
BARBITURATES UR QL: NEGATIVE
BASOPHILS # BLD AUTO: 0.06 THOUSANDS/ÂΜL (ref 0–0.1)
BASOPHILS NFR BLD AUTO: 0 % (ref 0–1)
BENZODIAZ UR QL: NEGATIVE
BILIRUB SERPL-MCNC: 1.27 MG/DL (ref 0.2–1)
BILIRUB UR QL STRIP: NEGATIVE
BUN SERPL-MCNC: 14 MG/DL (ref 5–25)
CALCIUM SERPL-MCNC: 9.7 MG/DL (ref 8.4–10.2)
CHLORIDE SERPL-SCNC: 105 MMOL/L (ref 96–108)
CLARITY UR: NORMAL
CO2 SERPL-SCNC: 27 MMOL/L (ref 21–32)
COCAINE UR QL: NEGATIVE
COLOR UR: YELLOW
CREAT SERPL-MCNC: 0.71 MG/DL (ref 0.6–1.3)
EOSINOPHIL # BLD AUTO: 0.31 THOUSAND/ÂΜL (ref 0–0.61)
EOSINOPHIL NFR BLD AUTO: 2 % (ref 0–6)
ERYTHROCYTE [DISTWIDTH] IN BLOOD BY AUTOMATED COUNT: 13.5 % (ref 11.6–15.1)
ETHANOL EXG-MCNC: 0 MG/DL
EXT PREGNANCY TEST URINE: NEGATIVE
EXT. CONTROL: NORMAL
GFR SERPL CREATININE-BSD FRML MDRD: 93 ML/MIN/1.73SQ M
GLUCOSE SERPL-MCNC: 109 MG/DL (ref 65–140)
GLUCOSE UR STRIP-MCNC: NEGATIVE MG/DL
HCT VFR BLD AUTO: 39.9 % (ref 34.8–46.1)
HGB BLD-MCNC: 13.3 G/DL (ref 11.5–15.4)
HGB UR QL STRIP.AUTO: NEGATIVE
IMM GRANULOCYTES # BLD AUTO: 0.04 THOUSAND/UL (ref 0–0.2)
IMM GRANULOCYTES NFR BLD AUTO: 0 % (ref 0–2)
KETONES UR STRIP-MCNC: NEGATIVE MG/DL
LEUKOCYTE ESTERASE UR QL STRIP: NEGATIVE
LYMPHOCYTES # BLD AUTO: 3.42 THOUSANDS/ÂΜL (ref 0.6–4.47)
LYMPHOCYTES NFR BLD AUTO: 26 % (ref 14–44)
MCH RBC QN AUTO: 28.9 PG (ref 26.8–34.3)
MCHC RBC AUTO-ENTMCNC: 33.3 G/DL (ref 31.4–37.4)
MCV RBC AUTO: 87 FL (ref 82–98)
METHADONE UR QL: NEGATIVE
MONOCYTES # BLD AUTO: 1.21 THOUSAND/ÂΜL (ref 0.17–1.22)
MONOCYTES NFR BLD AUTO: 9 % (ref 4–12)
NEUTROPHILS # BLD AUTO: 8.33 THOUSANDS/ÂΜL (ref 1.85–7.62)
NEUTS SEG NFR BLD AUTO: 63 % (ref 43–75)
NITRITE UR QL STRIP: NEGATIVE
NRBC BLD AUTO-RTO: 0 /100 WBCS
OPIATES UR QL SCN: NEGATIVE
OXYCODONE+OXYMORPHONE UR QL SCN: NEGATIVE
PCP UR QL: NEGATIVE
PH UR STRIP.AUTO: 7.5 [PH]
PLATELET # BLD AUTO: 281 THOUSANDS/UL (ref 149–390)
PMV BLD AUTO: 10 FL (ref 8.9–12.7)
POTASSIUM SERPL-SCNC: 3.7 MMOL/L (ref 3.5–5.3)
PROT SERPL-MCNC: 7 G/DL (ref 6.4–8.4)
PROT UR STRIP-MCNC: NEGATIVE MG/DL
RBC # BLD AUTO: 4.6 MILLION/UL (ref 3.81–5.12)
SODIUM SERPL-SCNC: 140 MMOL/L (ref 135–147)
SP GR UR STRIP.AUTO: 1.02 (ref 1–1.03)
THC UR QL: NEGATIVE
TSH SERPL DL<=0.05 MIU/L-ACNC: 1.85 UIU/ML (ref 0.45–4.5)
UROBILINOGEN UR STRIP-ACNC: <2 MG/DL
WBC # BLD AUTO: 13.37 THOUSAND/UL (ref 4.31–10.16)

## 2024-01-05 PROCEDURE — 96372 THER/PROPH/DIAG INJ SC/IM: CPT

## 2024-01-05 PROCEDURE — 82075 ASSAY OF BREATH ETHANOL: CPT

## 2024-01-05 PROCEDURE — 81025 URINE PREGNANCY TEST: CPT

## 2024-01-05 PROCEDURE — 80307 DRUG TEST PRSMV CHEM ANLYZR: CPT

## 2024-01-05 PROCEDURE — 81003 URINALYSIS AUTO W/O SCOPE: CPT

## 2024-01-05 PROCEDURE — 93005 ELECTROCARDIOGRAM TRACING: CPT

## 2024-01-05 PROCEDURE — 36415 COLL VENOUS BLD VENIPUNCTURE: CPT

## 2024-01-05 PROCEDURE — 80053 COMPREHEN METABOLIC PANEL: CPT

## 2024-01-05 PROCEDURE — 84443 ASSAY THYROID STIM HORMONE: CPT

## 2024-01-05 PROCEDURE — 99285 EMERGENCY DEPT VISIT HI MDM: CPT | Performed by: EMERGENCY MEDICINE

## 2024-01-05 PROCEDURE — 99285 EMERGENCY DEPT VISIT HI MDM: CPT

## 2024-01-05 PROCEDURE — 85025 COMPLETE CBC W/AUTO DIFF WBC: CPT

## 2024-01-05 RX ORDER — CEFTRIAXONE 500 MG/1
500 INJECTION, POWDER, FOR SOLUTION INTRAMUSCULAR; INTRAVENOUS ONCE
Status: COMPLETED | OUTPATIENT
Start: 2024-01-05 | End: 2024-01-05

## 2024-01-05 RX ORDER — DOXYCYCLINE HYCLATE 100 MG/1
100 CAPSULE ORAL ONCE
Status: COMPLETED | OUTPATIENT
Start: 2024-01-05 | End: 2024-01-05

## 2024-01-05 RX ADMIN — Medication 1 BOTTLE: at 13:15

## 2024-01-05 RX ADMIN — DOXYCYCLINE 100 MG: 100 CAPSULE ORAL at 13:14

## 2024-01-05 RX ADMIN — CEFTRIAXONE SODIUM 500 MG: 500 INJECTION, POWDER, FOR SOLUTION INTRAMUSCULAR; INTRAVENOUS at 13:13
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
## 2024-01-05 NOTE — ED NOTES
Patient own  at bedside (Wanda)  Wanda will check on patients cats and housing    Odalys Shelby RN  01/05/24 1146       Odalys Shelby RN  01/05/24 3047
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
## 2024-01-06 LAB
ATRIAL RATE: 57 BPM
P AXIS: 33 DEGREES
PR INTERVAL: 132 MS
QRS AXIS: 2 DEGREES
QRSD INTERVAL: 88 MS
QT INTERVAL: 424 MS
QTC INTERVAL: 412 MS
T WAVE AXIS: 61 DEGREES
VENTRICULAR RATE: 57 BPM
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
## 2024-01-06 NOTE — ED NOTES
This writer discussed the patients current presentation and recommended discharge plan with Dr. Koki COOK.  They agree with the patient being discharged at this time with referrals and/or information about outpatient mental health resources.     The patient was Instructed to follow up with their PCP   The patient was provided with referral information for:   Outpatient mental health referrals     This writer and the patient completed a safety plan.  The patient was provided with a copy of their safety plan with encouragement to utilize the plan following discharge.     In addition, the patient was instructed to call local [facility] crisis, other crisis services, 911 or to go to the nearest ER immediately if their situation changes at any time.     This writer discussed discharge plans with the patient, who agrees with and understands the discharge plans.         SAFETY PLAN  Warning Signs (thoughts, images, mood, behavior, situations) of a potential crisis: suicidal thoughts      Coping Skills (what can I do to take my mind off the problem, or to keep myself safe):watch tv      Outside Support (who can I reach out to for support and help): [facility] Suicide Prevention Hotline:  988      [facility] [phone] - Crisis   [facility] [phone] - [facility] Crisis/Mobile Crisis   [phone] - [facility] Crisis   [facility]: [phone]  [facility]: [phone]   [facility] [phone] - Crisis   [facility] [phone] - Crisis     [facility] [phone] - Crisis   [facility] [phone] - Crisis   [phone] - Peer Support Talk Line (1-9pm daily)  [phone] - Teen Support Talk Line (1-9pm daily)  [phone] - [facility]   [facility] [phone]- Crisis    [facility] [phone] - Crisis   [facility] [phone] - Crisis    [facility]) [phone] - [facility] Crisis

## 2024-01-12 ENCOUNTER — APPOINTMENT (EMERGENCY)
Dept: RADIOLOGY | Facility: HOSPITAL | Age: 59
End: 2024-01-12
Payer: MEDICARE

## 2024-01-12 ENCOUNTER — HOSPITAL ENCOUNTER (EMERGENCY)
Facility: HOSPITAL | Age: 59
Discharge: HOME | End: 2024-01-13
Attending: EMERGENCY MEDICINE
Payer: MEDICARE

## 2024-01-12 DIAGNOSIS — S42.293A HUMERAL HEAD FRACTURE: Primary | ICD-10-CM

## 2024-01-12 PROCEDURE — 72125 CT NECK SPINE W/O DYE: CPT

## 2024-01-12 PROCEDURE — 96361 HYDRATE IV INFUSION ADD-ON: CPT

## 2024-01-12 PROCEDURE — 99284 EMERGENCY DEPT VISIT MOD MDM: CPT

## 2024-01-12 PROCEDURE — 73030 X-RAY EXAM OF SHOULDER: CPT

## 2024-01-12 PROCEDURE — 73560 X-RAY EXAM OF KNEE 1 OR 2: CPT

## 2024-01-12 PROCEDURE — 96376 TX/PRO/DX INJ SAME DRUG ADON: CPT

## 2024-01-12 PROCEDURE — 73100 X-RAY EXAM OF WRIST: CPT

## 2024-01-12 PROCEDURE — 96375 TX/PRO/DX INJ NEW DRUG ADDON: CPT

## 2024-01-12 PROCEDURE — 73060 X-RAY EXAM OF HUMERUS: CPT

## 2024-01-12 PROCEDURE — 96365 THER/PROPH/DIAG IV INF INIT: CPT

## 2024-01-12 PROCEDURE — G1004 CDSM NDSC: HCPCS

## 2024-01-12 PROCEDURE — 70450 CT HEAD/BRAIN W/O DYE: CPT

## 2024-01-12 RX ORDER — FENTANYL CITRATE 50 UG/ML
INJECTION, SOLUTION INTRAMUSCULAR; INTRAVENOUS
Status: COMPLETED
Start: 2024-01-12 | End: 2024-01-12

## 2024-01-12 RX ORDER — FENTANYL CITRATE 50 UG/ML
50 INJECTION, SOLUTION INTRAMUSCULAR; INTRAVENOUS ONCE
Status: COMPLETED | OUTPATIENT
Start: 2024-01-12 | End: 2024-01-12

## 2024-01-12 RX ORDER — FENTANYL CITRATE 50 UG/ML
50 INJECTION, SOLUTION INTRAMUSCULAR; INTRAVENOUS ONCE
Status: COMPLETED | OUTPATIENT
Start: 2024-01-13 | End: 2024-01-12

## 2024-01-12 RX ADMIN — FENTANYL CITRATE 50 MCG: 50 INJECTION INTRAMUSCULAR; INTRAVENOUS at 23:17

## 2024-01-12 RX ADMIN — SODIUM CHLORIDE 1000 ML: 0.9 INJECTION, SOLUTION INTRAVENOUS at 22:39

## 2024-01-12 RX ADMIN — FENTANYL CITRATE 50 MCG: 50 INJECTION, SOLUTION INTRAMUSCULAR; INTRAVENOUS at 23:17

## 2024-01-12 RX ADMIN — FENTANYL CITRATE 50 MCG: 50 INJECTION INTRAMUSCULAR; INTRAVENOUS at 23:51

## 2024-01-12 RX ADMIN — FOLIC ACID: 5 INJECTION, SOLUTION INTRAMUSCULAR; INTRAVENOUS; SUBCUTANEOUS at 22:57

## 2024-01-13 ENCOUNTER — APPOINTMENT (EMERGENCY)
Dept: RADIOLOGY | Facility: HOSPITAL | Age: 59
End: 2024-01-13
Payer: MEDICARE

## 2024-01-13 VITALS
OXYGEN SATURATION: 93 % | TEMPERATURE: 97.8 F | RESPIRATION RATE: 16 BRPM | DIASTOLIC BLOOD PRESSURE: 61 MMHG | HEART RATE: 58 BPM | SYSTOLIC BLOOD PRESSURE: 116 MMHG

## 2024-01-13 PROCEDURE — 96375 TX/PRO/DX INJ NEW DRUG ADDON: CPT

## 2024-01-13 PROCEDURE — 99285 EMERGENCY DEPT VISIT HI MDM: CPT | Performed by: EMERGENCY MEDICINE

## 2024-01-13 PROCEDURE — NC001 PR NO CHARGE: Performed by: ORTHOPAEDIC SURGERY

## 2024-01-13 PROCEDURE — 73020 X-RAY EXAM OF SHOULDER: CPT

## 2024-01-13 PROCEDURE — 96361 HYDRATE IV INFUSION ADD-ON: CPT

## 2024-01-13 PROCEDURE — 73110 X-RAY EXAM OF WRIST: CPT

## 2024-01-13 PROCEDURE — 73070 X-RAY EXAM OF ELBOW: CPT

## 2024-01-13 RX ORDER — LORAZEPAM 2 MG/ML
1 INJECTION INTRAMUSCULAR ONCE
Status: COMPLETED | OUTPATIENT
Start: 2024-01-13 | End: 2024-01-13

## 2024-01-13 RX ORDER — HYDROMORPHONE HCL/PF 1 MG/ML
1 SYRINGE (ML) INJECTION ONCE
Status: COMPLETED | OUTPATIENT
Start: 2024-01-13 | End: 2024-01-13

## 2024-01-13 RX ORDER — LIDOCAINE HYDROCHLORIDE 10 MG/ML
10 INJECTION, SOLUTION EPIDURAL; INFILTRATION; INTRACAUDAL; PERINEURAL ONCE
Status: COMPLETED | OUTPATIENT
Start: 2024-01-13 | End: 2024-01-13

## 2024-01-13 RX ORDER — LORAZEPAM 2 MG/ML
INJECTION INTRAMUSCULAR
Status: COMPLETED
Start: 2024-01-13 | End: 2024-01-13

## 2024-01-13 RX ADMIN — LIDOCAINE HYDROCHLORIDE 10 ML: 10 INJECTION, SOLUTION EPIDURAL; INFILTRATION; INTRACAUDAL; PERINEURAL at 01:42

## 2024-01-13 RX ADMIN — SODIUM CHLORIDE 1000 ML: 0.9 INJECTION, SOLUTION INTRAVENOUS at 01:46

## 2024-01-13 RX ADMIN — HYDROMORPHONE HYDROCHLORIDE 1 MG: 1 INJECTION, SOLUTION INTRAMUSCULAR; INTRAVENOUS; SUBCUTANEOUS at 01:53

## 2024-01-13 RX ADMIN — LIDOCAINE HYDROCHLORIDE 10 ML: 10 INJECTION, SOLUTION EPIDURAL; INFILTRATION; INTRACAUDAL; PERINEURAL at 01:43

## 2024-01-13 RX ADMIN — LORAZEPAM 1 MG: 2 INJECTION INTRAMUSCULAR; INTRAVENOUS at 02:22

## 2024-01-13 RX ADMIN — LORAZEPAM 1 MG: 2 INJECTION INTRAMUSCULAR at 02:22

## 2024-01-13 NOTE — ED NOTES
XR at bedside. Severe pain 2/2 position for XR, AKIL Saenz and RN at bedside.      Feliciano Hale, SHORTY  01/13/24 4079

## 2024-01-13 NOTE — CONSULTS
Orthopedics   Misa Zuleta 58 y.o. female MRN: [id number]  Unit/Bed#: Cat Scan      Chief Complaint:   right wrist pain    HPI:   58 y.o. right hand dominant female status post mechanical fall while intoxicated over a box landing on her right arm complaining of right wrist pain. + Headstrike, denies LOC, Not on blood thinners.  Pain is sgaro in character, Located right shoulder and right wrist, acute in onset, constant in duration, severe in intensity. Exacerbating factors palpation and ROM, remitting factors rest. Nonradiating, no numbness, no tingling, no open wounds noted. No other complaints at this time. PMH significant for bipolar, HLD, HTN, PTSD.       Review Of Systems:   Skin: See HPI  Neuro: See HPI  Musculoskeletal: See HPI  14 point review of systems negative except as stated above     Past Medical History:   Past Medical History:   Diagnosis Date    Anxiety     Arthritis     Bipolar affective disorder, depressed, severe (HCC) 4/28/2019    Depression     Elevated bilirubin 8/15/2019    Hyperlipidemia     Hypertension     Hypokalemia 8/15/2019    Learning difficulty     Leukocytosis 7/28/2020    Obesity (BMI 30.0-34.9) 6/26/2020    Osteopenia     Ovarian failure     Previous known suicide attempt     Psychiatric disorder     Psychiatric illness     PTSD (post-traumatic stress disorder) 2/25/2023       Past Surgical History:   Past Surgical History:   Procedure Laterality Date    LAPAROSCOPY      as a child, per patient-normal findings       Family History:  Family history reviewed and non-contributory  Family History   Problem Relation Age of Onset    Alzheimer's disease Mother     Depression Mother     Depression Brother     Bipolar disorder Brother     No Known Problems Maternal Aunt     No Known Problems Paternal Aunt     No Known Problems Maternal Uncle     No Known Problems Paternal Uncle     No Known Problems Cousin     ADD / ADHD Neg Hx     Alcohol abuse Neg Hx     Anxiety disorder Neg Hx      Dementia Neg Hx     Drug abuse Neg Hx     OCD Neg Hx     Paranoid behavior Neg Hx     Schizophrenia Neg Hx     Seizures Neg Hx     Self-Injury Neg Hx     Suicide Attempts Neg Hx        Social History:  Social History     Socioeconomic History    Marital status: Single     Spouse name: Not on file    Number of children: Not on file    Years of education: Not on file    Highest education level: Not on file   Occupational History    Not on file   Tobacco Use    Smoking status: Every Day     Current packs/day: 1.00     Average packs/day: 1 pack/day for 20.0 years (20.0 ttl pk-yrs)     Types: Cigarettes    Smokeless tobacco: Never   Vaping Use    Vaping status: Never Used   Substance and Sexual Activity    Alcohol use: Not Currently    Drug use: Not Currently    Sexual activity: Yes     Partners: Male     Birth control/protection: None   Other Topics Concern    Not on file   Social History Narrative    Not on file     Social Determinants of Health     Financial Resource Strain: Low Risk  (1/17/2023)    Overall Financial Resource Strain (CARDIA)     Difficulty of Paying Living Expenses: Not hard at all   Food Insecurity: No Food Insecurity (1/17/2023)    Hunger Vital Sign     Worried About Running Out of Food in the Last Year: Never true     Ran Out of Food in the Last Year: Never true   Transportation Needs: No Transportation Needs (7/12/2023)    PRAPARE - Transportation     Lack of Transportation (Medical): No     Lack of Transportation (Non-Medical): No   Physical Activity: Inactive (3/8/2021)    Exercise Vital Sign     Days of Exercise per Week: 0 days     Minutes of Exercise per Session: 0 min   Stress: No Stress Concern Present (3/8/2021)    [employer] - Occupational Stress Questionnaire     Feeling of Stress : Not at all   Social Connections: Moderately Isolated (3/8/2021)    Social Connection and Isolation Panel [NHANES]     Frequency of Communication with Friends and Family: More  than three times a week     Frequency of Social Gatherings with Friends and Family: Three times a week     Attends Taoist Services: More than 4 times per year     Active Member of Clubs or Organizations: No     Attends Club or Organization Meetings: Never     Marital Status: Never    Intimate Partner Violence: Not At Risk (3/8/2021)    Humiliation, Afraid, Rape, and Kick questionnaire     Fear of Current or Ex-Partner: No     Emotionally Abused: No     Physically Abused: No     Sexually Abused: No   Housing Stability: Low Risk  (5/2/2022)    Housing Stability Vital Sign     Unable to Pay for Housing in the Last Year: No     Number of Places Lived in the Last Year: 1     Unstable Housing in the Last Year: No       Allergies:   Allergies   Allergen Reactions    Other      Hay Fever    Oxycodone-Acetaminophen GI Intolerance           Labs:  0   Lab Value Date/Time    HCT 39.9 01/05/2024 1305    HCT 41.8 02/24/2023 0546    HCT 39.3 11/24/2022 0633    HGB 13.3 01/05/2024 1305    HGB 13.7 02/24/2023 0546    HGB 12.6 11/24/2022 0633    INR 0.90 03/15/2022 1112    WBC 13.37 (H) 01/05/2024 1305    WBC 12.88 (H) 02/24/2023 0546    WBC 12.61 (H) 11/24/2022 0633    ESR 30 (H) 03/15/2022 1112    CRP 9.2 (H) 03/15/2022 1112       Meds:    Current Facility-Administered Medications:     folic acid 1 mg, thiamine (VITAMIN B1) 100 mg in sodium chloride 0.9 % 100 mL IV piggyback, , Intravenous, Daily, Feliciano Saenz MD, Stopped at 01/13/24 0004    sodium chloride 0.9 % bolus 1,000 mL, 1,000 mL, Intravenous, Once, Feliciano Saenz MD, Last Rate: 500 mL/hr at 01/13/24 0146, 1,000 mL at 01/13/24 0146    Current Outpatient Medications:     amLODIPine (NORVASC) 10 mg tablet, Take 1 tablet (10 mg total) by mouth daily, Disp: 30 tablet, Rfl: 2    atorvastatin (LIPITOR) 20 mg tablet, take 1 tablet by mouth daily, Disp: 90 tablet, Rfl: 2    dexamethasone (DECADRON) 1 mg tablet, TAKE 1 TABLET BY MOUTH TWICE A DAY WITH MEALS  "FOR 1 DAY, Disp: , Rfl:     escitalopram (LEXAPRO) 20 mg tablet, Take 20 mg by mouth daily, Disp: , Rfl:     lidocaine (Lidoderm) 5 %, Apply 1 patch topically over 12 hours daily Remove & Discard patch within 12 hours or as directed by MD, Disp: 30 patch, Rfl: 2    lisinopril (ZESTRIL) 10 mg tablet, Take 1 tablet (10 mg total) by mouth daily, Disp: 90 tablet, Rfl: 3    LORazepam (Ativan) 0.5 mg tablet, Take 1 tablet (0.5 mg total) by mouth every 8 (eight) hours as needed for anxiety, Disp: 21 tablet, Rfl: 0    methocarbamol (Robaxin-750) 750 mg tablet, Take 1 tablet (750 mg total) by mouth every 6 (six) hours as needed for muscle spasms, Disp: 60 tablet, Rfl: 0    naproxen (Naprosyn) 500 mg tablet, Take 1 tablet (500 mg total) by mouth 2 (two) times a day with meals, Disp: 60 tablet, Rfl: 0    QUEtiapine (SEROquel) 25 mg tablet, Take 25 mg by mouth every evening, Disp: , Rfl:     risperiDONE (RisperDAL) 0.5 mg tablet, Take 1 tablet (0.5 mg total) by mouth daily at bedtime Take one 2mg tab with one 0.5mg tab every night at bedtime for total dose of 2.5mg qhs, Disp: 30 tablet, Rfl: 0    risperiDONE (RisperDAL) 2 mg tablet, Take 1 tablet (2 mg total) by mouth daily at bedtime Take one 2mg tab with one 0.5mg tab every night at bedtime for total dose of 2.5mg qhs, Disp: 30 tablet, Rfl: 0    Synvisc One 48 MG/6ML injection, , Disp: , Rfl:     Blood Culture:   Lab Results   Component Value Date    BLOODCX Escherichia coli (A) 08/15/2019    BLOODCX Escherichia coli (A) 08/15/2019       Wound Culture:   No results found for: \"WOUNDCULT\"    Ins and Outs:  No intake/output data recorded.          Physical Exam:   BP 92/52   Pulse 78   Temp 97.8 °F (36.6 °C) (Tympanic)   Resp 16   LMP  (LMP Unknown)   SpO2 91%   Gen: No acute distress, resting comfortably in bed  HEENT: Eyes clear, moist mucus membranes, hearing intact  Respiratory: No audible wheezing or stridor  Cardiovascular: Well Perfused peripherally, 2+ distal " pulse  Abdomen: nondistended, no peritoneal signs  Musculoskeletal: right upper extremity  Skin intact right shoulder and right wrist  No obvious deformity  Edema to affected area - shoulder and wrist  Tender to palpation about wrist and proximal humerus globally  Sensation intact to axillary, musculocutaneous, median, radial, and ulnar distributions  Motor intact to ain/pin/m/r/u and deltoid  Finger tips warm and well perfused, 2+ radial and ulnar pulse  Musculature is soft and compressible, no pain with passive stretch    Radiology:   I personally reviewed the films.  X-rays AP, scap Y and axillary lateral views right shoulder shows greater tuberosity fracture. Humeral head is well located in the glenoid fossa.  XR AP, lateral, oblique right wrist shows no acute fracture or dislocation. Repeat imaging shows well located DRUJ.    Assessment:  58 y.o.female S/P mechanical fall with right greater tuberosity fracture and likely right wrist sprain.    Plan:   Non weight bearing right upper extremity in sling  Consider removable wrist splint for comfort  Instructed to return to ED if new numbness or tingling in fingers, pain that is out of control despite oral pain meds, or if fingers turn pale and cold  Analgesics for pain   There is no height or weight on file to calculate BMI.   Ice and elevation  Dispo: Ok for discharge from ortho perspective  Follow up with ortho trauma in 2 wk    Leidy Forde MD

## 2024-01-13 NOTE — ED ATTENDING ATTESTATION
Final Diagnoses:     1. Humeral head fracture           Justin MENDEZ MD, saw and evaluated the patient. All available labs and X-rays were ordered by me or the resident / non-physician and have been reviewed by myself. I discussed the patient with the resident / non-physician and agree with the resident's / non-physician practitioner's findings and plan as documented in the resident's / non-physician practicitioner's note, except where noted.   At this point, I agree with the current assessment done in the ED.   I was present during key portions of all procedures performed unless otherwise stated.     HPI:  NURSING TRIAGE:    This is a 58 y.o. female presenting for evaluation of fall.  In kitchen, she tripped over a box.  Can't move the right arm since hitting it.  +head strike  No LOC.  Points to mid humerus as where it hurt  No thinners.   +ETOH intake.  Hit knee during fall.   Chief Complaint   Patient presents with    Fall     Tripped over a box. Fell into wall. Landed on R shoulder, also hit head and knee. R shoulder severe pain, unable to move. No thinner/ASA, no LOC. Had 1 wine cooler.      PHYSICAL: ASSESSMENT + PLAN:   Pertinent: RIGHT humerus tenderness  M/U/R/A nerve sensation intact.    strength 5/5.   Good capillary refill. 2+ radial pulses, bilaterally equal.   Finger abduction/adduction/opposition intact.   Suppination/Pronation intact without reproduction of pain.   Able to 1+2 and 1+5 finger appose.   Able to 2+3 finger cross.   +wrist tenderness diffusely  BIPCEPS / TRICEPS intact but painful. Shoulder abduction/adduction refused  EHL/FHL/PF/DF/KF/KE/HF/HE 5/5  No tenderness of the 5th metatarsal, no tenderness of fibular head, no cog-sensation with rotation along joint of LisFranc.   Palpable pulses, BCR, bilaterally equal.   No saddle anesthesia  SLR negative.   2+ patellar reflexes  Sore knee though from imapct.   Ecchymosis on RIGHT potstgeriorlateral thigh. l    General: VS  reviewed  Appears in NAD  awake, alert.   Well-nourished, well-developed. Appears stated age.   Speaking normally in full sentences.   Head: Normocephalic, atraumatic  Eyes: EOM-I. No diplopia.   No hyphema.   No subconjunctival hemorrhages.  Symmetrical lids.   ENT: Atraumatic external nose and ears.    MMM  No malocclusion. No stridor. Normal phonation. No drooling. Normal swallowing.   Neck: No JVD.  CV: No pallor noted  Lungs:   No tachypnea  No respiratory distress  Abd: soft nt nd no rebound/guarding  MSK:   FROM spontaneously  Skin: Dry, intact.   Neuro: Awake, alert, GCS15, CN II-XII grossly intact.   Motor grossly intact.  Psychiatric/Behavioral: interacting normally; appropriate mood/affect.    Exam: deferred    Vitals:    01/13/24 0315 01/13/24 0411 01/13/24 0431 01/13/24 0530   BP: 102/58 111/58 94/50 116/61   Pulse: 81 80 57 58   Resp:       Temp:       TempSrc:       SpO2: 90% 94% 96% 93%    CTH given ETOH + head impact   XR shoulder/humerus for fx  Supportive measures       There are no obvious limitations to social determinants of care.   Nursing note reviewed.   Vitals reviewed.   Orders placed by myself and/or advanced practitioner / resident.    Previous chart was reviewed  No language barrier.   History obtained from patient.    There are no limitations to the history obtained:     Past Medical: Past Surgical:    has a past medical history of Anxiety, Arthritis, Bipolar affective disorder, depressed, severe (HCC) (4/28/2019), Depression, Elevated bilirubin (8/15/2019), Hyperlipidemia, Hypertension, Hypokalemia (8/15/2019), Learning difficulty, Leukocytosis (7/28/2020), Obesity (BMI 30.0-34.9) (6/26/2020), Osteopenia, Ovarian failure, Previous known suicide attempt, Psychiatric disorder, Psychiatric illness, and PTSD (post-traumatic stress disorder) (2/25/2023).  has a past surgical history that includes LAPAROSCOPY.   Social: Cardiac (Echo/Cath)   Social History     Substance and Sexual  Activity   Alcohol Use Not Currently     Social History     Tobacco Use   Smoking Status Every Day    Current packs/day: 1.00    Average packs/day: 1 pack/day for 20.0 years (20.0 ttl pk-yrs)    Types: Cigarettes   Smokeless Tobacco Never     Social History     Substance and Sexual Activity   Drug Use Not Currently    No results found for this or any previous visit.    No results found for this or any previous visit.    No results found for this or any previous visit.     Labs: Imaging:   Labs Reviewed - No data to display XR shoulder 2+ vw right   Final Result      Fracture of the right humeral head/neck with mild displacement of the greater tuberosity.      Workstation performed: [id number]         XR humerus RIGHT   Final Result      Limited evaluation due to positioning. Fracture of the right humeral head/neck with mild displacement of the greater tuberosity.      Workstation performed: [id number]         XR knee 1 or 2 vw right   Final Result      No acute fracture or dislocation of the right knee.            Workstation performed: [id number]         XR wrist 2 vw right   Final Result      Limited evaluation due to rotation/positioning with limited evaluation of the carpal bones. Apparent posterior displacement of the distal ulna which may be due to distal radioulnar joint subluxation/dislocation, correlate clinically.         Workstation performed: [id number]         CT head without contrast   Final Result      No acute intracranial abnormality.   Chronic left mastoid air cell opacification.               Workstation performed: [id number]         CT spine cervical without contrast   Final Result      No cervical spine fracture or traumatic malalignment.                  Workstation performed: [id number]         XR wrist 3+ views RIGHT    (Results Pending)   XR elbow 2 vw right    (Results Pending)   XR shoulder 1 vw left    (Results Pending)      Medications: Code Status:   Medications   sodium chloride 0.9 %  bolus 1,000 mL (0 mL Intravenous Stopped 1/13/24 0139)   fentanyl citrate (PF) 100 MCG/2ML 50 mcg (50 mcg Intravenous Given 1/12/24 2317)   fentanyl citrate (PF) 100 MCG/2ML 50 mcg (50 mcg Intravenous Given 1/12/24 2351)   HYDROmorphone (DILAUDID) injection 1 mg (1 mg Intravenous Given 1/13/24 0153)   lidocaine (PF) (XYLOCAINE-MPF) 1 % injection 10 mL (10 mL Infiltration Given by Other 1/13/24 0143)   lidocaine (PF) (XYLOCAINE-MPF) 1 % injection 10 mL (10 mL Infiltration Given by Other 1/13/24 0142)   sodium chloride 0.9 % bolus 1,000 mL (0 mL Intravenous Stopped 1/13/24 0412)   LORazepam (ATIVAN) injection 1 mg (1 mg Intravenous Given 1/13/24 0222)    Code Status: Prior  Advance Directive and Living Will:      Power of :    POLST:       Orders Placed This Encounter   Procedures    CT head without contrast    CT spine cervical without contrast    XR shoulder 2+ vw right    XR humerus RIGHT    XR knee 1 or 2 vw right    XR wrist 2 vw right    XR wrist 3+ views RIGHT    XR elbow 2 vw right    XR shoulder 1 vw left    Ambulatory Referral to Orthopedic Surgery    Inpatient consult to Orthopedic Surgery     Time reflects when diagnosis was documented in both MDM as applicable and the Disposition within this note       Time User Action Codes Description Comment    1/13/2024  1:15 AM Feliciano Saenz Add [S42.293A] Humeral head fracture     1/13/2024  1:15 AM Feliciano Saenz Add [S63.074A] Closed dislocation of distal end of right ulna, initial encounter     1/13/2024  4:19 AM Feliciano Saenz Remove [S63.074A] Closed dislocation of distal end of right ulna, initial encounter           ED Disposition       ED Disposition   Discharge    Condition   Stable    Date/Time   Sat Jan 13, 2024  3:51 AM    Comment   Misa Zuleta discharge to home/self care.                   Follow-up Information       Follow up With Specialties Details Why Contact Info Additional Information    [facility]  Department Emergency Medicine Go to  If symptoms worsen [address] [facility] [id number] [phone] [facility] Emergency Department, [address], [place], [id number] [phone]    [facility] [place] [facility]   [address] [facility] [id number] [phone] [facility] [place], [address], [address] [phone]  Use Entrance A           Discharge Medication List as of 1/13/2024  4:41 AM        CONTINUE these medications which have NOT CHANGED    Details   amLODIPine (NORVASC) 10 mg tablet Take 1 tablet (10 mg total) by mouth daily, Starting Thu 12/28/2023, Until Wed 3/27/2024, Normal      atorvastatin (LIPITOR) 20 mg tablet take 1 tablet by mouth daily, Starting Mon 11/27/2023, Normal      dexamethasone (DECADRON) 1 mg tablet TAKE 1 TABLET BY MOUTH TWICE A DAY WITH MEALS FOR 1 DAY, Historical Med      escitalopram (LEXAPRO) 20 mg tablet Take 20 mg by mouth daily, Starting Wed 11/22/2023, Historical Med      lidocaine (Lidoderm) 5 % Apply 1 patch topically over 12 hours daily Remove & Discard patch within 12 hours or as directed by MD, Starting Fri 12/8/2023, Normal      lisinopril (ZESTRIL) 10 mg tablet Take 1 tablet (10 mg total) by mouth daily, Starting Mon 11/20/2023, Normal      LORazepam (Ativan) 0.5 mg tablet Take 1 tablet (0.5 mg total) by mouth every 8 (eight) hours as needed for anxiety, Starting Fri 5/26/2023, Normal      methocarbamol (Robaxin-750) 750 mg tablet Take 1 tablet (750 mg total) by mouth every 6 (six) hours as needed for muscle spasms, Starting Fri 12/8/2023, Normal      naproxen (Naprosyn) 500 mg tablet Take 1 tablet (500 mg total) by mouth 2 (two) times a day with meals, Starting Fri 12/8/2023, Normal      QUEtiapine (SEROquel) 25 mg tablet Take 25 mg by mouth every evening, Starting Wed 11/22/2023, [facility] Med       risperiDONE (RisperDAL) 0.5 mg tablet Take 1 tablet (0.5 mg total) by mouth daily at bedtime Take one 2mg tab with one 0.5mg tab every night at bedtime for total dose of 2.5mg qhs, Starting Mon 4/3/2023, Until Wed 7/12/2023, Normal      risperiDONE (RisperDAL) 2 mg tablet Take 1 tablet (2 mg total) by mouth daily at bedtime Take one 2mg tab with one 0.5mg tab every night at bedtime for total dose of 2.5mg qhs, Starting Mon 4/3/2023, Until Wed 7/12/2023, Normal      Synvisc One 48 MG/6ML injection Starting Fri 9/1/2023, Historical Med             Prior to Admission Medications   Prescriptions Last Dose Informant Patient Reported? Taking?   LORazepam (Ativan) 0.5 mg tablet   No No   Sig: Take 1 tablet (0.5 mg total) by mouth every 8 (eight) hours as needed for anxiety   QUEtiapine (SEROquel) 25 mg tablet   Yes No   Sig: Take 25 mg by mouth every evening   Synvisc One 48 MG/6ML injection   Yes No   amLODIPine (NORVASC) 10 mg tablet   No No   Sig: Take 1 tablet (10 mg total) by mouth daily   atorvastatin (LIPITOR) 20 mg tablet   No No   Sig: take 1 tablet by mouth daily   dexamethasone (DECADRON) 1 mg tablet   Yes No   Sig: TAKE 1 TABLET BY MOUTH TWICE A DAY WITH MEALS FOR 1 DAY   escitalopram (LEXAPRO) 20 mg tablet   Yes No   Sig: Take 20 mg by mouth daily   lidocaine (Lidoderm) 5 %   No No   Sig: Apply 1 patch topically over 12 hours daily Remove & Discard patch within 12 hours or as directed by MD   lisinopril (ZESTRIL) 10 mg tablet   No No   Sig: Take 1 tablet (10 mg total) by mouth daily   methocarbamol (Robaxin-750) 750 mg tablet   No No   Sig: Take 1 tablet (750 mg total) by mouth every 6 (six) hours as needed for muscle spasms   naproxen (Naprosyn) 500 mg tablet   No No   Sig: Take 1 tablet (500 mg total) by mouth 2 (two) times a day with meals   risperiDONE (RisperDAL) 0.5 mg tablet   No No   Sig: Take 1 tablet (0.5 mg total) by mouth daily at bedtime Take one 2mg tab with one 0.5mg tab every night at bedtime  "for total dose of 2.5mg qhs   risperiDONE (RisperDAL) 2 mg tablet   No No   Sig: Take 1 tablet (2 mg total) by mouth daily at bedtime Take one 2mg tab with one 0.5mg tab every night at bedtime for total dose of 2.5mg qhs      Facility-Administered Medications: None                        Portions of the record may have been created with voice recognition software. Occasional wrong word or \"sound a like\" substitutions may have occurred due to the inherent limitations of voice recognition software. Read the chart carefully and recognize, using context, where substitutions have occurred.    Electronically signed by:  Justin Peterson  "

## 2024-01-13 NOTE — DISCHARGE INSTRUCTIONS
You have been evaluated in the emergency department after a fall.  Your evaluation shows a fracture of your humeral head.  For this, follow-up with orthopedic surgery.  Call them in the morning.  Continue to wear the sling and the wrist splint.     Seek care immediately if:   The pain in your injured arm does not get better or gets worse, even after you rest and take medicine.    Your injured arm, hand, or fingers feel numb.    Your arm is swollen, red, and feels warm.    Your skin over the fracture is swollen, cold, or pale.    You cannot move your arm, hand, or fingers.    Call your doctor if:   You have a fever.    Your brace or splint becomes wet, damaged, or comes off.    You have questions or concerns about your injury, treatment, or care.

## 2024-01-14 ENCOUNTER — HOSPITAL ENCOUNTER (EMERGENCY)
Facility: HOSPITAL | Age: 59
Discharge: HOME | End: 2024-01-14
Attending: EMERGENCY MEDICINE | Admitting: EMERGENCY MEDICINE
Payer: MEDICARE

## 2024-01-14 VITALS
DIASTOLIC BLOOD PRESSURE: 73 MMHG | SYSTOLIC BLOOD PRESSURE: 147 MMHG | HEART RATE: 73 BPM | WEIGHT: 190 LBS | OXYGEN SATURATION: 100 % | RESPIRATION RATE: 20 BRPM | BODY MASS INDEX: 33.66 KG/M2 | TEMPERATURE: 97.6 F

## 2024-01-14 DIAGNOSIS — S42.309A HUMERUS FRACTURE: ICD-10-CM

## 2024-01-14 DIAGNOSIS — M79.601 RIGHT ARM PAIN: Primary | ICD-10-CM

## 2024-01-14 PROCEDURE — 97167 OT EVAL HIGH COMPLEX 60 MIN: CPT

## 2024-01-14 PROCEDURE — 97163 PT EVAL HIGH COMPLEX 45 MIN: CPT

## 2024-01-14 PROCEDURE — 99284 EMERGENCY DEPT VISIT MOD MDM: CPT | Performed by: EMERGENCY MEDICINE

## 2024-01-14 PROCEDURE — 99283 EMERGENCY DEPT VISIT LOW MDM: CPT

## 2024-01-14 RX ORDER — MELOXICAM 7.5 MG/1
7.5 TABLET ORAL ONCE
Status: COMPLETED | OUTPATIENT
Start: 2024-01-14 | End: 2024-01-14

## 2024-01-14 RX ORDER — MELOXICAM 7.5 MG/1
15 TABLET ORAL DAILY
Status: DISCONTINUED | OUTPATIENT
Start: 2024-01-14 | End: 2024-01-14

## 2024-01-14 RX ADMIN — MELOXICAM 15 MG: 7.5 TABLET ORAL at 02:00

## 2024-01-14 RX ADMIN — MELOXICAM 7.5 MG: 7.5 TABLET ORAL at 12:29

## 2024-01-14 NOTE — ED PROVIDER NOTES
History  Chief Complaint   Patient presents with    Arm Pain     C/o right arm pain, recent fx     57 yo F PMHx of anxiety/depression, bipolar disorder presents to the ED complaining of R shoulder and arm pain. Patient was seen here in the ED yesterday for a fall resulting in a humeral fracture and wrist sprain. She was seen by ortho, placed in a sling with a removal wrist brace for comfort and discharged home with instructions to take tylenol for pain. She tells me her last dose of tylenol was around 9PM and that her pain has not been controlled with this regimen. She lives alone and does not have help with dressing and daily needs. She denies any numbness or tingling in the arm hand or fingers. No cold feeling in her hand, no complaints of color change to the fingers.         Prior to Admission Medications   Prescriptions Last Dose Informant Patient Reported? Taking?   LORazepam (Ativan) 0.5 mg tablet   No No   Sig: Take 1 tablet (0.5 mg total) by mouth every 8 (eight) hours as needed for anxiety   QUEtiapine (SEROquel) 25 mg tablet   Yes No   Sig: Take 25 mg by mouth every evening   Synvisc One 48 MG/6ML injection   Yes No   amLODIPine (NORVASC) 10 mg tablet   No No   Sig: Take 1 tablet (10 mg total) by mouth daily   atorvastatin (LIPITOR) 20 mg tablet   No No   Sig: take 1 tablet by mouth daily   dexamethasone (DECADRON) 1 mg tablet   Yes No   Sig: TAKE 1 TABLET BY MOUTH TWICE A DAY WITH MEALS FOR 1 DAY   escitalopram (LEXAPRO) 20 mg tablet   Yes No   Sig: Take 20 mg by mouth daily   lidocaine (Lidoderm) 5 %   No No   Sig: Apply 1 patch topically over 12 hours daily Remove & Discard patch within 12 hours or as directed by MD   lisinopril (ZESTRIL) 10 mg tablet   No No   Sig: Take 1 tablet (10 mg total) by mouth daily   methocarbamol (Robaxin-750) 750 mg tablet   No No   Sig: Take 1 tablet (750 mg total) by mouth every 6 (six) hours as needed for muscle spasms   naproxen (Naprosyn) 500 mg tablet   No No   Sig:  Take 1 tablet (500 mg total) by mouth 2 (two) times a day with meals   risperiDONE (RisperDAL) 0.5 mg tablet   No No   Sig: Take 1 tablet (0.5 mg total) by mouth daily at bedtime Take one 2mg tab with one 0.5mg tab every night at bedtime for total dose of 2.5mg qhs   risperiDONE (RisperDAL) 2 mg tablet   No No   Sig: Take 1 tablet (2 mg total) by mouth daily at bedtime Take one 2mg tab with one 0.5mg tab every night at bedtime for total dose of 2.5mg qhs      Facility-Administered Medications: None       Past Medical History:   Diagnosis Date    Anxiety     Arthritis     Bipolar affective disorder, depressed, severe (HCC) 4/28/2019    Depression     Elevated bilirubin 8/15/2019    Hyperlipidemia     Hypertension     Hypokalemia 8/15/2019    Learning difficulty     Leukocytosis 7/28/2020    Obesity (BMI 30.0-34.9) 6/26/2020    Osteopenia     Ovarian failure     Previous known suicide attempt     Psychiatric disorder     Psychiatric illness     PTSD (post-traumatic stress disorder) 2/25/2023       Past Surgical History:   Procedure Laterality Date    LAPAROSCOPY      as a child, per patient-normal findings       Family History   Problem Relation Age of Onset    Alzheimer's disease Mother     Depression Mother     Depression Brother     Bipolar disorder Brother     No Known Problems Maternal Aunt     No Known Problems Paternal Aunt     No Known Problems Maternal Uncle     No Known Problems Paternal Uncle     No Known Problems Cousin     ADD / ADHD Neg Hx     Alcohol abuse Neg Hx     Anxiety disorder Neg Hx     Dementia Neg Hx     Drug abuse Neg Hx     OCD Neg Hx     Paranoid behavior Neg Hx     Schizophrenia Neg Hx     Seizures Neg Hx     Self-Injury Neg Hx     Suicide Attempts Neg Hx      I have reviewed and agree with the history as documented.    E-Cigarette/Vaping    E-Cigarette Use Never User      E-Cigarette/Vaping Substances    Nicotine No     THC No     CBD No     Flavoring No     Other No     Unknown No       Social History     Tobacco Use    Smoking status: Every Day     Current packs/day: 1.00     Average packs/day: 1 pack/day for 20.0 years (20.0 ttl pk-yrs)     Types: Cigarettes    Smokeless tobacco: Never   Vaping Use    Vaping status: Never Used   Substance Use Topics    Alcohol use: Not Currently    Drug use: Not Currently        Review of Systems   Constitutional:  Negative for chills and fever.   HENT:  Negative for ear pain and sore throat.    Eyes:  Negative for pain and visual disturbance.   Respiratory:  Negative for cough and shortness of breath.    Cardiovascular:  Negative for chest pain and palpitations.   Gastrointestinal:  Negative for abdominal pain and vomiting.   Genitourinary:  Negative for dysuria and hematuria.   Musculoskeletal:  Positive for myalgias. Negative for arthralgias and back pain.   Skin:  Negative for color change and rash.   Neurological:  Negative for seizures and syncope.   All other systems reviewed and are negative.      Physical Exam  ED Triage Vitals [01/14/24 0131]   Temperature Pulse Respirations Blood Pressure SpO2   97.6 °F (36.4 °C) 77 20 132/59 95 %      Temp Source Heart Rate Source Patient Position - Orthostatic VS BP Location FiO2 (%)   Oral Monitor Lying Left arm --      Pain Score       8             Orthostatic Vital Signs  Vitals:    01/14/24 0131 01/14/24 1457   BP: 132/59 147/73   Pulse: 77 73   Patient Position - Orthostatic VS: Lying Sitting       Physical Exam  Vitals and nursing note reviewed.   Constitutional:       General: She is not in acute distress.     Appearance: She is well-developed.   HENT:      Head: Normocephalic and atraumatic.   Eyes:      Conjunctiva/sclera: Conjunctivae normal.   Cardiovascular:      Rate and Rhythm: Normal rate and regular rhythm.      Pulses:           Radial pulses are 2+ on the right side and 2+ on the left side.      Heart sounds: No murmur heard.  Pulmonary:      Effort: Pulmonary effort is normal. No respiratory  distress.      Breath sounds: Normal breath sounds.   Abdominal:      Palpations: Abdomen is soft.      Tenderness: There is no abdominal tenderness.   Musculoskeletal:         General: No swelling.        Arms:       Cervical back: Neck supple.   Skin:     General: Skin is warm and dry.      Capillary Refill: Capillary refill takes less than 2 seconds.      Comments: Finger of the right hand warm to the touch, normal in color, less than 2 seconds cap refill    Neurological:      Mental Status: She is alert.   Psychiatric:         Mood and Affect: Mood normal.         ED Medications  Medications   meloxicam (MOBIC) tablet 7.5 mg (7.5 mg Oral Given 1/14/24 1229)       Diagnostic Studies  Results Reviewed       None                   No orders to display         Procedures  Procedures      ED Course  ED Course as of 01/14/24 2058   Sun Jan 14, 2024   0632 On re-evaluation, patient's pain is much improved but she is unable to dress herself, pull herself up out of bed, and has trouble using the restroom on her own. Will consult case management PT/OT                                       Medical Decision Making  57 yo F PMHx of anxiety/depression, bipolar disorder presents to the ED complaining of R shoulder and arm pain.    Ddx: pain secondary to known humeral fracture,     Soft compartments, strong radial pulse, warm and well perfused.  Offered patient pain control. She is declining opioid medications, will give NSAIDs instead.    Following one dose of Mobic, patient sleeping comfortably in stretcher.   Will re-evaluate for continued pain control and need for possible case management PT/OT eval.     Pain much improved following re-evaluation, however patient still complaining of difficulty with ADLs secondary to fracture and arm in sling. Will reach out to case management, PT/OT for eval. Patient signed out to Dr. Whitley awaiting PT/OT eval.    Risk  Prescription drug management.          Disposition  Final diagnoses:    Right arm pain   Humerus fracture     Time reflects when diagnosis was documented in both MDM as applicable and the Disposition within this note       Time User Action Codes Description Comment    1/14/2024  5:01 AM Check, Stanton Galeana [M79.601] Right arm pain     1/14/2024  5:02 AM Check, Stanton Galeana [S42.309A] Humerus fracture           ED Disposition       ED Disposition   Discharge    Condition   Stable    Date/Time   Sun Jan 14, 2024  2:48 PM    Comment   Misa Zuleta discharge to home/self care.                   Follow-up Information       Follow up With Specialties Details Why Contact Info    [employer]fol[employer]  Call in 2 days  [phone]      [facility]  Follow up  [address] [place] [address]  Phone: [phone]  Fax: [phone]            Discharge Medication List as of 1/14/2024  3:00 PM        CONTINUE these medications which have NOT CHANGED    Details   amLODIPine (NORVASC) 10 mg tablet Take 1 tablet (10 mg total) by mouth daily, Starting Thu 12/28/2023, Until Wed 3/27/2024, Normal      atorvastatin (LIPITOR) 20 mg tablet take 1 tablet by mouth daily, Starting Mon 11/27/2023, Normal      dexamethasone (DECADRON) 1 mg tablet TAKE 1 TABLET BY MOUTH TWICE A DAY WITH MEALS FOR 1 DAY, Historical Med      escitalopram (LEXAPRO) 20 mg tablet Take 20 mg by mouth daily, Starting Wed 11/22/2023, Historical Med      lidocaine (Lidoderm) 5 % Apply 1 patch topically over 12 hours daily Remove & Discard patch within 12 hours or as directed by MD, Starting Fri 12/8/2023, Normal      lisinopril (ZESTRIL) 10 mg tablet Take 1 tablet (10 mg total) by mouth daily, Starting Mon 11/20/2023, Normal      LORazepam (Ativan) 0.5 mg tablet Take 1 tablet (0.5 mg total) by mouth every 8 (eight) hours as needed for anxiety, Starting Fri 5/26/2023, Normal      methocarbamol (Robaxin-750) 750 mg tablet Take 1 tablet (750 mg total) by mouth every 6 (six) hours as needed for muscle  spasms, Starting Fri 12/8/2023, Normal      naproxen (Naprosyn) 500 mg tablet Take 1 tablet (500 mg total) by mouth 2 (two) times a day with meals, Starting Fri 12/8/2023, Normal      QUEtiapine (SEROquel) 25 mg tablet Take 25 mg by mouth every evening, Starting Wed 11/22/2023, Historical Med      risperiDONE (RisperDAL) 0.5 mg tablet Take 1 tablet (0.5 mg total) by mouth daily at bedtime Take one 2mg tab with one 0.5mg tab every night at bedtime for total dose of 2.5mg qhs, Starting Mon 4/3/2023, Until Wed 7/12/2023, Normal      risperiDONE (RisperDAL) 2 mg tablet Take 1 tablet (2 mg total) by mouth daily at bedtime Take one 2mg tab with one 0.5mg tab every night at bedtime for total dose of 2.5mg qhs, Starting Mon 4/3/2023, Until Wed 7/12/2023, Normal      Synvisc One 48 MG/6ML injection Starting Fri 9/1/2023, Historical Med               PDMP Review         Value Time User    PDMP Reviewed  Yes 11/21/2022 10:34 AM Danielle Carey MD             ED Provider  Attending physically available and evaluated Misa Zuleta. I managed the patient along with the ED Attending.    Electronically Signed by           Maya Arrington MD  01/14/24 2058

## 2024-01-14 NOTE — CASE MANAGEMENT
Case Management Assessment & Discharge Planning Note    Patient name Misa Zuleta  Location [facility] MRN [id number]  : 1965 Date 2024       Current Admission Date: 2024  Current Admission Diagnosis:Arm pain   Patient Active Problem List    Diagnosis Date Noted    Acute right-sided low back pain without sciatica 2023    PTSD (post-traumatic stress disorder) 2023    Vitamin D insufficiency 2023    Pulmonary nodule 2021    Patellofemoral pain syndrome of both knees 12/15/2020    Bipolar disorder (HCC) 2020    Anxiety 2020    Insomnia 2020    Obesity 2020    Chronic pain of both knees 11/10/2020    MDD (major depressive disorder), recurrent episode, severe (HCC) 2020    Constipation 2020    Ovarian failure 2020    Mild intellectual disability 2020    Tobacco use 2020    HTN (hypertension) 2019    Amenorrhea 2013    Hyperlipidemia 2013      LOS (days): 0  Geometric Mean LOS (GMLOS) (days):   Days to GMLOS:     OBJECTIVE:     Current admission status: Emergency    Preferred Pharmacy:   [id number] - [place], [place] - [address]  [address]  [place] [address]  Phone: [phone] Fax: [phone]    [facility] - [place], [place] - [address]  [address]  [place] [address]  Phone: [phone] Fax: [phone]    Primary Care Provider: Colleen Tafoya DO    Primary Insurance: [employer]  REP  Secondary Insurance: [facility]    ASSESSMENT:  Active Health Care Proxies    There are no active Health Care Proxies on file.         Patient Information  Admitted from:: Home  Mental Status: Alert  During Assessment patient was accompanied by: Not accompanied during assessment  Assessment information provided by:: Patient  Primary Caregiver: Self  Support Systems: Self, /  Home entry access options. Select all  that apply.: No steps to enter home  Type of Current Residence: Apartment  Floor Level: 7 (Elevator access to 7th floor apartment)  Upon entering residence, is there a bedroom on the main floor (no further steps)?: Yes  Upon entering residence, is there a bathroom on the main floor (no further steps)?: Yes  Living Arrangements: Lives Alone    Activities of Daily Living Prior to Admission  Functional Status: Independent  Completes ADLs independently?: Yes  Ambulates independently?: Yes  Does patient use assisted devices?: No  Does patient currently own DME?: Yes  What DME does the patient currently own?: Walker  Does patient have a history of Outpatient Therapy (PT/OT)?: No  Does the patient have a history of Short-Term Rehab?: No  Does patient have a history of HHC?: No  Does patient currently have HHC?: No    Patient Information Continued  Income Source: SSI/SSD  Does patient have prescription coverage?: Yes  Does patient have a history of substance abuse?: No  Does patient have a history of Mental Health Diagnosis?: Yes (PTSD, Bipolar, Anxiety)  Is patient receiving treatment for mental health?: No. Patient declined treatment information. (Per pt was following with OP tx in the past currently seeking new MH provider, resources offered and declined, pt states she currently has a ICM [facility] who is assisting with establishing a new MH provider.)  Has patient received inpatient treatment related to mental health in the last 2 years?: Yes (Per pt SL IP U)    Means of Transportation  Means of Transport to Appts:: Other (Comment) (Transport provided by ICM and insurance company (free lyft rides to appointments)      Housing Stability: Low Risk  (1/14/2024)    Housing Stability Vital Sign     Unable to Pay for Housing in the Last Year: No     Number of Places Lived in the Last Year: 1     Unstable Housing in the Last Year: No   Food Insecurity: No Food Insecurity (1/14/2024)    Hunger Vital Sign     Worried About Running  Out of Food in the Last Year: Never true     Ran Out of Food in the Last Year: Never true   Transportation Needs: No Transportation Needs (1/14/2024)    PRAPARE - Transportation     Lack of Transportation (Medical): No     Lack of Transportation (Non-Medical): No   Utilities: Not At Risk (1/14/2024)    [facility] Utilities     Threatened with loss of utilities: No       DISCHARGE DETAILS:    Discharge planning discussed with:: pt at bedside.  Freedom of Choice: Yes    Other Referral/Resources/Interventions Provided:  Referral Comments: CM spoke with ED provider, CM consult placed to assess pt's needs for home. Pt diagnosed yesterday with a right humerus fracture and wrist sprain during prior ER visit. PT/OT evaluations ordered and pending this ED visit. CM will follow for final determination. CM met with pt in the ED, initial assesssment completed.  [place] [facility]th floor with elevator access., 0 ALEX. Pt states she is primarly independent with care prior to current injury. no hx with HHC or STR prior. Pt has hx of MH tx both inpatient and OP, last IP within 2 years. Pt currently has an Intensive , [facility] in the community. Pt unable to provide last name or phone number for ICM at this time. Per pt ICM is currently assisting  pt with establishing new MH provider OP and declined tx resources at this time. Pt reports no concerns with transport and states, her ICM and insurance assist with transport to and from medical appointments through Lyft or ICM drives . Potential therapy rec's discussed, Pt would be interested in HHC services if recommended pending therapy evals. Pt reports no further concerns at this time. CM team currently following and will remain available to assist with discharge planning needs and or concerns.

## 2024-01-14 NOTE — OCCUPATIONAL THERAPY NOTE
Occupational Therapy Evaluation     Patient Name: Misa Zuleta  Today's Date: 1/14/2024  Problem List  Active Problems:  There are no active Hospital Problems.    Past Medical History  Past Medical History:   Diagnosis Date    Anxiety     Arthritis     Bipolar affective disorder, depressed, severe (HCC) 4/28/2019    Depression     Elevated bilirubin 8/15/2019    Hyperlipidemia     Hypertension     Hypokalemia 8/15/2019    Learning difficulty     Leukocytosis 7/28/2020    Obesity (BMI 30.0-34.9) 6/26/2020    Osteopenia     Ovarian failure     Previous known suicide attempt     Psychiatric disorder     Psychiatric illness     PTSD (post-traumatic stress disorder) 2/25/2023     Past Surgical History  Past Surgical History:   Procedure Laterality Date    LAPAROSCOPY      as a child, per patient-normal findings         01/14/24 1201   OT Last Visit   OT Visit Date 01/14/24   Note Type   Note type Evaluation   Pain Assessment   Pain Assessment Tool 0-10   Pain Score 8   Pain Location/Orientation Orientation: Right;Location: Arm   Patient's Stated Pain Goal No pain   Hospital Pain Intervention(s) Repositioned;Ambulation/increased activity;Emotional support   Restrictions/Precautions   Weight Bearing Precautions Per Order Yes   RUE Weight Bearing Per Order (S)  NWB  (IN SLING + WRIST BRACE)   Braces or Orthoses Sling;Wrist lacer   Other Precautions WBS;Multiple lines;Fall Risk;Pain;Cognitive;Agitated;Chair Alarm;Bed Alarm   Home Living   Type of Home Apartment   Home Layout One level;Elevator   Additional Comments PT IS A POOR HISTORIAN WITH EXTENSIVE PSYCH HISTORY- EASILY IRRITATED WITH QUESTIONING. LIMITED CONVERSATION T/O   Prior Function   Level of [place] Independent with ADLs;Independent with functional mobility;Independent with IADLS   Lives With (S)  Alone   Falls in the last 6 months 1 to 4   Lifestyle   Autonomy PT IS BELIEVED TO BE I WITH ADLS/IADLS   Reciprocal Relationships LIVES ALONE. WHEN  "QUESTIONED ABOUT SUPPORT SYSTEM, PT YELLED \"WHY DOES EVERYONE KEEP ASKING ME ABOUT FAMILY AND FRIENDS?!\"   Service to Others UNKNOWN   Intrinsic Gratification \"I HAVE CATS\"   ADL   Eating Assistance 4  Minimal Assistance   Grooming Assistance 4  Minimal Assistance   UB Bathing Assistance 3  Moderate Assistance   LB Bathing Assistance 3  Moderate Assistance   UB Dressing Assistance 3  Moderate Assistance   LB Dressing Assistance 3  Moderate Assistance   Toileting Assistance  3  Moderate Assistance   Functional Assistance 3  Moderate Assistance   Bed Mobility   Supine to Sit 3  Moderate assistance   Additional items Assist x 1;Increased time required;Verbal cues;LE management   Sit to Supine 3  Moderate assistance   Additional items Assist x 1;Increased time required;Verbal cues;LE management   Transfers   Sit to Stand 4  Minimal assistance   Additional items Assist x 1;Increased time required;Verbal cues   Stand to Sit 4  Minimal assistance   Additional items Assist x 1;Increased time required;Verbal cues   Functional Mobility   Functional Mobility 4  Minimal assistance   Additional items Hand hold assistance   Balance   Static Sitting Fair   Static Standing Poor +   Ambulatory Poor +   Activity Tolerance   Activity Tolerance Patient limited by fatigue;Patient limited by pain;Treatment limited secondary to agitation  (COG)   Medical Staff Made Aware PT SEEN FOR CO-SESSION WITH SKILLED PHYSICAL THERAPIST 2' CLINICALLY UNSTABLE PRESENTATION, TRAUMATIC INJURIES, NEW PRECAUTIONS/LIMITATIONS, LIMITED ACTIVITY TOLERANCE AND PRESENT IMPAIRMENTS WHICH ARE A REGRESSION FROM THE PT'S BASELINE AND IMPACTING OVERALL OCCUPATIONAL PERFORMANCE.   RUE Assessment   RUE Assessment X  (NWB; PAIN)   LUE Assessment   LUE Assessment WFL   Cognition   Overall Cognitive Status Impaired   Arousal/Participation Responsive   Attention Attends with cues to redirect   Orientation Level Oriented to person  (RESPONDS TO NAME)   Memory Unable to " assess   Following Commands Follows one step commands with increased time or repetition   Comments PT WITH EXTENSIVE PSYCH HISTORY. LIMITED INSIGHT/JUDGEMENT/SAFETY AWARENESS. EASILY IRRIATABLE T/O SESSION. TEARFUL. RECOMMEND ALARM ON FOR SAFETY   Assessment   Limitation Decreased ADL status;Decreased Safe judgement during ADL;Decreased cognition;Decreased endurance;Decreased self-care trans;Decreased high-level ADLs;Mood limitation   Prognosis Fair   Assessment 59 YO Female SEEN FOR INITIAL OCCUPATIONAL THERAPY EVALUATION FOLLOWING PRESENTATION TO ED WITH INCREASED PAIN FOLLOWING RECENT D/C THE DAY PRIOR FOR R HUMERUS FX AND WRIST SPRAIN FROM A FALL WHILE INTOXICATED. PT REMAINS WITH THE FOLLOWING RESTRICTIONS;RUE NWB STATUS IN SLING + WRIST LACER BRACE. PROBLEMS LIST/PMH INCLUDES Anxiety, Arthritis, Bipolar affective disorder, depressed, severe (HCC), Depression, Elevated bilirubin, Hyperlipidemia, Hypertension, Hypokalemia, Learning difficulty, Leukocytosis, Obesity (BMI 30.0-34.9), Osteopenia, Ovarian failure, Previous known suicide attempt, Psychiatric disorder, Psychiatric illness, and PTSD (post-traumatic stress disorder). PT PRESENTS WITH MINIMAL CONVERSATION AND EASILY IRRITABLE T/O SESSION- BELIEVED TO BE FROM AN APT ALONE AND OVERALL INDEPENDENT AT BASELINE.  PT CURRENTLY REQUIRES OVERALL MOD A WITH ADLS AND MIN A WITH TRANSFERS / LIMITED FUNCTIONAL MOBILITY WITH HHA. PT IS LIMITED 2' PAIN, FATIGUE, IMPAIRED BALANCE, FALL RISK , LIMITED SAFETY AWARENESS/INSIGHT/JUDGEMENT, WB RESTRICTIONS, ORTHOPEDIC RESTRICTIONS, OVERALL WEAKNESS/DECONDITIONING , DIZZINESS WITH CHANGE OF POSITIONING , LIMITED FAMILY/FRIEND SUPPORT , INACCESSIBLE HOME ENVIRONMENT, and OVERALL LIMITED ACTIVITY TOLERANCE. PT EDUCATED ON SLING  MANAGEMENT, CARRY OVER OF WB STATUS, DEEP BREATHING TECHNIQUES T/O ACTIVITY, SLOWING OF PACE, and CONTINUE PARTICIPATION IN SELF-CARE/MOBILITY WITH STAFF WHILE IN THE HOSPITAL . The patient's raw score  on the AM-PAC Daily Activity Inpatient Short Form is 14. A raw score of less than 19 suggests the patient may benefit from discharge to post-acute rehabilitation services. Please refer to the recommendation of the Occupational Therapist for safe discharge planning.  FROM AN OCCUPATIONAL THERAPY PERSPECTIVE, RECOMMEND LEVEL II RESOURCES UPON D/C. WILL CONT TO FOLLOW TO ADDRESS THE BELOW DESCRIBED GOALS.   Goals   Patient Goals TO HAVE LESS PAIN   LTG Time Frame 10-14   Long Term Goal #1 SEE BELOW   Plan   Treatment Interventions ADL retraining;Functional transfer training;Endurance training;Cognitive reorientation;Patient/family training;Equipment evaluation/education;Compensatory technique education;Energy conservation;Activityengagement   Goal Expiration Date 01/28/24   OT Frequency 2-3x/wk   Discharge Recommendation   Rehab Resource Intensity Level, OT II (Moderate Resource Intensity)   AM-PAC Daily Activity Inpatient   Lower Body Dressing 2   Bathing 2   Toileting 2   Upper Body Dressing 2   Grooming 3   Eating 3   Daily Activity Raw Score 14   Daily Activity Standardized Score (Calc for Raw Score >=11) 33.39   AM-PAC Applied Cognition Inpatient   Following a Speech/Presentation 2   Understanding Ordinary Conversation 3   Taking Medications 2   Remembering Where Things Are Placed or Put Away 2   Remembering List of 4-5 Errands 2   Taking Care of Complicated Tasks 2   Applied Cognition Raw Score 13   Applied Cognition Standardized Score 30.46       OCCUPATIONAL THERAPY GOALS TO BE MET WITHIN 10-14 DAYS:    -Pt will complete additional cognitive assessment with 100% attention to task in order to assist with safe d/c plan.   -Pt will follow 100% simple 2-step commands and be A&O x4 consistently with environmental cues to increase participation in functional activities.   -Pt will identify 2-3 coping strategies that promote G mental health that can be completed within the hospital and carry over to d/c environment.    -Pt will identify 2-3 leisure activities that promote G mental health that can be completed within the hospital and carry over to d/c environment.   -Pt will increase bed mobility to MOD I to participate in functional activities with G tolerance and balance.  -Pt will improve functional mobility and transfers to MOD I on/off all surfaces including toileting.  -Pt will participate in lt grooming task with MOD I after set-up standing at sink ~3-5 minutes with G safety and balance.   -Pt will increase independence in all ADLS to MOD I with G balance sitting upright in chair.  -Pt will improve activity tolerance to G for 30 min txment sessions w/ G carry over of learned energy conservation techniques.  -Pt will improve independence in lt homemaking activities to MOD I without requiring cues for safety.  -Pt will demonstrate G carryover of learned WBS, safety techniques and proper body mechanics in functional and leisure activities with use of DME.      Documentation completed by BRADLEY Delatorre, OTR/L  [facility] Certified ID# [id number]

## 2024-01-14 NOTE — PHYSICAL THERAPY NOTE
Physical Therapy Evaluation     Patient's Name: Misa Zuleta    Admitting Diagnosis  Arm pain [M79.603]    Problem List  Patient Active Problem List   Diagnosis    Amenorrhea    HTN (hypertension)    Hyperlipidemia    Tobacco use    Ovarian failure    Mild intellectual disability    Constipation    MDD (major depressive disorder), recurrent episode, severe (HCC)    Chronic pain of both knees    Bipolar disorder (HCC)    Anxiety    Insomnia    Obesity    Patellofemoral pain syndrome of both knees    Pulmonary nodule    Vitamin D insufficiency    PTSD (post-traumatic stress disorder)    Acute right-sided low back pain without sciatica       Past Medical History  Past Medical History:   Diagnosis Date    Anxiety     Arthritis     Bipolar affective disorder, depressed, severe (HCC) 4/28/2019    Depression     Elevated bilirubin 8/15/2019    Hyperlipidemia     Hypertension     Hypokalemia 8/15/2019    Learning difficulty     Leukocytosis 7/28/2020    Obesity (BMI 30.0-34.9) 6/26/2020    Osteopenia     Ovarian failure     Previous known suicide attempt     Psychiatric disorder     Psychiatric illness     PTSD (post-traumatic stress disorder) 2/25/2023       Past Surgical History  Past Surgical History:   Procedure Laterality Date    LAPAROSCOPY      as a child, per patient-normal findings        01/14/24 1202   PT Last Visit   PT Visit Date 01/14/24   Note Type   Note type Evaluation   Pain Assessment   Pain Assessment Tool 0-10   Pain Score 8   Pain Location/Orientation Orientation: Right;Location: Arm   Hospital Pain Intervention(s) Ambulation/increased activity;Repositioned   Restrictions/Precautions   Weight Bearing Precautions Per Order Yes   RUE Weight Bearing Per Order (S)  NWB  (R humerus fracture + R wrist sprain)   Braces or Orthoses (S)  Sling;Wrist lacer  (R UE)   Other Precautions Cognitive;Pain;Fall Risk;WBS   Home Living   Type of Home Apartment   Home Layout One level;Elevator   Additional Comments  "Prior to this admission patient resided alone in an apartment with elevator access where at her baseline she is I with mobility (no use of AD) and ADLS. When asked if patient had support she became agitated and did not specify   Prior Function   Level of [place] Independent with ADLs;Independent with functional mobility   Lives With (S)  Alone   Falls in the last 6 months 1 to 4   General   Additional Pertinent History 58 y.o. female admitted to [facility] on 1/14/2024 with worsening pain in R UE and difficulty performing ADLS. Patient presented to ED initially on 1/13 s/p fall and was found to have R humerus fracture (non-op, sling) and R wrist sprain (in wrist brace) and d/c home.   Family/Caregiver Present No   Cognition   Overall Cognitive Status (S)  Impaired  (agitated, tangential, crying, h/o bipolar)   Attention Attends with cues to redirect   Orientation Level Oriented to person   Memory Unable to assess   Following Commands Follows one step commands with increased time or repetition   Subjective   Subjective \"why does everyone ask about friends and family?\"   RUE Assessment   RUE Assessment X  (immobilized in sling/wrist brace)   LUE Assessment   LUE Assessment WFL   RLE Assessment   RLE Assessment WFL  (4-/5 grossly)   LLE Assessment   LLE Assessment WFL  (4-/5 grossly)   Bed Mobility   Supine to Sit 3  Moderate assistance   Additional items Assist x 1;Increased time required;LE management   Sit to Supine 3  Moderate assistance   Additional items Assist x 1;Increased time required;LE management   Additional Comments patient received and remain on stretcher in hallway of ED; b/l railings up   Transfers   Sit to Stand 4  Minimal assistance   Additional items Assist x 1;Increased time required;Verbal cues   Stand to Sit 4  Minimal assistance   Additional items Assist x 1;Increased time required;Verbal cues   Ambulation/Elevation   Gait pattern Excessively slow;Short stride;Poor UE support "   Gait Assistance 4  Minimal assist   Additional items Assist x 1   Assistive Device None  (therapist providing steadying assist at hips)   Distance 5 feet x 2   Balance   Static Sitting Fair   Static Standing Poor +   Ambulatory Poor +   Endurance Deficit   Endurance Deficit Yes   Endurance Deficit Description pain, cognitive deficits   Activity Tolerance   Activity Tolerance Patient limited by pain   Medical Staff Made Aware This high complexity evaluation was performed with an occupational therapist due to the patient's co-morbidities, clinically unstable presentation, and present impairments which are a regression from the patient's baseline.   Assessment   Prognosis Fair   Problem List Decreased strength;Decreased range of motion;Decreased endurance;Impaired balance;Decreased mobility;Decreased cognition;Impaired judgement;Decreased safety awareness;Pain;Orthopedic restrictions   Assessment PT completed evaluation of 58 y.o. female admitted [facility] on 1/14/2024 with worsening pain in R UE and difficulty performing ADLS. Patient presented to ED initially on 1/13 s/p fall and was found to have R humerus fracture (non-op, sling) and R wrist sprain (in wrist brace) and d/c home.     Patient's current status instabilities include performance of evaluation in ED, pain, sling, fidelina regression in function from baseline.  PMH is significant for bipolar disorder, PTSD, and HTN. Prior to this admission patient resided alone in an apartment with elevator access where at her baseline she is I with mobility (no use of AD) and ADLS. When asked if patient had support she became agitated and did not specify.     Patient presents at time of PT evaluation functioning below baseline and currently w/ overall mobility deficits 2* to: impaired balance, decreased endurance, gait deviations, decreased activity tolerance and fall risk. During PT evaluation, patient currently is requiring mod-AX1 for bed mobility; min-AX1  for transfers and min-AX1 for ambulation. Patient using L UE to support R UE and so therapist guarded at patient's hips to provide steadying assist while ambulating. Patient walked 5 feet x 2 presenting with unsteady gait.     This patient would benefit from rehab at this time. Patient will continue to benefit from continued skilled PT this admission to achieve maximal function and safety.   Goals   Patient Goals to have less pain   LTG Expiration Date 01/28/24   Long Term Goal #1 1) Perform bed mobility S level to participate in frequent repositioning and improve skin integrity; 2) Perform functional transfers mod-I to promote S level  with toileting and OOB mobility; 3) Ambulate 200 feet S level with least restrictive device to participate in household and community level mobility; 4) Improve b/l LE strength by 1/2 grade in order to improve efficiency of tranfers; 5) Improve balance by 1 grade to reduce risk for falls   PT Treatment Day 0   Plan   Treatment/Interventions Functional transfer training;LE strengthening/ROM;Therapeutic exercise;Endurance training;Patient/family training;Equipment eval/education;Bed mobility;Gait training;OT;Spoke to nursing;Spoke to MD   PT Frequency 2-3x/wk   Discharge Recommendation   Rehab Resource Intensity Level, PT II (Moderate Resource Intensity)   Equipment Recommended   (will cont to assess)   AM-PAC Basic Mobility Inpatient   Turning in Flat Bed Without Bedrails 2   Lying on Back to Sitting on Edge of Flat Bed Without Bedrails 2   Moving Bed to Chair 3   Standing Up From Chair Using Arms 3   Walk in Room 3   Climb 3-5 Stairs With Railing 2   Basic Mobility Inpatient Raw Score 15   Basic Mobility Standardized Score 36.97   Highest Level Of Mobility   -HLM Goal 4: Move to chair/commode   -HLM Achieved 4: Move to chair/commode     The patient's AM-PAC Basic Mobility Inpatient Standardized Score is less than 42.9, suggesting this patient may benefit from discharge to  post-acute rehabilitation services. Please also refer to the recommendation of the Physical Therapist for safe discharge planning.        Radha Story, PT, DPT

## 2024-01-14 NOTE — ED ATTENDING ATTESTATION
1/14/2024  I, Stanton Hernandez MD, saw and evaluated the patient. I have discussed the patient with the resident/non-physician practitioner and agree with the resident's/non-physician practitioner's findings, Plan of Care, and MDM as documented in the resident's/non-physician practitioner's note, except where noted. All available labs and Radiology studies were reviewed.  I was present for key portions of any procedure(s) performed by the resident/non-physician practitioner and I was immediately available to provide assistance.       At this point I agree with the current assessment done in the Emergency Department.  I have conducted an independent evaluation of this patient a history and physical is as follows:    58-year-old female diagnosed yesterday with a right humerus fracture and wrist sprain presents back to the emergency department for evaluation of ongoing arm pain.  No new trauma or falls.  Has been wearing the sling as instructed.  States she is having difficulty performing ADLs.  No numbness or tingling.  No new complaints.    On exam, patient anxious appearing, but in no acute distress, head is normocephalic atraumatic, pupils equal round reactive to light, neck is supple without meningismus signs, respiratory rate and rhythm with intact distal pulses, no increased work of breathing, respiratory distress, or stridor.  Right upper extremity in sling.  Right wrist and splint.  Still able to wiggle fingers.  Compartments are soft.  Does have tenderness to palpation of the right shoulder.  No obvious deformity.  Sensation intact.    Suspect symptoms secondary to known humerus fracture and wrist sprain, no evidence of compartment syndrome.  Will treat with meloxicam, consult PT/OT and case management to assist with disposition.  If patient would like to be discharged home, she is stable to do so.    ED Course         Critical Care Time  Procedures

## 2024-01-14 NOTE — ED CARE HANDOFF
Emergency Department Sign Out Note        Sign out and transfer of care from Dr. Arrington. See Separate Emergency Department note.     The patient, Misa Zuleta, was evaluated by the previous provider for right arm pain.    Workup Completed:  Medications    ED Course / Workup Pending (followup):  Case management, PT OT and final dispo                                  ED Course as of 01/14/24 1611   Sun Jan 14, 2024   0706 SO: broken humerus x2 days ago c/o pain - mobic slept tn - CM and PT/OT 8am eval - can d/c   1308 OT recommending rehab PT still to see   1451 Pt cannot be away from home has pets case management to set up home health   1451 Pt to be d/c'ed     Procedures  Medical Decision Making  See ED course    Patient set up with home health per case management.  PT and OT recommending rehab however patient cannot be away from home.  Case management following will set up home health    Patient hemodynamically stable noted EKG for discharge.  Return precautions given patient verbalized understanding    Risk  Prescription drug management.            Disposition  Final diagnoses:   Right arm pain   Humerus fracture     Time reflects when diagnosis was documented in both MDM as applicable and the Disposition within this note       Time User Action Codes Description Comment    1/14/2024  5:01 AM Check, Stanton Add [M79.601] Right arm pain     1/14/2024  5:02 AM Check, Stanton Galeana [S42.309A] Humerus fracture           ED Disposition       ED Disposition   Discharge    Condition   Stable    Date/Time   Sun Jan 14, 2024  2:48 PM    Comment   Misa Zuleta discharge to home/self care.                   Follow-up Information       Follow up With Specialties Details Why Contact Info    [employer]  Call in 2 days  [phone]      [facility], [facility]  Follow up  [address]  [place] [address]  Phone: [phone]  Fax: [phone]          Patient's Medications   Discharge  Prescriptions    No medications on file            ED Provider  Electronically Signed by     Saravanan Whitley DO  01/14/24 9299

## 2024-01-14 NOTE — CASE MANAGEMENT
Case Management Discharge Planning Note    Patient name Misa Zuleta  Location Z5/Z5HA MRN [id number]  : 1965 Date 2024       Current Admission Date: 2024  Current Admission Diagnosis:Arm pain   Patient Active Problem List    Diagnosis Date Noted    Acute right-sided low back pain without sciatica 2023    PTSD (post-traumatic stress disorder) 2023    Vitamin D insufficiency 2023    Pulmonary nodule 2021    Patellofemoral pain syndrome of both knees 12/15/2020    Bipolar disorder (HCC) 2020    Anxiety 2020    Insomnia 2020    Obesity 2020    Chronic pain of both knees 11/10/2020    MDD (major depressive disorder), recurrent episode, severe (HCC) 2020    Constipation 2020    Ovarian failure 2020    Mild intellectual disability 2020    Tobacco use 2020    HTN (hypertension) 2019    Amenorrhea 2013    Hyperlipidemia 2013      LOS (days): 0  Geometric Mean LOS (GMLOS) (days):   Days to GMLOS:     OBJECTIVE:            Current admission status: Emergency   Preferred Pharmacy:   [id number] - [place], [place] - [address]  [address]  [place] [address]  Phone: [phone] Fax: [phone]    [facility] - [place], [place] - [address]  [address]  [place] [address]  Phone: [phone] Fax: [phone]    Primary Care Provider: Colleen Tafoya DO    Primary Insurance: [employer]  REP  Secondary Insurance: [employer]    DISCHARGE DETAILS:    Discharge planning discussed with:: pt at ED bedside  [place] of Choice: Yes  Comments - Freedom of Choice: Accepting [facility] options reviewed. Pt requested SOC with [facility]     Were Treatment Team discharge recommendations reviewed with patient/caregiver?: Yes  Did patient/caregiver verbalize understanding of patient care needs?: Yes  Were patient/caregiver advised of the risks associated  with not following Treatment Team discharge recommendations?: Yes    Requested Home Health Care         Is the patient interested in HHC at discharge?: Yes  Home Health Discipline requested:: Occupational Therapy, Physical Therapy  Home Health Agency Name:: [facility]  Home Health Follow-Up Provider:: PCP  Home Health Services Needed:: Evaluate Functional Status and Safety, Gait/ADL Training, Strengthening/Theraputic Exercises to Improve Function  Homebound Criteria Met:: Requires the Assistance of Another Person for Safe Ambulation or to Leave the Home, Uses an Assist Device (i.e. cane, walker, etc)  Supporting Clincal Findings:: Limited Endurance      Other Referral/Resources/Interventions Provided:  Interventions: HHC  Referral Comments: Accepting HHC options reviewed with pt. Pt requested SOC with [facility]. [facility] reserved via [facility] and added to AVS. CM updated ED provider of same who will place home health orders. Pt states she would require [facility] transport home and has access to her apartment. [facility] transport requested for 4pm. CM will follow for confirmed transport time. Bedside RN informed of same. CM will follow.    -AVS and HHC orders faxed to [facility] F: [phone]     Treatment Team Recommendation: Home with Home Health Care  Discharge Destination Plan:: Home with Home Health Care  Transport at Discharge : Wheelchair van  Dispatcher Contacted: Yes  Number/Name of Dispatcher: Round Trip     ETA of Transport (Date): 01/14/24  ETA of Transport (Time):  (request made for 4pm, CM will follow for confirmed transport time.)

## 2024-01-14 NOTE — PLAN OF CARE
Problem: OCCUPATIONAL THERAPY ADULT  Goal: Performs self-care activities at highest level of function for planned discharge setting.  See evaluation for individualized goals.  Description: Treatment Interventions: ADL retraining, Functional transfer training, Endurance training, Cognitive reorientation, Patient/family training, Equipment evaluation/education, Compensatory technique education, Energy conservation, Activityengagement          See flowsheet documentation for full assessment, interventions and recommendations.   Note: Limitation: Decreased ADL status, Decreased Safe judgement during ADL, Decreased cognition, Decreased endurance, Decreased self-care trans, Decreased high-level ADLs, Mood limitation  Prognosis: Fair  Assessment: 59 YO Female SEEN FOR INITIAL OCCUPATIONAL THERAPY EVALUATION FOLLOWING PRESENTATION TO ED WITH INCREASED PAIN FOLLOWING RECENT D/C THE DAY PRIOR FOR R HUMERUS FX AND WRIST SPRAIN FROM A FALL WHILE INTOXICATED. PT REMAINS WITH THE FOLLOWING RESTRICTIONS;RUE NWB STATUS IN SLING + WRIST LACER BRACE. PROBLEMS LIST/PMH INCLUDES Anxiety, Arthritis, Bipolar affective disorder, depressed, severe (HCC), Depression, Elevated bilirubin, Hyperlipidemia, Hypertension, Hypokalemia, Learning difficulty, Leukocytosis, Obesity (BMI 30.0-34.9), Osteopenia, Ovarian failure, Previous known suicide attempt, Psychiatric disorder, Psychiatric illness, and PTSD (post-traumatic stress disorder). PT PRESENTS WITH MINIMAL CONVERSATION AND EASILY IRRITABLE T/O SESSION- BELIEVED TO BE FROM AN APT ALONE AND OVERALL INDEPENDENT AT BASELINE.  PT CURRENTLY REQUIRES OVERALL MOD A WITH ADLS AND MIN A WITH TRANSFERS / LIMITED FUNCTIONAL MOBILITY WITH HHA. PT IS LIMITED 2' PAIN, FATIGUE, IMPAIRED BALANCE, FALL RISK , LIMITED SAFETY AWARENESS/INSIGHT/JUDGEMENT, WB RESTRICTIONS, ORTHOPEDIC RESTRICTIONS, OVERALL WEAKNESS/DECONDITIONING , DIZZINESS WITH CHANGE OF POSITIONING , LIMITED FAMILY/FRIEND SUPPORT ,  INACCESSIBLE HOME ENVIRONMENT, and OVERALL LIMITED ACTIVITY TOLERANCE. PT EDUCATED ON SLING  MANAGEMENT, CARRY OVER OF WB STATUS, DEEP BREATHING TECHNIQUES T/O ACTIVITY, SLOWING OF PACE, and CONTINUE PARTICIPATION IN SELF-CARE/MOBILITY WITH STAFF WHILE IN THE HOSPITAL . The patient's raw score on the AM-PAC Daily Activity Inpatient Short Form is 14. A raw score of less than 19 suggests the patient may benefit from discharge to post-acute rehabilitation services. Please refer to the recommendation of the Occupational Therapist for safe discharge planning.  FROM AN OCCUPATIONAL THERAPY PERSPECTIVE, RECOMMEND LEVEL II RESOURCES UPON D/C. WILL CONT TO FOLLOW TO ADDRESS THE BELOW DESCRIBED GOALS.     Rehab Resource Intensity Level, OT: II (Moderate Resource Intensity)

## 2024-01-14 NOTE — PLAN OF CARE
Problem: PHYSICAL THERAPY ADULT  Goal: Performs mobility at highest level of function for planned discharge setting.  See evaluation for individualized goals.  Description: Treatment/Interventions: Functional transfer training, LE strengthening/ROM, Therapeutic exercise, Endurance training, Patient/family training, Equipment eval/education, Bed mobility, Gait training, OT, Spoke to nursing, Spoke to MD  Equipment Recommended:  (will cont to assess)       See flowsheet documentation for full assessment, interventions and recommendations.  Note: Prognosis: Fair  Problem List: Decreased strength, Decreased range of motion, Decreased endurance, Impaired balance, Decreased mobility, Decreased cognition, Impaired judgement, Decreased safety awareness, Pain, Orthopedic restrictions  Assessment: PT completed evaluation of 58 y.o. female [facility]14/2024 with worsening pain in R UE and difficulty performing ADLS. Patient presented to ED initially on 1/13 s/p fall and was found to have R humerus fracture (non-op, sling) and R wrist sprain (in wrist brace) and d/c home. Patient's current status instabilities include performance of evaluation in ED, pain, sling, fidelina regression in function from baseline.  PMH is significant for bipolar disorder, PTSD, and HTN. Prior to this admission patient resided alone in an apartment with elevator access where at her baseline she is I with mobility (no use of AD) and ADLS. When asked if patient had support she became agitated and did not specify. Patient presents at time of PT evaluation functioning below baseline and currently w/ overall mobility deficits 2* to: impaired balance, decreased endurance, gait deviations, decreased activity tolerance and fall risk. During PT evaluation, patient currently is requiring mod-AX1 for bed mobility; min-AX1 for transfers and min-AX1 for ambulation. Patient using L UE to support R UE and so therapist guarded at patient's  hips to provide steadying assist while ambulating. Patient walked 5 feet x 2 presenting with unsteady gait. This patient would benefit from rehab at this time. Patient will continue to benefit from continued skilled PT this admission to achieve maximal function and safety.        Rehab Resource Intensity Level, PT: II (Moderate Resource Intensity)    See flowsheet documentation for full assessment.

## 2024-01-14 NOTE — DISCHARGE INSTRUCTIONS
You are seen evaluated emergency department for right arm pain.  Humerus fracture.  Will set up home health.  Please follow-up with them.  Follow-up with case management.  Follow-up with your PCP.  If your symptoms worsen or persist please return to the emergency department for further evaluation and management

## 2024-01-15 ENCOUNTER — VBI (OUTPATIENT)
Dept: ADMINISTRATIVE | Facility: OTHER | Age: 59
End: 2024-01-15

## 2024-01-15 ENCOUNTER — VBI (OUTPATIENT)
Dept: INTERNAL MEDICINE CLINIC | Facility: CLINIC | Age: 59
End: 2024-01-15

## 2024-01-15 NOTE — TELEPHONE ENCOUNTER
01/15/24 9:21 AM     [employer] SmartForm used to document caregap status.  Cm letter was sent.  Also apt was chg due to snow.  Ama Silva

## 2024-01-16 NOTE — TELEPHONE ENCOUNTER
01/16/24 3:50 PM    Patient contacted post ED visit, first outreach attempt made. Message was left for patient to return a call to the VBI Department at [facility]: Phone [phone].    Thank you.  Ama Silva  [facility]

## 2024-01-16 NOTE — TELEPHONE ENCOUNTER
01/16/24 3:57 PM    Patient contacted post ED visit, VBI department spoke with patient/caregiver and outreach was successful.    Thank you.  Ama Silva  [facility]

## 2024-01-16 NOTE — ED PROVIDER NOTES
History  Chief Complaint   Patient presents with    Fall     Tripped over a box. Fell into wall. Landed on R shoulder, also hit head and knee. R shoulder severe pain, unable to move. No thinner/ASA, no LOC. Had 1 wine cooler.     Patient is a 58-year-old female presenting after tripping over a box and falling into a wall, landing on her right shoulder, right knee, right wrist, and up with head strike.  Patient was drinking at the time, and is intoxicated upon evaluation in the emergency department.  Patient is complaining of pain in the above areas listed, denies pain in any other joint.  Patient denies loss of consciousness.  Patient is denying any headache, neck pain.  Review of systems is otherwise negative.        Prior to Admission Medications   Prescriptions Last Dose Informant Patient Reported? Taking?   LORazepam (Ativan) 0.5 mg tablet   No No   Sig: Take 1 tablet (0.5 mg total) by mouth every 8 (eight) hours as needed for anxiety   QUEtiapine (SEROquel) 25 mg tablet   Yes No   Sig: Take 25 mg by mouth every evening   Synvisc One 48 MG/6ML injection   Yes No   amLODIPine (NORVASC) 10 mg tablet   No No   Sig: Take 1 tablet (10 mg total) by mouth daily   atorvastatin (LIPITOR) 20 mg tablet   No No   Sig: take 1 tablet by mouth daily   dexamethasone (DECADRON) 1 mg tablet   Yes No   Sig: TAKE 1 TABLET BY MOUTH TWICE A DAY WITH MEALS FOR 1 DAY   escitalopram (LEXAPRO) 20 mg tablet   Yes No   Sig: Take 20 mg by mouth daily   lidocaine (Lidoderm) 5 %   No No   Sig: Apply 1 patch topically over 12 hours daily Remove & Discard patch within 12 hours or as directed by MD   lisinopril (ZESTRIL) 10 mg tablet   No No   Sig: Take 1 tablet (10 mg total) by mouth daily   methocarbamol (Robaxin-750) 750 mg tablet   No No   Sig: Take 1 tablet (750 mg total) by mouth every 6 (six) hours as needed for muscle spasms   naproxen (Naprosyn) 500 mg tablet   No No   Sig: Take 1 tablet (500 mg total) by mouth 2 (two) times a day with  meals   risperiDONE (RisperDAL) 0.5 mg tablet   No No   Sig: Take 1 tablet (0.5 mg total) by mouth daily at bedtime Take one 2mg tab with one 0.5mg tab every night at bedtime for total dose of 2.5mg qhs   risperiDONE (RisperDAL) 2 mg tablet   No No   Sig: Take 1 tablet (2 mg total) by mouth daily at bedtime Take one 2mg tab with one 0.5mg tab every night at bedtime for total dose of 2.5mg qhs      Facility-Administered Medications: None       Past Medical History:   Diagnosis Date    Anxiety     Arthritis     Bipolar affective disorder, depressed, severe (Formerly [facility]) 4/28/2019    Depression     Elevated bilirubin 8/15/2019    Hyperlipidemia     Hypertension     Hypokalemia 8/15/2019    Learning difficulty     Leukocytosis 7/28/2020    Obesity (BMI 30.0-34.9) 6/26/2020    Osteopenia     Ovarian failure     Previous known suicide attempt     Psychiatric disorder     Psychiatric illness     PTSD (post-traumatic stress disorder) 2/25/2023       Past Surgical History:   Procedure Laterality Date    LAPAROSCOPY      as a child, per patient-normal findings       Family History   Problem Relation Age of Onset    Alzheimer's disease Mother     Depression Mother     Depression Brother     Bipolar disorder Brother     No Known Problems Maternal Aunt     No Known Problems Paternal Aunt     No Known Problems Maternal Uncle     No Known Problems Paternal Uncle     No Known Problems Cousin     ADD / ADHD Neg Hx     Alcohol abuse Neg Hx     Anxiety disorder Neg Hx     Dementia Neg Hx     Drug abuse Neg Hx     OCD Neg Hx     Paranoid behavior Neg Hx     Schizophrenia Neg Hx     Seizures Neg Hx     Self-Injury Neg Hx     Suicide Attempts Neg Hx      I have reviewed and agree with the history as documented.    E-Cigarette/Vaping    E-Cigarette Use Never User      E-Cigarette/Vaping Substances    Nicotine No     THC No     CBD No     Flavoring No     Other No     Unknown No      Social History     Tobacco Use    Smoking status: Every Day      Current packs/day: 1.00     Average packs/day: 1 pack/day for 20.0 years (20.0 ttl pk-yrs)     Types: Cigarettes    Smokeless tobacco: Never   Vaping Use    Vaping status: Never Used   Substance Use Topics    Alcohol use: Not Currently    Drug use: Not Currently        Review of Systems   All other systems reviewed and are negative.      Physical Exam  ED Triage Vitals   Temperature Pulse Respirations Blood Pressure SpO2   01/12/24 2159 01/12/24 2158 01/12/24 2158 01/12/24 2158 01/12/24 2158   97.8 °F (36.6 °C) 70 16 98/56 94 %      Temp Source Heart Rate Source Patient Position - Orthostatic VS BP Location FiO2 (%)   01/12/24 2159 01/12/24 2300 01/12/24 2300 01/12/24 2300 --   Tympanic Monitor Lying Left arm       Pain Score       01/12/24 2158       10 - Worst Possible Pain             Orthostatic Vital Signs  Vitals:    01/13/24 0315 01/13/24 0411 01/13/24 0431 01/13/24 0530   BP: 102/58 111/58 94/50 116/61   Pulse: 81 80 57 58   Patient Position - Orthostatic VS:           Physical Exam  Vitals and nursing note reviewed.   Constitutional:       General: She is not in acute distress.     Appearance: She is well-developed.   HENT:      Head: Normocephalic and atraumatic.   Eyes:      Conjunctiva/sclera: Conjunctivae normal.   Cardiovascular:      Rate and Rhythm: Normal rate and regular rhythm.      Heart sounds: No murmur heard.  Pulmonary:      Effort: Pulmonary effort is normal. No respiratory distress.      Breath sounds: Normal breath sounds.   Abdominal:      Palpations: Abdomen is soft.      Tenderness: There is no abdominal tenderness.   Musculoskeletal:         General: No swelling.      Cervical back: Neck supple.   Skin:     General: Skin is warm and dry.      Capillary Refill: Capillary refill takes less than 2 seconds.   Neurological:      General: No focal deficit present.      Mental Status: She is alert.      Cranial Nerves: No cranial nerve deficit.      Comments: Patient is intoxicated, minimally  cooperative with neurologic exam.  Patient furthermore will not move her right arm secondary to pain.   Psychiatric:         Mood and Affect: Mood normal.         ED Medications  Medications   sodium chloride 0.9 % bolus 1,000 mL (0 mL Intravenous Stopped 1/13/24 0139)   fentanyl citrate (PF) 100 MCG/2ML 50 mcg (50 mcg Intravenous Given 1/12/24 2317)   fentanyl citrate (PF) 100 MCG/2ML 50 mcg (50 mcg Intravenous Given 1/12/24 2351)   HYDROmorphone (DILAUDID) injection 1 mg (1 mg Intravenous Given 1/13/24 0153)   lidocaine (PF) (XYLOCAINE-MPF) 1 % injection 10 mL (10 mL Infiltration Given by Other 1/13/24 0143)   lidocaine (PF) (XYLOCAINE-MPF) 1 % injection 10 mL (10 mL Infiltration Given by Other 1/13/24 0142)   sodium chloride 0.9 % bolus 1,000 mL (0 mL Intravenous Stopped 1/13/24 0412)   LORazepam (ATIVAN) injection 1 mg (1 mg Intravenous Given 1/13/24 0222)       Diagnostic Studies  Results Reviewed       None                   XR wrist 3+ views RIGHT   Final Result by Rajendra Lim MD (01/15 0737)   Degenerative changes      No acute osseous abnormality.            Workstation performed: [id number]         XR elbow 2 vw right   Final Result by Rajendra Lim MD (01/15 0737)      No acute osseous abnormality.         Workstation performed: [id number]         XR shoulder 1 vw left   Final Result by Rajendra Lim MD (01/15 0732)   Persistent findings of acute left humeral head and neck fracture with displacement      Workstation performed: [id number]         XR shoulder 2+ vw right   Final Result by Serjio Murphy MD (01/13 0033)      Fracture of the right humeral head/neck with mild displacement of the greater tuberosity.      Workstation performed: [id number]         XR humerus RIGHT   Final Result by Serjio Murphy MD (01/13 0032)      Limited evaluation due to positioning. Fracture of the right humeral head/neck with mild displacement of the greater tuberosity.      Workstation performed:  [id number]         XR knee 1 or 2 vw right   Final Result by Serjio Murphy MD (01/13 0033)      No acute fracture or dislocation of the right knee.            Workstation performed: [id number]         XR wrist 2 vw right   Final Result by Serjio Murphy MD (01/13 0034)      Limited evaluation due to rotation/positioning with limited evaluation of the carpal bones. Apparent posterior displacement of the distal ulna which may be due to distal radioulnar joint subluxation/dislocation, correlate clinically.         Workstation performed: [id number]         CT head without contrast   Final Result by Naif Lowry MD (01/12 2333)      No acute intracranial abnormality.   Chronic left mastoid air cell opacification.               Workstation performed: [id number]         CT spine cervical without contrast   Final Result by Naif Lowry MD (01/12 2344)      No cervical spine fracture or traumatic malalignment.                  Workstation performed: [id number]               Procedures  Procedures      ED Course                             SBIRT 20yo+      Flowsheet Row Most Recent Value   Initial Alcohol Screen: US AUDIT-C     1. How often do you have a drink containing alcohol? 2 Filed at: 01/12/2024 2200   2. How many drinks containing alcohol do you have on a typical day you are drinking?  2 Filed at: 01/12/2024 2200   3b. FEMALE Any Age, or MALE 65+: How often do you have 4 or more drinks on one occassion? 1 Filed at: 01/12/2024 2200   Audit-C Score 5 Filed at: 01/12/2024 2200   MARYELLEN: How many times in the past year have you...    Used an illegal drug or used a prescription medication for non-medical reasons? Never Filed at: 01/12/2024 2200                  Medical Decision Making  Patient fell with head strike and is attending, will CT head and neck.  Will additionally image the areas of pain with x-rays.    Patient uncooperative with x-ray technicians despite premedication.  Will attempt again with the  emergency physician present to assist in obtaining images.    Patient noted to have a fracture of the humeral head, though the humeral head is in place on the axillary view.  Patient noted to have some dorsal displacement of the ulna on her wrist view.  Ortho consulted, who evaluated the patient.  Originally recommended reduction and splint.  Hematoma block was performed for analgesia.  At this point repeat films were examined by Ortho, who noted that the ulna appeared to be not as severely displaced as before, patient ultimately did not require reduction.  Patient sent out in wrist splint and sling to follow-up with orthopedic surgery in stable condition.    Amount and/or Complexity of Data Reviewed  Radiology: ordered.    Risk  Prescription drug management.          Disposition  Final diagnoses:   Humeral head fracture     Time reflects when diagnosis was documented in both MDM as applicable and the Disposition within this note       Time User Action Codes Description Comment    1/13/2024  1:15 AM Feliciano Saenz Add [S42.293A] Humeral head fracture     1/13/2024  1:15 AM Feliciano Saenz Add [S63.074A] Closed dislocation of distal end of right ulna, initial encounter     1/13/2024  4:19 AM Feliciano Saenz Remove [S63.074A] Closed dislocation of distal end of right ulna, initial encounter           ED Disposition       ED Disposition   Discharge    Condition   Stable    Date/Time   Sat Jan 13, 2024 0351    Comment   Misa Zuleta discharge to home/self care.                   Follow-up Information       Follow up With Specialties Details Why Contact Info Additional Information    [facility] Emergency Department Emergency Medicine Go to  If symptoms worsen [address] [facility] [address] [phone] [facility] Emergency Department, [address] [place], [place], [address] [phone]    [facility]   [address]  [facility] [id number] [phone] [facility] [place], [address] [address], [place], [place], [id number] [phone]  Use Entrance A             Discharge Medication List as of 1/13/2024  4:41 AM        CONTINUE these medications which have NOT CHANGED    Details   amLODIPine (NORVASC) 10 mg tablet Take 1 tablet (10 mg total) by mouth daily, Starting Thu 12/28/2023, Until Wed 3/27/2024, Normal      atorvastatin (LIPITOR) 20 mg tablet take 1 tablet by mouth daily, Starting Mon 11/27/2023, Normal      dexamethasone (DECADRON) 1 mg tablet TAKE 1 TABLET BY MOUTH TWICE A DAY WITH MEALS FOR 1 DAY, Historical Med      escitalopram (LEXAPRO) 20 mg tablet Take 20 mg by mouth daily, Starting Wed 11/22/2023, Historical Med      lidocaine (Lidoderm) 5 % Apply 1 patch topically over 12 hours daily Remove & Discard patch within 12 hours or as directed by MD, Starting Fri 12/8/2023, Normal      lisinopril (ZESTRIL) 10 mg tablet Take 1 tablet (10 mg total) by mouth daily, Starting Mon 11/20/2023, Normal      LORazepam (Ativan) 0.5 mg tablet Take 1 tablet (0.5 mg total) by mouth every 8 (eight) hours as needed for anxiety, Starting Fri 5/26/2023, Normal      methocarbamol (Robaxin-750) 750 mg tablet Take 1 tablet (750 mg total) by mouth every 6 (six) hours as needed for muscle spasms, Starting Fri 12/8/2023, Normal      naproxen (Naprosyn) 500 mg tablet Take 1 tablet (500 mg total) by mouth 2 (two) times a day with meals, Starting Fri 12/8/2023, Normal      QUEtiapine (SEROquel) 25 mg tablet Take 25 mg by mouth every evening, Starting Wed 11/22/2023, Historical Med      risperiDONE (RisperDAL) 0.5 mg tablet Take 1 tablet (0.5 mg total) by mouth daily at bedtime Take one 2mg tab with one 0.5mg tab every night at bedtime for total dose of 2.5mg qhs, Starting Mon 4/3/2023, Until Wed 7/12/2023, Normal      risperiDONE (RisperDAL) 2 mg tablet Take 1 tablet (2 mg  total) by mouth daily at bedtime Take one 2mg tab with one 0.5mg tab every night at bedtime for total dose of 2.5mg qhs, Starting Mon 4/3/2023, Until Wed 7/12/2023, Normal      Synvisc One 48 MG/6ML injection Starting Fri 9/1/2023, Historical Med               PDMP Review         Value Time User    PDMP Reviewed  Yes 11/21/2022 10:34 AM Danielle Carey MD             ED Provider  Attending physically available and evaluated Misa Zuleta. I managed the patient along with the ED Attending.    Electronically Signed by           Feliciano Saenz MD  01/16/24 0005

## 2024-01-17 ENCOUNTER — TELEPHONE (OUTPATIENT)
Dept: INTERNAL MEDICINE CLINIC | Facility: CLINIC | Age: 59
End: 2024-01-17

## 2024-01-17 NOTE — TELEPHONE ENCOUNTER
Patient LVM asking for a call back to review the recent x-rays that were taken at the ED    Please give patient a call back once the results are in

## 2024-01-18 ENCOUNTER — APPOINTMENT (OUTPATIENT)
Dept: RADIOLOGY | Facility: AMBULARY SURGERY CENTER | Age: 59
End: 2024-01-18
Attending: STUDENT IN AN ORGANIZED HEALTH CARE EDUCATION/TRAINING PROGRAM
Payer: MEDICARE

## 2024-01-18 ENCOUNTER — OFFICE VISIT (OUTPATIENT)
Dept: OBGYN CLINIC | Facility: CLINIC | Age: 59
End: 2024-01-18
Payer: MEDICARE

## 2024-01-18 VITALS — WEIGHT: 190 LBS | BODY MASS INDEX: 33.66 KG/M2 | HEIGHT: 63 IN

## 2024-01-18 DIAGNOSIS — M25.531 PAIN IN RIGHT WRIST: Primary | ICD-10-CM

## 2024-01-18 DIAGNOSIS — S42.291A CLOSED FRACTURE OF HEAD OF RIGHT HUMERUS, INITIAL ENCOUNTER: ICD-10-CM

## 2024-01-18 DIAGNOSIS — S42.293A HUMERAL HEAD FRACTURE: ICD-10-CM

## 2024-01-18 DIAGNOSIS — M25.531 PAIN IN RIGHT WRIST: ICD-10-CM

## 2024-01-18 DIAGNOSIS — M25.531 ARTHRALGIA OF RIGHT WRIST: ICD-10-CM

## 2024-01-18 DIAGNOSIS — E66.01 MORBID OBESITY (HCC): ICD-10-CM

## 2024-01-18 PROCEDURE — 73030 X-RAY EXAM OF SHOULDER: CPT

## 2024-01-18 PROCEDURE — 99214 OFFICE O/P EST MOD 30 MIN: CPT | Performed by: STUDENT IN AN ORGANIZED HEALTH CARE EDUCATION/TRAINING PROGRAM

## 2024-01-18 PROCEDURE — 73110 X-RAY EXAM OF WRIST: CPT

## 2024-01-18 RX ORDER — CHLORHEXIDINE GLUCONATE 4 G/100ML
SOLUTION TOPICAL DAILY PRN
OUTPATIENT
Start: 2024-01-18

## 2024-01-18 RX ORDER — CHLORHEXIDINE GLUCONATE ORAL RINSE 1.2 MG/ML
15 SOLUTION DENTAL ONCE
OUTPATIENT
Start: 2024-01-18 | End: 2024-01-18

## 2024-01-18 NOTE — PROGRESS NOTES
Orthopaedics Office Visit - New Patient Visit    ASSESSMENT/PLAN:    Assessment:   Right humeral displaced tuberosity fracture sustained 1/12/2024  #2 right wrist sprain.  No acute fractures or dislocations    Plan:   X-rays were reviewed with the patient new updated x-rays of the shoulder and right wrist were obtained today.  Clenched fist views of the wrist did not demonstrate any significant scapholunate widening which was concerning on original emergency department radiographs.  There does not appear to be any DRUJ dislocation or acute fracture of any of the carpal bones.  X-rays of the shoulder demonstrated displacement of the greater tuberosity with a nondisplaced fracture extending into the surgical neck.  Greater tuberosity fracture is displaced by greater than 5 mm.  It is approximately 8 mm on oblique radiographs showing posterior retraction of the greater tuberosity.  I had discussion with the patient about operative versus nonoperative management for her greater tuberosity fracture.  The patient is right-hand dominant and would like to try to retain as much motion in the right shoulder as possible.  We discussed nonoperative management with progressive range of motion however discussing that greater than 5 mm of displacement of the greater tuberosity would lead to less then optimal functional outcomes in the impingement possibilities we discussed surgical management with open reduction internal fixation of the right proximal humerus.  The patient was agreeable and informed consent was obtained  Risks, benefits, alternatives were discussed, risks including but not limited to deep vein thrombosis, pulmonary embolism, malunion, nonunion, damage to neurovascular structures both near and distant, infection both deep and superficial, symptomatic hardware, joanna implant fracture, hardware failure, metal sensitivity, chronic pain, postoperative stiffness, avascular necrosis, extremity weakness, decreased range of  motion, need for subsequent repeat procedures, as well as the risks associated with general endotracheal anesthesia which include but not limited to death.  Patient agreed informed consent was obtained.  Right wrist brace provided for comfort.  Stat right upper extremity/shoulder CT ordered to evaluate nature of proximal humerus fracture for surgical planning  Tradamol prescribed for pain  Patient has had poor reaction to oxycodone  Blood work obtained at the hospital on her emergency department visit.    To Do Next Visit:  Follow up post-op    _____________________________________________________  CHIEF COMPLAINT:  Chief Complaint   Patient presents with    Right Shoulder - Fracture    Right Wrist - Fracture         SUBJECTIVE:  Misa Zuleta is a 58 y.o. RHD female who presents for initial evaluation of right proximal humerus fracture sustained 1/12/2024 with a fall.   She was seen at the ED that day along with two days alter.  Today she complains of right wrist, elbow and shoulder pain.  She does use sling with some benefit.  She complains of pain in the right shoulder with tenderness surrounding the elbow and some tenderness surrounding the right wrist.  She denies any previous right shoulder pain.  She is currently in a sling for comfort and is unable to come out of the sling due to severe pain.  Denies previous injury to the right wrist she has no numbness or paresthesias in the right upper extremity.    Patient has had poor reaction to oxycodone and codeine in past.    PAST MEDICAL HISTORY:  Past Medical History:   Diagnosis Date    Anxiety     Arthritis     Bipolar affective disorder, depressed, severe (HCC) 4/28/2019    Depression     Elevated bilirubin 8/15/2019    Hyperlipidemia     Hypertension     Hypokalemia 8/15/2019    Learning difficulty     Leukocytosis 7/28/2020    Obesity (BMI 30.0-34.9) 6/26/2020    Osteopenia     Ovarian failure     Previous known suicide attempt     Psychiatric disorder      Psychiatric illness     PTSD (post-traumatic stress disorder) 2/25/2023       PAST SURGICAL HISTORY:  Past Surgical History:   Procedure Laterality Date    LAPAROSCOPY      as a child, per patient-normal findings       FAMILY HISTORY:  Family History   Problem Relation Age of Onset    Alzheimer's disease Mother     Depression Mother     Depression Brother     Bipolar disorder Brother     No Known Problems Maternal Aunt     No Known Problems Paternal Aunt     No Known Problems Maternal Uncle     No Known Problems Paternal Uncle     No Known Problems Cousin     ADD / ADHD Neg Hx     Alcohol abuse Neg Hx     Anxiety disorder Neg Hx     Dementia Neg Hx     Drug abuse Neg Hx     OCD Neg Hx     Paranoid behavior Neg Hx     Schizophrenia Neg Hx     Seizures Neg Hx     Self-Injury Neg Hx     Suicide Attempts Neg Hx        SOCIAL HISTORY:  Social History     Tobacco Use    Smoking status: Every Day     Current packs/day: 1.00     Average packs/day: 1 pack/day for 20.0 years (20.0 ttl pk-yrs)     Types: Cigarettes    Smokeless tobacco: Never   Vaping Use    Vaping status: Never Used   Substance Use Topics    Alcohol use: Not Currently    Drug use: Not Currently       MEDICATIONS:    Current Outpatient Medications:     amLODIPine (NORVASC) 10 mg tablet, Take 1 tablet (10 mg total) by mouth daily, Disp: 30 tablet, Rfl: 2    atorvastatin (LIPITOR) 20 mg tablet, take 1 tablet by mouth daily, Disp: 90 tablet, Rfl: 2    dexamethasone (DECADRON) 1 mg tablet, TAKE 1 TABLET BY MOUTH TWICE A DAY WITH MEALS FOR 1 DAY, Disp: , Rfl:     escitalopram (LEXAPRO) 20 mg tablet, Take 20 mg by mouth daily, Disp: , Rfl:     lidocaine (Lidoderm) 5 %, Apply 1 patch topically over 12 hours daily Remove & Discard patch within 12 hours or as directed by MD, Disp: 30 patch, Rfl: 2    lisinopril (ZESTRIL) 10 mg tablet, Take 1 tablet (10 mg total) by mouth daily, Disp: 90 tablet, Rfl: 3    LORazepam (Ativan) 0.5 mg tablet, Take 1 tablet (0.5 mg total) by  "mouth every 8 (eight) hours as needed for anxiety, Disp: 21 tablet, Rfl: 0    methocarbamol (Robaxin-750) 750 mg tablet, Take 1 tablet (750 mg total) by mouth every 6 (six) hours as needed for muscle spasms, Disp: 60 tablet, Rfl: 0    naproxen (Naprosyn) 500 mg tablet, Take 1 tablet (500 mg total) by mouth 2 (two) times a day with meals, Disp: 60 tablet, Rfl: 0    QUEtiapine (SEROquel) 25 mg tablet, Take 25 mg by mouth every evening, Disp: , Rfl:     Synvisc One 48 MG/6ML injection, , Disp: , Rfl:     risperiDONE (RisperDAL) 0.5 mg tablet, Take 1 tablet (0.5 mg total) by mouth daily at bedtime Take one 2mg tab with one 0.5mg tab every night at bedtime for total dose of 2.5mg qhs, Disp: 30 tablet, Rfl: 0    risperiDONE (RisperDAL) 2 mg tablet, Take 1 tablet (2 mg total) by mouth daily at bedtime Take one 2mg tab with one 0.5mg tab every night at bedtime for total dose of 2.5mg qhs, Disp: 30 tablet, Rfl: 0    ALLERGIES:  Allergies   Allergen Reactions    Other      Hay Fever    Oxycodone-Acetaminophen GI Intolerance       REVIEW OF SYSTEMS:  MSK: Right shoulder  Neuro: None  Pertinent items are otherwise noted in HPI.  A comprehensive review of systems was otherwise negative.    LABS:  HgA1c:   Lab Results   Component Value Date    HGBA1C 5.6 04/29/2019     BMP:   Lab Results   Component Value Date    CALCIUM 9.7 01/05/2024    K 3.7 01/05/2024    CO2 27 01/05/2024     01/05/2024    BUN 14 01/05/2024    CREATININE 0.71 01/05/2024     CBC: No components found for: \"CBC\"    _____________________________________________________  PHYSICAL EXAMINATION:  Vital signs: Ht 5' 3\" (1.6 m)   Wt 86.2 kg (190 lb)   LMP  (LMP Unknown)   BMI 33.66 kg/m²   General: No acute distress, awake and alert  Psychiatric: Mood and affect appear appropriate  HEENT: Trachea Midline, No torticollis, no apparent facial trauma  Cardiovascular: No audible murmurs; Extremities appear perfused  Pulmonary: No audible wheezing or stridor  Skin: " No open lesions; see further details (if any) below    MUSCULOSKELETAL EXAMINATION:  Extremities:    Right shoulder:  The right upper extremity was exposed inspected.  The patient's skin is intact overlying the entirety of the right upper extremity.  The patient has tenderness to palpation over the right proximal humerus most notably laterally.  No tenderness over the scapula or clavicle.  The patient has mild tenderness over the anterior aspect of her elbow at the area of the ecchymotic pooling from her greater tuberosity fracture.  She has diffuse tenderness over the wrist most notably over the TFCC and the ulnar fossa.  No anatomical snuffbox pain.  No pain with palpation of the dorsal scapholunate interval.  No significant pain with radial ulnar deviation.  The patient's sensation is intact to light touch in the axillary, median, radial, ulnar nerve distributions.  AIN, PIN, ulnar nerves are intact.  +2 radial pulses distally.  Compartment soft compressible        _____________________________________________________  STUDIES REVIEWED:  I personally reviewed the images and interpretation is as follows:  Right shoulder x-ray of 1/18/2024 demonstrate x-rays of the right shoulder demonstrate a displaced greater tuberosity fracture with a nondisplaced surgical neck fracture of the proximal humerus.  The greater tuberosity fracture is displaced greater than 5 mm.  No visualized lesser tuberosity fracture fragment.  No glenohumeral dislocation    Right wrist x-ray of 1/18/2024 demonstrate no acute fractures dislocations.  Clenched fist views demonstrate no significant scapholunate widening greater than 3 mm.  There is no clear dorsal subluxation of the DRUJ.  No associated ulnar styloid fracture.  No osseous lesions.  No visualized carpal fractures or dislocations    PROCEDURES PERFORMED:  Erick Myers

## 2024-01-19 ENCOUNTER — TELEPHONE (OUTPATIENT)
Age: 59
End: 2024-01-19

## 2024-01-19 DIAGNOSIS — S63.501A SPRAIN OF RIGHT WRIST, INITIAL ENCOUNTER: Primary | ICD-10-CM

## 2024-01-19 DIAGNOSIS — S42.291A CLOSED FRACTURE OF HEAD OF RIGHT HUMERUS, INITIAL ENCOUNTER: Primary | ICD-10-CM

## 2024-01-19 RX ORDER — TRAMADOL HYDROCHLORIDE 50 MG/1
50 TABLET ORAL EVERY 6 HOURS PRN
Qty: 30 TABLET | Refills: 0 | Status: SHIPPED | OUTPATIENT
Start: 2024-01-19

## 2024-01-19 RX ORDER — NALOXONE HYDROCHLORIDE 4 MG/.1ML
SPRAY NASAL
Qty: 1 EACH | Refills: 1 | Status: SHIPPED | OUTPATIENT
Start: 2024-01-19 | End: 2025-01-18

## 2024-01-19 NOTE — TELEPHONE ENCOUNTER
Caller: patient    Doctor: Jamal     Reason for call: patient states that the tramadol was not sent to the pharmacy, please sent the script to the [facility].    Call back#: [phone]

## 2024-01-22 ENCOUNTER — TELEPHONE (OUTPATIENT)
Dept: OBGYN CLINIC | Facility: CLINIC | Age: 59
End: 2024-01-22

## 2024-01-22 ENCOUNTER — HOSPITAL ENCOUNTER (OUTPATIENT)
Dept: RADIOLOGY | Facility: HOSPITAL | Age: 59
Discharge: HOME | End: 2024-01-22
Attending: STUDENT IN AN ORGANIZED HEALTH CARE EDUCATION/TRAINING PROGRAM
Payer: MEDICARE

## 2024-01-22 DIAGNOSIS — S42.291A CLOSED FRACTURE OF HEAD OF RIGHT HUMERUS, INITIAL ENCOUNTER: ICD-10-CM

## 2024-01-22 PROCEDURE — 73200 CT UPPER EXTREMITY W/O DYE: CPT

## 2024-01-22 NOTE — TELEPHONE ENCOUNTER
I tried calling patient but could not reach out. Could not leave VM as well because there was no option. I will try to call her again later today.

## 2024-01-23 ENCOUNTER — TELEPHONE (OUTPATIENT)
Dept: OBGYN CLINIC | Facility: CLINIC | Age: 59
End: 2024-01-23

## 2024-01-23 ENCOUNTER — PREP FOR PROCEDURE (OUTPATIENT)
Dept: OBGYN CLINIC | Facility: CLINIC | Age: 59
End: 2024-01-23

## 2024-01-24 ENCOUNTER — HOSPITAL ENCOUNTER (OUTPATIENT)
Dept: RADIOLOGY | Facility: HOSPITAL | Age: 59
Setting detail: OUTPATIENT SURGERY
Discharge: HOME | End: 2024-01-24

## 2024-01-24 DIAGNOSIS — S42.291A CLOSED FRACTURE OF HEAD OF RIGHT HUMERUS, INITIAL ENCOUNTER: ICD-10-CM

## 2024-01-26 ENCOUNTER — TELEPHONE (OUTPATIENT)
Age: 59
End: 2024-01-26

## 2024-01-26 ENCOUNTER — TELEPHONE (OUTPATIENT)
Dept: OBGYN CLINIC | Facility: CLINIC | Age: 59
End: 2024-01-26

## 2024-01-26 NOTE — TELEPHONE ENCOUNTER
"Caller: Patient    Doctor: Jamal    Reason for call:     Patient is calling to reschedule her surgery for \" OPEN REDUCTION W/ INTERNAL FIXATION (ORIF) HUMERUS (SHOULDER) [639] \" which was scheduled on 1/24/24 and had to be canceled.  Please call to reschedule.  Thank you .    Call back#: [phone]  "

## 2024-01-29 ENCOUNTER — PATIENT OUTREACH (OUTPATIENT)
Dept: INTERNAL MEDICINE CLINIC | Facility: CLINIC | Age: 59
End: 2024-01-29

## 2024-01-29 NOTE — PROGRESS NOTES
"SW has reached out to pt this date to f/u re her this date to get an update on her OP MH, housing situation.  Pt shares she was had a serious fall at home due to tripping on a box and hurt her side and arm.    Pt will need rotor cuff surgery 1/31/24 she shares she has no ride home due to needing anesthesia.    SW has asked if she has gotten connected to  OP MH or if she has received any rental help from [employer] as yet?    Pt share she has not and if fact she is very upset with her ICM Wanda Mohini from [employer] and is in the process of getting a new one.( Possible named Sherry)   Sw has asked of he has gotten a Rep Payee with them so she would not be further victimized by scammers and she has NOT.    Pt appears to not want not pursue same.    She also shares she has been threatened with an eviction  and \"she has no where to go\".   She also shares \" she has no one to help her\".  SW hs mentioned she may want to consider [facility] who offers [place] .    Pt ended the call.    SW has reached out to Wanda Rajan [facility] @ [phone] but had to leave a message.  Will f/u and assist as indicated.    SW received a return call from Wandakesha Rajan [facility] who shares that she was in the process of making new ICM referrals  to other agencies.  Sw shared that SW understood that pt had just asked for a transfer from her to a new ICM with [employer].  She would speak to her Supervisor ángela parmar.  Sw also alerted her to the fact that pt needs someone  to accompany her back from the Hospital   ( [facility])  1/31/24 after surgery as they can not  send her in a LYFT alone.  She will speak with her Team re same as well.    They will reach out to pt and get back to SW as well.   "

## 2024-01-30 ENCOUNTER — TELEPHONE (OUTPATIENT)
Dept: OBGYN CLINIC | Facility: CLINIC | Age: 59
End: 2024-01-30

## 2024-01-30 PROBLEM — Z86.59: Status: ACTIVE | Noted: 2024-01-24

## 2024-01-30 PROBLEM — S49.90XS SHOULDER INJURY, SEQUELA: Status: ACTIVE | Noted: 2024-01-24

## 2024-01-30 PROBLEM — Z59.819 HOUSING SITUATION UNSTABLE: Status: ACTIVE | Noted: 2024-01-24

## 2024-01-30 NOTE — TELEPHONE ENCOUNTER
HAROON kendall pickup is scheduled for 8:15AM on 1/31/24 to take patient for surgery at [facility].

## 2024-01-31 ENCOUNTER — PATIENT OUTREACH (OUTPATIENT)
Dept: OBGYN CLINIC | Facility: HOSPITAL | Age: 59
End: 2024-01-31

## 2024-01-31 ENCOUNTER — PATIENT OUTREACH (OUTPATIENT)
Dept: INTERNAL MEDICINE CLINIC | Facility: CLINIC | Age: 59
End: 2024-01-31

## 2024-01-31 DIAGNOSIS — Z78.9 NEED FOR FOLLOW-UP BY SOCIAL WORKER: Primary | ICD-10-CM
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
## 2024-01-31 NOTE — PROGRESS NOTES
"SW has received call from OP JOHN/SABA Altamirano who shares pt has not had her shoulder surgery.  Sw has reached out to the [facility] and left message for the Office to return SW call.    Sw also reached out to Beth ORO of their [facility] [phone].    She shares that pt has sustained a significant fall and has been unable to care for herself and APS Kan Cochran [phone] has been involved and has had [facility] authorized to provide care to pt on an emergency basis.  Beth has stated a referral for the [place] Waiver as well but since this can take easily 3 months they have started these services.    SW did share pt missed her surgery today.    She share the due to the change in her care giver   ( her male aide had been removed )   She has \"fired\" the care agency because they did this.   Beth RN reports pt had made some inappropriate  comments re her male caregiver and for her and his protection this change was made.  Beth ORO had also shared that possible eviction was mentioned as pt keeps getting involved in  \"scams \" and is way behind on her rent.    She thought that she may be able to stay if pt gets a \" Rep Payee \" appointed.    She is not aware of the status of this as they do not have a Property Manger at present.    SW has reached out to Kan Cochran APS / [employer] [phone] to make a report of need but had to leave a message for him to return SW call.     SW has also reached out to Wanda Rajan [phone] X [phone] but her VM was full and SW could not leave a message.    Pt has not been in OP MH [place] since before 12/2023 when she had informed [facility] she was leaving this state and then did not go.  Since they had closed the case they would not take her back despite pt/ Sw multiple request.   They have not been taking any new pts.    Sw to f/u again.         "
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
## 2024-02-01 ENCOUNTER — TELEPHONE (OUTPATIENT)
Age: 59
End: 2024-02-01

## 2024-02-01 ENCOUNTER — PATIENT OUTREACH (OUTPATIENT)
Dept: OBGYN CLINIC | Facility: HOSPITAL | Age: 59
End: 2024-02-01

## 2024-02-01 NOTE — PROGRESS NOTES
SWCM received a message that pts surgery will not be rescheduled as the surgery is time sensitive. Provider requested pt schedule to discuss PT options.     Update: SWCM contacted pt to make her aware the surgery will not be rescheduled as it is a time sensitive procedure. Surgeon would like pt to schedule an appt to discuss PT. Pt was angry in regard to the news and hung up the call. SWCM attempted to reach pt back 2 x with no answer. SWCM will remain available.

## 2024-02-01 NOTE — TELEPHONE ENCOUNTER
Caller: Beth - care manager/Patient    Doctor/Office: Jamal    Call regarding :  Would like to know why her surgery isn't being rescheduled.    Call was transferred to: surgery coordinator   [phone]

## 2024-02-02 ENCOUNTER — PATIENT OUTREACH (OUTPATIENT)
Dept: INTERNAL MEDICINE CLINIC | Facility: CLINIC | Age: 59
End: 2024-02-02
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
## 2024-02-02 NOTE — PROGRESS NOTES
SW has returned call to Beth ORO at the [facility] also on the line was Eli Iqbal Pt Coordinator who shares pt was very upset re being told they can not do surgery .  Beth ORO has called the [facility]

## 2024-02-06 ENCOUNTER — PATIENT OUTREACH (OUTPATIENT)
Dept: OBGYN CLINIC | Facility: HOSPITAL | Age: 59
End: 2024-02-06

## 2024-02-06 ENCOUNTER — OFFICE VISIT (OUTPATIENT)
Dept: OBGYN CLINIC | Facility: CLINIC | Age: 59
End: 2024-02-06
Payer: MEDICARE

## 2024-02-06 ENCOUNTER — APPOINTMENT (OUTPATIENT)
Dept: RADIOLOGY | Facility: AMBULARY SURGERY CENTER | Age: 59
End: 2024-02-06
Attending: STUDENT IN AN ORGANIZED HEALTH CARE EDUCATION/TRAINING PROGRAM
Payer: MEDICARE

## 2024-02-06 VITALS — WEIGHT: 190 LBS | HEIGHT: 63 IN | BODY MASS INDEX: 33.66 KG/M2

## 2024-02-06 DIAGNOSIS — S42.291A CLOSED FRACTURE OF HEAD OF RIGHT HUMERUS, INITIAL ENCOUNTER: ICD-10-CM

## 2024-02-06 DIAGNOSIS — S42.291A CLOSED FRACTURE OF HEAD OF RIGHT HUMERUS, INITIAL ENCOUNTER: Primary | ICD-10-CM

## 2024-02-06 PROCEDURE — 73030 X-RAY EXAM OF SHOULDER: CPT

## 2024-02-06 PROCEDURE — 99214 OFFICE O/P EST MOD 30 MIN: CPT | Performed by: STUDENT IN AN ORGANIZED HEALTH CARE EDUCATION/TRAINING PROGRAM

## 2024-02-06 NOTE — PROGRESS NOTES
BATSHEVA had spoke with pt last week on 02/01/2024 to inform her that the surgeon will not reschedule surgery as the procedure was time sensitive. Pt was advised to contact our office to make an appt with surgeon to discuss PT options. Today I contacted Kan Cochran @ [phone] to make him aware pts surgery was cancelled. I will close my referral in regard to pt and pts BATSHEVA through her PCP office will continue to work with pt. I will remain available for any future needs and concerns.

## 2024-02-06 NOTE — PROGRESS NOTES
Orthopaedics Office Visit - New Patient Visit    ASSESSMENT/PLAN:    Assessment:   Right humeral displaced tuberosity fracture sustained 1/12/2024  2. Right wrist sprain.  No acute fractures or dislocations    Plan:   I had discussion with the patient once again about operative versus nonoperative management options.  The patient failed to show up 2 weeks and the risk for surgical intervention and is now approximately 4 weeks out from the date of her injury.  She is already started forming bony callus formation in the previous fracture site.  Her pain is starting to resolve.  Discussed with her that the benefits of going in there at this point would likely be negated by the amount of dissection that would be required to put the tuberosity back into its appropriate place.  We both decided that a trial of nonoperative management would be in the patient's best interest at this point.  Please begin with 2 weeks of pendulum exercises to the left upper extremity.  Patient should also discontinue sling use at the initiation of physical therapy.  Patient will then progress to active assist range of motion for 2 weeks.  Patient will then progress to active range of motion for 2 weeks.  At that point we will evaluate radiographic healing and progress to weightbearing as tolerated  Continue non weight bearing to right upper extremity  Discontinue use of sling and removable wrist brace.  X-rays were reviewed with the patient new updated x-rays of the shoulder wrist were obtained today.  There is interval healing and callus formation of the greater tuberosity fracture site with less displacement and maintained alignment in angulation.  PT script provided to patient to begin ROM exercises with right shoulder in accordance with nonoperative proximal humerus fracture protocol  We will see the patient back in 4 weeks to re-evaluate shoulder and obtain new XRs    To Do Next Visit:  Repeat XR right  shoulder    _____________________________________________________  CHIEF COMPLAINT:  Chief Complaint   Patient presents with    Right Shoulder - Follow-up, Fracture    Right Wrist - Follow-up         SUBJECTIVE:  Misa Zuleta is a 58 y.o. RHD female who presents for initial evaluation of right proximal humerus fracture sustained 1/12/2024 with a fall.   She was seen at the ED that day along with two days alter.  Today she complains of right wrist, elbow and shoulder pain.  She does use sling with some benefit.  She complains of pain in the right shoulder with tenderness surrounding the elbow and some tenderness surrounding the right wrist.  She denies any previous right shoulder pain.  She is currently in a sling for comfort and is unable to come out of the sling due to severe pain.  Denies previous injury to the right wrist she has no numbness or paresthesias in the right upper extremity.    Patient has had poor reaction to oxycodone and codeine in past.    Interval history 2/10/2024  58-year-old right-hand-dominant female who presents for second visit following right proximal humerus fracture sustained 1/12/2024 after a fall.  Patient was initially booked for surgery, however she did not show up for her surgery 2 weeks in a row.  She is here for second follow-up visit in clinic.  She has been in a sling for the last 3 and half weeks.  She has also been given a removable wrist brace for a wrist sprain.  She has been compliant with the sling and brace.  She has less tenderness palpation over the right proximal humerus at today's visit and no tenderness palpation over the right wrist at today's visit.  She is very apologetic for missing both surgical dates.  Patient's is denying any numbness or tingling to the right upper extremity.  Overall, she is feeling better since her initial visit approximately 1 month ago.    PAST MEDICAL HISTORY:  Past Medical History:   Diagnosis Date    Anxiety     Arthritis     Bipolar  affective disorder, depressed, severe (HCC) 04/28/2019    Depression     Elevated bilirubin 08/15/2019    Hyperlipidemia     Hypertension     Hypokalemia 08/15/2019    Learning difficulty     Leukocytosis 07/28/2020    Obesity (BMI 30.0-34.9) 06/26/2020    Osteopenia     Ovarian failure     PONV (postoperative nausea and vomiting)     Previous known suicide attempt     Psychiatric disorder     Psychiatric illness     PTSD (post-traumatic stress disorder) 02/25/2023       PAST SURGICAL HISTORY:  Past Surgical History:   Procedure Laterality Date    HERNIA REPAIR      LAPAROSCOPY      as a child, per patient-normal findings       FAMILY HISTORY:  Family History   Problem Relation Age of Onset    Alzheimer's disease Mother     Depression Mother     Depression Brother     Bipolar disorder Brother     No Known Problems Maternal Aunt     No Known Problems Paternal Aunt     No Known Problems Maternal Uncle     No Known Problems Paternal Uncle     No Known Problems Cousin     ADD / ADHD Neg Hx     Alcohol abuse Neg Hx     Anxiety disorder Neg Hx     Dementia Neg Hx     Drug abuse Neg Hx     OCD Neg Hx     Paranoid behavior Neg Hx     Schizophrenia Neg Hx     Seizures Neg Hx     Self-Injury Neg Hx     Suicide Attempts Neg Hx        SOCIAL HISTORY:  Social History     Tobacco Use    Smoking status: Every Day     Types: Cigarettes    Smokeless tobacco: Never   Vaping Use    Vaping status: Never Used   Substance Use Topics    Alcohol use: Not Currently    Drug use: Not Currently       MEDICATIONS:    Current Outpatient Medications:     amLODIPine (NORVASC) 10 mg tablet, Take 1 tablet (10 mg total) by mouth daily, Disp: 30 tablet, Rfl: 2    ARIPiprazole (ABILIFY) 2 mg tablet, Take 2 mg by mouth daily, Disp: , Rfl:     atorvastatin (LIPITOR) 20 mg tablet, take 1 tablet by mouth daily, Disp: 90 tablet, Rfl: 2    benztropine (COGENTIN) 1 mg tablet, Take 1 mg by mouth 2 (two) times a day, Disp: , Rfl:     escitalopram (LEXAPRO) 20  mg tablet, Take 20 mg by mouth daily, Disp: , Rfl:     lidocaine (Lidoderm) 5 %, Apply 1 patch topically over 12 hours daily Remove & Discard patch within 12 hours or as directed by MD, Disp: 30 patch, Rfl: 2    lisinopril (ZESTRIL) 10 mg tablet, Take 1 tablet (10 mg total) by mouth daily, Disp: 90 tablet, Rfl: 3    methocarbamol (Robaxin-750) 750 mg tablet, Take 1 tablet (750 mg total) by mouth every 6 (six) hours as needed for muscle spasms, Disp: 60 tablet, Rfl: 0    naloxone (NARCAN) 4 mg/0.1 mL nasal spray, Administer 1 spray into a nostril. If no response after 2-3 minutes, give another dose in the other nostril using a new spray., Disp: 1 each, Rfl: 1    naproxen (Naprosyn) 500 mg tablet, Take 1 tablet (500 mg total) by mouth 2 (two) times a day with meals, Disp: 60 tablet, Rfl: 0    traMADol (Ultram) 50 mg tablet, Take 1 tablet (50 mg total) by mouth every 6 (six) hours as needed for moderate pain, Disp: 30 tablet, Rfl: 0    Vitamin D, Cholecalciferol, 25 MCG (1000 UT) CAPS, Take by mouth, Disp: , Rfl:     LORazepam (Ativan) 0.5 mg tablet, Take 1 tablet (0.5 mg total) by mouth every 8 (eight) hours as needed for anxiety (Patient not taking: Reported on 2/6/2024), Disp: 21 tablet, Rfl: 0    QUEtiapine (SEROquel) 25 mg tablet, Take 25 mg by mouth every evening (Patient not taking: Reported on 2/6/2024), Disp: , Rfl:     risperiDONE (RisperDAL) 0.5 mg tablet, Take 1 tablet (0.5 mg total) by mouth daily at bedtime Take one 2mg tab with one 0.5mg tab every night at bedtime for total dose of 2.5mg qhs, Disp: 30 tablet, Rfl: 0    risperiDONE (RisperDAL) 2 mg tablet, Take 1 tablet (2 mg total) by mouth daily at bedtime Take one 2mg tab with one 0.5mg tab every night at bedtime for total dose of 2.5mg qhs, Disp: 30 tablet, Rfl: 0    Synvisc One 48 MG/6ML injection, , Disp: , Rfl:     ALLERGIES:  Allergies   Allergen Reactions    Other      Hay Fever    Oxycodone-Acetaminophen GI Intolerance       REVIEW OF  "SYSTEMS:  MSK: Right shoulder  Neuro: None  Pertinent items are otherwise noted in HPI.  A comprehensive review of systems was otherwise negative.    LABS:  HgA1c:   Lab Results   Component Value Date    HGBA1C 5.6 04/29/2019     BMP:   Lab Results   Component Value Date    CALCIUM 10.2 (H) 01/23/2024    K 3.5 01/23/2024    CO2 22 01/23/2024     01/23/2024    BUN 17 01/23/2024    CREATININE 0.63 01/23/2024     CBC: No components found for: \"CBC\"    _____________________________________________________  PHYSICAL EXAMINATION:  Vital signs: Ht 5' 3\" (1.6 m)   Wt 86.2 kg (190 lb)   LMP  (LMP Unknown)   BMI 33.66 kg/m²     Musculoskeletal examination  Extremities: Right shoulder  The right upper extremity was exposed inspected.  The patient's skin is intact overlying the entirety of the right upper extremity.  The patient has minimal tenderness palpation over the right proximal humerus most notably at the lateral aspect.  She is not tender over the scapula or the clavicle.  Patient has minimal tenderness over the wrist at today's visit.  There is no pain over the anatomical snuffbox.  There is is sensation intact to light touch in the axillary, median, radial, and ulnar nerve distributions.  Patient is motor intact to the AIN, PIN, and ulnar nerve distributions.  2+ radial pulses present distally.  Compartments are soft and compressible.    _____________________________________________________  STUDIES REVIEWED:  I personally reviewed the images and interpretation is as follows:  Right shoulder x-ray of 2/6/2024 demonstrates interval healing of right proximal humerus fracture with maintained alignment and callus formation at the greater tuberosity fracture site.  No new fracture or dislocation present.    PROCEDURES PERFORMED:  Procedures  None.  Romain Jackson MD    "

## 2024-02-07 ENCOUNTER — TELEPHONE (OUTPATIENT)
Dept: PSYCHIATRY | Facility: CLINIC | Age: 59
End: 2024-02-07

## 2024-02-07 ENCOUNTER — PATIENT OUTREACH (OUTPATIENT)
Dept: INTERNAL MEDICINE CLINIC | Facility: CLINIC | Age: 59
End: 2024-02-07

## 2024-02-07 NOTE — PROGRESS NOTES
SW has returned call to APS Worker   Kan Cochran [phone] this date and has reviewed concerns re pt's possible eviction, lack of MH care and supports.    SW has shared pt had been followed by   Wanda Rajan [facility] with [employer] of [place] [phone] and provided her #.  SW had shared that pt was in the precess of firing her.    Mr Cochran did share he has authorized aides to help care for Pt at least short term as the [place] Waiver Program is being explored and until what her Orthopedic Surgeon.      SW did share the conservative non surgical care suggested at present and pt will be needing  OP PT services.    SW has asked APS Scan help pt with OP MH care and evaluation for capacity to help her get a Rep Payee as she appears to be making choices which put her at risk for eviction and negative health issues.    He shares that he can only offer services that pt will agree to at his time unless she is found to not have capacity.   Pt would need to be evaluated by Medical Provider re same.    He shares the pt has been referred for the [place] Waiver Program and he will f/u on same.  SW asked him to let SW know if our Office is needed we can ask for a Physician's Certification to be completed for same.    JOHN has reached out to Beth ORO at the [facility] [phone] to see if she can help pt coordinate her OP PT Services?    Beth ORO  shares she will meet with pt tomorrow to assist with same .  She will also help her set up rides and will ask her aides to accompany her to these appointments if possible.    John has faxed her a list of [facility]'s PT Providers.     JOHN will f/u and assist as indicated.

## 2024-02-07 NOTE — TELEPHONE ENCOUNTER
"Behavioral Health Outpatient Intake Questions    Referred By   : SW - STAT Referral    Please advise interviewee that they need to answer all questions truthfully to allow for best care, and any misrepresentations of information may affect their ability to be seen at this clinic   => Was this discussed? Yes     If Minor Child (under age 18)    Who is/are the legal guardian(s) of the child?     Is there a custody agreement? No     If \"YES\"- Custody orders must be obtained prior to scheduling the first appointment  In addition, Consent to Treatment must be signed by all legal guardians prior to scheduling the first appointment    If \"NO\"- Consent to Treatment must be signed by all legal guardians prior to scheduling the first appointment    Behavioral Health Outpatient Intake History -     Presenting Problem (in patient's own words):     Depression, Anxiety, MDD, Bipolar episodes    Are there any communication barriers for this patient?     No                                               If yes, please describe barriers:  none  If there is a unique situation, please refer to Ruben Cabello/Shanti Gibson for final determination.    Are you taking any psychiatric medications? Yes     If \"YES\" -What are they Lexapro, Risperdal, Abilify     If \"YES\" -Who prescribes? PCP    Has the Patient previously received outpatient Talk Therapy or Medication Management from [facility]  No        If \"YES\"- When, Where and with Whom?         If \"NO\" -Has Patient received these services elsewhere?       If \"YES\" -When, Where, and with Whom?    Has the Patient abused alcohol or other substances in the last 6 months ? No  No concerns of substance abuse are reported.     If \"YES\" -What substance, How much, How often?     If illegal substance: Refer to [facility] (for ISAIAS) or SHARE/MAT Offices.   If Alcohol in excess of 10 drinks per week:  Refer to [facility] (for ISAIAS) or SHARE/MAT Offices    Legal History-     Is this treatment court " "ordered? No   If \"yes \"send to :  Talk Therapy : Send to Ruben Cabello/Shanti Gibson for final determination   Med Management: Send to Dr Cabrera for final determination     Has the Patient been convicted of a felony?  No   If \"Yes\" send to -When, What?  Talk Therapy : Send to Ruben Cabello/Shanti Gibson for final determination   Med Management: Send to Dr Cabrera for final determination     ACCEPTED as a patient Yes  If \"Yes\" Appointment Date: 3/4 at 1:30pm with Janine Johnson    Referred Elsewhere? No  If “Yes” - (Where? Ex: [facility], [facility], [facility], [facility], etc.)       Name of Insurance Co:[place]  Insurance ID#  Insurance Phone #  If ins is primary or secondary?Primary  If patient is a minor, parents information such as Name, D.O.B of guarantor.  "

## 2024-02-07 NOTE — TELEPHONE ENCOUNTER
Contacted patient in regards to STAT Referral in attempts to verify patient's needs of services and add patient to proper wait list. spoke with patient whom stated they were interested.

## 2024-02-10 NOTE — ED CARE HANDOFF
Regarding the splint used for Misa Zuleta on 1/13/2024, it was a prefabricated wrist splint     Feliciano Saenz MD  02/09/24 5920

## 2024-02-12 ENCOUNTER — TELEPHONE (OUTPATIENT)
Age: 59
End: 2024-02-12

## 2024-02-12 ENCOUNTER — NURSE TRIAGE (OUTPATIENT)
Dept: OTHER | Facility: OTHER | Age: 59
End: 2024-02-12

## 2024-02-12 ENCOUNTER — PATIENT OUTREACH (OUTPATIENT)
Dept: INTERNAL MEDICINE CLINIC | Facility: CLINIC | Age: 59
End: 2024-02-12

## 2024-02-12 ENCOUNTER — TELEPHONE (OUTPATIENT)
Dept: OTHER | Facility: OTHER | Age: 59
End: 2024-02-12

## 2024-02-12 NOTE — TELEPHONE ENCOUNTER
"Regarding: Medication Request  ----- Message from Jayant Gtz sent at 2/12/2024  5:45 AM EST -----  \"I broke my rotator cuff on 2/6 and I am requesting medication.\"    "

## 2024-02-12 NOTE — PROGRESS NOTES
DEENA has reviewed case with Dr. Tafoya this date. And updated him on her psychosocial issues.  He will attempt to assist pt when she comes to her PCP visit this week.  He wants SW to assess if she will be able to attend same.    Deena has reached out to Beth ORO at the [facility] to get an update on pt's Rides for PT our Office visit and to see if she knows if pt is taking any MH medications?    SW had to leave a message for her to return SW call.    DEENA has also attempted to reach pt but SW could not leave a message.  DEENA notes the Triage RN Lisa Tobin could not reach pt who had called re call requesting medication due to her rotator cuff Fx.    DEENA later received a return call from Beth ORO from the [facility].  She had seen pt earliest the date but did not know of any phone issues.  She would attempt to see if she can find her.   She had helped pt to set up her PT rides and would help her set up the ride to our Office as well.  She will return SW call when she gets an update form pt.    DEENA received a return call from Beth ORO from the [facility].  She shares that pt is behind on her phone bill so that is why it is no working at present.  She does havea new [employer] phone # [phone].  DEENA has updates our Clerical Team and also Triage RN Lisa Tobin  from Dr Gannon Office re call pt had re her medications.  Beth ORO was going to cancel the ride for today's appointment which ws cancelled and help set up the additional rides

## 2024-02-12 NOTE — TELEPHONE ENCOUNTER
Attempted to call patient back, patient's listed numbers in chart (same for home and mobile) were invalid and would not go through. Unable to leave voicemail messages.

## 2024-02-12 NOTE — TELEPHONE ENCOUNTER
Pt contacted Call Center requested refill of their medication.        Medication Name: Tramadol      Dosage of Med: 50mg      Frequency of Med: 2 x's day      Remaining Medication: 0      Pharmacy and Location: [facility]         Pt. Preferred Callback Phone Number: [phone]      Thank you.       PLEASE ADVISE PATIENTS:    REFILL REQUESTS WILL BE PROCESSED WITHIN 24-48 HOURS.

## 2024-02-13 NOTE — TELEPHONE ENCOUNTER
Received a TC from JJ about a patient she attempted to triage today but received no response. States she received message from . Reached out to  to follow up if any other assistance is needed  besides number update    Reviewed chart # is updated and medication requesting was sent to the provider

## 2024-02-14 ENCOUNTER — TELEPHONE (OUTPATIENT)
Age: 59
End: 2024-02-14

## 2024-02-14 DIAGNOSIS — M54.50 ACUTE RIGHT-SIDED LOW BACK PAIN WITHOUT SCIATICA: ICD-10-CM

## 2024-02-14 RX ORDER — NAPROXEN 500 MG/1
500 TABLET ORAL 2 TIMES DAILY WITH MEALS
Qty: 60 TABLET | Refills: 0 | Status: SHIPPED | OUTPATIENT
Start: 2024-02-14

## 2024-02-14 RX ORDER — METHOCARBAMOL 750 MG/1
750 TABLET, FILM COATED ORAL EVERY 6 HOURS PRN
Qty: 60 TABLET | Refills: 0 | Status: SHIPPED | OUTPATIENT
Start: 2024-02-14

## 2024-02-14 NOTE — TELEPHONE ENCOUNTER
Caller: Beth from [facility]-RN    Doctor: Dr. Berry    Reason for call: Beth(RN) calling asking if a prescription for Methocarbomal and Naproxen can be sent to the pharmacy.  Patient is going to start Physical Therapy tomorrow and is hoping to get something to alleviate her pain.  Beth is asking for call back if/when something is prescribed.    [facility] - [place] - [address] [phone]     Call back#:      Birth Control Pills Pregnancy And Lactation Text: This medication should be avoided if pregnant and for the first 30 days post-partum.

## 2024-02-15 ENCOUNTER — PATIENT OUTREACH (OUTPATIENT)
Dept: INTERNAL MEDICINE CLINIC | Facility: CLINIC | Age: 59
End: 2024-02-15

## 2024-02-15 ENCOUNTER — TELEPHONE (OUTPATIENT)
Dept: INTERNAL MEDICINE CLINIC | Facility: CLINIC | Age: 59
End: 2024-02-15

## 2024-02-15 ENCOUNTER — EVALUATION (OUTPATIENT)
Dept: PHYSICAL THERAPY | Facility: REHABILITATION | Age: 59
End: 2024-02-15
Payer: MEDICARE

## 2024-02-15 DIAGNOSIS — M25.511 ACUTE PAIN OF RIGHT SHOULDER: Primary | ICD-10-CM

## 2024-02-15 DIAGNOSIS — S42.291A CLOSED FRACTURE OF HEAD OF RIGHT HUMERUS, INITIAL ENCOUNTER: ICD-10-CM

## 2024-02-15 PROCEDURE — 97161 PT EVAL LOW COMPLEX 20 MIN: CPT

## 2024-02-15 PROCEDURE — 97112 NEUROMUSCULAR REEDUCATION: CPT

## 2024-02-15 NOTE — TELEPHONE ENCOUNTER
Patient established care at another PCP office. Please remove PCP from chart.    [facility]  [address], [place] [address]

## 2024-02-15 NOTE — PROGRESS NOTES
PT Evaluation     Today's date: 2/15/2024  Patient name: Misa Zuleta  : 1965  MRN: [id number]  Referring provider: Bruce Berry DO  Dx:   Encounter Diagnosis     ICD-10-CM    1. Acute pain of right shoulder  M25.511       2. Closed fracture of head of right humerus, initial encounter  S42.291A Ambulatory Referral to Physical Therapy          Start Time: 1120  Stop Time: 1200  Total time in clinic (min): 40 minutes    Assessment  Assessment details: Misa Zuleta is a pleasant 58 y.o. female with a referred diagnosis of s/p 1 month (24) non-operated right displaced proximal humerus fracture from Bruce Berry DO. STEVE was due to a direct impact fall secondary from intoxication on 2024, presented to ED same day. Patient was been seen by Ortho but due to 2x failed attendance for surgical intervention, it was agreed to trial conservative care at this time with expecting poor prognosis and healing potential. Upon further clinical testing, patient presents with the following deficits: poor functional capacity/tolerance of affected extremity, gross decrease strength, muscle atrophy, diminished peripheral sensation of RUE, pain with palpation of surrounding tissue (shoulder, arm, and elbow), pain with movement, mechanical joint derangement secondary to trauma, active and passive motor dysfunction. These deficits and impairments result in decrease functional capacity of affected extremity and inability to be independent with self-care and ADLs. Furthermore, due to delay of care and history of poor attendance/participation compliance patient may be at high risk for developing adhesive capsulitis.     Pt was provided with a basic HEP focused on only pendulums per Ortho Protocol which will be reviewed in the upcoming session. Pt able to demonstrate HEP with good technique and no pain. Educated pt to stop any exercises causing pain increase, pt verbalizes understanding. Pt was educated on anatomy and  physiology of diagnosis and demonstrated verbal understanding. Due to patient being only restricted to pendulums and to respect healing timeline of fracture, plan to have her return after 2 weeks to begin AAROM and PROM.  Positive prognostic indicators include positive attitude toward recovery.  Negative prognostic indicators include mood/psychiatric disorder, cognitive impairments, intellectual disability, hypertension, high symptom irritability, lack of transportation to PT, minimal changes in pain with movement, multiple concurrent orthopedic problems, poor surgical outcomes, obesity, and dependency on medications. Pt would benefit from skilled OP physical therapy to address these, and the below impairments in order to optimize outcomes and promote return to functional baseline.          Patient verbalized understanding of POC.    Please contact me if you have any questions or recommendations. Thank you for the referral and the opportunity to share in Misa's care.      Impairments: abnormal muscle firing, abnormal muscle tone, abnormal or restricted ROM, abnormal movement, activity intolerance, impaired physical strength, lacks appropriate home exercise program, pain with function, weight-bearing intolerance, poor posture  and poor body mechanics  Functional limitations: Self-Care, ADLs, Cleaning, IADLS, Lifting, OH movements  Symptom irritability: highBarriers to therapy: Cognitively Impaired, Psychosocial/Psychological Factors, SES, STEVE, Negative Prognosis, Delay of Receiving Care   Understanding of Dx/Px/POC: fair   Prognosis: poor    Goals    Short Term Goals:  In 4 weeks, the patient will:  1. Pt will report at least a 25% reduction in subjective pain complaints/symptoms to better manage ADLs and household chores.   2. Pt will begin to transition to atleast AROM of RUE and perform at minimum 3-/5 MMT of extremity  3. Pt independent with initial HEP, rationale, technique and frequency, for ROM and pain  "control.      Long Term Goals:  In 12 weeks, the patient will:  1. Pt will have WFL movements of RUE and decrease assistance from caregiver  2. FOTO to greater than predicted value.  3. Independent with comprehensive HEP upon discharge.  4. Pt will be able to perform ADLs, iADLS, and household duties with minimal restriction indicating return to PLOF.  5. Pt independent with rationale, technique and plan for performance of advanced HEP to ensure independent self-management of symptoms upon discharge.      Plan  Patient would benefit from: PT eval and skilled physical therapy  Referral necessary: No  Planned therapy interventions: activity modification, ADL retraining, joint mobilization, manual therapy, balance, balance/weight bearing training, behavior modification, body mechanics training, massage, motor coordination training, neuromuscular re-education, patient education, self care, sensory integrative techniques, community reintegration, coordination, flexibility, functional ROM exercises, graded activity, graded exercise, graded motor, home exercise program, therapeutic training, therapeutic exercise, therapeutic activities, stretching, strengthening and IADL retraining  Plan of Care beginning date: 2/15/2024  Plan of Care expiration date: 5/9/2024  Treatment plan discussed with: patient        Subjective Evaluation    History of Present Illness  Date of onset: 1/12/2024  Mechanism of injury: dislocation and trauma  Mechanism of injury: Patient presents for initial PT evaluation s/p right humeral displaced tuberosity fracture on 1/12/24. Patient is slightly cognitively impaired, she did take muscle relaxer before appt.     Per ED notes, sustained by \"tripping over a box and falling into a wall, landing on her right shoulder, right knee, right wrist, and up with head strike.  Patient was drinking at the time, and is intoxicated upon evaluation in the emergency department.\" She further sustained a wrist sprain at " time of injury but is improving.    Patient had failed  2x to present for scheduled surgical intervention for 2 weeks. Due to patient's delay in receiving appropriate care, discussed with Ortho of outcomes of trialing nonoperative management with protocol.       She does get numbness and paresthesias in the fingers at times.     She does not drive. She does have assistance from a state caregiver helping with ADLs to prevent her from using RUE. She is able to shower and dress herself, however, it is difficult.     Aggravating Factors: all movement, lifting, self-care tasks, night-time/sleeping  Relieving Factors: medication/muscle relaxer    PLOF: independent, no assistance with ADLS              Not a recurrent problem   Quality of life: poor    Patient Goals  Patient goals for therapy: increased strength, improved balance, decreased pain, decreased edema, increased motion, return to sport/leisure activities and independence with ADLs/IADLs    Pain  Current pain ratin  At worst pain rating: 10  Location: R Shoulder  Quality: dull ache, discomfort, sharp, radiating, pulling and tight  Relieving factors: medications  Aggravating factors: overhead activity and eating  Progression: no change    Social Support  Lives in: community-based residential facility  Lives with: alone    Employment status: not working  Hand dominance: right  Exercise history: None      Diagnostic Tests  X-ray: abnormal  Treatments  Previous treatment: immobilization and medication  Current treatment: medication and physical therapy        Objective    Recent X-Ray Impressions from 24  - Compared to prior study of 2024, there is a comminuted minimally displaced fracture of the humeral head and proximal neck. Alignment is unchanged. Fracture line is still relatively distinct. Minimal early callus formation noted.    Flowsheet Rows      Flowsheet Row Most Recent Value   PT/OT G-Codes    Current Score 24   Projected Score 51           Strength and ROM evaluated B from a regional biomechanical perspective and values relevant to this episode recorded in table below    Active ROM: Goniometric measurement revealed the following findings.  Shoulder ROM Left:  2/15/24   Flexion WNL   Abduction WNL   BTH WNL   BTB WNL     Passive ROM: Goniometric measurement revealed the following findings.  Shoulder ROM Right:  2/15/24 Left:  2/15/24   Flexion Defer until appropriate  WNL   Abduction  WNL   ER  WNL   IR  WNL       Strength: MMT revealed the following findings.  Joint Motion Right:  2/15/24 Left:  2/15/24   Shoulder Elevation 5/5 5/5   Sh. Flexion Defer until appropriate  4+/5   Sh. Abduction  4+/5   Sh. ER  4+/5   Sh. IR  4+/5   Elbow Flex 3/5 5/5   Elbow ext 3/5 5/5       Additional Assessments:  Palpation: significant tenderness on anterior aspect of arm and elbow, no significant pain around shoulder  Joint mobility: Restricted due to precautions       Neuro Screen:   Dermatomes: Slightly Dimisihed grossly on RUE, LUE intact  Myotomes: Intact   Reflexes:    Biceps: 2+, B/L   Brachioradialis: 2+ B/L   Triceps: 2+ B/L  Clonus: Negative  Landin's: Negative          Precautions: s/p non-operated LEFT humeral displaced fracture (1/12/24), NWB precuations on LUE,  Anxiety/Major Depression, Psychiatric Disorder, HTN, Hypokalemia, Morbid Obesity, Osteopenia, PTSD, Housing Situation Unstable, Intellectual Disability, Tobacco Usage    POC expires Unit limit Auth Expiration date PT/OT + Visit Limit?   5/9/24 BOMN 2/6/25 BOMN         Visit/Unit Tracking  AUTH Status:  Date 2/15        Pend Used 1         Remaining  Pend           Pertinent Findings:      POC End Date: 5/9/24                                                                                          Test / Measure  2/15/24   FOTO (Predicted 51) 24   R Shoulder AROM Defer   R Shoulder MMT 2/5          Non-operative Proximal Humerus Protocol per Ortho:  2/15/24-2/29/24 - Pendulums,  NWB  2/29/24-3/14/24 - Begin AAROM  3/14/24-3/28/24 - Begin AROM    Access Code: [email or web address]  URL: [email or web address]  Date: 02/15/2024  Prepared by: Odalys Tobar    Exercises  - Standing Bent Over Shoulder Flexion Extension Pendulum with Table Support  - 1-3 x daily - 7 x weekly - 3 sets - 10 reps  - Standing Bent Over Side to Side Shoulder Pendulum with Table Support  - 1-3 x daily - 7 x weekly - 3 sets - 10 reps  - Standing Bent Over Circular Shoulder Pendulum with Table Support  - 1-3 x daily - 7 x weekly - 3 sets - 10 reps  - Sitting Scapular Squeezes  - 1 x daily - 7 x weekly - 1 sets - 10 reps - 10 seconds hold      Manuals 2/15            R Shoulder PROM                                                    Neuro Re-Ed             HEP/Patient Education AR  23'            Pendulums             Scap Retractions                                                                              Ther Ex             Pulley's             Cervical AROM             Elbow AROM             Wrist AROM             Gripper                                                                 Ther Activity                                       Gait Training                                       Modalities

## 2024-02-15 NOTE — PROGRESS NOTES
SW has noted that pt's appointment was cancelled for today,  SW reached out to Beth French RN @ [facility] [phone] who shares the she assisted pt with call to our Office today.   Pt has established with a new PCP Office last week.  She is now attending the [facility] Office on [facility] [phone].    Beth ORO shares they provide free rides to and from their Office.  She shares pt is aware of her upcoming MH appointment.    Beth ORO shares the Aging will meet with pt om Tuesday 2/20/24 for her functional assessment. If a Physician's Certification needed it will need to be done at new PCP Office.  Beth brunson pt has not had her care takers for a few days and she does not know if she still has an ICM.  They had been through the [employer] [facility] [employer].     JOHN will now close the case as she has started with a new PCP Office.

## 2024-02-22 NOTE — TELEPHONE ENCOUNTER
02/22/24 2:56 PM     The office's request has been received, reviewed, and the patient chart updated. The PCP has successfully been removed with a patient attribution note. This message will now be completed.    Thank you  Radha Colin

## 2024-02-26 ENCOUNTER — TELEPHONE (OUTPATIENT)
Dept: PSYCHIATRY | Facility: CLINIC | Age: 59
End: 2024-02-26

## 2024-02-26 NOTE — TELEPHONE ENCOUNTER
Spoke with patient to dieter 4/1 follow up appt as provider is out of office. Confirmed 3/4 NP appt and provided address and contact for the office.

## 2024-02-29 ENCOUNTER — TELEPHONE (OUTPATIENT)
Dept: PSYCHIATRY | Facility: CLINIC | Age: 59
End: 2024-02-29

## 2024-02-29 NOTE — TELEPHONE ENCOUNTER
Patient is calling regarding cancelling an appointment.    Date/Time: 3/4/2024 at 1:30 p.m.    Reason: patient is sick    Patient was rescheduled: YES [] NO [x]  If yes, when was Patient reschedule for: n/a    Patient requesting call back to reschedule: YES [x] NO []

## 2024-03-01 ENCOUNTER — PATIENT OUTREACH (OUTPATIENT)
Dept: INTERNAL MEDICINE CLINIC | Facility: CLINIC | Age: 59
End: 2024-03-01

## 2024-03-01 ENCOUNTER — OFFICE VISIT (OUTPATIENT)
Dept: INTERNAL MEDICINE CLINIC | Facility: CLINIC | Age: 59
End: 2024-03-01

## 2024-03-01 VITALS
DIASTOLIC BLOOD PRESSURE: 79 MMHG | BODY MASS INDEX: 31.4 KG/M2 | SYSTOLIC BLOOD PRESSURE: 118 MMHG | OXYGEN SATURATION: 97 % | HEIGHT: 63 IN | HEART RATE: 88 BPM | WEIGHT: 177.2 LBS | TEMPERATURE: 97.3 F | RESPIRATION RATE: 14 BRPM

## 2024-03-01 DIAGNOSIS — Z72.0 TOBACCO USE: Chronic | ICD-10-CM

## 2024-03-01 DIAGNOSIS — I10 PRIMARY HYPERTENSION: ICD-10-CM

## 2024-03-01 DIAGNOSIS — E66.01 MORBID OBESITY (HCC): ICD-10-CM

## 2024-03-01 DIAGNOSIS — E55.9 VITAMIN D INSUFFICIENCY: ICD-10-CM

## 2024-03-01 DIAGNOSIS — Z12.11 COLON CANCER SCREENING: ICD-10-CM

## 2024-03-01 DIAGNOSIS — E53.8 VITAMIN B 12 DEFICIENCY: ICD-10-CM

## 2024-03-01 DIAGNOSIS — F31.9 BIPOLAR AFFECTIVE DISORDER, REMISSION STATUS UNSPECIFIED (HCC): Chronic | ICD-10-CM

## 2024-03-01 DIAGNOSIS — R73.01 ELEVATED FASTING GLUCOSE: ICD-10-CM

## 2024-03-01 DIAGNOSIS — J01.00 ACUTE MAXILLARY SINUSITIS, RECURRENCE NOT SPECIFIED: ICD-10-CM

## 2024-03-01 DIAGNOSIS — F33.2 SEVERE EPISODE OF RECURRENT MAJOR DEPRESSIVE DISORDER, WITHOUT PSYCHOTIC FEATURES (HCC): ICD-10-CM

## 2024-03-01 DIAGNOSIS — T16.2XXD FOREIGN BODY OF LEFT EAR, SUBSEQUENT ENCOUNTER: ICD-10-CM

## 2024-03-01 DIAGNOSIS — Z00.00 MEDICARE ANNUAL WELLNESS VISIT, SUBSEQUENT: Primary | ICD-10-CM

## 2024-03-01 DIAGNOSIS — Z12.31 SCREENING MAMMOGRAM FOR BREAST CANCER: ICD-10-CM

## 2024-03-01 DIAGNOSIS — F70 MILD INTELLECTUAL DISABILITY: Chronic | ICD-10-CM

## 2024-03-01 DIAGNOSIS — E78.2 MIXED HYPERLIPIDEMIA: Chronic | ICD-10-CM

## 2024-03-01 PROBLEM — T16.2XXA FOREIGN BODY OF LEFT EAR: Status: ACTIVE | Noted: 2024-03-01

## 2024-03-01 PROBLEM — M54.50 ACUTE RIGHT-SIDED LOW BACK PAIN WITHOUT SCIATICA: Status: RESOLVED | Noted: 2023-12-11 | Resolved: 2024-03-01

## 2024-03-01 PROBLEM — M25.531 PAIN IN RIGHT WRIST: Status: RESOLVED | Noted: 2024-01-18 | Resolved: 2024-03-01

## 2024-03-01 PROBLEM — S63.501A SPRAIN OF RIGHT WRIST, INITIAL ENCOUNTER: Status: RESOLVED | Noted: 2024-01-19 | Resolved: 2024-03-01

## 2024-03-01 PROCEDURE — 99213 OFFICE O/P EST LOW 20 MIN: CPT | Performed by: FAMILY MEDICINE

## 2024-03-01 RX ORDER — AMOXICILLIN 875 MG/1
875 TABLET, COATED ORAL 2 TIMES DAILY
Qty: 20 TABLET | Refills: 0 | Status: SHIPPED | OUTPATIENT
Start: 2024-03-01 | End: 2024-03-11

## 2024-03-01 RX ORDER — BENZONATATE 100 MG/1
200 CAPSULE ORAL 3 TIMES DAILY PRN
Qty: 30 CAPSULE | Refills: 0 | Status: SHIPPED | OUTPATIENT
Start: 2024-03-01

## 2024-03-01 NOTE — PROGRESS NOTES
SW received call from Beth French RN from the [facility] who asked if pt left her phone at the OFFICE it is missing asn pt is quite anxious re same.  She hs also lost her After Visit summary an d has asked if a copy of it can be faxed to her T the [facility] as per pt's request.  SW soske to Clerical Staff ( Natalia ) who said [facility] as pt was concerned about her phone when she ws still in Office sn phone not present.  Natalia shared she would FAX a copy to Beth ORO per pt's request.  Sw also called [facility]  Security Desk [phone] and spoke to Paige.    She would have the OFFICERS  check for any lost phones and call Beth ORO back at her cell [phone].  Pt will be without a phone service and it is critical for her to be able to have to coordinate with her care givers and coordinate her upcoming rides.    Beth ORO shares pt was not satisfied with the PCP Office she had transfer to and has wanted to re-establish today.    Beth ORO also shares pt is still followed by [facility] who is helping with her in home services.    Pt known to SW from previous Interventions.

## 2024-03-01 NOTE — PROGRESS NOTES
Assessment and Plan:     Problem List Items Addressed This Visit       HTN (hypertension)    Hyperlipidemia (Chronic)    Relevant Orders    Lipid panel    Tobacco use (Chronic)    Mild intellectual disability (Chronic)    MDD (major depressive disorder), recurrent episode, severe (HCC)    Bipolar disorder (HCC) (Chronic)    Morbid obesity (HCC)    Medicare annual wellness visit, subsequent - Primary     Due for eye , dental exams , fasting labs , mammo , colon ca screening she has FOBT kit at home          Vitamin D insufficiency    Relevant Orders    Vitamin D 25 hydroxy    Acute maxillary sinusitis     Drink plenty of fluids tea with honey and lemon , Vicks to bas of nose and chest , continue flonase , add amoxicillin , call if sx persist or concern          Relevant Medications    amoxicillin (AMOXIL) 875 mg tablet    benzonatate (TESSALON PERLES) 100 mg capsule    Foreign body of left ear    Relevant Orders    Ambulatory Referral to Otolaryngology     Other Visit Diagnoses       Elevated fasting glucose        Relevant Orders    Hemoglobin A1C    Vitamin B 12 deficiency        Relevant Orders    Vitamin B12          BMI Counseling: Body mass index is 31.39 kg/m². The BMI is above normal. Nutrition recommendations include decreasing portion sizes, encouraging healthy choices of fruits and vegetables, decreasing fast food intake, consuming healthier snacks, limiting drinks that contain sugar and reducing intake of saturated and trans fat. Exercise recommendations include exercising 3-5 times per week. Rationale for BMI follow-up plan is due to patient being overweight or obese.       Preventive health issues were discussed with patient, and age appropriate screening tests were ordered as noted in patient's After Visit Summary.  Personalized health advice and appropriate referrals for health education or preventive services given if needed, as noted in patient's After Visit Summary.     History of Present Illness:      Patient presents for a Medicare Wellness Visit    HPI   New pt here to establish care , medicare wellness exam , Eye exam never doesn't need readers , dentures x years needs f/u eval  , + hearing changes not new , exercise none formal walks some days  , she lost some weight over the last year 10 lbs , hx smoking for some years she can't recall 1/2 ppd  , - alcohol , HM items  and need for updated labs reviewed   She fell and broke shoulder R she didn't have surgery ,she didn't have a ride home , She did go to some P.T and has f/u appt ortho 3/5   Pt c/o head congestion , cold sx > 1 week seemed like seasonal allergies , L ear clogged , drainage , - s.t , nasal mucous heavy green , post nasal drip she was seen [facility] they told her she has piece of cotton stuck she has ENT appt . She is drinking tea w honey and lemon , no tylenol or advil   Fam hx Mat Aunt lung , MGM , colon ca   Patient Care Team:  Ethel Sheffield MD as PCP - General (Family Medicine)  Angela Kellogg MD as PCP - [facility] ([facility])  JOHN Red as  Care Manager (Care Coordination)     Review of Systems:     Review of Systems   Constitutional:  Negative for chills and fever.   HENT:  Negative for ear pain and sore throat.    Eyes:  Negative for pain and visual disturbance.   Respiratory:  Negative for cough and shortness of breath.    Cardiovascular:  Negative for chest pain and palpitations.   Gastrointestinal:  Negative for abdominal pain and vomiting.   Genitourinary:  Negative for dysuria and hematuria.   Musculoskeletal:  Negative for arthralgias and back pain.   Skin:  Negative for color change and rash.   Neurological:  Negative for seizures and syncope.   All other systems reviewed and are negative.       Problem List:     Patient Active Problem List   Diagnosis    Amenorrhea    HTN (hypertension)    Hyperlipidemia    Tobacco use    Ovarian failure    Mild intellectual disability     Constipation    MDD (major depressive disorder), recurrent episode, severe (HCC)    Chronic pain of both knees    Bipolar disorder (HCC)    Anxiety    Insomnia    Morbid obesity ([facility])    Patellofemoral pain syndrome of both knees    Pulmonary nodule    Medicare annual wellness visit, subsequent    Vitamin D insufficiency    PTSD (post-traumatic stress disorder)    Arthralgia of right wrist    History of learning disability as a child    Housing situation unstable    Shoulder injury, sequela    Acute maxillary sinusitis    Foreign body of left ear      Past Medical and Surgical History:     Past Medical History:   Diagnosis Date    Anxiety     Arthritis     Bipolar affective disorder, depressed, severe (HCC) 04/28/2019    Depression     Elevated bilirubin 08/15/2019    Hyperlipidemia     Hypertension     Hypokalemia 08/15/2019    Learning difficulty     Leukocytosis 07/28/2020    Obesity (BMI 30.0-34.9) 06/26/2020    Osteopenia     Ovarian failure     PONV (postoperative nausea and vomiting)     Previous known suicide attempt     Psychiatric disorder     Psychiatric illness     PTSD (post-traumatic stress disorder) 02/25/2023     Past Surgical History:   Procedure Laterality Date    HERNIA REPAIR      LAPAROSCOPY      as a child, per patient-normal findings      Family History:     Family History   Problem Relation Age of Onset    Alzheimer's disease Mother     Depression Mother     Depression Brother     Bipolar disorder Brother     No Known Problems Maternal Aunt     No Known Problems Paternal Aunt     No Known Problems Maternal Uncle     No Known Problems Paternal Uncle     No Known Problems Cousin     ADD / ADHD Neg Hx     Alcohol abuse Neg Hx     Anxiety disorder Neg Hx     Dementia Neg Hx     Drug abuse Neg Hx     OCD Neg Hx     Paranoid behavior Neg Hx     Schizophrenia Neg Hx     Seizures Neg Hx     Self-Injury Neg Hx     Suicide Attempts Neg Hx       Social History:     Social History     Socioeconomic  History    Marital status: Single     Spouse name: None    Number of children: None    Years of education: None    Highest education level: None   Occupational History    None   Tobacco Use    Smoking status: Former     Types: Cigarettes    Smokeless tobacco: Never   Vaping Use    Vaping status: Never Used   Substance and Sexual Activity    Alcohol use: Not Currently    Drug use: Not Currently    Sexual activity: Yes     Partners: Male     Birth control/protection: None   Other Topics Concern    None   Social History Narrative    None     Social Determinants of Health     Financial Resource Strain: Low Risk  (3/1/2024)    Overall Financial Resource Strain (CARDIA)     Difficulty of Paying Living Expenses: Not hard at all   Food Insecurity: No Food Insecurity (3/1/2024)    Hunger Vital Sign     Worried About Running Out of Food in the Last Year: Never true     Ran Out of Food in the Last Year: Never true   Transportation Needs: No Transportation Needs (3/1/2024)    PRAPARE - Transportation     Lack of Transportation (Medical): No     Lack of Transportation (Non-Medical): No   Physical Activity: Sufficiently Active (3/1/2024)    Exercise Vital Sign     Days of Exercise per Week: 5 days     Minutes of Exercise per Session: 30 min   Stress: Stress Concern Present (3/1/2024)    [employer] [place] [employer] - Occupational Stress Questionnaire     Feeling of Stress : To some extent   Social Connections: Socially Isolated (3/1/2024)    Social Connection and Isolation Panel [NHANES]     Frequency of Communication with Friends and Family: Twice a week     Frequency of Social Gatherings with Friends and Family: Once a week     Attends Confucianist Services: Never     Active Member of Clubs or Organizations: No     Attends Club or Organization Meetings: Never     Marital Status: Never    Intimate Partner Violence: Not At Risk (3/1/2024)    Humiliation, Afraid, Rape, and Kick questionnaire     Fear of Current or  Ex-Partner: No     Emotionally Abused: No     Physically Abused: No     Sexually Abused: No   Housing Stability: Low Risk  (3/1/2024)    Housing Stability Vital Sign     Unable to Pay for Housing in the Last Year: No     Number of Places Lived in the Last Year: 1     Unstable Housing in the Last Year: No      Medications and Allergies:     Current Outpatient Medications   Medication Sig Dispense Refill    amoxicillin (AMOXIL) 875 mg tablet Take 1 tablet (875 mg total) by mouth 2 (two) times a day for 10 days 20 tablet 0    benzonatate (TESSALON PERLES) 100 mg capsule Take 2 capsules (200 mg total) by mouth 3 (three) times a day as needed for cough 30 capsule 0    amLODIPine (NORVASC) 10 mg tablet Take 1 tablet (10 mg total) by mouth daily 30 tablet 2    ARIPiprazole (ABILIFY) 2 mg tablet Take 2 mg by mouth daily      atorvastatin (LIPITOR) 20 mg tablet take 1 tablet by mouth daily 90 tablet 2    benztropine (COGENTIN) 1 mg tablet Take 1 mg by mouth 2 (two) times a day      escitalopram (LEXAPRO) 20 mg tablet Take 20 mg by mouth daily      lidocaine (Lidoderm) 5 % Apply 1 patch topically over 12 hours daily Remove & Discard patch within 12 hours or as directed by MD 30 patch 2    lisinopril (ZESTRIL) 10 mg tablet Take 1 tablet (10 mg total) by mouth daily 90 tablet 3    methocarbamol (Robaxin-750) 750 mg tablet Take 1 tablet (750 mg total) by mouth every 6 (six) hours as needed for muscle spasms 60 tablet 0    naloxone (NARCAN) 4 mg/0.1 mL nasal spray Administer 1 spray into a nostril. If no response after 2-3 minutes, give another dose in the other nostril using a new spray. 1 each 1    naproxen (Naprosyn) 500 mg tablet Take 1 tablet (500 mg total) by mouth 2 (two) times a day with meals 60 tablet 0    risperiDONE (RisperDAL) 0.5 mg tablet Take 1 tablet (0.5 mg total) by mouth daily at bedtime Take one 2mg tab with one 0.5mg tab every night at bedtime for total dose of 2.5mg qhs 30 tablet 0    risperiDONE  "(RisperDAL) 2 mg tablet Take 1 tablet (2 mg total) by mouth daily at bedtime Take one 2mg tab with one 0.5mg tab every night at bedtime for total dose of 2.5mg qhs 30 tablet 0    traMADol (Ultram) 50 mg tablet Take 1 tablet (50 mg total) by mouth every 6 (six) hours as needed for moderate pain 30 tablet 0    Vitamin D, Cholecalciferol, 25 MCG (1000 UT) CAPS Take by mouth       No current facility-administered medications for this visit.     Allergies   Allergen Reactions    Other      Hay Fever    Oxycodone-Acetaminophen GI Intolerance      Immunizations:     Immunization History   Administered Date(s) Administered    COVID-19 PFIZER VACCINE 0.3 ML IM 03/01/2021, 03/22/2021, 10/12/2021    INFLUENZA 10/26/2014, 12/08/2015, 08/29/2017, 10/20/2021    Influenza, injectable, quadrivalent, preservative free 0.5 mL 10/06/2019, 08/23/2020    Influenza, recombinant, quadrivalent,injectable, preservative free 01/17/2023    Pneumococcal Conjugate Vaccine 20-valent (Pcv20), Polysace 01/17/2023    Pneumococcal Polysaccharide PPV23 02/03/2020, 09/22/2020    Tdap 07/11/2019, 10/06/2019    Zoster Vaccine Recombinant 05/21/2020, 08/23/2020      Health Maintenance:         Topic Date Due    Colorectal Cancer Screening  Never done    Breast Cancer Screening: Mammogram  07/28/2018    Cervical Cancer Screening  03/13/2025    HIV Screening  Completed    Hepatitis C Screening  Completed    Lung Cancer Screening  Discontinued         Topic Date Due    Influenza Vaccine (1) 09/01/2023    COVID-19 Vaccine (4 - 2023-24 season) 09/01/2023      Medicare Screening Tests and Risk Assessments:         Depression Screening:   PHQ-2 Score: 0      No results found.     Physical Exam:     /79 (BP Location: Left arm, Patient Position: Sitting, Cuff Size: Large)   Pulse 88   Temp (!) 97.3 °F (36.3 °C) (Temporal)   Resp 14   Ht 5' 3\" (1.6 m)   Wt 80.4 kg (177 lb 3.2 oz)   LMP  (LMP Unknown)   SpO2 97%   BMI 31.39 kg/m²     Physical " Exam  Vitals and nursing note reviewed.   Constitutional:       General: She is not in acute distress.     Appearance: She is well-developed.      Comments: Skin with good color turgor , well hydrated ,no distress noted  obese    HENT:      Head: Normocephalic and atraumatic.      Right Ear: No decreased hearing noted. No middle ear effusion. There is no impacted cerumen.      Left Ear: No decreased hearing noted.  No middle ear effusion. There is no impacted cerumen.   Eyes:      Conjunctiva/sclera: Conjunctivae normal.   Neck:      Thyroid: No thyromegaly.   Cardiovascular:      Rate and Rhythm: Normal rate and regular rhythm.      Heart sounds: No murmur heard.  Pulmonary:      Effort: Pulmonary effort is normal. No respiratory distress.      Breath sounds: Normal breath sounds.   Abdominal:      Palpations: Abdomen is soft.      Tenderness: There is no abdominal tenderness.   Musculoskeletal:         General: No swelling.      Cervical back: Neck supple.   Lymphadenopathy:      Cervical:      Right cervical: No superficial or posterior cervical adenopathy.     Left cervical: No superficial or posterior cervical adenopathy.   Skin:     General: Skin is warm and dry.      Capillary Refill: Capillary refill takes less than 2 seconds.   Neurological:      Mental Status: She is alert.   Psychiatric:         Mood and Affect: Mood normal.          Ethel Sheffield MD

## 2024-03-01 NOTE — ASSESSMENT & PLAN NOTE
Drink plenty of fluids tea with honey and lemon , Vicks to bas of nose and chest , continue flonase , add amoxicillin , call if sx persist or concern

## 2024-03-05 ENCOUNTER — APPOINTMENT (OUTPATIENT)
Dept: RADIOLOGY | Facility: AMBULARY SURGERY CENTER | Age: 59
End: 2024-03-05
Attending: STUDENT IN AN ORGANIZED HEALTH CARE EDUCATION/TRAINING PROGRAM
Payer: MEDICARE

## 2024-03-05 ENCOUNTER — OFFICE VISIT (OUTPATIENT)
Dept: OBGYN CLINIC | Facility: CLINIC | Age: 59
End: 2024-03-05
Payer: MEDICARE

## 2024-03-05 VITALS — HEIGHT: 63 IN | BODY MASS INDEX: 31.4 KG/M2 | WEIGHT: 177.2 LBS

## 2024-03-05 DIAGNOSIS — S42.291A CLOSED FRACTURE OF HEAD OF RIGHT HUMERUS, INITIAL ENCOUNTER: ICD-10-CM

## 2024-03-05 DIAGNOSIS — S42.291D CLOSED FRACTURE OF HEAD OF RIGHT HUMERUS WITH ROUTINE HEALING, SUBSEQUENT ENCOUNTER: Primary | ICD-10-CM

## 2024-03-05 PROCEDURE — 99214 OFFICE O/P EST MOD 30 MIN: CPT | Performed by: STUDENT IN AN ORGANIZED HEALTH CARE EDUCATION/TRAINING PROGRAM

## 2024-03-05 PROCEDURE — 73030 X-RAY EXAM OF SHOULDER: CPT

## 2024-03-05 NOTE — PROGRESS NOTES
Orthopaedics Office Visit - Follow up Patient Visit    ASSESSMENT/PLAN:    Assessment:   Right humeral displaced tuberosity fracture, DOI: 1/12/2024  2.  Right wrist sprain- resolved    Plan:   X-rays were taken today which remain obtained fracture alignment with interval fracture callus formation and healing.  No further displacement of the greater tuberosity noted.  Glenohumeral joint still located   please restart formal PT to the right upper extremity. WBAT, ROMAT.   Patient went to physical therapy for an evaluation however was told to continue with home exercise programs until she saw me.  We would like the patient to restart her outpatient physical therapy to resolve the stiffness that she has in her right upper extremity.  New prescription was written.  Continue to take Tylenol, anti-inflammatories as needed for pain control  Weightbearing as tolerated range of motion as tolerated  We will see the patient back in 6 weeks to re-evaluate shoulder and obtain new XRs    To Do Next Visit:  Repeat XR right shoulder    _____________________________________________________  CHIEF COMPLAINT:  Chief Complaint   Patient presents with    Right Shoulder - Follow-up, Fracture         SUBJECTIVE:  Misa Zuleta is a 58 y.o. RHD female who presents for initial evaluation of right proximal humerus fracture sustained 1/12/2024 with a fall.   She was seen at the ED that day along with two days alter.  Today she complains of right wrist, elbow and shoulder pain.  She does use sling with some benefit.  She complains of pain in the right shoulder with tenderness surrounding the elbow and some tenderness surrounding the right wrist.  She denies any previous right shoulder pain.  She is currently in a sling for comfort and is unable to come out of the sling due to severe pain.  Denies previous injury to the right wrist she has no numbness or paresthesias in the right upper extremity.    Patient has had poor reaction to oxycodone  and codeine in past.    Interval history 2/10/2024  58-year-old right-hand-dominant female who presents for second visit following right proximal humerus fracture sustained 1/12/2024 after a fall.  Patient was initially booked for surgery, however she did not show up for her surgery 2 weeks in a row.  She is here for second follow-up visit in clinic.  She has been in a sling for the last 3 and half weeks.  She has also been given a removable wrist brace for a wrist sprain.  She has been compliant with the sling and brace.  She has less tenderness palpation over the right proximal humerus at today's visit and no tenderness palpation over the right wrist at today's visit.  She is very apologetic for missing both surgical dates.  Patient's is denying any numbness or tingling to the right upper extremity.  Overall, she is feeling better since her initial visit approximately 1 month ago.    Interval history 3/5/2024  She is here today for f/u of nonoperative treatment right proximal humerus fracture sustained 7 weeks ago. Today she is feeling better. She has no pain. She has been weightbearing without a sling despite weightbearing restrictions being placed at her last visit. She went to PT once and they told her to do exercises at home and come back in 2 weeks however she misunderstood and thought she was to do HEP only until her f/u visit here.  Her main complaint today is of stiffness in the right upper extremity.  She denies any numbness or tingling to the right upper extremity.      PAST MEDICAL HISTORY:  Past Medical History:   Diagnosis Date    Anxiety     Arthritis     Bipolar affective disorder, depressed, severe (HCC) 04/28/2019    Depression     Elevated bilirubin 08/15/2019    Hyperlipidemia     Hypertension     Hypokalemia 08/15/2019    Learning difficulty     Leukocytosis 07/28/2020    Obesity (BMI 30.0-34.9) 06/26/2020    Osteopenia     Ovarian failure     PONV (postoperative nausea and vomiting)      Previous known suicide attempt     Psychiatric disorder     Psychiatric illness     PTSD (post-traumatic stress disorder) 02/25/2023       PAST SURGICAL HISTORY:  Past Surgical History:   Procedure Laterality Date    HERNIA REPAIR      LAPAROSCOPY      as a child, per patient-normal findings       FAMILY HISTORY:  Family History   Problem Relation Age of Onset    Alzheimer's disease Mother     Depression Mother     Depression Brother     Bipolar disorder Brother     No Known Problems Maternal Aunt     No Known Problems Paternal Aunt     No Known Problems Maternal Uncle     No Known Problems Paternal Uncle     No Known Problems Cousin     ADD / ADHD Neg Hx     Alcohol abuse Neg Hx     Anxiety disorder Neg Hx     Dementia Neg Hx     Drug abuse Neg Hx     OCD Neg Hx     Paranoid behavior Neg Hx     Schizophrenia Neg Hx     Seizures Neg Hx     Self-Injury Neg Hx     Suicide Attempts Neg Hx        SOCIAL HISTORY:  Social History     Tobacco Use    Smoking status: Former     Types: Cigarettes    Smokeless tobacco: Never   Vaping Use    Vaping status: Never Used   Substance Use Topics    Alcohol use: Not Currently    Drug use: Not Currently       MEDICATIONS:    Current Outpatient Medications:     amLODIPine (NORVASC) 10 mg tablet, Take 1 tablet (10 mg total) by mouth daily, Disp: 30 tablet, Rfl: 2    amoxicillin (AMOXIL) 875 mg tablet, Take 1 tablet (875 mg total) by mouth 2 (two) times a day for 10 days, Disp: 20 tablet, Rfl: 0    ARIPiprazole (ABILIFY) 2 mg tablet, Take 2 mg by mouth daily, Disp: , Rfl:     atorvastatin (LIPITOR) 20 mg tablet, take 1 tablet by mouth daily, Disp: 90 tablet, Rfl: 2    benzonatate (TESSALON PERLES) 100 mg capsule, Take 2 capsules (200 mg total) by mouth 3 (three) times a day as needed for cough, Disp: 30 capsule, Rfl: 0    benztropine (COGENTIN) 1 mg tablet, Take 1 mg by mouth 2 (two) times a day, Disp: , Rfl:     escitalopram (LEXAPRO) 20 mg tablet, Take 20 mg by mouth daily, Disp: , Rfl:      lidocaine (Lidoderm) 5 %, Apply 1 patch topically over 12 hours daily Remove & Discard patch within 12 hours or as directed by MD, Disp: 30 patch, Rfl: 2    lisinopril (ZESTRIL) 10 mg tablet, Take 1 tablet (10 mg total) by mouth daily, Disp: 90 tablet, Rfl: 3    methocarbamol (Robaxin-750) 750 mg tablet, Take 1 tablet (750 mg total) by mouth every 6 (six) hours as needed for muscle spasms, Disp: 60 tablet, Rfl: 0    naloxone (NARCAN) 4 mg/0.1 mL nasal spray, Administer 1 spray into a nostril. If no response after 2-3 minutes, give another dose in the other nostril using a new spray., Disp: 1 each, Rfl: 1    naproxen (Naprosyn) 500 mg tablet, Take 1 tablet (500 mg total) by mouth 2 (two) times a day with meals, Disp: 60 tablet, Rfl: 0    traMADol (Ultram) 50 mg tablet, Take 1 tablet (50 mg total) by mouth every 6 (six) hours as needed for moderate pain, Disp: 30 tablet, Rfl: 0    Vitamin D, Cholecalciferol, 25 MCG (1000 UT) CAPS, Take by mouth, Disp: , Rfl:     risperiDONE (RisperDAL) 0.5 mg tablet, Take 1 tablet (0.5 mg total) by mouth daily at bedtime Take one 2mg tab with one 0.5mg tab every night at bedtime for total dose of 2.5mg qhs, Disp: 30 tablet, Rfl: 0    risperiDONE (RisperDAL) 2 mg tablet, Take 1 tablet (2 mg total) by mouth daily at bedtime Take one 2mg tab with one 0.5mg tab every night at bedtime for total dose of 2.5mg qhs, Disp: 30 tablet, Rfl: 0    ALLERGIES:  Allergies   Allergen Reactions    Other      Hay Fever    Oxycodone-Acetaminophen GI Intolerance       REVIEW OF SYSTEMS:  MSK: Right shoulder  Neuro: None  Pertinent items are otherwise noted in HPI.  A comprehensive review of systems was otherwise negative.    LABS:  HgA1c:   Lab Results   Component Value Date    HGBA1C 5.6 04/29/2019     BMP:   Lab Results   Component Value Date    CALCIUM 10.2 (H) 01/23/2024    K 3.5 01/23/2024    CO2 22 01/23/2024     01/23/2024    BUN 17 01/23/2024    CREATININE 0.63 01/23/2024     CBC: No  "components found for: \"CBC\"    _____________________________________________________  PHYSICAL EXAMINATION:  Vital signs: Ht 5' 3\" (1.6 m)   Wt 80.4 kg (177 lb 3.2 oz)   LMP  (LMP Unknown)   BMI 31.39 kg/m²     Musculoskeletal examination  Extremities: Right shoulder  The right upper extremity was exposed inspected.  The patient's skin is intact overlying the entirety of the right upper extremity.  The patient has no tenderness palpation over the right proximal humerus.  Active range of motion of the shoulder is to 80 degrees of abduction, 90 degrees of forward flexion, external rotation to 40 degrees, internal rotation to the lower lumbar spine.. PROM is 100 degrees of forward flexion, 110 degrees of abduction, 50 degrees of external rotation, and internal rotation to her side. There is is sensation intact to light touch in the axillary, median, radial, and ulnar nerve distributions.  Patient is motor intact to the AIN, PIN, and ulnar nerve distributions.  2+ radial pulses present distally.  Compartments are soft and compressible.    _____________________________________________________  STUDIES REVIEWED:  I personally reviewed the images and interpretation is as follows:  Right shoulder x-ray of 2/6/2024 demonstrates interval healing of right proximal humerus fracture with maintained alignment and callus formation at the greater tuberosity fracture site.  No new fracture or dislocation present.  No further migration of the greater tuberosity fragment    PROCEDURES PERFORMED:  Procedures  None.    Jaxon Lubin MD    "

## 2024-03-06 ENCOUNTER — PATIENT OUTREACH (OUTPATIENT)
Dept: INTERNAL MEDICINE CLINIC | Facility: CLINIC | Age: 59
End: 2024-03-06

## 2024-03-06 NOTE — PROGRESS NOTES
DEENA reached out to Beth French RN @ [facility] [phone].  She relates the she saw pt to day and things are going well at the moment.  She likes he care giver and she has been able to get to her scheduled appointments.  She also shares the pt had her phone returned !  She reports Mr Cochran from [facility] is still enrolled and is coordinated needed services .  DEENA was asked to fax pt her Scripts that she had misplaced.  Deena has faxed then to the [facility]  [phone] and confirmation received.  Deena will ask PCP to write new order for her MAMMOGRAM as it was not on the chart.     SW to fax when it is available.    SW to remain available to assist as indicated,

## 2024-03-07 ENCOUNTER — TELEPHONE (OUTPATIENT)
Dept: INTERNAL MEDICINE CLINIC | Facility: CLINIC | Age: 59
End: 2024-03-07

## 2024-03-07 ENCOUNTER — PATIENT OUTREACH (OUTPATIENT)
Dept: INTERNAL MEDICINE CLINIC | Facility: CLINIC | Age: 59
End: 2024-03-07

## 2024-03-07 DIAGNOSIS — Z12.31 SCREENING MAMMOGRAM FOR BREAST CANCER: Primary | ICD-10-CM

## 2024-03-07 NOTE — PROGRESS NOTES
DEENA returned call to Kan Cochran [phone] who is shares they have additional concerns that pt inquiring IF pt's Provider can assist with a Rep Payee or Preferred a Guardian of the Estate EXPERT Testimony ?   They have a Physician scheduled for the END of the Month ( 1st Available appointment) but pt may be giving away money she needs for rent sooner.    Pt is facing an eviction for these actions.   This is why he is asking.  DEENA inquired if there was a Form for this?  He would get back to Deena.    DEENA has discussed with PCP and will f/u to assist as indicated.

## 2024-03-07 NOTE — TELEPHONE ENCOUNTER
Received message from Beth French, nurse at [facility].     Left voice message with [facility] regarding updated plan of care. Contact number provided for any f/u questions/concerns.

## 2024-03-07 NOTE — TELEPHONE ENCOUNTER
Received a call from patient that her old PCP at [facility] ordered an Echo due to elevated levels in a blood test. Spoke with patient. Patient confirmed she wants to return to [facility] for her PCP. Advised patient to contact [facility] and update facility with her change in primary care provider.     Patient stated she did contact central scheduling for the Echo. Appointment is for 3/20/24.     Reviewed with PCP at office. Recommend patient contact facility for medical records release, cancel Echo and reschedule a follow up appointment within next 2 weeks.     Discussed with patient on the phone. Patient is cancelling Echo appointment. Patient aware office will reach out to schedule an appointment at office within 2-3 weeks. Discussed contacting [facility] to obtain a medical release form.     Contacted [facility] at . [facility] medical records will reach out to patient directly.

## 2024-03-12 ENCOUNTER — TELEPHONE (OUTPATIENT)
Dept: OBGYN CLINIC | Facility: CLINIC | Age: 59
End: 2024-03-12

## 2024-03-12 NOTE — TELEPHONE ENCOUNTER
Caller: patient    Doctor: vanessa    Reason for call: called back because had a missed call, tried to connect with someone but no answer    Call back#: [phone]

## 2024-03-12 NOTE — TELEPHONE ENCOUNTER
LMOM for patient to call office and let us know if she is still interested in the visco injection. Waiting a call back

## 2024-03-12 NOTE — TELEPHONE ENCOUNTER
Caller: patient    Doctor:     Reason for call: called back because she received a missed call, patient said she is unaware of visco injections and would like to know more about it   Please advise    Call back#: [phone]

## 2024-03-13 ENCOUNTER — PATIENT OUTREACH (OUTPATIENT)
Dept: INTERNAL MEDICINE CLINIC | Facility: CLINIC | Age: 59
End: 2024-03-13

## 2024-03-13 NOTE — PROGRESS NOTES
JOHN has received an email from Kan Cochran [facility] [phone]  re Submitting an EXPERT REPORT re pt's Capacity.   JOHN has reviewed question with 2 our our Attending Providers and they do NOT feel able to complete same.  They feel that a psychiatrist needs to make this decision.  JOHN has left  and Emailed Mr Cochran re same.   He shares their consultant  will come the week of 3/25/24.    JOHN to remain available to assist as indicated.

## 2024-03-13 NOTE — TELEPHONE ENCOUNTER
Called and spoke with patient about the injections for her knee's. Patient was seen back in may for knee pain, patient at that time discussed visco injections. Ordered the injections but was not able to reach patient on multiple attempts. Patient advised she is doing so much better with her knees but she is having severe pain in her shoulders and wants to know if Dr. Berry is planning on injections in her shoulders?

## 2024-03-15 NOTE — TELEPHONE ENCOUNTER
Received call from patient. Patient had questions about getting her Echo. Discussed with patient she needs to cancel her Echo per last discussion. Patient to come back to the office within 2-3 weeks for a follow up with physician.     Scheduled a follow up appointment with Dr. Davenport for 3/25/24 at 1300.     Discussed with patient if she reached back to [facility] for medical release form. Patient unsure.     Contacted Beth from [facility] to discuss help with getting a medical release form from [facility]. Beth will look into it and f/u with nurse.     Beth updated patient has an upcoming appointment at office for 3/25/24 at 1300.

## 2024-03-20 LAB — HBA1C MFR BLD HPLC: 6 %

## 2024-03-25 ENCOUNTER — TELEPHONE (OUTPATIENT)
Dept: INTERNAL MEDICINE CLINIC | Facility: CLINIC | Age: 59
End: 2024-03-25

## 2024-03-25 ENCOUNTER — PATIENT OUTREACH (OUTPATIENT)
Dept: INTERNAL MEDICINE CLINIC | Facility: CLINIC | Age: 59
End: 2024-03-25

## 2024-03-25 NOTE — PROGRESS NOTES
JOHN has reached out to Kan Linson [facility] [phone] re update on pt who is scheduled to be seen this date at PCP Office.  He shares that the Capacity evaluation is tentatively scheduled 4/2/24 @ 4 PM. He has been working with Beth French RN from the [facility] re coordinating same.    She has been assisting pt with appointments and the PA Waiver application.   He shares it is closed at present due to not sending in required financial paperwork.  He will work with Beth ORO to f/u on the required financial information.  JOHN notes there is a Physician's certification completed completed by Dr [facility]. [phone] in chart.    JOHN has also reached out to Beth French [phone]  at the [facility] to see if she knows of pt's appointment here today.  JOHN has to leave a message.    JOHN notes pt was a no show this date to PCP office

## 2024-04-05 ENCOUNTER — PATIENT OUTREACH (OUTPATIENT)
Dept: INTERNAL MEDICINE CLINIC | Facility: CLINIC | Age: 59
End: 2024-04-05
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
## 2024-04-09 ENCOUNTER — TELEPHONE (OUTPATIENT)
Dept: DENTISTRY | Facility: CLINIC | Age: 59
End: 2024-04-09

## 2024-04-09 NOTE — TELEPHONE ENCOUNTER
Left a voice regarding insurance approval for  to call the office back to schedule her appointment.

## 2024-04-30 PROBLEM — Z00.00 MEDICARE ANNUAL WELLNESS VISIT, SUBSEQUENT: Status: RESOLVED | Noted: 2022-11-18 | Resolved: 2024-04-30

## 2024-04-30 PROBLEM — J01.00 ACUTE MAXILLARY SINUSITIS: Status: RESOLVED | Noted: 2024-03-01 | Resolved: 2024-04-30

## 2024-05-10 ENCOUNTER — PATIENT OUTREACH (OUTPATIENT)
Dept: INTERNAL MEDICINE CLINIC | Facility: CLINIC | Age: 59
End: 2024-05-10

## 2024-05-10 NOTE — PROGRESS NOTES
SW has reached out to Kan Cochran [facility] [phone] this date to request an update re her housing situation but SW had to leave a message.   SW to remain available to assist as indicated.

## 2024-05-13 ENCOUNTER — TELEPHONE (OUTPATIENT)
Dept: INTERNAL MEDICINE CLINIC | Facility: CLINIC | Age: 59
End: 2024-05-13

## 2024-05-13 NOTE — TELEPHONE ENCOUNTER
Spoke with patient on the phone. Introduced self and role. Patient stated she has a cough with thick green nasal discharge. Patient stating a fever over the weekend, but did not take her temperature. Complaints of feeling weak and tired. Patient currently taking OTC cold medicine. Discussed if patient would like an appointment to be seen in the office for cold like symptoms. Patient scheduled for today at 1600. All questions/concerns answered at this time.

## 2024-05-14 ENCOUNTER — OFFICE VISIT (OUTPATIENT)
Dept: INTERNAL MEDICINE CLINIC | Facility: CLINIC | Age: 59
End: 2024-05-14

## 2024-05-14 VITALS
HEART RATE: 67 BPM | SYSTOLIC BLOOD PRESSURE: 108 MMHG | RESPIRATION RATE: 18 BRPM | TEMPERATURE: 97.8 F | BODY MASS INDEX: 29.99 KG/M2 | WEIGHT: 169.25 LBS | HEIGHT: 63 IN | OXYGEN SATURATION: 99 % | DIASTOLIC BLOOD PRESSURE: 66 MMHG

## 2024-05-14 DIAGNOSIS — J30.1 SEASONAL ALLERGIC RHINITIS DUE TO POLLEN: ICD-10-CM

## 2024-05-14 DIAGNOSIS — K12.0 APHTHOUS ULCER: ICD-10-CM

## 2024-05-14 DIAGNOSIS — I10 PRIMARY HYPERTENSION: ICD-10-CM

## 2024-05-14 DIAGNOSIS — J00 HEAD COLD: Primary | ICD-10-CM

## 2024-05-14 PROCEDURE — 99213 OFFICE O/P EST LOW 20 MIN: CPT | Performed by: FAMILY MEDICINE

## 2024-05-14 PROCEDURE — G2211 COMPLEX E/M VISIT ADD ON: HCPCS | Performed by: FAMILY MEDICINE

## 2024-05-14 RX ORDER — TRIAMCINOLONE ACETONIDE 0.1 %
1 PASTE (GRAM) DENTAL 3 TIMES DAILY
Qty: 5 G | Refills: 0 | Status: SHIPPED | OUTPATIENT
Start: 2024-05-14

## 2024-05-14 NOTE — ASSESSMENT & PLAN NOTE
She has been feeling better gradually with conservative care , recommend she drink plenty of fluids , continue Zyrtec 1 po q day ,use Coricidin HBP , return prn

## 2024-05-14 NOTE — PROGRESS NOTES
Name: Misa Zuleta      : 1965      MRN: [id number]  Encounter Provider: Ethel Sheffield MD  Encounter Date: 2024   Encounter department: [facility] [place]    Assessment & Plan     1. Head cold  Assessment & Plan:  She has been feeling better gradually with conservative care , recommend she drink plenty of fluids , continue Zyrtec 1 po q day ,use Coricidin HBP , return prn      2. Primary hypertension    3. Aphthous ulcer  Assessment & Plan:  Avoid spicy crunchy hot foods , kenalog in orabase applications tid     Orders:  -     triamcinolone (KENALOG) 0.1 % oral topical paste; Apply 1 Application topically 3 (three) times a day    4. Seasonal allergic rhinitis due to pollen           Subjective      HPI Pt here same day appointment , had same day appt yesterday cancelled , c/o tired achy ,felt warm didn't take temp , head congested coughing a lot , had green nasal mucous She had less appetite all f this x 3 days she started to feel better yesterday . She went to [facility] to get cough med pharmacy told her to come to doctor . She is taking Coricidan HBP it did help also taking zyrtec  She has continued to have green mucous ,   Review of Systems   Constitutional:  Negative for chills and fever.   HENT:  Positive for dental problem and rhinorrhea. Negative for ear pain, postnasal drip, sinus pressure, sinus pain, sore throat and trouble swallowing.         Apthous ulcer   Eyes:  Negative for pain and visual disturbance.   Respiratory:  Positive for cough. Negative for shortness of breath.    Cardiovascular:  Negative for chest pain and palpitations.   Gastrointestinal:  Negative for abdominal pain and vomiting.   Genitourinary:  Negative for dysuria and hematuria.   Musculoskeletal:  Negative for arthralgias and back pain.   Skin:  Negative for color change and rash.   Allergic/Immunologic: Positive for environmental allergies.   Neurological:  Negative for seizures and  syncope.   All other systems reviewed and are negative.      Current Outpatient Medications on File Prior to Visit   Medication Sig    amLODIPine (NORVASC) 10 mg tablet Take 1 tablet (10 mg total) by mouth daily    ARIPiprazole (ABILIFY) 2 mg tablet Take 2 mg by mouth daily    atorvastatin (LIPITOR) 20 mg tablet take 1 tablet by mouth daily    benzonatate (TESSALON PERLES) 100 mg capsule Take 2 capsules (200 mg total) by mouth 3 (three) times a day as needed for cough    benztropine (COGENTIN) 1 mg tablet Take 1 mg by mouth 2 (two) times a day    escitalopram (LEXAPRO) 20 mg tablet Take 20 mg by mouth daily    lidocaine (Lidoderm) 5 % Apply 1 patch topically over 12 hours daily Remove & Discard patch within 12 hours or as directed by MD    lisinopril (ZESTRIL) 10 mg tablet Take 1 tablet (10 mg total) by mouth daily    methocarbamol (Robaxin-750) 750 mg tablet Take 1 tablet (750 mg total) by mouth every 6 (six) hours as needed for muscle spasms    naloxone (NARCAN) 4 mg/0.1 mL nasal spray Administer 1 spray into a nostril. If no response after 2-3 minutes, give another dose in the other nostril using a new spray.    naproxen (Naprosyn) 500 mg tablet Take 1 tablet (500 mg total) by mouth 2 (two) times a day with meals    risperiDONE (RisperDAL) 0.5 mg tablet Take 1 tablet (0.5 mg total) by mouth daily at bedtime Take one 2mg tab with one 0.5mg tab every night at bedtime for total dose of 2.5mg qhs    risperiDONE (RisperDAL) 2 mg tablet Take 1 tablet (2 mg total) by mouth daily at bedtime Take one 2mg tab with one 0.5mg tab every night at bedtime for total dose of 2.5mg qhs    traMADol (Ultram) 50 mg tablet Take 1 tablet (50 mg total) by mouth every 6 (six) hours as needed for moderate pain    Vitamin D, Cholecalciferol, 25 MCG (1000 UT) CAPS Take by mouth       Objective     /66 (BP Location: Right arm, Patient Position: Sitting, Cuff Size: Standard)   Pulse 67   Temp 97.8 °F (36.6 °C) (Temporal)   Resp 18    "Ht 5' 3\" (1.6 m)   Wt 76.8 kg (169 lb 4 oz)   LMP  (LMP Unknown)   SpO2 99%   BMI 29.98 kg/m²     Physical Exam  Constitutional:       Comments: Skin with good color turgor , well hydrated ,no distress noted     HENT:      Head: Normocephalic and atraumatic.      Right Ear: No decreased hearing noted. No middle ear effusion. There is no impacted cerumen.      Left Ear: No decreased hearing noted.  No middle ear effusion. There is no impacted cerumen.      Nose: Rhinorrhea present.      Mouth/Throat:      Mouth: Oral lesions present.      Pharynx: Oropharynx is clear.      Comments: Large apthous ulcer midline lower buccal and gum area   Cardiovascular:      Rate and Rhythm: Normal rate and regular rhythm.      Heart sounds: Normal heart sounds.   Pulmonary:      Breath sounds: Normal breath sounds.   Lymphadenopathy:      Cervical:      Right cervical: No superficial cervical adenopathy.     Left cervical: No superficial cervical adenopathy.   Skin:     General: Skin is warm and dry.   Neurological:      Comments: Non focal exam    Psychiatric:         Attention and Perception: Attention normal.         Mood and Affect: Mood normal.         Speech: Speech normal.         Behavior: Behavior normal.       Ethel Sheffield MD    "

## 2024-05-21 DIAGNOSIS — E78.2 MIXED HYPERLIPIDEMIA: ICD-10-CM

## 2024-05-21 DIAGNOSIS — I10 ESSENTIAL HYPERTENSION: ICD-10-CM

## 2024-05-21 RX ORDER — ATORVASTATIN CALCIUM 20 MG/1
20 TABLET, FILM COATED ORAL DAILY
Qty: 90 TABLET | Refills: 2 | Status: SHIPPED | OUTPATIENT
Start: 2024-05-21

## 2024-05-21 RX ORDER — LISINOPRIL 10 MG/1
10 TABLET ORAL DAILY
Qty: 90 TABLET | Refills: 3 | Status: SHIPPED | OUTPATIENT
Start: 2024-05-21

## 2024-05-21 RX ORDER — AMLODIPINE BESYLATE 10 MG/1
10 TABLET ORAL DAILY
Qty: 30 TABLET | Refills: 2 | Status: SHIPPED | OUTPATIENT
Start: 2024-05-21 | End: 2024-08-19

## 2024-05-21 NOTE — TELEPHONE ENCOUNTER
Received call from patient requesting refills of her atorvastatin, lisinopril and amlodipine be sent to [facility].     Scripts sent to pharmacy for refill per request.

## 2024-05-31 ENCOUNTER — PATIENT OUTREACH (OUTPATIENT)
Dept: INTERNAL MEDICINE CLINIC | Facility: CLINIC | Age: 59
End: 2024-05-31

## 2024-06-07 ENCOUNTER — TELEPHONE (OUTPATIENT)
Dept: INTERNAL MEDICINE CLINIC | Facility: CLINIC | Age: 59
End: 2024-06-07

## 2024-06-13 PROBLEM — J00 HEAD COLD: Status: RESOLVED | Noted: 2024-05-14 | Resolved: 2024-06-13

## 2024-07-10 ENCOUNTER — PATIENT OUTREACH (OUTPATIENT)
Dept: INTERNAL MEDICINE CLINIC | Facility: CLINIC | Age: 59
End: 2024-07-10

## 2024-07-10 NOTE — PROGRESS NOTES
W has reached out to Kan Cochran APS [phone] to get an update to see if pt will get a rep payee and if her housing is secure?  SW has to leave a message requesting a return call.    SW has also reached out to pt ( her Cell # is not in service) SW did reach her at her Home # [phone] and left a message.  Sw inquired how she is doing and reminded her of her upcoming ENT New Pt Appointment scheduled her at [place] this Friday 7/ 12/24 @ 1:20 PM .  SW encouraged her to call if she needs to reschedule it for any reason and also she should re-schedule her PCP appointment she missed.    SW to remain available to assist as indicated.

## 2024-07-12 ENCOUNTER — TELEPHONE (OUTPATIENT)
Dept: INTERNAL MEDICINE CLINIC | Facility: CLINIC | Age: 59
End: 2024-07-12

## 2024-07-17 ENCOUNTER — OFFICE VISIT (OUTPATIENT)
Dept: INTERNAL MEDICINE CLINIC | Facility: CLINIC | Age: 59
End: 2024-07-17

## 2024-07-17 ENCOUNTER — PATIENT OUTREACH (OUTPATIENT)
Dept: INTERNAL MEDICINE CLINIC | Facility: CLINIC | Age: 59
End: 2024-07-17

## 2024-07-17 VITALS
WEIGHT: 166 LBS | HEART RATE: 93 BPM | BODY MASS INDEX: 29.41 KG/M2 | OXYGEN SATURATION: 97 % | DIASTOLIC BLOOD PRESSURE: 82 MMHG | SYSTOLIC BLOOD PRESSURE: 116 MMHG | HEIGHT: 63 IN | TEMPERATURE: 97.9 F

## 2024-07-17 DIAGNOSIS — R10.9 RIGHT FLANK PAIN: ICD-10-CM

## 2024-07-17 DIAGNOSIS — M54.10 RADICULAR LOW BACK PAIN: Primary | ICD-10-CM

## 2024-07-17 DIAGNOSIS — I10 PRIMARY HYPERTENSION: ICD-10-CM

## 2024-07-17 LAB
SL AMB  POCT GLUCOSE, UA: NORMAL
SL AMB LEUKOCYTE ESTERASE,UA: NORMAL
SL AMB POCT BILIRUBIN,UA: NORMAL
SL AMB POCT BLOOD,UA: NORMAL
SL AMB POCT CLARITY,UA: NORMAL
SL AMB POCT COLOR,UA: NORMAL
SL AMB POCT KETONES,UA: NORMAL
SL AMB POCT NITRITE,UA: NORMAL
SL AMB POCT PH,UA: 6
SL AMB POCT SPECIFIC GRAVITY,UA: 1.03
SL AMB POCT URINE PROTEIN: NORMAL
SL AMB POCT UROBILINOGEN: 0.2

## 2024-07-17 PROCEDURE — 99214 OFFICE O/P EST MOD 30 MIN: CPT | Performed by: FAMILY MEDICINE

## 2024-07-17 PROCEDURE — 81003 URINALYSIS AUTO W/O SCOPE: CPT | Performed by: FAMILY MEDICINE

## 2024-07-17 PROCEDURE — G2211 COMPLEX E/M VISIT ADD ON: HCPCS | Performed by: FAMILY MEDICINE

## 2024-07-17 RX ORDER — METHOCARBAMOL 500 MG/1
TABLET, FILM COATED ORAL
Qty: 30 TABLET | Refills: 1 | Status: ON HOLD | OUTPATIENT
Start: 2024-07-17

## 2024-07-17 RX ORDER — FLUTICASONE PROPIONATE 50 MCG
SPRAY, SUSPENSION (ML) NASAL
Status: ON HOLD | COMMUNITY
Start: 2024-07-08

## 2024-07-17 NOTE — PROGRESS NOTES
SW has reached out to Kan Linson [facility] [phone] again this date to get an update to see if pt will get a rep payee and if her housing is secure? SW had tole ave a message requesting a return call.  Pt is scheduled to see PCP this date .  SW to remain available to assist pt if needed.     SW has met with pt at her PCP appointment this date.  Pt shares she is doing well.  She shares that [facility] has made a referral to [facility] [employer] and they are helping her with her back rent.  She now shares she knows she needs to keep paying her rent or she could face eviction.  Pt is not really happy with the building but shares she knows there are long waits to get into any other location.  Pt shares she no longer has OP MH or an ICM.  She does work with the Nurse Beth who works in her building.  Pt does not feel she needs any MH medications and has lost weight and feels better since stopping.  She is open to Talk Therapy.  Pt had been on the [facility] and at pt's request SW will send then an in basket to let them know she still wants talk Therapy,    Patient does not have any further questions, concerns, or other needs at this time.  Patient has my contact # and PCP office # if needed.  Social Work to remain available to assist as indicated.    Please re-consult Social Work if needed.

## 2024-07-17 NOTE — PROGRESS NOTES
Ambulatory Visit  Name: Misa Zuleta      : 1965      MRN: [id number]  Encounter Provider: Ethel Sheffield MD  Encounter Date: 2024   Encounter [facility]    Assessment & Plan   1. Radicular low back pain  Assessment & Plan:  See detailed HPI , also having R flank pain no hx kidney stones no changes in urinary or bowel patterns no fever - rash   UA neg for blood , low uro bili , recommend moist  heat liberally avoid offending positions actions  start robaxin 500 mg 1 po q 8-12 hr prn watch for rash of H Zoster , update L S spine films . Consider course of P.T for now gentle stretching , maintain good body mechanics   Orders:  -     XR spine lumbar minimum 4 views non injury; Future; Expected date: 2024  -     methocarbamol (ROBAXIN) 500 mg tablet; 1 po q 8- 12 hrs prn back  muscle spasm  2. Right flank pain  -     XR spine lumbar minimum 4 views non injury; Future; Expected date: 2024  -     POCT urine dip auto non-scope  3. Primary hypertension  Assessment & Plan:  BP well controlled  off medication lauded in weight loss          History of Present Illness     HPI Pt c/o R side pain jabbing feeling started  5 weeks , pain there all the time wraps around to her R low back , pain can radiate anterior thigh , - numbness - weakness , worse moving changing position , still hurts laying in bed laying on the R side can bother her more , she has no changes in urination or bowels , she has taken tylenol and aleve alternating helps some , during warm shower feels good . She has used I[employer] this helps some She has no hx of kidney stones , she can't recall any injury when she sops she uses a buggy to pull her groceries on   She has had a lot going on she almost lost er apartment   Review of Systems   Constitutional:  Negative for chills and fever.   HENT:  Negative for ear pain and sore throat.    Eyes:  Negative for pain and visual disturbance.    Respiratory:  Negative for cough and shortness of breath.    Cardiovascular:  Negative for chest pain and palpitations.   Gastrointestinal:  Negative for abdominal pain, constipation, diarrhea and vomiting.   Genitourinary:  Negative for difficulty urinating, dysuria and hematuria.   Musculoskeletal:  Positive for arthralgias and back pain.   Skin:  Negative for color change and rash.   Neurological:  Negative for seizures and syncope.   All other systems reviewed and are negative.    Past Medical History:   Diagnosis Date    Anxiety     Arthritis     Bipolar affective disorder, depressed, severe (HCC) 04/28/2019    Depression     Elevated bilirubin 08/15/2019    Hyperlipidemia     Hypertension     Hypokalemia 08/15/2019    Learning difficulty     Leukocytosis 07/28/2020    Obesity (BMI 30.0-34.9) 06/26/2020    Osteopenia     Ovarian failure     PONV (postoperative nausea and vomiting)     Previous known suicide attempt     Psychiatric disorder     Psychiatric illness     PTSD (post-traumatic stress disorder) 02/25/2023     Past Surgical History:   Procedure Laterality Date    HERNIA REPAIR      LAPAROSCOPY      as a child, per patient-normal findings     Family History   Problem Relation Age of Onset    Alzheimer's disease Mother     Depression Mother     Depression Brother     Bipolar disorder Brother     No Known Problems Maternal Aunt     No Known Problems Paternal Aunt     No Known Problems Maternal Uncle     No Known Problems Paternal Uncle     No Known Problems Cousin     ADD / ADHD Neg Hx     Alcohol abuse Neg Hx     Anxiety disorder Neg Hx     Dementia Neg Hx     Drug abuse Neg Hx     OCD Neg Hx     Paranoid behavior Neg Hx     Schizophrenia Neg Hx     Seizures Neg Hx     Self-Injury Neg Hx     Suicide Attempts Neg Hx      Social History     Tobacco Use    Smoking status: Some Days     Types: Cigarettes    Smokeless tobacco: Never   Vaping Use    Vaping status: Never Used   Substance and Sexual Activity     Alcohol use: Not Currently    Drug use: Not Currently    Sexual activity: Yes     Partners: Male     Birth control/protection: None     Current Outpatient Medications on File Prior to Visit   Medication Sig    amLODIPine (NORVASC) 10 mg tablet Take 1 tablet (10 mg total) by mouth daily    ARIPiprazole (ABILIFY) 2 mg tablet Take 2 mg by mouth daily    atorvastatin (LIPITOR) 20 mg tablet Take 1 tablet (20 mg total) by mouth daily    benzonatate (TESSALON PERLES) 100 mg capsule Take 2 capsules (200 mg total) by mouth 3 (three) times a day as needed for cough    benztropine (COGENTIN) 1 mg tablet Take 1 mg by mouth 2 (two) times a day    escitalopram (LEXAPRO) 20 mg tablet Take 20 mg by mouth daily    lidocaine (Lidoderm) 5 % Apply 1 patch topically over 12 hours daily Remove & Discard patch within 12 hours or as directed by MD    lisinopril (ZESTRIL) 10 mg tablet Take 1 tablet (10 mg total) by mouth daily    naloxone (NARCAN) 4 mg/0.1 mL nasal spray Administer 1 spray into a nostril. If no response after 2-3 minutes, give another dose in the other nostril using a new spray.    naproxen (Naprosyn) 500 mg tablet Take 1 tablet (500 mg total) by mouth 2 (two) times a day with meals    traMADol (Ultram) 50 mg tablet Take 1 tablet (50 mg total) by mouth every 6 (six) hours as needed for moderate pain    triamcinolone (KENALOG) 0.1 % oral topical paste Apply 1 Application topically 3 (three) times a day    Vitamin D, Cholecalciferol, 25 MCG (1000 UT) CAPS Take by mouth    [DISCONTINUED] methocarbamol (Robaxin-750) 750 mg tablet Take 1 tablet (750 mg total) by mouth every 6 (six) hours as needed for muscle spasms    risperiDONE (RisperDAL) 0.5 mg tablet Take 1 tablet (0.5 mg total) by mouth daily at bedtime Take one 2mg tab with one 0.5mg tab every night at bedtime for total dose of 2.5mg qhs    risperiDONE (RisperDAL) 2 mg tablet Take 1 tablet (2 mg total) by mouth daily at bedtime Take one 2mg tab with one 0.5mg tab every  "night at bedtime for total dose of 2.5mg qhs     Allergies   Allergen Reactions    Other      Hay Fever    Oxycodone-Acetaminophen GI Intolerance     Immunization History   Administered Date(s) Administered    COVID-19 PFIZER VACCINE 0.3 ML IM 03/01/2021, 03/22/2021, 10/12/2021    INFLUENZA 10/26/2014, 12/08/2015, 08/29/2017, 10/20/2021    Influenza, injectable, quadrivalent, preservative free 0.5 mL 10/06/2019, 08/23/2020    Influenza, recombinant, quadrivalent,injectable, preservative free 01/17/2023    Pneumococcal Conjugate Vaccine 20-valent (Pcv20), Polysace 01/17/2023    Pneumococcal Polysaccharide PPV23 02/03/2020, 09/22/2020    Tdap 07/11/2019, 10/06/2019    Zoster Vaccine Recombinant 05/21/2020, 08/23/2020     Objective     /82 (BP Location: Left arm, Patient Position: Sitting, Cuff Size: Standard)   Pulse 93   Temp 97.9 °F (36.6 °C) (Tympanic)   Ht 5' 3\" (1.6 m)   Wt 75.3 kg (166 lb)   LMP  (LMP Unknown)   SpO2 97%   BMI 29.41 kg/m²     Physical Exam  Vitals and nursing note reviewed.   Constitutional:       General: She is not in acute distress.     Appearance: She is well-developed.      Comments: Skin with good color turgor , well hydrated ,no distress noted     HENT:      Head: Normocephalic and atraumatic.      Nose: No congestion.      Mouth/Throat:      Pharynx: Oropharynx is clear.   Eyes:      Conjunctiva/sclera: Conjunctivae normal.   Neck:      Thyroid: No thyromegaly.   Cardiovascular:      Rate and Rhythm: Normal rate and regular rhythm.      Heart sounds: Normal heart sounds. No murmur heard.  Pulmonary:      Effort: Pulmonary effort is normal. No respiratory distress.      Breath sounds: Normal breath sounds.   Abdominal:      Palpations: Abdomen is soft.      Tenderness: There is no abdominal tenderness. There is no right CVA tenderness or left CVA tenderness.   Musculoskeletal:         General: No swelling.      Cervical back: Neck supple.      Lumbar back: Spasms present. No " tenderness or bony tenderness. Normal range of motion.      Right hip: Tenderness present. No crepitus. Normal range of motion.      Comments: Para lumbar areas of pain , mild TTP R gr trochanter    Lymphadenopathy:      Cervical:      Right cervical: No superficial cervical adenopathy.     Left cervical: No superficial cervical adenopathy.   Skin:     General: Skin is warm and dry.      Capillary Refill: Capillary refill takes less than 2 seconds.      Findings: No erythema or rash.   Neurological:      Mental Status: She is alert.      Comments: Non focal exam    Psychiatric:         Attention and Perception: Attention normal.         Mood and Affect: Mood normal.         Speech: Speech normal.         Behavior: Behavior normal.         Thought Content: Thought content normal.

## 2024-07-17 NOTE — ASSESSMENT & PLAN NOTE
See detailed HPI , also having R flank pain no hx kidney stones no changes in urinary or bowel patterns no fever - rash   UA neg for blood , low uro bili , recommend moist  heat liberally avoid offending positions actions  start robaxin 500 mg 1 po q 8-12 hr prn watch for rash of H Zoster , update L S spine films . Consider course of P.T for now gentle stretching , maintain good body mechanics

## 2024-07-18 ENCOUNTER — TELEPHONE (OUTPATIENT)
Age: 59
End: 2024-07-18

## 2024-07-18 NOTE — TELEPHONE ENCOUNTER
Per [employer] from  patient is still interested in talk therapy services. Contacted patient in regards to [employer]. Spoke w. Patient whom stated they are still interested in services. Patient stated they would need a verification call and LYFT services set for appointment. Writer notified patient that a notation has been made for their request.     Anna Ochoa 9/12 @ 2p   LYFT SERVICES REQUIRED

## 2024-07-19 ENCOUNTER — PATIENT OUTREACH (OUTPATIENT)
Dept: INTERNAL MEDICINE CLINIC | Facility: CLINIC | Age: 59
End: 2024-07-19
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
## 2024-07-24 ENCOUNTER — HOSPITAL ENCOUNTER (OUTPATIENT)
Dept: RADIOLOGY | Facility: HOSPITAL | Age: 59
Discharge: HOME | End: 2024-07-24
Payer: MEDICARE

## 2024-07-24 ENCOUNTER — PATIENT OUTREACH (OUTPATIENT)
Dept: INTERNAL MEDICINE CLINIC | Facility: CLINIC | Age: 59
End: 2024-07-24

## 2024-07-24 DIAGNOSIS — M54.10 RADICULAR LOW BACK PAIN: ICD-10-CM

## 2024-07-24 DIAGNOSIS — R10.9 RIGHT FLANK PAIN: ICD-10-CM

## 2024-07-24 PROCEDURE — 72110 X-RAY EXAM L-2 SPINE 4/>VWS: CPT
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
## 2024-07-25 ENCOUNTER — TELEPHONE (OUTPATIENT)
Dept: PSYCHIATRY | Facility: CLINIC | Age: 59
End: 2024-07-25

## 2024-07-25 ENCOUNTER — PATIENT OUTREACH (OUTPATIENT)
Dept: INTERNAL MEDICINE CLINIC | Facility: CLINIC | Age: 59
End: 2024-07-25
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
## 2024-07-26 ENCOUNTER — HOSPITAL ENCOUNTER (EMERGENCY)
Facility: HOSPITAL | Age: 59
End: 2024-07-26
Attending: EMERGENCY MEDICINE
Payer: MEDICARE

## 2024-07-26 ENCOUNTER — HOSPITAL ENCOUNTER (INPATIENT)
Facility: HOSPITAL | Age: 59
LOS: 6 days | Discharge: HOME | DRG: 885 | End: 2024-08-01
Attending: STUDENT IN AN ORGANIZED HEALTH CARE EDUCATION/TRAINING PROGRAM | Admitting: STUDENT IN AN ORGANIZED HEALTH CARE EDUCATION/TRAINING PROGRAM
Payer: MEDICARE

## 2024-07-26 VITALS
HEART RATE: 93 BPM | RESPIRATION RATE: 18 BRPM | TEMPERATURE: 98.1 F | SYSTOLIC BLOOD PRESSURE: 153 MMHG | BODY MASS INDEX: 29.41 KG/M2 | WEIGHT: 166 LBS | OXYGEN SATURATION: 97 % | DIASTOLIC BLOOD PRESSURE: 93 MMHG

## 2024-07-26 DIAGNOSIS — M22.2X2 PATELLOFEMORAL PAIN SYNDROME OF BOTH KNEES: ICD-10-CM

## 2024-07-26 DIAGNOSIS — F31.9 BIPOLAR DISORDER (HCC): Primary | Chronic | ICD-10-CM

## 2024-07-26 DIAGNOSIS — M22.2X1 PATELLOFEMORAL PAIN SYNDROME OF BOTH KNEES: ICD-10-CM

## 2024-07-26 DIAGNOSIS — I10 HTN (HYPERTENSION): ICD-10-CM

## 2024-07-26 DIAGNOSIS — F43.10 PTSD (POST-TRAUMATIC STRESS DISORDER): ICD-10-CM

## 2024-07-26 DIAGNOSIS — Z00.8 MEDICAL CLEARANCE FOR PSYCHIATRIC ADMISSION: ICD-10-CM

## 2024-07-26 DIAGNOSIS — F32.A DEPRESSION: ICD-10-CM

## 2024-07-26 DIAGNOSIS — Z72.0 TOBACCO USE: Chronic | ICD-10-CM

## 2024-07-26 DIAGNOSIS — E78.5 HYPERLIPIDEMIA: ICD-10-CM

## 2024-07-26 DIAGNOSIS — F41.9 ANXIETY: Chronic | ICD-10-CM

## 2024-07-26 DIAGNOSIS — R45.851 SUICIDAL IDEATION: Primary | ICD-10-CM

## 2024-07-26 DIAGNOSIS — E55.9 VITAMIN D INSUFFICIENCY: ICD-10-CM

## 2024-07-26 DIAGNOSIS — M54.50 ACUTE RIGHT-SIDED LOW BACK PAIN WITHOUT SCIATICA: ICD-10-CM

## 2024-07-26 DIAGNOSIS — F70 MILD INTELLECTUAL DISABILITY: Chronic | ICD-10-CM

## 2024-07-26 LAB
ALBUMIN SERPL BCG-MCNC: 4.3 G/DL (ref 3.5–5)
ALP SERPL-CCNC: 75 U/L (ref 34–104)
ALT SERPL W P-5'-P-CCNC: 13 U/L (ref 7–52)
AMPHETAMINES SERPL QL SCN: NEGATIVE
ANION GAP SERPL CALCULATED.3IONS-SCNC: 8 MMOL/L (ref 4–13)
AST SERPL W P-5'-P-CCNC: 14 U/L (ref 13–39)
BARBITURATES UR QL: NEGATIVE
BASOPHILS # BLD AUTO: 0.04 THOUSANDS/ÂΜL (ref 0–0.1)
BASOPHILS NFR BLD AUTO: 0 % (ref 0–1)
BENZODIAZ UR QL: NEGATIVE
BILIRUB SERPL-MCNC: 1.06 MG/DL (ref 0.2–1)
BILIRUB UR QL STRIP: NEGATIVE
BUN SERPL-MCNC: 13 MG/DL (ref 5–25)
CALCIUM SERPL-MCNC: 9.7 MG/DL (ref 8.4–10.2)
CHLORIDE SERPL-SCNC: 105 MMOL/L (ref 96–108)
CLARITY UR: CLEAR
CO2 SERPL-SCNC: 26 MMOL/L (ref 21–32)
COCAINE UR QL: NEGATIVE
COLOR UR: YELLOW
CREAT SERPL-MCNC: 0.65 MG/DL (ref 0.6–1.3)
EOSINOPHIL # BLD AUTO: 0.4 THOUSAND/ÂΜL (ref 0–0.61)
EOSINOPHIL NFR BLD AUTO: 3 % (ref 0–6)
ERYTHROCYTE [DISTWIDTH] IN BLOOD BY AUTOMATED COUNT: 13.2 % (ref 11.6–15.1)
ETHANOL EXG-MCNC: 0 MG/DL
FENTANYL UR QL SCN: NEGATIVE
GFR SERPL CREATININE-BSD FRML MDRD: 97 ML/MIN/1.73SQ M
GLUCOSE SERPL-MCNC: 99 MG/DL (ref 65–140)
GLUCOSE UR STRIP-MCNC: NEGATIVE MG/DL
HCT VFR BLD AUTO: 41.9 % (ref 34.8–46.1)
HGB BLD-MCNC: 14.1 G/DL (ref 11.5–15.4)
HGB UR QL STRIP.AUTO: NEGATIVE
HYDROCODONE UR QL SCN: NEGATIVE
IMM GRANULOCYTES # BLD AUTO: 0.05 THOUSAND/UL (ref 0–0.2)
IMM GRANULOCYTES NFR BLD AUTO: 0 % (ref 0–2)
KETONES UR STRIP-MCNC: NEGATIVE MG/DL
LEUKOCYTE ESTERASE UR QL STRIP: NEGATIVE
LYMPHOCYTES # BLD AUTO: 3.39 THOUSANDS/ÂΜL (ref 0.6–4.47)
LYMPHOCYTES NFR BLD AUTO: 28 % (ref 14–44)
MCH RBC QN AUTO: 29.5 PG (ref 26.8–34.3)
MCHC RBC AUTO-ENTMCNC: 33.7 G/DL (ref 31.4–37.4)
MCV RBC AUTO: 88 FL (ref 82–98)
METHADONE UR QL: NEGATIVE
MONOCYTES # BLD AUTO: 1.17 THOUSAND/ÂΜL (ref 0.17–1.22)
MONOCYTES NFR BLD AUTO: 10 % (ref 4–12)
NEUTROPHILS # BLD AUTO: 7.02 THOUSANDS/ÂΜL (ref 1.85–7.62)
NEUTS SEG NFR BLD AUTO: 59 % (ref 43–75)
NITRITE UR QL STRIP: NEGATIVE
NRBC BLD AUTO-RTO: 0 /100 WBCS
OPIATES UR QL SCN: NEGATIVE
OXYCODONE+OXYMORPHONE UR QL SCN: NEGATIVE
PCP UR QL: NEGATIVE
PH UR STRIP.AUTO: 6 [PH]
PLATELET # BLD AUTO: 287 THOUSANDS/UL (ref 149–390)
PMV BLD AUTO: 10.3 FL (ref 8.9–12.7)
POTASSIUM SERPL-SCNC: 3.8 MMOL/L (ref 3.5–5.3)
PROT SERPL-MCNC: 7.6 G/DL (ref 6.4–8.4)
PROT UR STRIP-MCNC: NEGATIVE MG/DL
RBC # BLD AUTO: 4.78 MILLION/UL (ref 3.81–5.12)
SODIUM SERPL-SCNC: 139 MMOL/L (ref 135–147)
SP GR UR STRIP.AUTO: 1.02 (ref 1–1.03)
THC UR QL: NEGATIVE
TSH SERPL DL<=0.05 MIU/L-ACNC: 1.46 UIU/ML (ref 0.45–4.5)
UROBILINOGEN UR STRIP-ACNC: <2 MG/DL
WBC # BLD AUTO: 12.07 THOUSAND/UL (ref 4.31–10.16)

## 2024-07-26 PROCEDURE — 36415 COLL VENOUS BLD VENIPUNCTURE: CPT

## 2024-07-26 PROCEDURE — 80053 COMPREHEN METABOLIC PANEL: CPT

## 2024-07-26 PROCEDURE — 81003 URINALYSIS AUTO W/O SCOPE: CPT

## 2024-07-26 PROCEDURE — 99285 EMERGENCY DEPT VISIT HI MDM: CPT | Performed by: EMERGENCY MEDICINE

## 2024-07-26 PROCEDURE — 80307 DRUG TEST PRSMV CHEM ANLYZR: CPT

## 2024-07-26 PROCEDURE — 93005 ELECTROCARDIOGRAM TRACING: CPT

## 2024-07-26 PROCEDURE — 99285 EMERGENCY DEPT VISIT HI MDM: CPT

## 2024-07-26 PROCEDURE — 82075 ASSAY OF BREATH ETHANOL: CPT

## 2024-07-26 PROCEDURE — 84443 ASSAY THYROID STIM HORMONE: CPT

## 2024-07-26 PROCEDURE — 85025 COMPLETE CBC W/AUTO DIFF WBC: CPT

## 2024-07-26 RX ORDER — LORAZEPAM 2 MG/ML
2 INJECTION INTRAMUSCULAR EVERY 6 HOURS PRN
Status: CANCELLED | OUTPATIENT
Start: 2024-07-26

## 2024-07-26 RX ORDER — BENZTROPINE MESYLATE 1 MG/ML
1 INJECTION, SOLUTION INTRAMUSCULAR; INTRAVENOUS 2 TIMES DAILY PRN
Status: CANCELLED | OUTPATIENT
Start: 2024-07-26

## 2024-07-26 RX ORDER — OLANZAPINE 2.5 MG/1
5 TABLET, FILM COATED ORAL
Status: CANCELLED | OUTPATIENT
Start: 2024-07-26

## 2024-07-26 RX ORDER — OLANZAPINE 10 MG/2ML
5 INJECTION, POWDER, FOR SOLUTION INTRAMUSCULAR
Status: DISCONTINUED | OUTPATIENT
Start: 2024-07-26 | End: 2024-08-01 | Stop reason: HOSPADM

## 2024-07-26 RX ORDER — HYDROXYZINE HYDROCHLORIDE 25 MG/1
25 TABLET, FILM COATED ORAL
Status: CANCELLED | OUTPATIENT
Start: 2024-07-26

## 2024-07-26 RX ORDER — DIPHENHYDRAMINE HYDROCHLORIDE 50 MG/ML
50 INJECTION INTRAMUSCULAR; INTRAVENOUS EVERY 6 HOURS PRN
Status: DISCONTINUED | OUTPATIENT
Start: 2024-07-26 | End: 2024-08-01 | Stop reason: HOSPADM

## 2024-07-26 RX ORDER — IBUPROFEN 600 MG/1
600 TABLET ORAL EVERY 6 HOURS PRN
Status: DISCONTINUED | OUTPATIENT
Start: 2024-07-26 | End: 2024-07-28

## 2024-07-26 RX ORDER — PROPRANOLOL HYDROCHLORIDE 10 MG/1
10 TABLET ORAL EVERY 8 HOURS PRN
Status: DISCONTINUED | OUTPATIENT
Start: 2024-07-26 | End: 2024-08-01 | Stop reason: HOSPADM

## 2024-07-26 RX ORDER — OLANZAPINE 10 MG/2ML
2.5 INJECTION, POWDER, FOR SOLUTION INTRAMUSCULAR
Status: DISCONTINUED | OUTPATIENT
Start: 2024-07-26 | End: 2024-08-01 | Stop reason: HOSPADM

## 2024-07-26 RX ORDER — OLANZAPINE 10 MG/2ML
5 INJECTION, POWDER, FOR SOLUTION INTRAMUSCULAR
Status: CANCELLED | OUTPATIENT
Start: 2024-07-26

## 2024-07-26 RX ORDER — IBUPROFEN 400 MG/1
800 TABLET, FILM COATED ORAL EVERY 8 HOURS PRN
Status: CANCELLED | OUTPATIENT
Start: 2024-07-26

## 2024-07-26 RX ORDER — OLANZAPINE 5 MG/1
5 TABLET ORAL
Status: DISCONTINUED | OUTPATIENT
Start: 2024-07-26 | End: 2024-08-01 | Stop reason: HOSPADM

## 2024-07-26 RX ORDER — BISACODYL 10 MG
10 SUPPOSITORY, RECTAL RECTAL DAILY PRN
Status: CANCELLED | OUTPATIENT
Start: 2024-07-26

## 2024-07-26 RX ORDER — NICOTINE 21 MG/24HR
1 PATCH, TRANSDERMAL 24 HOURS TRANSDERMAL DAILY
Status: DISCONTINUED | OUTPATIENT
Start: 2024-07-26 | End: 2024-08-01 | Stop reason: HOSPADM

## 2024-07-26 RX ORDER — OLANZAPINE 2.5 MG/1
2.5 TABLET, FILM COATED ORAL
Status: DISCONTINUED | OUTPATIENT
Start: 2024-07-26 | End: 2024-08-01 | Stop reason: HOSPADM

## 2024-07-26 RX ORDER — BISACODYL 10 MG
10 SUPPOSITORY, RECTAL RECTAL DAILY PRN
Status: DISCONTINUED | OUTPATIENT
Start: 2024-07-26 | End: 2024-08-01 | Stop reason: HOSPADM

## 2024-07-26 RX ORDER — TRAZODONE HYDROCHLORIDE 50 MG/1
50 TABLET, FILM COATED ORAL
Status: CANCELLED | OUTPATIENT
Start: 2024-07-26

## 2024-07-26 RX ORDER — BENZTROPINE MESYLATE 1 MG/1
1 TABLET ORAL 2 TIMES DAILY PRN
Status: DISCONTINUED | OUTPATIENT
Start: 2024-07-26 | End: 2024-08-01 | Stop reason: HOSPADM

## 2024-07-26 RX ORDER — PROPRANOLOL HYDROCHLORIDE 10 MG/1
10 TABLET ORAL EVERY 8 HOURS PRN
Status: CANCELLED | OUTPATIENT
Start: 2024-07-26

## 2024-07-26 RX ORDER — MINERAL OIL AND PETROLATUM 150; 830 MG/G; MG/G
OINTMENT OPHTHALMIC 4 TIMES DAILY PRN
Status: CANCELLED | OUTPATIENT
Start: 2024-07-26

## 2024-07-26 RX ORDER — OLANZAPINE 2.5 MG/1
2.5 TABLET, FILM COATED ORAL
Status: CANCELLED | OUTPATIENT
Start: 2024-07-26

## 2024-07-26 RX ORDER — MINERAL OIL AND PETROLATUM 150; 830 MG/G; MG/G
OINTMENT OPHTHALMIC 4 TIMES DAILY PRN
Status: DISCONTINUED | OUTPATIENT
Start: 2024-07-26 | End: 2024-08-01 | Stop reason: HOSPADM

## 2024-07-26 RX ORDER — IBUPROFEN 400 MG/1
400 TABLET, FILM COATED ORAL EVERY 4 HOURS PRN
Status: CANCELLED | OUTPATIENT
Start: 2024-07-26

## 2024-07-26 RX ORDER — HYDROXYZINE 50 MG/1
100 TABLET, FILM COATED ORAL
Status: DISCONTINUED | OUTPATIENT
Start: 2024-07-26 | End: 2024-08-01 | Stop reason: HOSPADM

## 2024-07-26 RX ORDER — MAGNESIUM HYDROXIDE/ALUMINUM HYDROXICE/SIMETHICONE 120; 1200; 1200 MG/30ML; MG/30ML; MG/30ML
30 SUSPENSION ORAL EVERY 4 HOURS PRN
Status: DISCONTINUED | OUTPATIENT
Start: 2024-07-26 | End: 2024-08-01 | Stop reason: HOSPADM

## 2024-07-26 RX ORDER — IBUPROFEN 600 MG/1
600 TABLET, FILM COATED ORAL EVERY 6 HOURS PRN
Status: CANCELLED | OUTPATIENT
Start: 2024-07-26

## 2024-07-26 RX ORDER — BENZTROPINE MESYLATE 1 MG/ML
1 INJECTION INTRAMUSCULAR; INTRAVENOUS 2 TIMES DAILY PRN
Status: DISCONTINUED | OUTPATIENT
Start: 2024-07-26 | End: 2024-08-01 | Stop reason: HOSPADM

## 2024-07-26 RX ORDER — HYDROXYZINE 50 MG/1
50 TABLET, FILM COATED ORAL
Status: DISCONTINUED | OUTPATIENT
Start: 2024-07-26 | End: 2024-08-01 | Stop reason: HOSPADM

## 2024-07-26 RX ORDER — IBUPROFEN 400 MG/1
400 TABLET ORAL EVERY 4 HOURS PRN
Status: DISCONTINUED | OUTPATIENT
Start: 2024-07-26 | End: 2024-07-28

## 2024-07-26 RX ORDER — MAGNESIUM HYDROXIDE/ALUMINUM HYDROXICE/SIMETHICONE 120; 1200; 1200 MG/30ML; MG/30ML; MG/30ML
30 SUSPENSION ORAL EVERY 4 HOURS PRN
Status: CANCELLED | OUTPATIENT
Start: 2024-07-26

## 2024-07-26 RX ORDER — LORAZEPAM 2 MG/ML
2 INJECTION INTRAMUSCULAR EVERY 6 HOURS PRN
Status: DISCONTINUED | OUTPATIENT
Start: 2024-07-26 | End: 2024-08-01 | Stop reason: HOSPADM

## 2024-07-26 RX ORDER — NICOTINE 21 MG/24HR
1 PATCH, TRANSDERMAL 24 HOURS TRANSDERMAL DAILY
Status: CANCELLED | OUTPATIENT
Start: 2024-07-26

## 2024-07-26 RX ORDER — TRAZODONE HYDROCHLORIDE 50 MG/1
50 TABLET ORAL
Status: DISCONTINUED | OUTPATIENT
Start: 2024-07-26 | End: 2024-08-01 | Stop reason: HOSPADM

## 2024-07-26 RX ORDER — HYDROXYZINE HYDROCHLORIDE 25 MG/1
100 TABLET, FILM COATED ORAL
Status: CANCELLED | OUTPATIENT
Start: 2024-07-26

## 2024-07-26 RX ORDER — HYDROXYZINE HYDROCHLORIDE 25 MG/1
25 TABLET, FILM COATED ORAL
Status: DISCONTINUED | OUTPATIENT
Start: 2024-07-26 | End: 2024-08-01 | Stop reason: HOSPADM

## 2024-07-26 RX ORDER — AMOXICILLIN 250 MG
1 CAPSULE ORAL DAILY PRN
Status: CANCELLED | OUTPATIENT
Start: 2024-07-26

## 2024-07-26 RX ORDER — OLANZAPINE 10 MG/2ML
2.5 INJECTION, POWDER, FOR SOLUTION INTRAMUSCULAR
Status: CANCELLED | OUTPATIENT
Start: 2024-07-26

## 2024-07-26 RX ORDER — AMOXICILLIN 250 MG
1 CAPSULE ORAL DAILY PRN
Status: DISCONTINUED | OUTPATIENT
Start: 2024-07-26 | End: 2024-08-01 | Stop reason: HOSPADM

## 2024-07-26 RX ORDER — BENZTROPINE MESYLATE 1 MG/1
1 TABLET ORAL 2 TIMES DAILY PRN
Status: CANCELLED | OUTPATIENT
Start: 2024-07-26

## 2024-07-26 RX ORDER — IBUPROFEN 800 MG/1
800 TABLET ORAL EVERY 8 HOURS PRN
Status: DISCONTINUED | OUTPATIENT
Start: 2024-07-26 | End: 2024-07-28

## 2024-07-26 RX ORDER — HYDROXYZINE HYDROCHLORIDE 25 MG/1
50 TABLET, FILM COATED ORAL
Status: CANCELLED | OUTPATIENT
Start: 2024-07-26

## 2024-07-26 RX ORDER — DIPHENHYDRAMINE HYDROCHLORIDE 50 MG/ML
50 INJECTION INTRAMUSCULAR; INTRAVENOUS EVERY 6 HOURS PRN
Status: CANCELLED | OUTPATIENT
Start: 2024-07-26

## 2024-07-26 RX ADMIN — IBUPROFEN 800 MG: 800 TABLET, FILM COATED ORAL at 20:08

## 2024-07-26 NOTE — ED NOTES
Patient is resting comfortably.  Ate lunch.  Denies any complaints at this time.     Odalys Shelby RN  07/26/24 [id number]

## 2024-07-26 NOTE — PLAN OF CARE
Problem: SELF HARM/SUICIDALITY  Goal: Will have no self-injury during hospital stay  Description: INTERVENTIONS:  - Q 15 MINUTES: Routine safety checks  - Q WAKING SHIFT & PRN: Assess risk to determine if routine checks are adequate to maintain patient safety  - Encourage patient to participate actively in care by formulating a plan to combat response to suicidal ideation, identify supports and resources  Outcome: Progressing     Problem: DEPRESSION  Goal: Will be euthymic at discharge  Description: INTERVENTIONS:  - Administer medication as ordered  - Provide emotional support via 1:1 interaction with staff  - Encourage involvement in milieu/groups/activities  - Monitor for social isolation  Outcome: Progressing     Problem: PSYCHOSIS  Goal: Will report no hallucinations or delusions  Description: Interventions:  - Administer medication as  ordered  - Every waking shifts and PRN assess for the presence of hallucinations and or delusions  - Assist with reality testing to support increasing orientation  - Assess if patient's hallucinations or delusions are encouraging self-harm or harm to others and intervene as appropriate  Outcome: Progressing     Problem: ANXIETY  Goal: Will report anxiety at manageable levels  Description: INTERVENTIONS:  - Administer medication as ordered  - Teach and encourage coping skills  - Provide emotional support  - Assess patient/family for anxiety and ability to cope  Outcome: Progressing  Goal: By discharge: Patient will verbalize 2 strategies to deal with anxiety  Description: Interventions:  - Identify any obvious source/trigger to anxiety  - Staff will assist patient in applying identified coping technique/skills  - Encourage attendance of scheduled groups and activities  Outcome: Progressing     Problem: Ineffective Coping  Goal: Identifies healthy coping skills  Outcome: Progressing  Goal: Participates in unit activities  Description: Interventions:  - Provide therapeutic  environment   - Provide required programming   - Redirect inappropriate behaviors   Outcome: Progressing     Problem: Depression  Goal: Treatment Goal: Demonstrate behavioral control of depressive symptoms, verbalize feelings of improved mood/affect, and adopt new coping skills prior to discharge  Outcome: Progressing  Goal: Verbalize thoughts and feelings  Description: Interventions:  - Assess and re-assess patient's level of risk   - Engage patient in 1:1 interactions, daily, for a minimum of 15 minutes   - Encourage patient to express feelings, fears, frustrations, hopes   Outcome: Progressing  Goal: Attend and participate in unit activities, including therapeutic, recreational, and educational groups  Description: Interventions:  - Provide therapeutic and educational activities daily, encourage attendance and participation, and document same in the medical record   Outcome: Progressing     Problem: Anxiety  Goal: Anxiety is at manageable level  Description: Interventions:  - Assess and monitor patient's anxiety level.   - Monitor for signs and symptoms (heart palpitations, chest pain, shortness of breath, headaches, nausea, feeling jumpy, restlessness, irritable, apprehensive).   - Collaborate with interdisciplinary team and initiate plan and interventions as ordered.  - [place] patient to unit/surroundings  - Explain treatment plan  - Encourage participation in care  - Encourage verbalization of concerns/fears  - Identify coping mechanisms  - Assist in developing anxiety-reducing skills  - Administer/offer alternative therapies  - Limit or eliminate stimulants  Outcome: Progressing

## 2024-07-26 NOTE — NURSING NOTE
MEDICATIONS TO NURSING    Pt arrived to the unit with the following:    Baby doll w/ accessories  T-shirt [employer] (cutoff-not to use on unit)  Leggings black  Socks  Bra  Underwear black  Underwear blue  Underwear multi  Jeggings  Elza Jeashish (drawstring)  Slides red fuzzy  Snacks  Purple tote with notebooks, art supplies  Shampoo  Conditioner  Deodorant  Wipes  Soap  Icy Hot  Backpack  Purse  Cigarettes  Lighter  Keys  Assorted costume jewelry  Cellphone  T[employer] Debit Card  Cash $5  Change $2.35
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
## 2024-07-26 NOTE — ED NOTES
Insurance Authorization for admission:     [employer] medicare assured auth form was faxed. Secondary is [facility] which does not require a COB.

## 2024-07-26 NOTE — EMTALA/ACUTE CARE TRANSFER
[facility] EMERGENCY DEPARTMENT  [address] [address]  Dept: [phone]      EMTALA TRANSFER CONSENT    NAME Misa Zuleta                                         1965                              MRN [id number]    I have been informed of my rights regarding examination, treatment, and transfer   by Dr. Vicente Freire MD    Benefits: Other benefits (Include comment)_______________________ (Psych transfer)    Risks: Potential for delay in receiving treatment, Increased discomfort during transfer      Transfer Request   I acknowledge that my medical condition has been evaluated and explained to me by the emergency department physician or other qualified medical person and/or my attending physician who has recommended and offered to me further medical examination and treatment. I understand the Hospital's obligation with respect to the treatment and stabilization of my emergency medical condition. I nevertheless request to be transferred. I release the Hospital, the doctor, and any other persons caring for me from all responsibility or liability for any injury or ill effects that may result from my transfer and agree to accept all responsibility for the consequences of my choice to transfer, rather than receive stabilizing treatment at the Hospital. I understand that because the transfer is my request, my insurance may not provide reimbursement for the services.  The Hospital will assist and direct me and my family in how to make arrangements for transfer, but the hospital is not liable for any fees charged by the transport service.  In spite of this understanding, I refuse to consent to further medical examination and treatment which has been offered to me, and request transfer to Accepting Facility Name, City & State : [facility]. I authorize the performance of emergency medical procedures and treatments upon me in both transit and upon arrival at the receiving  facility.  Additionally, I authorize the release of any and all medical records to the receiving facility and request they be transported with me, if possible.    I authorize the performance of emergency medical procedures and treatments upon me in both transit and upon arrival at the receiving facility.  Additionally, I authorize the release of any and all medical records to the receiving facility and request they be transported with me, if possible.  I understand that the safest mode of transportation during a medical emergency is an ambulance and that the Hospital advocates the use of this mode of transport. Risks of traveling to the receiving facility by car, including absence of medical control, life sustaining equipment, such as oxygen, and medical personnel has been explained to me and I fully understand them.    (NATASHA CORRECT BOX BELOW)  [  ]  I consent to the stated transfer and to be transported by ambulance/helicopter.  [  ]  I consent to the stated transfer, but refuse transportation by ambulance and accept full responsibility for my transportation by car.  I understand the risks of non-ambulance transfers and I exonerate the Hospital and its staff from any deterioration in my condition that results from this refusal.    X___________________________________________    DATE  24  TIME________  Signature of patient or legally responsible individual signing on patient behalf           RELATIONSHIP TO PATIENT_________________________          Provider Certification    NAME Misa Zuleta                                        [facility] 1965                              MRN [id number]    A medical screening exam was performed on the above named patient.  Based on the examination:    Condition Necessitating Transfer The primary encounter diagnosis was Suicidal ideation. Diagnoses of Medical clearance for psychiatric admission, Hyperlipidemia, HTN (hypertension), Vitamin D insufficiency, Patellofemoral  pain syndrome of both knees, and Depression were also pertinent to this visit.    Patient Condition: The patient has been stabilized such that within reasonable medical probability, no material deterioration of the patient condition or the condition of the unborn child(juan) is likely to result from the transfer    Reason for Transfer: Other (Include comment)____________________ (Pyschiatric transfer)    Transfer Requirements: Facility Q   Space available and qualified personnel available for treatment as acknowledged by    Agreed to accept transfer and to provide appropriate medical treatment as acknowledged by       TERESA Dawn  Appropriate medical records of the examination and treatment of the patient are provided at the time of transfer   STAFF INITIAL WHEN COMPLETED _______  Transfer will be performed by qualified personnel from [facility]  and appropriate transfer equipment as required, including the use of necessary and appropriate life support measures.    Provider Certification: I have examined the patient and explained the following risks and benefits of being transferred/refusing transfer to the patient/family:  The patient is stable for psychiatric transfer because they are medically stable, and is protected from harming him/herself or others during transport      Based on these reasonable risks and benefits to the patient and/or the unborn child(juan), and based upon the information available at the time of the patient’s examination, I certify that the medical benefits reasonably to be expected from the provision of appropriate medical treatments at another medical facility outweigh the increasing risks, if any, to the individual’s medical condition, and in the case of labor to the unborn child, from effecting the transfer.    X____________________________________________ DATE 07/26/24        TIME_______      ORIGINAL - SEND TO MEDICAL RECORDS   COPY - SEND WITH PATIENT DURING TRANSFER

## 2024-07-26 NOTE — NURSING NOTE
"Pt is a 201 from [facility] ED with increased depression, CAH to hurt self, SI \"if I could get a gun I would shoot myself.\" Pt reports paranoia, feeling that they will be kidnapped, murdered either by being cut into pieces or being shot. Off medications >6 months, reports compliance with BP and cholesterol meds. Pt is hard of hearing in bilateral ears, learning difficulties. Fall Risk: Right hip and low back pain, slightly impaired gait, Fall in January 2024 with fx Right shoulder, per pt. !/2 PPD smoker, no etoh or drug use. Discussed unit rules, schedule, expectations. Pt hopeful to discharge in a few days because they have 2 cats and a turtle at home. Pt states that the  to feed animals while IP, per pt.    Dr. HAQUE made aware of PTA meds and C-SSRS score - Moderate Risk. Denies current SI, room near nurse's station, q 7 minute checks continue. No new orders.   "

## 2024-07-26 NOTE — ED PROVIDER NOTES
History  Chief Complaint   Patient presents with    Suicidal     Pt reports wanting to end her life due to recent life stressors. Would like inpt psych treatment. Per EMS pt stopped taking medications for a couple of months.     HPI  Patient is 58-year-old female with past medical history of bipolar affective disorder, depression, PTSD presenting to ED for crisis evaluation.  Patient reports worsening depression and suicidal ideations.  Patient reports she is very lonely and has no one in her life which makes her depressed and suicidal.  Patient reports if she had access to a gun she would shoot herself in the head.  Patient denies access to firearms.  Patient denies AVH.  Patient denies somatic complaints.  Prior to Admission Medications   Prescriptions Last Dose Informant Patient Reported? Taking?   ARIPiprazole (ABILIFY) 2 mg tablet  Self Yes No   Sig: Take 2 mg by mouth daily   Vitamin D, Cholecalciferol, 25 MCG (1000 UT) CAPS  Self Yes No   Sig: Take by mouth   amLODIPine (NORVASC) 10 mg tablet   No No   Sig: Take 1 tablet (10 mg total) by mouth daily   atorvastatin (LIPITOR) 20 mg tablet   No No   Sig: Take 1 tablet (20 mg total) by mouth daily   benzonatate (TESSALON PERLES) 100 mg capsule  Self No No   Sig: Take 2 capsules (200 mg total) by mouth 3 (three) times a day as needed for cough   Patient not taking: Reported on 7/26/2024   benztropine (COGENTIN) 1 mg tablet  Self Yes No   Sig: Take 1 mg by mouth 2 (two) times a day   escitalopram (LEXAPRO) 20 mg tablet  Self Yes No   Sig: Take 20 mg by mouth daily   fluticasone (FLONASE) 50 mcg/act nasal spray   Yes No   Sig: ONE SPRAY INTO EACH NOSTRIL LATERALLY ONCE DAILY FOR ALLERGIC NASAL SYMPTOMS   lidocaine (Lidoderm) 5 %  Self No No   Sig: Apply 1 patch topically over 12 hours daily Remove & Discard patch within 12 hours or as directed by MD   lisinopril (ZESTRIL) 10 mg tablet   No No   Sig: Take 1 tablet (10 mg total) by mouth daily   methocarbamol  (ROBAXIN) 500 mg tablet   No No   Si po q 8- 12 hrs prn back  muscle spasm   naloxone (NARCAN) 4 mg/0.1 mL nasal spray  Self No No   Sig: Administer 1 spray into a nostril. If no response after 2-3 minutes, give another dose in the other nostril using a new spray.   naproxen (Naprosyn) 500 mg tablet  Self No No   Sig: Take 1 tablet (500 mg total) by mouth 2 (two) times a day with meals   Patient not taking: Reported on 2024   risperiDONE (RisperDAL) 0.5 mg tablet   No No   Sig: Take 1 tablet (0.5 mg total) by mouth daily at bedtime Take one 2mg tab with one 0.5mg tab every night at bedtime for total dose of 2.5mg qhs   Patient not taking: Reported on 2024   risperiDONE (RisperDAL) 2 mg tablet   No No   Sig: Take 1 tablet (2 mg total) by mouth daily at bedtime Take one 2mg tab with one 0.5mg tab every night at bedtime for total dose of 2.5mg qhs   Patient not taking: Reported on 2024   traMADol (Ultram) 50 mg tablet  Self No No   Sig: Take 1 tablet (50 mg total) by mouth every 6 (six) hours as needed for moderate pain   triamcinolone (KENALOG) 0.1 % oral topical paste   No No   Sig: Apply 1 Application topically 3 (three) times a day      Facility-Administered Medications: None       Past Medical History:   Diagnosis Date    Anxiety     Arthritis     Bipolar affective disorder, depressed, severe (HCC) 2019    Depression     Elevated bilirubin 08/15/2019    Hyperlipidemia     Hypertension     Hypokalemia 08/15/2019    Learning difficulty     Leukocytosis 2020    Obesity (BMI 30.0-34.9) 2020    Osteopenia     Ovarian failure     PONV (postoperative nausea and vomiting)     Previous known suicide attempt     Psychiatric disorder     Psychiatric illness     PTSD (post-traumatic stress disorder) 2023       Past Surgical History:   Procedure Laterality Date    HERNIA REPAIR      LAPAROSCOPY      as a child, per patient-normal findings       Family History   Problem Relation Age of  Onset    Alzheimer's disease Mother     Depression Mother     Depression Brother     Bipolar disorder Brother     No Known Problems Maternal Aunt     No Known Problems Paternal Aunt     No Known Problems Maternal Uncle     No Known Problems Paternal Uncle     No Known Problems Cousin     ADD / ADHD Neg Hx     Alcohol abuse Neg Hx     Anxiety disorder Neg Hx     Dementia Neg Hx     Drug abuse Neg Hx     OCD Neg Hx     Paranoid behavior Neg Hx     Schizophrenia Neg Hx     Seizures Neg Hx     Self-Injury Neg Hx     Suicide Attempts Neg Hx      I have reviewed and agree with the history as documented.    E-Cigarette/Vaping    E-Cigarette Use Never User      E-Cigarette/Vaping Substances    Nicotine No     THC No     CBD No     Flavoring No     Other No     Unknown No      Social History     Tobacco Use    Smoking status: Every Day     Current packs/day: 0.50     Average packs/day: 0.5 packs/day for 20.0 years (10.0 ttl pk-yrs)     Types: Cigarettes    Smokeless tobacco: Never   Vaping Use    Vaping status: Never Used   Substance Use Topics    Alcohol use: Not Currently    Drug use: Not Currently        Review of Systems   Constitutional:  Negative for chills and fever.   HENT:  Negative for ear pain and sore throat.    Respiratory:  Negative for cough and shortness of breath.    Cardiovascular:  Negative for chest pain, palpitations and leg swelling.   Gastrointestinal:  Negative for abdominal pain, diarrhea, nausea and vomiting.   Genitourinary:  Negative for dysuria, frequency and hematuria.   Musculoskeletal:  Negative for back pain and neck pain.   Skin:  Negative for rash.   Neurological:  Negative for dizziness, light-headedness and headaches.   Psychiatric/Behavioral:  Positive for suicidal ideas.         SI       Physical Exam  ED Triage Vitals [07/26/24 1028]   Temperature Pulse Respirations Blood Pressure SpO2   98.1 °F (36.7 °C) 93 18 153/93 97 %      Temp src Heart Rate Source Patient Position - Orthostatic  VS BP Location FiO2 (%)   -- Monitor Lying Right arm --      Pain Score       No Pain             Orthostatic Vital Signs  Vitals:    07/26/24 1028   BP: 153/93   Pulse: 93   Patient Position - Orthostatic VS: Lying       Physical Exam  Vitals and nursing note reviewed.   Constitutional:       Appearance: Normal appearance.   HENT:      Head: Normocephalic and atraumatic.      Mouth/Throat:      Mouth: Mucous membranes are moist.   Eyes:      Conjunctiva/sclera: Conjunctivae normal.   Cardiovascular:      Rate and Rhythm: Normal rate and regular rhythm.   Pulmonary:      Effort: Pulmonary effort is normal. No respiratory distress.   Abdominal:      General: Abdomen is flat.   Musculoskeletal:      Right lower leg: No edema.      Left lower leg: No edema.   Skin:     General: Skin is warm and dry.      Capillary Refill: Capillary refill takes less than 2 seconds.   Neurological:      Mental Status: She is alert and oriented to person, place, and time.   Psychiatric:      Comments: Flat affect.  Has baby doll with her which she states is her comfort item as she is unable to have children of her own         ED Medications  Medications - No data to display    Diagnostic Studies  Results Reviewed       Procedure Component Value Units Date/Time    TSH [[id number]]  (Normal) Collected: 07/26/24 1138    Lab Status: Final result Specimen: Blood from Arm, Right Updated: 07/26/24 1246     TSH 3RD GENERATON 1.461 uIU/mL     Rapid drug screen, urine [[id number]]  (Normal) Collected: 07/26/24 1138    Lab Status: Final result Specimen: Urine, Clean Catch Updated: 07/26/24 1213     Amph/Meth UR Negative     Barbiturate Ur Negative     Benzodiazepine Urine Negative     Cocaine Urine Negative     Methadone Urine Negative     Opiate Urine Negative     PCP Ur Negative     THC Urine Negative     Oxycodone Urine Negative     Fentanyl Urine Negative     HYDROCODONE URINE Negative    Narrative:      FOR MEDICAL PURPOSES ONLY.   IF  CONFIRMATION NEEDED PLEASE CONTACT THE LAB WITHIN 5 DAYS.    Drug Screen Cutoff Levels:  AMPHETAMINE/METHAMPHETAMINES  1000 ng/mL  BARBITURATES     200 ng/mL  BENZODIAZEPINES     200 ng/mL  COCAINE      300 ng/mL  METHADONE      300 ng/mL  OPIATES      300 ng/mL  PHENCYCLIDINE     25 ng/mL  THC       50 ng/mL  OXYCODONE      100 ng/mL  FENTANYL      5 ng/mL  HYDROCODONE     300 ng/mL    Comprehensive metabolic panel [[id number]]  (Abnormal) Collected: 07/26/24 1138    Lab Status: Final result Specimen: Blood from Arm, Right Updated: 07/26/24 1207     Sodium 139 mmol/L      Potassium 3.8 mmol/L      Chloride 105 mmol/L      CO2 26 mmol/L      ANION GAP 8 mmol/L      BUN 13 mg/dL      Creatinine 0.65 mg/dL      Glucose 99 mg/dL      Calcium 9.7 mg/dL      AST 14 U/L      ALT 13 U/L      Alkaline Phosphatase 75 U/L      Total Protein 7.6 g/dL      Albumin 4.3 g/dL      Total Bilirubin 1.06 mg/dL      eGFR 97 ml/min/1.73sq m     Narrative:      National Kidney Disease Foundation guidelines for Chronic Kidney Disease (CKD):     Stage 1 with normal or high GFR (GFR > 90 mL/min/1.73 square meters)    Stage 2 Mild CKD (GFR = 60-89 mL/min/1.73 square meters)    Stage 3A Moderate CKD (GFR = 45-59 mL/min/1.73 square meters)    Stage 3B Moderate CKD (GFR = 30-44 mL/min/1.73 square meters)    Stage 4 Severe CKD (GFR = 15-29 mL/min/1.73 square meters)    Stage 5 End Stage CKD (GFR <15 mL/min/1.73 square meters)  Note: GFR calculation is accurate only with a steady state creatinine    UA w Reflex to Microscopic w Reflex to Culture [[id number]] Collected: 07/26/24 1139    Lab Status: Final result Specimen: Urine, Clean Catch Updated: 07/26/24 1153     Color, UA Yellow     Clarity, UA Clear     Specific Gravity, UA 1.018     pH, UA 6.0     Leukocytes, UA Negative     Nitrite, UA Negative     Protein, UA Negative mg/dl      Glucose, UA Negative mg/dl      Ketones, UA Negative mg/dl      Urobilinogen, UA <2.0 mg/dl      Bilirubin, UA  Negative     Occult Blood, UA Negative    CBC and differential [[id number]]  (Abnormal) Collected: 07/26/24 1138    Lab Status: Final result Specimen: Blood from Arm, Right Updated: 07/26/24 1152     WBC 12.07 Thousand/uL      RBC 4.78 Million/uL      Hemoglobin 14.1 g/dL      Hematocrit 41.9 %      MCV 88 fL      MCH 29.5 pg      MCHC 33.7 g/dL      RDW 13.2 %      MPV 10.3 fL      Platelets 287 Thousands/uL      nRBC 0 /100 WBCs      Segmented % 59 %      Immature Grans % 0 %      Lymphocytes % 28 %      Monocytes % 10 %      Eosinophils Relative 3 %      Basophils Relative 0 %      Absolute Neutrophils 7.02 Thousands/µL      Absolute Immature Grans 0.05 Thousand/uL      Absolute Lymphocytes 3.39 Thousands/µL      Absolute Monocytes 1.17 Thousand/µL      Eosinophils Absolute 0.40 Thousand/µL      Basophils Absolute 0.04 Thousands/µL     POCT alcohol breath test [[id number]]  (Normal) Resulted: 07/26/24 1150    Lab Status: Final result Updated: 07/26/24 1150     EXTBreath Alcohol 0                   No orders to display         Procedures  Procedures      ED Course  ED Course as of 07/28/24 1003   Fri Jul 26, 2024   1057 201 signed   1204 WBC(!): 12.07   1204 Leukocytes, UA: Negative   1204 Nitrite, UA: Negative   1204 Blood, UA: Negative   1204 EXTBreath Alcohol: 0   1212 Leukocytes, UA: Negative   1213 Nitrite, UA: Negative   1213 Blood, UA: Negative   1213 Rapid drug screen, urine  Negative    1310 Accepted to [facility] 1345 pickup.    1310 Medically clear for crisis                                        Medical Decision Making  Amount and/or Complexity of Data Reviewed  Labs: ordered. Decision-making details documented in ED Course.    Risk  Decision regarding hospitalization.        Patient is 58 y.o. female with PMH of bipolar affective disorder, depression, PTSD presenting to ED for crisis evaluation.  . See history and physical documented above.     Impression: Suicidal ideation, depression  Plan: [facility]  psych workup, patient would like to sign a 201      View ED course above for further discussion on patient workup.       All labs reviewed and utilized in the medical decision making process  All radiology studies independently viewed by me and interpreted by the radiologist.  I reviewed all testing with the patient.     Transfer to [facility] for inpatient psychiatry.    Disposition  Final diagnoses:   Suicidal ideation   Depression     Time reflects when diagnosis was documented in both MDM as applicable and the Disposition within this note       Time User Action Codes Description Comment    7/26/2024 10:59 AM Chichi Cerda [R45.851] Suicidal ideation     7/26/2024 12:34 PM Demetrius Dawn [Z00.8] Medical clearance for psychiatric admission     7/26/2024 12:35 PM Demetrius Dawn [E78.5] Hyperlipidemia     7/26/2024 12:35 PM Demetrius Dawn [I10] HTN (hypertension)     7/26/2024 12:35 PM Demetrius Dawn [E55.9] Vitamin D insufficiency     7/26/2024 12:35 PM Demetrius Dawn [M22.2X1,  M22.2X2] Patellofemoral pain syndrome of both knees     7/26/2024  1:11 PM Chichi Cerda [F32.A] Depression           ED Disposition       ED Disposition   Transfer to Behavioral Health Condition   --    Date/Time   Fri Jul 26, 2024 10:59 AM    Comment   Misa Zuleta should be transferred out to pending placement and has been medically cleared.               MD Documentation      Flowsheet Row Most Recent Value   Patient Condition The patient has been stabilized such that within reasonable medical probability, no material deterioration of the patient condition or the condition of the unborn child(juan) is likely to result from the transfer   Reason for Transfer Other (Include comment)____________________  [Pyschiatric transfer]   Benefits of Transfer Other benefits (Include comment)_______________________  [Psych transfer]   Risks of Transfer Potential for delay in receiving treatment, Increased  discomfort during transfer   Accepting Physician TERESA Dawn   Accepting Facility Name, [facility]   Transported by (Company and Unit #) [facility]   Sending MD Freire   Provider Certification The patient is stable for psychiatric transfer because they are medically stable, and is protected from harming him/herself or others during transport          RN Documentation      Flowsheet Row Most Recent Value   Accepting Facility Name, [facility]   Transported by (Company and Unit #) [facility]          Follow-up Information    None         Discharge Medication List as of 7/26/2024  2:08 PM        CONTINUE these medications which have NOT CHANGED    Details   amLODIPine (NORVASC) 10 mg tablet Take 1 tablet (10 mg total) by mouth daily, Starting Tue 5/21/2024, Until Mon 8/19/2024, Normal      ARIPiprazole (ABILIFY) 2 mg tablet Take 2 mg by mouth daily, Historical Med      atorvastatin (LIPITOR) 20 mg tablet Take 1 tablet (20 mg total) by mouth daily, Starting Tue 5/21/2024, Normal      benzonatate (TESSALON PERLES) 100 mg capsule Take 2 capsules (200 mg total) by mouth 3 (three) times a day as needed for cough, Starting Fri 3/1/2024, Normal      benztropine (COGENTIN) 1 mg tablet Take 1 mg by mouth 2 (two) times a day, Historical Med      escitalopram (LEXAPRO) 20 mg tablet Take 20 mg by mouth daily, Starting Wed 11/22/2023, Historical Med      fluticasone (FLONASE) 50 mcg/act nasal spray ONE SPRAY INTO EACH NOSTRIL LATERALLY ONCE DAILY FOR ALLERGIC NASAL SYMPTOMS, Historical Med      lidocaine (Lidoderm) 5 % Apply 1 patch topically over 12 hours daily Remove & Discard patch within 12 hours or as directed by MD, Starting Fri 12/8/2023, Normal      lisinopril (ZESTRIL) 10 mg tablet Take 1 tablet (10 mg total) by mouth daily, Starting Tue 5/21/2024, Normal      methocarbamol (ROBAXIN) 500 mg tablet 1 po q 8- 12 hrs prn back  muscle spasm, Normal      naloxone (NARCAN) 4 mg/0.1 mL nasal spray Administer 1 spray into a nostril.  If no response after 2-3 minutes, give another dose in the other nostril using a new spray., Normal      naproxen (Naprosyn) 500 mg tablet Take 1 tablet (500 mg total) by mouth 2 (two) times a day with meals, Starting Wed 2/14/2024, Normal      risperiDONE (RisperDAL) 0.5 mg tablet Take 1 tablet (0.5 mg total) by mouth daily at bedtime Take one 2mg tab with one 0.5mg tab every night at bedtime for total dose of 2.5mg qhs, Starting Mon 4/3/2023, Until Mon 1/22/2024, Normal      risperiDONE (RisperDAL) 2 mg tablet Take 1 tablet (2 mg total) by mouth daily at bedtime Take one 2mg tab with one 0.5mg tab every night at bedtime for total dose of 2.5mg qhs, Starting Mon 4/3/2023, Until Mon 1/22/2024, Normal      traMADol (Ultram) 50 mg tablet Take 1 tablet (50 mg total) by mouth every 6 (six) hours as needed for moderate pain, Starting Fri 1/19/2024, Normal      triamcinolone (KENALOG) 0.1 % oral topical paste Apply 1 Application topically 3 (three) times a day, Starting Tue 5/14/2024, Normal      Vitamin D, Cholecalciferol, 25 MCG (1000 UT) CAPS Take by mouth, Historical Med           No discharge procedures on file.    PDMP Review         Value Time User    PDMP Reviewed  Yes 7/26/2024 12:30 PM Demetrius Dawn PA-C             ED Provider  Attending physically available and evaluated Misa Zuleta. I managed the patient along with the ED Attending.    Electronically Signed by           Chichi Cerda DO  07/28/24 0470

## 2024-07-26 NOTE — ED NOTES
Pt spoke with Beth [phone] at the [facility] who found someone to care for pts pets.

## 2024-07-26 NOTE — ED NOTES
Patient is accepted at [facility]  Patient is accepted by TERESA Sifuentes    Transportation is arranged with [employer].     Waiting for transport time      Nurse report is to be called to [phone] prior to patient transfer.

## 2024-07-27 PROBLEM — E66.3 OVERWEIGHT (BMI 25.0-29.9): Status: ACTIVE | Noted: 2020-11-11

## 2024-07-27 LAB
25(OH)D3 SERPL-MCNC: 28.9 NG/ML (ref 30–100)
ALBUMIN SERPL BCG-MCNC: 4.2 G/DL (ref 3.5–5)
ALP SERPL-CCNC: 68 U/L (ref 34–104)
ALT SERPL W P-5'-P-CCNC: 12 U/L (ref 7–52)
ANION GAP SERPL CALCULATED.3IONS-SCNC: 7 MMOL/L (ref 4–13)
AST SERPL W P-5'-P-CCNC: 12 U/L (ref 13–39)
BASOPHILS # BLD AUTO: 0.05 THOUSANDS/ÂΜL (ref 0–0.1)
BASOPHILS NFR BLD AUTO: 1 % (ref 0–1)
BILIRUB SERPL-MCNC: 1.08 MG/DL (ref 0.2–1)
BUN SERPL-MCNC: 20 MG/DL (ref 5–25)
CALCIUM SERPL-MCNC: 10 MG/DL (ref 8.4–10.2)
CHLORIDE SERPL-SCNC: 105 MMOL/L (ref 96–108)
CHOLEST SERPL-MCNC: 145 MG/DL
CO2 SERPL-SCNC: 28 MMOL/L (ref 21–32)
CREAT SERPL-MCNC: 0.71 MG/DL (ref 0.6–1.3)
EOSINOPHIL # BLD AUTO: 0.39 THOUSAND/ÂΜL (ref 0–0.61)
EOSINOPHIL NFR BLD AUTO: 4 % (ref 0–6)
ERYTHROCYTE [DISTWIDTH] IN BLOOD BY AUTOMATED COUNT: 13.3 % (ref 11.6–15.1)
EST. AVERAGE GLUCOSE BLD GHB EST-MCNC: 117 MG/DL
FOLATE SERPL-MCNC: 7.3 NG/ML
GFR SERPL CREATININE-BSD FRML MDRD: 93 ML/MIN/1.73SQ M
GLUCOSE P FAST SERPL-MCNC: 103 MG/DL (ref 65–99)
GLUCOSE SERPL-MCNC: 103 MG/DL (ref 65–140)
HBA1C MFR BLD: 5.7 %
HCT VFR BLD AUTO: 41 % (ref 34.8–46.1)
HDLC SERPL-MCNC: 44 MG/DL
HGB BLD-MCNC: 13.6 G/DL (ref 11.5–15.4)
IMM GRANULOCYTES # BLD AUTO: 0.02 THOUSAND/UL (ref 0–0.2)
IMM GRANULOCYTES NFR BLD AUTO: 0 % (ref 0–2)
LDLC SERPL CALC-MCNC: 75 MG/DL (ref 0–100)
LYMPHOCYTES # BLD AUTO: 3.81 THOUSANDS/ÂΜL (ref 0.6–4.47)
LYMPHOCYTES NFR BLD AUTO: 37 % (ref 14–44)
MCH RBC QN AUTO: 29.3 PG (ref 26.8–34.3)
MCHC RBC AUTO-ENTMCNC: 33.2 G/DL (ref 31.4–37.4)
MCV RBC AUTO: 88 FL (ref 82–98)
MONOCYTES # BLD AUTO: 1.18 THOUSAND/ÂΜL (ref 0.17–1.22)
MONOCYTES NFR BLD AUTO: 11 % (ref 4–12)
NEUTROPHILS # BLD AUTO: 4.86 THOUSANDS/ÂΜL (ref 1.85–7.62)
NEUTS SEG NFR BLD AUTO: 47 % (ref 43–75)
NONHDLC SERPL-MCNC: 101 MG/DL
NRBC BLD AUTO-RTO: 0 /100 WBCS
PLATELET # BLD AUTO: 278 THOUSANDS/UL (ref 149–390)
PMV BLD AUTO: 11 FL (ref 8.9–12.7)
POTASSIUM SERPL-SCNC: 4.5 MMOL/L (ref 3.5–5.3)
PROT SERPL-MCNC: 7.2 G/DL (ref 6.4–8.4)
RBC # BLD AUTO: 4.64 MILLION/UL (ref 3.81–5.12)
SODIUM SERPL-SCNC: 140 MMOL/L (ref 135–147)
TRIGL SERPL-MCNC: 130 MG/DL
TSH SERPL DL<=0.05 MIU/L-ACNC: 1.98 UIU/ML (ref 0.45–4.5)
VIT B12 SERPL-MCNC: 170 PG/ML (ref 180–914)
WBC # BLD AUTO: 10.31 THOUSAND/UL (ref 4.31–10.16)

## 2024-07-27 PROCEDURE — 85025 COMPLETE CBC W/AUTO DIFF WBC: CPT | Performed by: PHYSICIAN ASSISTANT

## 2024-07-27 PROCEDURE — 83036 HEMOGLOBIN GLYCOSYLATED A1C: CPT | Performed by: PHYSICIAN ASSISTANT

## 2024-07-27 PROCEDURE — 99254 IP/OBS CNSLTJ NEW/EST MOD 60: CPT | Performed by: PHYSICIAN ASSISTANT

## 2024-07-27 PROCEDURE — 80053 COMPREHEN METABOLIC PANEL: CPT | Performed by: PHYSICIAN ASSISTANT

## 2024-07-27 PROCEDURE — 82306 VITAMIN D 25 HYDROXY: CPT | Performed by: PHYSICIAN ASSISTANT

## 2024-07-27 PROCEDURE — 80061 LIPID PANEL: CPT | Performed by: PHYSICIAN ASSISTANT

## 2024-07-27 PROCEDURE — 82746 ASSAY OF FOLIC ACID SERUM: CPT | Performed by: PHYSICIAN ASSISTANT

## 2024-07-27 PROCEDURE — 86780 TREPONEMA PALLIDUM: CPT | Performed by: PHYSICIAN ASSISTANT

## 2024-07-27 PROCEDURE — 82607 VITAMIN B-12: CPT | Performed by: PHYSICIAN ASSISTANT

## 2024-07-27 PROCEDURE — 99223 1ST HOSP IP/OBS HIGH 75: CPT | Performed by: STUDENT IN AN ORGANIZED HEALTH CARE EDUCATION/TRAINING PROGRAM

## 2024-07-27 PROCEDURE — 84443 ASSAY THYROID STIM HORMONE: CPT | Performed by: PHYSICIAN ASSISTANT

## 2024-07-27 RX ORDER — RISPERIDONE 0.5 MG/1
0.5 TABLET ORAL DAILY
Status: DISCONTINUED | OUTPATIENT
Start: 2024-07-27 | End: 2024-08-01 | Stop reason: HOSPADM

## 2024-07-27 RX ORDER — LIDOCAINE 50 MG/G
1 PATCH TOPICAL DAILY
Status: DISCONTINUED | OUTPATIENT
Start: 2024-07-27 | End: 2024-08-01 | Stop reason: HOSPADM

## 2024-07-27 RX ORDER — RISPERIDONE 0.5 MG/1
0.5 TABLET ORAL
Status: DISCONTINUED | OUTPATIENT
Start: 2024-07-27 | End: 2024-07-28

## 2024-07-27 RX ADMIN — LIDOCAINE 5% 1 PATCH: 700 PATCH TOPICAL at 11:04

## 2024-07-27 RX ADMIN — RISPERIDONE 0.5 MG: 0.5 TABLET, FILM COATED ORAL at 12:02

## 2024-07-27 RX ADMIN — IBUPROFEN 800 MG: 800 TABLET, FILM COATED ORAL at 14:40

## 2024-07-27 RX ADMIN — RISPERIDONE 0.5 MG: 0.5 TABLET, FILM COATED ORAL at 21:50

## 2024-07-27 NOTE — CONSULTS
[facility]  Consult  Name: Misa Zuleta 58 y.o. female I MRN: [id number]  Unit/Bed#: -02 I Date of Admission: 7/26/2024   Date of Service: 7/27/2024 I Hospital Day: 1    Inpatient consult for Medical Clearance for  patient  Consult performed by: Christal Pablo PA-C  Consult ordered by: Demetrius Dawn PA-C          Assessment & Plan   Medical clearance for psychiatric admission  Assessment & Plan  Admission labs reviewed, CBC, CMP, lipid panel, TSH acceptable  Folate, hemoglobin A1c, RPR, vitamin B12, vitamin D 25-hydroxy labs pending  Vitals stable   UDS negative  COVID-19 negative  EKG reveals NSR, 57bpm  (1/5/24)  Medically stable for continued inpatient psychiatric treatment based on available results  Please contact [facility] with any questions or concerns      Low back pain  Assessment & Plan  With R-sided sciatica  Continue prn motrin, lidocaine    Overweight (BMI 25.0-29.9)  Assessment & Plan  Recommend dietary changes and lifestyle modifications  Affects all aspects of care    Bipolar disorder (HCC)  Assessment & Plan  Management per psychiatry    Tobacco use  Assessment & Plan  Counseled on cessation  Nicotine replacement therapy while admitted    Hyperlipidemia  Assessment & Plan  Continue Lipitor daily    HTN (hypertension)  Assessment & Plan  Continue Norvasc and lisinopril, continue  Monitor blood pressure per unit protocol           VTE Prophylaxis: VTE Score: 2 Low Risk (Score 0-2) - Encourage Ambulation.    Mobility:      [facility] patient    Recommendations for Discharge:  Discharge timeline per primary team.  Routine follow-up with primary care provider.     Total Time Spent on Date of Encounter in care of patient: 35 mins. This time was spent on one or more of the following: performing physical exam; counseling and coordination of care; obtaining or reviewing history; documenting in the medical record; reviewing/ordering tests, medications or procedures;  communicating with other healthcare professionals and discussing with patient's family/caregivers.    Collaboration of Care: Were Recommendations Directly Discussed with Primary Treatment Team? No    History of Present Illness:  Misa Zuleta is a 58 y.o. female the past medical history of hypertension, hyperlipidemia who is originally admitted to the behavioral health service due to suicidal ideation. We are consulted for management of chronic medical conditions.   Patient reports a past medical history of hypertension for which she takes daily medications.  Patient should continue all prior to admission medications as prescribed by primary care provider/outpatient specialists.  Patient denies recent travel, illness or sick contacts.  Patient denies smoking, drinking or drug use.  Available admission lab work and vitals are acceptable.  Patient feels a baseline physical health with the exception of some low back pain recently.  Patient appears medically stable for inpatient psychiatric treatment at this time. Please contact [facility] with any medical questions or concerns if issues should arise.      Review of Systems:  Review of Systems   Musculoskeletal:  Positive for back pain.   Psychiatric/Behavioral:  Positive for dysphoric mood and suicidal ideas.    All other systems reviewed and are negative.      Past Medical and Surgical History:   Past Medical History:   Diagnosis Date    Anxiety     Arthritis     Bipolar affective disorder, depressed, severe (HCC) 04/28/2019    Depression     Elevated bilirubin 08/15/2019    Hyperlipidemia     Hypertension     Hypokalemia 08/15/2019    Learning difficulty     Leukocytosis 07/28/2020    Obesity (BMI 30.0-34.9) 06/26/2020    Osteopenia     Ovarian failure     PONV (postoperative nausea and vomiting)     Previous known suicide attempt     Psychiatric disorder     Psychiatric illness     PTSD (post-traumatic stress disorder) 02/25/2023       Past Surgical History:   Procedure  Laterality Date    HERNIA REPAIR      LAPAROSCOPY      as a child, per patient-normal findings       Meds/Allergies:  PTA meds:   Prior to Admission Medications   Prescriptions Last Dose Informant Patient Reported? Taking?   ARIPiprazole (ABILIFY) 2 mg tablet Unknown Self Yes No   Sig: Take 2 mg by mouth daily   Vitamin D, Cholecalciferol, 25 MCG (1000 UT) CAPS Unknown Self Yes No   Sig: Take by mouth   amLODIPine (NORVASC) 10 mg tablet 2024  No Yes   Sig: Take 1 tablet (10 mg total) by mouth daily   atorvastatin (LIPITOR) 20 mg tablet 2024  No Yes   Sig: Take 1 tablet (20 mg total) by mouth daily   benzonatate (TESSALON PERLES) 100 mg capsule Not Taking Self No No   Sig: Take 2 capsules (200 mg total) by mouth 3 (three) times a day as needed for cough   Patient not taking: Reported on 2024   benztropine (COGENTIN) 1 mg tablet Unknown Self Yes No   Sig: Take 1 mg by mouth 2 (two) times a day   escitalopram (LEXAPRO) 20 mg tablet Unknown Self Yes No   Sig: Take 20 mg by mouth daily   fluticasone (FLONASE) 50 mcg/act nasal spray Unknown  Yes No   Sig: ONE SPRAY INTO EACH NOSTRIL LATERALLY ONCE DAILY FOR ALLERGIC NASAL SYMPTOMS   lidocaine (Lidoderm) 5 % Unknown Self No No   Sig: Apply 1 patch topically over 12 hours daily Remove & Discard patch within 12 hours or as directed by MD   lisinopril (ZESTRIL) 10 mg tablet 2024  No Yes   Sig: Take 1 tablet (10 mg total) by mouth daily   methocarbamol (ROBAXIN) 500 mg tablet Unknown  No No   Si po q 8- 12 hrs prn back  muscle spasm   naloxone (NARCAN) 4 mg/0.1 mL nasal spray  Self No No   Sig: Administer 1 spray into a nostril. If no response after 2-3 minutes, give another dose in the other nostril using a new spray.   naproxen (Naprosyn) 500 mg tablet Not Taking Self No No   Sig: Take 1 tablet (500 mg total) by mouth 2 (two) times a day with meals   Patient not taking: Reported on 2024   risperiDONE (RisperDAL) 0.5 mg tablet Not Taking  No No    Sig: Take 1 tablet (0.5 mg total) by mouth daily at bedtime Take one 2mg tab with one 0.5mg tab every night at bedtime for total dose of 2.5mg qhs   Patient not taking: Reported on 7/26/2024   risperiDONE (RisperDAL) 2 mg tablet Not Taking  No No   Sig: Take 1 tablet (2 mg total) by mouth daily at bedtime Take one 2mg tab with one 0.5mg tab every night at bedtime for total dose of 2.5mg qhs   Patient not taking: Reported on 7/26/2024   traMADol (Ultram) 50 mg tablet Unknown Self No No   Sig: Take 1 tablet (50 mg total) by mouth every 6 (six) hours as needed for moderate pain   triamcinolone (KENALOG) 0.1 % oral topical paste Unknown  No No   Sig: Apply 1 Application topically 3 (three) times a day      Facility-Administered Medications: None       Allergies:   Allergies   Allergen Reactions    Other      Hay Fever    Oxycodone-Acetaminophen GI Intolerance     No issues with Acetaminophen       Social History:  Marital Status: Single  Substance Use History:   Social History     Substance and Sexual Activity   Alcohol Use Not Currently     Social History     Tobacco Use   Smoking Status Every Day    Current packs/day: 0.50    Average packs/day: 0.5 packs/day for 20.0 years (10.0 ttl pk-yrs)    Types: Cigarettes   Smokeless Tobacco Never     Social History     Substance and Sexual Activity   Drug Use Not Currently       Family History:  Family History   Problem Relation Age of Onset    Alzheimer's disease Mother     Depression Mother     Depression Brother     Bipolar disorder Brother     No Known Problems Maternal Aunt     No Known Problems Paternal Aunt     No Known Problems Maternal Uncle     No Known Problems Paternal Uncle     No Known Problems Cousin     ADD / ADHD Neg Hx     Alcohol abuse Neg Hx     Anxiety disorder Neg Hx     Dementia Neg Hx     Drug abuse Neg Hx     OCD Neg Hx     Paranoid behavior Neg Hx     Schizophrenia Neg Hx     Seizures Neg Hx     Self-Injury Neg Hx     Suicide Attempts Neg Hx   "      Physical Exam:   Vitals:   Blood Pressure: (!) 87/64 (aysmptomatic, encouraged to increase po fluid intake) (07/27/24 0747)  Pulse: 58 (07/27/24 0747)  Temperature: 98.1 °F (36.7 °C) (07/27/24 0747)  Temp Source: Tympanic (07/27/24 0747)  Respirations: 14 (07/27/24 0747)  Height: 5' 3\" (160 cm) (07/26/24 1448)  Weight - Scale: 75.2 kg (165 lb 12.8 oz) (07/26/24 1448)  SpO2: 97 % (07/27/24 0747)    Physical Exam  Vitals and nursing note reviewed.   Constitutional:       General: She is awake. She is not in acute distress.  HENT:      Head: Normocephalic and atraumatic.   Cardiovascular:      Rate and Rhythm: Normal rate and regular rhythm.      Heart sounds: No murmur heard.  Pulmonary:      Effort: Pulmonary effort is normal.      Breath sounds: Normal breath sounds.   Abdominal:      General: There is no distension.      Palpations: Abdomen is soft.   Musculoskeletal:      Right lower leg: No edema.      Left lower leg: No edema.   Skin:     General: Skin is warm and dry.   Neurological:      Mental Status: She is alert.      Comments: CN II-XII grossly intact        Additional Data:   Lab Results:    Results from last 7 days   Lab Units 07/27/24  0557   WBC Thousand/uL 10.31*   HEMOGLOBIN g/dL 13.6   HEMATOCRIT % 41.0   PLATELETS Thousands/uL 278   SEGS PCT % 47   LYMPHO PCT % 37   MONO PCT % 11   EOS PCT % 4     Results from last 7 days   Lab Units 07/27/24  0557   SODIUM mmol/L 140   POTASSIUM mmol/L 4.5   CHLORIDE mmol/L 105   CO2 mmol/L 28   BUN mg/dL 20   CREATININE mg/dL 0.71   ANION GAP mmol/L 7   CALCIUM mg/dL 10.0   ALBUMIN g/dL 4.2   TOTAL BILIRUBIN mg/dL 1.08*   ALK PHOS U/L 68   ALT U/L 12   AST U/L 12*   GLUCOSE RANDOM mg/dL 103             Lab Results   Component Value Date    HGBA1C 6.0 03/20/2024    HGBA1C 5.6 04/29/2019               Imaging: No pertinent imaging reviewed.  No orders to display       EKG, Pathology, and Other Studies Reviewed on Admission:   EKG: NSR. HR 57bpm,  " (1/5/24).    ** Please Note: This note may have been constructed using a voice recognition system. **

## 2024-07-27 NOTE — TREATMENT TEAM
07/27/24 0900   Team Meeting   Meeting Type Daily Rounds   Team Members Present   Team Members Present Physician;Nurse   Physician Team Member Jonathan/Spencer   Nursing Team Member Zoë   Patient/Family Present   Patient Present No   Patient's Family Present No     AM rounds- increased depression and anxiety. CAH, SI, paranoia. [place] bilateral ears. Remains pleasant and cooperative on the unit. Slept well.
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
## 2024-07-27 NOTE — NURSING NOTE
Pt is awake, alert, pleasant and social with peers in Day Room following dinner. Pt provided with personal clothing at bedside. Pt provided with denture cup, efferdent, and fixodent for denture care. Pt reports improvement with anxiety and depression. Currently denies SI, HI, AVH.

## 2024-07-27 NOTE — PLAN OF CARE
Problem: SELF HARM/SUICIDALITY  Goal: Will have no self-injury during hospital stay  Description: INTERVENTIONS:  - Q 15 MINUTES: Routine safety checks  - Q WAKING SHIFT & PRN: Assess risk to determine if routine checks are adequate to maintain patient safety  - Encourage patient to participate actively in care by formulating a plan to combat response to suicidal ideation, identify supports and resources  Outcome: Progressing     Problem: DEPRESSION  Goal: Will be euthymic at discharge  Description: INTERVENTIONS:  - Administer medication as ordered  - Provide emotional support via 1:1 interaction with staff  - Encourage involvement in milieu/groups/activities  - Monitor for social isolation  Outcome: Progressing     Problem: PSYCHOSIS  Goal: Will report no hallucinations or delusions  Description: Interventions:  - Administer medication as  ordered  - Every waking shifts and PRN assess for the presence of hallucinations and or delusions  - Assist with reality testing to support increasing orientation  - Assess if patient's hallucinations or delusions are encouraging self-harm or harm to others and intervene as appropriate  Outcome: Progressing     Problem: ANXIETY  Goal: Will report anxiety at manageable levels  Description: INTERVENTIONS:  - Administer medication as ordered  - Teach and encourage coping skills  - Provide emotional support  - Assess patient/family for anxiety and ability to cope  Outcome: Progressing  Goal: By discharge: Patient will verbalize 2 strategies to deal with anxiety  Description: Interventions:  - Identify any obvious source/trigger to anxiety  - Staff will assist patient in applying identified coping technique/skills  - Encourage attendance of scheduled groups and activities  Outcome: Progressing     Problem: Ineffective Coping  Goal: Identifies healthy coping skills  Outcome: Progressing  Goal: Participates in unit activities  Description: Interventions:  - Provide therapeutic  environment   - Provide required programming   - Redirect inappropriate behaviors   Outcome: Progressing     Problem: Depression  Goal: Treatment Goal: Demonstrate behavioral control of depressive symptoms, verbalize feelings of improved mood/affect, and adopt new coping skills prior to discharge  Outcome: Progressing  Goal: Verbalize thoughts and feelings  Description: Interventions:  - Assess and re-assess patient's level of risk   - Engage patient in 1:1 interactions, daily, for a minimum of 15 minutes   - Encourage patient to express feelings, fears, frustrations, hopes   Outcome: Progressing  Goal: Attend and participate in unit activities, including therapeutic, recreational, and educational groups  Description: Interventions:  - Provide therapeutic and educational activities daily, encourage attendance and participation, and document same in the medical record   Outcome: Progressing     Problem: Anxiety  Goal: Anxiety is at manageable level  Description: Interventions:  - Assess and monitor patient's anxiety level.   - Monitor for signs and symptoms (heart palpitations, chest pain, shortness of breath, headaches, nausea, feeling jumpy, restlessness, irritable, apprehensive).   - Collaborate with interdisciplinary team and initiate plan and interventions as ordered.  - [place] patient to unit/surroundings  - Explain treatment plan  - Encourage participation in care  - Encourage verbalization of concerns/fears  - Identify coping mechanisms  - Assist in developing anxiety-reducing skills  - Administer/offer alternative therapies  - Limit or eliminate stimulants  Outcome: Progressing     Problem: SAFETY ADULT  Goal: Patient will remain free of falls  Description: INTERVENTIONS:  - Educate patient/family on patient safety including physical limitations  - Instruct patient to call for assistance with activity   -- Keep care items and personal belongings within reach  - Initiate and maintain comfort rounds  - Make  Fall Risk Sign visible to staff  - Apply yellow socks and bracelet for high fall risk patients  - Consider moving patient to room near nurses station  Outcome: Progressing

## 2024-07-27 NOTE — CMS CERTIFICATION NOTE
Recertification: Based upon physical, mental and social evaluations, I certify that inpatient psychiatric services continue to be medically necessary for this patient for a duration of 7 midnights for the treatment of  Bipolar disorder (HCC) Available alternative community resources still do not meet the patient's mental health care needs. I further attest that an established written individualized plan of care has been updated and is outlined in the patient's medical records.

## 2024-07-27 NOTE — ASSESSMENT & PLAN NOTE
Admission labs reviewed, CBC, CMP, lipid panel, TSH acceptable  Folate, hemoglobin A1c, RPR, vitamin B12, vitamin D 25-hydroxy labs pending  Vitals stable   UDS negative  COVID-19 negative  EKG reveals NSR, 57bpm  (1/5/24)  Medically stable for continued inpatient psychiatric treatment based on available results  Please contact [facility] with any questions or concerns

## 2024-07-27 NOTE — TREATMENT PLAN
TREATMENT PLAN REVIEW - Behavioral Health Misa Zuleta 58 y.o. 1965 female MRN: [id number]    [facility] QU IP BEHAVIORAL HLTH Room / Bed: [facility] 205/[facility] 205-02 Encounter: [id number]          Admit Date/Time:  7/26/2024  2:29 PM    Treatment Team:   MD Joe Norwood MD Gabriela Morales Samantha Clauser, SHORTY Camp, SHORTY Scott, SHORTY Corrigan, SHORTY Padron, SHORTY Alvarez    Diagnosis: Principal Problem:    Bipolar disorder (HCC)  Active Problems:    HTN (hypertension)    Hyperlipidemia    Tobacco use    Medical clearance for psychiatric admission    Overweight (BMI 25.0-29.9)    Low back pain      Patient Strengths/Assets: cooperative, motivation for treatment/growth, patient is on a voluntary commitment    Patient Barriers/Limitations: limited support system, noncompliant with medication, noncompliant with treatment    Short Term Goals: decrease in depressive symptoms, decrease in anxiety symptoms, decrease in paranoid thoughts, decrease in psychotic symptoms, decrease in suicidal thoughts, improvement in insight, sleep improvement, improvement in appetite, mood stabilization    Long Term Goals: improvement in anxiety, resolution of depressive symptoms, resolution of manic symptoms, stabilization of mood, free of suicidal thoughts, improved insight, acceptance of need for psychiatric medications, acceptance of need for psychiatric treatment, adequate self care, adequate sleep, adequate appetite    Progress Towards Goals: starting psychiatric medications as prescribed, improving, attends groups, mood is stabilizing, less anxious, more appropriate, less depressed    Recommended Treatment: medication management, patient medication education, group therapy, milieu therapy, continued Behavioral Health psychiatric evaluation/assessment process    Treatment Frequency: daily medication monitoring,  group and milieu therapy daily, monitoring through interdisciplinary rounds, monitoring through weekly patient care conferences    Expected Discharge Date:  5-7 days    Discharge Plan: referral for outpatient medication management with a psychiatrist, referral for outpatient psychotherapy    Treatment Plan Created/Updated By: Pauline Castillo MD

## 2024-07-27 NOTE — NURSING NOTE
"Pt awake and in Day Room at start of shift. Completed ADLs this morning. Reports feeling \"groggy\" this morning. Pt with low BP, asymptomatic. Will recheck vitals after breakfast. Pt denies SI, HI, AVH, mild anxiety, depression at this time. Pt hopeful for discharge on Monday to return home to cats and turtle.   "

## 2024-07-28 LAB
ATRIAL RATE: 96 BPM
QRS AXIS: -23 DEGREES
QRSD INTERVAL: 92 MS
QT INTERVAL: 400 MS
QTC INTERVAL: 444 MS
T WAVE AXIS: 54 DEGREES
TREPONEMA PALLIDUM IGG+IGM AB [PRESENCE] IN SERUM OR PLASMA BY IMMUNOASSAY: NORMAL
VENTRICULAR RATE: 74 BPM

## 2024-07-28 PROCEDURE — 99232 SBSQ HOSP IP/OBS MODERATE 35: CPT | Performed by: STUDENT IN AN ORGANIZED HEALTH CARE EDUCATION/TRAINING PROGRAM

## 2024-07-28 PROCEDURE — 93005 ELECTROCARDIOGRAM TRACING: CPT

## 2024-07-28 PROCEDURE — 93010 ELECTROCARDIOGRAM REPORT: CPT | Performed by: INTERNAL MEDICINE

## 2024-07-28 RX ORDER — NAPROXEN 500 MG/1
500 TABLET ORAL 2 TIMES DAILY WITH MEALS
Status: DISCONTINUED | OUTPATIENT
Start: 2024-07-28 | End: 2024-08-01 | Stop reason: HOSPADM

## 2024-07-28 RX ORDER — METHOCARBAMOL 500 MG/1
500 TABLET, FILM COATED ORAL 2 TIMES DAILY PRN
Status: DISCONTINUED | OUTPATIENT
Start: 2024-07-28 | End: 2024-08-01 | Stop reason: HOSPADM

## 2024-07-28 RX ORDER — RISPERIDONE 1 MG/1
1 TABLET ORAL
Status: DISCONTINUED | OUTPATIENT
Start: 2024-07-28 | End: 2024-08-01 | Stop reason: HOSPADM

## 2024-07-28 RX ADMIN — NAPROXEN 500 MG: 500 TABLET ORAL at 09:08

## 2024-07-28 RX ADMIN — NAPROXEN 500 MG: 500 TABLET ORAL at 17:20

## 2024-07-28 RX ADMIN — RISPERIDONE 1 MG: 1 TABLET, FILM COATED ORAL at 21:36

## 2024-07-28 RX ADMIN — RISPERIDONE 0.5 MG: 0.5 TABLET, FILM COATED ORAL at 09:09

## 2024-07-28 NOTE — PROGRESS NOTES
Progress Note - Behavioral Health   Misa Zuleta 58 y.o. female MRN: [id number]  Unit/Bed#: [facility] 205-02 Encounter: [id number]    Assessment & Plan   Principal Problem:    Bipolar disorder (HCC)  Active Problems:    HTN (hypertension)    Hyperlipidemia    Tobacco use    Medical clearance for psychiatric admission    Overweight (BMI 25.0-29.9)    Low back pain      Subjective: Patient was seen, chart was reviewed, and case was discussed with the team. Patient appears calm and cooperative. Reports improvement in mood but continues to have racing thoughts. Sleep was disturbed. Hallucinations have diminished. Patient is compliant with medications. Patient denied adverse effects to their current psychiatric medication regimen. Discussed the importance of continuing to take medications as prescribed, as well as the importance of continuing to attend groups on the unit.    Behavior over the last 24 hours:  improved  Sleep: normal  Appetite: normal  Medication side effects: No    Medical ROS: Pertinent items are noted in HPI.all other systems are negative    Current Medications:  Current Facility-Administered Medications   Medication Dose Route Frequency    aluminum-magnesium hydroxide-simethicone (MAALOX) oral suspension 30 mL  30 mL Oral Q4H PRN    artificial tear ophthalmic ointment   Both Eyes 4x Daily PRN    benztropine (COGENTIN) injection 1 mg  1 mg Intramuscular BID PRN    benztropine (COGENTIN) tablet 1 mg  1 mg Oral BID PRN    bisacodyl (DULCOLAX) rectal suppository 10 mg  10 mg Rectal Daily PRN    hydrOXYzine HCL (ATARAX) tablet 50 mg  50 mg Oral Q6H PRN Max 4/day    Or    diphenhydrAMINE (BENADRYL) injection 50 mg  50 mg Intramuscular Q6H PRN    hydrOXYzine HCL (ATARAX) tablet 100 mg  100 mg Oral Q6H PRN Max 4/day    Or    LORazepam (ATIVAN) injection 2 mg  2 mg Intramuscular Q6H PRN    hydrOXYzine HCL (ATARAX) tablet 25 mg  25 mg Oral Q6H PRN Max 4/day    lidocaine (LIDODERM) 5 % patch 1 patch  1 patch Topical  Daily    magnesium hydroxide (MILK OF MAGNESIA) oral suspension 30 mL  30 mL Oral Daily PRN    methocarbamol (ROBAXIN) tablet 500 mg  500 mg Oral BID PRN    naproxen (NAPROSYN) tablet 500 mg  500 mg Oral BID With Meals    nicotine (NICODERM CQ) 21 mg/24 hr TD 24 hr patch 1 patch  1 patch Transdermal Daily    nicotine polacrilex (NICORETTE) gum 4 mg  4 mg Oral Q2H PRN    OLANZapine (ZyPREXA) tablet 5 mg  5 mg Oral Q4H PRN Max 3/day    Or    OLANZapine (ZyPREXA) IM injection 2.5 mg  2.5 mg Intramuscular Q4H PRN Max 3/day    OLANZapine (ZyPREXA) tablet 5 mg  5 mg Oral Q3H PRN Max 3/day    Or    OLANZapine (ZyPREXA) IM injection 5 mg  5 mg Intramuscular Q3H PRN Max 3/day    OLANZapine (ZyPREXA) tablet 2.5 mg  2.5 mg Oral Q4H PRN Max 6/day    propranolol (INDERAL) tablet 10 mg  10 mg Oral Q8H PRN    risperiDONE (RisperDAL) tablet 0.5 mg  0.5 mg Oral Daily    risperiDONE (RisperDAL) tablet 1 mg  1 mg Oral HS    senna-docusate sodium (SENOKOT S) 8.6-50 mg per tablet 1 tablet  1 tablet Oral Daily PRN    traZODone (DESYREL) tablet 50 mg  50 mg Oral HS PRN       Behavioral Health Medications:   all current active meds have been reviewed.    Vitals:  Vitals:    07/28/24 0732   BP: 103/67   Pulse: 63   Resp: 16   Temp: 98.8 °F (37.1 °C)   SpO2: 98%       Laboratory results:    I have personally reviewed all pertinent laboratory/tests results.  Most Recent Labs:   Lab Results   Component Value Date    WBC 10.31 (H) 07/27/2024    RBC 4.64 07/27/2024    HGB 13.6 07/27/2024    HCT 41.0 07/27/2024     07/27/2024    RDW 13.3 07/27/2024    NEUTROABS 4.86 07/27/2024    SODIUM 140 07/27/2024    K 4.5 07/27/2024     07/27/2024    CO2 28 07/27/2024    BUN 20 07/27/2024    CREATININE 0.71 07/27/2024    GLUC 103 07/27/2024    GLUF 103 (H) 07/27/2024    CALCIUM 10.0 07/27/2024    AST 12 (L) 07/27/2024    ALT 12 07/27/2024    ALKPHOS 68 07/27/2024    TP 7.2 07/27/2024    ALB 4.2 07/27/2024    TBILI 1.08 (H) 07/27/2024     CHOLESTEROL 145 07/27/2024    HDL 44 (L) 07/27/2024    TRIG 130 07/27/2024    LDLCALC 75 07/27/2024    NONHDLC 101 07/27/2024    UUY9RTIDKSFB 1.976 07/27/2024    FREET4 1.35 08/18/2021    PREGUR negative 08/16/2022    PREGSERUM Negative 08/13/2020    RPR Non-Reactive 11/19/2022    HGBA1C 5.7 (H) 07/27/2024     07/27/2024       Mental Status Evaluation:    Appearance:  age appropriate   Behavior:  cooperative   Speech:  normal pitch and normal volume   Mood:  anxious   Affect:  mood-congruent   Thought Process:  goal directed and logical   Thought Content:  normal   Perceptual Disturbances: None   Risk Potential: Suicidal Ideations none  Homicidal Ideations none  Potential for Aggression No   Sensorium:  person, place, and time/date   Memory:  recent and remote memory grossly intact   Consciousness:  alert and awake    Attention: attention span appeared shorter than expected for age   Insight:  fair   Judgment: fair   Gait/Station: normal gait/station   Motor Activity: no abnormal movements       Progress Toward Goals: Progressing. Patient is not at goal. They are not yet ready for discharge. The patient's condition currently requires active psychopharmacological medication management, interdisciplinary coordination with case management, and the utilization of adjunctive milieu and group therapy to augment psychopharmacological efficacy. The patient's risk of morbidity, and progression or decompensation of psychiatric disease, is higher without this current treatment.    Recommended Treatment: Continue with pharmacotherapy, group therapy, milieu therapy and occupational therapy.    1.Increase Risperdal to 1 mg PO HS  Risks, benefits and possible side effects of Medications:   Risks, benefits, alternatives, and possible side effects of patient's psychiatric medications were discussed with patient.

## 2024-07-28 NOTE — NURSING NOTE
Pleasant, visible on unit, social with peers. Reports overall mood improvement, denies anxiety, SI/HI/AVH. Demonstrates good insight, contributes current hospital admission to medication noncompliance. Patient able to accurately identify warning signs leading up to admission including isolation, poor concentration, and misplacing items. PTA, patient states she lost her wallet and locked herself out of her phone. Patient then informed a resident nurse that she believed she needed to go to the hospital. Patient currently preoccupied about obtaining wallet contents, including new state identification. RN and patient discussed importance of medication compliance. Patient complaining of ongoing back pain, b/l knee pain, and R leg pain. Patient has dx arthritis. Informed RN that PTA she took Robaxin 500 mg 1 po prn q8-12 hours and Naproxen 500 bid. RN notified MD Christal Pablo via Epic Secure Chat. RN also applied several heat packs to area. Plan of care ongoing.

## 2024-07-28 NOTE — PLAN OF CARE
Problem: SELF HARM/SUICIDALITY  Goal: Will have no self-injury during hospital stay  Description: INTERVENTIONS:  - Q 15 MINUTES: Routine safety checks  - Q WAKING SHIFT & PRN: Assess risk to determine if routine checks are adequate to maintain patient safety  - Encourage patient to participate actively in care by formulating a plan to combat response to suicidal ideation, identify supports and resources  Outcome: Progressing     Problem: DEPRESSION  Goal: Will be euthymic at discharge  Description: INTERVENTIONS:  - Administer medication as ordered  - Provide emotional support via 1:1 interaction with staff  - Encourage involvement in milieu/groups/activities  - Monitor for social isolation  Outcome: Progressing     Problem: PSYCHOSIS  Goal: Will report no hallucinations or delusions  Description: Interventions:  - Administer medication as  ordered  - Every waking shifts and PRN assess for the presence of hallucinations and or delusions  - Assist with reality testing to support increasing orientation  - Assess if patient's hallucinations or delusions are encouraging self-harm or harm to others and intervene as appropriate  Outcome: Progressing     Problem: ANXIETY  Goal: Will report anxiety at manageable levels  Description: INTERVENTIONS:  - Administer medication as ordered  - Teach and encourage coping skills  - Provide emotional support  - Assess patient/family for anxiety and ability to cope  Outcome: Progressing  Goal: By discharge: Patient will verbalize 2 strategies to deal with anxiety  Description: Interventions:  - Identify any obvious source/trigger to anxiety  - Staff will assist patient in applying identified coping technique/skills  - Encourage attendance of scheduled groups and activities  Outcome: Progressing     Problem: Ineffective Coping  Goal: Identifies healthy coping skills  Outcome: Progressing  Goal: Participates in unit activities  Description: Interventions:  - Provide therapeutic  environment   - Provide required programming   - Redirect inappropriate behaviors   Outcome: Progressing     Problem: Ineffective Coping  Goal: Identifies healthy coping skills  Outcome: Progressing  Goal: Participates in unit activities  Description: Interventions:  - Provide therapeutic environment   - Provide required programming   - Redirect inappropriate behaviors   Outcome: Progressing     Problem: Depression  Goal: Treatment Goal: Demonstrate behavioral control of depressive symptoms, verbalize feelings of improved mood/affect, and adopt new coping skills prior to discharge  Outcome: Progressing  Goal: Verbalize thoughts and feelings  Description: Interventions:  - Assess and re-assess patient's level of risk   - Engage patient in 1:1 interactions, daily, for a minimum of 15 minutes   - Encourage patient to express feelings, fears, frustrations, hopes   Outcome: Progressing  Goal: Attend and participate in unit activities, including therapeutic, recreational, and educational groups  Description: Interventions:  - Provide therapeutic and educational activities daily, encourage attendance and participation, and document same in the medical record   Outcome: Progressing     Problem: Anxiety  Goal: Anxiety is at manageable level  Description: Interventions:  - Assess and monitor patient's anxiety level.   - Monitor for signs and symptoms (heart palpitations, chest pain, shortness of breath, headaches, nausea, feeling jumpy, restlessness, irritable, apprehensive).   - Collaborate with interdisciplinary team and initiate plan and interventions as ordered.  - [place] patient to unit/surroundings  - Explain treatment plan  - Encourage participation in care  - Encourage verbalization of concerns/fears  - Identify coping mechanisms  - Assist in developing anxiety-reducing skills  - Administer/offer alternative therapies  - Limit or eliminate stimulants  Outcome: Progressing     Problem: SAFETY ADULT  Goal: Patient will  remain free of falls  Description: INTERVENTIONS:  - Educate patient/family on patient safety including physical limitations  - Instruct patient to call for assistance with activity   -- Keep care items and personal belongings within reach  - Initiate and maintain comfort rounds  - Make Fall Risk Sign visible to staff  - Apply yellow socks and bracelet for high fall risk patients  - Consider moving patient to room near nurses station  Outcome: Progressing

## 2024-07-28 NOTE — TREATMENT TEAM
07/28/24 0800   Team Meeting   Meeting Type Daily Rounds   Team Members Present   Team Members Present Physician;Nurse   Physician Team Member Jonathan / Korey   Nursing Team Member Sonia   Patient/Family Present   Patient Present No   Patient's Family Present No     AM Rounds:   Fall Risk - pain, unsteady gait, dentures (top), Denies SI, HI, AVH, Mild anxiety, depression.  PRN Motrin for Right hip, back pain. Wants discharge on Monday to return home to cats and turtle.

## 2024-07-29 PROCEDURE — 99232 SBSQ HOSP IP/OBS MODERATE 35: CPT | Performed by: STUDENT IN AN ORGANIZED HEALTH CARE EDUCATION/TRAINING PROGRAM

## 2024-07-29 RX ADMIN — RISPERIDONE 0.5 MG: 0.5 TABLET, FILM COATED ORAL at 09:09

## 2024-07-29 RX ADMIN — RISPERIDONE 1 MG: 1 TABLET, FILM COATED ORAL at 21:32

## 2024-07-29 RX ADMIN — NAPROXEN 500 MG: 500 TABLET ORAL at 09:09

## 2024-07-29 RX ADMIN — NAPROXEN 500 MG: 500 TABLET ORAL at 17:23

## 2024-07-29 NOTE — CASE MANAGEMENT
Confirmed Address:    [facility]: [address] [place] [id number]    [place]   Resides in the home with:   Pt stated that she lives alone.    Will Return Home at Discharge:   Pt stated she can return.    Confirmed Phone Number:   [phone]   Confirmed Email Address:   [email or web address]    Marital Status:  Children: Single  None   Family/Social Supports:    History of Mental Health:  Pt stated she doesn't have many supports due to not having friends and family.     N/a   Commitment Status:    Status Changes:  201   Admitted from:   [facility] ED   Presenting C/O:         Pt stated about 7-8 months ago she stopped taking her medication due to not feeling it was working much. Pt stated that she does ok for a little while but then she starts to decompensate little by little. Pt stated that she is gets anxious and angry. Pt stated that she starts to make irrational decisions and has delusions. Pt stated that she has a hard time concentrating. Pt stated she got scammed for $1000s of dollars from people online. Pt stated that she lost her wallet and has been locked out of her phone recently as well. Pt stated that she started to have SI and feel she is not good enough all around. Pt stated that she went to the nurse at her residential facility and they said that they are going to get her to the hospital. Pt stated that she has had two nightmares since being on the unit. She stated one was about her being kidnapped and killed and another about someone she is unaware of getting killed. Pt stated that she is starting to feel better now that she is on her medication again.      Past Inpatient Tx:   Pt stated that she was inpatient in the past.    Past Suicide Attempts:   Pt stated none.    Current outpatient:    Psychiatrist:   Pt stated she is scheduled to see [facility] but isn't sure if it is just for therapy or psychiatry as well.    Therapist:   N/a   ACT/ICM/CPS/WRT/SC:   N/a   PCP:   [facility] [address]    Med Hx/Concern:   Pt stated that she has arthritis, high blood sugars, and high cholesterol.    Medications:   Pt stated that she wasn't taking them for a long time.    Pharmacy:   Pt stated that she uses [facility].    Spirituality/Presybeterian:   None   Education:   Pt stated that she was a high school dropout.    Work/Income:   Pt stated she gets SSI.    Legal:     Probation/[place] Ofc: Pt stated none.    Access to Firearms:   Pt stated none.    Transportation:   Pt stated she uses the bus or Uber.    Strength:   Pt stated she is independent.    Coping Skills:   Pt stated that she walks, listens to music, and does puzzles.    Goal:   Pt stated she wants to get back to where she was.   Referrals Needed:     CM will see if Pt is connected with [facility] through [facility]'s.   Transport at Discharge:   Pt stated that she will need a ride.    Emergency Contact:    N/a   ROIs obtained:     None at this time.    Insurance:    [employer]   IMM:  Signed.    Audit: 0 PAWSS: 0 BAT: 0 UDS: Negative   Substance Abuse: Freq. Amount Last Use Notes:   Heroin    N/a   Amp/Meth    N/a   Alcohol    N/a   Cocaine    N/a   Cannabis    N/a   Benzodiazepine    N/a   Barbituartes    N/a   Other    N/a   Tobacco    N/a     Pt reviewed and signed treatment plan.

## 2024-07-29 NOTE — PROGRESS NOTES
"   07/29/24 0750   Team Meeting   Meeting Type Daily Rounds   Team Members Present   Team Members Present Physician;Nurse;;Other (Discipline and Name)   Physician Team Member Dr. Castillo / TERENCE Dawn / Dr. Grant / PA Student   Nursing Team Member Epi / Derik   Care Management Team Member Min / [place] / Abbey / Luis Fernando   Other (Discipline and Name) Tate (Group Facilitator)   Patient/Family Present   Patient Present No   Patient's Family Present No       Status: Per admission note:     \"Pt is a [facility] from [facility] ED with increased depression, CAH to hurt self, SI \"if I could get a gun I would shoot myself.\" Pt reports paranoia, feeling that they will be kidnapped, murdered either by being cut into pieces or being shot. Off medications >6 months, reports compliance with BP and cholesterol meds. Pt is hard of hearing in bilateral ears, learning difficulties. Fall Risk: Right hip and low back pain, slightly impaired gait, Fall in January 2024 with fx Right shoulder, per pt. !/2 PPD smoker, no etoh or drug use. Discussed unit rules, schedule, expectations. Pt hopeful to discharge in a few days because they have 2 cats and a turtle at home. Pt states that the  to feed animals while IP, per pt.\"    Pt is calm and cooperative. Pt slept through the night.   Readmit score: 28(red), AUDIT: 0, PAWSS: 0, BAT: 0, UDS: Negative    Medication: No PRNs needed.     D/C: No discharge date.   "

## 2024-07-29 NOTE — PLAN OF CARE
Problem: SELF HARM/SUICIDALITY  Goal: Will have no self-injury during hospital stay  Description: INTERVENTIONS:  - Q 15 MINUTES: Routine safety checks  - Q WAKING SHIFT & PRN: Assess risk to determine if routine checks are adequate to maintain patient safety  - Encourage patient to participate actively in care by formulating a plan to combat response to suicidal ideation, identify supports and resources  Outcome: Progressing     Problem: DEPRESSION  Goal: Will be euthymic at discharge  Description: INTERVENTIONS:  - Administer medication as ordered  - Provide emotional support via 1:1 interaction with staff  - Encourage involvement in milieu/groups/activities  - Monitor for social isolation  Outcome: Progressing     Problem: PSYCHOSIS  Goal: Will report no hallucinations or delusions  Description: Interventions:  - Administer medication as  ordered  - Every waking shifts and PRN assess for the presence of hallucinations and or delusions  - Assist with reality testing to support increasing orientation  - Assess if patient's hallucinations or delusions are encouraging self-harm or harm to others and intervene as appropriate  Outcome: Progressing     Problem: ANXIETY  Goal: Will report anxiety at manageable levels  Description: INTERVENTIONS:  - Administer medication as ordered  - Teach and encourage coping skills  - Provide emotional support  - Assess patient/family for anxiety and ability to cope  Outcome: Progressing  Goal: By discharge: Patient will verbalize 2 strategies to deal with anxiety  Description: Interventions:  - Identify any obvious source/trigger to anxiety  - Staff will assist patient in applying identified coping technique/skills  - Encourage attendance of scheduled groups and activities  Outcome: Progressing     Problem: Ineffective Coping  Goal: Identifies healthy coping skills  Outcome: Progressing  Goal: Participates in unit activities  Description: Interventions:  - Provide therapeutic  environment   - Provide required programming   - Redirect inappropriate behaviors   Outcome: Progressing     Problem: Depression  Goal: Treatment Goal: Demonstrate behavioral control of depressive symptoms, verbalize feelings of improved mood/affect, and adopt new coping skills prior to discharge  Outcome: Progressing  Goal: Verbalize thoughts and feelings  Description: Interventions:  - Assess and re-assess patient's level of risk   - Engage patient in 1:1 interactions, daily, for a minimum of 15 minutes   - Encourage patient to express feelings, fears, frustrations, hopes   Outcome: Progressing  Goal: Attend and participate in unit activities, including therapeutic, recreational, and educational groups  Description: Interventions:  - Provide therapeutic and educational activities daily, encourage attendance and participation, and document same in the medical record   Outcome: Progressing     Problem: Anxiety  Goal: Anxiety is at manageable level  Description: Interventions:  - Assess and monitor patient's anxiety level.   - Monitor for signs and symptoms (heart palpitations, chest pain, shortness of breath, headaches, nausea, feeling jumpy, restlessness, irritable, apprehensive).   - Collaborate with interdisciplinary team and initiate plan and interventions as ordered.  - Rhodell patient to unit/surroundings  - Explain treatment plan  - Encourage participation in care  - Encourage verbalization of concerns/fears  - Identify coping mechanisms  - Assist in developing anxiety-reducing skills  - Administer/offer alternative therapies  - Limit or eliminate stimulants  Outcome: Progressing     Problem: SAFETY ADULT  Goal: Patient will remain free of falls  Description: INTERVENTIONS:  - Educate patient/family on patient safety including physical limitations  - Instruct patient to call for assistance with activity   -- Keep care items and personal belongings within reach  - Initiate and maintain comfort rounds  - Make  Fall Risk Sign visible to staff  - Apply yellow socks and bracelet for high fall risk patients  - Consider moving patient to room near nurses station  Outcome: Progressing     Problem: DISCHARGE PLANNING  Goal: Discharge to home or other facility with appropriate resources  Description: INTERVENTIONS:  - Identify barriers to discharge w/patient and caregiver  - Arrange for needed discharge resources and transportation as appropriate  - Identify discharge learning needs (meds, wound care, etc.)  - Arrange for interpretive services to assist at discharge as needed  - Refer to Case Management Department for coordinating discharge planning if the patient needs post-hospital services based on physician/advanced practitioner order or complex needs related to functional status, cognitive ability, or social support system  Outcome: Progressing

## 2024-07-29 NOTE — PLAN OF CARE
Problem: DISCHARGE PLANNING  Goal: Discharge to home or other facility with appropriate resources  Description: INTERVENTIONS:  - Identify barriers to discharge w/patient and caregiver  - Arrange for needed discharge resources and transportation as appropriate  - Identify discharge learning needs (meds, wound care, etc.)  - Arrange for interpretive services to assist at discharge as needed  - Refer to Case Management Department for coordinating discharge planning if the patient needs post-hospital services based on physician/advanced practitioner order or complex needs related to functional status, cognitive ability, or social support system  7/29/2024 1346 by Moe Copeland  Outcome: Progressing  Note: Pt met with CM to review and sign ROIs and complete intake. Pt read and signed treatment plan.   7/29/2024 1313 by Moe Copeland  Outcome: Progressing  Note: Pt met with CM to review and sign ROIs and complete intake.

## 2024-07-29 NOTE — PROGRESS NOTES
07/29/24 0900   Team Meeting   Meeting Type Tx Team Meeting   Initial Conference Date 07/29/24   Next Conference Date 08/29/24   Team Members Present   Team Members Present Physician;Nurse;   Physician Team Member Dr. Castillo   Nursing Team Member Derik   Care Management Team Member Min   Patient/Family Present   Patient Present No  (Pt declined to meet with treatment team.)   Patient's Family Present No       Reviewed diagnosis of bipolar disorder.  Discussed short term goals of decrease in depressive symptoms, decrease in anxiety symptoms, decrease in paranoid thoughts, decrease in psychotic symptoms, decrease in suicidal thoughts, improvement in insight, sleep improvement, improvement in appetite, mood stabilization.  No discharge date set at this time.  All parties are in agreement and treatment plan was signed.

## 2024-07-29 NOTE — PROGRESS NOTES
Progress Note - Behavioral Health   Misa Zuleta 58 y.o. female MRN: [id number]  Unit/Bed#: [facility] 205-02 Encounter: [id number]    Assessment & Plan   Principal Problem:    Bipolar disorder (HCC)  Active Problems:    HTN (hypertension)    Hyperlipidemia    Tobacco use    Medical clearance for psychiatric admission    Overweight (BMI 25.0-29.9)    Low back pain      Subjective: Patient was seen, chart was reviewed, and case was discussed with the team. Patient appears calm, cooperative, slow and soft. Reports improvement in mood. Voices have diminished. Sleep is good. She is feeling drowsy and tired this morning. Patient is compliant with medications. Patient denied adverse effects to their current psychiatric medication regimen. Discussed the importance of continuing to take medications as prescribed, as well as the importance of continuing to attend groups on the unit.    Behavior over the last 24 hours:  improved  Sleep: normal  Appetite: normal  Medication side effects: No    Medical ROS: Pertinent items are noted in HPI.all other systems are negative    Current Medications:  Current Facility-Administered Medications   Medication Dose Route Frequency    aluminum-magnesium hydroxide-simethicone (MAALOX) oral suspension 30 mL  30 mL Oral Q4H PRN    artificial tear ophthalmic ointment   Both Eyes 4x Daily PRN    benztropine (COGENTIN) injection 1 mg  1 mg Intramuscular BID PRN    benztropine (COGENTIN) tablet 1 mg  1 mg Oral BID PRN    bisacodyl (DULCOLAX) rectal suppository 10 mg  10 mg Rectal Daily PRN    hydrOXYzine HCL (ATARAX) tablet 50 mg  50 mg Oral Q6H PRN Max 4/day    Or    diphenhydrAMINE (BENADRYL) injection 50 mg  50 mg Intramuscular Q6H PRN    hydrOXYzine HCL (ATARAX) tablet 100 mg  100 mg Oral Q6H PRN Max 4/day    Or    LORazepam (ATIVAN) injection 2 mg  2 mg Intramuscular Q6H PRN    hydrOXYzine HCL (ATARAX) tablet 25 mg  25 mg Oral Q6H PRN Max 4/day    lidocaine (LIDODERM) 5 % patch 1 patch  1 patch  Topical Daily    magnesium hydroxide (MILK OF MAGNESIA) oral suspension 30 mL  30 mL Oral Daily PRN    methocarbamol (ROBAXIN) tablet 500 mg  500 mg Oral BID PRN    naproxen (NAPROSYN) tablet 500 mg  500 mg Oral BID With Meals    nicotine (NICODERM CQ) 21 mg/24 hr TD 24 hr patch 1 patch  1 patch Transdermal Daily    nicotine polacrilex (NICORETTE) gum 4 mg  4 mg Oral Q2H PRN    OLANZapine (ZyPREXA) tablet 5 mg  5 mg Oral Q4H PRN Max 3/day    Or    OLANZapine (ZyPREXA) IM injection 2.5 mg  2.5 mg Intramuscular Q4H PRN Max 3/day    OLANZapine (ZyPREXA) tablet 5 mg  5 mg Oral Q3H PRN Max 3/day    Or    OLANZapine (ZyPREXA) IM injection 5 mg  5 mg Intramuscular Q3H PRN Max 3/day    OLANZapine (ZyPREXA) tablet 2.5 mg  2.5 mg Oral Q4H PRN Max 6/day    propranolol (INDERAL) tablet 10 mg  10 mg Oral Q8H PRN    risperiDONE (RisperDAL) tablet 0.5 mg  0.5 mg Oral Daily    risperiDONE (RisperDAL) tablet 1 mg  1 mg Oral HS    senna-docusate sodium (SENOKOT S) 8.6-50 mg per tablet 1 tablet  1 tablet Oral Daily PRN    traZODone (DESYREL) tablet 50 mg  50 mg Oral HS PRN       Behavioral Health Medications:   all current active meds have been reviewed.    Vitals:  Vitals:    07/29/24 0733   BP: 113/59   Pulse: 58   Resp: 16   Temp: 97.5 °F (36.4 °C)   SpO2: 99%       Laboratory results:    I have personally reviewed all pertinent laboratory/tests results.  Most Recent Labs:   Lab Results   Component Value Date    WBC 10.31 (H) 07/27/2024    RBC 4.64 07/27/2024    HGB 13.6 07/27/2024    HCT 41.0 07/27/2024     07/27/2024    RDW 13.3 07/27/2024    NEUTROABS 4.86 07/27/2024    SODIUM 140 07/27/2024    K 4.5 07/27/2024     07/27/2024    CO2 28 07/27/2024    BUN 20 07/27/2024    CREATININE 0.71 07/27/2024    GLUC 103 07/27/2024    GLUF 103 (H) 07/27/2024    CALCIUM 10.0 07/27/2024    AST 12 (L) 07/27/2024    ALT 12 07/27/2024    ALKPHOS 68 07/27/2024    TP 7.2 07/27/2024    ALB 4.2 07/27/2024    TBILI 1.08 (H) 07/27/2024     "CHOLESTEROL 145 07/27/2024    HDL 44 (L) 07/27/2024    TRIG 130 07/27/2024    LDLCALC 75 07/27/2024    NONHDLC 101 07/27/2024    OOQ3HYTSBNVS 1.976 07/27/2024    FREET4 1.35 08/18/2021    PREGUR negative 08/16/2022    PREGSERUM Negative 08/13/2020    RPR Non-Reactive 11/19/2022    HGBA1C 5.7 (H) 07/27/2024     07/27/2024       Mental Status Evaluation:    Appearance:  age appropriate   Behavior:  cooperative   Speech:  soft and slow   Mood:  \"Drosy\"   Affect:  mood-congruent   Thought Process:  goal directed and logical   Thought Content:  normal   Perceptual Disturbances: None   Risk Potential: Suicidal Ideations none  Homicidal Ideations none  Potential for Aggression No   Sensorium:  person, place, and time/date   Memory:  recent and remote memory grossly intact   Consciousness:  alert and awake    Attention: attention span appeared shorter than expected for age   Insight:  fair   Judgment: fair   Gait/Station: normal gait/station   Motor Activity: no abnormal movements       Progress Toward Goals: Progressing. Patient is not at goal. They are not yet ready for discharge. The patient's condition currently requires active psychopharmacological medication management, interdisciplinary coordination with case management, and the utilization of adjunctive milieu and group therapy to augment psychopharmacological efficacy. The patient's risk of morbidity, and progression or decompensation of psychiatric disease, is higher without this current treatment.    Recommended Treatment: Continue with pharmacotherapy, group therapy, milieu therapy and occupational therapy.    Risks, benefits and possible side effects of Medications:   Risks, benefits, alternatives, and possible side effects of patient's psychiatric medications were discussed with patient.      "

## 2024-07-29 NOTE — NURSING NOTE
Patient pleasant and cooperative. Reports feeling tired following medication adjustments. Patient denies anxiety or depression. Denies SI/HI/AVH. Patient attending groups this morning. Deneis any unmet needs at this time.

## 2024-07-30 LAB
ATRIAL RATE: 53 BPM
P AXIS: 0 DEGREES
PR INTERVAL: 120 MS
QRS AXIS: 2 DEGREES
QRSD INTERVAL: 92 MS
QT INTERVAL: 452 MS
QTC INTERVAL: 424 MS
T WAVE AXIS: 28 DEGREES
VENTRICULAR RATE: 53 BPM

## 2024-07-30 PROCEDURE — 93010 ELECTROCARDIOGRAM REPORT: CPT | Performed by: INTERNAL MEDICINE

## 2024-07-30 PROCEDURE — 99232 SBSQ HOSP IP/OBS MODERATE 35: CPT | Performed by: STUDENT IN AN ORGANIZED HEALTH CARE EDUCATION/TRAINING PROGRAM

## 2024-07-30 RX ADMIN — NAPROXEN 500 MG: 500 TABLET ORAL at 16:44

## 2024-07-30 RX ADMIN — RISPERIDONE 0.5 MG: 0.5 TABLET, FILM COATED ORAL at 09:08

## 2024-07-30 RX ADMIN — NAPROXEN 500 MG: 500 TABLET ORAL at 09:07

## 2024-07-30 RX ADMIN — RISPERIDONE 1 MG: 1 TABLET, FILM COATED ORAL at 21:22

## 2024-07-30 NOTE — NURSING NOTE
Stated feeling much improved since admission smiling and pleasant will come to staff if feeling unsafe, Denies SI  or AVH

## 2024-07-30 NOTE — PROGRESS NOTES
"Progress Note - Behavioral Health     Misa Zuleta 58 y.o. female MRN: [id number]   Unit/Bed#: [facility] 205-02 Encounter: [id number]    Behavior over the last 24 hours: slowly improving.     Misa is a 58-year-old female with a history of bipolar disorder who presents for psychiatric follow-up.  Staff reports no behavioral issues overnight.  She is pleasant, calm and cooperative upon approach.  She states, \"the voices are a lot better,\" and endorses fewer racing thoughts, improved mood and a generally more positive outlook on the future.  She also offers better insight into her psychiatric issues, as well as the events which contributed to current hospital admission, stating \"I was off my medications for a long time, like 7 or 8 months.  I was not seeing my psychiatrist and my therapist.  It slowly just got worse.\"  She expresses contentment at recognizing her need for treatment and presents as more hopeful for the future, expressing desire to continue medications, participate in aftercare as an outpatient, and spend time with her 2 pet cats and 1 pet turtle.  Denies any SI or HI.  Denies any AH or VH.    Sleep: normal  Appetite: normal  Medication side effects: No   ROS: all other systems are negative    Mental Status Evaluation:    Appearance: Hospital attire, appears consistent with stated age, normal grooming  Motor: no psychomotor disturbances, no gait abnormalities  Behavior: Pleasant, calm, cooperative, interacts with this writer appropriately  Speech: Slow, normal volume  Mood: \"I feel better\"  Affect: Brighter, more appropriate, normal range and intensity, mood-congruent  Thought Process: organized, linear, and goal-oriented; intact associations  Thought Content: denies any delusional material, no preoccupation  Perception: denies any auditory or visual hallucinations, denies other perceptual disturbances  Risk Potential: denies suicidal ideation, plan, or intent. Denies homicidal ideation  Sensorium: Oriented " to person, place, time, and situation  Cognition: cognitive ability appears intact but was not quantitatively tested  Consciousness: alert and awake  Attention/Concentration: able to focus without difficulty, attention and concentration are age appropriate  Insight: Improving  Judgement: Fair    Vital signs in last 24 hours:    Temp:  [97 °F (36.1 °C)-98.2 °F (36.8 °C)] 98.2 °F (36.8 °C)  HR:  [80-81] 81  Resp:  [16-18] 18  BP: (108-112)/(75-82) 108/82    Laboratory results: I have personally reviewed all pertinent laboratory/tests results    Results from the past 24 hours: No results found for this or any previous visit (from the past 24 hour(s)).  Most Recent Labs:   Lab Results   Component Value Date    WBC 10.31 (H) 07/27/2024    RBC 4.64 07/27/2024    HGB 13.6 07/27/2024    HCT 41.0 07/27/2024     07/27/2024    RDW 13.3 07/27/2024    NEUTROABS 4.86 07/27/2024    SODIUM 140 07/27/2024    K 4.5 07/27/2024     07/27/2024    CO2 28 07/27/2024    BUN 20 07/27/2024    CREATININE 0.71 07/27/2024    GLUC 103 07/27/2024    CALCIUM 10.0 07/27/2024    AST 12 (L) 07/27/2024    ALT 12 07/27/2024    ALKPHOS 68 07/27/2024    TP 7.2 07/27/2024    ALB 4.2 07/27/2024    TBILI 1.08 (H) 07/27/2024    CHOLESTEROL 145 07/27/2024    HDL 44 (L) 07/27/2024    TRIG 130 07/27/2024    LDLCALC 75 07/27/2024    NONHDLC 101 07/27/2024    [id number] 1.976 07/27/2024    FREET4 1.35 08/18/2021    PREGUR negative 08/16/2022    PREGSERUM Negative 08/13/2020    SYPHILISAB Non-reactive 07/27/2024    HGBA1C 5.7 (H) 07/27/2024     07/27/2024       Progress Toward Goals: progressing    Assessment & Plan   Principal Problem:    Bipolar disorder (HCC)  Active Problems:    HTN (hypertension)    Hyperlipidemia    Tobacco use    Medical clearance for psychiatric admission    Overweight (BMI 25.0-29.9)    Low back pain      Recommended Treatment:     Planned medication and treatment changes:    All current active medications have been  reviewed  Encourage group therapy, milieu therapy and occupational therapy  Behavioral Health checks every 7 minutes    Continue current medications.  Patient appears to be making progress, monitor for continued stability.  Can likely discharge by Thursday or Friday, barring any behavioral setbacks.    Current Facility-Administered Medications   Medication Dose Route Frequency Provider Last Rate    aluminum-magnesium hydroxide-simethicone  30 mL Oral Q4H PRN Demetrius Dawn, TERENCE      artificial tear   Both Eyes 4x Daily PRN Demetrius Dawn, PA-ALEXIS      benztropine  1 mg Intramuscular BID PRN Demetrius Dawn, PA-ALEXIS      benztropine  1 mg Oral BID PRN Demetrius RICHELLE Dawn, TERENCE      bisacodyl  10 mg Rectal Daily PRN Demetrius Dawn, TERENCE      hydrOXYzine HCL  50 mg Oral Q6H PRN Max 4/day Demetrius Dawn PA-C      Or    diphenhydrAMINE  50 mg Intramuscular Q6H PRN [facility]da, TERENCE      hydrOXYzine HCL  100 mg Oral Q6H PRN Max 4/day Demetrius Dawn PA-C      Or    LORazepam  2 mg Intramuscular Q6H PRN Demetrius Dawn, TERENCE      hydrOXYzine HCL  25 mg Oral Q6H PRN Max 4/day Demetrius Dawn PA-C      lidocaine  1 patch Topical Daily Christal Pablo PA-C      magnesium hydroxide  30 mL Oral Daily PRN Demetrius Dawn PA-C      methocarbamol  500 mg Oral BID PRN Jaclyn Bolton MD      naproxen  500 mg Oral BID With Meals Jaclyn Bolton MD      nicotine  1 patch Transdermal Daily Demetrius Dawn PA-C      nicotine polacrilex  4 mg Oral Q2H PRN Demetrius Dawn PA-C      OLANZapine  5 mg Oral Q4H PRN Max 3/day Demetrius Dawn PA-C      Or    OLANZapine  2.5 mg Intramuscular Q4H PRN Max 3/day Demetrius Dawn PA-C      OLANZapine  5 mg Oral Q3H PRN Max 3/day Demetrius Dawn PA-C      Or    OLANZapine  5 mg Intramuscular Q3H PRN Max 3/day Demetrius Dawn, TERENCE      OLANZapine  2.5 mg Oral Q4H PRN Max 6/day Demetrius Dawn PA-C      propranolol  10 mg Oral Q8H PRN Demetrius Dawn PA-C      risperiDONE  0.5 mg Oral Daily [facility]  Isacc Castillo MD      risperiDONE  1 mg Oral HS Fedejose alfredo Isacc Castillo MD      senna-docusate sodium  1 tablet Oral Daily PRN Demetrius Dawn PA-C      traZODone  50 mg Oral HS PRN Demetrius Dawn PA-C       Risks / Benefits of Treatment:    Risks, benefits, and possible side effects of medications explained to patient and patient verbalizes understanding and agreement for treatment.    Counseling / Coordination of Care:    Patient's progress discussed with staff in treatment team meeting.  Medications, treatment progress and treatment plan reviewed with patient.    Demetrius Dawn PA-C 07/30/24

## 2024-07-30 NOTE — NURSING NOTE
PT pleasant during assessment. Pt visible on the unit and watching TV. Pt denies anxiety and depression. Denies SI/HI/AVH. Reports improvement in mood. Pt attends groups. Pt is looking forward to discharge. Denies unmet needs at this time.

## 2024-07-30 NOTE — NURSING NOTE
Patient denies SI HI AVH at present reports feeling improved, Patient discussed importance of taking medications

## 2024-07-30 NOTE — PROGRESS NOTES
07/29/24 4891   Activity/Group Checklist   Group Impromptu group (Comment)  (Discussion with ALVARADO/L about anxiety caused by general feelings of inadequacy in group settings. As well as coping techniques, the importance of outside supports, and healthy ways of expressing emotions.)   Attendance Attended   Attendance Duration (min) 16-30   Interactions Interacted appropriately   Affect/Mood Appropriate;Calm   Goals Achieved Identified feelings;Identified triggers;Discussed coping strategies;Discussed self-esteem issues;Identified distorted thoughts/beliefs;Able to reflect/comment on own behavior;Able to manage/cope with feelings;Able to self-disclose;Able to recieve feedback;Able to give feedback to another

## 2024-07-30 NOTE — PROGRESS NOTES
07/30/24 0750   Team Meeting   Meeting Type Daily Rounds   Team Members Present   Team Members Present Physician;Nurse;;Other (Discipline and Name)   Physician Team Member Dr. Castillo / TERENCE Dawn / Dr. Grant / PA Student / NP Student   Nursing Team Member Derik   Care Management Team Member Min / [place] / Abbey / Luis Fernando   Other (Discipline and Name) Tate (Group Facilitator)   Patient/Family Present   Patient Present No   Patient's Family Present No       D/C: Pt is discussed to discharge sometime this week.

## 2024-07-30 NOTE — PLAN OF CARE
Problem: SELF HARM/SUICIDALITY  Goal: Will have no self-injury during hospital stay  Description: INTERVENTIONS:  - Q 15 MINUTES: Routine safety checks  - Q WAKING SHIFT & PRN: Assess risk to determine if routine checks are adequate to maintain patient safety  - Encourage patient to participate actively in care by formulating a plan to combat response to suicidal ideation, identify supports and resources  Outcome: Progressing     Problem: DEPRESSION  Goal: Will be euthymic at discharge  Description: INTERVENTIONS:  - Administer medication as ordered  - Provide emotional support via 1:1 interaction with staff  - Encourage involvement in milieu/groups/activities  - Monitor for social isolation  Outcome: Progressing     Problem: PSYCHOSIS  Goal: Will report no hallucinations or delusions  Description: Interventions:  - Administer medication as  ordered  - Every waking shifts and PRN assess for the presence of hallucinations and or delusions  - Assist with reality testing to support increasing orientation  - Assess if patient's hallucinations or delusions are encouraging self-harm or harm to others and intervene as appropriate  Outcome: Progressing     Problem: ANXIETY  Goal: Will report anxiety at manageable levels  Description: INTERVENTIONS:  - Administer medication as ordered  - Teach and encourage coping skills  - Provide emotional support  - Assess patient/family for anxiety and ability to cope  Outcome: Progressing  Goal: By discharge: Patient will verbalize 2 strategies to deal with anxiety  Description: Interventions:  - Identify any obvious source/trigger to anxiety  - Staff will assist patient in applying identified coping technique/skills  - Encourage attendance of scheduled groups and activities  Outcome: Progressing     Problem: Ineffective Coping  Goal: Identifies healthy coping skills  Outcome: Progressing  Goal: Participates in unit activities  Description: Interventions:  - Provide therapeutic  environment   - Provide required programming   - Redirect inappropriate behaviors   Outcome: Progressing     Problem: Depression  Goal: Treatment Goal: Demonstrate behavioral control of depressive symptoms, verbalize feelings of improved mood/affect, and adopt new coping skills prior to discharge  Outcome: Progressing  Goal: Verbalize thoughts and feelings  Description: Interventions:  - Assess and re-assess patient's level of risk   - Engage patient in 1:1 interactions, daily, for a minimum of 15 minutes   - Encourage patient to express feelings, fears, frustrations, hopes   Outcome: Progressing  Goal: Attend and participate in unit activities, including therapeutic, recreational, and educational groups  Description: Interventions:  - Provide therapeutic and educational activities daily, encourage attendance and participation, and document same in the medical record   Outcome: Progressing     Problem: Anxiety  Goal: Anxiety is at manageable level  Description: Interventions:  - Assess and monitor patient's anxiety level.   - Monitor for signs and symptoms (heart palpitations, chest pain, shortness of breath, headaches, nausea, feeling jumpy, restlessness, irritable, apprehensive).   - Collaborate with interdisciplinary team and initiate plan and interventions as ordered.  - [place] patient to unit/surroundings  - Explain treatment plan  - Encourage participation in care  - Encourage verbalization of concerns/fears  - Identify coping mechanisms  - Assist in developing anxiety-reducing skills  - Administer/offer alternative therapies  - Limit or eliminate stimulants  Outcome: Progressing     Problem: SAFETY ADULT  Goal: Patient will remain free of falls  Description: INTERVENTIONS:  - Educate patient/family on patient safety including physical limitations  - Instruct patient to call for assistance with activity   -- Keep care items and personal belongings within reach  - Initiate and maintain comfort rounds  - Make  Fall Risk Sign visible to staff  - Apply yellow socks and bracelet for high fall risk patients  - Consider moving patient to room near nurses station  Outcome: Progressing     Problem: DISCHARGE PLANNING  Goal: Discharge to home or other facility with appropriate resources  Description: INTERVENTIONS:  - Identify barriers to discharge w/patient and caregiver  - Arrange for needed discharge resources and transportation as appropriate  - Identify discharge learning needs (meds, wound care, etc.)  - Arrange for interpretive services to assist at discharge as needed  - Refer to Case Management Department for coordinating discharge planning if the patient needs post-hospital services based on physician/advanced practitioner order or complex needs related to functional status, cognitive ability, or social support system  Outcome: Progressing

## 2024-07-31 PROBLEM — Z00.8 MEDICAL CLEARANCE FOR PSYCHIATRIC ADMISSION: Status: RESOLVED | Noted: 2020-08-13 | Resolved: 2024-07-31

## 2024-07-31 PROCEDURE — 99232 SBSQ HOSP IP/OBS MODERATE 35: CPT | Performed by: STUDENT IN AN ORGANIZED HEALTH CARE EDUCATION/TRAINING PROGRAM

## 2024-07-31 RX ADMIN — RISPERIDONE 1 MG: 1 TABLET, FILM COATED ORAL at 21:13

## 2024-07-31 RX ADMIN — NAPROXEN 500 MG: 500 TABLET ORAL at 09:13

## 2024-07-31 RX ADMIN — NAPROXEN 500 MG: 500 TABLET ORAL at 16:17

## 2024-07-31 RX ADMIN — LIDOCAINE 5% 1 PATCH: 700 PATCH TOPICAL at 09:15

## 2024-07-31 RX ADMIN — RISPERIDONE 0.5 MG: 0.5 TABLET, FILM COATED ORAL at 09:13

## 2024-07-31 NOTE — PROGRESS NOTES
"Progress Note - Behavioral Health     Misa Zuleta 58 y.o. female MRN: [id number]   Unit/Bed#: U 205-02 Encounter: [id number]    Behavior over the last 24 hours: improving.     Misa is a 58-year-old female with a history of bipolar disorder who presents for psychiatric follow-up.  Staff reports no behavioral issues overnight.  She is pleasant, calm and cooperative upon approach.  Has been bright, visible and social in the milieu with active participation in inpatient programming.  Offers no acute concerns.  Feels her mood is improving and her voices have resolved.  She is tolerating medications well.  She is more forward thinking, goal oriented and optimistic for the future.  Denies SI and HI.  Denies AH and VH.    Sleep: normal  Appetite: normal  Medication side effects: No   ROS: all other systems are negative    Mental Status Evaluation:    Appearance: casually dressed, appears consistent with stated age, normal grooming  Motor: no psychomotor disturbances, no gait abnormalities  Behavior: calm, cooperative, interacts with this writer appropriately  Speech: normal rate, rhythm, and volume  Mood: \"good\"  Affect: appropriate, normal range and intensity, mood-congruent  Thought Process: organized, linear, and goal-oriented; intact associations  Thought Content: denies any delusional material, no preoccupation  Perception: denies any auditory or visual hallucinations, denies other perceptual disturbances  Risk Potential: denies suicidal ideation, plan, or intent. Denies homicidal ideation  Sensorium: Oriented to person, place, time, and situation  Cognition: cognitive ability appears intact but was not quantitatively tested  Consciousness: alert and awake  Attention/Concentration: able to focus without difficulty, attention and concentration are age appropriate  Insight: improved  Judgement: improved    Vital signs in last 24 hours:    Temp:  [97.5 °F (36.4 °C)-98.2 °F (36.8 °C)] 97.5 °F (36.4 °C)  HR:  [58-69] " 58  Resp:  [18] 18  BP: (122-157)/(87-96) 122/87    Laboratory results: I have personally reviewed all pertinent laboratory/tests results    Results from the past 24 hours: No results found for this or any previous visit (from the past 24 hour(s)).  Most Recent Labs:   Lab Results   Component Value Date    WBC 10.31 (H) 07/27/2024    RBC 4.64 07/27/2024    HGB 13.6 07/27/2024    HCT 41.0 07/27/2024     07/27/2024    RDW 13.3 07/27/2024    NEUTROABS 4.86 07/27/2024    SODIUM 140 07/27/2024    K 4.5 07/27/2024     07/27/2024    CO2 28 07/27/2024    BUN 20 07/27/2024    CREATININE 0.71 07/27/2024    GLUC 103 07/27/2024    CALCIUM 10.0 07/27/2024    AST 12 (L) 07/27/2024    ALT 12 07/27/2024    ALKPHOS 68 07/27/2024    TP 7.2 07/27/2024    ALB 4.2 07/27/2024    TBILI 1.08 (H) 07/27/2024    CHOLESTEROL 145 07/27/2024    HDL 44 (L) 07/27/2024    TRIG 130 07/27/2024    LDLCALC 75 07/27/2024    NONHDLC 101 07/27/2024    ZLI2MAAHDOPT 1.976 07/27/2024    FREET4 1.35 08/18/2021    PREGUR negative 08/16/2022    PREGSERUM Negative 08/13/2020    SYPHILISAB Non-reactive 07/27/2024    HGBA1C 5.7 (H) 07/27/2024     07/27/2024       Progress Toward Goals: progressing, working on coping skills, discharge planning    Assessment & Plan   Principal Problem:    Bipolar disorder (HCC)  Active Problems:    HTN (hypertension)    Hyperlipidemia    Tobacco use    Medical clearance for psychiatric admission    Overweight (BMI 25.0-29.9)    Low back pain      Recommended Treatment:     Planned medication and treatment changes:    All current active medications have been reviewed  Encourage group therapy, milieu therapy and occupational therapy  Behavioral Health checks every 7 minutes    Continue current medications.  Discharge planning for tomorrow.    Current Facility-Administered Medications   Medication Dose Route Frequency Provider Last Rate    aluminum-magnesium hydroxide-simethicone  30 mL Oral Q4H PRN Demetrius Dawn,  PA-C      artificial tear   Both Eyes 4x Daily PRN [facility] Nereyda, PA-C      benztropine  1 mg Intramuscular BID PRN [facility] Nereyda, PA-C      benztropine  1 mg Oral BID PRN [facility] Dawn, PA-C      bisacodyl  10 mg Rectal Daily PRN [facility] Nereyda, PA-C      hydrOXYzine HCL  50 mg Oral Q6H PRN Max 4/day [facility] Nereyda, PA-ALEXIS      Or    diphenhydrAMINE  50 mg Intramuscular Q6H PRN [facility] Dawn, PA-C      hydrOXYzine HCL  100 mg Oral Q6H PRN Max 4/day [facility] Nereyda, PA-ALEXIS      Or    LORazepam  2 mg Intramuscular Q6H PRN [facility]      hydrOXYzine HCL  25 mg Oral Q6H PRN Max 4/day [facility] Nereyda, PA-C      lidocaine  1 patch Topical Daily Christal Pablo, PA-C      magnesium hydroxide  30 mL Oral Daily PRN [facility] Nereyda, PA-C      methocarbamol  500 mg Oral BID PRN Jaclyn Bolton MD      naproxen  500 mg Oral BID With Meals Jaclyn Bolton MD      nicotine  1 patch Transdermal Daily [facility] Nereyda, PA-ALEXIS      nicotine polacrilex  4 mg Oral Q2H PRN [facility] Nereyda, PA-C      OLANZapine  5 mg Oral Q4H PRN Max 3/day Demetrius Dawn, PA-ALEXIS      Or    OLANZapine  2.5 mg Intramuscular Q4H PRN Max 3/day Demetrius S Nereyda, PA-C      OLANZapine  5 mg Oral Q3H PRN Max 3/day Demetrius Dawn, PA-ALEXIS      Or    OLANZapine  5 mg Intramuscular Q3H PRN Max 3/day Demetrius Dawn, PA-C      OLANZapine  2.5 mg Oral Q4H PRN Max 6/day [facility] Nereyda, PA-C      propranolol  10 mg Oral Q8H PRN [facility] Nereyda, PA-C      risperiDONE  0.5 mg Oral Daily Pauline Castillo MD      risperiDONE  1 mg Oral HS Pauline Castillo MD      senna-docusate sodium  1 tablet Oral Daily PRN [facility] Nereyda, PA-C      traZODone  50 mg Oral HS PRN Demetrius Dawn, TERENCE       Risks / Benefits of Treatment:    Risks, benefits, and possible side effects of medications explained to patient and patient verbalizes understanding and agreement for treatment.    Counseling / Coordination of Care:    Patient's progress discussed with  staff in treatment team meeting.  Medications, treatment progress and treatment plan reviewed with patient.    Demetrius Dawn PA-C 07/31/24

## 2024-07-31 NOTE — PROGRESS NOTES
07/31/24 1470   Team Meeting   Meeting Type Daily Rounds   Team Members Present   Team Members Present Physician;Nurse;;Other (Discipline and Name)   Physician Team Member Dr. Castillo / TERENCE Dawn / Dr. Grant / NP Student   Nursing Team Member Derik / Epi   Care Management Team Member Min / [place] / [place]   Other (Discipline and Name) Tate (Group Facilitator)   Patient/Family Present   Patient Present No   Patient's Family Present No       D/C: Pt is scheduled to discharge tomorrow or Friday.
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
## 2024-07-31 NOTE — PLAN OF CARE
Problem: SELF HARM/SUICIDALITY  Goal: Will have no self-injury during hospital stay  Description: INTERVENTIONS:  - Q 15 MINUTES: Routine safety checks  - Q WAKING SHIFT & PRN: Assess risk to determine if routine checks are adequate to maintain patient safety  - Encourage patient to participate actively in care by formulating a plan to combat response to suicidal ideation, identify supports and resources  Outcome: Progressing     Problem: DEPRESSION  Goal: Will be euthymic at discharge  Description: INTERVENTIONS:  - Administer medication as ordered  - Provide emotional support via 1:1 interaction with staff  - Encourage involvement in milieu/groups/activities  - Monitor for social isolation  Outcome: Progressing     Problem: PSYCHOSIS  Goal: Will report no hallucinations or delusions  Description: Interventions:  - Administer medication as  ordered  - Every waking shifts and PRN assess for the presence of hallucinations and or delusions  - Assist with reality testing to support increasing orientation  - Assess if patient's hallucinations or delusions are encouraging self-harm or harm to others and intervene as appropriate  Outcome: Progressing     Problem: ANXIETY  Goal: Will report anxiety at manageable levels  Description: INTERVENTIONS:  - Administer medication as ordered  - Teach and encourage coping skills  - Provide emotional support  - Assess patient/family for anxiety and ability to cope  Outcome: Progressing  Goal: By discharge: Patient will verbalize 2 strategies to deal with anxiety  Description: Interventions:  - Identify any obvious source/trigger to anxiety  - Staff will assist patient in applying identified coping technique/skills  - Encourage attendance of scheduled groups and activities  Outcome: Progressing     Problem: Ineffective Coping  Goal: Identifies healthy coping skills  Outcome: Progressing  Goal: Participates in unit activities  Description: Interventions:  - Provide therapeutic  environment   - Provide required programming   - Redirect inappropriate behaviors   Outcome: Progressing     Problem: Depression  Goal: Treatment Goal: Demonstrate behavioral control of depressive symptoms, verbalize feelings of improved mood/affect, and adopt new coping skills prior to discharge  Outcome: Progressing  Goal: Verbalize thoughts and feelings  Description: Interventions:  - Assess and re-assess patient's level of risk   - Engage patient in 1:1 interactions, daily, for a minimum of 15 minutes   - Encourage patient to express feelings, fears, frustrations, hopes   Outcome: Progressing  Goal: Attend and participate in unit activities, including therapeutic, recreational, and educational groups  Description: Interventions:  - Provide therapeutic and educational activities daily, encourage attendance and participation, and document same in the medical record   Outcome: Progressing     Problem: Anxiety  Goal: Anxiety is at manageable level  Description: Interventions:  - Assess and monitor patient's anxiety level.   - Monitor for signs and symptoms (heart palpitations, chest pain, shortness of breath, headaches, nausea, feeling jumpy, restlessness, irritable, apprehensive).   - Collaborate with interdisciplinary team and initiate plan and interventions as ordered.  - [place] patient to unit/surroundings  - Explain treatment plan  - Encourage participation in care  - Encourage verbalization of concerns/fears  - Identify coping mechanisms  - Assist in developing anxiety-reducing skills  - Administer/offer alternative therapies  - Limit or eliminate stimulants  Outcome: Progressing     Problem: SAFETY ADULT  Goal: Patient will remain free of falls  Description: INTERVENTIONS:  - Educate patient/family on patient safety including physical limitations  - Instruct patient to call for assistance with activity   -- Keep care items and personal belongings within reach  - Initiate and maintain comfort rounds  - Make  Fall Risk Sign visible to staff  - Apply yellow socks and bracelet for high fall risk patients  - Consider moving patient to room near nurses station  Outcome: Progressing

## 2024-07-31 NOTE — NURSING NOTE
Misa is bright when approached. She reports readiness for d/c and is hopeful to d/c tomorrow. States she plans on staying on her medication and asking for help if she feels overwhelmed. Denies SI/HI and hallucinations. Encouraged to come to staff with questions or concerns.

## 2024-07-31 NOTE — NURSING NOTE
"Patient is calm and cooperative and laying in her bed upon approach. She denies SI/HI/AVH at this time. States that her depression and anxiety \"isn't too bad right now,\" She feels like the medication is doing really well right now and \"doing it's job.\" No concerns or questions at the moment, but assured that she will come to staff if she has any. Will continue to monitor.  "

## 2024-08-01 VITALS
DIASTOLIC BLOOD PRESSURE: 81 MMHG | SYSTOLIC BLOOD PRESSURE: 136 MMHG | RESPIRATION RATE: 18 BRPM | TEMPERATURE: 97.4 F | WEIGHT: 168.3 LBS | HEIGHT: 63 IN | OXYGEN SATURATION: 97 % | BODY MASS INDEX: 29.82 KG/M2 | HEART RATE: 67 BPM

## 2024-08-01 PROCEDURE — 99239 HOSP IP/OBS DSCHRG MGMT >30: CPT

## 2024-08-01 RX ORDER — NAPROXEN 500 MG/1
500 TABLET ORAL 2 TIMES DAILY WITH MEALS
Qty: 14 TABLET | Refills: 0 | Status: SHIPPED | OUTPATIENT
Start: 2024-08-01 | End: 2024-08-08

## 2024-08-01 RX ORDER — RISPERIDONE 1 MG/1
1 TABLET ORAL
Qty: 30 TABLET | Refills: 0 | Status: SHIPPED | OUTPATIENT
Start: 2024-08-01 | End: 2024-08-31

## 2024-08-01 RX ORDER — RISPERIDONE 0.5 MG/1
0.5 TABLET ORAL DAILY
Qty: 30 TABLET | Refills: 0 | Status: SHIPPED | OUTPATIENT
Start: 2024-08-01 | End: 2024-08-31

## 2024-08-01 RX ADMIN — NAPROXEN 500 MG: 500 TABLET ORAL at 08:16

## 2024-08-01 RX ADMIN — RISPERIDONE 0.5 MG: 0.5 TABLET, FILM COATED ORAL at 08:15

## 2024-08-01 NOTE — DISCHARGE INSTR - OTHER ORDERS
[facility] CRISIS INFORMATION     The PEER LINE is a toll-free telephone number for people in [facility] who are seeking a listening ear for additional support in their recovery from mental illness.  The PEER LINE is peer-run and peer-friendly. You can call the Peer Line 24 hours a day. Phone: 1-512-LX-PEERS /([phone])     Emergency Services  Crisis Intervention Number: 6-594-761-0616  [address] [place] [address]    Text CONNECT to 641097 from anywhere in the [place], anytime, about any type of crisis.  A live, trained Crisis Counselor receives the text and lets you know that they are here to listen.  The volunteer Crisis Counselor will help you move from a hot moment to a cool moment.      The National [place] on Mental Illness (MABEL) offers various education & support groups for you & your family.  For more information visit their website at   http://www.mabel-lv.org/.    Dial 2-1-1 to get connected/get help.  Free, confidential information & referral available 24/7: Aging Services, Child & Youth Services, Counseling, Education/Training, Food/Shelter/Clothing, Health Services, Parenting, Substance Abuse, Support Groups, Volunteer Opportunities, & much more.  Phone: 2-1-1 or 002-934-6352, Web: www.[employer], Email: 927@[facility].org    National Suicide Prevention Hotline  1-838.700.1724    National de Prevencion del Suicidio  1-325.725.6468    [place] Drug & Alcohol Helpline  1-939.730.3061    Crisis Text Line is free, 24/7 support for those in crisis. Text HOME to 722424 from anywhere in the [place] to text with a trained Crisis Counselor    [facility] provides funding to support multiple Recovery Centers in [facility].   These centers offer a safe, sober environment to those in recovery. A variety of programming including 12-Step Meetings, Yoga, Karaoke, Life Skills Workshops, etc. is offered at each location.    [facility]  [address]  [place]  [address]  [phone]  www.cleanslatebangor.org      [facility]  [address] [place] [address]  [phone]    Center  [address] [facility]  [place] [address]  [phone]  [email or web address].org/index.php/programs/hope-center    [facility]  Nurturing families impacted by substance use  [address] [place] [address]  [phone]  www.[email or web address]    Recovery Center  [address] [place] [address]  [phone]  [facility]     [facility] [address]  [address] [address]  [phone]  Hours  9:00 am to 5:00 pm Monday thru Friday    Abstinence from addiction is only the beginning.  [facility] residents are encouraged to pursue meaningful lives with purpose at the Change on [address] [place] [address]. We provide a safe and sober environment which is staffed by people in recovery, who share similar experiences, and are willing to listen and assist people in early recovery. It takes a community to support the recovery process. This [facility] initiative recognizes and supports the efforts and investment of those personally in recovery as well as those supporting their efforts.    Our [place]  The Change on [address] [place] [address] offers a safe environment to those seeking recovery and/or who are part of the treatment continuum.    Although we are not a treatment facility, we are able to assist those in need, refer members of the center to community resources as needed. We encourage and guide members to pursue meaningful lives with purpose at the Change on [address] [place] [address] .    Our hope is that they will find inspiration, encouragement, support through the examples of our volunteers and staff at the center. It takes effort and a dedicated community to support the recovery process.      [facility] Drop-In Centers  The Drop-In Centers are open to all mental health consumers in [facility] who are interested in meeting people and making new friends.  They provide a friendly social atmosphere with scheduled daily activities including games, arts & crafts, discussion and education groups, vocational activities, and much more. Light refreshments are served daily. Fosters social growth by providing structured social rehabilitation programming in a safe, culturally sensitive environment, to individuals in recovery from mental illness. The locations are:    [facility] Drop-In Center - operated by [facility]   [address] [address] : [phone]   Hours of Operation:  Monday 3:00 PM - 8:00 PM  Tuesday 12:00 PM - 8:00 PM   Wednesday 3:00 PM - 8:00 PM  Thursday 12:00 PM - 8:00 PM   Friday 3:00 PM - 8:00 PM   Saturday [facility] Drop-In Center - operated by [address] [place]: [phone]  Monday through Friday from 9:00am to 7:00pm  ____________________    DRUG & ALCOHOL Recovery Centers     [facility] provides funding to support three Recovery Centers in [facility]. These centers offer a safe, sober environment to those in recovery. A variety of programming including 12-Step Meetings, Yoga, Karaoke, Life Skills Workshops, art activities, computer access for job searches, job applications, sober events, holiday meals, etc. is offered at each location.    [address]  [place] [address]  [phone]  [facility].[email or web address]/index.[email or web address]/programs/[facility]  Nurturing families impacted by substance use  [address]  [place] [address]  [phone]  www.[email or web address].org  [facility]  Operates November 15, 2021 - April 15, 2022 Provides overnight shelter, evening meal served   5:30pm to 7:30pm, and breakfast to go. Guests are required to wear masks at all times. Showers   will be available daily.  Address:  [address] [place] [address]  [facility] [place] [address]  Eligibility: Homeless men and women who have no other shelter options; Unable to serve   families  Hours: Monday through Sunday, 5:00pm to 7:00am  Phone: [phone]  Website: [email or web address]    [facility]  December 1, 2021 through March 31, 2022 An emergency, cold-weather shelter for adult men and   women, available on a walk-in basis before posted curfew hours. Snacks served, hot beverages,   hygiene kits, clothing closet, showers, case management available. Covid-19 Protocols include   temperature screenings, testing if symptoms present, quarantine protocols and masking protocols.  Address:  [address] [place] [address]  Eligibility: Homeless men and women who have no other shelter options; unable to serve families  Hours: Every day of the week. Opens: 7:00pm Closes: 7:00am (December 1, 2021 through March 31, 2022). No entry past 9:00pm.  Phone: [phone]  Website: [email or web address].[email or web address]

## 2024-08-01 NOTE — PROGRESS NOTES
08/01/24 0750   Team Meeting   Meeting Type Daily Rounds   Team Members Present   Team Members Present Physician;Nurse;   Physician Team Member Dr. Castillo / TERENCE Romero / Dr. Grant   Nursing Team Member Epi / Eugenia   Care Management Team Member Min / [place] / Abbey   Patient/Family Present   Patient Present No   Patient's Family Present No       D/C: Pt is scheduled to discharge today 8/1/2024 to return to her residence. Pt is connected with OP through [facility].

## 2024-08-01 NOTE — CASE MANAGEMENT
CM contacted [facility] to see if they can scheduled the Pt with psychiatrist and provide therapy for a sooner date.

## 2024-08-01 NOTE — CASE MANAGEMENT
CM received confirmation that the Pt can start PHP through [facility] in Tuesday 8/6/2024.    CM met with Pt to discuss discharge planning. CM stated that [facility]'s can schedule the Pt for PHP through [facility]. CM explained PHP. Pt stated that she would be interested in PHP and has Uber rides through her insurance she can use to get there. CM stated that they will get her scheduled then. Pt stated that she is ready for discharge. Pt signed IMM letter and free local transportation waiver. CM stated that they will schedule her a ride. CM stated that if she has any questions or concerns, the CM contact information will be on her AVS. Pt verbalized understanding.     CM confirmed the Pt would be interested in doing PHP. CM completed order for PHP.     CM scheduled Lyft for 10:20am via [employer].
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
## 2024-08-01 NOTE — PLAN OF CARE
Problem: SELF HARM/SUICIDALITY  Goal: Will have no self-injury during hospital stay  Description: INTERVENTIONS:  - Q 15 MINUTES: Routine safety checks  - Q WAKING SHIFT & PRN: Assess risk to determine if routine checks are adequate to maintain patient safety  - Encourage patient to participate actively in care by formulating a plan to combat response to suicidal ideation, identify supports and resources  Outcome: Progressing     Problem: DEPRESSION  Goal: Will be euthymic at discharge  Description: INTERVENTIONS:  - Administer medication as ordered  - Provide emotional support via 1:1 interaction with staff  - Encourage involvement in milieu/groups/activities  - Monitor for social isolation  Outcome: Progressing     Problem: PSYCHOSIS  Goal: Will report no hallucinations or delusions  Description: Interventions:  - Administer medication as  ordered  - Every waking shifts and PRN assess for the presence of hallucinations and or delusions  - Assist with reality testing to support increasing orientation  - Assess if patient's hallucinations or delusions are encouraging self-harm or harm to others and intervene as appropriate  Outcome: Progressing     Problem: ANXIETY  Goal: Will report anxiety at manageable levels  Description: INTERVENTIONS:  - Administer medication as ordered  - Teach and encourage coping skills  - Provide emotional support  - Assess patient/family for anxiety and ability to cope  Outcome: Progressing  Goal: By discharge: Patient will verbalize 2 strategies to deal with anxiety  Description: Interventions:  - Identify any obvious source/trigger to anxiety  - Staff will assist patient in applying identified coping technique/skills  - Encourage attendance of scheduled groups and activities  Outcome: Progressing     Problem: Ineffective Coping  Goal: Identifies healthy coping skills  Outcome: Progressing  Goal: Participates in unit activities  Description: Interventions:  - Provide therapeutic  environment   - Provide required programming   - Redirect inappropriate behaviors   Outcome: Progressing     Problem: Depression  Goal: Treatment Goal: Demonstrate behavioral control of depressive symptoms, verbalize feelings of improved mood/affect, and adopt new coping skills prior to discharge  Outcome: Progressing  Goal: Verbalize thoughts and feelings  Description: Interventions:  - Assess and re-assess patient's level of risk   - Engage patient in 1:1 interactions, daily, for a minimum of 15 minutes   - Encourage patient to express feelings, fears, frustrations, hopes   Outcome: Progressing  Goal: Attend and participate in unit activities, including therapeutic, recreational, and educational groups  Description: Interventions:  - Provide therapeutic and educational activities daily, encourage attendance and participation, and document same in the medical record   Outcome: Not Progressing     Problem: Depression  Goal: Verbalize thoughts and feelings  Description: Interventions:  - Assess and re-assess patient's level of risk   - Engage patient in 1:1 interactions, daily, for a minimum of 15 minutes   - Encourage patient to express feelings, fears, frustrations, hopes   Outcome: Progressing     Problem: Anxiety  Goal: Anxiety is at manageable level  Description: Interventions:  - Assess and monitor patient's anxiety level.   - Monitor for signs and symptoms (heart palpitations, chest pain, shortness of breath, headaches, nausea, feeling jumpy, restlessness, irritable, apprehensive).   - Collaborate with interdisciplinary team and initiate plan and interventions as ordered.  - [place] patient to unit/surroundings  - Explain treatment plan  - Encourage participation in care  - Encourage verbalization of concerns/fears  - Identify coping mechanisms  - Assist in developing anxiety-reducing skills  - Administer/offer alternative therapies  - Limit or eliminate stimulants  Outcome: Progressing     Problem: DISCHARGE  PLANNING  Goal: Discharge to home or other facility with appropriate resources  Description: INTERVENTIONS:  - Identify barriers to discharge w/patient and caregiver  - Arrange for needed discharge resources and transportation as appropriate  - Identify discharge learning needs (meds, wound care, etc.)  - Arrange for interpretive services to assist at discharge as needed  - Refer to Case Management Department for coordinating discharge planning if the patient needs post-hospital services based on physician/advanced practitioner order or complex needs related to functional status, cognitive ability, or social support system  Outcome: Progressing

## 2024-08-01 NOTE — PLAN OF CARE
Problem: SELF HARM/SUICIDALITY  Goal: Will have no self-injury during hospital stay  Description: INTERVENTIONS:  - Q 15 MINUTES: Routine safety checks  - Q WAKING SHIFT & PRN: Assess risk to determine if routine checks are adequate to maintain patient safety  - Encourage patient to participate actively in care by formulating a plan to combat response to suicidal ideation, identify supports and resources  Outcome: Adequate for Discharge     Problem: DEPRESSION  Goal: Will be euthymic at discharge  Description: INTERVENTIONS:  - Administer medication as ordered  - Provide emotional support via 1:1 interaction with staff  - Encourage involvement in milieu/groups/activities  - Monitor for social isolation  Outcome: Adequate for Discharge     Problem: PSYCHOSIS  Goal: Will report no hallucinations or delusions  Description: Interventions:  - Administer medication as  ordered  - Every waking shifts and PRN assess for the presence of hallucinations and or delusions  - Assist with reality testing to support increasing orientation  - Assess if patient's hallucinations or delusions are encouraging self-harm or harm to others and intervene as appropriate  Outcome: Adequate for Discharge     Problem: ANXIETY  Goal: Will report anxiety at manageable levels  Description: INTERVENTIONS:  - Administer medication as ordered  - Teach and encourage coping skills  - Provide emotional support  - Assess patient/family for anxiety and ability to cope  Outcome: Adequate for Discharge  Goal: By discharge: Patient will verbalize 2 strategies to deal with anxiety  Description: Interventions:  - Identify any obvious source/trigger to anxiety  - Staff will assist patient in applying identified coping technique/skills  - Encourage attendance of scheduled groups and activities  Outcome: Adequate for Discharge     Problem: Ineffective Coping  Goal: Identifies healthy coping skills  Outcome: Adequate for Discharge  Goal: Participates in unit  activities  Description: Interventions:  - Provide therapeutic environment   - Provide required programming   - Redirect inappropriate behaviors   Outcome: Adequate for Discharge     Problem: Depression  Goal: Treatment Goal: Demonstrate behavioral control of depressive symptoms, verbalize feelings of improved mood/affect, and adopt new coping skills prior to discharge  Outcome: Adequate for Discharge  Goal: Verbalize thoughts and feelings  Description: Interventions:  - Assess and re-assess patient's level of risk   - Engage patient in 1:1 interactions, daily, for a minimum of 15 minutes   - Encourage patient to express feelings, fears, frustrations, hopes   Outcome: Adequate for Discharge  Goal: Attend and participate in unit activities, including therapeutic, recreational, and educational groups  Description: Interventions:  - Provide therapeutic and educational activities daily, encourage attendance and participation, and document same in the medical record   Outcome: Adequate for Discharge     Problem: Anxiety  Goal: Anxiety is at manageable level  Description: Interventions:  - Assess and monitor patient's anxiety level.   - Monitor for signs and symptoms (heart palpitations, chest pain, shortness of breath, headaches, nausea, feeling jumpy, restlessness, irritable, apprehensive).   - Collaborate with interdisciplinary team and initiate plan and interventions as ordered.  - [place] patient to unit/surroundings  - Explain treatment plan  - Encourage participation in care  - Encourage verbalization of concerns/fears  - Identify coping mechanisms  - Assist in developing anxiety-reducing skills  - Administer/offer alternative therapies  - Limit or eliminate stimulants  Outcome: Adequate for Discharge     Problem: SAFETY ADULT  Goal: Patient will remain free of falls  Description: INTERVENTIONS:  - Educate patient/family on patient safety including physical limitations  - Instruct patient to call for assistance with  activity   -- Keep care items and personal belongings within reach  - Initiate and maintain comfort rounds  - Make Fall Risk Sign visible to staff  - Apply yellow socks and bracelet for high fall risk patients  - Consider moving patient to room near nurses station  Outcome: Adequate for Discharge     Problem: DISCHARGE PLANNING  Goal: Discharge to home or other facility with appropriate resources  Description: INTERVENTIONS:  - Identify barriers to discharge w/patient and caregiver  - Arrange for needed discharge resources and transportation as appropriate  - Identify discharge learning needs (meds, wound care, etc.)  - Arrange for interpretive services to assist at discharge as needed  - Refer to Case Management Department for coordinating discharge planning if the patient needs post-hospital services based on physician/advanced practitioner order or complex needs related to functional status, cognitive ability, or social support system  Outcome: Adequate for Discharge

## 2024-08-01 NOTE — BH TRANSITION RECORD
Contact Information: If you have any questions, concerns, pended studies, tests and/or procedures, or emergencies regarding your inpatient behavioral health visit. Please contact [place] [facility] [phone] and ask to speak to a , nurse or physician. A contact is available 24 hours/ 7 days a week at this number.     Summary of Procedures Performed During your Stay:  Below is a list of major procedures performed during your hospital stay and a summary of results:  - Major Imaging Studies: ECG 12 lead- Sinus bradycardia, otherwise normal. Qtc 424 ms.    Pending Studies (From admission, onward)      None          Please follow up on the above pending studies with your PCP and/or referring provider.

## 2024-08-01 NOTE — NURSING NOTE
"Misa is calm and pleasant when approached. She reports readiness for d/c and states she is \"excited.\" Denies SI/HI and hallucinations.   "

## 2024-08-01 NOTE — DISCHARGE INSTR - APPOINTMENTS
You have confirmed and will be discharged to address [address] [address].  You have confirmed and will be contacted at phone number [phone].  Your  while you were at [facility] was Moe GASCA who can be reached at phone number [phone] and fax number [phone].    Marcela our Behavioral Health Nurse Navigator, will be calling you after your discharge, on the phone number that you provided.  She will be available as an additional support, if needed.   If you wish to speak with Marcela, you may contact her at [phone].

## 2024-08-01 NOTE — PLAN OF CARE
Problem: DISCHARGE PLANNING  Goal: Discharge to home or other facility with appropriate resources  Description: INTERVENTIONS:  - Identify barriers to discharge w/patient and caregiver  - Arrange for needed discharge resources and transportation as appropriate  - Identify discharge learning needs (meds, wound care, etc.)  - Arrange for interpretive services to assist at discharge as needed  - Refer to Case Management Department for coordinating discharge planning if the patient needs post-hospital services based on physician/advanced practitioner order or complex needs related to functional status, cognitive ability, or social support system  Outcome: Adequate for Discharge  Note: Pt is scheduled to discharge today 8/1/2024 to return to her residence. Pt is connected with [facility] through [facility].

## 2024-08-02 NOTE — PROGRESS NOTES
Pt completed relapse prevention plan with assistance from this writer. Identified warning signs, coping strategies, and supports. Discussed resources for relapse prevention and management.

## 2024-08-05 ENCOUNTER — TELEPHONE (OUTPATIENT)
Dept: PSYCHIATRY | Facility: CLINIC | Age: 59
End: 2024-08-05

## 2024-08-05 NOTE — TELEPHONE ENCOUNTER
Called pt to remind her of her appt for the Partial dept tomorrow. However pt didn't answer and her vm was full

## 2024-08-06 ENCOUNTER — TELEPHONE (OUTPATIENT)
Dept: PSYCHOLOGY | Facility: CLINIC | Age: 59
End: 2024-08-06

## 2024-08-06 NOTE — TELEPHONE ENCOUNTER
Subjective:     Patient ID: Misa Zuleta 58 y.o. Female    [facility] Progress Notes      Specialized Services Documentation  Therapist must complete separate progress note for each specific clinical activity in which the individual participated during the day.      ([id number]) This writer called Misa Zuleta regarding PHP intake scheduled for today, to arrive at 815 AM. Has not arrived and placed called -  Misa's VM is full, unable to leave a message.     VLADISLAV Aguilar

## 2024-08-12 NOTE — ED ATTENDING ATTESTATION
7/26/2024  I, Vicente Freire MD, saw and evaluated the patient. I have discussed the patient with the resident/non-physician practitioner and agree with the resident's/non-physician practitioner's findings, Plan of Care, and MDM as documented in the resident's/non-physician practitioner's note, except where noted. All available labs and Radiology studies were reviewed.  I was present for key portions of any procedure(s) performed by the resident/non-physician practitioner and I was immediately available to provide assistance.       At this point I agree with the current assessment done in the Emergency Department.  I have conducted an independent evaluation of this patient a history and physical is as follows:    ED Course     Patient presents for evaluation due to increased depression and suicidal ideation.  Patient states she stopped taking her medications.  No additional complaints. A/P: Depression with suicidal ideation.  Will check labs, urine, and will consult crisis.    Critical Care Time  Procedures

## 2024-08-14 ENCOUNTER — TELEPHONE (OUTPATIENT)
Dept: INTERNAL MEDICINE CLINIC | Facility: CLINIC | Age: 59
End: 2024-08-14

## 2024-08-14 NOTE — TELEPHONE ENCOUNTER
Received call from patient requesting to review results of her XR spine and lumbar.     Attempted to reach patient at . Left voice message with patient per physician, her XR spine lumbar stable from last study in 2021. Stable mild DJD L S spine.     Left office number for any other questions/concerns.

## 2024-08-16 ENCOUNTER — TELEPHONE (OUTPATIENT)
Dept: INTERNAL MEDICINE CLINIC | Facility: CLINIC | Age: 59
End: 2024-08-16

## 2024-08-16 NOTE — TELEPHONE ENCOUNTER
picked up call From Misa Zuleta. She stated that she wanted a call back from her doctor now. She is sick of our #shit and the f work twice. I asked her to please not use those words with me and if continued I would disconnect the call .She proceeded to hang up on me

## 2024-08-16 NOTE — TELEPHONE ENCOUNTER
"Patient came into the office, I asked if she had an appointment. She responded yelling \"I want to speak to my doctor!\" I asked for her name and verified her address. She nodded yes to both. As I was trying to start a note to send to clinical. I asked which doctor she need to speak to and what message can I send to them. She started banging on the table saying \"I want to speak to my doctor. You people do not  your phone calls. I have been calling since yesterday. You guys are worse than [facility]. I want to speak to my [facility]placed by [facility] doctor\". Carol asked her to please stop cursing. Ms. Zuleta continue to bang on the desk and stated she will not be coming back here.  "
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
## 2024-08-28 ENCOUNTER — TELEPHONE (OUTPATIENT)
Age: 59
End: 2024-08-28

## 2024-08-28 NOTE — TELEPHONE ENCOUNTER
[employer] from Clinical for a Medication Refill Request, Pt. Was scheduled for MM as a STAT Referral in March, and then cancelled. Pt. was also scheduled for PHP and cancelled. Pt. has never been established with our office therefor will be placed on the Wait List for the time being.

## 2024-09-03 ENCOUNTER — TELEPHONE (OUTPATIENT)
Dept: PSYCHIATRY | Facility: CLINIC | Age: 59
End: 2024-09-03

## 2024-09-03 NOTE — TELEPHONE ENCOUNTER
Patients Name: Misa Zuleta    : 1965    Phone Number: [phone]    Appointment Date:     Appointment time: 2 pm      Address: [address] [place] [address]  [place] [address]    Drop Off Facility/Office: [facility]    Drop Off Address: [address], [place] [address]    Cost Center Number: [phone]    Special notes/directions for :    Note for scheduling team:    Send to Ride Booking Pool: [id number] [facility]

## 2024-09-12 ENCOUNTER — OFFICE VISIT (OUTPATIENT)
Dept: BEHAVIORAL/MENTAL HEALTH CLINIC | Facility: CLINIC | Age: 59
End: 2024-09-12
Payer: MEDICARE

## 2024-09-12 DIAGNOSIS — F41.9 ANXIETY: Chronic | ICD-10-CM

## 2024-09-12 DIAGNOSIS — F31.9 BIPOLAR AFFECTIVE DISORDER, REMISSION STATUS UNSPECIFIED (HCC): Primary | Chronic | ICD-10-CM

## 2024-09-12 DIAGNOSIS — F43.10 PTSD (POST-TRAUMATIC STRESS DISORDER): ICD-10-CM

## 2024-09-12 PROCEDURE — 90791 PSYCH DIAGNOSTIC EVALUATION: CPT | Performed by: SOCIAL WORKER

## 2024-09-16 ENCOUNTER — TELEPHONE (OUTPATIENT)
Dept: PSYCHIATRY | Facility: CLINIC | Age: 59
End: 2024-09-16

## 2024-09-16 NOTE — TELEPHONE ENCOUNTER
Patients Name: Misa Zuleta    : 1965    Phone Number: [phone]    Appointment Date:     Appointment time: 10 am      Address: [address] [place] [address]  [place] [address]    Drop Off Facility/Office: [facility]    Drop Off Address: [address], [place] [address]    Cost Center Number: [id number]    Special notes/directions for :    Note for scheduling team:    Send to Ride Booking Pool: [id number] [facility]

## 2024-09-16 NOTE — TELEPHONE ENCOUNTER
LVM for PT to inform provider will be out of office on 9/18/24 appt. Please reschedule PT upon return call
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
## 2024-09-17 NOTE — PSYCH
Treatment Plan Tracking    # 1Treatment Plan not completed within required time limits due to: Not Done within 30 days of initial visit due to intensive intake, entire session was needed to gather all relevant information. .

## 2024-09-26 NOTE — TELEPHONE ENCOUNTER
Patient contacted the office to schedule a follow up visit with provider. Patient is now scheduled for 1/10/2025  at 8 am in office      Patient added to cancellation list. She needs and earlier appt then January. Was told someone was calling her back to schedule when the initial appt was cancelled. No one called her back. .

## 2024-10-15 ENCOUNTER — TELEPHONE (OUTPATIENT)
Dept: INTERNAL MEDICINE CLINIC | Facility: CLINIC | Age: 59
End: 2024-10-15

## 2024-10-15 NOTE — TELEPHONE ENCOUNTER
Received call from patient. Spoke with patient on the phone. Introduced self and role. Patient expressed that her allergies are not controlled. Patient stated she has watery eyes, itchy all over, and nasal discharge. Patient has been on Claritin and Zytrec. Patient stated it takes a long time for medication to work. Never tried Allegra.     Patient also stated she is out of her Lisinopril and almost finished with her Amlodipine. Patient stated while hospitalized the nurses would not always give her blood pressure medication pending result. Patient takes medication at home and does not check her blood pressure.     Patient also requesting to review her last imaging.     Discussed with physician. Per physician, patient needs an appointment.     Patient scheduled for tomorrow at 0920.

## 2024-10-16 ENCOUNTER — TELEPHONE (OUTPATIENT)
Dept: INTERNAL MEDICINE CLINIC | Facility: CLINIC | Age: 59
End: 2024-10-16

## 2024-10-18 ENCOUNTER — PATIENT OUTREACH (OUTPATIENT)
Dept: INTERNAL MEDICINE CLINIC | Facility: CLINIC | Age: 59
End: 2024-10-18

## 2024-10-18 ENCOUNTER — TELEPHONE (OUTPATIENT)
Dept: INTERNAL MEDICINE CLINIC | Facility: CLINIC | Age: 59
End: 2024-10-18

## 2024-10-18 DIAGNOSIS — I10 PRIMARY HYPERTENSION: Primary | ICD-10-CM

## 2024-10-18 NOTE — PROGRESS NOTES
Patient has not been taking BP meds for some time , BP was normal at her visit here with me in July on no medication , will assess at her visit with me later this month

## 2024-10-18 NOTE — TELEPHONE ENCOUNTER
Patient left a message on 10/16 that she woke up late and couldn't get to her appointment. Her  called and patient is out of medication that is not on the list that she said she has been taking for years. She has no Lisinopril and  Amlodipine. Patient is very concerned about not having these medications    Please advise and call patient

## 2024-10-18 NOTE — PROGRESS NOTES
SW has received a call from Beth French RN from [facility] [phone] re request for Medication refills.  Pt is out of Lisinopril and very low on amlodipine.  SW has Texted both Clinical/Clerical Team and PCP re same.  Ms French further relates that pt did not like the other PCP Office/ [facility] she tried so has returned to our Office.  She continues to have Rental issues.  She did receive assistance  with her past delinquent account which had sent her to court re an Eviction process.    She paid that off but is still delinquent on current rent and may face an EVICTION again.  APS had been involved and request to have her evaluated for requiring a Rep Payee was requested but APS Physician did not find that a Rep payee needed at that time.     Pt is active with [facility] but Ms French is not sure with whom.    She does NOT  have an ICM.     She had fired all services but now is asking for the PA Waiver Program again.  Beth French RN is assisting with same.      Please re-consult Social Work if needed.

## 2024-10-18 NOTE — TELEPHONE ENCOUNTER
MADYM on 10/16/2024 at 9:02 am Clinical  line.      Hi, yes, my name is Misa Zuleta. I had a an appointment this morning at 9:20 and I'm just waking up so I am gonna have to change that appointment. I don't know if I can come in later this afternoon or just reschedule it like for tomorrow, the appointment for tomorrow, but I am out of medication and also I was supposed to get my results from my X-rays and that. So there was a few things that I was going to talk over with the doctor, but like I said, I'm just waking up and I guess remember taking the appointment and I'm not going to make it by 920. So I need someone to call me back to let me know if I can come in later this afternoon or tomorrow or when can I come in. Thank you. Oh, my phone number is [phone]. Thank you.    Clerical- please call patient to reschedule appt.

## 2024-10-24 NOTE — TELEPHONE ENCOUNTER
Per patient she has never been off these medications. Per patient she found a bottle of lisinopril that she is currently taking but out of the amlodipine. I did advise patient to make sure she attends appointment on 10/28 and patient verbalized understanding; patient stated she needs both of these medications and she has never said she was off her medications.

## 2024-10-28 ENCOUNTER — PATIENT OUTREACH (OUTPATIENT)
Dept: INTERNAL MEDICINE CLINIC | Facility: CLINIC | Age: 59
End: 2024-10-28

## 2024-10-28 ENCOUNTER — OFFICE VISIT (OUTPATIENT)
Dept: INTERNAL MEDICINE CLINIC | Facility: CLINIC | Age: 59
End: 2024-10-28

## 2024-10-28 VITALS
BODY MASS INDEX: 29.83 KG/M2 | HEART RATE: 92 BPM | DIASTOLIC BLOOD PRESSURE: 64 MMHG | WEIGHT: 168.4 LBS | SYSTOLIC BLOOD PRESSURE: 126 MMHG | OXYGEN SATURATION: 98 % | TEMPERATURE: 98.6 F

## 2024-10-28 DIAGNOSIS — I10 PRIMARY HYPERTENSION: Primary | ICD-10-CM

## 2024-10-28 DIAGNOSIS — M54.10 RADICULAR LOW BACK PAIN: ICD-10-CM

## 2024-10-28 DIAGNOSIS — M70.61 TROCHANTERIC BURSITIS OF RIGHT HIP: ICD-10-CM

## 2024-10-28 PROCEDURE — G2211 COMPLEX E/M VISIT ADD ON: HCPCS | Performed by: FAMILY MEDICINE

## 2024-10-28 PROCEDURE — 99214 OFFICE O/P EST MOD 30 MIN: CPT | Performed by: FAMILY MEDICINE

## 2024-10-28 RX ORDER — AMLODIPINE BESYLATE 10 MG/1
10 TABLET ORAL DAILY
Qty: 90 TABLET | Refills: 3 | Status: SHIPPED | OUTPATIENT
Start: 2024-10-28

## 2024-10-28 RX ORDER — FLUTICASONE PROPIONATE 50 MCG
SPRAY, SUSPENSION (ML) NASAL
COMMUNITY
Start: 2024-10-28

## 2024-10-28 NOTE — ASSESSMENT & PLAN NOTE
See HPI , L S spine films show stable midl DJD multi Lumb levels , recommend continued exercises stretching , moist heat applications liberally , she declines formal P.T course , I printed home core , and low back strengthening exercises for her to do at home

## 2024-10-28 NOTE — PROGRESS NOTES
DEENA met briefly with pt s/p her PCP appointment to ask how she was doing?    Pt did mention she has stress re her housing situation.  She also shares the she feels she is doing better medically and has lost weight since being off of her psychiatric medications.   She feels she is doing better without them.  She shares the she does NOT want to take any more MH medications.    She shares she had an appointment with the therapist that she went to  but it was cancelled and no one told her.    They have re-scheduled her for January 2025.  She shares she does not have an ICM.  She also shares she no longer needs the PA Waiver Program.    Re her housing situation she shares she has had a verbal altercation with the  and then she f/u with a unkind  letter to them.  She shares that when she went to the Office to pay her rent they would not let her in the Office because of the verbal altercation.  She then shares the she did not put the money in an escrow account but she spent it!  She feels it was their fault because they did not take it when she went  there to pay it.  Deena has cautioned her the she could be risking an EVICTION.   She shared the shares the she was informed her she maybe getting one.    Deena has advised contacting [facility]  again.   She said she spoke to them but they can not assist until she would receive a notice.  SW has cautioned her the she maybe come homeless.     Pt then stated she would not go into a shelter again because of her pet.    Pt does not seem to realize the consequences of her actions.    It is very possible that she may NOT get funding for back rents again.    Deena has asked if she would re-consider getting a Rep Payee to help may sure she gets her rent paid in time and she was NOT receptive.     DEENA again advised contacting [facility]  as soon as she receive any notice form her  .    DEENA will send this encounter to pt's Therapist Anna  Don to ask if they can assist pt with a  Intensive Case Manger?     SW to remain available to assist as able and as pt permits.

## 2024-10-28 NOTE — ASSESSMENT & PLAN NOTE
Pt has been taking only norvasc ran out of lisinopril a few weeks back . When she was here last office visit 7/17/24 she told ne she was not taking BP meds she had lost significant weight over the last year 30 lbs   BP today 126/64 , she does have a BP cuff at home , doesn't use it She states she can have flushed , hot feelings at home , I asked her to check her BP when that happens , it may not be that her BP is elevated   Will have her continue norvasc only   Follow up here wellness exam 2-3 months   Orders:    amLODIPine (NORVASC) 10 mg tablet; Take 1 tablet (10 mg total) by mouth daily

## 2024-10-28 NOTE — PROGRESS NOTES
Ambulatory Visit  Name: Misa Zuleta      : 1965      MRN: [id number]  Encounter Provider: Ethel Sheffield MD  Encounter Date: 10/28/2024   Encounter department: [facility]    Assessment & Plan  Primary hypertension  Pt has been taking only norvasc ran out of lisinopril a few weeks back . When she was here last office visit 24 she told ne she was not taking BP meds she had lost significant weight over the last year 30 lbs   BP today 126/64 , she does have a BP cuff at home , doesn't use it She states she can have flushed , hot feelings at home , I asked her to check her BP when that happens , it may not be that her BP is elevated   Will have her continue norvasc only   Follow up here wellness exam 2-3 months   Orders:    amLODIPine (NORVASC) 10 mg tablet; Take 1 tablet (10 mg total) by mouth daily    Radicular low back pain  See HPI , L S spine films show stable midl DJD multi Lumb levels , recommend continued exercises stretching , moist heat applications liberally , she declines formal P.T course , I printed home core , and low back strengthening exercises for her to do at home        Trochanteric bursitis of right hip  See HPI pt has existing low back DJD , exam suggests R trochanteric bursitis , recommend avoid offending positions actions , continue moist heat Icy Hot roll on , stretching exercises given , recommend orthopedic evaluation   Orders:    Ambulatory Referral to Orthopedic Surgery; Future         History of Present Illness     HPI Pt here for BP check and review back xrays , had been here 24 complaining of R low back pain radicular in nature to R anterior thigh  also had R flank pain , xray LS spine completed on 24 showed mild multilevel disc space narrowing  She can't believe results show only mild Disc changes She uses heating pad on warm it can help her   She also complains of R hip pain hurts to lay on that side in bed , she needs to  do a lot of walking as she doesn't drive , to the stores etc   At that visit pt had not been taking her BP meds she had lost weight , BP at that time controlled off meds 116/82  She had run out of lisinopril a few weeks back , she has norvasc alone right now she has lost weight used to weigh up to 198 when she was still taking psych meds, she has been off psych meds , + stress where she is living , and she has gradually lost weight   She would like to get another set of dentures , she had an appointment scheduled her residential nurse sets  up Uber rides , pt was dropped off at the wrong dental office it took > 2 hrs to get back home    She has not been taking psych meds x > 1 year , feels well since she lost weight ( much of her weight gain was med related ) did set up appointment with counselor , got there to the office and was told the provider was off that week , she was rescheduled in [facility] 2025 , she was very upset about this   Review of Systems   Constitutional:  Negative for chills and fever.   HENT:  Negative for ear pain and sore throat.    Eyes:  Negative for pain and visual disturbance.   Respiratory:  Negative for cough and shortness of breath.    Cardiovascular:  Negative for chest pain and palpitations.   Gastrointestinal:  Negative for abdominal pain and vomiting.   Genitourinary:  Negative for dysuria and hematuria.   Musculoskeletal:  Positive for back pain. Negative for arthralgias.        R hip pain    Skin:  Negative for color change and rash.   Neurological:  Negative for seizures and syncope.   Psychiatric/Behavioral:  Positive for sleep disturbance.    All other systems reviewed and are negative.    Past Medical History:   Diagnosis Date    Anxiety     Arthritis     Bipolar affective disorder, depressed, severe (HCC) 04/28/2019    Depression     Elevated bilirubin 08/15/2019    Hyperlipidemia     Hypertension     Hypokalemia 08/15/2019    Learning difficulty     Leukocytosis 07/28/2020     Obesity (BMI 30.0-34.9) 06/26/2020    Osteopenia     Ovarian failure     PONV (postoperative nausea and vomiting)     Previous known suicide attempt     Psychiatric disorder     Psychiatric illness     PTSD (post-traumatic stress disorder) 02/25/2023     Past Surgical History:   Procedure Laterality Date    HERNIA REPAIR      LAPAROSCOPY      as a child, per patient-normal findings     Family History   Problem Relation Age of Onset    Alzheimer's disease Mother     Depression Mother     Depression Brother     Bipolar disorder Brother     No Known Problems Maternal Aunt     No Known Problems Paternal Aunt     No Known Problems Maternal Uncle     No Known Problems Paternal Uncle     No Known Problems Cousin     ADD / ADHD Neg Hx     Alcohol abuse Neg Hx     Anxiety disorder Neg Hx     Dementia Neg Hx     Drug abuse Neg Hx     OCD Neg Hx     Paranoid behavior Neg Hx     Schizophrenia Neg Hx     Seizures Neg Hx     Self-Injury Neg Hx     Suicide Attempts Neg Hx      Social History     Tobacco Use    Smoking status: Every Day     Current packs/day: 0.50     Average packs/day: 0.5 packs/day for 20.0 years (10.0 ttl pk-yrs)     Types: Cigarettes    Smokeless tobacco: Never   Vaping Use    Vaping status: Never Used   Substance and Sexual Activity    Alcohol use: Not Currently    Drug use: Not Currently    Sexual activity: Yes     Partners: Male     Birth control/protection: None     Current Outpatient Medications on File Prior to Visit   Medication Sig    fluticasone (FLONASE) 50 mcg/act nasal spray ONE SPRAY INTO EACH NOSTRIL LATERALLY ONCE DAILY FOR ALLERGIC NASAL SYMPTOMS    naproxen (Naprosyn) 500 mg tablet Take 1 tablet (500 mg total) by mouth 2 (two) times a day with meals for 7 days    risperiDONE (RisperDAL) 0.5 mg tablet Take 1 tablet (0.5 mg total) by mouth daily    risperiDONE (RisperDAL) 1 mg tablet Take 1 tablet (1 mg total) by mouth daily at bedtime     Allergies   Allergen Reactions    Other      Hay Fever     Oxycodone-Acetaminophen GI Intolerance     No issues with Acetaminophen     Immunization History   Administered Date(s) Administered    COVID-19 PFIZER VACCINE 0.3 ML IM 03/01/2021, 03/22/2021, 10/12/2021    INFLUENZA 10/26/2014, 12/08/2015, 08/29/2017, 10/20/2021    Influenza, injectable, quadrivalent, preservative free 0.5 mL 10/06/2019, 08/23/2020    Influenza, recombinant, quadrivalent,injectable, preservative free 01/17/2023    Pneumococcal Conjugate Vaccine 20-valent (Pcv20), Polysace 01/17/2023    Pneumococcal Polysaccharide PPV23 02/03/2020, 09/22/2020    Tdap 07/11/2019, 10/06/2019    Zoster Vaccine Recombinant 05/21/2020, 08/23/2020     Objective     /64 (BP Location: Left arm, Patient Position: Sitting, Cuff Size: Large)   Pulse 92   Temp 98.6 °F (37 °C) (Temporal)   Wt 76.4 kg (168 lb 6.4 oz)   LMP  (LMP Unknown)   SpO2 98%   BMI 29.83 kg/m²     Physical Exam  Vitals and nursing note reviewed.   Constitutional:       General: She is not in acute distress.     Appearance: She is well-developed.      Comments: Skin with good color turgor , well hydrated ,no distress noted     HENT:      Head: Normocephalic and atraumatic.      Right Ear: No decreased hearing noted.      Left Ear: No decreased hearing noted.      Mouth/Throat:      Dentition: Abnormal dentition.      Comments: Edentulous   Eyes:      Conjunctiva/sclera: Conjunctivae normal.   Neck:      Thyroid: No thyromegaly.   Cardiovascular:      Rate and Rhythm: Normal rate and regular rhythm.      Heart sounds: Normal heart sounds. No murmur heard.  Pulmonary:      Effort: Pulmonary effort is normal. No respiratory distress.      Breath sounds: Normal breath sounds.   Abdominal:      Palpations: Abdomen is soft.      Tenderness: There is no abdominal tenderness.   Musculoskeletal:         General: No swelling.      Cervical back: Neck supple.      Lumbar back: No deformity, tenderness or bony tenderness. Decreased range of motion. Negative  right straight leg raise test.      Right hip: Bony tenderness present. No deformity. Normal range of motion.      Comments: TTP R trochanteric bursa    Lymphadenopathy:      Cervical:      Right cervical: No superficial cervical adenopathy.     Left cervical: No superficial cervical adenopathy.   Skin:     General: Skin is warm and dry.      Capillary Refill: Capillary refill takes less than 2 seconds.   Neurological:      Mental Status: She is alert.      Comments: Non focal exam    Psychiatric:         Attention and Perception: Attention normal.         Mood and Affect: Mood normal.         Speech: Speech normal.         Behavior: Behavior normal.

## 2024-10-28 NOTE — ASSESSMENT & PLAN NOTE
See HPI pt has existing low back DJD , exam suggests R trochanteric bursitis , recommend avoid offending positions actions , continue moist heat Icy Hot roll on , stretching exercises given , recommend orthopedic evaluation   Orders:    Ambulatory Referral to Orthopedic Surgery; Future

## 2024-11-04 ENCOUNTER — PATIENT OUTREACH (OUTPATIENT)
Dept: INTERNAL MEDICINE CLINIC | Facility: CLINIC | Age: 59
End: 2024-11-04

## 2024-11-04 NOTE — PROGRESS NOTES
SW reached out to pt this date to f/u to see if pt was interested in getting a Intensive  through  to assist her with her Housing issues?  Pt stated that she does NOT want a Representative Payee.  SW attempted to explain the difference and the Line was disconnected.     SW called back and the call started to go to  and again the line was disconnected.     SW to remain available to assist as indicated.

## 2024-11-05 ENCOUNTER — APPOINTMENT (OUTPATIENT)
Dept: RADIOLOGY | Age: 59
End: 2024-11-05
Payer: MEDICARE

## 2024-11-05 ENCOUNTER — OFFICE VISIT (OUTPATIENT)
Dept: OBGYN CLINIC | Facility: CLINIC | Age: 59
End: 2024-11-05
Payer: MEDICARE

## 2024-11-05 VITALS
HEIGHT: 63 IN | BODY MASS INDEX: 29.77 KG/M2 | WEIGHT: 168 LBS | HEART RATE: 67 BPM | DIASTOLIC BLOOD PRESSURE: 87 MMHG | SYSTOLIC BLOOD PRESSURE: 143 MMHG

## 2024-11-05 DIAGNOSIS — M70.61 TROCHANTERIC BURSITIS OF RIGHT HIP: Primary | ICD-10-CM

## 2024-11-05 DIAGNOSIS — M70.61 TROCHANTERIC BURSITIS OF RIGHT HIP: ICD-10-CM

## 2024-11-05 DIAGNOSIS — M54.50 LOW BACK PAIN, UNSPECIFIED BACK PAIN LATERALITY, UNSPECIFIED CHRONICITY, UNSPECIFIED WHETHER SCIATICA PRESENT: ICD-10-CM

## 2024-11-05 PROCEDURE — 99213 OFFICE O/P EST LOW 20 MIN: CPT | Performed by: STUDENT IN AN ORGANIZED HEALTH CARE EDUCATION/TRAINING PROGRAM

## 2024-11-05 PROCEDURE — 73502 X-RAY EXAM HIP UNI 2-3 VIEWS: CPT

## 2024-11-05 NOTE — PROGRESS NOTES
Date: 24  Misa Zuleta   MRN# [id number]  : 1965      Chief Complaint: Right Hip Pain    Assessment and Plan:  The patient verbalized understanding of exam findings and treatment plan. We engaged in the shared decision-making process and treatment options were discussed at length with the patient. Surgical and conservative management discussed today along with risks and benefits. Patient was agreeable with the plan and all questions were answered to satisfaction.     Patient has a history, physical examination and radiographic evaluation consistent with greater trochanteric bursitis.    -WBAT  -Patient informed that this is a self-limiting condition which will improve with appropriate rest, medications and rehabilitation  -Activity modification to limit strain or impact on the joint  -NSAIDS as needed  -Tylenol 1000mg up to three times daily as needed. Do not exceed 3000mg daily  -Supervised physical therapy. Script provided   -Home exercise program directed by PT  - A referral was provided to pain management for her lower back  -Patient may follow up prn for further evaluation and treatment          Subjective:     Hip Pain  58 y.o. female presents to the office, referred by Ethel Sheffield,, for evaluation of right hip pain. This is evaluated as a personal injury. The pain began 1 year ago. The pain is located posterior and is made worse with walking. Patient rates their pain mild today.  She describes the symptoms as aching. Symptoms improve with rest. The symptoms are worse with activity, getting on and off the bus, and grocery shopping. The hip has not given out or felt unstable. Treatment to date has been NSAID's, icy hot, and Lidocaine cream without significant relief. Pain posterior aspect radiates down the leg intermittent steps walking on and off the bus achy pain Tylenol Ibuprofen Lidocaine and icy hot with minimal relief     Prior treatment:  NSAIDs Yes    Physical Therapy No  "  Cortisone Injections No     External Records Reviewed:   office notes    Orthopedic PMH  None    Orthopedic Surgical History  None    Estimated body mass index is 29.76 kg/m² as calculated from the following:    Height as of this encounter: 5' 3\" (1.6 m).    Weight as of this encounter: 76.2 kg (168 lb).    Lab Results   Component Value Date    HGBA1C 5.7 (H) 07/27/2024    HGBA1C 6.0 03/20/2024    HGBA1C 5.6 04/29/2019   .   Lab Results   Component Value Date    EGFR 93 07/27/2024    EGFR 97 07/26/2024    EGFR 102 01/23/2024    CREATININE 0.71 07/27/2024    CREATININE 0.65 07/26/2024    CREATININE 0.63 01/23/2024        Allergy:  Allergies   Allergen Reactions    Other      Hay Fever    Oxycodone-Acetaminophen GI Intolerance     No issues with Acetaminophen     Medications:  all current active meds have been reviewed  Past Medical History:  Past Medical History:   Diagnosis Date    Anxiety     Arthritis     Bipolar affective disorder, depressed, severe (HCC) 04/28/2019    Depression     Elevated bilirubin 08/15/2019    Hyperlipidemia     Hypertension     Hypokalemia 08/15/2019    Learning difficulty     Leukocytosis 07/28/2020    Obesity (BMI 30.0-34.9) 06/26/2020    Osteopenia     Ovarian failure     PONV (postoperative nausea and vomiting)     Previous known suicide attempt     Psychiatric disorder     Psychiatric illness     PTSD (post-traumatic stress disorder) 02/25/2023     Past Surgical History:  Past Surgical History:   Procedure Laterality Date    HERNIA REPAIR      LAPAROSCOPY      as a child, per patient-normal findings     Family History:  Family History   Problem Relation Age of Onset    Alzheimer's disease Mother     Depression Mother     Depression Brother     Bipolar disorder Brother     No Known Problems Maternal Aunt     No Known Problems Paternal Aunt     No Known Problems Maternal Uncle     No Known Problems Paternal Uncle     No Known Problems Cousin     ADD / ADHD Neg Hx     Alcohol abuse Neg " "Hx     Anxiety disorder Neg Hx     Dementia Neg Hx     Drug abuse Neg Hx     OCD Neg Hx     Paranoid behavior Neg Hx     Schizophrenia Neg Hx     Seizures Neg Hx     Self-Injury Neg Hx     Suicide Attempts Neg Hx      Social History:  Social History     Substance and Sexual Activity   Alcohol Use Not Currently     Social History     Substance and Sexual Activity   Drug Use Not Currently     Social History     Tobacco Use   Smoking Status Every Day    Current packs/day: 0.50    Average packs/day: 0.5 packs/day for 20.0 years (10.0 ttl pk-yrs)    Types: Cigarettes   Smokeless Tobacco Never           Review of Systems:  General- denies fever/chills  HEENT- denies hearing loss or sore throat  Eyes- denies eye pain or visual disturbances, denies red eyes  Respiratory- denies cough or SOB  Cardio- denies chest pain or palpitations  GI- denies abdominal pain  Endocrine- denies urinary frequency  Urinary- denies pain with urination  Musculoskeletal- Negative except noted above  Skin- denies rashes or wounds  Neurological- denies dizziness or headache  Psychiatric- denies anxiety or difficulty concentrating      Objective:   BP Readings from Last 1 Encounters:   11/05/24 143/87      Wt Readings from Last 1 Encounters:   11/05/24 76.2 kg (168 lb)      Pulse Readings from Last 1 Encounters:   11/05/24 67        BMI: Estimated body mass index is 29.76 kg/m² as calculated from the following:    Height as of this encounter: 5' 3\" (1.6 m).    Weight as of this encounter: 76.2 kg (168 lb).      Physical Exam  /87   Pulse 67   Ht 5' 3\" (1.6 m)   Wt 76.2 kg (168 lb)   LMP  (LMP Unknown)   BMI 29.76 kg/m²   General/Constitutional: No apparent distress: well-nourished and well developed.  Eyes: normal ocular motion  Cardio: RRR, Normal S1S2, No m/r/g.   Lymphatic: No appreciable lymphadenopathy  Respiratory: Non-labored breathing, CTA b/l no w/c/r  Vascular: No edema, swelling or tenderness, except as noted in detailed exam. " Extremities well perfused. No LE edema  Integumentary: No impressive skin lesions present, except as noted in detailed exam.  Neuro: No ataxia or tremors noted  Psych: Normal mood and affect, oriented to person, place and time. Appropriate affect.  Musculoskeletal: Normal, except as noted in detailed exam and in HPI.    Gait and Station:   normal    Right Hip     Inspection: Skin intact    Range of Motion: full PROM without pain  TTP over trochanteric bursa    negative  log roll   negative Trendelenburg sign  negative  Stinchfield  negative  KEVNI  negative  FADDIR    Motor: 5/5 Q/HS/TA/GS/EHL/FHL    Vascular: Toes WWP with BCR    SILT DP/SP/Shannan/Saph/Tib      Images:    I personally reviewed relevant images in the PACS system and my interpretation is as follows:    X-rays of the right hip reveals mild joint space narrowing, subchondral sclerosis, subchondral cysts, osteophyte formation. No fracture or dislocation.       Scribe Attestation      I,:  Marycarmen Burris MA am acting as a scribe while in the presence of the attending physician.:       I,:  Flynn Collins MD personally performed the services described in this documentation    as scribed in my presence.:               Flynn Collins MD  Adult Reconstruction Specialist   [facility]

## 2024-11-05 NOTE — PATIENT INSTRUCTIONS
Tylenol 1000mg 3 times a day  Referral to spine and pain for back  Referral to PT for hip bursitis

## 2024-11-08 ENCOUNTER — TELEPHONE (OUTPATIENT)
Age: 59
End: 2024-11-08

## 2024-11-08 NOTE — TELEPHONE ENCOUNTER
Patient called office requesting a letter for receiving treatment, she is applying for rental assistance. Pateint requesting PABLO be fax to  [phone].

## 2024-12-10 ENCOUNTER — TELEPHONE (OUTPATIENT)
Dept: PSYCHIATRY | Facility: CLINIC | Age: 59
End: 2024-12-10

## 2024-12-10 NOTE — TELEPHONE ENCOUNTER
CM received internal referral request for assistance with ICM Referral.     Patient added to CM work queue. If patient reaches out regarding their referral, please forward their message to our CM Pool at [id number].    Per [employer], patient has active MA through [facility] ID #[id number]

## 2024-12-11 ENCOUNTER — TELEPHONE (OUTPATIENT)
Dept: INTERNAL MEDICINE CLINIC | Facility: CLINIC | Age: 59
End: 2024-12-11

## 2024-12-11 ENCOUNTER — TELEPHONE (OUTPATIENT)
Age: 59
End: 2024-12-11

## 2024-12-11 NOTE — TELEPHONE ENCOUNTER
Pt called stating that she is in a lot of pain. She  has not seen us yet I advised she call her pcp.

## 2024-12-11 NOTE — TELEPHONE ENCOUNTER
Patient and patients family practice called office requesting a sooner appt. Writer offered her a sooner appt that was open per decision tree on 12/23/24 at 11am. Nurse/doctor stated that she needs this appts and she is upset that no one called to offer a sooner appt when she's been added to the cancellation list a while ago. Writer apologized and placed patient on cancellation list incase anyone cancels between now and 12/23

## 2024-12-11 NOTE — TELEPHONE ENCOUNTER
Patient Nurse Beth French-RN called regarding patient severe pain in the lower back which radiates down to the buttocks area and knees and on the side of both legs.    Patient has tried pain medication Tylenol,epsom salt baths nothing is helping her.Patient is requesting a call to be advised what she can do.    Pain level is at a 9 and can barely walk she does have a pain management appt. Who referred her to call pcp.

## 2024-12-12 NOTE — TELEPHONE ENCOUNTER
spoke with patient about [facility] services. Pt will be attending her appointment on 12/23 and will sign the PABLO for [facility].

## 2024-12-13 ENCOUNTER — CONSULT (OUTPATIENT)
Dept: PAIN MEDICINE | Facility: CLINIC | Age: 59
End: 2024-12-13
Payer: MEDICARE

## 2024-12-13 VITALS
HEIGHT: 63 IN | SYSTOLIC BLOOD PRESSURE: 139 MMHG | WEIGHT: 175 LBS | HEART RATE: 70 BPM | BODY MASS INDEX: 31.01 KG/M2 | DIASTOLIC BLOOD PRESSURE: 89 MMHG

## 2024-12-13 DIAGNOSIS — M25.551 RIGHT HIP PAIN: ICD-10-CM

## 2024-12-13 DIAGNOSIS — M54.50 LOW BACK PAIN, UNSPECIFIED BACK PAIN LATERALITY, UNSPECIFIED CHRONICITY, UNSPECIFIED WHETHER SCIATICA PRESENT: ICD-10-CM

## 2024-12-13 DIAGNOSIS — M47.816 LUMBAR SPONDYLOSIS: Primary | ICD-10-CM

## 2024-12-13 DIAGNOSIS — M43.16 SPONDYLOLISTHESIS, LUMBAR REGION: ICD-10-CM

## 2024-12-13 PROCEDURE — G2211 COMPLEX E/M VISIT ADD ON: HCPCS | Performed by: ANESTHESIOLOGY

## 2024-12-13 PROCEDURE — 99204 OFFICE O/P NEW MOD 45 MIN: CPT | Performed by: ANESTHESIOLOGY

## 2024-12-13 NOTE — LETTER
December 13, 2024     Flynn Collins MD  [address] [place] [id number]    Patient: Misa Zuleta   YOB: 1965   Date of Visit: 12/13/2024       Dear Dr. Collins:    Thank you for referring Misa Zuleta to me for evaluation. Below are my notes for this consultation.    If you have questions, please do not hesitate to call me. I look forward to following your patient along with you.         Sincerely,        Harrison Barajas, DO        CC: No Recipients

## 2024-12-13 NOTE — PROGRESS NOTES
Assessment  1. Lumbar spondylosis    2. Low back pain, unspecified back pain laterality, unspecified chronicity, unspecified whether sciatica present    3. Spondylolisthesis, lumbar region    4. Right hip pain        Plan  Patient is a 59-year-old female sent in as referral from orthopedics for evaluation of low back pain.  Patient reports right-sided low back pain radiating to right groin and right anterior lateral thigh over the past several months without any inciting event.  She reports associated numbness and paresthesias.  Patient had x-ray of lumbar spine done in July 2024 and interpreted by me showing lower lumbar facet arthropathy and mild anterior listhesis of L4 and L5.  Patient also had x-ray of right hip done in November 2024 and interpreted by me showing moderate degenerative changes of right hip.  Patient states that she has not gone to physical therapy recently and taking ibuprofen and Tylenol for pain relief.  Differential diagnosis includes lumbar radiculopathy and right hip arthritis.    1.  Will refer patient to physical therapy.  If patient's symptoms persist despite conservative measures, then we will consider getting MRI lumbar spine  2.  If patient's right hip/right groin pain worsens, then will consider performing right intra-articular hip injection    My impressions and treatment recommendations were discussed in detail with the patient who verbalized understanding and had no further questions.  Discharge instructions were provided. I personally saw and examined the patient and I agree with the above discussed plan of care.    Orders Placed This Encounter   Procedures    Ambulatory referral to Physical Therapy     Standing Status:   Future     Expiration Date:   12/13/2025     Referral Priority:   Routine     Referral Type:   Physical Therapy     Referral Reason:   Specialty Services Required     Requested Specialty:   Physical Therapy     Number of Visits Requested:   1     Expiration  Date:   12/13/2025       No orders of the defined types were placed in this encounter.      History of Present Illness    Misa Zuleta is a 59 y.o. female presents with low back pain radiating to right groin and right anterior lateral thigh over the past several months without any inciting event.  Patient states that symptoms are moderate to severe, rated as 8-9 out of 10, states that the pain occurs nearly constantly, described as a throbbing-like sensation, with associated weakness and numbness.  Patient states that she has been taking Tylenol and ibuprofen with minimal leaf in symptoms.    I have personally reviewed and/or updated the patient's past medical history, past surgical history, family history, social history, current medications, allergies, and vital signs today.     Review of Systems    Patient Active Problem List   Diagnosis    Amenorrhea    HTN (hypertension)    Hyperlipidemia    Tobacco use    Ovarian failure    Mild intellectual disability    Constipation    MDD (major depressive disorder), recurrent episode, severe (HCC)    Chronic pain of both knees    Bipolar disorder (HCC)    Anxiety    Insomnia    Overweight (BMI 25.0-29.9)    Patellofemoral pain syndrome of both knees    Pulmonary nodule    Vitamin D insufficiency    PTSD (post-traumatic stress disorder)    Low back pain    Arthralgia of right wrist    History of learning disability as a child    Housing situation unstable    Shoulder injury, sequela    Foreign body of left ear    Aphthous ulcer    Radicular low back pain    Right flank pain    Trochanteric bursitis of right hip    Lumbar spondylosis    Spondylolisthesis, lumbar region       Past Medical History:   Diagnosis Date    Anxiety     Arthritis     Bipolar affective disorder, depressed, severe (HCC) 04/28/2019    Depression     Elevated bilirubin 08/15/2019    Hyperlipidemia     Hypertension     Hypokalemia 08/15/2019    Learning difficulty     Leukocytosis 07/28/2020    Obesity  (BMI 30.0-34.9) 06/26/2020    Osteopenia     Ovarian failure     PONV (postoperative nausea and vomiting)     Previous known suicide attempt     Psychiatric disorder     Psychiatric illness     PTSD (post-traumatic stress disorder) 02/25/2023       Past Surgical History:   Procedure Laterality Date    HERNIA REPAIR      LAPAROSCOPY      as a child, per patient-normal findings       Family History   Problem Relation Age of Onset    Alzheimer's disease Mother     Depression Mother     Depression Brother     Bipolar disorder Brother     No Known Problems Maternal Aunt     No Known Problems Paternal Aunt     No Known Problems Maternal Uncle     No Known Problems Paternal Uncle     No Known Problems Cousin     ADD / ADHD Neg Hx     Alcohol abuse Neg Hx     Anxiety disorder Neg Hx     Dementia Neg Hx     Drug abuse Neg Hx     OCD Neg Hx     Paranoid behavior Neg Hx     Schizophrenia Neg Hx     Seizures Neg Hx     Self-Injury Neg Hx     Suicide Attempts Neg Hx        Social History     Occupational History    Not on file   Tobacco Use    Smoking status: Every Day     Current packs/day: 0.50     Average packs/day: 0.5 packs/day for 20.0 years (10.0 ttl pk-yrs)     Types: Cigarettes    Smokeless tobacco: Never   Vaping Use    Vaping status: Never Used   Substance and Sexual Activity    Alcohol use: Not Currently    Drug use: Not Currently    Sexual activity: Yes     Partners: Male     Birth control/protection: None       Current Outpatient Medications on File Prior to Visit   Medication Sig    amLODIPine (NORVASC) 10 mg tablet Take 1 tablet (10 mg total) by mouth daily    fluticasone (FLONASE) 50 mcg/act nasal spray ONE SPRAY INTO EACH NOSTRIL LATERALLY ONCE DAILY FOR ALLERGIC NASAL SYMPTOMS    naproxen (Naprosyn) 500 mg tablet Take 1 tablet (500 mg total) by mouth 2 (two) times a day with meals for 7 days    risperiDONE (RisperDAL) 0.5 mg tablet Take 1 tablet (0.5 mg total) by mouth daily    risperiDONE (RisperDAL) 1 mg  "tablet Take 1 tablet (1 mg total) by mouth daily at bedtime     No current facility-administered medications on file prior to visit.       Allergies   Allergen Reactions    Other      Hay Fever    Oxycodone-Acetaminophen GI Intolerance     No issues with Acetaminophen       Physical Exam    /89   Pulse 70   Ht 5' 3\" (1.6 m)   Wt 79.4 kg (175 lb)   LMP  (LMP Unknown)   BMI 31.00 kg/m²     Vitals:    12/13/24 1012   BP: 139/89   Pulse: 70     Constitutional: no apparent distress, does not appear sedated   HEENT: pupils equal and round, symmetric facial muscles   Neck: supple  Cardiovascular: well perfused, no peripheral cyanosis  Pulmonary: good chest wall excursion, breathing unlabored   Psych: appropriate affect and insight, No agitation. No evidence of aberrant behavior   Skin: No rashes or lesions  Neuro: cranial nerves II-XII grossly intact   MSK:  Inspection: no signs of infection to back  Palpation: tender to palpation to lumbar spine and right hip  ROM: no significant rom abnormalities in lumbar spine   MMT 5/5 strength in B/L LE  Sensation to light touch intact B/L LE  DTR: 2+ in B/L patellar, achilles relefx  Special test: + FADIR, + Stinchfield, + SLR   Gait: ambulates unassisted, gait is not antalgic        Imaging      Study Result    Narrative & Impression         RIGHT HIP     INDICATION:   Trochanteric bursitis, right hip.      COMPARISON:  None.     VIEWS:  XR HIP/PELV 2-3 VWS RIGHT W PELVIS IF PERFORMED      FINDINGS:     There is no acute displaced fracture or dislocation.     Moderate to advanced degenerative arthritis right hip.     Degenerative changes left hip.     No lytic or blastic osseous lesion.     Soft tissues are unremarkable.     The visualized lumbar spine is unremarkable.     IMPRESSION:        Moderate to advanced degenerative arthritis right hip.        Electronically signed: 11/05/2024 05:35 PM Tru Natarajan MD       Study Result    Narrative & Impression         LUMBAR " SPINE     INDICATION:   Unspecified abdominal pain. Radiculopathy, site unspecified.      COMPARISON: 4/12/2021.     VIEWS:  XR SPINE LUMBAR MINIMUM 4 VIEWS NON INJURY     FINDINGS:     There are 5 non rib bearing lumbar vertebral bodies.      There is no evidence of acute fracture or destructive osseous lesion.     Alignment is unremarkable.      There is mild multilevel degenerative disc space narrowing.. There is minimal osteophytic lipping off of the endplates of L2, L3 and to lesser extent the superior endplate of L4.     There is mild bilateral facet arthropathy at L4-5 and L5-S1 bilaterally.     The pedicles appear intact.     Soft tissues are unremarkable.     IMPRESSION:     There are very mild spondylitic changes of the lumbar spine, similar in appearance to 4/12/2021.     Electronically signed: 07/24/2024 05:41 PM Luke Boyle MD

## 2024-12-16 NOTE — TELEPHONE ENCOUNTER
Attempted to reach patient at . Left detailed voice message touching base to see how patient was feeling and noted that she did follow up with pain medicine on 12/13/24. Patient updated she has a referral for PT and encouraged to contact closest facility to start scheduling PT dates/times.     Office number provided for any further questions/concerns.

## 2024-12-17 NOTE — TELEPHONE ENCOUNTER
Pt called stating she just received a call from [facility]'s phone number, she believes it may be in regard to appt scheduled 12/23. Upon review, writer made pt aware she had received the confirmation call for the appt. Pt verbally confirmed 12/23 appt.

## 2024-12-20 ENCOUNTER — TELEPHONE (OUTPATIENT)
Dept: PSYCHIATRY | Facility: CLINIC | Age: 59
End: 2024-12-20

## 2024-12-20 NOTE — TELEPHONE ENCOUNTER
Patients Name: Misa Zuleta  : 1965  Phone Number: [phone]  Appointment Date: 24  Appointment time: 11:00   time 10:30   Address: [address] [place] [address]  [place] [id number]  Drop off Facility/Office Name: [facility]  Drop off  Address: [address] [place] [address]

## 2024-12-23 ENCOUNTER — TELEPHONE (OUTPATIENT)
Age: 59
End: 2024-12-23

## 2024-12-23 NOTE — TELEPHONE ENCOUNTER
Patient's having issues with her LYFT  If patient calls back please reach ou to office to have them rs her ride, office aware of issues.

## 2025-01-06 ENCOUNTER — EVALUATION (OUTPATIENT)
Dept: PHYSICAL THERAPY | Facility: REHABILITATION | Age: 60
End: 2025-01-06
Payer: MEDICARE

## 2025-01-06 DIAGNOSIS — M54.50 LOW BACK PAIN, UNSPECIFIED BACK PAIN LATERALITY, UNSPECIFIED CHRONICITY, UNSPECIFIED WHETHER SCIATICA PRESENT: ICD-10-CM

## 2025-01-06 DIAGNOSIS — M25.551 RIGHT HIP PAIN: ICD-10-CM

## 2025-01-06 PROCEDURE — 97163 PT EVAL HIGH COMPLEX 45 MIN: CPT

## 2025-01-06 NOTE — PROGRESS NOTES
PT Evaluation     Today's date: 2025  Patient name: Misa Zuleta  : 1965  MRN: [id number]  Referring provider: Harrison Barajas DO  Dx:   Encounter Diagnosis     ICD-10-CM    1. Low back pain, unspecified back pain laterality, unspecified chronicity, unspecified whether sciatica present  M54.50 Ambulatory referral to Physical Therapy      2. Right hip pain  M25.551 Ambulatory referral to Physical Therapy          Start Time: 930  Stop Time: 101  Total time in clinic (min): 45 minutes    Assessment  Impairments: abnormal gait, abnormal or restricted ROM, abnormal movement, activity intolerance, difficulty understanding, impaired physical strength, lacks appropriate home exercise program, pain with function, poor posture , participation limitations and activity limitations  Symptom irritability: high    Assessment details: Problem List:  1) Lumbar mobility  2) Neural tension    Misa Zuleta is a pleasant 59 y.o. female who presents with signs and symptoms of chronic radiating back pain.  she has limited lumbar AROM with most restriction into extension and ipsilateral side glide, and positive neurodynamic testing for reproduction of symptoms. Suspected directional preference for extension. While motion improved with repeated flexion, extension produces greater improvement in motion. Prescribed extension based interventions and will re-assess for symptom response. Deficits result in the symptoms she is experiencing, and result in difficulty walking, navigating stairs and limit community mobility. No further referral appears necessary at this time based upon examination results.  I expect she will have fair improvement with 8-10 weeks of skilled physical therapy.        Comparable signs:  1) Max restriction with lumbar extension and right side glide  2) positive slump test  Understanding of Dx/Px/POC: good     Prognosis: good    Goals  Short Term Goals:   1. Patient will demonstrate independence with  HEP by providing return demonstration of exercises  2. Patient will report decreased symptom intensity during activity by 50%  3. Patient will have minimal restriction into extension  4. Patient will have minimal restriction into ipsilateral side glide    Long Term Goals:   1. Patient will improve FOTO to greater then goal  2. Patient will improve pain with activity to 2/10 or less  3. Patient will continue with HEP to allow for continued progress and function  4. Patient will have negative slump test        Plan  Referral necessary: No  Planned modality interventions: TENS    Planned therapy interventions: joint mobilization, manual therapy, muscle pump exercises, neuromuscular re-education, patient education, strengthening, stretching, therapeutic activities, therapeutic exercise, home exercise program, graded exercise, graded activity, gait training, functional ROM exercises, flexibility, body mechanics training, balance, abdominal trunk stabilization, motor coordination training and nerve gliding    Frequency: 2x week  Plan of Care beginning date: 1/6/2025  Plan of Care expiration date: 3/17/2025  Treatment plan discussed with: patient        Subjective Evaluation    History of Present Illness  Mechanism of injury: Patient developed right sided low back pain in May/ June of 2024. Symptoms radiate to her right flank, groin and anterior thigh with occasional radiation below her knee. Symptoms impact functional mobility and stair navigation- difficult due to patient taking public transportation. She endorses associated numbness and tingling, but denies weakness. Denies bowel and bladder changes. Reports symptoms will disturb sleep, able to reduce with movement and Tylenol and Ibuprofen. Symptoms reduce mildly with standing and walking, but still bothersome. Has been evaluated by PCP, ortho and pain management. Had plain films imaging of her lumbar spine on 7/24, and plain films imaging of her right hip on 11/5.                Recurrent probem    Patient Goals  Patient goals for therapy: decreased pain, increased motion, increased strength and independence with ADLs/IADLs  Patient goal: improvd ability with taking stairs, taking the bus, grocery shopping  Pain  Current pain ratin  At best pain ratin  At worst pain rating: 10  Quality: radiating (tingling)  Relieving factors: heat and ice  Aggravating factors: sitting, standing and walking (laying down)  Progression: worsening    Treatments  Previous treatment: medication        Objective    Posture: mild shift to the left    Dermatome: (L/R):    L1: Intact to light touch bilaterally  L2: Intact  to light touch bilaterally  L3: Intact  to light touch bilaterally  L4: Intact  to light touch bilaterally  L5: Intact  to light touch bilaterally  S1: Intact  to light touch bilaterally     Reflexes:  (L/R)   L3-4:  3+ bilaterally        S1:  2+ bilaterally              GAIT: ambulates with RW.      Lumbar Restriction level Response   Flex. Min Back pain   Extn. Max Back pain   SG Left Min    SG Right Max    Repeated Movements  Prone laying= Decrease, not better  Prone prop= Increase low back pain and paresthesias, improve- increased lumbar AROM (ext & side glide)  Seated Flexion= increase low back pain and paresthesias, produce right thigh pain, improve- increased lumbar AROM (flex & ext)        MMT         AROM          PROM    Hip       L       R        L           R      L     R   Flex. (L1,L2,L3) 4 4-       IR.     25 20 (p)   ER.     45 35 (p)            Knee         Ext (L3) 4 4-       Flex (S2) 4 4-                Ankle/foot         DF (L4) 4 4       PF (S1) 4 4       G. Toe (L5) 4 4       Comments:    Neuro Dynamic Testing:  Slump test:   L=    negative  R=     positive for back pain      Straight leg raise:     L=   negative     R=    positive for back pain        Crossed Straight leg raise:    L=    negative  R=     negative              Precautions: Standard,  Chronicity of symptoms, hx R. Shoulder fracture      Manuals                                                                 Neuro Re-Ed                                                                                                        Ther Ex             Repeated movements Nv            Prone laying  HEP, progress to prone prop                                                                                         Ther Activity                                       Gait Training                                       Modalities

## 2025-01-08 ENCOUNTER — OFFICE VISIT (OUTPATIENT)
Dept: PHYSICAL THERAPY | Facility: REHABILITATION | Age: 60
End: 2025-01-08
Payer: MEDICARE

## 2025-01-08 DIAGNOSIS — M54.50 LOW BACK PAIN, UNSPECIFIED BACK PAIN LATERALITY, UNSPECIFIED CHRONICITY, UNSPECIFIED WHETHER SCIATICA PRESENT: Primary | ICD-10-CM

## 2025-01-08 DIAGNOSIS — M25.551 RIGHT HIP PAIN: ICD-10-CM

## 2025-01-08 PROCEDURE — 97110 THERAPEUTIC EXERCISES: CPT

## 2025-01-08 NOTE — PROGRESS NOTES
Daily Note     Today's date: 2025  Patient name: Misa Zuleta  : 1965  MRN: [id number]  Referring provider: Harrison Barajas DO  Dx:   Encounter Diagnosis     ICD-10-CM    1. Low back pain, unspecified back pain laterality, unspecified chronicity, unspecified whether sciatica present  M54.50       2. Right hip pain  M25.551           Start Time: 1000  Stop Time: 1045  Total time in clinic (min): 45 minutes    Subjective: Misa reports increase in back pain the day after last session. She has thomas getting tingling in both legs, she believes it is a newer symptom but unsure when it started.       Objective: See treatment diary below    Prone laying  Increase back pain, produce right buttock pain, produce bilateral paresthesias; not worse    Repeated supine flexion: Produce central low back pain, produce bilateral paresthesias; abolish symptoms after    Lumbar AROM  R. Sg restricted.       SLR  Positve on the right with production of anterior thigh pain      Assessment: Discussed phenomena of centralization and peripheralization and that increase in back pain is the desired treatment effect. Given production of paresthesias with directional preference interventions, investigated flexion based movements with above result. Prescribed flexion in supine and discussed modifications with patient based on symptom response. With variable response to flexion and extension, and limitations in side glide motion; potential for lateral component. Continue to monitor for symptom response. Tolerated treatment well. Patient exhibited good technique with therapeutic exercises and would benefit from continued PT      Plan: Continue per plan of care.      Precautions: Standard, Chronicity of symptoms, hx R. Shoulder fracture      Manuals                                                                 Neuro Re-Ed                                                                                                        Ther Ex              Repeated movements Nv LM           Prone laying HEP, progress to prone prop            Flexion in supine  HEP                                                                            Ther Activity                                       Gait Training                                       Modalities

## 2025-01-13 ENCOUNTER — OFFICE VISIT (OUTPATIENT)
Dept: PHYSICAL THERAPY | Facility: REHABILITATION | Age: 60
End: 2025-01-13
Payer: MEDICARE

## 2025-01-13 DIAGNOSIS — M54.50 LOW BACK PAIN, UNSPECIFIED BACK PAIN LATERALITY, UNSPECIFIED CHRONICITY, UNSPECIFIED WHETHER SCIATICA PRESENT: Primary | ICD-10-CM

## 2025-01-13 DIAGNOSIS — M25.551 RIGHT HIP PAIN: ICD-10-CM

## 2025-01-13 PROCEDURE — 97112 NEUROMUSCULAR REEDUCATION: CPT

## 2025-01-13 PROCEDURE — 97110 THERAPEUTIC EXERCISES: CPT

## 2025-01-13 NOTE — PROGRESS NOTES
Daily Note     Today's date: 2025  Patient name: Misa Zuleta  : 1965  MRN: [id number]  Referring provider: Harrison Barajas DO  Dx:   Encounter Diagnosis     ICD-10-CM    1. Low back pain, unspecified back pain laterality, unspecified chronicity, unspecified whether sciatica present  M54.50       2. Right hip pain  M25.551           Start Time: 1045  Stop Time: 1125  Total time in clinic (min): 40 minutes    Subjective: Misa says she has been doing well since her last appointment. She has had a significant reduction in symptoms, and reports minimal right sided low back pain and few paresthesias in her thigh. She has been compliant with her flexion exercises, but has been doing her extensions as well.       Objective: See treatment diary below      Assessment: With improvement in symptoms and overall clinical presentation, progressed patient to motor control interventions with good response within session. Discussed potential for symptom return and recommended patient continue flexion based interventions for symptom control. With continuation of extension based interventions and lack of symptoms with both flexion and extension, potential that derangement has been reduced vs. Patient fits mechanically inconclusive subcategory. Incorporated low step up to improve single limb strength, control and simulate functional mobility demands. Tolerated treatment well. Patient exhibited good technique with therapeutic exercises and would benefit from continued PT      Plan: Continue per plan of care.      Precautions: Standard, Chronicity of symptoms, hx R. Shoulder fracture      Manuals                                                                Neuro Re-Ed             Pallof press   2x10 3# xs                                                                                        Ther Ex             Educaiton    HEP LM         Repeated movements Nv LM           Prone laying HEP, progress to prone  "prop            Flexion in supine  HEP           VG   Lvl 6 5'          Bridge   2x10          Clam   3x8 4\" hold YHB                                                 Ther Activity             Step up   4\" 2x6                       Gait Training                                       Modalities                                              "

## 2025-01-15 ENCOUNTER — OFFICE VISIT (OUTPATIENT)
Dept: PHYSICAL THERAPY | Facility: REHABILITATION | Age: 60
End: 2025-01-15
Payer: MEDICARE

## 2025-01-15 DIAGNOSIS — M54.50 LOW BACK PAIN, UNSPECIFIED BACK PAIN LATERALITY, UNSPECIFIED CHRONICITY, UNSPECIFIED WHETHER SCIATICA PRESENT: Primary | ICD-10-CM

## 2025-01-15 DIAGNOSIS — M25.551 RIGHT HIP PAIN: ICD-10-CM

## 2025-01-15 PROCEDURE — 97110 THERAPEUTIC EXERCISES: CPT

## 2025-01-15 PROCEDURE — 97112 NEUROMUSCULAR REEDUCATION: CPT

## 2025-01-15 NOTE — PROGRESS NOTES
"Daily Note     Today's date: 1/15/2025  Patient name: Misa Zuleta  : 1965  MRN: [id number]  Referring provider: Harrison Barajas DO  Dx:   Encounter Diagnosis     ICD-10-CM    1. Low back pain, unspecified back pain laterality, unspecified chronicity, unspecified whether sciatica present  M54.50       2. Right hip pain  M25.551           Start Time: 1045  Stop Time: 1130  Total time in clinic (min): 45 minutes    Subjective: Mild symptom increase with bilateral low back pain, some radiation to right thigh.       Objective: See treatment diary below      Assessment: Tolerated treatment well. Reduction in right thigh symptoms with flexion based interventions performed today, with localized low back discomfort reported after. Good response to motor control interventions performed within clinic, with further reduction in symptoms with hip abductor control interventions. Progressed patient's deep core control interventions with mild muscular strain reported. Reviewed HEP with patient verbalizing understanding. Patient exhibited good technique with therapeutic exercises and would benefit from continued PT      Plan: Continue per plan of care.      Precautions: Standard, Chronicity of symptoms, hx R. Shoulder fracture      Manuals  1/8 1/13 1/15                                                             Neuro Re-Ed             Pallof press   2x10 3# xs 7# xs 2x10         Supine ball crush    3x8 4\" hold                                                                          Ther Ex             Educaiton    HEP LM         Repeated movements Nv LM           Prone laying HEP, progress to prone prop            Flexion in supine  HEP  2x10         VG   Lvl 6 5' Lvl 6 5'         Bridge   2x10 3x10         Clam   3x8 4\" hold YHB 3x10 4\" hold                                                Ther Activity             Step up   4\" 2x6                       Gait Training                                       Modalities     "

## 2025-01-20 ENCOUNTER — APPOINTMENT (OUTPATIENT)
Dept: PHYSICAL THERAPY | Facility: REHABILITATION | Age: 60
End: 2025-01-20
Payer: MEDICARE

## 2025-01-22 ENCOUNTER — APPOINTMENT (OUTPATIENT)
Dept: PHYSICAL THERAPY | Facility: REHABILITATION | Age: 60
End: 2025-01-22
Payer: MEDICARE

## 2025-01-27 ENCOUNTER — APPOINTMENT (OUTPATIENT)
Dept: PHYSICAL THERAPY | Facility: REHABILITATION | Age: 60
End: 2025-01-27
Payer: MEDICARE

## 2025-01-28 ENCOUNTER — TELEPHONE (OUTPATIENT)
Dept: INTERNAL MEDICINE CLINIC | Facility: CLINIC | Age: 60
End: 2025-01-28

## 2025-01-29 ENCOUNTER — APPOINTMENT (OUTPATIENT)
Dept: PHYSICAL THERAPY | Facility: REHABILITATION | Age: 60
End: 2025-01-29
Payer: MEDICARE

## 2025-02-04 ENCOUNTER — OFFICE VISIT (OUTPATIENT)
Dept: INTERNAL MEDICINE CLINIC | Facility: CLINIC | Age: 60
End: 2025-02-04

## 2025-02-04 VITALS
DIASTOLIC BLOOD PRESSURE: 70 MMHG | BODY MASS INDEX: 29.26 KG/M2 | WEIGHT: 165.2 LBS | HEART RATE: 60 BPM | SYSTOLIC BLOOD PRESSURE: 117 MMHG | OXYGEN SATURATION: 97 % | TEMPERATURE: 97.8 F

## 2025-02-04 DIAGNOSIS — Z87.891 HISTORY OF TOBACCO USE: ICD-10-CM

## 2025-02-04 DIAGNOSIS — Z12.4 CERVICAL CANCER SCREENING: ICD-10-CM

## 2025-02-04 DIAGNOSIS — E55.9 VITAMIN D INSUFFICIENCY: ICD-10-CM

## 2025-02-04 DIAGNOSIS — Z12.12 SCREENING FOR COLORECTAL CANCER: ICD-10-CM

## 2025-02-04 DIAGNOSIS — M43.16 SPONDYLOLISTHESIS, LUMBAR REGION: ICD-10-CM

## 2025-02-04 DIAGNOSIS — Z00.00 MEDICARE ANNUAL WELLNESS VISIT, SUBSEQUENT: Primary | ICD-10-CM

## 2025-02-04 DIAGNOSIS — Z12.11 SCREENING FOR COLORECTAL CANCER: ICD-10-CM

## 2025-02-04 DIAGNOSIS — E53.8 VITAMIN B 12 DEFICIENCY: ICD-10-CM

## 2025-02-04 DIAGNOSIS — H91.13 PRESBYCUSIS OF BOTH EARS: ICD-10-CM

## 2025-02-04 DIAGNOSIS — R05.9 COUGH IN ADULT: ICD-10-CM

## 2025-02-04 DIAGNOSIS — E78.2 MIXED HYPERLIPIDEMIA: Chronic | ICD-10-CM

## 2025-02-04 DIAGNOSIS — I10 PRIMARY HYPERTENSION: ICD-10-CM

## 2025-02-04 DIAGNOSIS — R53.83 OTHER FATIGUE: ICD-10-CM

## 2025-02-04 DIAGNOSIS — F31.70 BIPOLAR AFFECTIVE DISORDER IN REMISSION (HCC): Chronic | ICD-10-CM

## 2025-02-04 DIAGNOSIS — F31.9 BIPOLAR AFFECTIVE DISORDER, REMISSION STATUS UNSPECIFIED (HCC): ICD-10-CM

## 2025-02-04 DIAGNOSIS — Z12.31 ENCOUNTER FOR SCREENING MAMMOGRAM FOR BREAST CANCER: ICD-10-CM

## 2025-02-04 DIAGNOSIS — F70 MILD INTELLECTUAL DISABILITY: Chronic | ICD-10-CM

## 2025-02-04 PROBLEM — U07.1 COVID-19: Status: ACTIVE | Noted: 2025-02-04

## 2025-02-04 LAB
SARS-COV-2 AG UPPER RESP QL IA: POSITIVE
VALID CONTROL: ABNORMAL

## 2025-02-04 PROCEDURE — G2211 COMPLEX E/M VISIT ADD ON: HCPCS | Performed by: FAMILY MEDICINE

## 2025-02-04 PROCEDURE — 99214 OFFICE O/P EST MOD 30 MIN: CPT | Performed by: FAMILY MEDICINE

## 2025-02-04 PROCEDURE — 87811 SARS-COV-2 COVID19 W/OPTIC: CPT | Performed by: FAMILY MEDICINE

## 2025-02-04 PROCEDURE — G0439 PPPS, SUBSEQ VISIT: HCPCS | Performed by: FAMILY MEDICINE

## 2025-02-04 NOTE — ASSESSMENT & PLAN NOTE
Pt agreeable to gyn evaluation , pap   Orders:    Ambulatory Referral to Obstetrics / Gynecology; Future

## 2025-02-04 NOTE — PATIENT INSTRUCTIONS
Medicare Preventive Visit Patient Instructions  Thank you for completing your Welcome to Medicare Visit or Medicare Annual Wellness Visit today. Your next wellness visit will be due in one year (2/5/2026).  The screening/preventive services that you may require over the next 5-10 years are detailed below. Some tests may not apply to you based off risk factors and/or age. Screening tests ordered at today's visit but not completed yet may show as past due. Also, please note that scanned in results may not display below.  Preventive Screenings:  Service Recommendations Previous Testing/Comments   Colorectal Cancer Screening  * Colonoscopy    * Fecal Occult Blood Test (FOBT)/Fecal Immunochemical Test (FIT)  * Fecal DNA/Cologuard Test  * Flexible Sigmoidoscopy Age: 45-75 years old   Colonoscopy: every 10 years (may be performed more frequently if at higher risk)  OR  FOBT/FIT: every 1 year  OR  Cologuard: every 3 years  OR  Sigmoidoscopy: every 5 years  Screening may be recommended earlier than age 45 if at higher risk for colorectal cancer. Also, an individualized decision between you and your healthcare provider will decide whether screening between the ages of 76-85 would be appropriate. Colonoscopy: Not on file  FOBT/FIT: Not on file  Cologuard: Not on file  Sigmoidoscopy: Not on file          Breast Cancer Screening Age: 40+ years old  Frequency: every 1-2 years  Not required if history of left and right mastectomy Mammogram: 07/28/2017        Cervical Cancer Screening Between the ages of 21-29, pap smear recommended once every 3 years.   Between the ages of 30-65, can perform pap smear with HPV co-testing every 5 years.   Recommendations may differ for women with a history of total hysterectomy, cervical cancer, or abnormal pap smears in past. Pap Smear: 03/13/2020        Hepatitis C Screening Once for adults born between 1945 and 1965  More frequently in patients at high risk for Hepatitis C Hep C Antibody:  02/03/2020    Screening Current   Diabetes Screening 1-2 times per year if you're at risk for diabetes or have pre-diabetes Fasting glucose: 103 mg/dL (7/27/2024)  A1C: 5.7 % (7/27/2024)  Screening Current   Cholesterol Screening Once every 5 years if you don't have a lipid disorder. May order more often based on risk factors. Lipid panel: 07/27/2024    Screening Not Indicated  History Lipid Disorder     Other Preventive Screenings Covered by Medicare:  Abdominal Aortic Aneurysm (AAA) Screening: covered once if your at risk. You're considered to be at risk if you have a family history of AAA.  Lung Cancer Screening: covers low dose CT scan once per year if you meet all of the following conditions: (1) Age 55-77; (2) No signs or symptoms of lung cancer; (3) Current smoker or have quit smoking within the last 15 years; (4) You have a tobacco smoking history of at least 20 pack years (packs per day multiplied by number of years you smoked); (5) You get a written order from a healthcare provider.  Glaucoma Screening: covered annually if you're considered high risk: (1) You have diabetes OR (2) Family history of glaucoma OR (3)  aged 50 and older OR (4)  American aged 65 and older  Osteoporosis Screening: covered every 2 years if you meet one of the following conditions: (1) You're estrogen deficient and at risk for osteoporosis based off medical history and other findings; (2) Have a vertebral abnormality; (3) On glucocorticoid therapy for more than 3 months; (4) Have primary hyperparathyroidism; (5) On osteoporosis medications and need to assess response to drug therapy.   Last bone density test (DXA Scan): 11/17/2023.  HIV Screening: covered annually if you're between the age of 15-65. Also covered annually if you are younger than 15 and older than 65 with risk factors for HIV infection. For pregnant patients, it is covered up to 3 times per pregnancy.    Immunizations:  Immunization  Recommendations   Influenza Vaccine Annual influenza vaccination during flu season is recommended for all persons aged >= 6 months who do not have contraindications   Pneumococcal Vaccine   * Pneumococcal conjugate vaccine = PCV13 (Prevnar 13), PCV15 (Vaxneuvance), PCV20 (Prevnar 20)  * Pneumococcal polysaccharide vaccine = PPSV23 (Pneumovax) Adults 19-65 yo with certain risk factors or if 65+ yo  If never received any pneumonia vaccine: recommend Prevnar 20 (PCV20)  Give PCV20 if previously received 1 dose of PCV13 or PPSV23   Hepatitis B Vaccine 3 dose series if at intermediate or high risk (ex: diabetes, end stage renal disease, liver disease)   Respiratory syncytial virus (RSV) Vaccine - COVERED BY MEDICARE PART D  * RSVPreF3 (Arexvy) CDC recommends that adults 60 years of age and older may receive a single dose of RSV vaccine using shared clinical decision-making (SCDM)   Tetanus (Td) Vaccine - COST NOT COVERED BY MEDICARE PART B Following completion of primary series, a booster dose should be given every 10 years to maintain immunity against tetanus. Td may also be given as tetanus wound prophylaxis.   Tdap Vaccine - COST NOT COVERED BY MEDICARE PART B Recommended at least once for all adults. For pregnant patients, recommended with each pregnancy.   Shingles Vaccine (Shingrix) - COST NOT COVERED BY MEDICARE PART B  2 shot series recommended in those 19 years and older who have or will have weakened immune systems or those 50 years and older     Health Maintenance Due:      Topic Date Due   • Colorectal Cancer Screening  Never done   • Breast Cancer Screening: Mammogram  07/28/2018   • Cervical Cancer Screening  03/13/2025   • HIV Screening  Completed   • Hepatitis C Screening  Completed   • Lung Cancer Screening  Discontinued     Immunizations Due:      Topic Date Due   • COVID-19 Vaccine (4 - 2024-25 season) 09/01/2024     Advance Directives   What are advance directives?  Advance directives are legal  documents that state your wishes and plans for medical care. These plans are made ahead of time in case you lose your ability to make decisions for yourself. Advance directives can apply to any medical decision, such as the treatments you want, and if you want to donate organs.   What are the types of advance directives?  There are many types of advance directives, and each state has rules about how to use them. You may choose a combination of any of the following:  Living will:  This is a written record of the treatment you want. You can also choose which treatments you do not want, which to limit, and which to stop at a certain time. This includes surgery, medicine, IV fluid, and tube feedings.   Durable power of  for healthcare (DPAHC):  This is a written record that states who you want to make healthcare choices for you when you are unable to make them for yourself. This person, called a proxy, is usually a family member or a friend. You may choose more than 1 proxy.  Do not resuscitate (DNR) order:  A DNR order is used in case your heart stops beating or you stop breathing. It is a request not to have certain forms of treatment, such as CPR. A DNR order may be included in other types of advance directives.  Medical directive:  This covers the care that you want if you are in a coma, near death, or unable to make decisions for yourself. You can list the treatments you want for each condition. Treatment may include pain medicine, surgery, blood transfusions, dialysis, IV or tube feedings, and a ventilator (breathing machine).  Values history:  This document has questions about your views, beliefs, and how you feel and think about life. This information can help others choose the care that you would choose.  Why are advance directives important?  An advance directive helps you control your care. Although spoken wishes may be used, it is better to have your wishes written down. Spoken wishes can be  misunderstood, or not followed. Treatments may be given even if you do not want them. An advance directive may make it easier for your family to make difficult choices about your care.   Cigarette Smoking and Your Health   Risks to your health if you smoke:  Nicotine and other chemicals found in tobacco damage every cell in your body. Even if you are a light smoker, you have an increased risk for cancer, heart disease, and lung disease. If you are pregnant or have diabetes, smoking increases your risk for complications.   Benefits to your health if you stop smoking:   You decrease respiratory symptoms such as coughing, wheezing, and shortness of breath.   You reduce your risk for cancers of the lung, mouth, throat, kidney, bladder, pancreas, stomach, and cervix. If you already have cancer, you increase the benefits of chemotherapy. You also reduce your risk for cancer returning or a second cancer from developing.   You reduce your risk for heart disease, blood clots, heart attack, and stroke.   You reduce your risk for lung infections, and diseases such as pneumonia, asthma, chronic bronchitis, and emphysema.  Your circulation improves. More oxygen can be delivered to your body. If you have diabetes, you lower your risk for complications, such as kidney, artery, and eye diseases. You also lower your risk for nerve damage. Nerve damage can lead to amputations, poor vision, and blindness.  You improve your body's ability to heal and to fight infections.  For more information and support to stop smoking:   [employer]  Phone: 7- [phone]  Web Address: www.[employer]  Weight Management   Why it is important to manage your weight:  Being overweight increases your risk of health conditions such as heart disease, high blood pressure, type 2 diabetes, and certain types of cancer. It can also increase your risk for osteoarthritis, sleep apnea, and other respiratory problems. Aim for a slow, steady weight loss.  Even a small amount of weight loss can lower your risk of health problems.  How to lose weight safely:  A safe and healthy way to lose weight is to eat fewer calories and get regular exercise. You can lose up about 1 pound a week by decreasing the number of calories you eat by 500 calories each day.   Healthy meal plan for weight management:  A healthy meal plan includes a variety of foods, contains fewer calories, and helps you stay healthy. A healthy meal plan includes the following:  Eat whole-grain foods more often.  A healthy meal plan should contain fiber. Fiber is the part of grains, fruits, and vegetables that is not broken down by your body. Whole-grain foods are healthy and provide extra fiber in your diet. Some examples of whole-grain foods are whole-wheat breads and pastas, oatmeal, brown rice, and bulgur.  Eat a variety of vegetables every day.  Include dark, leafy greens such as spinach, kale, geovanny greens, and mustard greens. Eat yellow and orange vegetables such as carrots, sweet potatoes, and winter squash.   Eat a variety of fruits every day.  Choose fresh or canned fruit (canned in its own juice or light syrup) instead of juice. Fruit juice has very little or no fiber.  Eat low-fat dairy foods.  Drink fat-free (skim) milk or 1% milk. Eat fat-free yogurt and low-fat cottage cheese. Try low-fat cheeses such as mozzarella and other reduced-fat cheeses.  Choose meat and other protein foods that are low in fat.  Choose beans or other legumes such as split peas or lentils. Choose fish, skinless poultry (chicken or turkey), or lean cuts of red meat (beef or pork). Before you cook meat or poultry, cut off any visible fat.   Use less fat and oil.  Try baking foods instead of frying them. Add less fat, such as margarine, sour cream, regular salad dressing and mayonnaise to foods. Eat fewer high-fat foods. Some examples of high-fat foods include french fries, doughnuts, ice cream, and cakes.  Eat fewer  sweets.  Limit foods and drinks that are high in sugar. This includes candy, cookies, regular soda, and sweetened drinks.  Exercise:  Exercise at least 30 minutes per day on most days of the week. Some examples of exercise include walking, biking, dancing, and swimming. You can also fit in more physical activity by taking the stairs instead of the elevator or parking farther away from stores. Ask your healthcare provider about the best exercise plan for you.      © Copyright [employer] 2018 Information is for End User's use only and may not be sold, redistributed or otherwise used for commercial purposes. All illustrations and images included in CareNotes® are the copyrighted property of [employer] or [employer]

## 2025-02-04 NOTE — ASSESSMENT & PLAN NOTE
Low fat diet , exercise due for follow up lipid profile   Orders:    Lipid panel; Future    Comprehensive metabolic panel; Future

## 2025-02-04 NOTE — PROGRESS NOTES
Name: Misa Zuleta      : 1965      MRN: [id number]  Encounter Provider: Ethel Sheffield MD  Encounter Date: 2025   Encounter department: [facility]    Assessment & Plan  Medicare annual wellness visit, subsequent  Pt declines flu vaccine , due for eye exam , hearing evaluation audiology , fasting labs slip ordered        Cough in adult  Pt tested + COVID , recommend rest drink plenty of fluids , tylenol , tea with honey , Vicks vapo rub to base of nose , throat chest   Orders:    POCT Rapid Covid Ag    Other fatigue    Orders:    POCT Rapid Covid Ag    CBC and differential; Future    Primary hypertension         Bipolar affective disorder in remission (HCC)  Patient follows with psychiatry , has a counselor she has had one session , going back soon        Mild intellectual disability         Mixed hyperlipidemia  Low fat diet , exercise due for follow up lipid profile   Orders:    Lipid panel; Future    Comprehensive metabolic panel; Future    Vitamin D insufficiency  Patient is taking D 3 2 50 mg q day she does miss some days due for labs   Orders:    Vitamin D 25 hydroxy; Future    Spondylolisthesis, lumbar region  Patient is doing well with P.T she has been attending 2 x per week x 4 weeks now , she will return to spine specialist and will have LS MRI she will continue her home exercise regimen she is feeling some better        History of tobacco use  Patient has been smoke free x 6 months she smoked since teen years always less than 1/2 ppd , lauded in her efforts        Vitamin B 12 deficiency  Due for follow up labs , last value 170 2024  Orders:    Vitamin B12; Future    Encounter for screening mammogram for breast cancer    Orders:    Mammo screening bilateral w 3d and cad; Future    Cervical cancer screening  Pt agreeable to gyn evaluation , pap   Orders:    Ambulatory Referral to Obstetrics / Gynecology; Future    Screening for colorectal cancer  Pt  agreeable to home test   Orders:    At Home Occult Blood, Fecal,IA; Future    Presbycusis of both ears    Orders:    Ambulatory Referral to Audiology; Future    Bipolar affective disorder, remission status unspecified (HCC)  Patient follows with psychiatry , has a counselor she has had one session , going back soon        BMI Counseling: Body mass index is 29.26 kg/m². The BMI is above normal. Nutrition recommendations include decreasing portion sizes, encouraging healthy choices of fruits and vegetables, decreasing fast food intake, consuming healthier snacks, limiting drinks that contain sugar and reducing intake of saturated and trans fat. Exercise recommendations include exercising 3-5 times per week. Rationale for BMI follow-up plan is due to patient being overweight or obese.       Preventive health issues were discussed with patient, and age appropriate screening tests were ordered as noted in patient's After Visit Summary. Personalized health advice and appropriate referrals for health education or preventive services given if needed, as noted in patient's After Visit Summary.    History of Present Illness     HPI pt here for AWV , she is also ill started to feel sick yesterday when she had gone out achy body pain , felt warm , head pain some nasal congestion , felt she had fever last night - s.t mild cough she has taken tylenol last night and today she laid down right away when she got back to her place   Vision last exam years ago , she doesn't wear glasses   Dental she has no teeth she has upper denture , she has no bone lower so can't have lower denture   Hearing poor for a long time getting worse , last testing ? No date on the paperwork she has at home   Diet proteins , good with meat chicken fish , good with fruits veggies , water bottles 2 per day , coffee 4 cups a day , -rare juice , soda 1 per day   Exercise she walks daily   Prior Smoking quit 6 months ago , she smoked 1/2 pack or less per day ,  smoked off and on since teen years -ETOH   Fam hx HTN M Aunt , Mom  Alzheimer's  age 80   Fam hx cancer M Aunt lung cancer , MGM colon cancer  older age   Patient Care Team:  Ethel Sheffield MD as PCP - General (Family Medicine)  Angela Kellogg MD as PCP - PCP-[facility] ([facility])    Review of Systems   Constitutional:  Negative for chills and fever.   HENT:  Positive for congestion, hearing loss, postnasal drip and rhinorrhea. Negative for ear pain, sore throat and voice change.    Eyes:  Negative for pain and visual disturbance.   Respiratory:  Positive for cough. Negative for shortness of breath.    Cardiovascular:  Negative for chest pain and palpitations.   Gastrointestinal:  Negative for abdominal pain and vomiting.   Genitourinary:  Negative for dysuria and hematuria.   Musculoskeletal:  Negative for arthralgias and back pain.   Skin:  Negative for color change and rash.   Neurological:  Negative for seizures and syncope.   Psychiatric/Behavioral:  Positive for sleep disturbance. Negative for self-injury and suicidal ideas.    All other systems reviewed and are negative.    Medical History Reviewed by provider this encounter:  Tobacco  Allergies  Meds  Problems  Med Hx  Surg Hx  Fam Hx       Annual Wellness Visit Questionnaire   Misa is here for her Subsequent Wellness visit.     Health Risk Assessment:   Patient rates overall health as fair. Patient feels that their physical health rating is same. Patient is satisfied with their life. Eyesight was rated as same. Hearing was rated as much worse. Patient feels that their emotional and mental health rating is slightly worse. Patients states they are sometimes angry. Patient states they are sometimes unusually tired/fatigued. Pain experienced in the last 7 days has been some. Patient's pain rating has been 6/10.     Depression Screening:   PHQ-9 Score: 10      Fall Risk Screening:   In the past year, patient has experienced: history  of falling in past year    Number of falls: 1  Injured during fall?: Yes    Feels unsteady when standing or walking?: Yes    Worried about falling?: Yes      Urinary Incontinence Screening:   Patient has not leaked urine accidently in the last six months.     Home Safety:  Patient has trouble with stairs inside or outside of their home. Patient has working smoke alarms and has working carbon monoxide detector. Home safety hazards include: none.     Nutrition:   Current diet is Regular.     Medications:   Patient is currently taking over-the-counter supplements. OTC medications include: see medication list. Patient is able to manage medications.     Activities of Daily Living (ADLs)/Instrumental Activities of Daily Living (IADLs):   Walk and transfer into and out of bed and chair?: Yes  Dress and groom yourself?: Yes    Bathe or shower yourself?: Yes    Feed yourself? Yes  Do your laundry/housekeeping?: Yes  Manage your money, pay your bills and track your expenses?: Yes  Make your own meals?: Yes    Do your own shopping?: Yes    PREVENTIVE SCREENINGS      Cardiovascular Screening:    General: Screening Not Indicated and History Lipid Disorder      Diabetes Screening:     General: Screening Current      Lung Cancer Screening:     General: Screening Not Indicated      Hepatitis C Screening:    General: Screening Current    Screening, Brief Intervention, and Referral to Treatment (SBIRT)    Screening  Typical number of drinks in a day: 0  Typical number of drinks in a week: 0  Interpretation: Low risk drinking behavior.    Single Item Drug Screening:  How often have you used an illegal drug (including marijuana) or a prescription medication for non-medical reasons in the past year? never    Single Item Drug Screen Score: 0  Interpretation: Negative screen for possible drug use disorder    Social Drivers of Health     Financial Resource Strain: Low Risk  (2/4/2025)    Overall Financial Resource Strain ([facility])      Difficulty of Paying Living Expenses: Not hard at all   Food Insecurity: No Food Insecurity (2/4/2025)    Hunger Vital Sign     Worried About Running Out of Food in the Last Year: Never true     Ran Out of Food in the Last Year: Never true   Transportation Needs: No Transportation Needs (2/4/2025)    PRAPARE - Transportation     Lack of Transportation (Medical): No     Lack of Transportation (Non-Medical): No   Housing Stability: Low Risk  (2/4/2025)    Housing Stability Vital Sign     Unable to Pay for Housing in the Last Year: No     Number of Times Moved in the Last Year: 1     Homeless in the Last Year: No   Utilities: Not At Risk (2/4/2025)    [facility] Utilities     Threatened with loss of utilities: No     No results found.    Objective   /70 (BP Location: Left arm, Patient Position: Sitting, Cuff Size: Standard)   Pulse 60   Temp 97.8 °F (36.6 °C) (Temporal)   Wt 74.9 kg (165 lb 3.2 oz)   LMP  (LMP Unknown)   SpO2 97%   BMI 29.26 kg/m²     Physical Exam  Vitals and nursing note reviewed.   Constitutional:       General: She is not in acute distress.     Appearance: She is well-developed.      Comments: Skin with good color turgor , well hydrated ,no distress noted     HENT:      Head: Normocephalic and atraumatic.      Right Ear: No decreased hearing noted. No middle ear effusion. There is no impacted cerumen.      Left Ear: No decreased hearing noted.  No middle ear effusion. There is no impacted cerumen.      Nose: Congestion and rhinorrhea present.      Mouth/Throat:      Dentition: Abnormal dentition.      Pharynx: Oropharynx is clear.      Comments: Edentulous   Eyes:      Conjunctiva/sclera: Conjunctivae normal.   Neck:      Thyroid: No thyromegaly.   Cardiovascular:      Rate and Rhythm: Normal rate and regular rhythm.      Heart sounds: Normal heart sounds. No murmur heard.  Pulmonary:      Effort: Pulmonary effort is normal. No respiratory distress.      Breath sounds: Normal breath sounds.    Abdominal:      Palpations: Abdomen is soft.      Tenderness: There is no abdominal tenderness. There is no right CVA tenderness or left CVA tenderness.   Musculoskeletal:         General: No swelling.      Cervical back: Neck supple.      Lumbar back: Decreased range of motion.      Right hip: Tenderness present. Decreased range of motion.   Lymphadenopathy:      Cervical:      Right cervical: No superficial or posterior cervical adenopathy.     Left cervical: No superficial or posterior cervical adenopathy.   Skin:     General: Skin is warm and dry.      Capillary Refill: Capillary refill takes less than 2 seconds.   Neurological:      Mental Status: She is alert.      Comments: Non focal exam   Psychiatric:         Mood and Affect: Mood normal.         Speech: Speech normal.         Behavior: Behavior normal.

## 2025-02-04 NOTE — ASSESSMENT & PLAN NOTE
Pt declines flu vaccine , due for eye exam , hearing evaluation audiology , fasting labs slip ordered

## 2025-02-04 NOTE — ASSESSMENT & PLAN NOTE
Patient is doing well with P.T she has been attending 2 x per week x 4 weeks now , she will return to spine specialist and will have LS MRI she will continue her home exercise regimen she is feeling some better

## 2025-02-04 NOTE — ASSESSMENT & PLAN NOTE
Patient has been smoke free x 6 months she smoked since teen years always less than 1/2 ppd , lauded in her efforts

## 2025-02-04 NOTE — ASSESSMENT & PLAN NOTE
Patient is taking D 3 2 50 mg q day she does miss some days due for labs   Orders:    Vitamin D 25 hydroxy; Future

## 2025-02-10 ENCOUNTER — TELEPHONE (OUTPATIENT)
Dept: BEHAVIORAL/MENTAL HEALTH CLINIC | Facility: CLINIC | Age: 60
End: 2025-02-10

## 2025-02-12 ENCOUNTER — TELEPHONE (OUTPATIENT)
Dept: BEHAVIORAL/MENTAL HEALTH CLINIC | Facility: CLINIC | Age: 60
End: 2025-02-12

## 2025-02-12 NOTE — TELEPHONE ENCOUNTER
Attached is a form from [place] [employer] for the provider to complete.    Will be place in provider's mail bin for completion.

## 2025-02-25 ENCOUNTER — TELEPHONE (OUTPATIENT)
Age: 60
End: 2025-02-25

## 2025-02-25 NOTE — TELEPHONE ENCOUNTER
Spoke with the patient, she mentioned she is going through some personal issues and unable  to schedule any appointments

## 2025-03-04 ENCOUNTER — TELEPHONE (OUTPATIENT)
Dept: PSYCHIATRY | Facility: CLINIC | Age: 60
End: 2025-03-04

## 2025-03-04 NOTE — TELEPHONE ENCOUNTER
A medical record request was received 3/4/25 from [place] [employer]. The date range is 1/1/24 to Present.    Placed in Keturah MENDEZ's mailbox for her review.

## 2025-03-06 PROBLEM — Z12.11 SCREENING FOR COLORECTAL CANCER: Status: RESOLVED | Noted: 2025-02-04 | Resolved: 2025-03-06

## 2025-03-06 PROBLEM — Z12.4 CERVICAL CANCER SCREENING: Status: RESOLVED | Noted: 2025-02-04 | Resolved: 2025-03-06

## 2025-03-06 PROBLEM — Z00.00 MEDICARE ANNUAL WELLNESS VISIT, SUBSEQUENT: Status: RESOLVED | Noted: 2025-02-04 | Resolved: 2025-03-06

## 2025-03-06 PROBLEM — Z12.12 SCREENING FOR COLORECTAL CANCER: Status: RESOLVED | Noted: 2025-02-04 | Resolved: 2025-03-06

## 2025-03-07 ENCOUNTER — TELEPHONE (OUTPATIENT)
Dept: INTERNAL MEDICINE CLINIC | Facility: CLINIC | Age: 60
End: 2025-03-07

## 2025-03-07 ENCOUNTER — TELEPHONE (OUTPATIENT)
Age: 60
End: 2025-03-07

## 2025-03-07 DIAGNOSIS — I10 PRIMARY HYPERTENSION: ICD-10-CM

## 2025-03-07 RX ORDER — AMLODIPINE BESYLATE 10 MG/1
10 TABLET ORAL DAILY
Qty: 90 TABLET | Refills: 3 | Status: SHIPPED | OUTPATIENT
Start: 2025-03-07

## 2025-03-07 NOTE — TELEPHONE ENCOUNTER
Patient also requesting refill of Atorvastin medication. Not in current med list. Please review.    Please call patient with update

## 2025-03-07 NOTE — TELEPHONE ENCOUNTER
S/w pt and advised pt to reach out to her PCP to see if PCP is able to assist.  Pt stated she has a residential nurse that is attempted to assist.  Pt has a  from   [facility] who also is working on this for her. Pt reports this is really taxing on her, she does have access to meals. Pt states everyone is telling her to be calm, this is going to take time, with the appeals process, pt expressed she is nervous and it does not stop her worrying. Pt states she has no one to go to for help. Nurse provided emotional support and listened on the line. Pt is on disability and they are taking it all away from her. She reports she has a learning disability from when she was a young child, she was put on SSI and disability.  Everything is in review to see if she is still eligible for disability. Nurse did reiterated to pt for pt to reach out to PCP for resources, pt stated her  is helping with that. Pt states she has an upcoming appt with her therapist. Nurse provided more emotional support and advised pt to call our office when she is ready to reschedule.

## 2025-03-07 NOTE — TELEPHONE ENCOUNTER
Patient is requesting refills for her Atorvastatin. Per chart review this medication Discontinued by: Josué Romero PA-C on 8/1/2024 11:54.    Please review and advise if appropriate to refill

## 2025-03-12 DIAGNOSIS — E78.2 MIXED HYPERLIPIDEMIA: Primary | Chronic | ICD-10-CM

## 2025-03-12 RX ORDER — ATORVASTATIN CALCIUM 20 MG/1
20 TABLET, FILM COATED ORAL DAILY
Qty: 90 TABLET | Refills: 1 | Status: SHIPPED | OUTPATIENT
Start: 2025-03-12

## 2025-03-19 ENCOUNTER — TELEPHONE (OUTPATIENT)
Age: 60
End: 2025-03-19

## 2025-03-19 NOTE — TELEPHONE ENCOUNTER
Patient is calling regarding cancelling an appointment.    Date/Time: 3/20 @ 11    Reason: pt in process of getting  [place] back    Patient was rescheduled: YES [x] NO []  If yes, when was Patient reschedule for: 4.29 @ 9    Patient requesting call back to reschedule: YES [] NO [x]

## 2025-04-23 ENCOUNTER — TELEPHONE (OUTPATIENT)
Age: 60
End: 2025-04-23

## 2025-04-23 NOTE — TELEPHONE ENCOUNTER
"Contacted patient for Medication Management  to verify needs of services in attempts to offer patient an appointment at Available Office. Writer verified N/A - NO ANSWER. Writer unable to LVM due to message stating \"your request cannot be processed.\"    Called 1x  "

## 2025-04-29 ENCOUNTER — TELEPHONE (OUTPATIENT)
Dept: PSYCHIATRY | Facility: CLINIC | Age: 60
End: 2025-04-29

## 2025-04-29 NOTE — LETTER
25     Misa Zuleta   : 1965   [address] [facility]  [place] [address]       It is the policy of [facility] to monitor and manage appointments that have been no-showed or cancelled with less than 48-hour notice. This is necessary to ensure that we are able to provide timely access for all patients to our providers. Undue numbers of unutilized appointments delays necessary medical care for all patients.      Dear Misa Zuleta       We are sorry that you missed your appointment with Anna Ochoa LCSW on 2025. It has been 6 months or more since your last appointment. Your health and follow-up care are important to us. We want to make you aware that you do not have another appointment with Anna Ochoa LCSW scheduled. If you have already rescheduled this appointment, please disregard.    Please be aware that our office policy states that if you 'no show' two or more Therapy appointments without prior notice of cancellation within in a calendar year, you may be discharged from Therapy treatment.  We want to bring this to your attention now to prevent an interruption of your care.  If you have any questions about this policy, please call us at the number above.     If we do not hear from you within 10 business days to make a follow up appointment, we will assume you are no longer interested in care here.    Thank you in advance for your cooperation and assistance.       Sincerely,      [facility] Support Staff

## 2025-04-29 NOTE — TELEPHONE ENCOUNTER
NO-SHOW LETTER MAILED TO Misa Zuleta.  ADDRESS: [address] [address]  SENT VIA [employer]

## 2025-05-05 ENCOUNTER — TELEPHONE (OUTPATIENT)
Dept: PSYCHIATRY | Facility: CLINIC | Age: 60
End: 2025-05-05

## 2025-05-05 NOTE — TELEPHONE ENCOUNTER
Called and LVM for patient to notify that 5/5/25 appt will need to be canceled and r/s due to conflict in provider's schedule, as there was already an appt booked.    Please assist with r/s, upon returned call.

## 2025-05-05 NOTE — TELEPHONE ENCOUNTER
Received call from POD with Beth and patient expressing disappointment regarding patients appointment being canceled today. Beth explained that patient has not been seen since 9/2024 and this is the second cancellation by provider last minute. Patient was provided transportation to the office and was told appt was canceled. She did explain patient previously canceled due to her benefits being taken for a time. Beth expressed that patient is besides herself and may have to be taken to the ER for acute care. Writer expressed understanding and explained FELI process to RN and patient. Both understood and requested a call back to Beth with response from practice admin.    Beth provided direct number for her both work and cell    Work - [phone]  Cell - [phone]    Will forward request to admin for review.

## 2025-05-05 NOTE — TELEPHONE ENCOUNTER
Beth French RN where patient lives, [phone]     Called in stating the patient made it to the office only to find out their appointment today for talk therapy was cancelled.  Patient states this os not the first time this has happened and they would like to transfer to another therapist especially after waiting 6 months to get the initial appointment.    Patient and RN were transfered to support services for further assistance.

## 2025-05-05 NOTE — TELEPHONE ENCOUNTER
Called and spoke with Beth RN and patient who was still with her. Explained to patient response from provider and offered opening 5/7 8AM in office. Patient agreed and Beth confirmed she will have transportation set up for patient. Beth expressed the need for patient to talk with someone and thanked staff for the call.

## 2025-05-07 ENCOUNTER — TELEPHONE (OUTPATIENT)
Dept: PSYCHIATRY | Facility: CLINIC | Age: 60
End: 2025-05-07

## 2025-05-07 NOTE — LETTER
25     Misa Zuleta   : 1965   [address] [facility]  [place] [address]       It is the policy of [facility] to monitor and manage appointments that have been no-showed or cancelled with less than 48-hour notice. This is necessary to ensure that we are able to provide timely access for all patients to our providers. Undue numbers of unutilized appointments delays necessary medical care for all patients.      Dear Misa Zuleta       We are sorry that you missed your appointment with Anna Ochoa LCSW on 2025. It has been 8 months or more   since your last appointment. Your health and follow-up care are important to us. We want to make you aware that you do not have another appointment with Anna Ochoa LCSW scheduled. If you have already rescheduled this appointment, please disregard.    Please be aware that our office policy states that if you 'no show' two or more Therapy appointments without prior notice of cancellation within in a calendar year, you may be discharged from Therapy treatment.  We want to bring this to your attention now to prevent an interruption of your care.  If you have any questions about this policy, please call us at the number above.     If we do not hear from you within 10 business days to make a follow up appointment, we will assume you are no longer interested in care here.    Thank you in advance for your cooperation and assistance.       Sincerely,      [facility] Support Staff

## 2025-05-07 NOTE — TELEPHONE ENCOUNTER
NO-SHOW LETTER MAILED TO Misa Zuleta.  ADDRESS: [address] [address]  SENT VIA [employer]

## 2025-05-19 ENCOUNTER — TELEPHONE (OUTPATIENT)
Age: 60
End: 2025-05-19

## 2025-05-21 NOTE — TELEPHONE ENCOUNTER
Pt is requesting FELI. Pt feels like she cannot trust provider anymore due to cancelling appts.     Pt also updated phone number as her phone number will be disconnected. Pt is ok with calling nurse at residence. Beth # [phone] and fax # [phone]   Received Verbal consent to speak with nurse about appts.   Beth will be out of office until may 27th  
Request acknowledged by support services. Writer spoke with Beth ORO on 5/5 and scheduled 5/7 8AM appt to which was confirmed transportation would be set up for patient. Appt was missed. No communication with office to cancel visit prior. Patient requesting FELI due to office cancellations. Approval needed by admin. Writer will contact Beth upon her return on 5/27 to discuss what may have happened between 5/5 and 5/7 for appt to be missed. Will forward to admin in the interim. Patient on [facility] for FELI pending approval. Patient seen once by provider. No summary needed.  
Patient Aakash is a [id number] boy with no medical history presenting with 1 day of difficulty breathing and cough.  Patient was seen in the ED 2 weeks ago 9/27 for the same complaint.  Was diagnosed with bronchiolitis and put on HFNC.  He was able to be weaned and discharged from the ED.  Today returns with similar symptoms of labored respiration, belly breathing, skin pulling in the neck. VUTD, no sick contacts, no recent travel.     ED Course:  Noted diminished breath sounds, given 3 back to backs, dex, and HF (30L). Noted to have fever of 100.9F.

## 2025-05-29 NOTE — NURSING NOTE
Patient in dayroom for meals, was social with others and currently denies symptoms   Patient compliant with medications and has no complaints at this time Physical Therapy Visit    Visit Type: Daily Treatment Note  Visit: 5  Referring Provider: Karthikeyan Wen MD  Medical Diagnosis (from order): R07.89 - Atypical chest pain   Patient alert and oriented X3.    SUBJECTIVE                                                                                                               Iram working on a hardscaping project over the weekend which entailed moving lots of rocks.  She experienced latent soreness in the upper arms bilaterally by the next day.    Pain:  unrated  Functional Change: Plays pfwaterworksle daily and it experiences no irritation.    Pain / Symptoms  - Location:        OBJECTIVE                                                                                                                    Observation   Palpation:  limited soft tissue play right middle thoracic groove    Middle and Lower Trap strength:  4/5 on the left and 4-/5 on the right, tested with long lever    Passive left shoulder abduction in neutral rotation:  0 - 170 degrees left and 0 - 178 degrees right    Limited posterior to anterior shear at T2    Limited exhalation left middle posterior rib cage                        Treatment     Manual Therapy   STM of middle thoracic paraspinals in the perpendicular direction  Right rotation grade mobilization at T7    Neuromuscular Re-Education  Functional mobilization of right shear at T7 with resisted shoulder adduction and lower trunk rotation in sidelying  Functional mobilization of left shear at T8 with resisted shoulder adduction  Functional mobilization of right costal cage exhalation with sink plunger with lateral fascial anatomy train pattern  Functional mobilization of posterior to anterior shear at T2 and T4 with exaggerated deep breathing in sitting with elbows resting upon the table    NMR:  reviewed prone on elbows and added shoulder external rotation with light theraband  NMR:  horizontal abduction in supine with level one  leaenn    Skilled input: as detailed above    Writer verbally educated and received verbal consent for hand placement, positioning of patient, and techniques to be performed today from patient for hand placement and palpation for techniques and clothing adjustments for techniques as described above and how they are pertinent to the patient's plan of care.  Home Exercise Program  Prone on elbows for one to two minutes, twice per day.  Added shoulder external rotation with level one theraband    Access Code: [email or web address]  URL: [email or web address]  Date: 05/29/2025  Prepared by: Lucas Edward    Program Notes  Hold head position in neutral.  No crease in the neck.    Exercises  - Static Prone on Elbows  - 1 x daily - 7 x weekly - 3 sets - 10 reps  - Supine Shoulder Horizontal Abduction with Resistance  - 1 x daily - 7 x weekly - 3 sets - 10 reps      ASSESSMENT                                                                                                            Patient demonstrated excellent lateral costal cage motion after the visit.  Middle and Lower Trapezius strength increased by one mm grade as well.      Neris is sore after a hardscaping garden project this weekend, but demonstrated better shoulder abduction than when she left last session.    Pain:  unrated  Education:   - Results of above outlined education: Needs reinforcement    PLAN                                                                                                                           Suggestions for next session as indicated: Reassess lateral thoracic/costal cage and treat as indicated.  Reinforce home exercise program - consider addition of hooklying x band shoulder Y's    Goals  Long Term Goals: to be met by end of plan of care  1. Patient will be independent in a home exercise program.  2. Patient will score 5/5 with vertical compression test, upper extremity loading, suitcase test, and push /  pull.    3. Patient will tolerate working at her computer for one hour without upper quadrant pain.      Therapy procedure time and total treatment time can be found documented on the Time Entry flowsheet

## 2025-06-11 ENCOUNTER — TELEPHONE (OUTPATIENT)
Dept: INTERNAL MEDICINE CLINIC | Facility: CLINIC | Age: 60
End: 2025-06-11

## 2025-06-11 NOTE — TELEPHONE ENCOUNTER
Beth # [phone] from [facility] called on behalf of patient to get referrals for physical therapy. She called physical therapy and they would like referrals for her back and knees. I asked if ED or PT advised her to see her pcp and Beth said that physical therapy said they wouldn't need to see the doctor they just needed the referrals

## 2025-06-13 DIAGNOSIS — M22.2X1 PATELLOFEMORAL PAIN SYNDROME OF BOTH KNEES: ICD-10-CM

## 2025-06-13 DIAGNOSIS — M22.2X2 PATELLOFEMORAL PAIN SYNDROME OF BOTH KNEES: ICD-10-CM

## 2025-06-13 DIAGNOSIS — M43.16 SPONDYLOLISTHESIS, LUMBAR REGION: Primary | ICD-10-CM

## 2025-07-02 DIAGNOSIS — M25.561 RIGHT KNEE PAIN, UNSPECIFIED CHRONICITY: Primary | ICD-10-CM

## 2025-07-02 DIAGNOSIS — M25.562 LEFT KNEE PAIN, UNSPECIFIED CHRONICITY: ICD-10-CM

## 2025-07-15 ENCOUNTER — HOSPITAL ENCOUNTER (OUTPATIENT)
Dept: RADIOLOGY | Facility: HOSPITAL | Age: 60
Discharge: HOME | End: 2025-07-15
Attending: ORTHOPAEDIC SURGERY
Payer: MEDICARE

## 2025-07-15 ENCOUNTER — OFFICE VISIT (OUTPATIENT)
Dept: OBGYN CLINIC | Facility: HOSPITAL | Age: 60
End: 2025-07-15
Payer: MEDICARE

## 2025-07-15 VITALS — WEIGHT: 174 LBS | BODY MASS INDEX: 30.83 KG/M2 | HEIGHT: 63 IN

## 2025-07-15 DIAGNOSIS — M17.0 BILATERAL PRIMARY OSTEOARTHRITIS OF KNEE: Primary | ICD-10-CM

## 2025-07-15 DIAGNOSIS — M25.562 LEFT KNEE PAIN, UNSPECIFIED CHRONICITY: ICD-10-CM

## 2025-07-15 DIAGNOSIS — M25.561 RIGHT KNEE PAIN, UNSPECIFIED CHRONICITY: ICD-10-CM

## 2025-07-15 PROCEDURE — 99213 OFFICE O/P EST LOW 20 MIN: CPT | Performed by: ORTHOPAEDIC SURGERY

## 2025-07-15 PROCEDURE — 20610 DRAIN/INJ JOINT/BURSA W/O US: CPT | Performed by: ORTHOPAEDIC SURGERY

## 2025-07-15 PROCEDURE — 73562 X-RAY EXAM OF KNEE 3: CPT

## 2025-07-15 RX ORDER — LIDOCAINE HYDROCHLORIDE 10 MG/ML
2 INJECTION, SOLUTION INFILTRATION; PERINEURAL
Status: COMPLETED | OUTPATIENT
Start: 2025-07-15 | End: 2025-07-15

## 2025-07-15 RX ORDER — BETAMETHASONE SODIUM PHOSPHATE AND BETAMETHASONE ACETATE 3; 3 MG/ML; MG/ML
12 INJECTION, SUSPENSION INTRA-ARTICULAR; INTRALESIONAL; INTRAMUSCULAR; SOFT TISSUE
Status: COMPLETED | OUTPATIENT
Start: 2025-07-15 | End: 2025-07-15

## 2025-07-15 RX ORDER — LIDOCAINE HYDROCHLORIDE 10 MG/ML
1 INJECTION, SOLUTION INFILTRATION; PERINEURAL
Status: COMPLETED | OUTPATIENT
Start: 2025-07-15 | End: 2025-07-15

## 2025-07-15 RX ADMIN — LIDOCAINE HYDROCHLORIDE 2 ML: 10 INJECTION, SOLUTION INFILTRATION; PERINEURAL at 10:30

## 2025-07-15 RX ADMIN — BETAMETHASONE SODIUM PHOSPHATE AND BETAMETHASONE ACETATE 12 MG: 3; 3 INJECTION, SUSPENSION INTRA-ARTICULAR; INTRALESIONAL; INTRAMUSCULAR; SOFT TISSUE at 10:30

## 2025-07-15 RX ADMIN — LIDOCAINE HYDROCHLORIDE 1 ML: 10 INJECTION, SOLUTION INFILTRATION; PERINEURAL at 10:30

## 2025-07-23 ENCOUNTER — EVALUATION (OUTPATIENT)
Dept: PHYSICAL THERAPY | Facility: REHABILITATION | Age: 60
End: 2025-07-23
Payer: MEDICARE

## 2025-07-23 DIAGNOSIS — M54.16 LUMBAR RADICULOPATHY: Primary | ICD-10-CM

## 2025-07-23 DIAGNOSIS — M25.562 BILATERAL CHRONIC KNEE PAIN: ICD-10-CM

## 2025-07-23 DIAGNOSIS — G89.29 BILATERAL CHRONIC KNEE PAIN: ICD-10-CM

## 2025-07-23 DIAGNOSIS — M25.561 BILATERAL CHRONIC KNEE PAIN: ICD-10-CM

## 2025-07-23 DIAGNOSIS — M17.0 BILATERAL PRIMARY OSTEOARTHRITIS OF KNEE: ICD-10-CM

## 2025-07-23 PROCEDURE — 97162 PT EVAL MOD COMPLEX 30 MIN: CPT

## 2025-07-23 NOTE — PROGRESS NOTES
PT Evaluation     Today's date: 2025  Patient name: Misa Zuleta  : 1965  MRN: [id number]  Referring provider: Ethel Lewis MD  Dx:   Encounter Diagnosis     ICD-10-CM    1. Lumbar radiculopathy  M54.16       2. Bilateral primary osteoarthritis of knee  M17.0       3. Bilateral chronic knee pain  M25.561     M25.562     G89.29           Start Time: 0930  Stop Time: 1020  Total time in clinic (min): 50 minutes    Assessment  Impairments: abnormal coordination, abnormal or restricted ROM, abnormal movement, activity intolerance, impaired physical strength, lacks appropriate home exercise program, pain with function, poor body mechanics, participation limitations, activity limitations and endurance  Symptom irritability: moderate    Assessment details: Problem List:  1) Lumbar ROM  2) Thigh strength    Misa Zuleta is a pleasant 59 y.o. female who presents with chronic lower back pain with radiating symptoms and signs of knee osteoarthritis. She has limited lumbar motion, thigh and hip strength deficits, and radiating R LE pain resulting in knee pain when standing or walking over 20 minutes and difficulty ambulating stairs.  No further referral appears necessary at this time based upon examination results.  I expect she will do well with 8 weeks of skilled physical therapy.        Comparable signs:  1) Lower back pain with extension  2) Radiating R LE pain with flexion and side bending  3) Hip flexion weakness  Understanding of Dx/Px/POC: good     Prognosis: good    Goals  Short Term Goals:   1. Patient will demonstrate independence with HEP by providing return demonstration of exercises  2. Patient will report decreased symptom intensity during activity by 50%  3. Patient will increase hip flexion strength by 1/2 MMT grade.  4. Patient will be able to walk for 30 minutes without knee pain.    Long Term Goals:   1. Patient will improve FOTO to greater then goal  2. Patient will improve pain  with activity to 2/10 or less  3. Patient will continue with HEP to allow for continued progress and function  4. Patient will be able to walk for 1 hour without experiencing pain symptoms.        Plan  Patient would benefit from: skilled physical therapy, PT eval and home program    Planned therapy interventions: activity modification, body mechanics training, coordination, fine motor coordination training, flexibility, functional ROM exercises, graded activity, graded exercise, graded motor, home exercise program, joint mobilization, manual therapy, motor coordination training, nerve gliding, neuromuscular re-education, strengthening, stretching, therapeutic activities, therapeutic exercise, therapeutic training, Cagle taping, abdominal trunk stabilization and postural training    Frequency: 1-2x week  Plan of Care beginning date: 7/23/2025  Plan of Care expiration date: 9/17/2025  Treatment plan discussed with: patient      Subjective Evaluation    History of Present Illness  Mechanism of injury: Pt reports history of chronic low back pain which has improved with formal PT and her home exercise program. Symptoms continue, and she experiences low to moderate back pain. Pt stated she experiences back stiffness every morning. Pt said her back begins to loosen up with movement around the house. She currently has no back pain.symptoms will occasionally radiate to her right hip, and she gets some numbness and tingling in her right leg.     Pt reports history of right knee pain which has recently worsened and associated with activity. she is experiencing pain and swelling in her R knee. Pt stated she walks about 2 miles everyday. She begins experiencing R knee pain that can radiate to her right shin after about 20 minutes of walking, and it improves with rest. She will occasionally feel like her right leg wants to give out to the side. She currently has no knee pain.     She stated she heard a pop in her tibia while  standing in her apartment.  Pt lives in a 14 story building, but uses the elevator. When ambulating stairs, pt experiences pain symptoms. Pt uses rolling walker to ambulate occasionally. Pt is contemplating the use of a scooter for community ambulation.    Patient Goals  Patient goals for therapy: decreased pain, increased motion, increased strength, return to sport/leisure activities and independence with ADLs/IADLs    Pain  Current pain ratin  At best pain ratin  At worst pain ratin  Quality: dull ache, radiating, throbbing, pressure, discomfort, pulling and tight  Relieving factors: medications  Aggravating factors: walking and standing  Progression: improved    Treatments  Previous treatment: physical therapy, medication and injection treatment  Current treatment: injection treatment        Objective      Dermatome: (L/R):    L1: Intact to light touch bilaterally  L2: Intact to light touch bilaterally  L3: Intact to light touch bilaterally  L4: Intact to light touch bilaterally  L5: Intact to light touch bilaterally  S1: Intact to light touch bilaterally     Reflexes:  (L/R)   L3-4:  1+ on Right, 2+ on Left        S1:  2+ bilaterally                Lumbar Restriction level Response   Flex. Mild Produces back pain with radiating symptoms to just below the knee   Extn. Mod Produces central lower back pain   SG Left Peter Produces back pain with radiating symptoms to just below the knee   SG Right Peter Produces back pain with radiating symptoms to just below the knee   Seated postural correction : Decrease lateral thigh pain        MMT    Hip       L       R   Flex. (L1,L2,L3) 3+ (p) 3+ (p)        Knee     Ext (L3) 4 4- (p)   Flex (S2) 4- 4- (p)        Ankle/foot     DF (L4) 4+ 4+   PF (S1)     G. Toe (L5) 4 4       Neuro Dynamic Testing:  Slump test:     L= Positive     R=  Positive             Precautions: Standard, Past Medical History[1]        Manuals                                                                  Neuro Re-Ed                                                                                                        Ther Ex             Repeated movement testing Nv poss.                                                                                                        Ther Activity                                       Gait Training                                       Modalities                                                 [1]   Past Medical History:  Diagnosis Date    Anxiety     Arthritis     Bipolar affective disorder, depressed, severe (HCC) 04/28/2019    Depression     Elevated bilirubin 08/15/2019    Hyperlipidemia     Hypertension     Hypokalemia 08/15/2019    Learning difficulty     Leukocytosis 07/28/2020    Obesity (BMI 30.0-34.9) 06/26/2020    Osteopenia     Ovarian failure     PONV (postoperative nausea and vomiting)     Previous known suicide attempt     Psychiatric disorder     Psychiatric illness     PTSD (post-traumatic stress disorder) 02/25/2023

## 2025-07-24 ENCOUNTER — OFFICE VISIT (OUTPATIENT)
Dept: AUDIOLOGY | Age: 60
End: 2025-07-24
Attending: FAMILY MEDICINE
Payer: MEDICARE

## 2025-07-24 DIAGNOSIS — H91.13 PRESBYCUSIS OF BOTH EARS: ICD-10-CM

## 2025-07-24 DIAGNOSIS — H90.3 SENSORY HEARING LOSS, BILATERAL: Primary | ICD-10-CM

## 2025-07-24 PROCEDURE — 92557 COMPREHENSIVE HEARING TEST: CPT

## 2025-07-24 PROCEDURE — 92567 TYMPANOMETRY: CPT

## 2025-07-24 NOTE — PROGRESS NOTES
Diagnostic Hearing Evaluation    Name:  Misa Zuleta  :  1965  Age:  59 y.o.   MRN:  [id number]  Date of Evaluation: 25     HISTORY:     Reason for visit: Difficulty Understanding    Misa Zuleta is being seen today at the request of Dr. Sixto Sheffield for an initial  evaluation of hearing. The patient reports a longstanding asymmetric hearing loss. She also reports cotton in her left ear  EVALUATION:    Otoscopic Evaluation:   Right Ear: Clear canal   Left Ear: Clear canal    Tympanometry:   Right Ear: Type A; normal middle ear pressure and static compliance    Left Ear: Type B; no measurable middle ear pressure or static compliance, consistent with middle ear pathology.     Speech Audiometry:  Speech Reception (SRT)    Right Ear: 45 dB HL    Left Ear: 85 dB HL    Word Recognition Scores (WRS):  Right Ear: good (8/10 % correct)     Left Ear: excellent (9/10 % correct)    Stimuli: [id number]    Pure Tone Audiometry:  Conventional pure tone audiometry from 250 - 8000 Hz  was obtained with good reliability and revealed the following:     Right Ear: Moderate sloping to severe sensorineural hearing loss (SNHL)    Left Ear: Severe mixed hearing loss (MHL)     *see attached audiogram    RECOMMENDATIONS:  Consult ENT, Return to [facility]. for F/U, Hearing Aid Evaluation, and Copy to Patient/Caregiver    PATIENT EDUCATION:   The results of today's results and recommendations were reviewed with the patient and her hearing thresholds were explained at length. Treatment options, including amplification and communication strategies, were discussed as appropriate. The patient voiced understanding of her test results. Questions were addressed and the patient was encouraged to contact our department should concerns arise.      Manan Hayes., CCC-A  Clinical Audiologist  [facility]  [address]  [place] [id number]

## 2025-07-25 ENCOUNTER — OFFICE VISIT (OUTPATIENT)
Dept: OBGYN CLINIC | Facility: CLINIC | Age: 60
End: 2025-07-25

## 2025-07-25 VITALS
WEIGHT: 168.6 LBS | HEART RATE: 98 BPM | HEIGHT: 63 IN | DIASTOLIC BLOOD PRESSURE: 90 MMHG | SYSTOLIC BLOOD PRESSURE: 170 MMHG | BODY MASS INDEX: 29.88 KG/M2

## 2025-07-25 DIAGNOSIS — Z01.419 WOMEN'S ANNUAL ROUTINE GYNECOLOGICAL EXAMINATION: ICD-10-CM

## 2025-07-25 DIAGNOSIS — Z11.3 SCREEN FOR STD (SEXUALLY TRANSMITTED DISEASE): Primary | ICD-10-CM

## 2025-07-25 DIAGNOSIS — Z12.31 ENCOUNTER FOR SCREENING MAMMOGRAM FOR MALIGNANT NEOPLASM OF BREAST: ICD-10-CM

## 2025-07-25 DIAGNOSIS — Z12.4 CERVICAL CANCER SCREENING: ICD-10-CM

## 2025-07-25 DIAGNOSIS — E28.39 PRIMARY OVARIAN INSUFFICIENCY: ICD-10-CM

## 2025-07-25 PROCEDURE — 88175 CYTOPATH C/V AUTO FLUID REDO: CPT | Performed by: NURSE PRACTITIONER

## 2025-07-25 PROCEDURE — 87624 HPV HI-RISK TYP POOLED RSLT: CPT | Performed by: NURSE PRACTITIONER

## 2025-07-25 PROCEDURE — 87591 N.GONORRHOEAE DNA AMP PROB: CPT | Performed by: NURSE PRACTITIONER

## 2025-07-25 PROCEDURE — G0101 CA SCREEN;PELVIC/BREAST EXAM: HCPCS | Performed by: NURSE PRACTITIONER

## 2025-07-25 PROCEDURE — 87491 CHLMYD TRACH DNA AMP PROBE: CPT | Performed by: NURSE PRACTITIONER

## 2025-07-25 NOTE — PROGRESS NOTES
ANNUAL GYNECOLOGICAL EXAMINATION    Misa Zuleta is a 59 y.o. female who presents today for annual GYN exam.  Her last pap smear was performed  and result was LSIL, +HPV other. She had a follow up colposcopy with no high grade lesion noted, she has not had follow up since. She has a history of abnormal pap smears in her past, +HPV other.  Her last mammogram was performed years ago.  She has not had colon cancer screening performed.  She had HIV screening performed  and it was negative.  No LMP recorded (lmp unknown). Patient is postmenopausal. Patient reports that when she was younger she never had any menstrual cycles. She had gone to a GYN multiple times and had an exploratory lap which reported showed very underdeveloped ovaries and she was told she would never have a menstrual cycle or be able to conceive. Her general medical history has been reviewed and she reports it as follows:    Past Medical History[1]  Past Surgical History[2]  OB History          0    Para   0    Term   0       0    AB   0    Living   0         SAB   0    IAB   0    Ectopic   0    Multiple   0    Live Births   0               Social History[3]  Social History     Substance and Sexual Activity   Sexual Activity Yes    Partners: Male    Birth control/protection: None     Cancer-related family history is not on file.    Current Outpatient Medications   Medication Instructions    amLODIPine (NORVASC) 10 mg, Oral, Daily    atorvastatin (LIPITOR) 20 mg, Oral, Daily    fluticasone (FLONASE) 50 mcg/act nasal spray     naproxen (NAPROSYN) 500 mg, Oral, 2 times daily with meals    risperiDONE (RISPERDAL) 0.5 mg, Oral, Daily    risperiDONE (RISPERDAL) 1 mg, Oral, Daily at bedtime       Review of Systems:  Review of Systems   Constitutional: Negative.    Gastrointestinal: Negative.    Genitourinary: Negative.    Skin: Negative.        Physical Exam:  /90 (BP Location: Right arm, Patient Position: Sitting)   Pulse 98    "Ht 5' 3\" (1.6 m)   Wt 76.5 kg (168 lb 9.6 oz)   LMP  (LMP Unknown)   BMI 29.87 kg/m²   Physical Exam  Constitutional:       General: She is not in acute distress.     Appearance: Normal appearance.   Genitourinary:      Vulva and bladder normal.      No lesions in the vagina.      No vaginal erythema or ulceration.        Right Adnexa: not tender and no mass present.     Left Adnexa: not tender and no mass present.     No cervical motion tenderness or lesion.      Uterus is not enlarged or tender.      No uterine mass detected.  Breasts:     Right: No mass, nipple discharge or skin change.      Left: No mass, nipple discharge or skin change.     Cardiovascular:      Rate and Rhythm: Normal rate and regular rhythm.   Pulmonary:      Effort: Pulmonary effort is normal.      Breath sounds: Normal breath sounds.   Abdominal:      General: Abdomen is flat.      Palpations: Abdomen is soft.     Musculoskeletal:      Cervical back: Neck supple.     Neurological:      Mental Status: She is alert.     Skin:     General: Skin is warm and dry.     Psychiatric:         Mood and Affect: Mood normal.         Behavior: Behavior normal.   Vitals reviewed.         Assessment/Plan:   1. Normal well-woman GYN exam.  2. Cervical cancer screening:  Normal cervical exam.  Pap smear done with HPV co-testing.     3. STD screening: Orders placed for vaginal GC/CT cultures.  Orders placed for serum anti-HIV, anti-HCV, syphilis panel.   4. Breast cancer screening:  Normal breast exam.  Order placed for bilateral screening mammogram.  Reviewed breast self-awareness.   5. Colon cancer screening:  Has Cologuard kit at home from PCP. She declines colonoscopy.    6. Depression Screening: Patient's depression screening was assessed with a PHQ-2 score of 0. Clinically patient does not have depression. No treatment is required.     7. BMI Counseling: Body mass index is 29.87 kg/m². Discussed the patient's BMI with her. The BMI is above normal. " Exercise recommendations include moderate aerobic physical activity for 150 minutes/week.   8. Tobacco Cessation Counseling: Tobacco cessation counseling and education was provided. The patient is sincerely urged to quit consumption of tobacco. She is not ready to quit tobacco. The numerous health risks of tobacco consumption were discussed. If she decides to quit, there are a number of helpful adjunctive aids, and she can see me to discuss nicotine replacement therapy, chantix, or bupropion anytime in the future.   9. Return to office in one year for annual exam or sooner if needed.    Reviewed with patient that [facility], but that they will not necessarily be reviewed by me immediately.  Explained that I will review results at my earliest opportunity and contact patient appropriately.         [1]   Past Medical History:  Diagnosis Date    Anxiety     Arthritis     Bipolar affective disorder, depressed, severe (HCC) 04/28/2019    Depression     Elevated bilirubin 08/15/2019    Hyperlipidemia     Hypertension     Hypokalemia 08/15/2019    Learning difficulty     Leukocytosis 07/28/2020    Obesity (BMI 30.0-34.9) 06/26/2020    Osteopenia     Ovarian failure     PONV (postoperative nausea and vomiting)     Previous known suicide attempt     Psychiatric disorder     Psychiatric illness     PTSD (post-traumatic stress disorder) 02/25/2023   [2]   Past Surgical History:  Procedure Laterality Date    HERNIA REPAIR      LAPAROSCOPY      as a child, per patient-normal findings   [3]   Social History  Tobacco Use    Smoking status: Every Day     Current packs/day: 0.50     Average packs/day: 0.5 packs/day for 20.0 years (10.0 ttl pk-yrs)     Types: Cigarettes    Smokeless tobacco: Never   Vaping Use    Vaping status: Never Used   Substance Use Topics    Alcohol use: Not Currently    Drug use: Not Currently

## 2025-07-25 NOTE — PATIENT INSTRUCTIONS
Cigarette Smoking and Your Health     What are the risks to my health if I smoke tobacco?  Nicotine and other chemicals found in tobacco damage every cell in your body. Even if you are a light smoker, you have an increased risk for cancer, heart disease, and lung disease. If you are pregnant or have diabetes, smoking increases your risk for complications.     What are the benefits to my health if I stop smoking?   You decrease respiratory symptoms such as coughing, wheezing, and shortness of breath.     You reduce your risk for cancers of the lung, mouth, throat, kidney, bladder, pancreas, stomach, and cervix. If you already have cancer, you increase the benefits of chemotherapy. You also reduce your risk for cancer returning or a second cancer from developing.     You reduce your risk for heart disease, blood clots, heart attack, and stroke.     You reduce your risk for lung infections, and diseases such as pneumonia, asthma, chronic bronchitis, and emphysema.    Your circulation improves. More oxygen can be delivered to your body. If you have diabetes, you lower your risk for complications, such as kidney, artery, and eye diseases. You also lower your risk for nerve damage. Nerve damage can lead to amputations, poor vision, and blindness.    You improve your body's ability to heal and to fight infections.    What are the health benefits to others if I stop smoking?  Tobacco is harmful to nonsmokers who breathe in your secondhand smoke. The following are ways the health of others around you may improve when you stop smoking:  You lower the risks for lung cancer and heart disease in nonsmoking adults.     If you are pregnant, you lower the risk for miscarriage, early delivery, low birth weight, and stillbirth. You also lower your baby's risk for SIDS, obesity, developmental delay, and neurobehavioral problems, such as ADHD.     If you have children, you lower their risk for ear infections, colds, pneumonia,  bronchitis, and asthma.    How to Stop Smoking     You will improve your health and the health of others around you  if you stop smoking. Your risk for heart and lung disease, cancer, stroke, heart attack, and vision problems will also decrease. You can benefit from quitting no matter how long you have smoked.  PREPARE to stop smoking.  Nicotine is a highly addictive drug found in cigarettes. Withdrawal symptoms can happen when you stop smoking and make it hard to quit. These include anxiety, depression, irritability, trouble sleeping, and increased appetite. You increase your chances of success if you PREPARE to quit.    Set a quit date.  Pick a date that is within the next 2 weeks. Do not pick a day that you think may be stressful or busy. Write down the day or [place] it on your calender.     Tell friends and family that you plan to quit.  Explain that you may have withdrawal symptoms when you try to quit. Ask them to support you. They may be able to encourage you and help reduce your stress to make it easier for you to quit.    Make a list of your reasons for quitting.  Put the list somewhere you will see it every day, such as your refrigerator. You can look at the list when you have a craving.     Remove all tobacco and nicotine products from your home, car, and workplace.  Also, remove anything else that will tempt you to smoke, such as lighters, matches, or ashtrays. Clean your car, home, and places at work that smell like smoke. The smell of smoke can trigger a craving.     Identify triggers that make you want to smoke.  This may include activities, feelings, or people. Also write down 1 way you can deal with each of your triggers. For example, if you want to smoke as soon as you wake up, plan another activity during this time, such as exercise.     Make a plan for how you will quit.  Learn about the tools that can help you quit, such as medicine, counseling, or nicotine replacement therapy. Choose at least 2  options to help you quit.      Tools to help you stop smoking:     Counseling  from a trained healthcare provider can provide you with support and skills to quit smoking. The provider will also teach you to manage your withdrawal symptoms and cravings. You may receive counseling from one counselor, in group therapy, or through phone therapy called a quit line.     Nicotine replacement therapy (NRT)  such as nicotine patches, gum, or lozenges may help reduce your nicotine cravings. You may get these without a doctor's order. Do not use e-cigarettes or smokeless tobacco in place of cigarettes or to help you quit. They still contain nicotine.    Prescription medicines  such as nasal sprays or nicotine inhalers may help reduce your withdrawal symptoms. Other medicines may also be used to reduce your urge to smoke. Ask your healthcare provider about these medicines. You may need to start certain medicines 2 weeks before your quit date for them to work well.     Hypnosis  is a practice that helps guide you through thoughts and feelings. Hypnosis may help decrease your cravings and make you more willing to quit.     Acupuncture therapy  uses very thin needles to balance energy channels in the body. This is thought to help decrease cravings and symptoms of nicotine withdrawal.     Support groups  let you talk to others who are trying to quit or have already quit. It may be helpful to speak with others about how they quit.      Manage your cravings:     Avoid situations, people, and places that tempt you to smoke.  Go to nonsmoking places, such as libraries or restaurants. Understand what tempts you and try to avoid these things.    Keep your hands busy.  Hold things such as a stress ball or pen.     Put candy or toothpicks in your mouth.  Keep lollipops, sugarless gum, or toothpicks with you at all times.     Do not have alcohol or caffeine.  These drinks may tempt you to smoke. Drink healthy liquids such as water or juice  instead.    Reward yourself when you resist your cravings.  Rewards will motivate you and help you stay positive.     Do an activity that distracts you from your craving.  Examples include going for a walk, exercising, or cleaning.      Prevent weight gain after you quit:  You may gain a few pounds after you quit smoking. It is healthier for you to gain a few pounds than to continue to smoke. The following can help you prevent weight gain:    Eat healthy foods.  These include fruits, vegetables, whole-grain breads, low-fat dairy products, beans, lean meats, and fish. Eat healthy snacks, such as low-fat yogurt, if you get hungry between meals.     Drink water before, during, and between meals.  This will make your stomach feel full and help prevent you from overeating. Ask your healthcare provider how much liquid to drink each day and which liquids are best for you.    Exercise.  Take a walk or do some kind of exercise every day. Ask your healthcare provider what exercise is right for you. This may help reduce your cravings and reduce stress.

## 2025-07-28 ENCOUNTER — OFFICE VISIT (OUTPATIENT)
Dept: PHYSICAL THERAPY | Facility: REHABILITATION | Age: 60
End: 2025-07-28
Payer: MEDICARE

## 2025-07-28 DIAGNOSIS — M54.16 LUMBAR RADICULOPATHY: Primary | ICD-10-CM

## 2025-07-28 DIAGNOSIS — M17.0 BILATERAL PRIMARY OSTEOARTHRITIS OF KNEE: ICD-10-CM

## 2025-07-28 DIAGNOSIS — M25.561 BILATERAL CHRONIC KNEE PAIN: ICD-10-CM

## 2025-07-28 DIAGNOSIS — G89.29 BILATERAL CHRONIC KNEE PAIN: ICD-10-CM

## 2025-07-28 DIAGNOSIS — M25.562 BILATERAL CHRONIC KNEE PAIN: ICD-10-CM

## 2025-07-28 PROCEDURE — 97110 THERAPEUTIC EXERCISES: CPT

## 2025-07-29 LAB
C TRACH DNA SPEC QL NAA+PROBE: NEGATIVE
N GONORRHOEA DNA SPEC QL NAA+PROBE: NEGATIVE

## 2025-07-30 LAB
LAB AP GYN PRIMARY INTERPRETATION: NORMAL
Lab: NORMAL
PATH INTERP SPEC-IMP: NORMAL

## 2025-08-04 ENCOUNTER — OFFICE VISIT (OUTPATIENT)
Dept: PHYSICAL THERAPY | Facility: REHABILITATION | Age: 60
End: 2025-08-04
Payer: MEDICARE

## 2025-08-04 ENCOUNTER — OFFICE VISIT (OUTPATIENT)
Dept: INTERNAL MEDICINE CLINIC | Facility: CLINIC | Age: 60
End: 2025-08-04

## 2025-08-04 VITALS
HEIGHT: 63 IN | OXYGEN SATURATION: 94 % | WEIGHT: 172 LBS | DIASTOLIC BLOOD PRESSURE: 74 MMHG | SYSTOLIC BLOOD PRESSURE: 115 MMHG | BODY MASS INDEX: 30.48 KG/M2 | TEMPERATURE: 99.2 F | HEART RATE: 73 BPM

## 2025-08-04 DIAGNOSIS — G89.29 CHRONIC PAIN OF BOTH KNEES: ICD-10-CM

## 2025-08-04 DIAGNOSIS — M25.561 BILATERAL CHRONIC KNEE PAIN: ICD-10-CM

## 2025-08-04 DIAGNOSIS — G89.29 BILATERAL CHRONIC KNEE PAIN: ICD-10-CM

## 2025-08-04 DIAGNOSIS — M47.816 LUMBAR SPONDYLOSIS: ICD-10-CM

## 2025-08-04 DIAGNOSIS — F31.70 BIPOLAR AFFECTIVE DISORDER IN REMISSION (HCC): Chronic | ICD-10-CM

## 2025-08-04 DIAGNOSIS — M25.561 CHRONIC PAIN OF BOTH KNEES: ICD-10-CM

## 2025-08-04 DIAGNOSIS — M25.562 BILATERAL CHRONIC KNEE PAIN: ICD-10-CM

## 2025-08-04 DIAGNOSIS — M17.0 BILATERAL PRIMARY OSTEOARTHRITIS OF KNEE: ICD-10-CM

## 2025-08-04 DIAGNOSIS — M25.562 CHRONIC PAIN OF BOTH KNEES: ICD-10-CM

## 2025-08-04 DIAGNOSIS — M54.16 LUMBAR RADICULOPATHY: Primary | ICD-10-CM

## 2025-08-04 DIAGNOSIS — I10 PRIMARY HYPERTENSION: Primary | ICD-10-CM

## 2025-08-04 PROCEDURE — 99214 OFFICE O/P EST MOD 30 MIN: CPT | Performed by: FAMILY MEDICINE

## 2025-08-04 PROCEDURE — 97110 THERAPEUTIC EXERCISES: CPT

## 2025-08-04 PROCEDURE — G2211 COMPLEX E/M VISIT ADD ON: HCPCS | Performed by: FAMILY MEDICINE

## 2025-08-05 ENCOUNTER — TELEPHONE (OUTPATIENT)
Dept: OBGYN CLINIC | Facility: CLINIC | Age: 60
End: 2025-08-05

## 2025-08-06 ENCOUNTER — TELEPHONE (OUTPATIENT)
Dept: OBGYN CLINIC | Facility: CLINIC | Age: 60
End: 2025-08-06

## 2025-08-08 ENCOUNTER — OFFICE VISIT (OUTPATIENT)
Dept: PHYSICAL THERAPY | Facility: REHABILITATION | Age: 60
End: 2025-08-08
Payer: MEDICARE

## 2025-08-08 ENCOUNTER — TELEPHONE (OUTPATIENT)
Dept: OBGYN CLINIC | Facility: CLINIC | Age: 60
End: 2025-08-08

## 2025-08-08 DIAGNOSIS — M25.561 BILATERAL CHRONIC KNEE PAIN: ICD-10-CM

## 2025-08-08 DIAGNOSIS — G89.29 BILATERAL CHRONIC KNEE PAIN: ICD-10-CM

## 2025-08-08 DIAGNOSIS — M54.16 LUMBAR RADICULOPATHY: Primary | ICD-10-CM

## 2025-08-08 DIAGNOSIS — M17.0 BILATERAL PRIMARY OSTEOARTHRITIS OF KNEE: ICD-10-CM

## 2025-08-08 DIAGNOSIS — M25.562 BILATERAL CHRONIC KNEE PAIN: ICD-10-CM

## 2025-08-08 PROCEDURE — 97110 THERAPEUTIC EXERCISES: CPT

## 2025-08-08 PROCEDURE — 97116 GAIT TRAINING THERAPY: CPT

## 2025-08-11 ENCOUNTER — OFFICE VISIT (OUTPATIENT)
Dept: PHYSICAL THERAPY | Facility: REHABILITATION | Age: 60
End: 2025-08-11
Payer: MEDICARE

## 2025-08-12 ENCOUNTER — OFFICE VISIT (OUTPATIENT)
Dept: BEHAVIORAL/MENTAL HEALTH CLINIC | Facility: CLINIC | Age: 60
End: 2025-08-12
Payer: MEDICARE

## 2025-08-13 ENCOUNTER — TELEPHONE (OUTPATIENT)
Age: 60
End: 2025-08-13

## 2025-08-15 ENCOUNTER — OFFICE VISIT (OUTPATIENT)
Dept: PHYSICAL THERAPY | Facility: REHABILITATION | Age: 60
End: 2025-08-15
Payer: MEDICARE

## 2025-08-15 DIAGNOSIS — E78.2 MIXED HYPERLIPIDEMIA: Chronic | ICD-10-CM

## 2025-08-17 PROBLEM — B97.7 HPV (HUMAN PAPILLOMA VIRUS) INFECTION: Status: ACTIVE | Noted: 2025-08-17

## 2025-08-18 RX ORDER — ATORVASTATIN CALCIUM 20 MG/1
20 TABLET, FILM COATED ORAL DAILY
Qty: 90 TABLET | Refills: 3 | Status: SHIPPED | OUTPATIENT
Start: 2025-08-18

## 2025-08-19 ENCOUNTER — OFFICE VISIT (OUTPATIENT)
Dept: OBGYN CLINIC | Facility: CLINIC | Age: 60
End: 2025-08-19

## 2025-08-19 VITALS
HEIGHT: 63 IN | DIASTOLIC BLOOD PRESSURE: 80 MMHG | HEART RATE: 79 BPM | SYSTOLIC BLOOD PRESSURE: 132 MMHG | WEIGHT: 170 LBS | BODY MASS INDEX: 30.12 KG/M2

## 2025-08-19 DIAGNOSIS — B97.7 HPV (HUMAN PAPILLOMA VIRUS) INFECTION: Primary | ICD-10-CM

## 2025-08-19 PROCEDURE — 99213 OFFICE O/P EST LOW 20 MIN: CPT | Performed by: OBSTETRICS & GYNECOLOGY

## 2025-08-22 ENCOUNTER — TELEPHONE (OUTPATIENT)
Dept: OBGYN CLINIC | Facility: CLINIC | Age: 60
End: 2025-08-22